# Patient Record
Sex: MALE | Race: OTHER | Employment: OTHER | URBAN - METROPOLITAN AREA
[De-identification: names, ages, dates, MRNs, and addresses within clinical notes are randomized per-mention and may not be internally consistent; named-entity substitution may affect disease eponyms.]

---

## 2017-04-13 ENCOUNTER — HOSPITAL ENCOUNTER (EMERGENCY)
Age: 52
Discharge: HOME | End: 2017-04-13
Attending: EMERGENCY MEDICINE
Payer: COMMERCIAL

## 2017-04-13 ENCOUNTER — HOSPITAL ENCOUNTER (OUTPATIENT)
Age: 52
Setting detail: OUTPATIENT SURGERY
Discharge: ANOTHER HOSPITAL | End: 2017-04-13
Attending: PODIATRIST | Admitting: PODIATRIST
Payer: COMMERCIAL

## 2017-04-13 VITALS
WEIGHT: 202.6 LBS | OXYGEN SATURATION: 100 % | DIASTOLIC BLOOD PRESSURE: 83 MMHG | BODY MASS INDEX: 31.8 KG/M2 | RESPIRATION RATE: 14 BRPM | TEMPERATURE: 97.3 F | HEIGHT: 67 IN | HEART RATE: 74 BPM | SYSTOLIC BLOOD PRESSURE: 206 MMHG

## 2017-04-13 VITALS
HEART RATE: 77 BPM | TEMPERATURE: 97.9 F | RESPIRATION RATE: 16 BRPM | SYSTOLIC BLOOD PRESSURE: 165 MMHG | DIASTOLIC BLOOD PRESSURE: 63 MMHG | OXYGEN SATURATION: 98 %

## 2017-04-13 DIAGNOSIS — I10 ESSENTIAL HYPERTENSION: Primary | ICD-10-CM

## 2017-04-13 LAB
ABSOLUTE EOS #: 0.3 K/UL (ref 0–0.4)
ABSOLUTE LYMPH #: 2.1 K/UL (ref 1–4.8)
ABSOLUTE MONO #: 0.6 K/UL (ref 0.2–0.8)
ANION GAP SERPL CALCULATED.3IONS-SCNC: 14 MMOL/L (ref 9–17)
BASOPHILS # BLD: 0 % (ref 0–2)
BASOPHILS ABSOLUTE: 0 K/UL (ref 0–0.2)
BUN BLDV-MCNC: 12 MG/DL (ref 6–20)
BUN/CREAT BLD: 18 (ref 9–20)
CALCIUM SERPL-MCNC: 8.9 MG/DL (ref 8.6–10.4)
CHLORIDE BLD-SCNC: 100 MMOL/L (ref 98–107)
CO2: 24 MMOL/L (ref 20–31)
CREAT SERPL-MCNC: 0.66 MG/DL (ref 0.7–1.2)
DIFFERENTIAL TYPE: ABNORMAL
EOSINOPHILS RELATIVE PERCENT: 3 % (ref 1–4)
GFR AFRICAN AMERICAN: >60 ML/MIN
GFR NON-AFRICAN AMERICAN: >60 ML/MIN
GFR SERPL CREATININE-BSD FRML MDRD: ABNORMAL ML/MIN/{1.73_M2}
GFR SERPL CREATININE-BSD FRML MDRD: ABNORMAL ML/MIN/{1.73_M2}
GLUCOSE BLD-MCNC: 246 MG/DL (ref 70–99)
HCT VFR BLD CALC: 37.4 % (ref 41–53)
HEMOGLOBIN: 12.1 G/DL (ref 13.5–17.5)
LYMPHOCYTES # BLD: 23 % (ref 24–44)
MCH RBC QN AUTO: 26 PG (ref 26–34)
MCHC RBC AUTO-ENTMCNC: 32.3 G/DL (ref 31–37)
MCV RBC AUTO: 80.6 FL (ref 80–100)
MONOCYTES # BLD: 7 % (ref 1–7)
MYOGLOBIN: 62 NG/ML (ref 28–72)
PDW BLD-RTO: 14.4 % (ref 11.5–14.5)
PLATELET # BLD: 385 K/UL (ref 130–400)
PLATELET ESTIMATE: ABNORMAL
PMV BLD AUTO: 8.8 FL (ref 6–12)
POTASSIUM SERPL-SCNC: 4.6 MMOL/L (ref 3.7–5.3)
RBC # BLD: 4.63 M/UL (ref 4.5–5.9)
RBC # BLD: ABNORMAL 10*6/UL
SEG NEUTROPHILS: 67 % (ref 36–66)
SEGMENTED NEUTROPHILS ABSOLUTE COUNT: 6.2 K/UL (ref 1.8–7.7)
SODIUM BLD-SCNC: 138 MMOL/L (ref 135–144)
TROPONIN INTERP: NORMAL
TROPONIN T: <0.03 NG/ML
WBC # BLD: 9.2 K/UL (ref 3.5–11)
WBC # BLD: ABNORMAL 10*3/UL

## 2017-04-13 PROCEDURE — 7100000010 HC PHASE II RECOVERY - FIRST 15 MIN: Performed by: PODIATRIST

## 2017-04-13 PROCEDURE — 96374 THER/PROPH/DIAG INJ IV PUSH: CPT

## 2017-04-13 PROCEDURE — 96376 TX/PRO/DX INJ SAME DRUG ADON: CPT

## 2017-04-13 PROCEDURE — 3600000012 HC SURGERY LEVEL 2 ADDTL 15MIN: Performed by: PODIATRIST

## 2017-04-13 PROCEDURE — 84484 ASSAY OF TROPONIN QUANT: CPT

## 2017-04-13 PROCEDURE — 99283 EMERGENCY DEPT VISIT LOW MDM: CPT

## 2017-04-13 PROCEDURE — 6370000000 HC RX 637 (ALT 250 FOR IP): Performed by: PODIATRIST

## 2017-04-13 PROCEDURE — 85025 COMPLETE CBC W/AUTO DIFF WBC: CPT

## 2017-04-13 PROCEDURE — 3600000002 HC SURGERY LEVEL 2 BASE: Performed by: PODIATRIST

## 2017-04-13 PROCEDURE — 83874 ASSAY OF MYOGLOBIN: CPT

## 2017-04-13 PROCEDURE — 80048 BASIC METABOLIC PNL TOTAL CA: CPT

## 2017-04-13 PROCEDURE — 6360000002 HC RX W HCPCS: Performed by: NURSE PRACTITIONER

## 2017-04-13 PROCEDURE — 93005 ELECTROCARDIOGRAM TRACING: CPT

## 2017-04-13 PROCEDURE — 7100000011 HC PHASE II RECOVERY - ADDTL 15 MIN: Performed by: PODIATRIST

## 2017-04-13 DEVICE — EPIFIX 2X2CM 4SQ CM: Type: IMPLANTABLE DEVICE | Site: FOOT | Status: FUNCTIONAL

## 2017-04-13 RX ORDER — SODIUM CHLORIDE 0.9 % (FLUSH) 0.9 %
10 SYRINGE (ML) INJECTION PRN
Status: DISCONTINUED | OUTPATIENT
Start: 2017-04-13 | End: 2017-04-13

## 2017-04-13 RX ORDER — SODIUM CHLORIDE, SODIUM LACTATE, POTASSIUM CHLORIDE, CALCIUM CHLORIDE 600; 310; 30; 20 MG/100ML; MG/100ML; MG/100ML; MG/100ML
INJECTION, SOLUTION INTRAVENOUS CONTINUOUS
Status: DISCONTINUED | OUTPATIENT
Start: 2017-04-13 | End: 2017-04-13

## 2017-04-13 RX ORDER — SODIUM CHLORIDE 9 MG/ML
INJECTION, SOLUTION INTRAVENOUS CONTINUOUS
Status: DISCONTINUED | OUTPATIENT
Start: 2017-04-13 | End: 2017-04-13

## 2017-04-13 RX ORDER — HYDRALAZINE HYDROCHLORIDE 20 MG/ML
10 INJECTION INTRAMUSCULAR; INTRAVENOUS ONCE
Status: COMPLETED | OUTPATIENT
Start: 2017-04-13 | End: 2017-04-13

## 2017-04-13 RX ORDER — AMLODIPINE BESYLATE 5 MG/1
5 TABLET ORAL ONCE
Status: COMPLETED | OUTPATIENT
Start: 2017-04-13 | End: 2017-04-13

## 2017-04-13 RX ORDER — LIDOCAINE HYDROCHLORIDE 10 MG/ML
1 INJECTION, SOLUTION EPIDURAL; INFILTRATION; INTRACAUDAL; PERINEURAL
Status: DISCONTINUED | OUTPATIENT
Start: 2017-04-13 | End: 2017-04-13

## 2017-04-13 RX ORDER — OXYCODONE HYDROCHLORIDE AND ACETAMINOPHEN 5; 325 MG/1; MG/1
1 TABLET ORAL EVERY 6 HOURS PRN
Qty: 15 TABLET | Refills: 0 | Status: SHIPPED | OUTPATIENT
Start: 2017-04-13 | End: 2017-04-20

## 2017-04-13 RX ORDER — SODIUM CHLORIDE 0.9 % (FLUSH) 0.9 %
10 SYRINGE (ML) INJECTION EVERY 12 HOURS SCHEDULED
Status: DISCONTINUED | OUTPATIENT
Start: 2017-04-13 | End: 2017-04-13

## 2017-04-13 RX ORDER — LABETALOL 100 MG/1
100 TABLET, FILM COATED ORAL ONCE
Status: COMPLETED | OUTPATIENT
Start: 2017-04-13 | End: 2017-04-13

## 2017-04-13 RX ADMIN — HYDRALAZINE HYDROCHLORIDE 10 MG: 20 INJECTION INTRAMUSCULAR; INTRAVENOUS at 11:46

## 2017-04-13 RX ADMIN — HYDRALAZINE HYDROCHLORIDE 10 MG: 20 INJECTION INTRAMUSCULAR; INTRAVENOUS at 12:43

## 2017-04-13 RX ADMIN — LABETALOL HYDROCHLORIDE 100 MG: 100 TABLET, FILM COATED ORAL at 09:20

## 2017-04-13 RX ADMIN — AMLODIPINE BESYLATE 5 MG: 5 TABLET ORAL at 10:12

## 2017-04-13 ASSESSMENT — PAIN SCALES - GENERAL
PAINLEVEL_OUTOF10: 0

## 2017-04-13 ASSESSMENT — ENCOUNTER SYMPTOMS
CHEST TIGHTNESS: 0
ABDOMINAL PAIN: 0
SHORTNESS OF BREATH: 0
COUGH: 0
PHOTOPHOBIA: 0
EYE PAIN: 0
BACK PAIN: 0

## 2017-04-18 LAB
EKG ATRIAL RATE: 73 BPM
EKG P AXIS: 36 DEGREES
EKG P-R INTERVAL: 146 MS
EKG Q-T INTERVAL: 400 MS
EKG QRS DURATION: 82 MS
EKG QTC CALCULATION (BAZETT): 440 MS
EKG R AXIS: 20 DEGREES
EKG T AXIS: -10 DEGREES
EKG VENTRICULAR RATE: 73 BPM

## 2017-04-26 ENCOUNTER — ANESTHESIA EVENT (OUTPATIENT)
Dept: OPERATING ROOM | Age: 52
DRG: 617 | End: 2017-04-26
Payer: COMMERCIAL

## 2017-04-27 ENCOUNTER — ANESTHESIA (OUTPATIENT)
Dept: OPERATING ROOM | Age: 52
DRG: 617 | End: 2017-04-27
Payer: COMMERCIAL

## 2017-04-27 ENCOUNTER — HOSPITAL ENCOUNTER (INPATIENT)
Age: 52
LOS: 3 days | Discharge: HOME HEALTH | DRG: 617 | End: 2017-04-30
Attending: PODIATRIST | Admitting: PODIATRIST
Payer: COMMERCIAL

## 2017-04-27 ENCOUNTER — APPOINTMENT (OUTPATIENT)
Dept: GENERAL RADIOLOGY | Age: 52
DRG: 617 | End: 2017-04-27
Attending: PODIATRIST
Payer: COMMERCIAL

## 2017-04-27 VITALS — OXYGEN SATURATION: 100 % | SYSTOLIC BLOOD PRESSURE: 175 MMHG | DIASTOLIC BLOOD PRESSURE: 71 MMHG

## 2017-04-27 LAB
ABSOLUTE EOS #: 0.3 K/UL (ref 0–0.4)
ABSOLUTE LYMPH #: 2.4 K/UL (ref 1–4.8)
ABSOLUTE MONO #: 0.9 K/UL (ref 0.1–1.2)
ANION GAP SERPL CALCULATED.3IONS-SCNC: 15 MMOL/L (ref 9–17)
BASOPHILS # BLD: 0 %
BASOPHILS ABSOLUTE: 0 K/UL (ref 0–0.2)
BUN BLDV-MCNC: 14 MG/DL (ref 6–20)
BUN/CREAT BLD: ABNORMAL (ref 9–20)
CALCIUM SERPL-MCNC: 9.2 MG/DL (ref 8.6–10.4)
CHLORIDE BLD-SCNC: 98 MMOL/L (ref 98–107)
CHOLESTEROL/HDL RATIO: 2.9
CHOLESTEROL: 98 MG/DL
CO2: 23 MMOL/L (ref 20–31)
CREAT SERPL-MCNC: 0.7 MG/DL (ref 0.7–1.2)
DIFFERENTIAL TYPE: ABNORMAL
EOSINOPHILS RELATIVE PERCENT: 3 %
ESTIMATED AVERAGE GLUCOSE: 197 MG/DL
GFR AFRICAN AMERICAN: >60 ML/MIN
GFR NON-AFRICAN AMERICAN: >60 ML/MIN
GFR SERPL CREATININE-BSD FRML MDRD: ABNORMAL ML/MIN/{1.73_M2}
GFR SERPL CREATININE-BSD FRML MDRD: ABNORMAL ML/MIN/{1.73_M2}
GLUCOSE BLD-MCNC: 114 MG/DL (ref 75–110)
GLUCOSE BLD-MCNC: 114 MG/DL (ref 75–110)
GLUCOSE BLD-MCNC: 137 MG/DL (ref 75–110)
GLUCOSE BLD-MCNC: 169 MG/DL (ref 75–110)
GLUCOSE BLD-MCNC: 176 MG/DL (ref 70–99)
GLUCOSE BLD-MCNC: 189 MG/DL (ref 75–110)
HBA1C MFR BLD: 8.5 % (ref 4–6)
HCT VFR BLD CALC: 36.1 % (ref 41–53)
HDLC SERPL-MCNC: 34 MG/DL
HEMOGLOBIN: 11.7 G/DL (ref 13.5–17.5)
LDL CHOLESTEROL: 45 MG/DL (ref 0–130)
LYMPHOCYTES # BLD: 24 %
MCH RBC QN AUTO: 25.7 PG (ref 26–34)
MCHC RBC AUTO-ENTMCNC: 32.3 G/DL (ref 31–37)
MCV RBC AUTO: 79.4 FL (ref 80–100)
MONOCYTES # BLD: 9 %
PDW BLD-RTO: 15.4 % (ref 12.5–15.4)
PLATELET # BLD: 267 K/UL (ref 140–450)
PLATELET ESTIMATE: ABNORMAL
PMV BLD AUTO: 9.3 FL (ref 6–12)
POTASSIUM SERPL-SCNC: 4 MMOL/L (ref 3.7–5.3)
RBC # BLD: 4.54 M/UL (ref 4.5–5.9)
RBC # BLD: ABNORMAL 10*6/UL
SEG NEUTROPHILS: 64 %
SEGMENTED NEUTROPHILS ABSOLUTE COUNT: 6.3 K/UL (ref 1.8–7.7)
SODIUM BLD-SCNC: 136 MMOL/L (ref 135–144)
TRIGL SERPL-MCNC: 96 MG/DL
VLDLC SERPL CALC-MCNC: ABNORMAL MG/DL (ref 1–30)
WBC # BLD: 9.9 K/UL (ref 3.5–11)
WBC # BLD: ABNORMAL 10*3/UL

## 2017-04-27 PROCEDURE — 87070 CULTURE OTHR SPECIMN AEROBIC: CPT

## 2017-04-27 PROCEDURE — 2500000003 HC RX 250 WO HCPCS: Performed by: NURSE PRACTITIONER

## 2017-04-27 PROCEDURE — 80048 BASIC METABOLIC PNL TOTAL CA: CPT

## 2017-04-27 PROCEDURE — 73630 X-RAY EXAM OF FOOT: CPT

## 2017-04-27 PROCEDURE — 6360000002 HC RX W HCPCS: Performed by: NURSE ANESTHETIST, CERTIFIED REGISTERED

## 2017-04-27 PROCEDURE — 3700000001 HC ADD 15 MINUTES (ANESTHESIA): Performed by: PODIATRIST

## 2017-04-27 PROCEDURE — 2580000003 HC RX 258: Performed by: INTERNAL MEDICINE

## 2017-04-27 PROCEDURE — 88311 DECALCIFY TISSUE: CPT

## 2017-04-27 PROCEDURE — 2500000003 HC RX 250 WO HCPCS: Performed by: NURSE ANESTHETIST, CERTIFIED REGISTERED

## 2017-04-27 PROCEDURE — 2500000003 HC RX 250 WO HCPCS: Performed by: PODIATRIST

## 2017-04-27 PROCEDURE — 88305 TISSUE EXAM BY PATHOLOGIST: CPT

## 2017-04-27 PROCEDURE — 6360000002 HC RX W HCPCS: Performed by: INTERNAL MEDICINE

## 2017-04-27 PROCEDURE — 2580000003 HC RX 258: Performed by: PODIATRIST

## 2017-04-27 PROCEDURE — 7100000010 HC PHASE II RECOVERY - FIRST 15 MIN: Performed by: PODIATRIST

## 2017-04-27 PROCEDURE — 83036 HEMOGLOBIN GLYCOSYLATED A1C: CPT

## 2017-04-27 PROCEDURE — 6360000002 HC RX W HCPCS: Performed by: ANESTHESIOLOGY

## 2017-04-27 PROCEDURE — 87176 TISSUE HOMOGENIZATION CULTR: CPT

## 2017-04-27 PROCEDURE — 0Y6S0Z0 DETACHMENT AT LEFT 2ND TOE, COMPLETE, OPEN APPROACH: ICD-10-PCS | Performed by: PODIATRIST

## 2017-04-27 PROCEDURE — 87205 SMEAR GRAM STAIN: CPT

## 2017-04-27 PROCEDURE — 6370000000 HC RX 637 (ALT 250 FOR IP): Performed by: PODIATRIST

## 2017-04-27 PROCEDURE — 87181 SC STD AGAR DILUTION PER AGT: CPT

## 2017-04-27 PROCEDURE — 6360000002 HC RX W HCPCS: Performed by: FAMILY MEDICINE

## 2017-04-27 PROCEDURE — 88307 TISSUE EXAM BY PATHOLOGIST: CPT

## 2017-04-27 PROCEDURE — 6360000002 HC RX W HCPCS: Performed by: PODIATRIST

## 2017-04-27 PROCEDURE — 80061 LIPID PANEL: CPT

## 2017-04-27 PROCEDURE — 1200000000 HC SEMI PRIVATE

## 2017-04-27 PROCEDURE — 3700000000 HC ANESTHESIA ATTENDED CARE: Performed by: PODIATRIST

## 2017-04-27 PROCEDURE — 82947 ASSAY GLUCOSE BLOOD QUANT: CPT

## 2017-04-27 PROCEDURE — 2720000010 HC SURG SUPPLY STERILE: Performed by: PODIATRIST

## 2017-04-27 PROCEDURE — 0Y9N0ZZ DRAINAGE OF LEFT FOOT, OPEN APPROACH: ICD-10-PCS | Performed by: PODIATRIST

## 2017-04-27 PROCEDURE — 7100000011 HC PHASE II RECOVERY - ADDTL 15 MIN: Performed by: PODIATRIST

## 2017-04-27 PROCEDURE — 3600000003 HC SURGERY LEVEL 3 BASE: Performed by: PODIATRIST

## 2017-04-27 PROCEDURE — 3600000013 HC SURGERY LEVEL 3 ADDTL 15MIN: Performed by: PODIATRIST

## 2017-04-27 PROCEDURE — 6370000000 HC RX 637 (ALT 250 FOR IP): Performed by: FAMILY MEDICINE

## 2017-04-27 PROCEDURE — 2580000003 HC RX 258: Performed by: ANESTHESIOLOGY

## 2017-04-27 PROCEDURE — 87075 CULTR BACTERIA EXCEPT BLOOD: CPT

## 2017-04-27 PROCEDURE — 85025 COMPLETE CBC W/AUTO DIFF WBC: CPT

## 2017-04-27 PROCEDURE — 99221 1ST HOSP IP/OBS SF/LOW 40: CPT | Performed by: FAMILY MEDICINE

## 2017-04-27 RX ORDER — INSULIN GLARGINE 100 [IU]/ML
90 INJECTION, SOLUTION SUBCUTANEOUS NIGHTLY
Status: DISCONTINUED | OUTPATIENT
Start: 2017-04-27 | End: 2017-04-30 | Stop reason: HOSPADM

## 2017-04-27 RX ORDER — HYDRALAZINE HYDROCHLORIDE 50 MG/1
50 TABLET, FILM COATED ORAL 3 TIMES DAILY
Status: DISCONTINUED | OUTPATIENT
Start: 2017-04-27 | End: 2017-04-28

## 2017-04-27 RX ORDER — OXYCODONE HYDROCHLORIDE AND ACETAMINOPHEN 5; 325 MG/1; MG/1
1 TABLET ORAL EVERY 4 HOURS PRN
Status: DISCONTINUED | OUTPATIENT
Start: 2017-04-27 | End: 2017-04-30 | Stop reason: HOSPADM

## 2017-04-27 RX ORDER — LIDOCAINE HYDROCHLORIDE 10 MG/ML
INJECTION, SOLUTION EPIDURAL; INFILTRATION; INTRACAUDAL; PERINEURAL PRN
Status: DISCONTINUED | OUTPATIENT
Start: 2017-04-27 | End: 2017-04-27 | Stop reason: SDUPTHER

## 2017-04-27 RX ORDER — NICOTINE POLACRILEX 4 MG
15 LOZENGE BUCCAL PRN
Status: DISCONTINUED | OUTPATIENT
Start: 2017-04-27 | End: 2017-04-30 | Stop reason: HOSPADM

## 2017-04-27 RX ORDER — HYDRALAZINE HYDROCHLORIDE 20 MG/ML
10 INJECTION INTRAMUSCULAR; INTRAVENOUS EVERY 6 HOURS PRN
Status: DISCONTINUED | OUTPATIENT
Start: 2017-04-27 | End: 2017-04-30 | Stop reason: HOSPADM

## 2017-04-27 RX ORDER — METOPROLOL TARTRATE 5 MG/5ML
5 INJECTION INTRAVENOUS EVERY 4 HOURS PRN
Status: DISCONTINUED | OUTPATIENT
Start: 2017-04-27 | End: 2017-04-30 | Stop reason: HOSPADM

## 2017-04-27 RX ORDER — BUPIVACAINE HYDROCHLORIDE 5 MG/ML
INJECTION, SOLUTION EPIDURAL; INTRACAUDAL PRN
Status: DISCONTINUED | OUTPATIENT
Start: 2017-04-27 | End: 2017-04-27

## 2017-04-27 RX ORDER — LISINOPRIL 20 MG/1
20 TABLET ORAL DAILY
Status: DISCONTINUED | OUTPATIENT
Start: 2017-04-27 | End: 2017-04-28

## 2017-04-27 RX ORDER — DEXTROSE MONOHYDRATE 25 G/50ML
12.5 INJECTION, SOLUTION INTRAVENOUS PRN
Status: DISCONTINUED | OUTPATIENT
Start: 2017-04-27 | End: 2017-04-30 | Stop reason: HOSPADM

## 2017-04-27 RX ORDER — GLIMEPIRIDE 2 MG/1
1 TABLET ORAL 2 TIMES DAILY
Status: DISCONTINUED | OUTPATIENT
Start: 2017-04-27 | End: 2017-04-30 | Stop reason: HOSPADM

## 2017-04-27 RX ORDER — PROPOFOL 10 MG/ML
INJECTION, EMULSION INTRAVENOUS PRN
Status: DISCONTINUED | OUTPATIENT
Start: 2017-04-27 | End: 2017-04-27 | Stop reason: SDUPTHER

## 2017-04-27 RX ORDER — HYDRALAZINE HYDROCHLORIDE 25 MG/1
25 TABLET, FILM COATED ORAL 3 TIMES DAILY
Status: DISCONTINUED | OUTPATIENT
Start: 2017-04-27 | End: 2017-04-27

## 2017-04-27 RX ORDER — SODIUM CHLORIDE 0.9 % (FLUSH) 0.9 %
10 SYRINGE (ML) INJECTION PRN
Status: DISCONTINUED | OUTPATIENT
Start: 2017-04-27 | End: 2017-04-30 | Stop reason: HOSPADM

## 2017-04-27 RX ORDER — CAPTOPRIL 12.5 MG/1
12.5 TABLET ORAL 3 TIMES DAILY
Status: DISCONTINUED | OUTPATIENT
Start: 2017-04-27 | End: 2017-04-27

## 2017-04-27 RX ORDER — TIMOLOL MALEATE 2.5 MG/ML
1 SOLUTION OPHTHALMIC DAILY
Status: ON HOLD | COMMUNITY
End: 2020-07-14

## 2017-04-27 RX ORDER — MORPHINE SULFATE 4 MG/ML
4 INJECTION, SOLUTION INTRAMUSCULAR; INTRAVENOUS
Status: DISCONTINUED | OUTPATIENT
Start: 2017-04-27 | End: 2017-04-30 | Stop reason: HOSPADM

## 2017-04-27 RX ORDER — OXYCODONE HYDROCHLORIDE AND ACETAMINOPHEN 5; 325 MG/1; MG/1
2 TABLET ORAL EVERY 4 HOURS PRN
Status: DISCONTINUED | OUTPATIENT
Start: 2017-04-27 | End: 2017-04-30 | Stop reason: HOSPADM

## 2017-04-27 RX ORDER — LATANOPROST 50 UG/ML
1 SOLUTION/ DROPS OPHTHALMIC NIGHTLY
Status: DISCONTINUED | OUTPATIENT
Start: 2017-04-27 | End: 2017-04-30 | Stop reason: HOSPADM

## 2017-04-27 RX ORDER — ONDANSETRON 2 MG/ML
4 INJECTION INTRAMUSCULAR; INTRAVENOUS EVERY 6 HOURS PRN
Status: DISCONTINUED | OUTPATIENT
Start: 2017-04-27 | End: 2017-04-30 | Stop reason: HOSPADM

## 2017-04-27 RX ORDER — PRAVASTATIN SODIUM 10 MG
10 TABLET ORAL DAILY
Status: ON HOLD | COMMUNITY
End: 2017-09-25 | Stop reason: HOSPADM

## 2017-04-27 RX ORDER — MAGNESIUM HYDROXIDE 1200 MG/15ML
LIQUID ORAL CONTINUOUS PRN
Status: DISCONTINUED | OUTPATIENT
Start: 2017-04-27 | End: 2017-04-27

## 2017-04-27 RX ORDER — HYDRALAZINE HYDROCHLORIDE 25 MG/1
25 TABLET, FILM COATED ORAL 3 TIMES DAILY
Status: ON HOLD | COMMUNITY
End: 2017-04-30 | Stop reason: HOSPADM

## 2017-04-27 RX ORDER — MIDAZOLAM HYDROCHLORIDE 1 MG/ML
2 INJECTION INTRAMUSCULAR; INTRAVENOUS ONCE
Status: COMPLETED | OUTPATIENT
Start: 2017-04-27 | End: 2017-04-27

## 2017-04-27 RX ORDER — INSULIN GLARGINE 100 [IU]/ML
INJECTION, SOLUTION SUBCUTANEOUS NIGHTLY
Status: ON HOLD | COMMUNITY
End: 2017-08-31 | Stop reason: CLARIF

## 2017-04-27 RX ORDER — CYCLOBENZAPRINE HCL 5 MG
5 TABLET ORAL 3 TIMES DAILY PRN
Status: ON HOLD | COMMUNITY
End: 2017-09-25 | Stop reason: HOSPADM

## 2017-04-27 RX ORDER — KETAMINE HYDROCHLORIDE 100 MG/ML
INJECTION, SOLUTION INTRAMUSCULAR; INTRAVENOUS PRN
Status: DISCONTINUED | OUTPATIENT
Start: 2017-04-27 | End: 2017-04-27 | Stop reason: SDUPTHER

## 2017-04-27 RX ORDER — HYDRALAZINE HYDROCHLORIDE 25 MG/1
25 TABLET, FILM COATED ORAL ONCE
Status: COMPLETED | OUTPATIENT
Start: 2017-04-27 | End: 2017-04-27

## 2017-04-27 RX ORDER — SODIUM CHLORIDE 0.9 % (FLUSH) 0.9 %
10 SYRINGE (ML) INJECTION EVERY 12 HOURS SCHEDULED
Status: DISCONTINUED | OUTPATIENT
Start: 2017-04-27 | End: 2017-04-30 | Stop reason: HOSPADM

## 2017-04-27 RX ORDER — ACETAMINOPHEN 325 MG/1
650 TABLET ORAL EVERY 4 HOURS PRN
Status: DISCONTINUED | OUTPATIENT
Start: 2017-04-27 | End: 2017-04-30 | Stop reason: HOSPADM

## 2017-04-27 RX ORDER — FENTANYL CITRATE 50 UG/ML
25 INJECTION, SOLUTION INTRAMUSCULAR; INTRAVENOUS EVERY 5 MIN PRN
Status: DISCONTINUED | OUTPATIENT
Start: 2017-04-27 | End: 2017-04-27

## 2017-04-27 RX ORDER — SODIUM CHLORIDE, SODIUM LACTATE, POTASSIUM CHLORIDE, CALCIUM CHLORIDE 600; 310; 30; 20 MG/100ML; MG/100ML; MG/100ML; MG/100ML
INJECTION, SOLUTION INTRAVENOUS CONTINUOUS
Status: DISCONTINUED | OUTPATIENT
Start: 2017-04-27 | End: 2017-04-27

## 2017-04-27 RX ORDER — MORPHINE SULFATE 2 MG/ML
2 INJECTION, SOLUTION INTRAMUSCULAR; INTRAVENOUS
Status: DISCONTINUED | OUTPATIENT
Start: 2017-04-27 | End: 2017-04-30 | Stop reason: HOSPADM

## 2017-04-27 RX ORDER — INSULIN GLARGINE 100 [IU]/ML
0.9 INJECTION, SOLUTION SUBCUTANEOUS NIGHTLY
Status: DISCONTINUED | OUTPATIENT
Start: 2017-04-27 | End: 2017-04-27

## 2017-04-27 RX ORDER — CEPHALEXIN 500 MG/1
500 CAPSULE ORAL
Status: ON HOLD | COMMUNITY
Start: 2016-11-07 | End: 2017-04-27

## 2017-04-27 RX ORDER — CAPTOPRIL 12.5 MG/1
12.5 TABLET ORAL 3 TIMES DAILY
Status: ON HOLD | COMMUNITY
End: 2017-04-30 | Stop reason: HOSPADM

## 2017-04-27 RX ORDER — DEXTROSE MONOHYDRATE 50 MG/ML
100 INJECTION, SOLUTION INTRAVENOUS PRN
Status: DISCONTINUED | OUTPATIENT
Start: 2017-04-27 | End: 2017-04-30 | Stop reason: HOSPADM

## 2017-04-27 RX ORDER — LIDOCAINE HYDROCHLORIDE 10 MG/ML
1 INJECTION, SOLUTION EPIDURAL; INFILTRATION; INTRACAUDAL; PERINEURAL
Status: DISCONTINUED | OUTPATIENT
Start: 2017-04-27 | End: 2017-04-27

## 2017-04-27 RX ORDER — CYCLOBENZAPRINE HCL 5 MG
5 TABLET ORAL 3 TIMES DAILY PRN
Status: DISCONTINUED | OUTPATIENT
Start: 2017-04-27 | End: 2017-04-30 | Stop reason: HOSPADM

## 2017-04-27 RX ORDER — PRAVASTATIN SODIUM 20 MG
10 TABLET ORAL DAILY
Status: DISCONTINUED | OUTPATIENT
Start: 2017-04-27 | End: 2017-04-30 | Stop reason: HOSPADM

## 2017-04-27 RX ADMIN — METFORMIN HYDROCHLORIDE 500 MG: 500 TABLET ORAL at 11:47

## 2017-04-27 RX ADMIN — HYDRALAZINE HYDROCHLORIDE 25 MG: 25 TABLET, FILM COATED ORAL at 11:46

## 2017-04-27 RX ADMIN — METFORMIN HYDROCHLORIDE 500 MG: 500 TABLET ORAL at 17:17

## 2017-04-27 RX ADMIN — HYDRALAZINE HYDROCHLORIDE 10 MG: 20 INJECTION INTRAMUSCULAR; INTRAVENOUS at 20:23

## 2017-04-27 RX ADMIN — MIDAZOLAM HYDROCHLORIDE 2 MG: 1 INJECTION, SOLUTION INTRAMUSCULAR; INTRAVENOUS at 08:58

## 2017-04-27 RX ADMIN — PROPOFOL 100 MG: 10 INJECTION, EMULSION INTRAVENOUS at 09:19

## 2017-04-27 RX ADMIN — INSULIN GLARGINE 90 UNITS: 100 INJECTION, SOLUTION SUBCUTANEOUS at 20:36

## 2017-04-27 RX ADMIN — CEFTRIAXONE SODIUM 1 G: 1 INJECTION, POWDER, FOR SOLUTION INTRAMUSCULAR; INTRAVENOUS at 15:13

## 2017-04-27 RX ADMIN — LIDOCAINE HYDROCHLORIDE 50 MG: 10 INJECTION, SOLUTION EPIDURAL; INFILTRATION; INTRACAUDAL; PERINEURAL at 09:19

## 2017-04-27 RX ADMIN — TIMOLOL MALEATE 1 DROP: 2.5 SOLUTION/ DROPS OPHTHALMIC at 20:22

## 2017-04-27 RX ADMIN — OXYCODONE HYDROCHLORIDE AND ACETAMINOPHEN 1 TABLET: 5; 325 TABLET ORAL at 20:28

## 2017-04-27 RX ADMIN — PRAVASTATIN SODIUM 10 MG: 20 TABLET ORAL at 11:47

## 2017-04-27 RX ADMIN — KETAMINE HYDROCHLORIDE 20 MG: 100 INJECTION, SOLUTION, CONCENTRATE INTRAMUSCULAR; INTRAVENOUS at 09:19

## 2017-04-27 RX ADMIN — HYDRALAZINE HYDROCHLORIDE 10 MG: 20 INJECTION INTRAMUSCULAR; INTRAVENOUS at 14:30

## 2017-04-27 RX ADMIN — HYDRALAZINE HYDROCHLORIDE 25 MG: 25 TABLET, FILM COATED ORAL at 17:17

## 2017-04-27 RX ADMIN — SODIUM CHLORIDE, POTASSIUM CHLORIDE, SODIUM LACTATE AND CALCIUM CHLORIDE: 600; 310; 30; 20 INJECTION, SOLUTION INTRAVENOUS at 08:00

## 2017-04-27 RX ADMIN — HYDRALAZINE HYDROCHLORIDE 50 MG: 50 TABLET, FILM COATED ORAL at 20:22

## 2017-04-27 RX ADMIN — VANCOMYCIN HYDROCHLORIDE 1500 MG: 1 INJECTION, POWDER, LYOPHILIZED, FOR SOLUTION INTRAVENOUS at 11:47

## 2017-04-27 RX ADMIN — METOPROLOL TARTRATE 5 MG: 5 INJECTION INTRAVENOUS at 21:36

## 2017-04-27 RX ADMIN — Medication 2 G: at 09:21

## 2017-04-27 RX ADMIN — LISINOPRIL 20 MG: 20 TABLET ORAL at 15:24

## 2017-04-27 RX ADMIN — LATANOPROST 1 DROP: 50 SOLUTION OPHTHALMIC at 20:22

## 2017-04-27 RX ADMIN — CAPTOPRIL 12.5 MG: 12.5 TABLET ORAL at 11:46

## 2017-04-27 ASSESSMENT — ENCOUNTER SYMPTOMS
SHORTNESS OF BREATH: 0
CONSTIPATION: 0
BLOOD IN STOOL: 0
DIARRHEA: 0
COLOR CHANGE: 1
SORE THROAT: 0
COUGH: 0
SINUS PRESSURE: 0
ABDOMINAL PAIN: 0
VOICE CHANGE: 0
NAUSEA: 0
WHEEZING: 0
VOMITING: 0

## 2017-04-27 ASSESSMENT — PAIN SCALES - GENERAL
PAINLEVEL_OUTOF10: 0
PAINLEVEL_OUTOF10: 4
PAINLEVEL_OUTOF10: 2
PAINLEVEL_OUTOF10: 0
PAINLEVEL_OUTOF10: 0

## 2017-04-27 ASSESSMENT — PAIN - FUNCTIONAL ASSESSMENT: PAIN_FUNCTIONAL_ASSESSMENT: 0-10

## 2017-04-28 LAB
GLUCOSE BLD-MCNC: 102 MG/DL (ref 75–110)
GLUCOSE BLD-MCNC: 103 MG/DL (ref 75–110)
GLUCOSE BLD-MCNC: 109 MG/DL (ref 75–110)
GLUCOSE BLD-MCNC: 110 MG/DL (ref 75–110)
GLUCOSE BLD-MCNC: 137 MG/DL (ref 75–110)
TSH SERPL DL<=0.05 MIU/L-ACNC: 2.92 MIU/L (ref 0.3–5)
VANCOMYCIN RANDOM DATE LAST DOSE: NORMAL
VANCOMYCIN RANDOM DOSE AMOUNT: NORMAL
VANCOMYCIN RANDOM TIME LAST DOSE: NORMAL
VANCOMYCIN RANDOM: 10.5 UG/ML

## 2017-04-28 PROCEDURE — 97165 OT EVAL LOW COMPLEX 30 MIN: CPT

## 2017-04-28 PROCEDURE — 82947 ASSAY GLUCOSE BLOOD QUANT: CPT

## 2017-04-28 PROCEDURE — 6370000000 HC RX 637 (ALT 250 FOR IP): Performed by: PODIATRIST

## 2017-04-28 PROCEDURE — 6370000000 HC RX 637 (ALT 250 FOR IP): Performed by: FAMILY MEDICINE

## 2017-04-28 PROCEDURE — 02HV33Z INSERTION OF INFUSION DEVICE INTO SUPERIOR VENA CAVA, PERCUTANEOUS APPROACH: ICD-10-PCS | Performed by: INTERNAL MEDICINE

## 2017-04-28 PROCEDURE — G8978 MOBILITY CURRENT STATUS: HCPCS

## 2017-04-28 PROCEDURE — 2580000003 HC RX 258: Performed by: INTERNAL MEDICINE

## 2017-04-28 PROCEDURE — 6360000002 HC RX W HCPCS: Performed by: INTERNAL MEDICINE

## 2017-04-28 PROCEDURE — 97535 SELF CARE MNGMENT TRAINING: CPT

## 2017-04-28 PROCEDURE — 36569 INSJ PICC 5 YR+ W/O IMAGING: CPT

## 2017-04-28 PROCEDURE — 80202 ASSAY OF VANCOMYCIN: CPT

## 2017-04-28 PROCEDURE — G8987 SELF CARE CURRENT STATUS: HCPCS

## 2017-04-28 PROCEDURE — 6360000002 HC RX W HCPCS: Performed by: FAMILY MEDICINE

## 2017-04-28 PROCEDURE — C1751 CATH, INF, PER/CENT/MIDLINE: HCPCS

## 2017-04-28 PROCEDURE — 2500000003 HC RX 250 WO HCPCS: Performed by: NURSE PRACTITIONER

## 2017-04-28 PROCEDURE — G8989 SELF CARE D/C STATUS: HCPCS

## 2017-04-28 PROCEDURE — 6360000002 HC RX W HCPCS: Performed by: PODIATRIST

## 2017-04-28 PROCEDURE — 97161 PT EVAL LOW COMPLEX 20 MIN: CPT

## 2017-04-28 PROCEDURE — G8980 MOBILITY D/C STATUS: HCPCS

## 2017-04-28 PROCEDURE — B548ZZA ULTRASONOGRAPHY OF SUPERIOR VENA CAVA, GUIDANCE: ICD-10-PCS | Performed by: INTERNAL MEDICINE

## 2017-04-28 PROCEDURE — 36415 COLL VENOUS BLD VENIPUNCTURE: CPT

## 2017-04-28 PROCEDURE — G8988 SELF CARE GOAL STATUS: HCPCS

## 2017-04-28 PROCEDURE — 76937 US GUIDE VASCULAR ACCESS: CPT

## 2017-04-28 PROCEDURE — 2580000003 HC RX 258: Performed by: PODIATRIST

## 2017-04-28 PROCEDURE — 1200000000 HC SEMI PRIVATE

## 2017-04-28 PROCEDURE — G8979 MOBILITY GOAL STATUS: HCPCS

## 2017-04-28 PROCEDURE — 99232 SBSQ HOSP IP/OBS MODERATE 35: CPT | Performed by: FAMILY MEDICINE

## 2017-04-28 PROCEDURE — 84443 ASSAY THYROID STIM HORMONE: CPT

## 2017-04-28 PROCEDURE — 97530 THERAPEUTIC ACTIVITIES: CPT

## 2017-04-28 RX ORDER — HYDRALAZINE HYDROCHLORIDE 50 MG/1
100 TABLET, FILM COATED ORAL 3 TIMES DAILY
Status: DISCONTINUED | OUTPATIENT
Start: 2017-04-28 | End: 2017-04-30 | Stop reason: HOSPADM

## 2017-04-28 RX ORDER — LISINOPRIL 20 MG/1
40 TABLET ORAL DAILY
Status: DISCONTINUED | OUTPATIENT
Start: 2017-04-29 | End: 2017-04-30 | Stop reason: HOSPADM

## 2017-04-28 RX ORDER — FAMOTIDINE 20 MG/1
20 TABLET, FILM COATED ORAL 2 TIMES DAILY
Status: DISCONTINUED | OUTPATIENT
Start: 2017-04-28 | End: 2017-04-30 | Stop reason: HOSPADM

## 2017-04-28 RX ADMIN — HYDRALAZINE HYDROCHLORIDE 100 MG: 50 TABLET, FILM COATED ORAL at 13:59

## 2017-04-28 RX ADMIN — TIMOLOL MALEATE 1 DROP: 2.5 SOLUTION/ DROPS OPHTHALMIC at 09:23

## 2017-04-28 RX ADMIN — INSULIN GLARGINE 90 UNITS: 100 INJECTION, SOLUTION SUBCUTANEOUS at 21:37

## 2017-04-28 RX ADMIN — HYDRALAZINE HYDROCHLORIDE 50 MG: 50 TABLET, FILM COATED ORAL at 09:22

## 2017-04-28 RX ADMIN — METFORMIN HYDROCHLORIDE 500 MG: 500 TABLET ORAL at 09:22

## 2017-04-28 RX ADMIN — VANCOMYCIN HYDROCHLORIDE 1250 MG: 10 INJECTION, POWDER, LYOPHILIZED, FOR SOLUTION INTRAVENOUS at 16:41

## 2017-04-28 RX ADMIN — VANCOMYCIN HYDROCHLORIDE 1250 MG: 10 INJECTION, POWDER, LYOPHILIZED, FOR SOLUTION INTRAVENOUS at 23:53

## 2017-04-28 RX ADMIN — HYDRALAZINE HYDROCHLORIDE 100 MG: 50 TABLET, FILM COATED ORAL at 20:30

## 2017-04-28 RX ADMIN — METOPROLOL TARTRATE 5 MG: 5 INJECTION INTRAVENOUS at 17:45

## 2017-04-28 RX ADMIN — OXYCODONE HYDROCHLORIDE AND ACETAMINOPHEN 1 TABLET: 5; 325 TABLET ORAL at 19:23

## 2017-04-28 RX ADMIN — METOPROLOL TARTRATE 5 MG: 5 INJECTION INTRAVENOUS at 21:24

## 2017-04-28 RX ADMIN — HYDRALAZINE HYDROCHLORIDE 10 MG: 20 INJECTION INTRAMUSCULAR; INTRAVENOUS at 23:50

## 2017-04-28 RX ADMIN — LATANOPROST 1 DROP: 50 SOLUTION OPHTHALMIC at 20:30

## 2017-04-28 RX ADMIN — CEFTRIAXONE SODIUM 2 G: 2 INJECTION, POWDER, FOR SOLUTION INTRAMUSCULAR; INTRAVENOUS at 18:16

## 2017-04-28 RX ADMIN — Medication 10 ML: at 21:24

## 2017-04-28 RX ADMIN — VANCOMYCIN HYDROCHLORIDE 1250 MG: 10 INJECTION, POWDER, LYOPHILIZED, FOR SOLUTION INTRAVENOUS at 00:38

## 2017-04-28 RX ADMIN — TIMOLOL MALEATE 1 DROP: 2.5 SOLUTION/ DROPS OPHTHALMIC at 20:30

## 2017-04-28 RX ADMIN — METFORMIN HYDROCHLORIDE 500 MG: 500 TABLET ORAL at 16:44

## 2017-04-28 RX ADMIN — LISINOPRIL 20 MG: 20 TABLET ORAL at 09:22

## 2017-04-28 RX ADMIN — FAMOTIDINE 20 MG: 20 TABLET, FILM COATED ORAL at 18:13

## 2017-04-28 RX ADMIN — ENOXAPARIN SODIUM 40 MG: 40 INJECTION SUBCUTANEOUS at 11:25

## 2017-04-28 RX ADMIN — PRAVASTATIN SODIUM 10 MG: 20 TABLET ORAL at 09:22

## 2017-04-28 ASSESSMENT — ENCOUNTER SYMPTOMS
SINUS PRESSURE: 0
SORE THROAT: 0
VOICE CHANGE: 0
BLOOD IN STOOL: 0
VOMITING: 0
COUGH: 0
SHORTNESS OF BREATH: 0
CONSTIPATION: 0
DIARRHEA: 0
NAUSEA: 0
WHEEZING: 0
ABDOMINAL PAIN: 0

## 2017-04-28 ASSESSMENT — PAIN SCALES - GENERAL
PAINLEVEL_OUTOF10: 4
PAINLEVEL_OUTOF10: 0

## 2017-04-29 LAB
GLUCOSE BLD-MCNC: 105 MG/DL (ref 75–110)
GLUCOSE BLD-MCNC: 134 MG/DL (ref 75–110)
GLUCOSE BLD-MCNC: 64 MG/DL (ref 75–110)
GLUCOSE BLD-MCNC: 72 MG/DL (ref 75–110)
GLUCOSE BLD-MCNC: 72 MG/DL (ref 75–110)
GLUCOSE BLD-MCNC: 80 MG/DL (ref 75–110)

## 2017-04-29 PROCEDURE — 6370000000 HC RX 637 (ALT 250 FOR IP): Performed by: NURSE PRACTITIONER

## 2017-04-29 PROCEDURE — 93975 VASCULAR STUDY: CPT

## 2017-04-29 PROCEDURE — 6370000000 HC RX 637 (ALT 250 FOR IP): Performed by: FAMILY MEDICINE

## 2017-04-29 PROCEDURE — 6360000002 HC RX W HCPCS: Performed by: FAMILY MEDICINE

## 2017-04-29 PROCEDURE — 6360000002 HC RX W HCPCS: Performed by: INTERNAL MEDICINE

## 2017-04-29 PROCEDURE — 1200000000 HC SEMI PRIVATE

## 2017-04-29 PROCEDURE — 2580000003 HC RX 258: Performed by: PODIATRIST

## 2017-04-29 PROCEDURE — 2500000003 HC RX 250 WO HCPCS: Performed by: NURSE PRACTITIONER

## 2017-04-29 PROCEDURE — 99232 SBSQ HOSP IP/OBS MODERATE 35: CPT | Performed by: FAMILY MEDICINE

## 2017-04-29 PROCEDURE — 82947 ASSAY GLUCOSE BLOOD QUANT: CPT

## 2017-04-29 PROCEDURE — 6360000002 HC RX W HCPCS: Performed by: PODIATRIST

## 2017-04-29 PROCEDURE — 2580000003 HC RX 258: Performed by: INTERNAL MEDICINE

## 2017-04-29 PROCEDURE — 6370000000 HC RX 637 (ALT 250 FOR IP): Performed by: PODIATRIST

## 2017-04-29 RX ORDER — AMLODIPINE BESYLATE 10 MG/1
10 TABLET ORAL DAILY
Status: DISCONTINUED | OUTPATIENT
Start: 2017-04-29 | End: 2017-04-30 | Stop reason: HOSPADM

## 2017-04-29 RX ORDER — CLONIDINE HYDROCHLORIDE 0.1 MG/1
0.1 TABLET ORAL EVERY 4 HOURS PRN
Status: DISCONTINUED | OUTPATIENT
Start: 2017-04-29 | End: 2017-04-29

## 2017-04-29 RX ORDER — CALCIUM CARBONATE 200(500)MG
500 TABLET,CHEWABLE ORAL 3 TIMES DAILY PRN
Status: DISCONTINUED | OUTPATIENT
Start: 2017-04-29 | End: 2017-04-30 | Stop reason: HOSPADM

## 2017-04-29 RX ADMIN — FAMOTIDINE 20 MG: 20 TABLET, FILM COATED ORAL at 07:33

## 2017-04-29 RX ADMIN — INSULIN GLARGINE 90 UNITS: 100 INJECTION, SOLUTION SUBCUTANEOUS at 21:58

## 2017-04-29 RX ADMIN — METOPROLOL TARTRATE 5 MG: 5 INJECTION INTRAVENOUS at 17:02

## 2017-04-29 RX ADMIN — DEXTROSE MONOHYDRATE 12.5 G: 500 INJECTION PARENTERAL at 11:57

## 2017-04-29 RX ADMIN — METOPROLOL TARTRATE 5 MG: 5 INJECTION INTRAVENOUS at 08:56

## 2017-04-29 RX ADMIN — ANTACID TABLETS 500 MG: 500 TABLET, CHEWABLE ORAL at 00:31

## 2017-04-29 RX ADMIN — FAMOTIDINE 20 MG: 20 TABLET, FILM COATED ORAL at 21:57

## 2017-04-29 RX ADMIN — METOPROLOL TARTRATE 5 MG: 5 INJECTION INTRAVENOUS at 04:00

## 2017-04-29 RX ADMIN — HYDRALAZINE HYDROCHLORIDE 100 MG: 50 TABLET, FILM COATED ORAL at 21:57

## 2017-04-29 RX ADMIN — Medication 10 ML: at 21:57

## 2017-04-29 RX ADMIN — CEFTRIAXONE SODIUM 2 G: 2 INJECTION, POWDER, FOR SOLUTION INTRAMUSCULAR; INTRAVENOUS at 17:42

## 2017-04-29 RX ADMIN — HYDRALAZINE HYDROCHLORIDE 100 MG: 50 TABLET, FILM COATED ORAL at 13:29

## 2017-04-29 RX ADMIN — PRAVASTATIN SODIUM 10 MG: 20 TABLET ORAL at 07:32

## 2017-04-29 RX ADMIN — LATANOPROST 1 DROP: 50 SOLUTION OPHTHALMIC at 22:08

## 2017-04-29 RX ADMIN — METOPROLOL TARTRATE 5 MG: 5 INJECTION INTRAVENOUS at 23:40

## 2017-04-29 RX ADMIN — TIMOLOL MALEATE 1 DROP: 2.5 SOLUTION/ DROPS OPHTHALMIC at 07:30

## 2017-04-29 RX ADMIN — VANCOMYCIN HYDROCHLORIDE 1250 MG: 10 INJECTION, POWDER, LYOPHILIZED, FOR SOLUTION INTRAVENOUS at 11:12

## 2017-04-29 RX ADMIN — CLONIDINE HYDROCHLORIDE 0.1 MG: 0.1 TABLET ORAL at 05:45

## 2017-04-29 RX ADMIN — AMLODIPINE BESYLATE 10 MG: 10 TABLET ORAL at 08:52

## 2017-04-29 RX ADMIN — ENOXAPARIN SODIUM 40 MG: 40 INJECTION SUBCUTANEOUS at 07:33

## 2017-04-29 RX ADMIN — METFORMIN HYDROCHLORIDE 500 MG: 500 TABLET ORAL at 07:32

## 2017-04-29 RX ADMIN — HYDRALAZINE HYDROCHLORIDE 10 MG: 20 INJECTION INTRAMUSCULAR; INTRAVENOUS at 11:35

## 2017-04-29 RX ADMIN — HYDRALAZINE HYDROCHLORIDE 100 MG: 50 TABLET, FILM COATED ORAL at 07:32

## 2017-04-29 RX ADMIN — TIMOLOL MALEATE 1 DROP: 2.5 SOLUTION/ DROPS OPHTHALMIC at 22:03

## 2017-04-29 RX ADMIN — LISINOPRIL 40 MG: 20 TABLET ORAL at 07:32

## 2017-04-29 ASSESSMENT — ENCOUNTER SYMPTOMS
SORE THROAT: 0
COUGH: 0
SINUS PRESSURE: 0
VOICE CHANGE: 0
ABDOMINAL PAIN: 0
CONSTIPATION: 0
NAUSEA: 0
WHEEZING: 0
BLOOD IN STOOL: 0
VOMITING: 0
DIARRHEA: 0
SHORTNESS OF BREATH: 0

## 2017-04-29 ASSESSMENT — PAIN DESCRIPTION - PROGRESSION
CLINICAL_PROGRESSION: GRADUALLY IMPROVING

## 2017-04-29 ASSESSMENT — PAIN DESCRIPTION - LOCATION: LOCATION: FOOT

## 2017-04-29 ASSESSMENT — PAIN DESCRIPTION - DESCRIPTORS: DESCRIPTORS: DISCOMFORT;SORE

## 2017-04-29 ASSESSMENT — PAIN DESCRIPTION - FREQUENCY: FREQUENCY: CONTINUOUS

## 2017-04-29 ASSESSMENT — PAIN SCALES - GENERAL
PAINLEVEL_OUTOF10: 4
PAINLEVEL_OUTOF10: 0

## 2017-04-29 ASSESSMENT — PAIN DESCRIPTION - ORIENTATION: ORIENTATION: LEFT

## 2017-04-29 ASSESSMENT — PAIN DESCRIPTION - PAIN TYPE: TYPE: ACUTE PAIN;SURGICAL PAIN

## 2017-04-29 ASSESSMENT — PAIN DESCRIPTION - ONSET: ONSET: ON-GOING

## 2017-04-30 VITALS
DIASTOLIC BLOOD PRESSURE: 82 MMHG | RESPIRATION RATE: 18 BRPM | WEIGHT: 204.15 LBS | HEART RATE: 72 BPM | OXYGEN SATURATION: 98 % | HEIGHT: 67 IN | TEMPERATURE: 98.1 F | SYSTOLIC BLOOD PRESSURE: 166 MMHG | BODY MASS INDEX: 32.04 KG/M2

## 2017-04-30 LAB
ANION GAP SERPL CALCULATED.3IONS-SCNC: 16 MMOL/L (ref 9–17)
BUN BLDV-MCNC: 10 MG/DL (ref 6–20)
BUN/CREAT BLD: ABNORMAL (ref 9–20)
CALCIUM SERPL-MCNC: 8.9 MG/DL (ref 8.6–10.4)
CHLORIDE BLD-SCNC: 102 MMOL/L (ref 98–107)
CO2: 19 MMOL/L (ref 20–31)
CREAT SERPL-MCNC: 0.63 MG/DL (ref 0.7–1.2)
CULTURE: ABNORMAL
DIRECT EXAM: ABNORMAL
DIRECT EXAM: ABNORMAL
GFR AFRICAN AMERICAN: >60 ML/MIN
GFR NON-AFRICAN AMERICAN: >60 ML/MIN
GFR SERPL CREATININE-BSD FRML MDRD: ABNORMAL ML/MIN/{1.73_M2}
GFR SERPL CREATININE-BSD FRML MDRD: ABNORMAL ML/MIN/{1.73_M2}
GLUCOSE BLD-MCNC: 108 MG/DL (ref 70–99)
GLUCOSE BLD-MCNC: 129 MG/DL (ref 75–110)
GLUCOSE BLD-MCNC: 45 MG/DL (ref 75–110)
GLUCOSE BLD-MCNC: 47 MG/DL (ref 75–110)
GLUCOSE BLD-MCNC: 60 MG/DL (ref 75–110)
GLUCOSE BLD-MCNC: 63 MG/DL (ref 75–110)
GLUCOSE BLD-MCNC: 99 MG/DL (ref 75–110)
Lab: ABNORMAL
ORGANISM: ABNORMAL
POTASSIUM SERPL-SCNC: 3.6 MMOL/L (ref 3.7–5.3)
SODIUM BLD-SCNC: 137 MMOL/L (ref 135–144)
SPECIMEN DESCRIPTION: ABNORMAL
STATUS: ABNORMAL

## 2017-04-30 PROCEDURE — 82947 ASSAY GLUCOSE BLOOD QUANT: CPT

## 2017-04-30 PROCEDURE — 6370000000 HC RX 637 (ALT 250 FOR IP): Performed by: PODIATRIST

## 2017-04-30 PROCEDURE — 80048 BASIC METABOLIC PNL TOTAL CA: CPT

## 2017-04-30 PROCEDURE — 6360000002 HC RX W HCPCS: Performed by: PODIATRIST

## 2017-04-30 PROCEDURE — 6370000000 HC RX 637 (ALT 250 FOR IP): Performed by: FAMILY MEDICINE

## 2017-04-30 PROCEDURE — 6370000000 HC RX 637 (ALT 250 FOR IP): Performed by: NURSE PRACTITIONER

## 2017-04-30 PROCEDURE — 99232 SBSQ HOSP IP/OBS MODERATE 35: CPT | Performed by: FAMILY MEDICINE

## 2017-04-30 PROCEDURE — 2580000003 HC RX 258: Performed by: INTERNAL MEDICINE

## 2017-04-30 PROCEDURE — 6360000002 HC RX W HCPCS: Performed by: INTERNAL MEDICINE

## 2017-04-30 PROCEDURE — 6360000002 HC RX W HCPCS: Performed by: FAMILY MEDICINE

## 2017-04-30 PROCEDURE — 2500000003 HC RX 250 WO HCPCS: Performed by: NURSE PRACTITIONER

## 2017-04-30 PROCEDURE — 36415 COLL VENOUS BLD VENIPUNCTURE: CPT

## 2017-04-30 RX ORDER — AMLODIPINE BESYLATE 10 MG/1
10 TABLET ORAL DAILY
Qty: 30 TABLET | Refills: 1 | Status: ON HOLD | OUTPATIENT
Start: 2017-04-30 | End: 2017-09-25 | Stop reason: HOSPADM

## 2017-04-30 RX ORDER — CLONIDINE HYDROCHLORIDE 0.1 MG/1
0.1 TABLET ORAL 3 TIMES DAILY
Status: DISCONTINUED | OUTPATIENT
Start: 2017-04-30 | End: 2017-04-30

## 2017-04-30 RX ORDER — CLONIDINE HYDROCHLORIDE 0.1 MG/1
0.1 TABLET ORAL 2 TIMES DAILY
Status: DISCONTINUED | OUTPATIENT
Start: 2017-04-30 | End: 2017-04-30

## 2017-04-30 RX ORDER — METOPROLOL TARTRATE 50 MG/1
50 TABLET, FILM COATED ORAL 2 TIMES DAILY
Status: DISCONTINUED | OUTPATIENT
Start: 2017-04-30 | End: 2017-04-30 | Stop reason: HOSPADM

## 2017-04-30 RX ORDER — HYDRALAZINE HYDROCHLORIDE 100 MG/1
100 TABLET, FILM COATED ORAL 3 TIMES DAILY
Qty: 90 TABLET | Refills: 1 | Status: ON HOLD | OUTPATIENT
Start: 2017-04-30 | End: 2017-09-25 | Stop reason: HOSPADM

## 2017-04-30 RX ORDER — METOPROLOL TARTRATE 50 MG/1
50 TABLET, FILM COATED ORAL 2 TIMES DAILY
Qty: 60 TABLET | Refills: 1 | Status: ON HOLD | OUTPATIENT
Start: 2017-04-30 | End: 2017-09-25 | Stop reason: HOSPADM

## 2017-04-30 RX ORDER — SPIRONOLACTONE 25 MG/1
50 TABLET ORAL DAILY
Status: DISCONTINUED | OUTPATIENT
Start: 2017-04-30 | End: 2017-04-30 | Stop reason: HOSPADM

## 2017-04-30 RX ORDER — CLONIDINE HYDROCHLORIDE 0.1 MG/1
0.1 TABLET ORAL EVERY 4 HOURS PRN
Status: DISCONTINUED | OUTPATIENT
Start: 2017-04-30 | End: 2017-04-30

## 2017-04-30 RX ORDER — HYDRALAZINE HYDROCHLORIDE 100 MG/1
100 TABLET, FILM COATED ORAL 3 TIMES DAILY
Qty: 90 TABLET | Refills: 3 | Status: SHIPPED | OUTPATIENT
Start: 2017-04-30 | End: 2017-04-30

## 2017-04-30 RX ORDER — LISINOPRIL 40 MG/1
40 TABLET ORAL DAILY
Qty: 30 TABLET | Refills: 1 | Status: ON HOLD | OUTPATIENT
Start: 2017-04-30 | End: 2017-09-25 | Stop reason: HOSPADM

## 2017-04-30 RX ORDER — METOPROLOL TARTRATE 50 MG/1
50 TABLET, FILM COATED ORAL 2 TIMES DAILY
Qty: 60 TABLET | Refills: 3 | Status: SHIPPED | OUTPATIENT
Start: 2017-04-30 | End: 2017-04-30

## 2017-04-30 RX ORDER — LISINOPRIL 40 MG/1
40 TABLET ORAL DAILY
Qty: 30 TABLET | Refills: 3 | Status: SHIPPED | OUTPATIENT
Start: 2017-04-30 | End: 2017-04-30

## 2017-04-30 RX ORDER — CALCIUM CARBONATE 200(500)MG
500 TABLET,CHEWABLE ORAL 3 TIMES DAILY PRN
Qty: 60 TABLET | COMMUNITY
Start: 2017-04-30 | End: 2017-05-30

## 2017-04-30 RX ORDER — AMLODIPINE BESYLATE 10 MG/1
10 TABLET ORAL DAILY
Qty: 30 TABLET | Refills: 3 | Status: SHIPPED | OUTPATIENT
Start: 2017-04-30 | End: 2017-04-30

## 2017-04-30 RX ORDER — OXYCODONE HYDROCHLORIDE AND ACETAMINOPHEN 5; 325 MG/1; MG/1
1 TABLET ORAL EVERY 4 HOURS PRN
Qty: 40 TABLET | Refills: 0 | Status: ON HOLD | OUTPATIENT
Start: 2017-04-30 | End: 2017-09-16 | Stop reason: HOSPADM

## 2017-04-30 RX ORDER — CEFTRIAXONE 2 G/1
2 INJECTION, POWDER, FOR SOLUTION INTRAMUSCULAR; INTRAVENOUS DAILY
Qty: 60 EACH | Refills: 0 | Status: SHIPPED | OUTPATIENT
Start: 2017-04-30 | End: 2017-06-07

## 2017-04-30 RX ADMIN — FAMOTIDINE 20 MG: 20 TABLET, FILM COATED ORAL at 08:47

## 2017-04-30 RX ADMIN — ENOXAPARIN SODIUM 40 MG: 40 INJECTION SUBCUTANEOUS at 08:27

## 2017-04-30 RX ADMIN — HYDRALAZINE HYDROCHLORIDE 10 MG: 20 INJECTION INTRAMUSCULAR; INTRAVENOUS at 12:12

## 2017-04-30 RX ADMIN — DEXTROSE 15 G: 15 GEL ORAL at 05:54

## 2017-04-30 RX ADMIN — LISINOPRIL 40 MG: 20 TABLET ORAL at 08:27

## 2017-04-30 RX ADMIN — METOPROLOL TARTRATE 5 MG: 5 INJECTION INTRAVENOUS at 10:37

## 2017-04-30 RX ADMIN — HYDRALAZINE HYDROCHLORIDE 10 MG: 20 INJECTION INTRAMUSCULAR; INTRAVENOUS at 05:54

## 2017-04-30 RX ADMIN — HYDRALAZINE HYDROCHLORIDE 100 MG: 50 TABLET, FILM COATED ORAL at 13:12

## 2017-04-30 RX ADMIN — DEXTROSE 15 G: 15 GEL ORAL at 06:24

## 2017-04-30 RX ADMIN — CEFTRIAXONE SODIUM 2 G: 2 INJECTION, POWDER, FOR SOLUTION INTRAMUSCULAR; INTRAVENOUS at 16:06

## 2017-04-30 RX ADMIN — TIMOLOL MALEATE 1 DROP: 2.5 SOLUTION/ DROPS OPHTHALMIC at 08:24

## 2017-04-30 RX ADMIN — PRAVASTATIN SODIUM 10 MG: 20 TABLET ORAL at 08:47

## 2017-04-30 RX ADMIN — AMLODIPINE BESYLATE 10 MG: 10 TABLET ORAL at 08:28

## 2017-04-30 RX ADMIN — DEXTROSE 15 G: 15 GEL ORAL at 05:28

## 2017-04-30 RX ADMIN — METOPROLOL TARTRATE 50 MG: 50 TABLET, FILM COATED ORAL at 08:27

## 2017-04-30 RX ADMIN — METOPROLOL TARTRATE 5 MG: 5 INJECTION INTRAVENOUS at 04:33

## 2017-04-30 RX ADMIN — SPIRONOLACTONE 50 MG: 25 TABLET ORAL at 14:05

## 2017-04-30 RX ADMIN — HYDRALAZINE HYDROCHLORIDE 100 MG: 50 TABLET, FILM COATED ORAL at 08:27

## 2017-04-30 ASSESSMENT — ENCOUNTER SYMPTOMS
BLOOD IN STOOL: 0
SORE THROAT: 0
DIARRHEA: 0
CONSTIPATION: 0
VOMITING: 0
WHEEZING: 0
VOICE CHANGE: 0
COUGH: 0
SINUS PRESSURE: 0
SHORTNESS OF BREATH: 0
ABDOMINAL PAIN: 0
NAUSEA: 0

## 2017-04-30 ASSESSMENT — PAIN DESCRIPTION - PROGRESSION
CLINICAL_PROGRESSION: GRADUALLY IMPROVING

## 2017-04-30 ASSESSMENT — PAIN SCALES - GENERAL: PAINLEVEL_OUTOF10: 0

## 2017-05-01 LAB — SURGICAL PATHOLOGY REPORT: NORMAL

## 2017-07-13 RX ORDER — METOPROLOL TARTRATE 50 MG/1
TABLET, FILM COATED ORAL
Qty: 60 TABLET | Refills: 1 | OUTPATIENT
Start: 2017-07-13

## 2017-07-13 RX ORDER — LISINOPRIL 40 MG/1
TABLET ORAL
Qty: 30 TABLET | Refills: 1 | OUTPATIENT
Start: 2017-07-13

## 2017-07-13 RX ORDER — AMLODIPINE BESYLATE 10 MG/1
TABLET ORAL
Qty: 30 TABLET | Refills: 1 | OUTPATIENT
Start: 2017-07-13

## 2017-07-13 RX ORDER — HYDRALAZINE HYDROCHLORIDE 100 MG/1
TABLET, FILM COATED ORAL
Qty: 90 TABLET | Refills: 1 | OUTPATIENT
Start: 2017-07-13

## 2017-08-31 ENCOUNTER — HOSPITAL ENCOUNTER (INPATIENT)
Age: 52
LOS: 16 days | Discharge: NURSING, REHAB OR LONG-TERM CARE | DRG: 239 | End: 2017-09-16
Attending: PODIATRIST | Admitting: PODIATRIST
Payer: COMMERCIAL

## 2017-08-31 ENCOUNTER — APPOINTMENT (OUTPATIENT)
Dept: GENERAL RADIOLOGY | Age: 52
DRG: 239 | End: 2017-08-31
Attending: PODIATRIST
Payer: COMMERCIAL

## 2017-08-31 PROBLEM — I96 GANGRENE OF FOOT (HCC): Status: ACTIVE | Noted: 2017-08-31

## 2017-08-31 PROBLEM — N17.9 ACUTE KIDNEY INJURY (HCC): Status: ACTIVE | Noted: 2017-08-31

## 2017-08-31 PROBLEM — E11.8 DIABETIC FOOT (HCC): Status: ACTIVE | Noted: 2017-08-31

## 2017-08-31 LAB
ABSOLUTE EOS #: 0 K/UL (ref 0–0.4)
ABSOLUTE LYMPH #: 0.73 K/UL (ref 1–4.8)
ABSOLUTE MONO #: 1.09 K/UL (ref 0.1–0.8)
ANION GAP SERPL CALCULATED.3IONS-SCNC: 20 MMOL/L (ref 9–17)
BASOPHILS # BLD: 0 %
BASOPHILS ABSOLUTE: 0 K/UL (ref 0–0.2)
BUN BLDV-MCNC: 59 MG/DL (ref 6–20)
BUN/CREAT BLD: ABNORMAL (ref 9–20)
C-REACTIVE PROTEIN: 214.9 MG/L (ref 0–5)
CALCIUM SERPL-MCNC: 8.2 MG/DL (ref 8.6–10.4)
CHLORIDE BLD-SCNC: 96 MMOL/L (ref 98–107)
CO2: 14 MMOL/L (ref 20–31)
CREAT SERPL-MCNC: 1.92 MG/DL (ref 0.7–1.2)
DIFFERENTIAL TYPE: ABNORMAL
EOSINOPHILS RELATIVE PERCENT: 0 %
GFR AFRICAN AMERICAN: 45 ML/MIN
GFR NON-AFRICAN AMERICAN: 37 ML/MIN
GFR SERPL CREATININE-BSD FRML MDRD: ABNORMAL ML/MIN/{1.73_M2}
GFR SERPL CREATININE-BSD FRML MDRD: ABNORMAL ML/MIN/{1.73_M2}
GLUCOSE BLD-MCNC: 226 MG/DL (ref 70–99)
GLUCOSE BLD-MCNC: 230 MG/DL (ref 75–110)
HCT VFR BLD CALC: 24.6 % (ref 41–53)
HEMOGLOBIN: 7.9 G/DL (ref 13.5–17.5)
LYMPHOCYTES # BLD: 2 %
MCH RBC QN AUTO: 25.2 PG (ref 26–34)
MCHC RBC AUTO-ENTMCNC: 32.2 G/DL (ref 31–37)
MCV RBC AUTO: 78.4 FL (ref 80–100)
MONOCYTES # BLD: 3 %
MORPHOLOGY: ABNORMAL
PDW BLD-RTO: 17.1 % (ref 12.5–15.4)
PLATELET # BLD: 369 K/UL (ref 140–450)
PLATELET ESTIMATE: ABNORMAL
PMV BLD AUTO: 10.2 FL (ref 6–12)
POTASSIUM SERPL-SCNC: 3.9 MMOL/L (ref 3.7–5.3)
RBC # BLD: 3.14 M/UL (ref 4.5–5.9)
RBC # BLD: ABNORMAL 10*6/UL
SEDIMENTATION RATE, ERYTHROCYTE: 75 MM (ref 0–10)
SEG NEUTROPHILS: 95 %
SEGMENTED NEUTROPHILS ABSOLUTE COUNT: 34.58 K/UL (ref 1.8–7.7)
SODIUM BLD-SCNC: 130 MMOL/L (ref 135–144)
WBC # BLD: 36.4 K/UL (ref 3.5–11)
WBC # BLD: ABNORMAL 10*3/UL

## 2017-08-31 PROCEDURE — 85045 AUTOMATED RETICULOCYTE COUNT: CPT

## 2017-08-31 PROCEDURE — 86140 C-REACTIVE PROTEIN: CPT

## 2017-08-31 PROCEDURE — 93923 UPR/LXTR ART STDY 3+ LVLS: CPT

## 2017-08-31 PROCEDURE — 83550 IRON BINDING TEST: CPT

## 2017-08-31 PROCEDURE — 2580000003 HC RX 258: Performed by: PODIATRIST

## 2017-08-31 PROCEDURE — 6360000002 HC RX W HCPCS: Performed by: PODIATRIST

## 2017-08-31 PROCEDURE — 83036 HEMOGLOBIN GLYCOSYLATED A1C: CPT

## 2017-08-31 PROCEDURE — 99222 1ST HOSP IP/OBS MODERATE 55: CPT | Performed by: SURGERY

## 2017-08-31 PROCEDURE — 6370000000 HC RX 637 (ALT 250 FOR IP): Performed by: HOSPITALIST

## 2017-08-31 PROCEDURE — 82728 ASSAY OF FERRITIN: CPT

## 2017-08-31 PROCEDURE — 73630 X-RAY EXAM OF FOOT: CPT

## 2017-08-31 PROCEDURE — 82947 ASSAY GLUCOSE BLOOD QUANT: CPT

## 2017-08-31 PROCEDURE — 2580000003 HC RX 258: Performed by: SURGERY

## 2017-08-31 PROCEDURE — 80048 BASIC METABOLIC PNL TOTAL CA: CPT

## 2017-08-31 PROCEDURE — 6360000002 HC RX W HCPCS: Performed by: STUDENT IN AN ORGANIZED HEALTH CARE EDUCATION/TRAINING PROGRAM

## 2017-08-31 PROCEDURE — 73610 X-RAY EXAM OF ANKLE: CPT

## 2017-08-31 PROCEDURE — 36415 COLL VENOUS BLD VENIPUNCTURE: CPT

## 2017-08-31 PROCEDURE — 73590 X-RAY EXAM OF LOWER LEG: CPT

## 2017-08-31 PROCEDURE — 83540 ASSAY OF IRON: CPT

## 2017-08-31 PROCEDURE — 1200000000 HC SEMI PRIVATE

## 2017-08-31 PROCEDURE — 85025 COMPLETE CBC W/AUTO DIFF WBC: CPT

## 2017-08-31 PROCEDURE — 6370000000 HC RX 637 (ALT 250 FOR IP): Performed by: PODIATRIST

## 2017-08-31 PROCEDURE — 85651 RBC SED RATE NONAUTOMATED: CPT

## 2017-08-31 RX ORDER — DEXTROSE MONOHYDRATE 50 MG/ML
100 INJECTION, SOLUTION INTRAVENOUS PRN
Status: DISCONTINUED | OUTPATIENT
Start: 2017-08-31 | End: 2017-09-16 | Stop reason: HOSPADM

## 2017-08-31 RX ORDER — HEPARIN SODIUM 5000 [USP'U]/ML
5000 INJECTION, SOLUTION INTRAVENOUS; SUBCUTANEOUS EVERY 8 HOURS SCHEDULED
Status: DISCONTINUED | OUTPATIENT
Start: 2017-08-31 | End: 2017-09-12

## 2017-08-31 RX ORDER — NICOTINE POLACRILEX 4 MG
15 LOZENGE BUCCAL PRN
Status: DISCONTINUED | OUTPATIENT
Start: 2017-08-31 | End: 2017-09-16 | Stop reason: HOSPADM

## 2017-08-31 RX ORDER — PANTOPRAZOLE SODIUM 20 MG/1
20 TABLET, DELAYED RELEASE ORAL 2 TIMES DAILY
Status: DISCONTINUED | OUTPATIENT
Start: 2017-08-31 | End: 2017-09-16 | Stop reason: HOSPADM

## 2017-08-31 RX ORDER — SODIUM CHLORIDE 0.9 % (FLUSH) 0.9 %
10 SYRINGE (ML) INJECTION EVERY 12 HOURS SCHEDULED
Status: DISCONTINUED | OUTPATIENT
Start: 2017-08-31 | End: 2017-09-16 | Stop reason: HOSPADM

## 2017-08-31 RX ORDER — CYCLOBENZAPRINE HCL 10 MG
5 TABLET ORAL 3 TIMES DAILY PRN
Status: DISCONTINUED | OUTPATIENT
Start: 2017-08-31 | End: 2017-09-16 | Stop reason: HOSPADM

## 2017-08-31 RX ORDER — INSULIN GLARGINE 100 [IU]/ML
20 INJECTION, SOLUTION SUBCUTANEOUS NIGHTLY
Status: DISCONTINUED | OUTPATIENT
Start: 2017-08-31 | End: 2017-09-01

## 2017-08-31 RX ORDER — OXYCODONE HYDROCHLORIDE AND ACETAMINOPHEN 5; 325 MG/1; MG/1
1 TABLET ORAL EVERY 4 HOURS PRN
Status: DISCONTINUED | OUTPATIENT
Start: 2017-08-31 | End: 2017-09-16 | Stop reason: HOSPADM

## 2017-08-31 RX ORDER — INSULIN GLARGINE 100 [IU]/ML
30 INJECTION, SOLUTION SUBCUTANEOUS NIGHTLY
Status: DISCONTINUED | OUTPATIENT
Start: 2017-08-31 | End: 2017-08-31

## 2017-08-31 RX ORDER — LISINOPRIL 20 MG/1
40 TABLET ORAL DAILY
Status: DISCONTINUED | OUTPATIENT
Start: 2017-08-31 | End: 2017-09-11

## 2017-08-31 RX ORDER — LATANOPROST 50 UG/ML
1 SOLUTION/ DROPS OPHTHALMIC NIGHTLY
Status: DISCONTINUED | OUTPATIENT
Start: 2017-08-31 | End: 2017-09-16 | Stop reason: HOSPADM

## 2017-08-31 RX ORDER — INSULIN GLARGINE 100 [IU]/ML
20 INJECTION, SOLUTION SUBCUTANEOUS EVERY MORNING
Status: ON HOLD | COMMUNITY
End: 2017-09-25 | Stop reason: HOSPADM

## 2017-08-31 RX ORDER — OMEPRAZOLE 20 MG/1
20 CAPSULE, DELAYED RELEASE ORAL 2 TIMES DAILY
COMMUNITY

## 2017-08-31 RX ORDER — AMLODIPINE BESYLATE 10 MG/1
10 TABLET ORAL DAILY
Status: DISCONTINUED | OUTPATIENT
Start: 2017-08-31 | End: 2017-09-11

## 2017-08-31 RX ORDER — ACETAMINOPHEN 325 MG/1
650 TABLET ORAL EVERY 4 HOURS PRN
Status: DISCONTINUED | OUTPATIENT
Start: 2017-08-31 | End: 2017-09-16 | Stop reason: HOSPADM

## 2017-08-31 RX ORDER — PREGABALIN 25 MG/1
25 CAPSULE ORAL 3 TIMES DAILY
Status: ON HOLD | COMMUNITY
End: 2017-09-25 | Stop reason: HOSPADM

## 2017-08-31 RX ORDER — MORPHINE SULFATE 2 MG/ML
2 INJECTION, SOLUTION INTRAMUSCULAR; INTRAVENOUS
Status: DISCONTINUED | OUTPATIENT
Start: 2017-08-31 | End: 2017-09-07

## 2017-08-31 RX ORDER — PREGABALIN 25 MG/1
25 CAPSULE ORAL 3 TIMES DAILY
Status: DISCONTINUED | OUTPATIENT
Start: 2017-08-31 | End: 2017-09-16 | Stop reason: HOSPADM

## 2017-08-31 RX ORDER — SODIUM CHLORIDE 0.9 % (FLUSH) 0.9 %
10 SYRINGE (ML) INJECTION PRN
Status: DISCONTINUED | OUTPATIENT
Start: 2017-08-31 | End: 2017-09-16 | Stop reason: HOSPADM

## 2017-08-31 RX ORDER — SODIUM CHLORIDE, SODIUM LACTATE, POTASSIUM CHLORIDE, CALCIUM CHLORIDE 600; 310; 30; 20 MG/100ML; MG/100ML; MG/100ML; MG/100ML
INJECTION, SOLUTION INTRAVENOUS CONTINUOUS
Status: DISCONTINUED | OUTPATIENT
Start: 2017-08-31 | End: 2017-09-03

## 2017-08-31 RX ORDER — METOPROLOL TARTRATE 50 MG/1
50 TABLET, FILM COATED ORAL 2 TIMES DAILY
Status: DISCONTINUED | OUTPATIENT
Start: 2017-08-31 | End: 2017-09-16 | Stop reason: HOSPADM

## 2017-08-31 RX ORDER — INSULIN GLARGINE 100 [IU]/ML
30 INJECTION, SOLUTION SUBCUTANEOUS NIGHTLY
Status: ON HOLD | COMMUNITY
End: 2017-09-25 | Stop reason: HOSPADM

## 2017-08-31 RX ORDER — ONDANSETRON 2 MG/ML
4 INJECTION INTRAMUSCULAR; INTRAVENOUS EVERY 6 HOURS PRN
Status: DISCONTINUED | OUTPATIENT
Start: 2017-08-31 | End: 2017-09-16 | Stop reason: HOSPADM

## 2017-08-31 RX ORDER — PRAVASTATIN SODIUM 20 MG
10 TABLET ORAL DAILY
Status: DISCONTINUED | OUTPATIENT
Start: 2017-08-31 | End: 2017-09-16 | Stop reason: HOSPADM

## 2017-08-31 RX ORDER — GLIMEPIRIDE 2 MG/1
1 TABLET ORAL 2 TIMES DAILY
Status: DISCONTINUED | OUTPATIENT
Start: 2017-08-31 | End: 2017-09-16 | Stop reason: HOSPADM

## 2017-08-31 RX ORDER — DEXTROSE MONOHYDRATE 25 G/50ML
12.5 INJECTION, SOLUTION INTRAVENOUS PRN
Status: DISCONTINUED | OUTPATIENT
Start: 2017-08-31 | End: 2017-09-16 | Stop reason: HOSPADM

## 2017-08-31 RX ORDER — OXYCODONE HYDROCHLORIDE AND ACETAMINOPHEN 5; 325 MG/1; MG/1
2 TABLET ORAL EVERY 4 HOURS PRN
Status: DISCONTINUED | OUTPATIENT
Start: 2017-08-31 | End: 2017-09-16 | Stop reason: HOSPADM

## 2017-08-31 RX ORDER — MORPHINE SULFATE 4 MG/ML
4 INJECTION, SOLUTION INTRAMUSCULAR; INTRAVENOUS
Status: DISCONTINUED | OUTPATIENT
Start: 2017-08-31 | End: 2017-09-07

## 2017-08-31 RX ORDER — HYDRALAZINE HYDROCHLORIDE 50 MG/1
100 TABLET, FILM COATED ORAL 3 TIMES DAILY
Status: DISCONTINUED | OUTPATIENT
Start: 2017-08-31 | End: 2017-09-16 | Stop reason: HOSPADM

## 2017-08-31 RX ORDER — INSULIN GLARGINE 100 [IU]/ML
20 INJECTION, SOLUTION SUBCUTANEOUS EVERY MORNING
Status: DISCONTINUED | OUTPATIENT
Start: 2017-09-01 | End: 2017-08-31

## 2017-08-31 RX ADMIN — HYDRALAZINE HYDROCHLORIDE 100 MG: 50 TABLET, FILM COATED ORAL at 22:07

## 2017-08-31 RX ADMIN — METFORMIN HYDROCHLORIDE 500 MG: 500 TABLET ORAL at 17:45

## 2017-08-31 RX ADMIN — TIMOLOL MALEATE 1 DROP: 2.5 SOLUTION/ DROPS OPHTHALMIC at 22:07

## 2017-08-31 RX ADMIN — MORPHINE SULFATE 4 MG: 4 INJECTION, SOLUTION INTRAMUSCULAR; INTRAVENOUS at 19:37

## 2017-08-31 RX ADMIN — HYDRALAZINE HYDROCHLORIDE 100 MG: 50 TABLET, FILM COATED ORAL at 17:45

## 2017-08-31 RX ADMIN — PIPERACILLIN AND TAZOBACTAM 3.38 G: 3; .375 INJECTION, POWDER, LYOPHILIZED, FOR SOLUTION INTRAVENOUS; PARENTERAL at 19:34

## 2017-08-31 RX ADMIN — LATANOPROST 1 DROP: 50 SOLUTION OPHTHALMIC at 22:07

## 2017-08-31 RX ADMIN — SODIUM CHLORIDE, POTASSIUM CHLORIDE, SODIUM LACTATE AND CALCIUM CHLORIDE: 600; 310; 30; 20 INJECTION, SOLUTION INTRAVENOUS at 19:33

## 2017-08-31 RX ADMIN — METOPROLOL TARTRATE 50 MG: 50 TABLET, FILM COATED ORAL at 22:07

## 2017-08-31 RX ADMIN — PREGABALIN 25 MG: 25 CAPSULE ORAL at 22:07

## 2017-08-31 RX ADMIN — Medication 10 ML: at 23:07

## 2017-08-31 RX ADMIN — OXYCODONE HYDROCHLORIDE AND ACETAMINOPHEN 2 TABLET: 5; 325 TABLET ORAL at 17:27

## 2017-08-31 RX ADMIN — VANCOMYCIN HYDROCHLORIDE 1750 MG: 10 INJECTION, POWDER, LYOPHILIZED, FOR SOLUTION INTRAVENOUS at 23:01

## 2017-08-31 RX ADMIN — INSULIN GLARGINE 20 UNITS: 100 INJECTION, SOLUTION SUBCUTANEOUS at 23:01

## 2017-08-31 RX ADMIN — HEPARIN SODIUM 5000 UNITS: 5000 INJECTION, SOLUTION INTRAVENOUS; SUBCUTANEOUS at 23:01

## 2017-08-31 RX ADMIN — PANTOPRAZOLE SODIUM 20 MG: 20 TABLET, DELAYED RELEASE ORAL at 22:07

## 2017-08-31 ASSESSMENT — PAIN SCALES - GENERAL
PAINLEVEL_OUTOF10: 5
PAINLEVEL_OUTOF10: 7
PAINLEVEL_OUTOF10: 4

## 2017-08-31 ASSESSMENT — PAIN DESCRIPTION - FREQUENCY: FREQUENCY: CONTINUOUS

## 2017-08-31 ASSESSMENT — PAIN DESCRIPTION - PAIN TYPE: TYPE: ACUTE PAIN

## 2017-08-31 ASSESSMENT — PAIN DESCRIPTION - ORIENTATION: ORIENTATION: LEFT

## 2017-08-31 ASSESSMENT — PAIN DESCRIPTION - DESCRIPTORS: DESCRIPTORS: ACHING;DISCOMFORT

## 2017-08-31 ASSESSMENT — PAIN DESCRIPTION - LOCATION: LOCATION: LEG

## 2017-09-01 ENCOUNTER — APPOINTMENT (OUTPATIENT)
Dept: CARDIAC CATH/INVASIVE PROCEDURES | Age: 52
DRG: 239 | End: 2017-09-01
Attending: PODIATRIST
Payer: COMMERCIAL

## 2017-09-01 ENCOUNTER — APPOINTMENT (OUTPATIENT)
Dept: MRI IMAGING | Age: 52
DRG: 239 | End: 2017-09-01
Attending: PODIATRIST
Payer: COMMERCIAL

## 2017-09-01 PROBLEM — I99.8 ISCHEMIC FOOT: Status: ACTIVE | Noted: 2017-09-01

## 2017-09-01 LAB
ABSOLUTE EOS #: 0 K/UL (ref 0–0.4)
ABSOLUTE LYMPH #: 1.1 K/UL (ref 1–4.8)
ABSOLUTE MONO #: 1.65 K/UL (ref 0.1–1.2)
ABSOLUTE RETIC #: 0.08 M/UL (ref 0.02–0.1)
ANION GAP SERPL CALCULATED.3IONS-SCNC: 12 MMOL/L (ref 9–17)
BASOPHILS # BLD: 0 %
BASOPHILS ABSOLUTE: 0 K/UL (ref 0–0.2)
BUN BLDV-MCNC: 61 MG/DL (ref 6–20)
BUN/CREAT BLD: ABNORMAL (ref 9–20)
CALCIUM SERPL-MCNC: 7.5 MG/DL (ref 8.6–10.4)
CHLORIDE BLD-SCNC: 98 MMOL/L (ref 98–107)
CO2: 16 MMOL/L (ref 20–31)
CORTISOL COLLECTION INFO: NORMAL
CORTISOL COLLECTION INFO: NORMAL
CORTISOL: 14.4 UG/DL
CORTISOL: 19.7 UG/DL
CREAT SERPL-MCNC: 1.67 MG/DL (ref 0.7–1.2)
CREATININE URINE: 66.1 MG/DL (ref 39–259)
DIFFERENTIAL TYPE: ABNORMAL
EOSINOPHILS RELATIVE PERCENT: 0 %
ESTIMATED AVERAGE GLUCOSE: 212 MG/DL
FERRITIN: 634 UG/L (ref 30–400)
FOLATE: 4.4 NG/ML
GFR AFRICAN AMERICAN: 52 ML/MIN
GFR NON-AFRICAN AMERICAN: 43 ML/MIN
GFR SERPL CREATININE-BSD FRML MDRD: ABNORMAL ML/MIN/{1.73_M2}
GFR SERPL CREATININE-BSD FRML MDRD: ABNORMAL ML/MIN/{1.73_M2}
GLUCOSE BLD-MCNC: 156 MG/DL (ref 75–110)
GLUCOSE BLD-MCNC: 195 MG/DL (ref 75–110)
GLUCOSE BLD-MCNC: 199 MG/DL (ref 75–110)
GLUCOSE BLD-MCNC: 215 MG/DL (ref 75–110)
GLUCOSE BLD-MCNC: 247 MG/DL (ref 70–99)
HBA1C MFR BLD: 9 % (ref 4–6)
HCT VFR BLD CALC: 24 % (ref 41–53)
HEMOGLOBIN: 7.8 G/DL (ref 13.5–17.5)
IRON SATURATION: 10 % (ref 20–55)
IRON: 12 UG/DL (ref 59–158)
LYMPHOCYTES # BLD: 4 %
MCH RBC QN AUTO: 25.2 PG (ref 26–34)
MCHC RBC AUTO-ENTMCNC: 32.3 G/DL (ref 31–37)
MCV RBC AUTO: 78 FL (ref 80–100)
MONOCYTES # BLD: 6 %
MORPHOLOGY: ABNORMAL
OSMOLALITY URINE: 337 MOSM/KG (ref 80–1300)
PDW BLD-RTO: 16.8 % (ref 12.5–15.4)
PLATELET # BLD: 320 K/UL (ref 140–450)
PLATELET ESTIMATE: ABNORMAL
PMV BLD AUTO: 11.1 FL (ref 6–12)
POTASSIUM SERPL-SCNC: 3.4 MMOL/L (ref 3.7–5.3)
RBC # BLD: 3.08 M/UL (ref 4.5–5.9)
RBC # BLD: ABNORMAL 10*6/UL
RETIC %: 2.7 % (ref 0.5–2)
SEG NEUTROPHILS: 90 %
SEGMENTED NEUTROPHILS ABSOLUTE COUNT: 24.75 K/UL (ref 1.8–7.7)
SERUM OSMOLALITY: 305 MOSM/KG (ref 275–295)
SERUM OSMOLALITY: 307 MOSM/KG (ref 275–295)
SODIUM BLD-SCNC: 126 MMOL/L (ref 135–144)
SODIUM BLD-SCNC: 129 MMOL/L (ref 135–144)
SODIUM BLD-SCNC: 130 MMOL/L (ref 135–144)
SODIUM BLD-SCNC: 135 MMOL/L (ref 135–144)
SODIUM,UR: 26 MMOL/L
SURGICAL PATHOLOGY REPORT: NORMAL
TOTAL IRON BINDING CAPACITY: 124 UG/DL (ref 250–450)
TSH SERPL DL<=0.05 MIU/L-ACNC: 2.92 MIU/L (ref 0.3–5)
TSH SERPL DL<=0.05 MIU/L-ACNC: 3.65 MIU/L (ref 0.3–5)
UNSATURATED IRON BINDING CAPACITY: 112 UG/DL (ref 112–347)
VITAMIN B-12: >2000 PG/ML (ref 211–946)
WBC # BLD: 27.5 K/UL (ref 3.5–11)
WBC # BLD: ABNORMAL 10*3/UL

## 2017-09-01 PROCEDURE — 047U3ZZ DILATION OF LEFT PERONEAL ARTERY, PERCUTANEOUS APPROACH: ICD-10-PCS | Performed by: SURGERY

## 2017-09-01 PROCEDURE — 82533 TOTAL CORTISOL: CPT

## 2017-09-01 PROCEDURE — 84443 ASSAY THYROID STIM HORMONE: CPT

## 2017-09-01 PROCEDURE — 75710 ARTERY X-RAYS ARM/LEG: CPT

## 2017-09-01 PROCEDURE — 36415 COLL VENOUS BLD VENIPUNCTURE: CPT

## 2017-09-01 PROCEDURE — B41G1ZZ FLUOROSCOPY OF LEFT LOWER EXTREMITY ARTERIES USING LOW OSMOLAR CONTRAST: ICD-10-PCS | Performed by: SURGERY

## 2017-09-01 PROCEDURE — C1725 CATH, TRANSLUMIN NON-LASER: HCPCS

## 2017-09-01 PROCEDURE — C1769 GUIDE WIRE: HCPCS

## 2017-09-01 PROCEDURE — 6360000002 HC RX W HCPCS: Performed by: STUDENT IN AN ORGANIZED HEALTH CARE EDUCATION/TRAINING PROGRAM

## 2017-09-01 PROCEDURE — 76937 US GUIDE VASCULAR ACCESS: CPT | Performed by: SURGERY

## 2017-09-01 PROCEDURE — 047L3ZZ DILATION OF LEFT FEMORAL ARTERY, PERCUTANEOUS APPROACH: ICD-10-PCS | Performed by: SURGERY

## 2017-09-01 PROCEDURE — 6370000000 HC RX 637 (ALT 250 FOR IP): Performed by: PODIATRIST

## 2017-09-01 PROCEDURE — 1200000000 HC SEMI PRIVATE

## 2017-09-01 PROCEDURE — 6360000002 HC RX W HCPCS: Performed by: PODIATRIST

## 2017-09-01 PROCEDURE — 82570 ASSAY OF URINE CREATININE: CPT

## 2017-09-01 PROCEDURE — 37224 HC FEM POP TERRITORY PLASTY: CPT

## 2017-09-01 PROCEDURE — 83036 HEMOGLOBIN GLYCOSYLATED A1C: CPT

## 2017-09-01 PROCEDURE — C2623 CATH, TRANSLUMIN, DRUG-COAT: HCPCS

## 2017-09-01 PROCEDURE — 51798 US URINE CAPACITY MEASURE: CPT

## 2017-09-01 PROCEDURE — 6370000000 HC RX 637 (ALT 250 FOR IP): Performed by: HOSPITALIST

## 2017-09-01 PROCEDURE — 37228 PR REVSC OPN/PRQ TIB/PERO W/ANGIOPLASTY UNI: CPT | Performed by: SURGERY

## 2017-09-01 PROCEDURE — 84295 ASSAY OF SERUM SODIUM: CPT

## 2017-09-01 PROCEDURE — C1781 MESH (IMPLANTABLE): HCPCS

## 2017-09-01 PROCEDURE — 37232 HC TIB PER TERR ADDL PLASTY: CPT

## 2017-09-01 PROCEDURE — C1760 CLOSURE DEV, VASC: HCPCS

## 2017-09-01 PROCEDURE — 80048 BASIC METABOLIC PNL TOTAL CA: CPT

## 2017-09-01 PROCEDURE — C1887 CATHETER, GUIDING: HCPCS

## 2017-09-01 PROCEDURE — 99223 1ST HOSP IP/OBS HIGH 75: CPT | Performed by: INTERNAL MEDICINE

## 2017-09-01 PROCEDURE — 6360000002 HC RX W HCPCS

## 2017-09-01 PROCEDURE — 047S3ZZ DILATION OF LEFT POSTERIOR TIBIAL ARTERY, PERCUTANEOUS APPROACH: ICD-10-PCS | Performed by: SURGERY

## 2017-09-01 PROCEDURE — 83930 ASSAY OF BLOOD OSMOLALITY: CPT

## 2017-09-01 PROCEDURE — 37232 PR REVSC OPN/PRQ TIB/PERO W/ANGIOPLASTY UNI EA VSL: CPT | Performed by: SURGERY

## 2017-09-01 PROCEDURE — 83935 ASSAY OF URINE OSMOLALITY: CPT

## 2017-09-01 PROCEDURE — 73718 MRI LOWER EXTREMITY W/O DYE: CPT

## 2017-09-01 PROCEDURE — 2580000003 HC RX 258: Performed by: PODIATRIST

## 2017-09-01 PROCEDURE — C1894 INTRO/SHEATH, NON-LASER: HCPCS

## 2017-09-01 PROCEDURE — 37224 PR REVSC OPN/PRG FEM/POP W/ANGIOPLASTY UNI: CPT | Performed by: SURGERY

## 2017-09-01 PROCEDURE — 85025 COMPLETE CBC W/AUTO DIFF WBC: CPT

## 2017-09-01 PROCEDURE — 84300 ASSAY OF URINE SODIUM: CPT

## 2017-09-01 PROCEDURE — 82607 VITAMIN B-12: CPT

## 2017-09-01 PROCEDURE — 82947 ASSAY GLUCOSE BLOOD QUANT: CPT

## 2017-09-01 PROCEDURE — 37228 HC TIB PER TERRITORY PLASTY: CPT

## 2017-09-01 PROCEDURE — 75774 ARTERY X-RAY EACH VESSEL: CPT

## 2017-09-01 PROCEDURE — 82746 ASSAY OF FOLIC ACID SERUM: CPT

## 2017-09-01 RX ORDER — POTASSIUM CHLORIDE 20 MEQ/1
20 TABLET, EXTENDED RELEASE ORAL ONCE
Status: COMPLETED | OUTPATIENT
Start: 2017-09-01 | End: 2017-09-01

## 2017-09-01 RX ORDER — INSULIN GLARGINE 100 [IU]/ML
30 INJECTION, SOLUTION SUBCUTANEOUS NIGHTLY
Status: DISCONTINUED | OUTPATIENT
Start: 2017-09-01 | End: 2017-09-05

## 2017-09-01 RX ADMIN — OXYCODONE HYDROCHLORIDE AND ACETAMINOPHEN 2 TABLET: 5; 325 TABLET ORAL at 22:12

## 2017-09-01 RX ADMIN — INSULIN LISPRO 1 UNITS: 100 INJECTION, SOLUTION INTRAVENOUS; SUBCUTANEOUS at 16:17

## 2017-09-01 RX ADMIN — POTASSIUM CHLORIDE 20 MEQ: 20 TABLET, EXTENDED RELEASE ORAL at 15:57

## 2017-09-01 RX ADMIN — PIPERACILLIN AND TAZOBACTAM 3.38 G: 3; .375 INJECTION, POWDER, LYOPHILIZED, FOR SOLUTION INTRAVENOUS; PARENTERAL at 03:48

## 2017-09-01 RX ADMIN — PIPERACILLIN AND TAZOBACTAM 3.38 G: 3; .375 INJECTION, POWDER, LYOPHILIZED, FOR SOLUTION INTRAVENOUS; PARENTERAL at 13:48

## 2017-09-01 RX ADMIN — INSULIN GLARGINE 30 UNITS: 100 INJECTION, SOLUTION SUBCUTANEOUS at 22:09

## 2017-09-01 RX ADMIN — LISINOPRIL 40 MG: 20 TABLET ORAL at 15:58

## 2017-09-01 RX ADMIN — MORPHINE SULFATE 4 MG: 4 INJECTION, SOLUTION INTRAMUSCULAR; INTRAVENOUS at 11:03

## 2017-09-01 RX ADMIN — HEPARIN SODIUM 5000 UNITS: 5000 INJECTION, SOLUTION INTRAVENOUS; SUBCUTANEOUS at 22:08

## 2017-09-01 RX ADMIN — PREGABALIN 25 MG: 25 CAPSULE ORAL at 22:08

## 2017-09-01 RX ADMIN — HEPARIN SODIUM 5000 UNITS: 5000 INJECTION, SOLUTION INTRAVENOUS; SUBCUTANEOUS at 15:57

## 2017-09-01 RX ADMIN — TIMOLOL MALEATE 1 DROP: 2.5 SOLUTION/ DROPS OPHTHALMIC at 22:19

## 2017-09-01 RX ADMIN — DAKIN'S SOLUTION 0.125% (QUARTER STRENGTH): 0.12 SOLUTION at 16:01

## 2017-09-01 RX ADMIN — PRAVASTATIN SODIUM 10 MG: 20 TABLET ORAL at 15:58

## 2017-09-01 RX ADMIN — INSULIN LISPRO 1 UNITS: 100 INJECTION, SOLUTION INTRAVENOUS; SUBCUTANEOUS at 22:08

## 2017-09-01 RX ADMIN — PANTOPRAZOLE SODIUM 20 MG: 20 TABLET, DELAYED RELEASE ORAL at 22:08

## 2017-09-01 RX ADMIN — PREGABALIN 25 MG: 25 CAPSULE ORAL at 15:57

## 2017-09-01 RX ADMIN — HYDRALAZINE HYDROCHLORIDE 100 MG: 50 TABLET, FILM COATED ORAL at 16:00

## 2017-09-01 RX ADMIN — LATANOPROST 1 DROP: 50 SOLUTION OPHTHALMIC at 22:08

## 2017-09-01 ASSESSMENT — PAIN DESCRIPTION - LOCATION: LOCATION: FOOT

## 2017-09-01 ASSESSMENT — PAIN SCALES - GENERAL
PAINLEVEL_OUTOF10: 9
PAINLEVEL_OUTOF10: 7
PAINLEVEL_OUTOF10: 3

## 2017-09-01 ASSESSMENT — PAIN DESCRIPTION - DESCRIPTORS: DESCRIPTORS: CONSTANT;SORE;DISCOMFORT

## 2017-09-01 ASSESSMENT — PAIN DESCRIPTION - ORIENTATION: ORIENTATION: LEFT

## 2017-09-01 ASSESSMENT — PAIN DESCRIPTION - ONSET: ONSET: ON-GOING

## 2017-09-01 ASSESSMENT — PAIN DESCRIPTION - PAIN TYPE: TYPE: ACUTE PAIN;SURGICAL PAIN

## 2017-09-01 ASSESSMENT — PAIN DESCRIPTION - FREQUENCY: FREQUENCY: CONTINUOUS

## 2017-09-01 ASSESSMENT — PAIN DESCRIPTION - PROGRESSION: CLINICAL_PROGRESSION: NOT CHANGED

## 2017-09-01 NOTE — PROGRESS NOTES
cyclobenzaprine, glucose, dextrose, glucagon (rDNA), dextrose    Objective     Vitals:  No data found. Average, Min, and Max for last 24 hours Vitals:  TEMPERATURE:  Temp  Av.4 °F (36.3 °C)  Min: 97.3 °F (36.3 °C)  Max: 97.4 °F (36.3 °C)    RESPIRATIONS RANGE: Resp  Av.5  Min: 16  Max: 19    PULSE RANGE: Pulse  Av.5  Min: 59  Max: 64    BLOOD PRESSURE RANGE:  Systolic (06WXK), PNB:580 , Min:119 , YOLANDA:591   ; Diastolic (07ICU), LP, Min:45, Max:45      PULSE OXIMETRY RANGE: SpO2  Av.5 %  Min: 94 %  Max: 97 %    I/O last 3 completed shifts:  In: -   Out: 548 [Urine:548]    CBC:  Recent Labs      17   1730  17   0519   WBC  36.4*  27.5*   HGB  7.9*  7.8*   HCT  24.6*  24.0*   PLT  369  320   CRP  214.9*   --         BMP:  Recent Labs      17   1730  17   0530  17   1341  17   1557   NA  130*  126*  130*  129*   K  3.9  3.4*   --    --    CL  96*  98   --    --    CO2  14*  16*   --    --    BUN  59*  61*   --    --    CREATININE  1.92*  1.67*   --    --    GLUCOSE  226*  247*   --    --    CALCIUM  8.2*  7.5*   --    --         Coags:  No results for input(s): APTT, PROT, INR in the last 72 hours. Physical Exam:   General: Awake, alert, and oriented to person, place, and time. No acute distress. Heart: Regular rate and rhythm. S1 and S2 normal     Lungs: Clear to auscultation bilateral, no wheezes, rales, rhonchi, or crackles    Abdomen: Soft, non-tender to palpation. Not distended. +Bowel sounds in all 4 quadrants. Lower Extremity Physical Exam:  Vascular: PT pulses are non-palpable bilaterally and DP non-palpable, Right. Palpable DP pulse, left. CFT<5 seconds to digits bilaterally. Neuro: Sensation intact to light touch to level of digits bilaterally. Musculoskeletal: Lower extremity muscle strength deferred due to pain. EHL/EHB/EDL/EDB intact, left. Dermatologic: Lower extremity skin is warm, left.  Large ulceration present on the lateral aspect of the left foot measures approximately 9.0 x 3.0 x 0.3cm. Necrotic wound base. Ulceration on the dorsum of the foot extending to the plantar aspect of the foot. Dusky necrotic changes to the left 5th digit. Malodor present. Serous drainage on dressing and from lateral wound. No purulence. Radiology:  MRI Left Foot  Impression:        1. Ulceration at the medial forefoot with subjacent osteomyelitis of the  residual 1st metatarsal diaphysis. 2. Abscess formation surrounding the distal 5th metatarsal with probable  acute on chronic osteomyelitis of the entirety of the 5th metatarsal and  likely the proximal aspect of the proximal phalanx of the 5th digit. 3. Broad ulceration at the lateral aspect of the foot with subjacent abscess  formation inferolateral to the cuboid and probable septic calcaneocuboid  joint.  Region of suspected abscess formation measures 1.4 x 1.7 x 1.9 cm. Suspected osseous destruction and osteomyelitis of the 4th and 5th  tarsometatarsal joints. 4. Abscess formation or infectious tenosynovitis in association with the  distal aspect of the residual flexor tendon of the 2nd digit measuring 1.8 x  1.7 x 1.2 cm.  5. Signal changes and slight fragmentation of the distal 3rd metatarsal head  can be seen with sequela of Freiberg's infraction or osteomyelitis.  Probable  reactive osteitis versus early osteomyelitis of the remainder of the 3rd and  4th metatarsals and proximal aspect of the proximal phalanx of the 3rd digit. 6. Diffuse infectious myositis.  Multifocal susceptibility artifact  throughout the foot but primarily in the midfoot is concerning for multifocal  soft tissue gas or possibly necrotizing infection. 7. Mild diffuse cellulitis of the visualized foot. 8. Prior amputation of the 1st and 2nd digits at the level of the metatarsal  diaphysis.   9. Suspected osteomyelitis of the anterolateral margin of the calcaneus and  inferior aspect of the medial

## 2017-09-01 NOTE — PROGRESS NOTES
Patient admitted, consent signed, all questions answered. Pt ready for procedure. Call light to reach with side rails up 2 of 2. Bilateral groin hair clipped. No family at bedside with patient.-wife remains in patient's room. Left foot with large dressing dry and intact.

## 2017-09-01 NOTE — PLAN OF CARE
Problem: Pain:  Goal: Pain level will decrease  Pain level will decrease   Outcome: Ongoing  Goal: Control of acute pain  Control of acute pain   Outcome: Ongoing  Goal: Control of chronic pain  Control of chronic pain   Outcome: Ongoing    Problem: Falls - Risk of  Goal: Absence of falls  Outcome: Ongoing    Problem: Skin Integrity:  Goal: Will show no infection signs and symptoms  Will show no infection signs and symptoms   Outcome: Ongoing  Goal: Absence of new skin breakdown  Absence of new skin breakdown   Outcome: Ongoing

## 2017-09-01 NOTE — CONSULTS
intraocular lens 2015    bilateral     Type II or unspecified type diabetes mellitus without mention of complication, not stated as uncontrolled        Past Surgical  History:     Past Surgical History:   Procedure Laterality Date    EYE SURGERY Bilateral     lazer both eyes    FOOT SURGERY Left 02/24/2017    surgical prep of wound bed    FOOT SURGERY Left 04/13/2017    1) Left foot surgical preparation of wound-bed, 2) Left foot application of graft     HC  PICC [address] DOUBLE  4/28/2017         OTHER SURGICAL HISTORY Left 01/23/2017    I and D bone, bone biopsy with flap closure and pulse lavage    OTHER SURGICAL HISTORY Left 04/27/2017    I & D foot    HI DEEP DISSEC FOOT INFEC,1 BURSA Left 4/13/2017    FOOT DEBRIDEMENT WITH EPIFIX  performed by Wesley Hernandez DPM at [facility] [address] Left 2014    hallux    TOE AMPUTATION Left 12/09/2016    hallux    TOE AMPUTATION Left 4/27/2017    I AND D FOOT, BONE BIOPSY, POSSIBLE FILET 2ND TOE performed by Wesley Hernandez DPM at [facility] OR       Medications:      sodium hypochlorite   Irrigation Daily    insulin glargine  30 Units Subcutaneous Nightly    insulin lispro  0-6 Units Subcutaneous TID WC    insulin lispro  0-3 Units Subcutaneous Nightly    potassium chloride  20 mEq Oral Once    sodium chloride flush  10 mL Intravenous 2 times per day    amLODIPine  10 mg Oral Daily    hydrALAZINE  100 mg Oral TID    lisinopril  40 mg Oral Daily    metoprolol tartrate  50 mg Oral BID    pravastatin  10 mg Oral Daily    timolol  1 drop Both Eyes BID    latanoprost  1 drop Both Eyes Nightly    pregabalin  25 mg Oral TID    pantoprazole  20 mg Oral BID    piperacillin-tazobactam  3.375 g Intravenous Q8H    vancomycin (VANCOCIN) intermittent dosing (placeholder)   Other RX Placeholder    vancomycin  1,750 mg Intravenous Q24H    heparin (porcine)  5,000 Units Subcutaneous 3 times per day       Social History:     Social History     Social

## 2017-09-01 NOTE — PROGRESS NOTES
[address]  Occupational Therapy Not Seen Note    Patient not available for Occupational Therapy due to:    [] Testing:    [] Hemodialysis    [] Blood Transfusion in Progress    [] Refusal by Patient:    [x] Surgery/Procedure: Surgery today for L foot wound - Cath lab in AM    [] Strict Bedrest    [] Sedation    [] Spine Precautions     [] Pt being transferred to palliative care at this time. Spoke with pt/family and OT services to be defered. [] Pt independent with functional mobility and functional tasks.  Pt with no OT acute care needs at this time, will defer OT eval.    [] Other    Next Scheduled: Check back 9/4    Signature: RASTA Cameron, OTR/L

## 2017-09-01 NOTE — PROGRESS NOTES
Patient return to room from angiogram procedure. Patient is resting, right groin site soft, no hematoma or swelling noted. Pedal Pulse palpable. Remind patient to lay flat for two hours. Family members at bedside. Will continue to monitor.

## 2017-09-01 NOTE — CONSULTS
Division of Vascular Surgery          Vascular Consult      Name: Car Montoya  MRN: [id number]       Physician Requesting Consult:  Dr. Caro Dominguez    Reason for Consult:   Left foot wound    Chief Complaint:      My foot has a bad wound on    History of Present Illness:      Car Montoya is a 46 y.o.  male who presents with a long complex podiatric history. Patient is a hypertensive hypercholesterolemic diabetic patient with a previous history of right foot digit amputation and left foot digital amputations. Patient has been doing moderately well this last Saturday he reports twisting his ankle. After twisting his ankle he developed blisters. Despite being urged by his wife to go to the hospital patient did not. Finally the odor and foot looked so poor he presented to the hospital today. Patient denies any pain fevers or chills with me but says that there were odors of the foot. Apparently some the blisters have been removed and has the appearance below. Patient denies any shortness of breath, chest pain, difficulty breathing. He is comfortable lying in bed. He denies a history of claudication. He has multiple wounds on his legs that of all healed and denies a long history of nonhealing wounds.   He denies having a vascular surgeon he denies having vascular surgery in the past.  He is a former smoker and he's quit for almost a year now                 Past Medical History:     Past Medical History:   Diagnosis Date    Acid reflux     Cerebrovascular disease 2006    TIA    Glaucoma     Hyperlipidemia     Hypertension     IDDM (insulin dependent diabetes mellitus) ([address]     MDRO (multiple drug resistant organisms) resistance     Neuropathy ([address]     Osteomyelitis ([address]     Left Hallux    S/P cataract extraction and insertion of intraocular lens 2015    bilateral     Type II or unspecified type diabetes mellitus without mention of complication, not stated as uncontrolled         Past Surgical MG tablet Take 1 tablet by mouth 3 times daily 4/30/17   Dary Trejo MD   cyclobenzaprine (FLEXERIL) 5 MG tablet Take 5 mg by mouth 3 times daily as needed for Muscle spasms    Historical Provider, MD   metFORMIN (GLUCOPHAGE) 500 MG tablet Take 500 mg by mouth 2 times daily (with meals)    Historical Provider, MD   pravastatin (PRAVACHOL) 10 MG tablet Take 10 mg by mouth daily    Historical Provider, MD   timolol (TIMOPTIC-XE) 0.25 % ophthalmic gel-forming 1 drop daily    Historical Provider, MD   travoprost, benzalkonium, (TRAVATAN) 0.004 % ophthalmic solution 1 drop nightly    Historical Provider, MD        Allergies:       Review of patient's allergies indicates no known allergies. Social History:     Tobacco:    reports that he has quit smoking. His smoking use included Cigarettes. He smoked 0.00 packs per day. He does not have any smokeless tobacco history on file. Alcohol:      reports that he does not drink alcohol. Drug Use:  reports that he does not use illicit drugs.     Family History:     Family History   Problem Relation Age of Onset    Diabetes Mother     Cancer Father      colon    Diabetes Father     Diabetes Sister     Diabetes Sister        Review of Systems:     Positive and Negative as described in HPI      Constitutional:  negative for  fevers, chills, sweats, fatigue, and weight loss  HEENT:  negative for vision or hearing changes,   Respiratory:  negative for shortness of breath, cough, or congestion  Cardiovascular:  negative for  chest pain, palpitations  Gastrointestinal:  negative for nausea, vomiting, diarrhea, constipation, abdominal pain  Genitourinary:  negative for frequency, dysuria  Integument/Breast:  negative for rash, skin lesions  Musculoskeletal:  negative for muscle aches or joint pain  Neurological:  negative for headaches, dizziness, lightheadedness, numbness, pain and tingling extremities  Behavior/Psych:  negative for depression and anxiety      Physical Exam: Vitals:  BP (!) 119/45  Pulse 59  Temp 97.4 °F (36.3 °C) (Oral)   Resp 16  Ht 5' 6.93\" (1.7 m)  Wt 204 lb 2.3 oz (92.6 kg)  SpO2 94%  BMI 32.04 kg/m2      General appearance - alert, well appearing and in no acute distress  Mental status - oriented to person, place and time with normal affect  Head - normocephalic and atraumatic  Eyes - pupils equal and reactive, extraocular eye movements intact, conjunctiva clear  Ears - hearing appears to be intact  Nose - no drainage noted  Mouth - mucous membranes moist  Neck - supple, no carotid bruits, thyroid not palpable, no JVD  Chest - clear to auscultation, normal effort  Heart - normal rate, regular rhythm, no murmurs  Abdomen - soft, non-tender, non-distended, bowel sounds present all four quadrants, no masses,   Neurological - normal speech, no focal findings or movement disorder noted, cranial nerves II through XII grossly intact  Extremities - images above demonstrate the foot with full thickness wounds on the plantar lateral and dorsal aspect of the foot however depth cannot be identified. Left fifth digit appears to have full thickness gangrene. There is an odor. There appears to be fat exposed on the lateral aspect of the left foot. There is no cellulitis at the ankle. The exes on the images demonstrate where the signal can be identified. No pulses can be palpated in the foot  Skin -multiple skin breakdown of the foot as demonstrated above          R brachial 2+ L brachial 2+   R radial 2+ L radial 2+   R femoral 2+ L femoral 2+   R popliteal 0+ L popliteal 0+   R posterior tibial 0+ L posterior tibial 0+   R dorsalis pedis 0+ L dorsalis pedis 0+          LALI:   Noninvasive studies were performed by the vascular lab. I reviewed the waveforms and ankle brachial indices which were approximately 0.7-0.8 on the right and 1.3 on the left.   The waveforms appear normal however I do not feel a pulse and thus concluded that this test is invalidated due to hyper calcific disease and should not be relied on:      Imaging:   Multiple x-rays have been performed final results are pending      Assessment:     Ike Fajardo is a 46 y.o.  male who    1. Critical limb ischemia of the left lower extremity  2. Although ankle-brachial indices would suggest normal flow there is no palpable pulse which is an ominous sign in the setting of this much tissue loss in a diabetic hypercholesterolemic former smoker      Plan:     1. This is an issue of limb salvage. Although he has a moderately good signal the dorsalis pedis on the left there is almost no signal the posterior tibial and I can't identify peroneal.  Given the fact that the next level of amputation if this is a below the knee amputation I feel no aggressive strategy is warranted    [facility] the patient in the multiple family members present. We will bring him for a left lower extremity diagnostic angiogram possible intervention. He may need a bypass if he wants to see this foot. There is no guarantees that we'll be able to salvage this. Overall I feel he must of twisted his ankle developed a blister which became infected the infection spread and cause significant tissue loss. I cannot necessarily prove that this is the point but based on the history sounds like this is what occurred. Family seems to understand everything that is going on. Both nursing and podiatry team have been made aware. Patient was seen and examined with the surgical residents and we discussed the case. Requested that he be started on IV infusion of lactated Ringer's at 1 25 mL an hour as creatinine is elevated. Also recommend that he be started on broad-spectrum antibiotics as his white count is elevated.     No guarantees can be made that we'll be able to save this foot below the knee amputation may be the end result      ----------------------------------------    Al Eng M.D., FACS, RVT, [address]

## 2017-09-01 NOTE — PROGRESS NOTES
Patient straight cathed at 0430. Patient tolerated well. Due to void by 1230. Writer will include in report to day shift nurse.

## 2017-09-01 NOTE — PROGRESS NOTES
DATE: 2017    NAME: Fox Hunter  MRN: [id number]   : 1965    Patient not seen this date for Physical Therapy due to:  [] Blood transfusion in progress  [] Hemodialysis  []  Patient Declined  [] Spine Precautions   [] Strict Bedrest  [x] Surgery/ Procedure: Surgery today for L foot wound. [] Testing      [] Other        [] PT being discontinued at this time. Patient independent. No further needs. [] PT being discontinued at this time as the patient has been transferred to palliative care. No further needs. RAMOS Garg  Evaluation/treatment performed by Student PT under the supervision of co-signing PT who agrees with all evaluation/treatment and documentation.

## 2017-09-01 NOTE — CONSULTS
[address] [place] [address]     Department of Internal Medicine - Staff Internal Medicine Service          CONSULTATION NOTE      RESAON FOR CONSULTATION:      HISTORY OBTAIN FROM:  Patient     HISTORY OF PRESENT ILLNESS:      Thank you for allowing us to see Ollie Mcneil in consultation for medical management/pre-operative evaluation for proposed. Podiatry is the primary. Ollie Mcneil is a pleasant 46 y.o. male with significant past medical history IDDM, MDRO , diabetic neuropathy, HTN, wounds of  foot who presented with swollen left foot. Patient had a fall last Saturday, he lost his balance, walks with a walker. His little toe became black. Hx of amputation of left great toe in past due to gangrene.     PAST MEDICAL HISTORY:        Diagnosis Date    Acid reflux     Cerebrovascular disease 2006    TIA    Glaucoma     Hyperlipidemia     Hypertension     IDDM (insulin dependent diabetes mellitus) ([address]     MDRO (multiple drug resistant organisms) resistance     Neuropathy (HCC)     Osteomyelitis (HCC)     Left Hallux    S/P cataract extraction and insertion of intraocular lens 2015    bilateral     Type II or unspecified type diabetes mellitus without mention of complication, not stated as uncontrolled        PAST SURGICAL HISTORY:        Procedure Laterality Date    EYE SURGERY Bilateral     lazer both eyes    FOOT SURGERY Left 02/24/2017    surgical prep of wound bed    FOOT SURGERY Left 04/13/2017    1) Left foot surgical preparation of wound-bed, 2) Left foot application of graft     HC  PICC [facility] DOUBLE  4/28/2017         OTHER SURGICAL HISTORY Left 01/23/2017    I and D bone, bone biopsy with flap closure and pulse lavage    OTHER SURGICAL HISTORY Left 04/27/2017    I & D foot    [place] DEEP DISSEC FOOT INFEC,1 BURSA Left 4/13/2017    FOOT DEBRIDEMENT WITH EPIFIX  performed by Vinod Quintero DPM at [address] [place] [address] TOE AMPUTATION Left 2014    hallux    TOE AMPUTATION Left 12/09/2016    hallux    TOE AMPUTATION Left 4/27/2017    I AND D FOOT, BONE BIOPSY, POSSIBLE FILET 2ND TOE performed by Suzy Judd DPM at [facility]:  Prescriptions Prior to Admission: insulin glargine (LANTUS) 100 UNIT/ML injection vial, Inject 20 Units into the skin every morning  insulin glargine (LANTUS) 100 UNIT/ML injection vial, Inject 30 Units into the skin nightly  omeprazole (PRILOSEC) 20 MG delayed release capsule, Take 20 mg by mouth 2 times daily  pregabalin (LYRICA) 25 MG capsule, Take 25 mg by mouth 3 times daily  oxyCODONE-acetaminophen (PERCOCET) 5-325 MG per tablet, Take 1 tablet by mouth every 4 hours as needed for Pain . lisinopril (PRINIVIL;ZESTRIL) 40 MG tablet, Take 1 tablet by mouth daily  metoprolol tartrate (LOPRESSOR) 50 MG tablet, Take 1 tablet by mouth 2 times daily  amLODIPine (NORVASC) 10 MG tablet, Take 1 tablet by mouth daily  hydrALAZINE (APRESOLINE) 100 MG tablet, Take 1 tablet by mouth 3 times daily  cyclobenzaprine (FLEXERIL) 5 MG tablet, Take 5 mg by mouth 3 times daily as needed for Muscle spasms  metFORMIN (GLUCOPHAGE) 500 MG tablet, Take 500 mg by mouth 2 times daily (with meals)  pravastatin (PRAVACHOL) 10 MG tablet, Take 10 mg by mouth daily  timolol (TIMOPTIC-XE) 0.25 % ophthalmic gel-forming, 1 drop daily  travoprost, benzalkonium, (TRAVATAN) 0.004 % ophthalmic solution, 1 drop nightly    Allergies:  Review of patient's allergies indicates no known allergies. SOCIAL HISTORY:   TOBACCO:   reports that he has quit smoking. His smoking use included Cigarettes. He smoked 0.00 packs per day. He does not have any smokeless tobacco history on file. ETOH:   reports that he does not drink alcohol. DRUGS:   reports that he does not use illicit drugs.     FAMILY HISTORY:       Problem Relation Age of Onset    Diabetes Mother     Cancer Father      colon    Diabetes Father     Diabetes Sister     Diabetes Sister        REVIEW OF SYSTEMS:  CONSTITUTIONAL:  negative  EYES:  negative  HEENT:  negative  RESPIRATORY:  negative  CARDIOVASCULAR:  negative  GASTROINTESTINAL:  negative  GENITOURINARY:  negative  HEMATOLOGIC/LYMPHATIC:  negative  ENDOCRINE:  negative  MUSCULOSKELETAL:  Pain in left foot  NEUROLOGICAL:  negative  BEHAVIOR/PSYCH:  negative    PHYSICAL EXAM:  BP (!) 119/45  Pulse 59  Temp 97.4 °F (36.3 °C) (Oral)   Resp 16  Ht 5' 6.93\" (1.7 m)  Wt 204 lb 2.3 oz (92.6 kg)  SpO2 94%  BMI 32.04 kg/m2  CONSTITUTIONAL:  awake, alert, cooperative, no apparent distress, and appears stated age  EYES: Pupils equal, round and reactive to light, extra ocular muscles intact  ENT:  Normocephalic, without obvious abnormality, atraumatic. BACK:  Symmetric, no curvature,   LUNGS:  Clear to auscultation bilaterally, no crackles or wheezing  CARDIOVASCULAR:  Normal S1 and S2, no S3 or S4, and no murmur noted  ABDOMEN:  Normal bowel sounds, soft, non-distended, non-tender, no masses palpated  MUSCULOSKELETAL:  Motor strength is 5 out of 5 all extremities bilaterally. Tone is normal.  NEUROLOGIC:  Awake, alert, oriented to name, place and time.    EXT : peripheral pulses weak more feeble in left foot  SKIN:  no bruising or bleeding      CBC with Differential:    Lab Results   Component Value Date    WBC 36.4 08/31/2017    RBC 3.14 08/31/2017    HGB 7.9 08/31/2017    HCT 24.6 08/31/2017     08/31/2017    MCV 78.4 08/31/2017    MCH 25.2 08/31/2017    MCHC 32.2 08/31/2017    RDW 17.1 08/31/2017    LYMPHOPCT 2 08/31/2017    MONOPCT 3 08/31/2017    BASOPCT 0 08/31/2017    MONOSABS 1.09 08/31/2017    LYMPHSABS 0.73 08/31/2017    EOSABS 0.00 08/31/2017    BASOSABS 0.00 08/31/2017    DIFFTYPE NOT REPORTED 08/31/2017     BMP:    Lab Results   Component Value Date     09/01/2017    K 3.4 09/01/2017    CL 98 09/01/2017    CO2 16 09/01/2017    BUN 61 09/01/2017    LABALBU 4.0 07/13/2012    CREATININE 1.67 09/01/2017    CALCIUM 7.5 09/01/2017 GFRAA 52 09/01/2017    LABGLOM 43 09/01/2017    GLUCOSE 247 09/01/2017     PT/INR:  No results found for: PROTIME, INR  Troponin:  No results found for: TROPONINI  Radiology Review:      Principal Problem:    Diabetic foot left  Active Problems:    IDDM (insulin dependent diabetes mellitus) ([facility] [address]    Acute kidney injury ([facility]    Ischemic foot left      ASSESSMENT/PLAN:    · Lantus 20 U HS , glucose checks every 6 hrs  · Leukocytosis Wbc 34, on vancomycin  · NPO from midnight, resume sliding scale once the patient takes diet. · BP well controlled hydralazine, amlodipine, metoprolol  · Angiogram followed by possible ( amputation) in am, vascular on board  · Pain control per primary   · COLT patient on ringers lactate 125 ml/hr   · F/u BMP in am  · Anemia Hb 7.9, serum iron , TIBC and ferritin, PS and retic count, stool for occult blood.   · Heparin for DVT prophylaxis      Michelle Box  PGY-2, Internal Medicine Resident  9/1/2017, 6:53 AM

## 2017-09-01 NOTE — PROGRESS NOTES
Internal Medicine Team - 3            Daily Progress Note    Patient:  Ike Fajardo  YOB: 1965  MRN: Opal Robledo: [de-identified]     Admit date: 8/31/2017    Subjective:   Pt seen and examined at bedside. There were no acute events overnight. Denies any chest pain/discomfort or difficulty breathing. NPO from midnight    Review of systems:    Eyes: No visual changes or diplopia. No scleral icterus. Cardiovascular: No chest pain, No dyspnea, No orthopnea or palpitations   Respiratory: No cough or wheezing, no sputum production. No pleuritic chest pain. Gastrointestinal: No abdominal pain, No nausea, vomiting or diarrhea  Genitourinary: No dysuria, trouble voiding, or hematuria. Objective:   BP (!) 119/45  Pulse 59  Temp 97.4 °F (36.3 °C) (Oral)   Resp 16  Ht 5' 6.93\" (1.7 m)  Wt 204 lb 2.3 oz (92.6 kg)  SpO2 94%  BMI 32.04 kg/m2      CONSTITUTIONAL: Alert, awake, oriented to time, place and person, in no apparent distress, sating at     [facility]:  CTA bilaterally, no wheeze, rales or rhonchi  CVS:  RRR, no murmurs, gallops, or rubs.   ABDOMEN: soft, non distended, non tender, bowel sounds present, no masses, no organomegaly   NEUROLOGY:   EXTREMITIES: No pedal edema, no calf swelling or tenderness,  Distal pulses intact      Intake/Output Summary (Last 24 hours) at 09/01/17 0605  Last data filed at 09/01/17 0424   Gross per 24 hour   Intake                0 ml   Output               28 ml   Net              -28 ml         Medications:      sodium hypochlorite   Irrigation Daily    sodium chloride flush  10 mL Intravenous 2 times per day    amLODIPine  10 mg Oral Daily    hydrALAZINE  100 mg Oral TID    lisinopril  40 mg Oral Daily    metoprolol tartrate  50 mg Oral BID    pravastatin  10 mg Oral Daily    timolol  1 drop Both Eyes BID    latanoprost  1 drop Both Eyes Nightly    pregabalin  25 mg Oral TID    pantoprazole  20 mg Oral BID    piperacillin-tazobactam

## 2017-09-01 NOTE — PROGRESS NOTES
Patient has not voided this shift. Writer asked patient to try in a urinal with no success. Writer bladder scanned patient for 739 mL. Danii Mora to notify. Writer will continue to monitor.

## 2017-09-02 PROBLEM — R33.9 URINARY RETENTION: Status: ACTIVE | Noted: 2017-09-02

## 2017-09-02 LAB
BUN BLDV-MCNC: 60 MG/DL (ref 6–20)
CREAT SERPL-MCNC: 1.57 MG/DL (ref 0.7–1.2)
GFR AFRICAN AMERICAN: 56 ML/MIN
GFR NON-AFRICAN AMERICAN: 46 ML/MIN
GFR SERPL CREATININE-BSD FRML MDRD: ABNORMAL ML/MIN/{1.73_M2}
GFR SERPL CREATININE-BSD FRML MDRD: ABNORMAL ML/MIN/{1.73_M2}
GLUCOSE BLD-MCNC: 153 MG/DL (ref 75–110)
GLUCOSE BLD-MCNC: 164 MG/DL (ref 75–110)
GLUCOSE BLD-MCNC: 178 MG/DL (ref 75–110)
GLUCOSE BLD-MCNC: 183 MG/DL (ref 75–110)
HCT VFR BLD CALC: 24.7 % (ref 41–53)
HEMOGLOBIN: 8.1 G/DL (ref 13.5–17.5)
MCH RBC QN AUTO: 25.7 PG (ref 26–34)
MCHC RBC AUTO-ENTMCNC: 32.6 G/DL (ref 31–37)
MCV RBC AUTO: 78.9 FL (ref 80–100)
PDW BLD-RTO: 17.8 % (ref 12.5–15.4)
PLATELET # BLD: 284 K/UL (ref 140–450)
PMV BLD AUTO: 11.1 FL (ref 6–12)
RBC # BLD: 3.14 M/UL (ref 4.5–5.9)
SODIUM BLD-SCNC: 127 MMOL/L (ref 135–144)
SODIUM BLD-SCNC: 129 MMOL/L (ref 135–144)
SODIUM BLD-SCNC: 129 MMOL/L (ref 135–144)
SODIUM BLD-SCNC: 130 MMOL/L (ref 135–144)
SODIUM BLD-SCNC: 130 MMOL/L (ref 135–144)
SODIUM BLD-SCNC: 133 MMOL/L (ref 135–144)
WBC # BLD: 23.7 K/UL (ref 3.5–11)

## 2017-09-02 PROCEDURE — 6370000000 HC RX 637 (ALT 250 FOR IP): Performed by: PODIATRIST

## 2017-09-02 PROCEDURE — 6360000002 HC RX W HCPCS: Performed by: STUDENT IN AN ORGANIZED HEALTH CARE EDUCATION/TRAINING PROGRAM

## 2017-09-02 PROCEDURE — 6370000000 HC RX 637 (ALT 250 FOR IP): Performed by: HOSPITALIST

## 2017-09-02 PROCEDURE — 85027 COMPLETE CBC AUTOMATED: CPT

## 2017-09-02 PROCEDURE — 84295 ASSAY OF SERUM SODIUM: CPT

## 2017-09-02 PROCEDURE — G8979 MOBILITY GOAL STATUS: HCPCS

## 2017-09-02 PROCEDURE — 6360000002 HC RX W HCPCS: Performed by: PODIATRIST

## 2017-09-02 PROCEDURE — 97162 PT EVAL MOD COMPLEX 30 MIN: CPT

## 2017-09-02 PROCEDURE — 82947 ASSAY GLUCOSE BLOOD QUANT: CPT

## 2017-09-02 PROCEDURE — 36415 COLL VENOUS BLD VENIPUNCTURE: CPT

## 2017-09-02 PROCEDURE — 2580000003 HC RX 258: Performed by: PODIATRIST

## 2017-09-02 PROCEDURE — 99232 SBSQ HOSP IP/OBS MODERATE 35: CPT | Performed by: INTERNAL MEDICINE

## 2017-09-02 PROCEDURE — 84520 ASSAY OF UREA NITROGEN: CPT

## 2017-09-02 PROCEDURE — 51701 INSERT BLADDER CATHETER: CPT

## 2017-09-02 PROCEDURE — 1200000000 HC SEMI PRIVATE

## 2017-09-02 PROCEDURE — 99233 SBSQ HOSP IP/OBS HIGH 50: CPT | Performed by: PODIATRIST

## 2017-09-02 PROCEDURE — 82565 ASSAY OF CREATININE: CPT

## 2017-09-02 PROCEDURE — 97530 THERAPEUTIC ACTIVITIES: CPT

## 2017-09-02 PROCEDURE — G8978 MOBILITY CURRENT STATUS: HCPCS

## 2017-09-02 PROCEDURE — 87086 URINE CULTURE/COLONY COUNT: CPT

## 2017-09-02 RX ORDER — TAMSULOSIN HYDROCHLORIDE 0.4 MG/1
0.4 CAPSULE ORAL DAILY
Status: DISCONTINUED | OUTPATIENT
Start: 2017-09-02 | End: 2017-09-16 | Stop reason: HOSPADM

## 2017-09-02 RX ADMIN — PIPERACILLIN AND TAZOBACTAM 3.38 G: 3; .375 INJECTION, POWDER, LYOPHILIZED, FOR SOLUTION INTRAVENOUS; PARENTERAL at 00:33

## 2017-09-02 RX ADMIN — PREGABALIN 25 MG: 25 CAPSULE ORAL at 08:38

## 2017-09-02 RX ADMIN — LISINOPRIL 40 MG: 20 TABLET ORAL at 08:38

## 2017-09-02 RX ADMIN — PANTOPRAZOLE SODIUM 20 MG: 20 TABLET, DELAYED RELEASE ORAL at 08:38

## 2017-09-02 RX ADMIN — INSULIN GLARGINE 30 UNITS: 100 INJECTION, SOLUTION SUBCUTANEOUS at 21:38

## 2017-09-02 RX ADMIN — LATANOPROST 1 DROP: 50 SOLUTION OPHTHALMIC at 21:32

## 2017-09-02 RX ADMIN — INSULIN LISPRO 1 UNITS: 100 INJECTION, SOLUTION INTRAVENOUS; SUBCUTANEOUS at 11:04

## 2017-09-02 RX ADMIN — HYDRALAZINE HYDROCHLORIDE 100 MG: 50 TABLET, FILM COATED ORAL at 13:21

## 2017-09-02 RX ADMIN — METOPROLOL TARTRATE 50 MG: 50 TABLET, FILM COATED ORAL at 08:38

## 2017-09-02 RX ADMIN — METOPROLOL TARTRATE 50 MG: 50 TABLET, FILM COATED ORAL at 21:31

## 2017-09-02 RX ADMIN — PREGABALIN 25 MG: 25 CAPSULE ORAL at 21:31

## 2017-09-02 RX ADMIN — PREGABALIN 25 MG: 25 CAPSULE ORAL at 13:21

## 2017-09-02 RX ADMIN — PRAVASTATIN SODIUM 10 MG: 20 TABLET ORAL at 08:38

## 2017-09-02 RX ADMIN — HYDRALAZINE HYDROCHLORIDE 100 MG: 50 TABLET, FILM COATED ORAL at 21:31

## 2017-09-02 RX ADMIN — HEPARIN SODIUM 5000 UNITS: 5000 INJECTION, SOLUTION INTRAVENOUS; SUBCUTANEOUS at 05:57

## 2017-09-02 RX ADMIN — HEPARIN SODIUM 5000 UNITS: 5000 INJECTION, SOLUTION INTRAVENOUS; SUBCUTANEOUS at 13:21

## 2017-09-02 RX ADMIN — HEPARIN SODIUM 5000 UNITS: 5000 INJECTION, SOLUTION INTRAVENOUS; SUBCUTANEOUS at 23:26

## 2017-09-02 RX ADMIN — TIMOLOL MALEATE 1 DROP: 2.5 SOLUTION/ DROPS OPHTHALMIC at 08:38

## 2017-09-02 RX ADMIN — VANCOMYCIN HYDROCHLORIDE 1750 MG: 10 INJECTION, POWDER, LYOPHILIZED, FOR SOLUTION INTRAVENOUS at 01:00

## 2017-09-02 RX ADMIN — PIPERACILLIN AND TAZOBACTAM 3.38 G: 3; .375 INJECTION, POWDER, LYOPHILIZED, FOR SOLUTION INTRAVENOUS; PARENTERAL at 16:42

## 2017-09-02 RX ADMIN — OXYCODONE HYDROCHLORIDE AND ACETAMINOPHEN 2 TABLET: 5; 325 TABLET ORAL at 03:09

## 2017-09-02 RX ADMIN — TIMOLOL MALEATE 1 DROP: 2.5 SOLUTION/ DROPS OPHTHALMIC at 21:31

## 2017-09-02 RX ADMIN — HYDRALAZINE HYDROCHLORIDE 100 MG: 50 TABLET, FILM COATED ORAL at 08:38

## 2017-09-02 RX ADMIN — INSULIN LISPRO 1 UNITS: 100 INJECTION, SOLUTION INTRAVENOUS; SUBCUTANEOUS at 16:42

## 2017-09-02 RX ADMIN — PIPERACILLIN AND TAZOBACTAM 3.38 G: 3; .375 INJECTION, POWDER, LYOPHILIZED, FOR SOLUTION INTRAVENOUS; PARENTERAL at 08:38

## 2017-09-02 RX ADMIN — AMLODIPINE BESYLATE 10 MG: 10 TABLET ORAL at 08:38

## 2017-09-02 RX ADMIN — INSULIN LISPRO 1 UNITS: 100 INJECTION, SOLUTION INTRAVENOUS; SUBCUTANEOUS at 08:39

## 2017-09-02 RX ADMIN — INSULIN LISPRO 1 UNITS: 100 INJECTION, SOLUTION INTRAVENOUS; SUBCUTANEOUS at 23:22

## 2017-09-02 ASSESSMENT — PAIN DESCRIPTION - PROGRESSION

## 2017-09-02 ASSESSMENT — PAIN SCALES - GENERAL
PAINLEVEL_OUTOF10: 3
PAINLEVEL_OUTOF10: 7

## 2017-09-02 ASSESSMENT — PAIN DESCRIPTION - PAIN TYPE
TYPE: ACUTE PAIN
TYPE: ACUTE PAIN

## 2017-09-02 ASSESSMENT — PAIN DESCRIPTION - DESCRIPTORS: DESCRIPTORS: CONSTANT

## 2017-09-02 ASSESSMENT — PAIN DESCRIPTION - LOCATION
LOCATION: FOOT
LOCATION: FOOT

## 2017-09-02 ASSESSMENT — PAIN DESCRIPTION - ORIENTATION
ORIENTATION: LEFT
ORIENTATION: LEFT

## 2017-09-02 ASSESSMENT — PAIN DESCRIPTION - FREQUENCY: FREQUENCY: CONTINUOUS

## 2017-09-02 ASSESSMENT — PAIN DESCRIPTION - ONSET: ONSET: ON-GOING

## 2017-09-02 NOTE — PROGRESS NOTES
Physical Therapy    Facility/Department: [address] ORTHO/MED SURG  Initial Assessment    NAME: Jose Barger  : 1965  MRN: Jace Anaya a 46 y. o. male who presents to the hospital from Dr. Vonnie Yusuf office. Patient states that he has had wounds on the left foot for sometime now however they were almost healed. Patient states that on Saturday he fell and since then his wounds have worsened. S/P LLE angiogram with angioplasty of L AT, L peroneal, L SFA yesterday. Good left DP pulse obtained post procedure and on today's exam. Podiatry to manage Left foot with debridement planned this week.   Date of Service: 2017    Patient Diagnosis(es):   Patient Active Problem List    Diagnosis Date Noted    Ischemic foot left 2017    Gangrene of foot ([address] 2017    Diabetic foot left 2017    Acute kidney injury ([address] 2017    Hyperlipidemia     Dysphagia 2013    Family history of colon cancer 2013    Bowel habit changes 2013    Uncontrolled hypertension     IDDM (insulin dependent diabetes mellitus) ([address]     Neuropathy ([address]     Uncontrolled type 2 diabetes with cellulitis of foot ([address]     Cerebrovascular disease     Cataract        Past Medical History:   Diagnosis Date    Acid reflux     Cerebrovascular disease 2006    TIA    Glaucoma     Hyperlipidemia     Hypertension     IDDM (insulin dependent diabetes mellitus) (HCC)     MDRO (multiple drug resistant organisms) resistance     Neuropathy (HCC)     Osteomyelitis (HCC)     Left Hallux    S/P cataract extraction and insertion of intraocular lens     bilateral     Type II or unspecified type diabetes mellitus without mention of complication, not stated as uncontrolled      Past Surgical History:   Procedure Laterality Date    EYE SURGERY Bilateral     lazer both eyes    FOOT SURGERY Left 2017    surgical prep of wound bed    FOOT SURGERY Left 2017    1) Left foot surgical Assistance: Independent  Homemaking Responsibilities: Yes  Ambulation Assistance: Independent (with [facility] most recently)  Transfer Assistance: [address]: enjoys sleeping and relaxing  Additional Comments: Pt's wife reports sons plan to install a ramp prior to pt's discharge. Objective          Joint Mobility  Spine: WFL  ROM RLE: WFL  ROM LLE: WFL  ROM RUE: WFL  ROM LUE: WFL  Strength RLE  Strength RLE: WFL  Strength LLE  Strength LLE: WFL  Comment: ankle not tested d/t foot pain, at least 3/5 based on functional mobility  Strength RUE  Strength RUE: WFL  Strength LUE  Strength LUE: WFL  Comment: pt complains of L shoulder arthritis and stiffness  Tone RLE  RLE Tone: Normotonic  Tone LLE  LLE Tone: Normotonic  Motor Control  Gross Motor?: WNL  Sensation  Overall Sensation Status: Impaired (B foot neuropathy at baseline)  Bed mobility  Scooting: Contact guard assistance  Transfers  Sit to Stand: Contact guard assistance  Stand to sit: Contact guard assistance  Stand Pivot Transfers: Contact guard assistance  Comment: transfers performed with RW  Ambulation  Ambulation?: Yes  WB Status: NWB LLE  Ambulation 1  Surface: level tile  Device: Rolling Walker  Assistance: Contact guard assistance  Quality of Gait: NWB LLE, mildly unsteady, no LOB  Distance: 3ft  Comments: pt able to maintain NWB LLE  Stairs/Curb  Stairs?: No     Balance  Posture: Good  Sitting - Static: Good  Sitting - Dynamic: Good  Standing - Static: Fair;+  Standing - Dynamic: Fair  Comments: standing balance assessed with RW        Assessment   Body structures, Functions, Activity limitations: Decreased functional mobility ; Decreased ADL status; Decreased strength;Decreased endurance;Decreased balance  Assessment: Pt ambulated 3ft with RW and CGA and is able to maintain NWB LLE. Pt should be safe to discharge home with prn assist from family.   Prognosis: Good  Decision Making: Medium Complexity  Patient Education: POC, importance of

## 2017-09-02 NOTE — PROGRESS NOTES
[facility] notified and stated to let pod know vascular resident and attending were here less than 1 hr ago changed drsg wound on foot draining bright red blood thru drsg on to floor now Dr Isabel Ji notified and stated to let pod resident know and if they needed vascular to see let her know. Pod attending and resident here to see pt and change drsg.

## 2017-09-02 NOTE — PLAN OF CARE
Problem: Pain:  Goal: Pain level will decrease  Pain level will decrease to a 3 on a scale of 0-10 per pt. goal   Outcome: Met This Shift  Goal: Control of acute pain  Control of acute pain   Outcome: Met This Shift  Goal: Control of chronic pain  Control of chronic pain   Outcome: Met This Shift    Problem: Falls - Risk of  Goal: Absence of falls  Outcome: Met This Shift    Problem: Skin Integrity:  Goal: Will show no infection signs and symptoms  Will show no infection signs and symptoms   Outcome: Ongoing  Goal: Absence of new skin breakdown  Absence of new skin breakdown   Outcome: Met This Shift

## 2017-09-02 NOTE — PROGRESS NOTES
alert, in no apparent distress. Pleasant. Mental status: good mentation  Head:  Normocephalic, no trauma  ENT: sclera anicteric. Neck: Supple, without lymphadenopathy. Pulmonary: Clear to auscultation  Cardiovascular: Regular rate and rhythm without murmurs. Abdomen: Soft, non tender, non distended. Extremities: Warm, well perfused. LE edema. Neurologic: No gross sensory or motor deficits. Skin: left foot examined, gangrenous changes most of the foot  And less foul    Medical Decision Making:   Labs    CBC with Differential:   Recent Labs      08/31/17 1730 09/01/17   0519  09/02/17   0532   WBC  36.4*  27.5*  23.7*   HGB  7.9*  7.8*  8.1*   HCT  24.6*  24.0*  24.7*   PLT  369  320  284   LYMPHOPCT  2  4   --    MONOPCT  3  6   --        BMP:   Recent Labs      08/31/17 1730 09/01/17 0530   09/02/17   0532  09/02/17   0937   NA  130*  126*   < >  133*  130*   K  3.9  3.4*   --    --    --    CL  96*  98   --    --    --    CO2  14*  16*   --    --    --    BUN  59*  61*   --   60*   --    CREATININE  1.92*  1.67*   --   1.57*   --     < > = values in this interval not displayed. Hepatic Function Panel: No results for input(s): PROT, LABALBU, BILIDIR, IBILI, BILITOT, ALKPHOS, ALT, AST in the last 72 hours. No results for input(s): RPR in the last 72 hours. No results for input(s): HIV in the last 72 hours. No results for input(s): BC in the last 72 hours. Lab Results   Component Value Date    MUCUS 2+ 03/30/2015    RBC 3.14 09/02/2017    TRICHOMONAS NOT REPORTED 03/30/2015    WBC 23.7 09/02/2017    YEAST NOT REPORTED 03/30/2015    TURBIDITY TURBID 03/30/2015       Lab Results   Component Value Date    CREATININE 1.57 09/02/2017    GLUCOSE 247 09/01/2017       . .................................................................................................................................. Delilah Pretty   MRI left foot  9/1/17  Ulceration at the medial forefoot with subjacent osteomyelitis of the   residual 1st metatarsal diaphysis. 2. Abscess formation surrounding the distal 5th metatarsal with probable   acute on chronic osteomyelitis of the entirety of the 5th metatarsal and   likely the proximal aspect of the proximal phalanx of the 5th digit. 3. Broad ulceration at the lateral aspect of the foot with subjacent abscess   formation inferolateral to the cuboid and probable septic calcaneocuboid   joint.  Region of suspected abscess formation measures 1.4 x 1.7 x 1.9 cm. Suspected osseous destruction and osteomyelitis of the 4th and 5th   tarsometatarsal joints. 4. Abscess formation or infectious tenosynovitis in association with the   distal aspect of the residual flexor tendon of the 2nd digit measuring 1.8 x   1.7 x 1.2 cm.   5. Signal changes and slight fragmentation of the distal 3rd metatarsal head   can be seen with sequela of Freiberg's infraction or osteomyelitis.  Probable   reactive osteitis versus early osteomyelitis of the remainder of the 3rd and   4th metatarsals and proximal aspect of the proximal phalanx of the 3rd digit. 6. Diffuse infectious myositis.  Multifocal susceptibility artifact   throughout the foot but primarily in the midfoot is concerning for multifocal   soft tissue gas or possibly necrotizing infection. 7. Mild diffuse cellulitis of the visualized foot. 8. Prior amputation of the 1st and 2nd digits at the level of the metatarsal   diaphysis. 9. Suspected osteomyelitis of the anterolateral margin of the calcaneus and   inferior aspect of the medial cuneiform. Cora Obando MD , pgy-1      Department of Internal Medicine  [facility]         9/2/2017, 11:04 AM      I have examined the patient, reviewed the patient's medical history in detail and updated the computerized patient record. Above exam and data confirmed.   Colleen Chavira, Infectious Diseases  \

## 2017-09-02 NOTE — PROGRESS NOTES
[facility]  Occupational Therapy    Date: 2017  Patient Name: Trey Barnes        : 1965       [x] Pt Refusal     Pt declines this am stating \"I just got out of bed. \"        [] Pt Unavailable due to:           Elder Mcdonough,  OTR/L Date: 2017

## 2017-09-02 NOTE — OP NOTE
[address] [facility]                               OPERATIVE REPORT    PATIENT NAME: Ngoc Barber                                 :       1965  MED REC NO:   [id number]                                        ROOM:      0234  ACCOUNT NO:   [de-identified]                                      ADMISSION  DATE:  2017  PROVIDER:     Antonio Holcomb MD    DATE OF PROCEDURE:  2017    SURGEON:  Dr. Antonio Holcomb. ASSISTANT:  Dr. Moise Pleitez. PREOPERATIVE DIAGNOSIS:  Significant tissue loss of the left lower  extremity ischemia. POSTOPERATIVE DIAGNOSIS:  Significant tissue loss of the left lower  extremity ischemia. PROCEDURE:  1. Ultrasound-guided access of the right common femoral artery. 2.  Left lower extremity angiogram with third order catheterization of  the anterior tibial artery. 3.  Additional third order catheterization with catheter into the  peroneal artery. 4.  Additional third order catheterization with catheter into the  posterior tibial artery. 5.  Percutaneous angioplasty of the anterior tibial artery with a 3 mm  balloon all the way down into the dorsalis pedis. 6.  Percutaneous angioplasty of the peroneal vessel, 3 mm. 7.  Percutaneous angioplasty of the posterior tibial artery with 3 mm. 8.  Percutaneous angioplasty of the superficial femoral artery with a  Lutonix balloon, 5 mm. 9.  Completion angiogram.  10.  Closure with 6-Frisian Angio-seal.    CPT CODES:  1.  96367.  2.  30554.  3.  68690.  4.  68553.  5.  28108.  6.  80272.  7.  27142.  8.  12923.  9.  84704. IMPLANTS:  None. COMPLICATIONS:  None. CONTRAST:  15 mL. BLOOD LOSS:  10 mL. FLOW TIME:  Per nursing chart.     PREOPERATIVE FINDINGS:  Stenotic SFA lesion, stenosis greater than 75%  of the takeoff of the peroneal artery, occlusion of the distal  posterior tibial artery, and significant occlusive a  fluffy-angled Glidewire was then placed up in the aorta under vision. We then hooked it and went up and over the aortic bifurcation and  placed the catheter. Diagnostic angiography was performed using 2 mL of contrast down the  left lower extremity down the level of the knee. We identified normal  common femoral artery, profunda femoris artery and proximal  superficial femoral artery with a stenosis greater than 50% at the  location of the superficial femoral artery midline. The wire and  catheter were then placed all the way down into the popliteal artery  where another 2 mL of contrast was used and diagnostic angiography now  demonstrated patent popliteal artery, stenotic takeoff of the  posterior tibial artery with an occlusion, however, some of the  imaging was difficult to identify. Peroneal artery appeared to be  patent, but diseased and anterior tibial artery was patent, but  diseased. In an attempt to take contrast and any decision to  intervene based on the diagnostic angiography and more  catheter-directed angiography for diagnostic and treatment purposes  will be used later where we can use selective catheterization and  imaging. The patient was given 9000 units of heparin and stiff-angled Glidewire  was placed and the sheath was exchanged after the catheter was  removed. A [place] 45 cm sheath was placed up and over  the aortic bifurcation under direct vision into left common femoral  artery. At this point, a [address] catheter was then placed  down into the tibioperoneal trunk. We then used the angled Glidewire  to access the anterior tibial artery based on old images access the AT and  once the anterior tibial artery was accessed, the diagnostic  angiography was performed. Anterior tibial artery had diseased  proximal segment occluded at the level of the ankle and no dorsalis  pedis was identified.   As a result, an KloudCatch wire was  placed on this catheter platform and from the level of the  distal popliteal artery I performed the completion angiogram using 2  mL of the contrast and I was able to identify now a significantly  improved anterior tibial artery takeoff with flow all the way into the  foot filling small branches and vessels which was not present  preoperatively. Peroneal artery was patent and proximal stenosis was  gone and he had larger branches at the level of the ankle. The  posterior tibial artery was larger and larger branches could be  identified. We then removed the catheter over the wire and then took  the Lutonix balloon, a 5 mm x 80 and performed a percutaneous  angioplasty of the previous superficial femoral artery stenosis. Wires, catheters, and sheaths were all backed off. Completion  angiography was performed with ACOSTA imaging demonstrated good puncture  site. A 6-Polish Angio-Seal was deployed without difficulty. At the end of the case, hemostasis was achieved. I was able to  palpate dorsalis pedis at the level of the foot, which was marked with  an X. The foot was much warmer than when I saw him last night. I went and spoke to his wife and I had spoken with the Podiatry  attending. We have explained the plan. I think that rapid  debridement of all ischemic tissue is warranted. We should then allow  that to heal back and then consider any foot saving amputations at  that point. I am moderately optimistic, however, I have been  realistic with the patient for the expectations from the salvage. ROBERT I. Dulcie Mcburney, MD    D: 09/01/2017 11:56:25       T: 09/01/2017 14:40:06     [id number]  Job#: [id number]     Doc#: [id number]

## 2017-09-02 NOTE — PROGRESS NOTES
Temp src Pulse Resp SpO2   17 0309 (!) 113/55 98.3 °F (36.8 °C) Oral 56 16 95 %     Average, Min, and Max for last 24 hours Vitals:  TEMPERATURE:  Temp  Av.7 °F (36.5 °C)  Min: 97.3 °F (36.3 °C)  Max: 98.3 °F (36.8 °C)    RESPIRATIONS RANGE: Resp  Av.3  Min: 16  Max: 19    PULSE RANGE: Pulse  Av.7  Min: 56  Max: 64    BLOOD PRESSURE RANGE:  Systolic (88TZY), JEV:199 , Min:113 , KATINA:950   ; Diastolic (88RRG), OTM:94, Min:52, Max:57      PULSE OXIMETRY RANGE: SpO2  Av %  Min: 95 %  Max: 97 %    I/O last 3 completed shifts:  In: -   Out: 1000 [Urine:1000]    CBC:  Recent Labs      17   0519  17   0532   WBC  36.4*  27.5*  23.7*   HGB  7.9*  7.8*  8.1*   HCT  24.6*  24.0*  24.7*   PLT  369  320  284   CRP  214.9*   --    --         BMP:  Recent Labs      17   0530   17   0028  17   0532   NA  130*  126*   < >  135  129*  133*   K  3.9  3.4*   --    --    --    --    CL  96*  98   --    --    --    --    CO2  14*  16*   --    --    --    --    BUN  59*  61*   --    --    --   60*   CREATININE  1.92*  1.67*   --    --    --   1.57*   GLUCOSE  226*  247*   --    --    --    --    CALCIUM  8.2*  7.5*   --    --    --    --     < > = values in this interval not displayed. Coags:  No results for input(s): APTT, PROT, INR in the last 72 hours. Physical Exam:   General: Awake, alert, and oriented to person, place, and time. No acute distress. Heart: RRR    Lungs: CTAB, no increased effort, equal air entry     Abdomen: Soft, non-tender to palpation. Not distended. +Bowel sounds in all 4 quadrants. Lower Extremity Physical Exam:  Vascular: PT pulses are non-palpable bilaterally and DP non-palpable, Right. Palpable DP pulse, left. CFT<5 seconds to digits bilaterally. Neuro: Sensation intact to light touch to level of digits bilaterally.     Musculoskeletal: Lower extremity muscle strength metatarsal  diaphysis. 9. Suspected osteomyelitis of the anterolateral margin of the calcaneus and  inferior aspect of the medial cuneiform. 10. Developing Charcot arthropathy/neuropathic foot is difficult to exclude. Assessment    Patient is a 45 yo male with      1. Chronic diabetic neuropathic ulceration, left foot    2. Dry gangrene left 5th digit.    3. Severe PAD    Principal Problem:    Diabetic foot left  Active Problems:    IDDM (insulin dependent diabetes mellitus) ([facility]    Acute kidney injury ([facility]    Ischemic foot left       Plan      - Patient examined and evaluated at bedside with Dr. Sierra Hernandez  - Treatment options discussed in detail with the patient      - Dakins WTD dressing changed to left lower extremity   - Continue IV abx per ID   - Medical management per IM  - NWB to left foot   - Diet: carb control  - DVT ppx: lovenox  - Plan for extensive debridement of left foot vs amputation       Ashish Torre DPM PGY-2  Podiatric Medicine & Surgery   Pager: [phone]

## 2017-09-02 NOTE — PROGRESS NOTES
insulin lispro  0-3 Units Subcutaneous Nightly    sodium chloride flush  10 mL Intravenous 2 times per day    amLODIPine  10 mg Oral Daily    hydrALAZINE  100 mg Oral TID    lisinopril  40 mg Oral Daily    metoprolol tartrate  50 mg Oral BID    pravastatin  10 mg Oral Daily    timolol  1 drop Both Eyes BID    latanoprost  1 drop Both Eyes Nightly    pregabalin  25 mg Oral TID    pantoprazole  20 mg Oral BID    piperacillin-tazobactam  3.375 g Intravenous Q8H    vancomycin (VANCOCIN) intermittent dosing (placeholder)   Other RX Placeholder    vancomycin  1,750 mg Intravenous Q24H    heparin (porcine)  5,000 Units Subcutaneous 3 times per day       PRN Medications  sodium chloride flush 10 mL PRN   acetaminophen 650 mg Q4H PRN   magnesium hydroxide 30 mL Daily PRN   ondansetron 4 mg Q6H PRN   oxyCODONE-acetaminophen 1 tablet Q4H PRN   Or     oxyCODONE-acetaminophen 2 tablet Q4H PRN   morphine 2 mg Q2H PRN   Or     morphine 4 mg Q2H PRN   cyclobenzaprine 5 mg TID PRN   glucose 15 g PRN   dextrose 12.5 g PRN   glucagon (rDNA) 1 mg PRN   dextrose 100 mL/hr PRN       Diagnostic Labs and Imaging:  CBC:  Recent Labs      08/31/17   1730  09/01/17   0519  09/02/17   0532   WBC  36.4*  27.5*  23.7*   HGB  7.9*  7.8*  8.1*   PLT  369  320  284     BMP: Recent Labs      08/31/17   1730  09/01/17   0530   09/01/17 2012 09/02/17   0028  09/02/17   0532   NA  130*  126*   < >  135  129*  133*   K  3.9  3.4*   --    --    --    --    CL  96*  98   --    --    --    --    CO2  14*  16*   --    --    --    --    BUN  59*  61*   --    --    --   60*   CREATININE  1.92*  1.67*   --    --    --   1.57*   GLUCOSE  226*  247*   --    --    --    --     < > = values in this interval not displayed. Hepatic: No results for input(s): AST, ALT, ALB, BILITOT, ALKPHOS in the last 72 hours. ASSESSMENT:     1. Diabetic left foot  2. Osteomyelitis   3. Insulin dependent DM   4. Hyponatremia   5. Acute kidney injury   6. Essential HTN     PLAN:    - S/p angiogram/angioplasty on 9/1/17   - Debridement planned this week per podiatry    - Blood glucose control Lantus 20U, and low dose ISS   - IV vanc/zosyn per primary team    - DVT prophylaxis: Pepe Conrad DO  Internal Medicine Resident   [facility]

## 2017-09-03 LAB
-: ABNORMAL
AMORPHOUS: ABNORMAL
BACTERIA: ABNORMAL
BILIRUBIN URINE: NEGATIVE
BUN BLDV-MCNC: 44 MG/DL (ref 6–20)
CASTS UA: ABNORMAL /LPF (ref 0–8)
COLOR: YELLOW
CREAT SERPL-MCNC: 1.29 MG/DL (ref 0.7–1.2)
CRYSTALS, UA: ABNORMAL /HPF
CULTURE: NO GROWTH
CULTURE: NORMAL
EPITHELIAL CELLS UA: ABNORMAL /HPF (ref 0–5)
GFR AFRICAN AMERICAN: >60 ML/MIN
GFR NON-AFRICAN AMERICAN: 58 ML/MIN
GFR SERPL CREATININE-BSD FRML MDRD: ABNORMAL ML/MIN/{1.73_M2}
GFR SERPL CREATININE-BSD FRML MDRD: ABNORMAL ML/MIN/{1.73_M2}
GLUCOSE BLD-MCNC: 107 MG/DL (ref 75–110)
GLUCOSE BLD-MCNC: 133 MG/DL (ref 75–110)
GLUCOSE BLD-MCNC: 151 MG/DL (ref 75–110)
GLUCOSE BLD-MCNC: 205 MG/DL (ref 75–110)
GLUCOSE URINE: NEGATIVE
HCT VFR BLD CALC: 24 % (ref 41–53)
HEMOGLOBIN: 7.8 G/DL (ref 13.5–17.5)
KETONES, URINE: NEGATIVE
LEUKOCYTE ESTERASE, URINE: ABNORMAL
Lab: NORMAL
MCH RBC QN AUTO: 25.2 PG (ref 26–34)
MCHC RBC AUTO-ENTMCNC: 32.5 G/DL (ref 31–37)
MCV RBC AUTO: 77.5 FL (ref 80–100)
MUCUS: ABNORMAL
NITRITE, URINE: NEGATIVE
OTHER OBSERVATIONS UA: ABNORMAL
PATHOLOGIST REVIEW: NORMAL
PDW BLD-RTO: 17.4 % (ref 12.5–15.4)
PH UA: 5.5 (ref 5–8)
PLATELET # BLD: 305 K/UL (ref 140–450)
PMV BLD AUTO: 10.6 FL (ref 6–12)
PROTEIN UA: ABNORMAL
RBC # BLD: 3.1 M/UL (ref 4.5–5.9)
RBC UA: ABNORMAL /HPF (ref 0–4)
RENAL EPITHELIAL, UA: ABNORMAL /HPF
SODIUM BLD-SCNC: 129 MMOL/L (ref 135–144)
SODIUM BLD-SCNC: 129 MMOL/L (ref 135–144)
SODIUM BLD-SCNC: 130 MMOL/L (ref 135–144)
SPECIFIC GRAVITY UA: 1.02 (ref 1–1.03)
SPECIMEN DESCRIPTION: NORMAL
STATUS: NORMAL
TRICHOMONAS: ABNORMAL
TURBIDITY: ABNORMAL
URINE HGB: NEGATIVE
UROBILINOGEN, URINE: NORMAL
VANCOMYCIN TROUGH DATE LAST DOSE: NORMAL
VANCOMYCIN TROUGH DOSE AMOUNT: NORMAL
VANCOMYCIN TROUGH TIME LAST DOSE: NORMAL
VANCOMYCIN TROUGH: 15.2 UG/ML (ref 10–20)
WBC # BLD: 27 K/UL (ref 3.5–11)
WBC UA: ABNORMAL /HPF (ref 0–5)
YEAST: ABNORMAL

## 2017-09-03 PROCEDURE — 6360000002 HC RX W HCPCS: Performed by: PODIATRIST

## 2017-09-03 PROCEDURE — 1200000000 HC SEMI PRIVATE

## 2017-09-03 PROCEDURE — 82947 ASSAY GLUCOSE BLOOD QUANT: CPT

## 2017-09-03 PROCEDURE — 36415 COLL VENOUS BLD VENIPUNCTURE: CPT

## 2017-09-03 PROCEDURE — 2580000003 HC RX 258: Performed by: SURGERY

## 2017-09-03 PROCEDURE — 84295 ASSAY OF SERUM SODIUM: CPT

## 2017-09-03 PROCEDURE — 85027 COMPLETE CBC AUTOMATED: CPT

## 2017-09-03 PROCEDURE — 86403 PARTICLE AGGLUT ANTBDY SCRN: CPT

## 2017-09-03 PROCEDURE — 87186 SC STD MICRODIL/AGAR DIL: CPT

## 2017-09-03 PROCEDURE — 97116 GAIT TRAINING THERAPY: CPT

## 2017-09-03 PROCEDURE — 99233 SBSQ HOSP IP/OBS HIGH 50: CPT | Performed by: INTERNAL MEDICINE

## 2017-09-03 PROCEDURE — 84520 ASSAY OF UREA NITROGEN: CPT

## 2017-09-03 PROCEDURE — 6360000002 HC RX W HCPCS: Performed by: STUDENT IN AN ORGANIZED HEALTH CARE EDUCATION/TRAINING PROGRAM

## 2017-09-03 PROCEDURE — 81001 URINALYSIS AUTO W/SCOPE: CPT

## 2017-09-03 PROCEDURE — 6370000000 HC RX 637 (ALT 250 FOR IP): Performed by: STUDENT IN AN ORGANIZED HEALTH CARE EDUCATION/TRAINING PROGRAM

## 2017-09-03 PROCEDURE — 97530 THERAPEUTIC ACTIVITIES: CPT

## 2017-09-03 PROCEDURE — 82565 ASSAY OF CREATININE: CPT

## 2017-09-03 PROCEDURE — 87205 SMEAR GRAM STAIN: CPT

## 2017-09-03 PROCEDURE — 6370000000 HC RX 637 (ALT 250 FOR IP): Performed by: HOSPITALIST

## 2017-09-03 PROCEDURE — 87070 CULTURE OTHR SPECIMN AEROBIC: CPT

## 2017-09-03 PROCEDURE — 80202 ASSAY OF VANCOMYCIN: CPT

## 2017-09-03 PROCEDURE — 97110 THERAPEUTIC EXERCISES: CPT

## 2017-09-03 PROCEDURE — 6370000000 HC RX 637 (ALT 250 FOR IP): Performed by: PODIATRIST

## 2017-09-03 PROCEDURE — 2580000003 HC RX 258: Performed by: PODIATRIST

## 2017-09-03 PROCEDURE — 82747 ASSAY OF FOLIC ACID RBC: CPT

## 2017-09-03 RX ADMIN — METOPROLOL TARTRATE 50 MG: 50 TABLET, FILM COATED ORAL at 08:46

## 2017-09-03 RX ADMIN — PRAVASTATIN SODIUM 10 MG: 20 TABLET ORAL at 08:46

## 2017-09-03 RX ADMIN — TIMOLOL MALEATE 1 DROP: 2.5 SOLUTION/ DROPS OPHTHALMIC at 21:42

## 2017-09-03 RX ADMIN — METOPROLOL TARTRATE 50 MG: 50 TABLET, FILM COATED ORAL at 21:42

## 2017-09-03 RX ADMIN — PREGABALIN 25 MG: 25 CAPSULE ORAL at 14:06

## 2017-09-03 RX ADMIN — HYDRALAZINE HYDROCHLORIDE 100 MG: 50 TABLET, FILM COATED ORAL at 08:46

## 2017-09-03 RX ADMIN — INSULIN GLARGINE 30 UNITS: 100 INJECTION, SOLUTION SUBCUTANEOUS at 21:42

## 2017-09-03 RX ADMIN — INSULIN LISPRO 1 UNITS: 100 INJECTION, SOLUTION INTRAVENOUS; SUBCUTANEOUS at 09:40

## 2017-09-03 RX ADMIN — AMLODIPINE BESYLATE 10 MG: 10 TABLET ORAL at 08:46

## 2017-09-03 RX ADMIN — PIPERACILLIN AND TAZOBACTAM 3.38 G: 3; .375 INJECTION, POWDER, LYOPHILIZED, FOR SOLUTION INTRAVENOUS; PARENTERAL at 16:24

## 2017-09-03 RX ADMIN — HEPARIN SODIUM 5000 UNITS: 5000 INJECTION, SOLUTION INTRAVENOUS; SUBCUTANEOUS at 07:41

## 2017-09-03 RX ADMIN — PREGABALIN 25 MG: 25 CAPSULE ORAL at 21:42

## 2017-09-03 RX ADMIN — INSULIN LISPRO 2 UNITS: 100 INJECTION, SOLUTION INTRAVENOUS; SUBCUTANEOUS at 12:48

## 2017-09-03 RX ADMIN — LISINOPRIL 40 MG: 20 TABLET ORAL at 08:46

## 2017-09-03 RX ADMIN — HYDRALAZINE HYDROCHLORIDE 100 MG: 50 TABLET, FILM COATED ORAL at 14:06

## 2017-09-03 RX ADMIN — ONDANSETRON 4 MG: 2 INJECTION INTRAMUSCULAR; INTRAVENOUS at 13:38

## 2017-09-03 RX ADMIN — PIPERACILLIN AND TAZOBACTAM 3.38 G: 3; .375 INJECTION, POWDER, LYOPHILIZED, FOR SOLUTION INTRAVENOUS; PARENTERAL at 00:32

## 2017-09-03 RX ADMIN — HEPARIN SODIUM 5000 UNITS: 5000 INJECTION, SOLUTION INTRAVENOUS; SUBCUTANEOUS at 14:06

## 2017-09-03 RX ADMIN — TAMSULOSIN HYDROCHLORIDE 0.4 MG: 0.4 CAPSULE ORAL at 08:48

## 2017-09-03 RX ADMIN — TIMOLOL MALEATE 1 DROP: 2.5 SOLUTION/ DROPS OPHTHALMIC at 08:49

## 2017-09-03 RX ADMIN — HYDRALAZINE HYDROCHLORIDE 100 MG: 50 TABLET, FILM COATED ORAL at 21:42

## 2017-09-03 RX ADMIN — PANTOPRAZOLE SODIUM 20 MG: 20 TABLET, DELAYED RELEASE ORAL at 21:42

## 2017-09-03 RX ADMIN — VANCOMYCIN HYDROCHLORIDE 1750 MG: 10 INJECTION, POWDER, LYOPHILIZED, FOR SOLUTION INTRAVENOUS at 03:09

## 2017-09-03 RX ADMIN — HEPARIN SODIUM 5000 UNITS: 5000 INJECTION, SOLUTION INTRAVENOUS; SUBCUTANEOUS at 21:42

## 2017-09-03 RX ADMIN — PREGABALIN 25 MG: 25 CAPSULE ORAL at 08:45

## 2017-09-03 RX ADMIN — PANTOPRAZOLE SODIUM 20 MG: 20 TABLET, DELAYED RELEASE ORAL at 08:45

## 2017-09-03 RX ADMIN — SODIUM CHLORIDE, POTASSIUM CHLORIDE, SODIUM LACTATE AND CALCIUM CHLORIDE: 600; 310; 30; 20 INJECTION, SOLUTION INTRAVENOUS at 03:11

## 2017-09-03 RX ADMIN — LATANOPROST 1 DROP: 50 SOLUTION OPHTHALMIC at 21:42

## 2017-09-03 RX ADMIN — PIPERACILLIN AND TAZOBACTAM 3.38 G: 3; .375 INJECTION, POWDER, LYOPHILIZED, FOR SOLUTION INTRAVENOUS; PARENTERAL at 08:46

## 2017-09-03 ASSESSMENT — PAIN DESCRIPTION - PROGRESSION
CLINICAL_PROGRESSION: NOT CHANGED

## 2017-09-03 ASSESSMENT — PAIN SCALES - GENERAL: PAINLEVEL_OUTOF10: 0

## 2017-09-03 NOTE — PROGRESS NOTES
Physical Therapy  Facility/Department: [address] ORTHO/MED SURG  Daily Treatment Note  NAME: Trey Barnes  : 1965  MRN: [id number]    Date of Service: 9/3/2017    Patient Diagnosis(es):   Patient Active Problem List    Diagnosis Date Noted    Urinary retention 2017    Ischemic foot left 2017    Gangrene of foot ([address] 2017    Diabetic foot left 2017    Acute kidney injury ([address] 2017    Hyperlipidemia     Dysphagia 2013    Family history of colon cancer 2013    Bowel habit changes 2013    Uncontrolled hypertension     IDDM (insulin dependent diabetes mellitus) ([address]     Neuropathy ([address]     Uncontrolled type 2 diabetes with cellulitis of foot ([address]     Cerebrovascular disease     Cataract        Past Medical History:   Diagnosis Date    Acid reflux     Cerebrovascular disease     TIA    Glaucoma     Hyperlipidemia     Hypertension     IDDM (insulin dependent diabetes mellitus) ([address]     MDRO (multiple drug resistant organisms) resistance     Neuropathy (HCC)     Osteomyelitis ([address]     Left Hallux    S/P cataract extraction and insertion of intraocular lens     bilateral     Type II or unspecified type diabetes mellitus without mention of complication, not stated as uncontrolled      Past Surgical History:   Procedure Laterality Date    EYE SURGERY Bilateral     lazer both eyes    FOOT SURGERY Left 2017    surgical prep of wound bed    FOOT SURGERY Left 2017    1) Left foot surgical preparation of wound-bed, 2) Left foot application of graft     HC  PICC [facility] DOUBLE  2017         OTHER SURGICAL HISTORY Left 2017    I and D bone, bone biopsy with flap closure and pulse lavage    OTHER SURGICAL HISTORY Left 2017    I & D foot    CO DEEP DISSEC FOOT INFEC,1 BURSA Left 2017    FOOT DEBRIDEMENT WITH EPIFIX  performed by Rosalee Gonzalez DPM at [facility] Left 2014    hallux  TOE AMPUTATION Left 12/09/2016    hallux    TOE AMPUTATION Left 4/27/2017    I AND D FOOT, BONE BIOPSY, POSSIBLE FILET 2ND TOE performed by Rosalee Gonzalez DPM at [place] [address]       Restrictions/Precautions  Restrictions/Precautions: Weight Bearing, Fall Risk  Required Braces or Orthoses?: No  Lower Extremity Weight Bearing Restrictions  Left Lower Extremity Weight Bearing: Non Weight Bearing  Position Activity Restriction  Other position/activity restrictions: up with assist    Subjective   Subjective: Patient supine in bed upon arrival, Podiatric MD present, performing wound check and dressing change. Pt and RN agreeable to PT this date. Patient Currently in Pain: Denies  Pain Assessment: 0-10  Pain Level: 0  Vital Signs  Patient Currently in Pain: Denies    Orientation  Overall Orientation Status: Within Normal Limits    Objective   Bed mobility  Rolling to Left: Contact guard assistance  Supine to Sit: Minimal assistance  Scooting: Contact guard assistance    Transfers  Sit to Stand: Minimal Assistance  Stand to sit: Minimal Assistance  Bed to Chair: Contact guard assistance;Minimal assistance  Stand Pivot Transfers: Contact guard assistance    Ambulation  WB Status: NWB LLE  Surface: level tile  Device: Rolling Walker  Assistance: Contact guard assistance;Minimal assistance  Quality of Gait: NWB LLE, mildly unsteady, no LOB  Distance: 8 ft x 2 to chair and then back to bed  Comments: pt able to maintain NWB LLE    Stairs/Curb  Stairs?: No    Balance  Posture: Good  Sitting - Static: Good  Sitting - Dynamic: Good  Standing - Static: Fair;+  Standing - Dynamic: Fair  Comments: standing balance assessed with RW    Seated LE exercise program: Long Arc Quads, hip abduction/adduction, heel/toe raises, and marches. Reps: 15 reps bilaterally    Assessment   Body structures, Functions, Activity limitations: Decreased functional mobility ; Decreased ADL status; Decreased strength;Decreased endurance;Decreased

## 2017-09-03 NOTE — PROGRESS NOTES
Internal Medicine Residency Program  Inpatient Daily Progress Note  ______________________________________________________________________________    Patient: Toney Brown  YOB: 1965   MRN: Su Bill: [de-identified]     Admit date: 8/31/2017  Today's date: 09/03/17  Number of days in the hospital: 3  Expected Discharge Date: 09/04/17    Admitting Diagnosis: Diabetic foot ([address]    Subjective:   Patient seen and examined at bedside. Patient c/o urinary retention yesterday afternoon, and was straight catheterized twice (520 and 480 cc). Urology was consulted and marcus placed, started on flomax. Afebrile, vital signs stable. Patient states pain is controlled. States podiatry is planning for debridement surgery this week.       REVIEW OF SYSTEMS:  Constitutional: negative fever, negative chills  HEENT: negative visual disturbances, negative headaches  Respiratory: negative dyspnea, negative cough  Cardiovascular: negative chest pain,  negative palpitations  Gastrointestinal: negative abdominal pain, negative RUQ pain, negative N/V, negative diarrhea, negative constipation  Musculoskeletal: negative back pain, negative myalgias, negative arthralgias  Neurological:  negative dizziness, negative weakness    Objective:   Vital Sign:  BP (!) 137/59  Pulse 66  Temp 98.6 °F (37 °C) (Oral)   Resp 16  Ht 5' 6.93\" (1.7 m)  Wt 204 lb 2.3 oz (92.6 kg)  SpO2 98%  BMI 32.04 kg/m2      Physical Exam:  General appearance:   alert, well appearing, and in no distress  Mental Status: alert, oriented to person, place, and time  Neurologic:  alert, oriented, normal speech, no focal findings or movement disorder noted  Lungs:  clear to auscultation, no wheezes, rales or rhonchi, symmetric air entry  Heart[de-identified] normal rate, regular rhythm, normal S1, S2, no murmurs, rubs, clicks or gallops  Abdomen:  soft, nontender, nondistended, no masses or organomegaly      Medications:  Scheduled Medications   tamsulosin 0.4 mg Oral Daily    sodium hypochlorite   Irrigation Daily    insulin glargine  30 Units Subcutaneous Nightly    insulin lispro  0-6 Units Subcutaneous TID WC    insulin lispro  0-3 Units Subcutaneous Nightly    sodium chloride flush  10 mL Intravenous 2 times per day    amLODIPine  10 mg Oral Daily    hydrALAZINE  100 mg Oral TID    lisinopril  40 mg Oral Daily    metoprolol tartrate  50 mg Oral BID    pravastatin  10 mg Oral Daily    timolol  1 drop Both Eyes BID    latanoprost  1 drop Both Eyes Nightly    pregabalin  25 mg Oral TID    pantoprazole  20 mg Oral BID    piperacillin-tazobactam  3.375 g Intravenous Q8H    vancomycin (VANCOCIN) intermittent dosing (placeholder)   Other RX Placeholder    vancomycin  1,750 mg Intravenous Q24H    heparin (porcine)  5,000 Units Subcutaneous 3 times per day       PRN Medications    sodium chloride flush 10 mL PRN   acetaminophen 650 mg Q4H PRN   magnesium hydroxide 30 mL Daily PRN   ondansetron 4 mg Q6H PRN   oxyCODONE-acetaminophen 1 tablet Q4H PRN   Or     oxyCODONE-acetaminophen 2 tablet Q4H PRN   morphine 2 mg Q2H PRN   Or     morphine 4 mg Q2H PRN   cyclobenzaprine 5 mg TID PRN   glucose 15 g PRN   dextrose 12.5 g PRN   glucagon (rDNA) 1 mg PRN   dextrose 100 mL/hr PRN       Diagnostic Labs and Imaging:  CBC:  Recent Labs      08/31/17   1730  09/01/17   0519  09/02/17   0532   WBC  36.4*  27.5*  23.7*   HGB  7.9*  7.8*  8.1*   PLT  369  320  284     BMP:   Recent Labs      08/31/17   1730  09/01/17   0530   09/02/17   0532   09/02/17   1656  09/02/17   2030  09/03/17   0049   NA  130*  126*   < >  133*   < >  129*  127*  129*   K  3.9  3.4*   --    --    --    --    --    --    CL  96*  98   --    --    --    --    --    --    CO2  14*  16*   --    --    --    --    --    --    BUN  59*  61*   --   60*   --    --    --    --    CREATININE  1.92*  1.67*   --   1.57*   --    --    --    --    GLUCOSE  226*  247*   --    --    --    --    --    -- < > = values in this interval not displayed. Hepatic: No results for input(s): AST, ALT, ALB, BILITOT, ALKPHOS in the last 72 hours. ASSESSMENT:     1. Diabetic left foot  2. Osteomyelitis   3. Insulin dependent DM   4. Acute kidney injury   5. Hyponatremia  6.  Essential HTN     PLAN:    - S/p LLE angiogram/angioplasty on 9/1/17   - Debridement planned for Tuesday (9/5/17) per podiatry    - IV vanc/zosyn per primary team    - Blood glucose control Lantus 30U, and low dose ISS   - Urology consulted for urinary retention: recommended marcus catheter 48-72 hrs or until Cr nadirs, UA and Ucx pending, started Flomax    - Monitor serum sodium q6hrs   - Blood pressures controlled with home medications     - DVT prophylaxis: heparin    - Ulcer prophylaxis: protonix       Carlos Enrique Salinas DO  Internal Medicine Resident   [facility], [facility]

## 2017-09-03 NOTE — PROGRESS NOTES
Progress Note  Podiatric Medicine and Surgery     Subjective     Patient seen and examined at bedside. No acute events overnight. Afebrile, vital signs stable   Was having issues with urinary retention; now has marcus in place and urology has been consulted. Urine cx suggestive of UTI. Denies pain in the foot. HPI 8/31/17:  Devi Stanford is a 46 y.o. male who presents to the hospital from Dr. Clifford Stone office. Patient states that he has had wounds on the left foot for sometime now however they were almost healed. Patient states that on Saturday he fell and since then his wounds have worsened. Patient states that it is painful to move. Patient states that the little toe has become black. Patient denies any N/V/F/C. Pt denies any shortness of breath. ROS: Negative except for HPI. Denies N/V/F/C/SOB/CP.     Medications:  Scheduled Meds:   tamsulosin  0.4 mg Oral Daily    sodium hypochlorite   Irrigation Daily    insulin glargine  30 Units Subcutaneous Nightly    insulin lispro  0-6 Units Subcutaneous TID WC    insulin lispro  0-3 Units Subcutaneous Nightly    sodium chloride flush  10 mL Intravenous 2 times per day    amLODIPine  10 mg Oral Daily    hydrALAZINE  100 mg Oral TID    lisinopril  40 mg Oral Daily    metoprolol tartrate  50 mg Oral BID    pravastatin  10 mg Oral Daily    timolol  1 drop Both Eyes BID    latanoprost  1 drop Both Eyes Nightly    pregabalin  25 mg Oral TID    pantoprazole  20 mg Oral BID    piperacillin-tazobactam  3.375 g Intravenous Q8H    vancomycin  1,750 mg Intravenous Q24H    heparin (porcine)  5,000 Units Subcutaneous 3 times per day       Continuous Infusions:   dextrose         PRN Meds:sodium chloride flush, acetaminophen, magnesium hydroxide, ondansetron, oxyCODONE-acetaminophen **OR** oxyCODONE-acetaminophen, morphine **OR** morphine, cyclobenzaprine, glucose, dextrose, glucagon (rDNA), dextrose    Objective     Vitals:  Patient Vitals for the past 8 artifact  throughout the foot but primarily in the midfoot is concerning for multifocal  soft tissue gas or possibly necrotizing infection. 7. Mild diffuse cellulitis of the visualized foot. 8. Prior amputation of the 1st and 2nd digits at the level of the metatarsal  diaphysis. 9. Suspected osteomyelitis of the anterolateral margin of the calcaneus and  inferior aspect of the medial cuneiform. 10. Developing Charcot arthropathy/neuropathic foot is difficult to exclude. Assessment    Patient is a 47 yo male with      1. Chronic diabetic neuropathic ulceration, left foot   2. Osteomyelitis of the left foot    3. Dry gangrene left 5th digit.    4. Severe PAD    Principal Problem:    Diabetic foot left  Active Problems:    IDDM (insulin dependent diabetes mellitus) ([facility]    Acute kidney injury ([facility]    Ischemic foot left    Urinary retention       Plan      - Patient examined and evaluated at bedside with Dr. Terryann Aschoff  - Treatment options discussed in detail with the patient      - Dakins WTD dressing changed to left lower extremity   - Continue IV abx per ID: currently vanc/zosyn   - Aerobic/anaerobic cultures of wound obtained and sent today   - Medical management per IM  - NWB to left foot   - Diet: carb control  - DVT ppx: lovenox  - Plan for extensive debridement of left foot vs amputation       Adrianne Beaver DPM PGY-2  Podiatric Medicine & Surgery   Pager: [phone]

## 2017-09-03 NOTE — CONSULTS
[facility]  Urology Consultation    Patient:  Zo Roca  MRN: [id number]  YOB: 1965    CHIEF COMPLAINT:  Urinary retention    HISTORY OF PRESENT ILLNESS:   The patient is a 46 y.o. male with PMH diabetes who presents with infected left foot wounds. Patient has had difficulty voiding over the past 24 hours requiring straight catheterization for volumes of 520 and 480 cc. A marcus catheter was inserted by the primary service today for difficulty voiding. The patient denies any past urologic history including retention, BPH, cancer, or nephrolithasis. Has never seen a urologist in the past. Reports that his diabetes is normally well controlled at home, however his HbA1c is 9.0 during this admission. Does have diabetic neuropathy but denies retinopathy or gastroparesis. Having regular bowel movements, last BM today, denies issues with constipation. His mobility is somewhat limited by his foot wound but he reports he is able to get out of bed. Not currently requiring any pain medications, denies any pain. Denies fevers, chills, chest pain, shortness of breath, nausea, vomiting. Onset of retention was 1 day ago and was gradual in onset. Patient currently does have a urinary catheter in place. 500 ml of urine were drained when catheter was placed. Prior to this event voiding symptoms consisted of nocturia x1. Denies weak stream, hesitancy, urgency. States he does have urge to void intact. Prior treatments include none. Recent medications that may have affected his voiding include none. Patient's old records, notes and chart reviewed and summarized above.     Past Medical History:    Past Medical History:   Diagnosis Date    Acid reflux     Cerebrovascular disease 2006    TIA    Glaucoma     Hyperlipidemia     Hypertension     ID[facility] mellitus) [facility]     MDRO (multiple drug resistant organisms) resistance     Neuropathy ([facility]     Osteomyelitis ([address]     Left Hallux    S/P cataract extraction and insertion of intraocular lens 2015    bilateral     Type II or unspecified type diabetes mellitus without mention of complication, not stated as uncontrolled        Past Surgical History:    Past Surgical History:   Procedure Laterality Date    EYE SURGERY Bilateral     lazer both eyes    FOOT SURGERY Left 02/24/2017    surgical prep of wound bed    FOOT SURGERY Left 04/13/2017    1) Left foot surgical preparation of wound-bed, 2) Left foot application of graft     HC  PICC [facility] DOUBLE  4/28/2017         OTHER SURGICAL HISTORY Left 01/23/2017    I and D bone, bone biopsy with flap closure and pulse lavage    OTHER SURGICAL HISTORY Left 04/27/2017    I & D foot    MT DEEP DISSEC FOOT INFEC,1 BURSA Left 4/13/2017    FOOT DEBRIDEMENT WITH EPIFIX  performed by Eleonora Haro DPM at [address] [place] [address] TOE AMPUTATION Left 2014    hallux    TOE AMPUTATION Left 12/09/2016    hallux    TOE AMPUTATION Left 4/27/2017    I AND D FOOT, BONE BIOPSY, POSSIBLE FILET 2ND TOE performed by Eleonora Haro DPM at [place] [address]     Previous  surgery: none     Medications:    Scheduled Meds:   sodium hypochlorite   Irrigation Daily    insulin glargine  30 Units Subcutaneous Nightly    insulin lispro  0-6 Units Subcutaneous TID WC    insulin lispro  0-3 Units Subcutaneous Nightly    sodium chloride flush  10 mL Intravenous 2 times per day    amLODIPine  10 mg Oral Daily    hydrALAZINE  100 mg Oral TID    lisinopril  40 mg Oral Daily    metoprolol tartrate  50 mg Oral BID    pravastatin  10 mg Oral Daily    timolol  1 drop Both Eyes BID    latanoprost  1 drop Both Eyes Nightly    pregabalin  25 mg Oral TID    pantoprazole  20 mg Oral BID    piperacillin-tazobactam  3.375 g Intravenous Q8H    vancomycin (VANCOCIN) intermittent dosing (placeholder)   Other RX Placeholder    vancomycin  1,750 mg Intravenous Q24H    heparin (porcine)  5,000 Units colon cancer    Bowel habit changes    Hyperlipidemia    Gangrene of foot ([address]    Diabetic foot left    Acute kidney [facility]    Ischemic foot left   46 y.o. male with urinary retention. Immobility and diabetic cytopathy possible contributing factors. Plan: COLT with Cr 1.92 on admission from baseline 0.7. Cr 1.57 today, continue serial chemistries. BUN: Cr ratio of 30 on admission suggestive of prerenal etiology. Maintain marcus catheter for 48-72 hours or until Cr nadirs. Check UA and urine culture. Start Flomax. Encourage ambulation/out of bed. Avoid constipation. Will need outpatient followup, may benefit from urodynamics.       Lauren Davalos  8:07 PM 9/2/2017

## 2017-09-03 NOTE — PLAN OF CARE
Problem: Pain:  Goal: Pain level will decrease  Pain level will decrease to a 3 on a scale of 0-10 per pt. goal   Outcome: Ongoing  Goal: Control of acute pain  Control of acute pain   Outcome: Ongoing  Goal: Control of chronic pain  Control of chronic pain   Outcome: Ongoing    Problem: Falls - Risk of  Goal: Absence of falls  Outcome: Met This Shift    Problem: Skin Integrity:  Goal: Will show no infection signs and symptoms  Will show no infection signs and symptoms   Outcome: Ongoing  Goal: Absence of new skin breakdown  Absence of new skin breakdown   Outcome: Ongoing

## 2017-09-03 NOTE — PROGRESS NOTES
[facility] - Initial Consult Note  Today's Date and Time: 9/3/2017, 8:32 AM    Impression :   1. Left diabetic foot gangrene , possible gaz formation  2. Left foot abscess  3. osteo 3rd and 4th, 5th MTS heads with abscesses  4. Calcaneal osteo  5. leukocytosis  6. Diabetes mellitus with neuropathy : uncontrolled    Recommendations   1. vanco zosyn iv  2. Watch creat and vanco trough under vanco , aim trough   3. Strict glycemic control  4. Pend OR cx tuesday  Chief complaint/reason for consultation:    Gangrene left foot. History of Present Illness:   Misa Holland is a 46 y.o.-male who was initially admitted on 2017. This is a 45 yo male who presents to the hospital from Dr Gayle Vera office where he was found to have blackening of his left little toe with wounds over his foot . The patient had wounds on his left foot before which were healing and then he fell on Saturday which made the wounds worse. He has had amputation of his left toe before. No fever, chills, nausea or vomiting. MRI of left foot shows osteomyelitis of the 1st and 3rd metatarsal, calcaneus and also of the 5th metatarsals , diffuse infectious myositis is seen. Concern for soft tissue gas and necrotizing infection. abscess formation measures 1.4 x 1.7 x 1.9 cm.lateral aspect of the foot. Interval History :17   No nausea, vomiting, diarrhea   No fever  Foot debridement possibly this week as per podiatry  Afebrile    Urine cx 17 neg  Creat better 1.5  WBC better 36-23      Physical Examination :     Patient Vitals for the past 8 hrs:   BP Temp Temp src Pulse Resp SpO2   17 0800 (!) 136/54 99.6 °F (37.6 °C) Oral 66 15 91 %     Temp (24hrs), Av.1 °F (37.3 °C), Min:98.6 °F (37 °C), Max:99.6 °F (37.6 °C)      General Appearance: Awake, alert, in no apparent distress. Pleasant. Mental status: good mentation  Head:  Normocephalic, no trauma  ENT: sclera anicteric.    Neck: Supple, without lymphadenopathy. Pulmonary: Clear to auscultation  Cardiovascular: Regular rate and rhythm without murmurs. Abdomen: Soft, non tender, non distended. Extremities: Warm, well perfused. LE edema. Neurologic: No gross sensory or motor deficits. Skin: left foot examined, gangrenous changes most of the foot  And less foul    Medical Decision Making:   Labs    CBC with Differential:   Recent Labs      08/31/17   1730  09/01/17   0519  09/02/17   0532  09/03/17   0606   WBC  36.4*  27.5*  23.7*  27.0*   HGB  7.9*  7.8*  8.1*  7.8*   HCT  24.6*  24.0*  24.7*  24.0*   PLT  369  320  284  305   LYMPHOPCT  2  4   --    --    MONOPCT  3  6   --    --        BMP:   Recent Labs      08/31/17   1730 09/01/17   0530   09/02/17   0532   09/03/17   0049  09/03/17   0606   NA  130*  126*   < >  133*   < >  129*  129*   K  3.9  3.4*   --    --    --    --    --    CL  96*  98   --    --    --    --    --    CO2  14*  16*   --    --    --    --    --    BUN  59*  61*   --   60*   --    --   44*   CREATININE  1.92*  1.67*   --   1.57*   --    --   1.29*    < > = values in this interval not displayed. Hepatic Function Panel: No results for input(s): PROT, LABALBU, BILIDIR, IBILI, BILITOT, ALKPHOS, ALT, AST in the last 72 hours. No results for input(s): RPR in the last 72 hours. No results for input(s): HIV in the last 72 hours. No results for input(s): BC in the last 72 hours. Lab Results   Component Value Date    MUCUS NOT REPORTED 09/03/2017    RBC 3.10 09/03/2017    TRICHOMONAS NOT REPORTED 09/03/2017    WBC 27.0 09/03/2017    YEAST NOT REPORTED 09/03/2017    TURBIDITY CLOUDY 09/03/2017       Lab Results   Component Value Date    CREATININE 1.29 09/03/2017    GLUCOSE 247 09/01/2017       . .................................................................................................................................. Adebayo Katel   MRI left foot  9/1/17  Ulceration at the medial forefoot with subjacent osteomyelitis of the   residual 1st metatarsal diaphysis. 2. Abscess formation surrounding the distal 5th metatarsal with probable   acute on chronic osteomyelitis of the entirety of the 5th metatarsal and   likely the proximal aspect of the proximal phalanx of the 5th digit. 3. Broad ulceration at the lateral aspect of the foot with subjacent abscess   formation inferolateral to the cuboid and probable septic calcaneocuboid   joint.  Region of suspected abscess formation measures 1.4 x 1.7 x 1.9 cm. Suspected osseous destruction and osteomyelitis of the 4th and 5th   tarsometatarsal joints. 4. Abscess formation or infectious tenosynovitis in association with the   distal aspect of the residual flexor tendon of the 2nd digit measuring 1.8 x   1.7 x 1.2 cm.   5. Signal changes and slight fragmentation of the distal 3rd metatarsal head   can be seen with sequela of Freiberg's infraction or osteomyelitis.  Probable   reactive osteitis versus early osteomyelitis of the remainder of the 3rd and   4th metatarsals and proximal aspect of the proximal phalanx of the 3rd digit. 6. Diffuse infectious myositis.  Multifocal susceptibility artifact   throughout the foot but primarily in the midfoot is concerning for multifocal   soft tissue gas or possibly necrotizing infection. 7. Mild diffuse cellulitis of the visualized foot. 8. Prior amputation of the 1st and 2nd digits at the level of the metatarsal   diaphysis. 9. Suspected osteomyelitis of the anterolateral margin of the calcaneus and   inferior aspect of the medial cuneiform. I have examined the patient, reviewed the patient's medical history in detail and updated the computerized patient record. Above exam and data confirmed.   Colleen Chavira, Infectious Diseases

## 2017-09-04 LAB
BUN BLDV-MCNC: 43 MG/DL (ref 6–20)
CREAT SERPL-MCNC: 1.27 MG/DL (ref 0.7–1.2)
CREATININE URINE: 84.5 MG/DL (ref 39–259)
CULTURE: NO GROWTH
CULTURE: NORMAL
GFR AFRICAN AMERICAN: >60 ML/MIN
GFR NON-AFRICAN AMERICAN: 59 ML/MIN
GFR SERPL CREATININE-BSD FRML MDRD: ABNORMAL ML/MIN/{1.73_M2}
GFR SERPL CREATININE-BSD FRML MDRD: ABNORMAL ML/MIN/{1.73_M2}
GLUCOSE BLD-MCNC: 122 MG/DL (ref 75–110)
GLUCOSE BLD-MCNC: 226 MG/DL (ref 75–110)
GLUCOSE BLD-MCNC: 230 MG/DL (ref 75–110)
GLUCOSE BLD-MCNC: 230 MG/DL (ref 75–110)
HCT VFR BLD CALC: 20 % (ref 41–53)
HCT VFR BLD CALC: 21.4 % (ref 41–53)
HCT VFR BLD CALC: 24.3 % (ref 41–53)
HEMOGLOBIN: 6.5 G/DL (ref 13.5–17.5)
HEMOGLOBIN: 7.1 G/DL (ref 13.5–17.5)
Lab: NORMAL
MCH RBC QN AUTO: 25.1 PG (ref 26–34)
MCHC RBC AUTO-ENTMCNC: 32.7 G/DL (ref 31–37)
MCV RBC AUTO: 76.8 FL (ref 80–100)
OSMOLALITY URINE: 371 MOSM/KG (ref 80–1300)
PDW BLD-RTO: 17.7 % (ref 12.5–15.4)
PLATELET # BLD: 304 K/UL (ref 140–450)
PMV BLD AUTO: 10.2 FL (ref 6–12)
RBC # BLD: 2.61 M/UL (ref 4.5–5.9)
RBC FOLATE: 821 NG/ML (ref 208–903)
SERUM OSMOLALITY: 294 MOSM/KG (ref 275–295)
SODIUM BLD-SCNC: 127 MMOL/L (ref 135–144)
SODIUM BLD-SCNC: 128 MMOL/L (ref 135–144)
SODIUM,UR: <20 MMOL/L
SPECIMEN DESCRIPTION: NORMAL
STATUS: NORMAL
TOTAL PROTEIN, URINE: 65 MG/DL
TRIGL SERPL-MCNC: 165 MG/DL
WBC # BLD: 22.3 K/UL (ref 3.5–11)

## 2017-09-04 PROCEDURE — 86920 COMPATIBILITY TEST SPIN: CPT

## 2017-09-04 PROCEDURE — 85027 COMPLETE CBC AUTOMATED: CPT

## 2017-09-04 PROCEDURE — 85014 HEMATOCRIT: CPT

## 2017-09-04 PROCEDURE — 84300 ASSAY OF URINE SODIUM: CPT

## 2017-09-04 PROCEDURE — 82570 ASSAY OF URINE CREATININE: CPT

## 2017-09-04 PROCEDURE — 97116 GAIT TRAINING THERAPY: CPT

## 2017-09-04 PROCEDURE — 83935 ASSAY OF URINE OSMOLALITY: CPT

## 2017-09-04 PROCEDURE — 2580000003 HC RX 258: Performed by: PODIATRIST

## 2017-09-04 PROCEDURE — 84478 ASSAY OF TRIGLYCERIDES: CPT

## 2017-09-04 PROCEDURE — 6360000002 HC RX W HCPCS: Performed by: PODIATRIST

## 2017-09-04 PROCEDURE — 6360000002 HC RX W HCPCS: Performed by: STUDENT IN AN ORGANIZED HEALTH CARE EDUCATION/TRAINING PROGRAM

## 2017-09-04 PROCEDURE — 36415 COLL VENOUS BLD VENIPUNCTURE: CPT

## 2017-09-04 PROCEDURE — 84295 ASSAY OF SERUM SODIUM: CPT

## 2017-09-04 PROCEDURE — 86850 RBC ANTIBODY SCREEN: CPT

## 2017-09-04 PROCEDURE — 84520 ASSAY OF UREA NITROGEN: CPT

## 2017-09-04 PROCEDURE — G8988 SELF CARE GOAL STATUS: HCPCS

## 2017-09-04 PROCEDURE — 83930 ASSAY OF BLOOD OSMOLALITY: CPT

## 2017-09-04 PROCEDURE — 82947 ASSAY GLUCOSE BLOOD QUANT: CPT

## 2017-09-04 PROCEDURE — 99232 SBSQ HOSP IP/OBS MODERATE 35: CPT | Performed by: INTERNAL MEDICINE

## 2017-09-04 PROCEDURE — 84156 ASSAY OF PROTEIN URINE: CPT

## 2017-09-04 PROCEDURE — 97530 THERAPEUTIC ACTIVITIES: CPT

## 2017-09-04 PROCEDURE — 82565 ASSAY OF CREATININE: CPT

## 2017-09-04 PROCEDURE — 99233 SBSQ HOSP IP/OBS HIGH 50: CPT | Performed by: PODIATRIST

## 2017-09-04 PROCEDURE — 6370000000 HC RX 637 (ALT 250 FOR IP): Performed by: HOSPITALIST

## 2017-09-04 PROCEDURE — 6370000000 HC RX 637 (ALT 250 FOR IP): Performed by: STUDENT IN AN ORGANIZED HEALTH CARE EDUCATION/TRAINING PROGRAM

## 2017-09-04 PROCEDURE — 85018 HEMOGLOBIN: CPT

## 2017-09-04 PROCEDURE — 97110 THERAPEUTIC EXERCISES: CPT

## 2017-09-04 PROCEDURE — 86901 BLOOD TYPING SEROLOGIC RH(D): CPT

## 2017-09-04 PROCEDURE — G8987 SELF CARE CURRENT STATUS: HCPCS

## 2017-09-04 PROCEDURE — 97166 OT EVAL MOD COMPLEX 45 MIN: CPT

## 2017-09-04 PROCEDURE — 1200000000 HC SEMI PRIVATE

## 2017-09-04 PROCEDURE — 86900 BLOOD TYPING SEROLOGIC ABO: CPT

## 2017-09-04 PROCEDURE — 6370000000 HC RX 637 (ALT 250 FOR IP): Performed by: PODIATRIST

## 2017-09-04 RX ORDER — FOLIC ACID 1 MG/1
1 TABLET ORAL DAILY
Status: DISCONTINUED | OUTPATIENT
Start: 2017-09-04 | End: 2017-09-16 | Stop reason: HOSPADM

## 2017-09-04 RX ADMIN — PREGABALIN 25 MG: 25 CAPSULE ORAL at 09:11

## 2017-09-04 RX ADMIN — PIPERACILLIN AND TAZOBACTAM 3.38 G: 3; .375 INJECTION, POWDER, LYOPHILIZED, FOR SOLUTION INTRAVENOUS; PARENTERAL at 16:50

## 2017-09-04 RX ADMIN — HYDRALAZINE HYDROCHLORIDE 100 MG: 50 TABLET, FILM COATED ORAL at 14:25

## 2017-09-04 RX ADMIN — TAMSULOSIN HYDROCHLORIDE 0.4 MG: 0.4 CAPSULE ORAL at 09:11

## 2017-09-04 RX ADMIN — AMLODIPINE BESYLATE 10 MG: 10 TABLET ORAL at 09:11

## 2017-09-04 RX ADMIN — PIPERACILLIN AND TAZOBACTAM 3.38 G: 3; .375 INJECTION, POWDER, LYOPHILIZED, FOR SOLUTION INTRAVENOUS; PARENTERAL at 08:45

## 2017-09-04 RX ADMIN — HEPARIN SODIUM 5000 UNITS: 5000 INJECTION, SOLUTION INTRAVENOUS; SUBCUTANEOUS at 14:25

## 2017-09-04 RX ADMIN — PANTOPRAZOLE SODIUM 20 MG: 20 TABLET, DELAYED RELEASE ORAL at 09:11

## 2017-09-04 RX ADMIN — METOPROLOL TARTRATE 50 MG: 50 TABLET, FILM COATED ORAL at 09:11

## 2017-09-04 RX ADMIN — PREGABALIN 25 MG: 25 CAPSULE ORAL at 14:25

## 2017-09-04 RX ADMIN — TIMOLOL MALEATE 1 DROP: 2.5 SOLUTION/ DROPS OPHTHALMIC at 20:41

## 2017-09-04 RX ADMIN — PANTOPRAZOLE SODIUM 20 MG: 20 TABLET, DELAYED RELEASE ORAL at 20:39

## 2017-09-04 RX ADMIN — PRAVASTATIN SODIUM 10 MG: 20 TABLET ORAL at 09:11

## 2017-09-04 RX ADMIN — INSULIN LISPRO 1 UNITS: 100 INJECTION, SOLUTION INTRAVENOUS; SUBCUTANEOUS at 20:42

## 2017-09-04 RX ADMIN — INSULIN GLARGINE 30 UNITS: 100 INJECTION, SOLUTION SUBCUTANEOUS at 20:44

## 2017-09-04 RX ADMIN — METOPROLOL TARTRATE 50 MG: 50 TABLET, FILM COATED ORAL at 20:39

## 2017-09-04 RX ADMIN — PREGABALIN 25 MG: 25 CAPSULE ORAL at 20:41

## 2017-09-04 RX ADMIN — FOLIC ACID 1 MG: 1 TABLET ORAL at 09:31

## 2017-09-04 RX ADMIN — VANCOMYCIN HYDROCHLORIDE 1750 MG: 10 INJECTION, POWDER, LYOPHILIZED, FOR SOLUTION INTRAVENOUS at 01:30

## 2017-09-04 RX ADMIN — INSULIN LISPRO 2 UNITS: 100 INJECTION, SOLUTION INTRAVENOUS; SUBCUTANEOUS at 12:40

## 2017-09-04 RX ADMIN — HYDRALAZINE HYDROCHLORIDE 100 MG: 50 TABLET, FILM COATED ORAL at 20:39

## 2017-09-04 RX ADMIN — HYDRALAZINE HYDROCHLORIDE 100 MG: 50 TABLET, FILM COATED ORAL at 09:11

## 2017-09-04 RX ADMIN — TIMOLOL MALEATE 1 DROP: 2.5 SOLUTION/ DROPS OPHTHALMIC at 09:11

## 2017-09-04 RX ADMIN — HEPARIN SODIUM 5000 UNITS: 5000 INJECTION, SOLUTION INTRAVENOUS; SUBCUTANEOUS at 06:46

## 2017-09-04 RX ADMIN — Medication 10 ML: at 09:32

## 2017-09-04 RX ADMIN — LATANOPROST 1 DROP: 50 SOLUTION OPHTHALMIC at 20:40

## 2017-09-04 RX ADMIN — INSULIN LISPRO 3 UNITS: 100 INJECTION, SOLUTION INTRAVENOUS; SUBCUTANEOUS at 17:44

## 2017-09-04 RX ADMIN — LISINOPRIL 40 MG: 20 TABLET ORAL at 09:11

## 2017-09-04 RX ADMIN — PIPERACILLIN AND TAZOBACTAM 3.38 G: 3; .375 INJECTION, POWDER, LYOPHILIZED, FOR SOLUTION INTRAVENOUS; PARENTERAL at 01:00

## 2017-09-04 ASSESSMENT — PAIN SCALES - GENERAL: PAINLEVEL_OUTOF10: 6

## 2017-09-04 ASSESSMENT — PAIN DESCRIPTION - DESCRIPTORS: DESCRIPTORS: ACHING

## 2017-09-04 ASSESSMENT — PAIN DESCRIPTION - PAIN TYPE: TYPE: ACUTE PAIN

## 2017-09-04 ASSESSMENT — PAIN DESCRIPTION - ORIENTATION: ORIENTATION: LEFT

## 2017-09-04 ASSESSMENT — PAIN DESCRIPTION - LOCATION: LOCATION: SHOULDER

## 2017-09-04 ASSESSMENT — PAIN DESCRIPTION - FREQUENCY: FREQUENCY: INTERMITTENT

## 2017-09-04 NOTE — PROGRESS NOTES
Occupational Therapy   Occupational Therapy Initial Assessment  Date: 2017   Patient Name: Surinder Vásquez  MRN: [id number]     : 1965    No chief complaint on file.       Past Medical History:   Diagnosis Date    Acid reflux     Cerebrovascular disease 2006    TIA    Glaucoma     Hyperlipidemia     Hypertension     IDDM (insulin dependent diabetes mellitus) ([facility] [address]     MDRO (multiple drug resistant organisms) resistance     Neuropathy (HCC)     Osteomyelitis (HCC)     Left Hallux    S/P cataract extraction and insertion of intraocular lens     bilateral     Type II or unspecified type diabetes mellitus without mention of complication, not stated as uncontrolled      Past Surgical History:   Procedure Laterality Date    EYE SURGERY Bilateral     lazer both eyes    FOOT SURGERY Left 2017    surgical prep of wound bed    FOOT SURGERY Left 2017    1) Left foot surgical preparation of wound-bed, 2) Left foot application of graft     HC  PICC [facility] DOUBLE  2017         OTHER SURGICAL HISTORY Left 2017    I and D bone, bone biopsy with flap closure and pulse lavage    OTHER SURGICAL HISTORY Left 2017    I & D foot    MD DEEP DISSEC FOOT INFEC,1 BURSA Left 2017    FOOT DEBRIDEMENT WITH EPIFIX  performed by Zurdo Montoya DPM at [address] [facility]  Left     hallux    TOE AMPUTATION Left 2016    hallux    TOE AMPUTATION Left 2017    I AND D FOOT, BONE BIOPSY, POSSIBLE FILET 2ND TOE performed by Zurdo Montoya DPM at [address] [facility]  Restrictions/Precautions  Restrictions/Precautions: Weight Bearing, Fall Risk, Contact Precautions  Required Braces or Orthoses?: No  Lower Extremity Weight Bearing Restrictions  Left Lower Extremity Weight Bearing: Non Weight Bearing  Position Activity Restriction  Other position/activity restrictions: up with assist    Subjective   General  Patient assessed for rehabilitation services?: Yes  Family / Caregiver Present: No  Diagnosis: Diabetic L foot ulcer  Pain Assessment  Patient Currently in Pain: Yes  Pain Assessment: 0-10  Pain Level: 6  Pain Type: Acute pain  Pain Location: Shoulder  Pain Orientation: Left  Pain Descriptors: Aching  Pain Frequency: Intermittent  Pain Intervention(s): Distraction, Therapeutic presence, Repositioned, Emotional support, Ambulation/Increased activity  Response to Pain Intervention: Patient Satisfied     Social/Functional History  Social/Functional History  Lives With: Spouse  Type of Home: House  Home Layout: One level  Home Access: Stairs to enter with rails  Entrance Stairs - Number of Steps: 4  Entrance Stairs - Rails: Right  Bathroom Shower/Tub: Walk-in shower  Bathroom Toilet: Standard  Bathroom Accessibility: Accessible  Home Equipment: Rolling walker, Cane, [employer] cane, [employer]  Receives Help From: Family  ADL Assistance: Independent  Homemaking Assistance: Independent  Homemaking Responsibilities: Yes  Ambulation Assistance: Independent (cane)  Transfer Assistance: Independent  Active : Yes  Occupation: Retired  Leisure & Hobbies: enjoys sleeping and relaxing  Additional Comments: Pt's wife reports sons plan to install a ramp prior to pt's discharge.      Objective   Vision: Within Functional Limits  Hearing: Within functional limits    Orientation  Overall Orientation Status: Within Functional Limits     Balance  Sitting Balance: Independent  Standing Balance: Stand by assistance  Standing Balance  Time: 3 min  Activity: Pt stood bedside and short func mob   Sit to stand: Contact guard assistance  Stand to sit: Contact guard assistance (Pt demo'd proper technique with vc's)  Functional Mobility  Functional - Mobility Device: Rolling Walker  Activity: Other  Assist Level: Contact guard assistance  Functional Mobility Comments: Unsteady at times d/t NWB restriction using RW  ADL  Feeding: Setup  Grooming: Setup  UE Bathing: Training, Endurance Training, Functional Mobility Training, Pain Management, Safety Education & Training, Home Management Training, Self-Care / ADL, Patient/Caregiver Education & Training, Equipment Evaluation, Education, & procurement    G-Code  OT G-codes  Functional Assessment Tool Used: Barthel index  Score: 17/20  Functional Limitation: Self care  Self Care Current Status (): At least 1 percent but less than 20 percent impaired, limited or restricted  Self Care Goal Status (): 0 percent impaired, limited or restricted    Goals  Short term goals  Time Frame for Short term goals: Pt will by discharge   Short term goal 1: demo supine<>sit transfer with mod I  Short term goal 2: demo sit<>stand transfer with least restrictive AE and SBA  Short term goal 3: demo standing during ADL completion for 6 min with least restrictive AE and supervision  Short term goal 4: demo good safety awareness during func mob around floor with RW, SBA, and maintaining WB restricitons  Short term goal 5: demo ADL UB/LB dressing/bathing activity seated with setup only     Therapy Time   Individual Concurrent Group Co-treatment   Time In 0920         Time Out 0936         Minutes 16            Pt supine in bed with call light within reach upon arrival. Pt transferred to EOB, performed ADL LB dressing task, and transferred to standing with RW and assist. Pt demo'd short func mob around floor while maintaining WB restrictions. Pt returned to room and sat in recliner, pt demo'd ADL feeding task seated with chair alarm on and call light within reach upon exit. Discharge recommendations discussed with patient during initial evaluation.     Niko Darden OTR/L

## 2017-09-04 NOTE — PROGRESS NOTES
timolol  1 drop Both Eyes BID    latanoprost  1 drop Both Eyes Nightly    pregabalin  25 mg Oral TID    pantoprazole  20 mg Oral BID    piperacillin-tazobactam  3.375 g Intravenous Q8H    vancomycin  1,750 mg Intravenous Q24H    heparin (porcine)  5,000 Units Subcutaneous 3 times per day         Diagnostic Labs and Imaging      CBC:   Recent Labs      09/02/17   0532  09/03/17   0606  09/03/17   0856  09/04/17   0536   WBC  23.7*  27.0*   --   22.3*   RBC  3.14*  3.10*   --   2.61*   HGB  8.1*  7.8*   --   6.5*   HCT  24.7*  24.0*  24.3*  20.0*   MCV  78.9*  77.5*   --   76.8*   RDW  17.8*  17.4*   --   17.7*   PLT  284  305   --   304     BMP:   Recent Labs      09/02/17   0532   09/03/17   0606  09/03/17 2026  09/04/17   0536   NA  133*   < >  129*  130*  127*   BUN  60*   --   44*   --   43*   CREATININE  1.57*   --   1.29*   --   1.27*    < > = values in this interval not displayed.      FASTING LIPID PANEL:  Lab Results   Component Value Date    CHOL 98 04/27/2017    HDL 34 (L) 04/27/2017    TRIG 96 04/27/2017       Assessment and Plan:     Principal Problem:    Diabetic foot left  Active Problems:    IDDM (insulin dependent diabetes mellitus) ([facility] [address]    Acute kidney injury ([facility] [address]    Ischemic foot left    Urinary retention      PLAN:  · S/p LLE Angiogram with angioplasty of Left anterior tibial. Left Peroneal, L SFA  · Possible debridement vs left foot amputation per podiatry, as planned  · Monitor sodium every 6 hrs  · Blood glucose control 30 Lantus and MISS  · IV vancomycin and zosyn  · Gomez's and Flomax of urinary retention        Aide Grande  Resident PGY-2, Department of Internal medicine   [place], [place]

## 2017-09-04 NOTE — PROGRESS NOTES
Urology Progress Note    Subjective: Surinder Vásquez is a 46 y.o. male. His/Her current Diet is: DIET CARB CONTROL;. The patient is * No surgery found * from   No acute urologic events overnight. Gomez draining without issue. No chest pain, shortness of breath, nausea, vomiting, fevers, chills  is not ambulating  is passing flatus  is not having bowel movements    Patient Vitals for the past 24 hrs:   BP Temp Temp src Pulse Resp SpO2   09/04/17 0726 (!) 141/53 98.3 °F (36.8 °C) Oral 67 16 94 %   09/03/17 2004 (!) 137/57 98.3 °F (36.8 °C) Oral 71 17 94 %       Intake/Output Summary (Last 24 hours) at 09/04/17 0921  Last data filed at 09/04/17 0439   Gross per 24 hour   Intake             1003 ml   Output             1200 ml   Net             -197 ml       Recent Labs      09/02/17   0532  09/03/17   0606  09/03/17   0856  09/04/17   0536  09/04/17   0750   WBC  23.7*  27.0*   --   22.3*   --    HGB  8.1*  7.8*   --   6.5*  7.1*   HCT  24.7*  24.0*  24.3*  20.0*  21.4*   MCV  78.9*  77.5*   --   76.8*   --    PLT  284  305   --   304   --      Recent Labs      09/02/17   0532   09/03/17   0606  09/03/17 2026  09/04/17   0536   NA  133*   < >  129*  130*  127*   BUN  60*   --   44*   --   43*   CREATININE  1.57*   --   1.29*   --   1.27*    < > = values in this interval not displayed.        Recent Labs      09/03/17   0739   COLORU  YELLOW   PHUR  5.5   WBCUA  20 TO 50   RBCUA  2 TO 5   MUCUS  NOT REPORTED   TRICHOMONAS  NOT REPORTED   YEAST  NOT REPORTED   BACTERIA  NOT REPORTED   SPECGRAV  1.016   LEUKOCYTESUR  MODERATE*   UROBILINOGEN  Normal   BILIRUBINUR  NEGATIVE       Additional Lab/culture results:    Physical Exam:   NAD  AOx3  Peripheral pulses palpable  Regular rhythm  Respirations nonlabored, symmetric chest rise bilaterally  Soft, NT, ND  Gomez in place draining clear  LLE wound with dressing in place      Interval Imaging Findings:    Impression:    Patient Active Problem List   Diagnosis   

## 2017-09-04 NOTE — PROGRESS NOTES
Perfect served Dr. Jenna Ovalles with internal medicine @ 9283 that pt.'s hgb is 6.5 this am and inquired about a blood transfusion. Awaiting response.

## 2017-09-04 NOTE — PROGRESS NOTES
with increased ambulation this date although still difficult and fatiguing. Decreased complaints of UE pain. Requested multiple times to return to bed at end of treatment, with encouragement and education, patient agreeable to stay up in chair (at least until after lunch but prefer to stay up for dinner).  Patient would benefit from contienud PT services and home PT at the minimum when d/c from hospital.   Prognosis: Good  Decision Making: Medium Complexity  Patient Education: Importance of mobility, NWB LLE  Barriers to Learning: none  REQUIRES PT FOLLOW UP: Yes  Activity Tolerance  Activity Tolerance: Patient Tolerated treatment well;Patient limited by endurance  PT Equipment Recommendations  Equipment Needed: No     Discharge Recommendations:  Home with assist PRN, Home with Home health PT    Goals  Short term goals  Time Frame for Short term goals: 14 visits  Short term goal 1: Ambulate 100ft with RW and NWB LLE independently  Short term goal 2: Ascend/descend 4 steps with NWB LLE and CGA  Short term goal 3: Mod I for bed mobility and functional transfers  Short term goal 4: Demo Good dynamic standing balance with RW to decrease risk of falls  Patient Goals   Patient goals : return home    Plan    Times per week: 6-7x  Times per day: Daily  Current Treatment Recommendations: Strengthening, Balance Training, Functional Mobility Training, Transfer Training, Endurance Training, Gait Training, Stair training, Home Exercise Program, Safety Education & Training, Patient/Caregiver Education & Training  Safety Devices  Type of devices: Nurse notified, Left in chair, Chair alarm in place, Call light within reach, Gait belt, All fall precuations   Restraints  Initially in place: No     Therapy Time   Individual Concurrent Group Co-treatment   Time In  1137         Time Out  1208         Minutes  [address] [place], [place]

## 2017-09-04 NOTE — PROGRESS NOTES
[facility] - Initial Consult Note  Today's Date and Time: 2017, 1:20 PM    Impression :   1. Left diabetic foot gangrene , possible gas formation  2. Left foot abscess  3. Osteo 3rd and 4th, 5th MTS heads with abscesses  4. Calcaneal osteo  5. leukocytosis  6. Diabetes mellitus with neuropathy : uncontrolled    Recommendations   1. vanco, zosyn iv  2. Watch creat and vanco trough under vanco , aim trough levels 14-17 mcg/ml  3. Strict glycemic control  4. Pend OR cx tuesday  Chief complaint/reason for consultation:    Gangrene left foot. History of Present Illness:   INITIAL HISTORY:    Surinder Vásquez is a 46 y.o.-male who was initially admitted on 2017. He presented to the hospital from Dr Natty Pacheco office where he was found to have blackening of his left little toe with wounds over his foot . The patient had wounds on his left foot before which were healing and then he fell on Saturday which made the wounds worse. He has had amputation of his left toe before. No fever, chills, nausea or vomiting. MRI of left foot shows osteomyelitis of the 1st and 3rd metatarsal, calcaneus and also of the 5th metatarsals , diffuse infectious myositis is seen. Concern for soft tissue gas and necrotizing infection. abscess formation measures 1.4 x 1.7 x 1.9 cm.lateral aspect of the foot. CURRENT EVALUATION  :17   Afebrile  VS stable    No nausea, vomiting, diarrhea     Foot debridement possibly this week as per Podiatry      Urine cx 17 neg  Creat better 1.5-->1.27  WBC better 36-->22.3  9-3-17 Foot culture: S aureus. Awaiting sensitivity    Physical Examination :     Patient Vitals for the past 8 hrs:   BP Temp Temp src Pulse Resp SpO2   17 0726 (!) 141/53 98.3 °F (36.8 °C) Oral 67 16 94 %     Temp (24hrs), Av.3 °F (36.8 °C), Min:98.3 °F (36.8 °C), Max:98.3 °F (36.8 °C)      General Appearance: Awake, alert, in no apparent distress. Pleasant.    Mental status: good mentation  Head:  Normocephalic, no trauma  ENT: sclera anicteric. Neck: Supple, without lymphadenopathy. Pulmonary: Clear to auscultation  Cardiovascular: Regular rate and rhythm without murmurs. Abdomen: Soft, non tender, non distended. Extremities: Warm, well perfused. LE edema. Neurologic: No gross sensory or motor deficits. Skin: left foot examined, gangrenous changes present          Medical Decision Making:   Labs    CBC with Differential:   Recent Labs      09/03/17   0606 09/04/17   0536  09/04/17   0750   WBC  27.0*   --   22.3*   --    HGB  7.8*   --   6.5*  7.1*   HCT  24.0*   < >  20.0*  21.4*   PLT  305   --   304   --     < > = values in this interval not displayed. BMP:   Recent Labs      09/03/17   0606 09/03/17 2026 09/04/17   0536   NA  129*  130*  127*   BUN  44*   --   43*   CREATININE  1.29*   --   1.27*       Hepatic Function Panel: No results for input(s): PROT, LABALBU, BILIDIR, IBILI, BILITOT, ALKPHOS, ALT, AST in the last 72 hours. No results for input(s): RPR in the last 72 hours. No results for input(s): HIV in the last 72 hours. No results for input(s): BC in the last 72 hours. Lab Results   Component Value Date    MUCUS NOT REPORTED 09/03/2017    RBC 2.61 09/04/2017    TRICHOMONAS NOT REPORTED 09/03/2017    WBC 22.3 09/04/2017    YEAST NOT REPORTED 09/03/2017    TURBIDITY CLOUDY 09/03/2017       Lab Results   Component Value Date    CREATININE 1.27 09/04/2017    GLUCOSE 247 09/01/2017       . .................................................................................................................................. Bob Mei MRI left foot  9/1/17  Ulceration at the medial forefoot with subjacent osteomyelitis of the   residual 1st metatarsal diaphysis.    2. Abscess formation surrounding the distal 5th metatarsal with probable   acute on chronic osteomyelitis of the entirety of the 5th metatarsal and   likely the proximal aspect of the proximal phalanx of the 5th digit. 3. Broad ulceration at the lateral aspect of the foot with subjacent abscess   formation inferolateral to the cuboid and probable septic calcaneocuboid   joint.  Region of suspected abscess formation measures 1.4 x 1.7 x 1.9 cm. Suspected osseous destruction and osteomyelitis of the 4th and 5th   tarsometatarsal joints. 4. Abscess formation or infectious tenosynovitis in association with the   distal aspect of the residual flexor tendon of the 2nd digit measuring 1.8 x   1.7 x 1.2 cm.   5. Signal changes and slight fragmentation of the distal 3rd metatarsal head   can be seen with sequela of Freiberg's infraction or osteomyelitis.  Probable   reactive osteitis versus early osteomyelitis of the remainder of the 3rd and   4th metatarsals and proximal aspect of the proximal phalanx of the 3rd digit. 6. Diffuse infectious myositis.  Multifocal susceptibility artifact   throughout the foot but primarily in the midfoot is concerning for multifocal   soft tissue gas or possibly necrotizing infection. 7. Mild diffuse cellulitis of the visualized foot. 8. Prior amputation of the 1st and 2nd digits at the level of the metatarsal   diaphysis. 9. Suspected osteomyelitis of the anterolateral margin of the calcaneus and   inferior aspect of the medial cuneiform. I have examined the patient, reviewed the patient's medical history in detail and updated the computerized patient record. Above exam and data confirmed.     Ian Thapa MD

## 2017-09-04 NOTE — PROGRESS NOTES
Progress Note  Podiatric Medicine and Surgery     Subjective     Patient seen and examined at bedside. No acute events overnight. Afebrile, vital signs stable   Denies pain in the foot. HPI 8/31/17:  Devi Stanford is a 46 y.o. male who presents to the hospital from Dr. Clifford Stone office. Patient states that he has had wounds on the left foot for sometime now however they were almost healed. Patient states that on Saturday he fell and since then his wounds have worsened. Patient states that it is painful to move. Patient states that the little toe has become black. Patient denies any N/V/F/C. Pt denies any shortness of breath. ROS: Negative except for HPI. Denies N/V/F/C/SOB/CP.     Medications:  Scheduled Meds:   folic acid  1 mg Oral Daily    tamsulosin  0.4 mg Oral Daily    sodium hypochlorite   Irrigation Daily    insulin glargine  30 Units Subcutaneous Nightly    insulin lispro  0-6 Units Subcutaneous TID WC    insulin lispro  0-3 Units Subcutaneous Nightly    sodium chloride flush  10 mL Intravenous 2 times per day    amLODIPine  10 mg Oral Daily    hydrALAZINE  100 mg Oral TID    lisinopril  40 mg Oral Daily    metoprolol tartrate  50 mg Oral BID    pravastatin  10 mg Oral Daily    timolol  1 drop Both Eyes BID    latanoprost  1 drop Both Eyes Nightly    pregabalin  25 mg Oral TID    pantoprazole  20 mg Oral BID    piperacillin-tazobactam  3.375 g Intravenous Q8H    vancomycin  1,750 mg Intravenous Q24H    heparin (porcine)  5,000 Units Subcutaneous 3 times per day       Continuous Infusions:   dextrose         PRN Meds:sodium chloride flush, acetaminophen, magnesium hydroxide, ondansetron, oxyCODONE-acetaminophen **OR** oxyCODONE-acetaminophen, morphine **OR** morphine, cyclobenzaprine, glucose, dextrose, glucagon (rDNA), dextrose    Objective     Vitals:  Patient Vitals for the past 8 hrs:   BP Temp Temp src Pulse Resp SpO2   09/04/17 0726 (!) 141/53 98.3 °F (36.8 °C) Oral 67 16 94 %     Average, Min, and Max for last 24 hours Vitals:  TEMPERATURE:  Temp  Av.3 °F (36.8 °C)  Min: 98.3 °F (36.8 °C)  Max: 98.3 °F (36.8 °C)    RESPIRATIONS RANGE: Resp  Av.5  Min: 16  Max: 17    PULSE RANGE: Pulse  Av  Min: 67  Max: 71    BLOOD PRESSURE RANGE:  Systolic (60XUK), CIE:782 , Min:137 , TLJ:960   ; Diastolic (27BEV), WVE:95, Min:53, Max:57      PULSE OXIMETRY RANGE: SpO2  Av %  Min: 94 %  Max: 94 %    I/O last 3 completed shifts: In: 1003 [P.O.:650; I.V.:353]  Out: 1200 [Urine:1200]    CBC:  Recent Labs      17   0532  17   0606  17   0856  17   0536  17   0750   WBC  23.7*  27.0*   --   22.3*   --    HGB  8.1*  7.8*   --   6.5*  7.1*   HCT  24.7*  24.0*  24.3*  20.0*  21.4*   PLT  284  305   --   304   --         BMP:  Recent Labs      17   0532   17   0606  17   2026  17   0536   NA  133*   < >  129*  130*  127*   BUN  60*   --   44*   --   43*   CREATININE  1.57*   --   1.29*   --   1.27*    < > = values in this interval not displayed. Coags:  No results for input(s): APTT, PROT, INR in the last 72 hours. Physical Exam:   General: Awake, alert, and oriented to person, place, and time. No acute distress. Heart: RRR    Lungs: CTAB, no increased effort, equal air entry     Abdomen: Soft, non-tender to palpation. Not distended. +Bowel sounds in all 4 quadrants. Gomez in place      Lower Extremity Physical Exam:  Vascular: PT pulses are non-palpable bilaterally and DP non-palpable, Right. Palpable DP pulse, left. CFT<5 seconds to digits bilaterally. Neuro: Sensation absent to light touch to level of digits bilaterally. Musculoskeletal: Lower extremity muscle strength deferred due to pain. EHL/EHB/EDL/EDB intact, left. Dermatologic: Lower extremity skin is warm, left. Large ulceration present on the lateral aspect of the left foot measures approximately 9.0 x 3.0 x 0.3cm. Necrotic wound base.

## 2017-09-05 ENCOUNTER — APPOINTMENT (OUTPATIENT)
Dept: ULTRASOUND IMAGING | Age: 52
DRG: 239 | End: 2017-09-05
Attending: PODIATRIST
Payer: COMMERCIAL

## 2017-09-05 LAB
BUN BLDV-MCNC: 35 MG/DL (ref 6–20)
CREAT SERPL-MCNC: 1.11 MG/DL (ref 0.7–1.2)
GFR AFRICAN AMERICAN: >60 ML/MIN
GFR NON-AFRICAN AMERICAN: >60 ML/MIN
GFR SERPL CREATININE-BSD FRML MDRD: ABNORMAL ML/MIN/{1.73_M2}
GFR SERPL CREATININE-BSD FRML MDRD: ABNORMAL ML/MIN/{1.73_M2}
GLUCOSE BLD-MCNC: 189 MG/DL (ref 75–110)
GLUCOSE BLD-MCNC: 236 MG/DL (ref 75–110)
GLUCOSE BLD-MCNC: 287 MG/DL (ref 75–110)
GLUCOSE BLD-MCNC: 342 MG/DL (ref 75–110)
HCT VFR BLD CALC: 21.9 % (ref 41–53)
HEMOGLOBIN: 7.1 G/DL (ref 13.5–17.5)
MCH RBC QN AUTO: 25.2 PG (ref 26–34)
MCHC RBC AUTO-ENTMCNC: 32.3 G/DL (ref 31–37)
MCV RBC AUTO: 77.9 FL (ref 80–100)
PDW BLD-RTO: 17.2 % (ref 12.5–15.4)
PLATELET # BLD: 333 K/UL (ref 140–450)
PMV BLD AUTO: 9.9 FL (ref 6–12)
RBC # BLD: 2.82 M/UL (ref 4.5–5.9)
SODIUM BLD-SCNC: 128 MMOL/L (ref 135–144)
SODIUM BLD-SCNC: 131 MMOL/L (ref 135–144)
WBC # BLD: 20 K/UL (ref 3.5–11)

## 2017-09-05 PROCEDURE — 1200000000 HC SEMI PRIVATE

## 2017-09-05 PROCEDURE — 6360000002 HC RX W HCPCS: Performed by: STUDENT IN AN ORGANIZED HEALTH CARE EDUCATION/TRAINING PROGRAM

## 2017-09-05 PROCEDURE — 6360000002 HC RX W HCPCS: Performed by: PODIATRIST

## 2017-09-05 PROCEDURE — 82565 ASSAY OF CREATININE: CPT

## 2017-09-05 PROCEDURE — 2580000003 HC RX 258: Performed by: PODIATRIST

## 2017-09-05 PROCEDURE — 85027 COMPLETE CBC AUTOMATED: CPT

## 2017-09-05 PROCEDURE — 6370000000 HC RX 637 (ALT 250 FOR IP): Performed by: HOSPITALIST

## 2017-09-05 PROCEDURE — 84520 ASSAY OF UREA NITROGEN: CPT

## 2017-09-05 PROCEDURE — 82947 ASSAY GLUCOSE BLOOD QUANT: CPT

## 2017-09-05 PROCEDURE — 76770 US EXAM ABDO BACK WALL COMP: CPT

## 2017-09-05 PROCEDURE — 6370000000 HC RX 637 (ALT 250 FOR IP): Performed by: STUDENT IN AN ORGANIZED HEALTH CARE EDUCATION/TRAINING PROGRAM

## 2017-09-05 PROCEDURE — 97116 GAIT TRAINING THERAPY: CPT

## 2017-09-05 PROCEDURE — 51798 US URINE CAPACITY MEASURE: CPT

## 2017-09-05 PROCEDURE — 99233 SBSQ HOSP IP/OBS HIGH 50: CPT | Performed by: INTERNAL MEDICINE

## 2017-09-05 PROCEDURE — 36415 COLL VENOUS BLD VENIPUNCTURE: CPT

## 2017-09-05 PROCEDURE — 84295 ASSAY OF SERUM SODIUM: CPT

## 2017-09-05 PROCEDURE — 6370000000 HC RX 637 (ALT 250 FOR IP): Performed by: PODIATRIST

## 2017-09-05 PROCEDURE — 97530 THERAPEUTIC ACTIVITIES: CPT

## 2017-09-05 RX ORDER — INSULIN GLARGINE 100 [IU]/ML
35 INJECTION, SOLUTION SUBCUTANEOUS NIGHTLY
Status: DISCONTINUED | OUTPATIENT
Start: 2017-09-05 | End: 2017-09-07

## 2017-09-05 RX ADMIN — METOPROLOL TARTRATE 50 MG: 50 TABLET, FILM COATED ORAL at 21:17

## 2017-09-05 RX ADMIN — TIMOLOL MALEATE 1 DROP: 2.5 SOLUTION/ DROPS OPHTHALMIC at 09:20

## 2017-09-05 RX ADMIN — PIPERACILLIN AND TAZOBACTAM 3.38 G: 3; .375 INJECTION, POWDER, LYOPHILIZED, FOR SOLUTION INTRAVENOUS; PARENTERAL at 12:53

## 2017-09-05 RX ADMIN — INSULIN LISPRO 4 UNITS: 100 INJECTION, SOLUTION INTRAVENOUS; SUBCUTANEOUS at 12:49

## 2017-09-05 RX ADMIN — DAKIN'S SOLUTION 0.125% (QUARTER STRENGTH) 473 ML: 0.12 SOLUTION at 09:00

## 2017-09-05 RX ADMIN — VANCOMYCIN HYDROCHLORIDE 1750 MG: 10 INJECTION, POWDER, LYOPHILIZED, FOR SOLUTION INTRAVENOUS at 00:10

## 2017-09-05 RX ADMIN — PANTOPRAZOLE SODIUM 20 MG: 20 TABLET, DELAYED RELEASE ORAL at 21:18

## 2017-09-05 RX ADMIN — Medication 10 ML: at 21:17

## 2017-09-05 RX ADMIN — PRAVASTATIN SODIUM 10 MG: 20 TABLET ORAL at 09:17

## 2017-09-05 RX ADMIN — LISINOPRIL 40 MG: 20 TABLET ORAL at 09:16

## 2017-09-05 RX ADMIN — PIPERACILLIN AND TAZOBACTAM 3.38 G: 3; .375 INJECTION, POWDER, LYOPHILIZED, FOR SOLUTION INTRAVENOUS; PARENTERAL at 03:30

## 2017-09-05 RX ADMIN — INSULIN LISPRO 2 UNITS: 100 INJECTION, SOLUTION INTRAVENOUS; SUBCUTANEOUS at 09:21

## 2017-09-05 RX ADMIN — HEPARIN SODIUM 5000 UNITS: 5000 INJECTION, SOLUTION INTRAVENOUS; SUBCUTANEOUS at 00:16

## 2017-09-05 RX ADMIN — HYDRALAZINE HYDROCHLORIDE 100 MG: 50 TABLET, FILM COATED ORAL at 21:18

## 2017-09-05 RX ADMIN — TAMSULOSIN HYDROCHLORIDE 0.4 MG: 0.4 CAPSULE ORAL at 09:19

## 2017-09-05 RX ADMIN — PREGABALIN 25 MG: 25 CAPSULE ORAL at 21:18

## 2017-09-05 RX ADMIN — INSULIN LISPRO 3 UNITS: 100 INJECTION, SOLUTION INTRAVENOUS; SUBCUTANEOUS at 16:36

## 2017-09-05 RX ADMIN — AMLODIPINE BESYLATE 10 MG: 10 TABLET ORAL at 09:15

## 2017-09-05 RX ADMIN — PREGABALIN 25 MG: 25 CAPSULE ORAL at 14:02

## 2017-09-05 RX ADMIN — PANTOPRAZOLE SODIUM 20 MG: 20 TABLET, DELAYED RELEASE ORAL at 09:17

## 2017-09-05 RX ADMIN — LATANOPROST 1 DROP: 50 SOLUTION OPHTHALMIC at 21:17

## 2017-09-05 RX ADMIN — INSULIN LISPRO 1 UNITS: 100 INJECTION, SOLUTION INTRAVENOUS; SUBCUTANEOUS at 21:18

## 2017-09-05 RX ADMIN — PREGABALIN 25 MG: 25 CAPSULE ORAL at 09:18

## 2017-09-05 RX ADMIN — TIMOLOL MALEATE 1 DROP: 2.5 SOLUTION/ DROPS OPHTHALMIC at 21:17

## 2017-09-05 RX ADMIN — HEPARIN SODIUM 5000 UNITS: 5000 INJECTION, SOLUTION INTRAVENOUS; SUBCUTANEOUS at 14:05

## 2017-09-05 RX ADMIN — METOPROLOL TARTRATE 50 MG: 50 TABLET, FILM COATED ORAL at 09:16

## 2017-09-05 RX ADMIN — HEPARIN SODIUM 5000 UNITS: 5000 INJECTION, SOLUTION INTRAVENOUS; SUBCUTANEOUS at 06:23

## 2017-09-05 RX ADMIN — PIPERACILLIN AND TAZOBACTAM 3.38 G: 3; .375 INJECTION, POWDER, LYOPHILIZED, FOR SOLUTION INTRAVENOUS; PARENTERAL at 21:11

## 2017-09-05 RX ADMIN — FOLIC ACID 1 MG: 1 TABLET ORAL at 09:15

## 2017-09-05 RX ADMIN — HYDRALAZINE HYDROCHLORIDE 100 MG: 50 TABLET, FILM COATED ORAL at 09:15

## 2017-09-05 RX ADMIN — HEPARIN SODIUM 5000 UNITS: 5000 INJECTION, SOLUTION INTRAVENOUS; SUBCUTANEOUS at 21:18

## 2017-09-05 RX ADMIN — INSULIN GLARGINE 35 UNITS: 100 INJECTION, SOLUTION SUBCUTANEOUS at 21:19

## 2017-09-05 ASSESSMENT — PAIN SCALES - GENERAL: PAINLEVEL_OUTOF10: 0

## 2017-09-05 NOTE — PROGRESS NOTES
°C)      General Appearance: Awake, alert, in no apparent distress. Pleasant. Mental status: good mentation  Head:  Normocephalic, no trauma  ENT: sclera anicteric. Neck: Supple, without lymphadenopathy. Pulmonary: Clear to auscultation  Cardiovascular: Regular rate and rhythm without murmurs. Abdomen: Soft, non tender, non distended. Extremities: Warm, well perfused. LE edema. Neurologic: No gross sensory or motor deficits. Skin: left foot examined, gangrenous changes present          Medical Decision Making:   Labs    CBC with Differential:   Recent Labs      09/04/17   0536  09/04/17   0750  09/05/17   0808   WBC  22.3*   --   20.0*   HGB  6.5*  7.1*  7.1*   HCT  20.0*  21.4*  21.9*   PLT  304   --   333       BMP:   Recent Labs      09/04/17   0536  09/04/17 2018 09/05/17   0808   NA  127*  128*  131*   BUN  43*   --   35*   CREATININE  1.27*   --   1.11       Hepatic Function Panel: No results for input(s): PROT, LABALBU, BILIDIR, IBILI, BILITOT, ALKPHOS, ALT, AST in the last 72 hours. No results for input(s): RPR in the last 72 hours. No results for input(s): HIV in the last 72 hours. No results for input(s): BC in the last 72 hours. Lab Results   Component Value Date    MUCUS NOT REPORTED 09/03/2017    RBC 2.82 09/05/2017    TRICHOMONAS NOT REPORTED 09/03/2017    WBC 20.0 09/05/2017    YEAST NOT REPORTED 09/03/2017    TURBIDITY CLOUDY 09/03/2017       Lab Results   Component Value Date    CREATININE 1.11 09/05/2017    GLUCOSE 247 09/01/2017       . .................................................................................................................................. Nicole Valenzuela MRI left foot  9/1/17  Ulceration at the medial forefoot with subjacent osteomyelitis of the   residual 1st metatarsal diaphysis.    2. Abscess formation surrounding the distal 5th metatarsal with probable   acute on chronic osteomyelitis of the entirety of the 5th metatarsal and   likely the proximal aspect of the proximal phalanx of the 5th digit. 3. Broad ulceration at the lateral aspect of the foot with subjacent abscess   formation inferolateral to the cuboid and probable septic calcaneocuboid   joint.  Region of suspected abscess formation measures 1.4 x 1.7 x 1.9 cm. Suspected osseous destruction and osteomyelitis of the 4th and 5th   tarsometatarsal joints. 4. Abscess formation or infectious tenosynovitis in association with the   distal aspect of the residual flexor tendon of the 2nd digit measuring 1.8 x   1.7 x 1.2 cm.   5. Signal changes and slight fragmentation of the distal 3rd metatarsal head   can be seen with sequela of Freiberg's infraction or osteomyelitis.  Probable   reactive osteitis versus early osteomyelitis of the remainder of the 3rd and   4th metatarsals and proximal aspect of the proximal phalanx of the 3rd digit. 6. Diffuse infectious myositis.  Multifocal susceptibility artifact   throughout the foot but primarily in the midfoot is concerning for multifocal   soft tissue gas or possibly necrotizing infection. 7. Mild diffuse cellulitis of the visualized foot. 8. Prior amputation of the 1st and 2nd digits at the level of the metatarsal   diaphysis. 9. Suspected osteomyelitis of the anterolateral margin of the calcaneus and   inferior aspect of the medial cuneiform. I have examined the patient, reviewed the patient's medical history in detail and updated the computerized patient record. Above exam and data confirmed.   Colleen Chavira, Infectious Diseases

## 2017-09-05 NOTE — PLAN OF CARE
Problem: Skin Integrity:  Goal: Will show no infection signs and symptoms  Will show no infection signs and symptoms   Outcome: Ongoing  Goal: Absence of new skin breakdown  Absence of new skin breakdown   Outcome: Ongoing

## 2017-09-05 NOTE — PROGRESS NOTES
Physical Therapy  Facility/Department: [address] ORTHO/MED SURG  Daily Treatment Note  NAME: Zafar Hall  : 1965  MRN: [id number]    Date of Service: 2017    Patient Diagnosis(es):   Patient Active Problem List    Diagnosis Date Noted    Urinary retention 2017    Ischemic foot left 2017    Gangrene of foot ([address] 2017    Diabetic foot left 2017    Acute kidney injury ([address] 2017    Hyperlipidemia     Dysphagia 2013    Family history of colon cancer 2013    Bowel habit changes 2013    Uncontrolled hypertension     IDDM (insulin dependent diabetes mellitus) ([address]     Neuropathy ([address]     Uncontrolled type 2 diabetes with cellulitis of foot ([address]     Cerebrovascular disease     Cataract        Past Medical History:   Diagnosis Date    Acid reflux     Cerebrovascular disease 2006    TIA    Glaucoma     Hyperlipidemia     Hypertension     IDDM (insulin dependent diabetes mellitus) ([address]     MDRO (multiple drug resistant organisms) resistance     Neuropathy (HCC)     Osteomyelitis ([address]     Left Hallux    S/P cataract extraction and insertion of intraocular lens     bilateral     Type II or unspecified type diabetes mellitus without mention of complication, not stated as uncontrolled      Past Surgical History:   Procedure Laterality Date    EYE SURGERY Bilateral     lazer both eyes    FOOT SURGERY Left 2017    surgical prep of wound bed    FOOT SURGERY Left 2017    1) Left foot surgical preparation of wound-bed, 2) Left foot application of graft     HC  PICC [facility] DOUBLE  2017         OTHER SURGICAL HISTORY Left 2017    I and D bone, bone biopsy with flap closure and pulse lavage    OTHER SURGICAL HISTORY Left 2017    I & D foot    OR DEEP DISSEC FOOT INFEC,1 BURSA Left 2017    FOOT DEBRIDEMENT WITH EPIFIX  performed by Aly Laguerre DPM at [address] Left     hallux  TOE AMPUTATION Left 12/09/2016    hallux    TOE AMPUTATION Left 4/27/2017    I AND D FOOT, BONE BIOPSY, POSSIBLE FILET 2ND TOE performed by Mary Montero DPM at [place] [address]       Restrictions/Precautions  Restrictions/Precautions: Weight Bearing, Fall Risk  Required Braces or Orthoses?: No  Lower Extremity Weight Bearing Restrictions  Left Lower Extremity Weight Bearing: Non Weight Bearing  Position Activity Restriction  Other position/activity restrictions: up with assist    Subjective   Per RN, patient is okay for PT. Patient with multiple family members present- requested all visitors (except spouse) to step out for treatment. RN requested to assist patient to attempt to urinate standing up. Patient agreeable to treatment. Did not rate pain when asked but did admit to a significant amount. Orientation  Overall Orientation Status: Within Normal Limits    Objective   Transfers  Sit to Stand: Minimal Assistance  Stand to sit: Minimal Assistance  Comments: Cued for eccentric descent. Improved steadiness. Ambulation  WB Status: NWB LLE  Surface: level tile  Device: Rolling Walker  Assistance: Contact guard assistance;Minimal assistance  Quality of Gait: NWB LLE, mildly unsteady, no LOB  Distance: 20 ft  Comments: Max cues (and encouragement) near end of gait training (fatigue/pain). Patient verbalized understanding of importance of maintaining NWB status and risks. Stairs/Curb  Stairs?: No    Balance  Posture: Good  Sitting - Static: Good  Sitting - Dynamic: Good  Standing - Static: Fair;+  Standing - Dynamic: Fair  Comments: standing balance assessed with RW. Patient CGA while attempting to urinate. Extended standing throughout treatment. Exercise  Seated LE exercise program: Long Arc Quads, hip abduction/adduction, heel/toe raises, and marches. Reps: 15  Assessment   Body structures, Functions, Activity limitations: Decreased functional mobility ; Decreased ADL status; Decreased strength;Decreased

## 2017-09-05 NOTE — PROGRESS NOTES
Progress Note  Podiatric Medicine and Surgery     Subjective     Patient seen and examined at bedside. No acute events overnight. Afebrile, hypertensive and vital signs stable   Denies pain in the foot. HPI 8/31/17:  Ike Fajardo is a 46 y.o. male who presents to the hospital from Dr. Dereck Peterson office. Patient states that he has had wounds on the left foot for sometime now however they were almost healed. Patient states that on Saturday he fell and since then his wounds have worsened. Patient states that it is painful to move. Patient states that the little toe has become black. Patient denies any N/V/F/C. Pt denies any shortness of breath. ROS: Negative except for HPI. Denies N/V/F/C/SOB/CP.     Medications:  Scheduled Meds:   piperacillin-tazobactam  3.375 g Intravenous Q8H    insulin lispro  0-6 Units Subcutaneous TID WC    insulin lispro  0-3 Units Subcutaneous Nightly    insulin glargine  35 Units Subcutaneous Nightly    folic acid  1 mg Oral Daily    tamsulosin  0.4 mg Oral Daily    sodium hypochlorite   Irrigation Daily    sodium chloride flush  10 mL Intravenous 2 times per day    amLODIPine  10 mg Oral Daily    hydrALAZINE  100 mg Oral TID    lisinopril  40 mg Oral Daily    metoprolol tartrate  50 mg Oral BID    pravastatin  10 mg Oral Daily    timolol  1 drop Both Eyes BID    latanoprost  1 drop Both Eyes Nightly    pregabalin  25 mg Oral TID    pantoprazole  20 mg Oral BID    vancomycin  1,750 mg Intravenous Q24H    heparin (porcine)  5,000 Units Subcutaneous 3 times per day       Continuous Infusions:   dextrose         PRN Meds:sodium chloride flush, acetaminophen, magnesium hydroxide, ondansetron, oxyCODONE-acetaminophen **OR** oxyCODONE-acetaminophen, morphine **OR** morphine, cyclobenzaprine, glucose, dextrose, glucagon (rDNA), dextrose    Objective     Vitals:  Patient Vitals for the past 8 hrs:   BP Temp Pulse Resp SpO2   09/05/17 0700 (!) 147/59 98.1 °F (36.7 °C) 70 16 foot is difficult to exclude. Cultures:  AEROBIC CULTURE [[id number]] (Abnormal) Collected: 09/03/17 1145      Order Status: Completed Specimen: Foot Updated: 09/05/17 8242      Specimen Description . FOOT      Special Requests NECROTIC SWAB      Direct Exam FEW NEUTROPHILS (A)      Direct Exam FEW GRAM POSITIVE COCCI IN CLUSTERS (A)      Direct Exam FEW GRAM POSITIVE RODS (A)      Culture STAPHYLOCOCCUS AUREUS MODERATE GROWTH (A)      Culture NORMAL SKIN MARIELA (A)      Culture [address] [place] [id number] [facility], [address] [place] [address] [phone]      Status Pending       Assessment    Patient is a 45 yo male with      1. Chronic diabetic neuropathic ulceration, left foot   2. Osteomyelitis of the left foot    3. Dry gangrene left 5th digit.    4. Severe PAD    Principal Problem:    Diabetic foot left  Active Problems:    IDDM (insulin dependent diabetes mellitus) ([facility]    Acute kidney injury ([facility]    Ischemic foot left    Urinary retention       Plan      - Patient examined and evaluated at bedside  - Treatment options discussed in detail with the patient      - Dakins WTD dressing changed to left lower extremity   - Continue IV abx per ID: currently vanc/zosyn   - Aerobic/anaerobic cultures of wound - Staph Aureus: Pending ID  - Medical management per IM  - Urology on board for urinary retention - renal US 9/4, void trial soon  - NWB to left foot   - Diet: carb control  - DVT ppx: lovenox  - Plan for extensive debridement of left foot vs amputation     - Discussed with Dr. Bah Prior     Electronically signed by Greg Pritchard DPM on 9/5/2017 at 12:06 PM

## 2017-09-05 NOTE — FLOWSHEET NOTE
DATE: 2017    NAME: Stacey Manriquez  MRN: [id number]   : 1965    Patient not seen this date for Physical Therapy due to:  [] Blood transfusion in progress  [] Hemodialysis  []  Patient Declined  [] Spine Precautions   [] Strict Bedrest  [] Surgery/ Procedure  [x] Testing      [x] Other; checked on pt 2x in am, pt eating breakfast then gone for testing        [] PT being discontinued at this time. Patient independent. No further needs. [] PT being discontinued at this time as the patient has been transferred to palliative care. No further needs.     Gabriel Holland, PTA

## 2017-09-05 NOTE — PLAN OF CARE
Problem: Pain:  Goal: Pain level will decrease  Pain level will decrease to a 3 on a scale of 0-10 per pt. goal   Outcome: Ongoing  Goal: Control of acute pain  Control of acute pain   Outcome: Ongoing  Goal: Control of chronic pain  Control of chronic pain   Outcome: Ongoing

## 2017-09-06 PROBLEM — M86.9 OSTEOMYELITIS OF LEFT FOOT (HCC): Status: ACTIVE | Noted: 2017-09-06

## 2017-09-06 LAB
BUN BLDV-MCNC: 28 MG/DL (ref 6–20)
CREAT SERPL-MCNC: 1.13 MG/DL (ref 0.7–1.2)
CULTURE: ABNORMAL
DIRECT EXAM: ABNORMAL
GFR AFRICAN AMERICAN: >60 ML/MIN
GFR NON-AFRICAN AMERICAN: >60 ML/MIN
GFR SERPL CREATININE-BSD FRML MDRD: ABNORMAL ML/MIN/{1.73_M2}
GFR SERPL CREATININE-BSD FRML MDRD: ABNORMAL ML/MIN/{1.73_M2}
GLUCOSE BLD-MCNC: 140 MG/DL (ref 75–110)
GLUCOSE BLD-MCNC: 78 MG/DL (ref 75–110)
GLUCOSE BLD-MCNC: 90 MG/DL (ref 75–110)
HCT VFR BLD CALC: 20.9 % (ref 41–53)
HEMOGLOBIN: 6.7 G/DL (ref 13.5–17.5)
Lab: ABNORMAL
MCH RBC QN AUTO: 25.4 PG (ref 26–34)
MCHC RBC AUTO-ENTMCNC: 32.1 G/DL (ref 31–37)
MCV RBC AUTO: 79.2 FL (ref 80–100)
ORGANISM: ABNORMAL
PDW BLD-RTO: 17.4 % (ref 12.5–15.4)
PLATELET # BLD: 352 K/UL (ref 140–450)
PMV BLD AUTO: 9.8 FL (ref 6–12)
RBC # BLD: 2.64 M/UL (ref 4.5–5.9)
SODIUM BLD-SCNC: 129 MMOL/L (ref 135–144)
SODIUM BLD-SCNC: 135 MMOL/L (ref 135–144)
SPECIMEN DESCRIPTION: ABNORMAL
STATUS: ABNORMAL
WBC # BLD: 17 K/UL (ref 3.5–11)

## 2017-09-06 PROCEDURE — 6360000002 HC RX W HCPCS: Performed by: PODIATRIST

## 2017-09-06 PROCEDURE — 84295 ASSAY OF SERUM SODIUM: CPT

## 2017-09-06 PROCEDURE — 2580000003 HC RX 258: Performed by: PODIATRIST

## 2017-09-06 PROCEDURE — 99232 SBSQ HOSP IP/OBS MODERATE 35: CPT | Performed by: INTERNAL MEDICINE

## 2017-09-06 PROCEDURE — 6360000002 HC RX W HCPCS: Performed by: STUDENT IN AN ORGANIZED HEALTH CARE EDUCATION/TRAINING PROGRAM

## 2017-09-06 PROCEDURE — 6370000000 HC RX 637 (ALT 250 FOR IP): Performed by: PODIATRIST

## 2017-09-06 PROCEDURE — 84520 ASSAY OF UREA NITROGEN: CPT

## 2017-09-06 PROCEDURE — 82565 ASSAY OF CREATININE: CPT

## 2017-09-06 PROCEDURE — P9016 RBC LEUKOCYTES REDUCED: HCPCS

## 2017-09-06 PROCEDURE — 2580000003 HC RX 258: Performed by: HOSPITALIST

## 2017-09-06 PROCEDURE — 1200000000 HC SEMI PRIVATE

## 2017-09-06 PROCEDURE — 36415 COLL VENOUS BLD VENIPUNCTURE: CPT

## 2017-09-06 PROCEDURE — 6370000000 HC RX 637 (ALT 250 FOR IP): Performed by: STUDENT IN AN ORGANIZED HEALTH CARE EDUCATION/TRAINING PROGRAM

## 2017-09-06 PROCEDURE — 6370000000 HC RX 637 (ALT 250 FOR IP): Performed by: HOSPITALIST

## 2017-09-06 PROCEDURE — 86900 BLOOD TYPING SEROLOGIC ABO: CPT

## 2017-09-06 PROCEDURE — 82947 ASSAY GLUCOSE BLOOD QUANT: CPT

## 2017-09-06 PROCEDURE — 85027 COMPLETE CBC AUTOMATED: CPT

## 2017-09-06 PROCEDURE — 2580000003 HC RX 258: Performed by: STUDENT IN AN ORGANIZED HEALTH CARE EDUCATION/TRAINING PROGRAM

## 2017-09-06 PROCEDURE — 36430 TRANSFUSION BLD/BLD COMPNT: CPT

## 2017-09-06 RX ORDER — 0.9 % SODIUM CHLORIDE 0.9 %
250 INTRAVENOUS SOLUTION INTRAVENOUS ONCE
Status: COMPLETED | OUTPATIENT
Start: 2017-09-06 | End: 2017-09-06

## 2017-09-06 RX ADMIN — HYDRALAZINE HYDROCHLORIDE 100 MG: 50 TABLET, FILM COATED ORAL at 09:06

## 2017-09-06 RX ADMIN — INSULIN LISPRO 2 UNITS: 100 INJECTION, SOLUTION INTRAVENOUS; SUBCUTANEOUS at 09:09

## 2017-09-06 RX ADMIN — HYDRALAZINE HYDROCHLORIDE 100 MG: 50 TABLET, FILM COATED ORAL at 15:22

## 2017-09-06 RX ADMIN — TAMSULOSIN HYDROCHLORIDE 0.4 MG: 0.4 CAPSULE ORAL at 09:09

## 2017-09-06 RX ADMIN — VANCOMYCIN HYDROCHLORIDE 1750 MG: 10 INJECTION, POWDER, LYOPHILIZED, FOR SOLUTION INTRAVENOUS at 01:18

## 2017-09-06 RX ADMIN — LISINOPRIL 40 MG: 20 TABLET ORAL at 09:07

## 2017-09-06 RX ADMIN — Medication 10 ML: at 22:22

## 2017-09-06 RX ADMIN — PANTOPRAZOLE SODIUM 20 MG: 20 TABLET, DELAYED RELEASE ORAL at 22:09

## 2017-09-06 RX ADMIN — HEPARIN SODIUM 5000 UNITS: 5000 INJECTION, SOLUTION INTRAVENOUS; SUBCUTANEOUS at 06:40

## 2017-09-06 RX ADMIN — INSULIN GLARGINE 35 UNITS: 100 INJECTION, SOLUTION SUBCUTANEOUS at 22:12

## 2017-09-06 RX ADMIN — HEPARIN SODIUM 5000 UNITS: 5000 INJECTION, SOLUTION INTRAVENOUS; SUBCUTANEOUS at 22:10

## 2017-09-06 RX ADMIN — HEPARIN SODIUM 5000 UNITS: 5000 INJECTION, SOLUTION INTRAVENOUS; SUBCUTANEOUS at 15:23

## 2017-09-06 RX ADMIN — METOPROLOL TARTRATE 50 MG: 50 TABLET, FILM COATED ORAL at 09:07

## 2017-09-06 RX ADMIN — HYDRALAZINE HYDROCHLORIDE 100 MG: 50 TABLET, FILM COATED ORAL at 22:10

## 2017-09-06 RX ADMIN — FOLIC ACID 1 MG: 1 TABLET ORAL at 09:06

## 2017-09-06 RX ADMIN — DAKIN'S SOLUTION 0.125% (QUARTER STRENGTH) 473 ML: 0.12 SOLUTION at 09:08

## 2017-09-06 RX ADMIN — LATANOPROST 1 DROP: 50 SOLUTION OPHTHALMIC at 22:10

## 2017-09-06 RX ADMIN — TIMOLOL MALEATE 1 DROP: 2.5 SOLUTION/ DROPS OPHTHALMIC at 22:10

## 2017-09-06 RX ADMIN — TIMOLOL MALEATE 1 DROP: 2.5 SOLUTION/ DROPS OPHTHALMIC at 09:09

## 2017-09-06 RX ADMIN — AMLODIPINE BESYLATE 10 MG: 10 TABLET ORAL at 09:05

## 2017-09-06 RX ADMIN — IRON SUCROSE 100 MG: 20 INJECTION, SOLUTION INTRAVENOUS at 18:01

## 2017-09-06 RX ADMIN — PANTOPRAZOLE SODIUM 20 MG: 20 TABLET, DELAYED RELEASE ORAL at 09:07

## 2017-09-06 RX ADMIN — SODIUM CHLORIDE 250 ML: 9 INJECTION, SOLUTION INTRAVENOUS at 10:00

## 2017-09-06 RX ADMIN — METOPROLOL TARTRATE 50 MG: 50 TABLET, FILM COATED ORAL at 22:09

## 2017-09-06 RX ADMIN — PIPERACILLIN AND TAZOBACTAM 3.38 G: 3; .375 INJECTION, POWDER, LYOPHILIZED, FOR SOLUTION INTRAVENOUS; PARENTERAL at 12:48

## 2017-09-06 RX ADMIN — PREGABALIN 25 MG: 25 CAPSULE ORAL at 22:10

## 2017-09-06 RX ADMIN — PIPERACILLIN AND TAZOBACTAM 3.38 G: 3; .375 INJECTION, POWDER, LYOPHILIZED, FOR SOLUTION INTRAVENOUS; PARENTERAL at 05:31

## 2017-09-06 RX ADMIN — PREGABALIN 25 MG: 25 CAPSULE ORAL at 15:23

## 2017-09-06 RX ADMIN — PRAVASTATIN SODIUM 10 MG: 20 TABLET ORAL at 09:08

## 2017-09-06 RX ADMIN — PREGABALIN 25 MG: 25 CAPSULE ORAL at 09:08

## 2017-09-06 RX ADMIN — PIPERACILLIN AND TAZOBACTAM 3.38 G: 3; .375 INJECTION, POWDER, LYOPHILIZED, FOR SOLUTION INTRAVENOUS; PARENTERAL at 22:22

## 2017-09-06 ASSESSMENT — PAIN SCALES - GENERAL
PAINLEVEL_OUTOF10: 0
PAINLEVEL_OUTOF10: 0

## 2017-09-06 NOTE — PROGRESS NOTES
REPORTED Final     trimethoprim-sulfamethoxazole Sensitive <=10 SUSCEPTIBLE Final     vancomycin Sensitive 1 SUSCEPTIBLE Final            Paige Cazares  Podiatric Medicine DPM PGY1  [address]    I have examined the patient, reviewed the patient's medical history in detail and updated the computerized patient record. Above exam and data confirmed.   Colleen Chavira, Infectious Diseases

## 2017-09-06 NOTE — PROGRESS NOTES
[facility]  Urology Progress Note    Subjective: No acute events. Catheter removed yesterday. Voiding well throughout the day. Voids of 500-600 with pvr ranging . Most recent pvr 35 per nurse. Feels he is voiding well and emptying. Patient Vitals for the past 24 hrs:   BP Temp Pulse Resp SpO2   09/05/17 2013 (!) 142/50 98.9 °F (37.2 °C) 64 20 96 %   09/05/17 1400 (!) 120/57 - - - -   09/05/17 0700 (!) 147/59 98.1 °F (36.7 °C) 70 16 96 %       Intake/Output Summary (Last 24 hours) at 09/06/17 0548  Last data filed at 09/05/17 2245   Gross per 24 hour   Intake                0 ml   Output             1775 ml   Net            -1775 ml       Recent Labs      09/04/17   0536  09/04/17   0750  09/05/17   0808  09/06/17   0505   WBC  22.3*   --   20.0*  17.0*   HGB  6.5*  7.1*  7.1*  6.7*   HCT  20.0*  21.4*  21.9*  20.9*   MCV  76.8*   --   77.9*  79.2*   PLT  304   --   333  352     Recent Labs      09/04/17   0536   09/05/17   0808  09/05/17   1947  09/06/17   0505   NA  127*   < >  131*  128*  129*   BUN  43*   --   35*   --   28*   CREATININE  1.27*   --   1.11   --   1.13    < > = values in this interval not displayed. Recent Labs      09/03/17   0739   COLORU  YELLOW   PHUR  5.5   WBCUA  20 TO 50   RBCUA  2 TO 5   MUCUS  NOT REPORTED   TRICHOMONAS  NOT REPORTED   YEAST  NOT REPORTED   BACTERIA  NOT REPORTED   SPECGRAV  1.016   LEUKOCYTESUR  MODERATE*   UROBILINOGEN  Normal   BILIRUBINUR  NEGATIVE       Additional Lab/culture results:    Physical Exam:   NAD, A/Ox3  NC/AT  RRR, palpable pulses  Equal chest rise, normal inspiratory effort  Soft, NT/ND.  No peritoneal signs  Warm extremities    Interval Imaging Findings:    Impression:  Diabetic foot [facility])  Active Hospital Problems    Diagnosis Date Noted    Urinary retention [R33.9] 09/02/2017    Ischemic foot left [I99.8] 09/01/2017    Diabetic foot left [E11.8] 08/31/2017    Acute kidney injury ([facility] [N17.9] 08/31/2017  IDDM (insulin dependent diabetes mellitus) [facility] [E11.9, Z79.4]        Plan:   Continue flomax  Supportive care  Continue timed double voids, pvrs, CIC for pvr>400 or patient discomfort  Follow up in Urology Office in 1-2 months for evaluation of voiding  Please call with any further questions/concerns.  Thank you for the opportunity to participate in this patient's care    Karon Pitts  5:48 AM 9/6/2017

## 2017-09-06 NOTE — PROGRESS NOTES
Physical Therapy  DATE: 2017    NAME: Stacey Manriquez  MRN: [id number]   : 1965    Patient not seen this date for Physical Therapy due to:  [] Blood transfusion in progress  [] Hemodialysis  []  Patient Declined  [] Spine Precautions   [] Strict Bedrest  [] Surgery/ Procedure  [] Testing      [x] Other: PT checked on twice this am, first time pt needed breakfast and second check pt needed blood per RN. Will check this pm.        [] PT being discontinued at this time. Patient independent. No further needs. [] PT being discontinued at this time as the patient has been transferred to palliative care. No further needs.     Adrianne Baker, PTA

## 2017-09-06 NOTE — PROGRESS NOTES
04/13/17 202 lb 9.6 oz (91.9 kg)       Intake and Output summary:   Intake/Output Summary (Last 24 hours) at 09/06/17 0933  Last data filed at 09/06/17 0630   Gross per 24 hour   Intake                0 ml   Output             1425 ml   Net            -1425 ml         PHYSICAL EXAMINATION :    General appearance - alert, well appearing, and in no distress and oriented to person, place, and time  Mental status - alert, oriented to person, place, and time, normal mood, behavior, speech, dress, motor activity, and thought processes  Eyes - pupils equal and reactive, extraocular eye movements intact, sclera anicteric  Ears - hearing grossly normal bilaterally  Nose - normal and patent, no discharge and normal nontender sinuses  Mouth - mucous membranes moist, pharynx normal without lesions  Neck - supple, no significant adenopathy, carotids upstroke normal bilaterally, no bruits  Chest - clear to auscultation, no wheezes, rales or rhonchi, symmetric air entry, no tachypnea, retractions or cyanosis  Heart - normal rate, regular rhythm, normal S1, S2, no murmurs, rubs, clicks or gallops, no JVD  Abdomen - soft, nontender, nondistended, no masses or organomegaly, bowel sounds normal, no abdominal bruits, no pulsatile masses  Extremities - no pedal edema, no clubbing or cyanosis, dressing on the left foot      Medications:    Scheduled Meds:   insulin lispro  0-12 Units Subcutaneous TID WC    insulin lispro  0-6 Units Subcutaneous Nightly    sodium chloride  250 mL Intravenous Once    piperacillin-tazobactam  3.375 g Intravenous Q8H    insulin glargine  35 Units Subcutaneous Nightly    folic acid  1 mg Oral Daily    tamsulosin  0.4 mg Oral Daily    sodium hypochlorite   Irrigation Daily    sodium chloride flush  10 mL Intravenous 2 times per day    amLODIPine  10 mg Oral Daily    hydrALAZINE  100 mg Oral TID    lisinopril  40 mg Oral Daily    metoprolol tartrate  50 mg Oral BID    pravastatin  10 mg Oral Daily    timolol  1 drop Both Eyes BID    latanoprost  1 drop Both Eyes Nightly    pregabalin  25 mg Oral TID    pantoprazole  20 mg Oral BID    vancomycin  1,750 mg Intravenous Q24H    heparin (porcine)  5,000 Units Subcutaneous 3 times per day     Continuous Infusions:   dextrose       PRN Medications: sodium chloride flush, acetaminophen, magnesium hydroxide, ondansetron, oxyCODONE-acetaminophen **OR** oxyCODONE-acetaminophen, morphine **OR** morphine, cyclobenzaprine, glucose, dextrose, glucagon (rDNA), dextrose    Diet: DIET CARB CONTROL;    Laboratory findings:    CBC:   Recent Labs      09/04/17   0536  09/04/17   0750  09/05/17   0808  09/06/17   0505   WBC  22.3*   --   20.0*  17.0*   HGB  6.5*  7.1*  7.1*  6.7*   HCT  20.0*  21.4*  21.9*  20.9*   PLT  304   --   333  352       BMP: Recent Labs      09/04/17   0536   09/05/17   0808  09/05/17   1947  09/06/17   0505   NA  127*   < >  131*  128*  129*   BUN  43*   --   35*   --   28*   CREATININE  1.27*   --   1.11   --   1.13    < > = values in this interval not displayed. Hepatic functions: No results for input(s): AST, ALT, ALB, BILITOT, ALKPHOS in the last 72 hours. INR: No results for input(s): INR in the last 72 hours. S. Lactic Acid: No results for input(s): LACTA in the last 72 hours. Cardiac enzymes:No results for input(s): CKTOTAL, CKMB, CKMBINDEX, TROPONINI in the last 72 hours.     Urinalysis:     Microbiology:    Cultures during this admission:     Blood cultures:      [] None drawn      [] Negative             [x]  Positive (Details:  )  Urine Culture:        [] None drawn      [x] Negative             []  Positive (Details:  )  Sputum Culture:   [x] None drawn       [] Negative             []  Positive (Details:  )     ASSESSMENT:      Patient Active Problem List:     Uncontrolled hypertension     ID[facility] (insulin dependent diabetes mellitus) [facility]     Neuropathy ([facility]     Uncontrolled type 2 diabetes with cellulitis of foot ([facility]

## 2017-09-06 NOTE — PROGRESS NOTES
--   1.11   --     < > = values in this interval not displayed. Calcium:No results for input(s): CALCIUM in the last 72 hours. Ionized Calcium:No results for input(s): IONCA in the last 72 hours. Magnesium:No results for input(s): MG in the last 72 hours. Phosphorus:No results for input(s): PHOS in the last 72 hours. BNP:No results for input(s): BNP in the last 72 hours. Glucose:  Recent Labs      09/05/17   0713  09/05/17   1245  09/05/17   1633   POCGLU  236*  342*  287*     HgbA1C: No results for input(s): LABA1C in the last 72 hours. INR: No results for input(s): INR in the last 72 hours. Hepatic: No results for input(s): ALKPHOS, ALT, AST, PROT, BILITOT, BILIDIR, LABALBU in the last 72 hours. Amylase and Lipase:No results for input(s): LACTA, AMYLASE in the last 72 hours. Lactic Acid: No results for input(s): LACTA in the last 72 hours. CARDIAC ENZYMES:No results for input(s): CKTOTAL, CKMB, CKMBINDEX, TROPONINI in the last 72 hours. BNP: No results for input(s): BNP in the last 72 hours.   Lipids: Recent Labs      09/04/17   1208   TRIG  165*     ABGs: No results found for: PH, PCO2, PO2, HCO3, O2SAT  Thyroid:   Lab Results   Component Value Date    TSH 3.65 09/01/2017      Urinalysis: Recent Labs      09/03/17   0739   BACTERIA  NOT REPORTED   COLORU  YELLOW   PHUR  5.5   PROTEINU  1+*   RBCUA  2 TO 5   SPECGRAV  1.016   BILIRUBINUR  NEGATIVE   NITRU  NEGATIVE   WBCUA  20 TO 48   LEUKOCYTESUR  MODERATE*   GLUCOSEU  NEGATIVE               Assessment:    Patient Active Problem List   Diagnosis    Uncontrolled hypertension    IDDM (insulin dependent diabetes mellitus) (HCC)    Neuropathy (HCC)    Uncontrolled type 2 diabetes with [facility]ulitis of foot ([facility]    Cerebrovascular disease    Cataract    Dysphagia    Family history of colon cancer    Bowel habit changes    Hyperlipidemia    Gangrene of foot (HCC)    Diabetic foot left    Acute kidney injury ([facility]    Ischemic foot left    Urinary retention   Type 2 diabetes mellitus. Hyponatremia. Essential hypertension. Ischemic left foot with ulceration    Plan:  Meds reviewed- changes made as appropriate. Lantus insulin to be increased. On subcu heparin. Continue to monitor serum sodium. Laboratory data do not suggest SIADH  Increase activity as permitted and tolerated. Questions patient/family had were answered to their satisfaction. Diet reviewed. Patient apparently does not follow dietary restrictions which could contribute to his poor sugar control  Thank you for having us involved in the care of your patient. Please call us if you have any questions or concerns.     Yassine Phillips MD      9/5/2017, 8:58 PM    Pulmonary & Critical Care

## 2017-09-06 NOTE — PROGRESS NOTES
Vascular Surgery Progress Note         PATIENT NAME: Abhijit Pritchard     TODAY'S DATE: 9/6/2017, 1:22 PM    CC: LLE foot osteomyelitis and dry gangrene      SUBJECTIVE:    Pt seen and examined. States he is doing well with no complaints at this time. No LLE foot pain and denies any issues besides he is noticing his foot is beginning to smell. Tolerating diet and denies any n/v, f/c, sob or chest pain. Tmax 37.2, WBC 17.0    Detail of events in plan section. Will assess with Dr. Bryn Sharp at bedside and determine surgical timing for L BKA vs any alternative intervention. OBJECTIVE:   Vitals:  /71  Pulse 60  Temp 97.4 °F (36.3 °C)  Resp 16  Ht 5' 6.93\" (1.7 m)  Wt 204 lb 2.3 oz (92.6 kg)  SpO2 98%  BMI 32.04 kg/m2     INTAKE/OUTPUT:      Intake/Output Summary (Last 24 hours) at 09/06/17 1322  Last data filed at 09/06/17 0630   Gross per 24 hour   Intake                0 ml   Output             1425 ml   Net            -1425 ml                 GENERAL: AOx3, NAD  HEENT: EOMI bilaterally  CARDIAC: Regular rate and rhythm. ABDOMEN: Soft, NT, ND, Active Bowel Sounds  EXTREMITY: moves all extremities, no edema. Good doppler signal of Left DP/PT/AT   NEURO:Gross motor intact.   INCISION: Dressing moist fluffs and new Kerlix applied with loose ace bandage around      Data:  CBC with Differential:    Lab Results   Component Value Date    WBC 17.0 09/06/2017    RBC 2.64 09/06/2017    HGB 6.7 09/06/2017    HCT 20.9 09/06/2017     09/06/2017    MCV 79.2 09/06/2017    MCH 25.4 09/06/2017    MCHC 32.1 09/06/2017    RDW 17.4 09/06/2017    LYMPHOPCT 4 09/01/2017    MONOPCT 6 09/01/2017    BASOPCT 0 09/01/2017    MONOSABS 1.65 09/01/2017    LYMPHSABS 1.10 09/01/2017    EOSABS 0.00 09/01/2017    BASOSABS 0.00 09/01/2017    DIFFTYPE NOT REPORTED 09/01/2017     CMP:    Lab Results   Component Value Date     09/06/2017    K 3.4 09/01/2017    CL 98 09/01/2017    CO2 16 09/01/2017    BUN 28 09/06/2017 CREATININE 1.13 09/06/2017    GFRAA >60 09/06/2017    LABGLOM >60 09/06/2017    GLUCOSE 247 09/01/2017    LABALBU 4.0 07/13/2012    CALCIUM 7.5 09/01/2017    BILITOT 0.20 07/13/2012    ALKPHOS 76 07/13/2012    AST 18 07/13/2012    ALT 22 07/13/2012     HgBA1c:  No components found for: HGBA1C      ASSESSMENT   Patient Active Problem List    Diagnosis Date Noted    Osteomyelitis of left foot ([facility] 09/06/2017    Urinary retention 09/02/2017    Ischemic foot left 09/01/2017    Gangrene of foot ([facility] 08/31/2017    Diabetic foot left 08/31/2017    Acute kidney injury ([facility] [facility] 08/31/2017    Hyperlipidemia     Dysphagia 11/12/2013    Family history of colon cancer 11/12/2013    Bowel habit changes 11/12/2013    Uncontrolled hypertension     IDDM (insulin dependent diabetes mellitus) ([facility] [facility]     Neuropathy (HCC)     Uncontrolled type 2 diabetes with cellulitis of foot ([facility] [facility]     Cerebrovascular disease     Cataract        1. Osteomyelitis of L foot (3,4,5 MTS heads w/ abscesses), calcaneal osteo, and dry gangrene of L 5th digit extending to left lateral aspect of foot now   2. Severe PAD  3. DM with neuropathy  4. Hyponatremia  5. Leukocytosis   6. Anemia Hgb 6.7 last CBC      PLAN  1. Vascular surgery signed off 9/2 under the impression podiatry would do aggressive debridement of LLE s/p LLE angiogram with angioplasty on 9/1 with post procedure palpable L DP obtained. Per podiatry daily progress notes and original discussion with podiatry team plan was for extensive debridement by podiatry. Per chart review, notes and interviewing the pt the pt has received no bedside debridements or OR debridements since admission on 8/31. LLE skin necrosis and gangrene has since worsened and further critical limb ischemia is now evident    2. Will discuss L BKA with Dr. Martha Roach today   -Pt was informed of potential plan of L BKA and agreeable to proceed with surgery in order to assure over all well being and safety    3.  Risk stratification   -Medical optimization   4. Continue Abx therapy per ID, Zosyn and vanco IV  5. Strict glycemic control per IM  6. Continue management and recommendations per primary   -Anemic Hgb 6.7, hyponatremia  7.  Urology following for urinary retention- dosing flomax and timed double voids       Electronically signed by Brandon Harrington DO  on 9/6/2017 at 1:22 PM

## 2017-09-06 NOTE — PROGRESS NOTES
Internal Medicine - Daily Progress Note      Date and time: 9/6/2017 10:00 AM  Patient's name:  Trey Barnes  Medical Record Number: [id number]  Patient's account/billing number: [de-identified]  Patient's YOB: 1965  Age: 46 y.o. Date of Admission: 8/31/2017  4:35 PM      Primary Care Physician: Atul Rodriguez MD  Attending Physician:    Code Status: Full Code    Chief complaint:  Osteomyelitis and gangrene of diabetic left foot    Problem List:   Patient Active Problem List   Diagnosis    Uncontrolled hypertension    IDDM (insulin dependent diabetes mellitus) ([address]    Neuropathy ([address]    Uncontrolled type 2 diabetes with cellulitis of foot ([address]    Cerebrovascular disease    Cataract    Dysphagia    Family history of colon cancer    Bowel habit changes    Hyperlipidemia    Gangrene of foot ([address]    Diabetic foot left    Acute kidney injury ([address]    Ischemic foot left    Urinary retention    Osteomyelitis of left foot (HCC)       Allergies:    No Known Allergies       SUBJECTIVE:     Interval History : History was obtained from patient Mr./Ms. Trey Barnes is who mis admitted for osteomyelitis of left foot . Medical history significant for Insulin dependent Diabetes mellitus, currently on zosyn and vancomycin. Blood sugars are well controlled. Vitally stable, afebrile.    ml pt on condom catheter  Had a bowel movement    Review of Systems -   General ROS: negative  Psychological ROS: negative  Ophthalmic ROS: negative  ENT ROS: negative  Allergy and Immunology ROS: negative  Hematological and Lymphatic ROS: negative  Endocrine ROS: negative  Respiratory ROS: no cough, shortness of breath, or wheezing  Cardiovascular ROS: no chest pain or dyspnea on exertion  Gastrointestinal ROS:negative  Genito-Urinary ROS: negative  Musculoskeletal ROS: pain in left foot  Neurological ROS: negative  Dermatological ROS: negative        OBJECTIVE:    Vitals: /70  Pulse 68  Temp 97.7 °F (36.5 °C)  Resp 14  Ht 5' 6.93\" (1.7 m)  Wt 204 lb 2.3 oz (92.6 kg)  SpO2 95%  BMI 32.04 kg/m2    Last Body weight:   Wt Readings from Last 3 Encounters:   08/31/17 204 lb 2.3 oz (92.6 kg)   04/27/17 204 lb 2.3 oz (92.6 kg)   04/13/17 202 lb 9.6 oz (91.9 kg)       Intake and Output summary:     Intake/Output Summary (Last 24 hours) at 09/06/17 1000  Last data filed at 09/06/17 0630   Gross per 24 hour   Intake                0 ml   Output             1425 ml   Net            -1425 ml         PHYSICAL EXAMINATION :    General appearance - alert, well appearing, and in no distress and oriented to person, place, and time  Mental status - alert, oriented to person, place, and time, normal mood, behavior, speech, dress, motor activity, and thought processes  Eyes - pupils equal and reactive ,   Ears - hearing grossly normal bilaterally  Nose - normal and patent  Mouth - mucous membranes moist  Neck - supple, no significant adenopathy  Chest - clear to auscultation, no wheezes  Heart - normal rate, regular rhythm, normal S1, S2, no murmurs  Abdomen - soft, nontender, nondistended, no masses or organomegaly, bowel sounds normal, no abdominal bruits, no pulsatile masses  Neurological - alert, oriented, normal speech, gait not assessed  Extremities - peripheral pulses normal, no pedal edema, no clubbing or cyanosis, feet normal, good pulses  Skin - small hematoma on his belly at the site of heparin pokes, dressing of left foot intact.        Medications:    Scheduled Meds:   insulin lispro  0-12 Units Subcutaneous TID     insulin lispro  0-6 Units Subcutaneous Nightly    sodium chloride  250 mL Intravenous Once    piperacillin-tazobactam  3.375 g Intravenous Q8H    insulin glargine  35 Units Subcutaneous Nightly    folic acid  1 mg Oral Daily    tamsulosin  0.4 mg Oral Daily    sodium hypochlorite   Irrigation Daily    sodium chloride flush  10 mL Intravenous 2 times per day    amLODIPine  10 mg Oral Daily  hydrALAZINE  100 mg Oral TID    lisinopril  40 mg Oral Daily    metoprolol tartrate  50 mg Oral BID    pravastatin  10 mg Oral Daily    timolol  1 drop Both Eyes BID    latanoprost  1 drop Both Eyes Nightly    pregabalin  25 mg Oral TID    pantoprazole  20 mg Oral BID    vancomycin  1,750 mg Intravenous Q24H    heparin (porcine)  5,000 Units Subcutaneous 3 times per day     Continuous Infusions:   dextrose       PRN Medications: sodium chloride flush, acetaminophen, magnesium hydroxide, ondansetron, oxyCODONE-acetaminophen **OR** oxyCODONE-acetaminophen, morphine **OR** morphine, cyclobenzaprine, glucose, dextrose, glucagon (rDNA), dextrose    Diet: DIET CARB CONTROL;    Laboratory findings:    CBC:   Recent Labs      09/04/17   0536  09/04/17   0750  09/05/17   0808  09/06/17   0505   WBC  22.3*   --   20.0*  17.0*   HGB  6.5*  7.1*  7.1*  6.7*   HCT  20.0*  21.4*  21.9*  20.9*   PLT  304   --   333  352       BMP:   Recent Labs      09/04/17   0536   09/05/17   0808  09/05/17   1947  09/06/17   0505   NA  127*   < >  131*  128*  129*   BUN  43*   --   35*   --   28*   CREATININE  1.27*   --   1.11   --   1.13    < > = values in this interval not displayed.            Microbiology:    Cultures during this admission:     Blood cultures:      [] None drawn      [] Negative             []  Positive (Details:  )  Urine Culture:        [] None drawn      [] Negative             []  Positive (Details:  )  Sputum Culture:   [] None drawn       [] Negative             []  Positive (Details:  )     ASSESSMENT:      Patient Active Problem List:     Uncontrolled hypertension     IDDM (insulin dependent diabetes mellitus) ([facility] [facility]     Neuropathy ([facility] [address]     Uncontrolled type 2 diabetes with cellulitis of foot ([facility] [address]     Cerebrovascular disease     Cataract     Dysphagia     Family history of colon cancer     Bowel habit changes     Hyperlipidemia     Gangrene of foot ([facility] [address]     Diabetic foot left     Acute kidney injury

## 2017-09-06 NOTE — PROGRESS NOTES
Podiatry Inpatient Progress Note  CC: LLE gangrene, osteomyelitis, PAD    Subjective     Patient seen and examined at bedside at bedside. No acute events overnight. Afebrile, continued HTN otherwise VSS  Denies pain in the foot. C/o foul odor to foot. HPI 8/31/17:  Fatou Carvalho is a 46 y.o. male who presents to the hospital from Dr. Mery Alcantar office. Patient states that he has had wounds on the left foot for sometime now however they were almost healed. Patient states that on Saturday he fell and since then his wounds have worsened. Patient states that it is painful to move. Patient states that the little toe has become black. Patient denies any N/V/F/C. Pt denies any shortness of breath. ROS: Negative except for HPI. Denies N/V/F/C/SOB/CP.     Medications:  Scheduled Meds:   insulin lispro  0-12 Units Subcutaneous TID WC    insulin lispro  0-6 Units Subcutaneous Nightly    sodium chloride  250 mL Intravenous Once    piperacillin-tazobactam  3.375 g Intravenous Q8H    insulin glargine  35 Units Subcutaneous Nightly    folic acid  1 mg Oral Daily    tamsulosin  0.4 mg Oral Daily    sodium hypochlorite   Irrigation Daily    sodium chloride flush  10 mL Intravenous 2 times per day    amLODIPine  10 mg Oral Daily    hydrALAZINE  100 mg Oral TID    lisinopril  40 mg Oral Daily    metoprolol tartrate  50 mg Oral BID    pravastatin  10 mg Oral Daily    timolol  1 drop Both Eyes BID    latanoprost  1 drop Both Eyes Nightly    pregabalin  25 mg Oral TID    pantoprazole  20 mg Oral BID    vancomycin  1,750 mg Intravenous Q24H    heparin (porcine)  5,000 Units Subcutaneous 3 times per day       Continuous Infusions:   dextrose         PRN Meds:sodium chloride flush, acetaminophen, magnesium hydroxide, ondansetron, oxyCODONE-acetaminophen **OR** oxyCODONE-acetaminophen, morphine **OR** morphine, cyclobenzaprine, glucose, dextrose, glucagon (rDNA), dextrose    Objective     Vitals:  Patient Vitals the midfoot is concerning for multifocal  soft tissue gas or possibly necrotizing infection. 7. Mild diffuse cellulitis of the visualized foot. 8. Prior amputation of the 1st and 2nd digits at the level of the metatarsal  diaphysis. 9. Suspected osteomyelitis of the anterolateral margin of the calcaneus and  inferior aspect of the medial cuneiform. 10. Developing Charcot arthropathy/neuropathic foot is difficult to exclude. Cultures:  AEROBIC CULTURE [[id number]] (Abnormal)  Collected: 09/03/17 1145      Order Status: Completed Specimen: Foot Updated: 09/06/17 0048      Specimen Description . FOOT      Special Requests NECROTIC SWAB      Direct Exam FEW NEUTROPHILS (A)      Direct Exam FEW GRAM POSITIVE COCCI IN CLUSTERS (A)      Direct Exam FEW GRAM POSITIVE RODS (A)      Culture METHICILLIN RESISTANT STAPHYLOCOCCUS AUREUS MODERATE GROWTH (A)      Culture NORMAL SKIN MARIELA (A)      Culture [facility] [address] [facility], [address] [place] [address] [phone]      Status FINAL 09/06/2017      Organism MRSA     PVR PPG 8/31/17:  LALI Right: 0.82 Left 1.12    Assessment    Patient is a 45 yo male with      1. Chronic diabetic neuropathic ulceration, left foot   2. Osteomyelitis of the left foot    3. Dry gangrene left 5th digit. 4. Severe PAD  5. Leukocytosis: 20 --> 17    Principal Problem:    Osteomyelitis of left foot (HCC)  Active Problems:    IDDM (insulin dependent diabetes mellitus) (HCC)    Diabetic foot left    Acute kidney injury ([address]    Ischemic foot left    Urinary retention       Plan      - Patient examined and evaluated at bedside with Dr. Alex Baptiste.   - Discussed with patient, family and RN need for BKA LLE. Patient agreeable to amputation. Dr. Alex Baptiste requested Dr. Lora Ramos however Dr. Lora Ramos [place] so Dr. Bryn Sharp is consulted.       - Dakins WTD dressing changed to left lower extremity   - Continue IV abx per ID: currently vanc/zosyn   - Medical management per IM  - Urology on board for urinary retention rec: Continue flomax, Supportive care, Continue timed double voids, pvrs, CIC for pvr>400 or patient discomfort.  - NWB to left foot   - Diet: carb control  - DVT ppx: lovenox  - Discussed with Dr. Misa Alrdidge    Electronically signed by Justus Troy DPM on 9/6/2017 at 11:28 AM

## 2017-09-06 NOTE — PLAN OF CARE
Problem: Skin Integrity:  Goal: Absence of new skin breakdown  Absence of new skin breakdown   Outcome: Met This Shift

## 2017-09-06 NOTE — PROGRESS NOTES
Progress Note  Podiatric Medicine and Surgery     Subjective     Patient seen and examined at bedside. No acute events overnight. Afebrile, hypertensive and vital signs stable   Denies pain in the foot. HPI 17:  Gabriela Vasquez is a 46 y.o. male who presents to the hospital from Dr. Chance Trejo office. Patient states that he has had wounds on the left foot for sometime now however they were almost healed. Patient states that on Saturday he fell and since then his wounds have worsened. Patient states that it is painful to move. Patient states that the little toe has become black. Patient denies any N/V/F/C. Pt denies any shortness of breath. S/p     ROS: Negative except for HPI. Denies N/V/F/C/SOB/CP. Medications:  Scheduled Meds:   insulin lispro  0-12 Units Subcutaneous TID WC    insulin lispro  0-6 Units Subcutaneous Nightly    piperacillin-tazobactam  3.375 g Intravenous Q8H    insulin glargine  35 Units Subcutaneous Nightly    folic acid  1 mg Oral Daily    tamsulosin  0.4 mg Oral Daily    sodium hypochlorite   Irrigation Daily    sodium chloride flush  10 mL Intravenous 2 times per day    amLODIPine  10 mg Oral Daily    hydrALAZINE  100 mg Oral TID    lisinopril  40 mg Oral Daily    metoprolol tartrate  50 mg Oral BID    pravastatin  10 mg Oral Daily    timolol  1 drop Both Eyes BID    latanoprost  1 drop Both Eyes Nightly    pregabalin  25 mg Oral TID    pantoprazole  20 mg Oral BID    vancomycin  1,750 mg Intravenous Q24H    heparin (porcine)  5,000 Units Subcutaneous 3 times per day       Continuous Infusions:   dextrose         PRN Meds:sodium chloride flush, acetaminophen, magnesium hydroxide, ondansetron, oxyCODONE-acetaminophen **OR** oxyCODONE-acetaminophen, morphine **OR** morphine, cyclobenzaprine, glucose, dextrose, glucagon (rDNA), dextrose    Objective     Vitals:  No data found.     Average, Min, and Max for last 24 hours Vitals:  TEMPERATURE:  Temp  Av.5 °F (36.9

## 2017-09-07 ENCOUNTER — ANESTHESIA (OUTPATIENT)
Dept: OPERATING ROOM | Age: 52
DRG: 239 | End: 2017-09-07
Payer: COMMERCIAL

## 2017-09-07 ENCOUNTER — ANESTHESIA EVENT (OUTPATIENT)
Dept: OPERATING ROOM | Age: 52
DRG: 239 | End: 2017-09-07
Payer: COMMERCIAL

## 2017-09-07 VITALS — SYSTOLIC BLOOD PRESSURE: 110 MMHG | TEMPERATURE: 96 F | DIASTOLIC BLOOD PRESSURE: 57 MMHG | OXYGEN SATURATION: 100 %

## 2017-09-07 LAB
ABO/RH: NORMAL
ANION GAP SERPL CALCULATED.3IONS-SCNC: 12 MMOL/L (ref 9–17)
ANTIBODY SCREEN: NEGATIVE
ARM BAND NUMBER: NORMAL
BLD PROD TYP BPU: NORMAL
BUN BLDV-MCNC: 20 MG/DL (ref 6–20)
BUN/CREAT BLD: ABNORMAL (ref 9–20)
CALCIUM SERPL-MCNC: 7.9 MG/DL (ref 8.6–10.4)
CHLORIDE BLD-SCNC: 100 MMOL/L (ref 98–107)
CO2: 19 MMOL/L (ref 20–31)
CREAT SERPL-MCNC: 0.93 MG/DL (ref 0.7–1.2)
CROSSMATCH RESULT: NORMAL
DISPENSE STATUS BLOOD BANK: NORMAL
EXPIRATION DATE: NORMAL
GFR AFRICAN AMERICAN: >60 ML/MIN
GFR NON-AFRICAN AMERICAN: >60 ML/MIN
GFR SERPL CREATININE-BSD FRML MDRD: ABNORMAL ML/MIN/{1.73_M2}
GFR SERPL CREATININE-BSD FRML MDRD: ABNORMAL ML/MIN/{1.73_M2}
GLUCOSE BLD-MCNC: 107 MG/DL (ref 75–110)
GLUCOSE BLD-MCNC: 113 MG/DL (ref 70–99)
GLUCOSE BLD-MCNC: 119 MG/DL (ref 75–110)
GLUCOSE BLD-MCNC: 134 MG/DL (ref 75–110)
GLUCOSE BLD-MCNC: 82 MG/DL (ref 75–110)
HCT VFR BLD CALC: 24.3 % (ref 41–53)
HEMOGLOBIN: 8 G/DL (ref 13.5–17.5)
MCH RBC QN AUTO: 26.1 PG (ref 26–34)
MCHC RBC AUTO-ENTMCNC: 32.8 G/DL (ref 31–37)
MCV RBC AUTO: 79.5 FL (ref 80–100)
PDW BLD-RTO: 18.2 % (ref 12.5–15.4)
PLATELET # BLD: 394 K/UL (ref 140–450)
PMV BLD AUTO: 9.6 FL (ref 6–12)
POTASSIUM SERPL-SCNC: 4.1 MMOL/L (ref 3.7–5.3)
RBC # BLD: 3.06 M/UL (ref 4.5–5.9)
SODIUM BLD-SCNC: 131 MMOL/L (ref 135–144)
TRANSFUSION STATUS: NORMAL
UNIT DIVISION: 0
UNIT NUMBER: NORMAL
WBC # BLD: 17.9 K/UL (ref 3.5–11)

## 2017-09-07 PROCEDURE — 36415 COLL VENOUS BLD VENIPUNCTURE: CPT

## 2017-09-07 PROCEDURE — 3600000015 HC SURGERY LEVEL 5 ADDTL 15MIN: Performed by: SURGERY

## 2017-09-07 PROCEDURE — 84295 ASSAY OF SERUM SODIUM: CPT

## 2017-09-07 PROCEDURE — 27882 AMPUTATION OF LOWER LEG: CPT | Performed by: SURGERY

## 2017-09-07 PROCEDURE — 2000000000 HC ICU R&B

## 2017-09-07 PROCEDURE — 2580000003 HC RX 258: Performed by: PODIATRIST

## 2017-09-07 PROCEDURE — 6360000002 HC RX W HCPCS: Performed by: ANESTHESIOLOGY

## 2017-09-07 PROCEDURE — 6360000002 HC RX W HCPCS: Performed by: SURGERY

## 2017-09-07 PROCEDURE — 76937 US GUIDE VASCULAR ACCESS: CPT

## 2017-09-07 PROCEDURE — 2580000003 HC RX 258: Performed by: HOSPITALIST

## 2017-09-07 PROCEDURE — 3700000001 HC ADD 15 MINUTES (ANESTHESIA): Performed by: SURGERY

## 2017-09-07 PROCEDURE — 6370000000 HC RX 637 (ALT 250 FOR IP): Performed by: PODIATRIST

## 2017-09-07 PROCEDURE — 6360000002 HC RX W HCPCS: Performed by: STUDENT IN AN ORGANIZED HEALTH CARE EDUCATION/TRAINING PROGRAM

## 2017-09-07 PROCEDURE — 85027 COMPLETE CBC AUTOMATED: CPT

## 2017-09-07 PROCEDURE — 99221 1ST HOSP IP/OBS SF/LOW 40: CPT | Performed by: PHYSICAL MEDICINE & REHABILITATION

## 2017-09-07 PROCEDURE — 2580000003 HC RX 258: Performed by: STUDENT IN AN ORGANIZED HEALTH CARE EDUCATION/TRAINING PROGRAM

## 2017-09-07 PROCEDURE — 6360000002 HC RX W HCPCS: Performed by: PODIATRIST

## 2017-09-07 PROCEDURE — 82947 ASSAY GLUCOSE BLOOD QUANT: CPT

## 2017-09-07 PROCEDURE — 88307 TISSUE EXAM BY PATHOLOGIST: CPT

## 2017-09-07 PROCEDURE — 2580000003 HC RX 258: Performed by: SURGERY

## 2017-09-07 PROCEDURE — 0Y6J0Z3 DETACHMENT AT LEFT LOWER LEG, LOW, OPEN APPROACH: ICD-10-PCS | Performed by: SURGERY

## 2017-09-07 PROCEDURE — 3600000005 HC SURGERY LEVEL 5 BASE: Performed by: SURGERY

## 2017-09-07 PROCEDURE — 6360000002 HC RX W HCPCS

## 2017-09-07 PROCEDURE — 88311 DECALCIFY TISSUE: CPT

## 2017-09-07 PROCEDURE — 2500000003 HC RX 250 WO HCPCS: Performed by: NURSE ANESTHETIST, CERTIFIED REGISTERED

## 2017-09-07 PROCEDURE — 6360000002 HC RX W HCPCS: Performed by: HOSPITALIST

## 2017-09-07 PROCEDURE — 2580000003 HC RX 258: Performed by: NURSE ANESTHETIST, CERTIFIED REGISTERED

## 2017-09-07 PROCEDURE — 3700000000 HC ANESTHESIA ATTENDED CARE: Performed by: SURGERY

## 2017-09-07 PROCEDURE — 80048 BASIC METABOLIC PNL TOTAL CA: CPT

## 2017-09-07 PROCEDURE — 99232 SBSQ HOSP IP/OBS MODERATE 35: CPT | Performed by: INTERNAL MEDICINE

## 2017-09-07 PROCEDURE — 6360000002 HC RX W HCPCS: Performed by: NURSE ANESTHETIST, CERTIFIED REGISTERED

## 2017-09-07 PROCEDURE — 94770 HC ETCO2 MONITOR DAILY: CPT

## 2017-09-07 RX ORDER — FENTANYL CITRATE 50 UG/ML
INJECTION, SOLUTION INTRAMUSCULAR; INTRAVENOUS PRN
Status: DISCONTINUED | OUTPATIENT
Start: 2017-09-07 | End: 2017-09-07 | Stop reason: SDUPTHER

## 2017-09-07 RX ORDER — FENTANYL CITRATE 50 UG/ML
25 INJECTION, SOLUTION INTRAMUSCULAR; INTRAVENOUS EVERY 5 MIN PRN
Status: DISCONTINUED | OUTPATIENT
Start: 2017-09-07 | End: 2017-09-07 | Stop reason: HOSPADM

## 2017-09-07 RX ORDER — NALOXONE HYDROCHLORIDE 0.4 MG/ML
0.4 INJECTION, SOLUTION INTRAMUSCULAR; INTRAVENOUS; SUBCUTANEOUS PRN
Status: DISCONTINUED | OUTPATIENT
Start: 2017-09-07 | End: 2017-09-08

## 2017-09-07 RX ORDER — PROPOFOL 10 MG/ML
INJECTION, EMULSION INTRAVENOUS PRN
Status: DISCONTINUED | OUTPATIENT
Start: 2017-09-07 | End: 2017-09-07 | Stop reason: SDUPTHER

## 2017-09-07 RX ORDER — SODIUM CHLORIDE 9 MG/ML
INJECTION, SOLUTION INTRAVENOUS CONTINUOUS PRN
Status: DISCONTINUED | OUTPATIENT
Start: 2017-09-07 | End: 2017-09-07 | Stop reason: SDUPTHER

## 2017-09-07 RX ORDER — INSULIN GLARGINE 100 [IU]/ML
20 INJECTION, SOLUTION SUBCUTANEOUS NIGHTLY
Status: DISCONTINUED | OUTPATIENT
Start: 2017-09-07 | End: 2017-09-09

## 2017-09-07 RX ORDER — MAGNESIUM HYDROXIDE 1200 MG/15ML
LIQUID ORAL CONTINUOUS PRN
Status: DISCONTINUED | OUTPATIENT
Start: 2017-09-07 | End: 2017-09-07 | Stop reason: HOSPADM

## 2017-09-07 RX ORDER — FENTANYL CITRATE 50 UG/ML
INJECTION, SOLUTION INTRAMUSCULAR; INTRAVENOUS
Status: COMPLETED
Start: 2017-09-07 | End: 2017-09-07

## 2017-09-07 RX ORDER — ONDANSETRON 2 MG/ML
4 INJECTION INTRAMUSCULAR; INTRAVENOUS
Status: DISCONTINUED | OUTPATIENT
Start: 2017-09-07 | End: 2017-09-07 | Stop reason: HOSPADM

## 2017-09-07 RX ORDER — MIDAZOLAM HYDROCHLORIDE 1 MG/ML
INJECTION INTRAMUSCULAR; INTRAVENOUS PRN
Status: DISCONTINUED | OUTPATIENT
Start: 2017-09-07 | End: 2017-09-07 | Stop reason: SDUPTHER

## 2017-09-07 RX ORDER — LIDOCAINE HYDROCHLORIDE 10 MG/ML
INJECTION, SOLUTION EPIDURAL; INFILTRATION; INTRACAUDAL; PERINEURAL PRN
Status: DISCONTINUED | OUTPATIENT
Start: 2017-09-07 | End: 2017-09-07 | Stop reason: SDUPTHER

## 2017-09-07 RX ORDER — MEPERIDINE HYDROCHLORIDE 50 MG/ML
12.5 INJECTION INTRAMUSCULAR; INTRAVENOUS; SUBCUTANEOUS EVERY 5 MIN PRN
Status: DISCONTINUED | OUTPATIENT
Start: 2017-09-07 | End: 2017-09-07 | Stop reason: HOSPADM

## 2017-09-07 RX ORDER — FENTANYL CITRATE 50 UG/ML
50 INJECTION, SOLUTION INTRAMUSCULAR; INTRAVENOUS EVERY 5 MIN PRN
Status: DISCONTINUED | OUTPATIENT
Start: 2017-09-07 | End: 2017-09-07 | Stop reason: HOSPADM

## 2017-09-07 RX ADMIN — FENTANYL CITRATE 25 MCG: 50 INJECTION, SOLUTION INTRAMUSCULAR; INTRAVENOUS at 10:00

## 2017-09-07 RX ADMIN — PREGABALIN 25 MG: 25 CAPSULE ORAL at 16:29

## 2017-09-07 RX ADMIN — IRON SUCROSE 100 MG: 20 INJECTION, SOLUTION INTRAVENOUS at 18:44

## 2017-09-07 RX ADMIN — HYDRALAZINE HYDROCHLORIDE 100 MG: 50 TABLET, FILM COATED ORAL at 14:39

## 2017-09-07 RX ADMIN — AMLODIPINE BESYLATE 10 MG: 10 TABLET ORAL at 14:39

## 2017-09-07 RX ADMIN — Medication 1 MG: at 10:59

## 2017-09-07 RX ADMIN — HYDRALAZINE HYDROCHLORIDE 100 MG: 50 TABLET, FILM COATED ORAL at 21:12

## 2017-09-07 RX ADMIN — HEPARIN SODIUM 5000 UNITS: 5000 INJECTION, SOLUTION INTRAVENOUS; SUBCUTANEOUS at 22:58

## 2017-09-07 RX ADMIN — PROPOFOL 200 MG: 10 INJECTION, EMULSION INTRAVENOUS at 09:03

## 2017-09-07 RX ADMIN — SODIUM CHLORIDE: 900 INJECTION, SOLUTION INTRAVENOUS at 08:51

## 2017-09-07 RX ADMIN — FENTANYL CITRATE 50 MCG: 50 INJECTION INTRAMUSCULAR; INTRAVENOUS at 09:13

## 2017-09-07 RX ADMIN — PANTOPRAZOLE SODIUM 20 MG: 20 TABLET, DELAYED RELEASE ORAL at 21:13

## 2017-09-07 RX ADMIN — PIPERACILLIN AND TAZOBACTAM 3.38 G: 3; .375 INJECTION, POWDER, LYOPHILIZED, FOR SOLUTION INTRAVENOUS; PARENTERAL at 13:36

## 2017-09-07 RX ADMIN — TIMOLOL MALEATE 1 DROP: 2.5 SOLUTION/ DROPS OPHTHALMIC at 21:11

## 2017-09-07 RX ADMIN — OXYCODONE HYDROCHLORIDE AND ACETAMINOPHEN 2 TABLET: 5; 325 TABLET ORAL at 22:14

## 2017-09-07 RX ADMIN — PIPERACILLIN AND TAZOBACTAM 3.38 G: 3; .375 INJECTION, POWDER, LYOPHILIZED, FOR SOLUTION INTRAVENOUS; PARENTERAL at 20:36

## 2017-09-07 RX ADMIN — VANCOMYCIN HYDROCHLORIDE 1750 MG: 10 INJECTION, POWDER, LYOPHILIZED, FOR SOLUTION INTRAVENOUS at 00:54

## 2017-09-07 RX ADMIN — MIDAZOLAM HYDROCHLORIDE 1 MG: 1 INJECTION, SOLUTION INTRAMUSCULAR; INTRAVENOUS at 09:00

## 2017-09-07 RX ADMIN — FENTANYL CITRATE 50 MCG: 50 INJECTION INTRAMUSCULAR; INTRAVENOUS at 10:12

## 2017-09-07 RX ADMIN — FENTANYL CITRATE 25 MCG: 50 INJECTION INTRAMUSCULAR; INTRAVENOUS at 10:00

## 2017-09-07 RX ADMIN — CALCIUM GLUCONATE 2 G: 94 INJECTION, SOLUTION INTRAVENOUS at 17:35

## 2017-09-07 RX ADMIN — LATANOPROST 1 DROP: 50 SOLUTION OPHTHALMIC at 21:11

## 2017-09-07 RX ADMIN — LIDOCAINE HYDROCHLORIDE 50 MG: 10 INJECTION, SOLUTION EPIDURAL; INFILTRATION; INTRACAUDAL; PERINEURAL at 09:03

## 2017-09-07 RX ADMIN — PIPERACILLIN AND TAZOBACTAM 3.38 G: 3; .375 INJECTION, POWDER, LYOPHILIZED, FOR SOLUTION INTRAVENOUS; PARENTERAL at 05:06

## 2017-09-07 RX ADMIN — METOPROLOL TARTRATE 50 MG: 50 TABLET, FILM COATED ORAL at 21:19

## 2017-09-07 RX ADMIN — PREGABALIN 25 MG: 25 CAPSULE ORAL at 22:14

## 2017-09-07 RX ADMIN — LISINOPRIL 40 MG: 20 TABLET ORAL at 16:29

## 2017-09-07 ASSESSMENT — PAIN DESCRIPTION - ORIENTATION
ORIENTATION: LEFT
ORIENTATION: LEFT

## 2017-09-07 ASSESSMENT — PAIN DESCRIPTION - DESCRIPTORS
DESCRIPTORS: SHARP
DESCRIPTORS: SHARP

## 2017-09-07 ASSESSMENT — PAIN DESCRIPTION - PAIN TYPE
TYPE: SURGICAL PAIN
TYPE: SURGICAL PAIN

## 2017-09-07 ASSESSMENT — PAIN DESCRIPTION - ONSET
ONSET: ON-GOING
ONSET: ON-GOING

## 2017-09-07 ASSESSMENT — PAIN SCALES - GENERAL
PAINLEVEL_OUTOF10: 4
PAINLEVEL_OUTOF10: 5
PAINLEVEL_OUTOF10: 7

## 2017-09-07 ASSESSMENT — PAIN - FUNCTIONAL ASSESSMENT: PAIN_FUNCTIONAL_ASSESSMENT: 0-10

## 2017-09-07 ASSESSMENT — PAIN DESCRIPTION - PROGRESSION
CLINICAL_PROGRESSION: GRADUALLY IMPROVING
CLINICAL_PROGRESSION: NOT CHANGED

## 2017-09-07 ASSESSMENT — PAIN DESCRIPTION - LOCATION
LOCATION: LEG
LOCATION: LEG

## 2017-09-07 ASSESSMENT — PAIN DESCRIPTION - FREQUENCY
FREQUENCY: INTERMITTENT
FREQUENCY: INTERMITTENT

## 2017-09-07 NOTE — CONSULTS
appetite, depression and fatigue     Premorbid function:  Independent with all PTA    Current function:    PT: TBA post BKA    OT: TBA post BKA      Past Medical History:        Diagnosis Date    Acid reflux     Cerebrovascular disease 2006    TIA    Glaucoma     Hyperlipidemia     Hypertension     [facility] mellitus) [facility]     MDRO (multiple drug resistant organisms) resistance     MRSA (methicillin resistant staph aureus) culture positive 09/03/2017    foot    Neuropathy ([facility]     Osteomyelitis ([facility]     Left Hallux    S/P cataract extraction and insertion of intraocular lens 2015    bilateral     Type II or unspecified type diabetes mellitus without mention of complication, not stated as uncontrolled        Past Surgical History:        Procedure Laterality Date    EYE SURGERY Bilateral     lazer both eyes    FOOT SURGERY Left 02/24/2017    surgical prep of wound bed    FOOT SURGERY Left 04/13/2017    1) Left foot surgical preparation of wound-bed, 2) Left foot application of graft     HC  PICC [facility] DOUBLE  4/28/2017         OTHER SURGICAL HISTORY Left 01/23/2017    I and D bone, bone biopsy with flap closure and pulse lavage    OTHER SURGICAL HISTORY Left 04/27/2017    I & D foot    OK DEEP DISSEC FOOT INFEC,1 BURSA Left 4/13/2017    FOOT DEBRIDEMENT WITH EPIFIX  performed by Priscila Wilkinson DPM at [address] Left 2014    hallux    TOE AMPUTATION Left 12/09/2016    hallux    TOE AMPUTATION Left 4/27/2017    I AND D FOOT, BONE BIOPSY, POSSIBLE FILET 2ND TOE performed by Priscila Wilkinson DPM at [address] [facility] [address]:    No Known Allergies     Current Medications:   Current Facility-Administered Medications: naloxone (NARCAN) injection 0.4 mg, 0.4 mg, Intravenous, PRN  fentaNYL 20 mcg/mL PCA, , Intravenous, Continuous  insulin glargine (LANTUS) injection vial 20 Units, 20 Units, Subcutaneous, Nightly  insulin lispro (HUMALOG) injection vial 0-12 Units, 0-12 Units, Subcutaneous, TID WC  insulin lispro (HUMALOG) injection vial 0-6 Units, 0-6 Units, Subcutaneous, Nightly  iron sucrose (VENOFER) 100 mg in sodium chloride 0.9 % 100 mL IVPB, 100 mg, Intravenous, Q24H  piperacillin-tazobactam (ZOSYN) 3.375 g in dextrose 5 % 50 mL IVPB extended infusion (mini-bag), 3.375 g, Intravenous, F4G  folic acid (FOLVITE) tablet 1 mg, 1 mg, Oral, Daily  tamsulosin (FLOMAX) capsule 0.4 mg, 0.4 mg, Oral, Daily  sodium hypochlorite (DAKINS) external solution, , Irrigation, Daily  sodium chloride flush 0.9 % injection 10 mL, 10 mL, Intravenous, 2 times per day  sodium chloride flush 0.9 % injection 10 mL, 10 mL, Intravenous, PRN  acetaminophen (TYLENOL) tablet 650 mg, 650 mg, Oral, Q4H PRN  magnesium hydroxide (MILK OF MAGNESIA) 400 MG/5ML suspension 30 mL, 30 mL, Oral, Daily PRN  ondansetron (ZOFRAN) injection 4 mg, 4 mg, Intravenous, Q6H PRN  oxyCODONE-acetaminophen (PERCOCET) 5-325 MG per tablet 1 tablet, 1 tablet, Oral, Q4H PRN **OR** oxyCODONE-acetaminophen (PERCOCET) 5-325 MG per tablet 2 tablet, 2 tablet, Oral, Q4H PRN  amLODIPine (NORVASC) tablet 10 mg, 10 mg, Oral, Daily  cyclobenzaprine (FLEXERIL) tablet 5 mg, 5 mg, Oral, TID PRN  hydrALAZINE (APRESOLINE) tablet 100 mg, 100 mg, Oral, TID  lisinopril (PRINIVIL;ZESTRIL) tablet 40 mg, 40 mg, Oral, Daily  metoprolol tartrate (LOPRESSOR) tablet 50 mg, 50 mg, Oral, BID  pravastatin (PRAVACHOL) tablet 10 mg, 10 mg, Oral, Daily  timolol (TIMOPTIC) 0.25 % ophthalmic solution 1 drop, 1 drop, Both Eyes, BID  latanoprost (XALATAN) 0.005 % ophthalmic solution 1 drop, 1 drop, Both Eyes, Nightly  glucose (GLUTOSE) 40 % oral gel 15 g, 15 g, Oral, PRN  dextrose 50 % solution 12.5 g, 12.5 g, Intravenous, PRN  glucagon (rDNA) injection 1 mg, 1 mg, Intramuscular, PRN  dextrose 5 % solution, 100 mL/hr, Intravenous, PRN  pregabalin (LYRICA) capsule 25 mg, 25 mg, Oral, TID  pantoprazole (PROTONIX) tablet 20 mg, 20 mg, Oral, Follows all commands  Good speech and language function. Lungs clear, no wheezing. Heart regular. Abdomen non-distended, non-tender. No calf tenderness or edema. Left leg wrapped with bandage. Sensory: Intact in BUE and BLE to soft sensation. Motor: Muscle tone and bulk are normal bilaterally. MS- 5/5 BUE and BLE. Impression:  Patient is a 45 yo male s/p left BKA secondary to chronic diabetic wound    Recommendations:  1. The patient needs PT and OT re- evaluations post surgery today. He will most likely be appropriate for acute inpatient rehab for pre-prosthetic training. He will have to have the need for 2 of 3 disciplines. The rehab team will continue to follow the patient. 2.  Podiatry- left guillotine amputation of foot. NWB LLE. 3.  dVT px- on lovenox  4. ID- on IV abx per ID service. It was my pleasure to evaluate Maegan Burgess today. Please call with questions. Nelson Headley MD        This note was completed by using EMR and the assistance of a speech-recognition program. However, inadvertent computerized transcription errors may be present.  Although every effort was made to ensure accuracy, no guarantees can be provided that every mistake has been identified and corrected by editing

## 2017-09-07 NOTE — PROGRESS NOTES
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 Being discussed with Dr. Sol Agustin  · Watch creat and vanco trough under vanco , aim trough levels 14-17 mcg/ml  · Strict glycemic control  · MRSA - susceptible to vanc, clinda, tetracycline, tmp-smx, gentamycin  Chief complaint/reason for consultation:    Gangrene left foot. History of Present Illness:   INITIAL HISTORY:    Ike Fajardo is a 46 y.o.-male who was initially admitted on 8/31/2017. He presented to the hospital from Dr Dereck Peterson office where he was found to have blackening of his left little toe with wounds over his foot . The patient had wounds on his left foot before which were healing and then he fell on Saturday which made the wounds worse. He has had amputation of his left toe before. No fever, chills, nausea or vomiting. MRI of left foot shows osteomyelitis of the 1st and 3rd metatarsal, calcaneus and also of the 5th metatarsals , diffuse infectious myositis is seen. Concern for soft tissue gas and necrotizing infection. abscess formation measures 1.4 x 1.7 x 1.9 cm.lateral aspect of the foot.     CURRENT EVALUATION  :09/07/17   Afebrile   He denies n/v/f/c/d, sob, no sputum    vanco trough 15.2 on 9/3/17  Creat better 1.5-->1.27 --> 1.1 --> 1.13 --> 0.93 (9/6/17)  WBC better 36-->22.3 --> 20 --> 17 - 17.9 (9/7/17)  9-3-17 Foot culture: MRSA see below sensitivities     Urine cx 9/2/17 neg    US retroperitoneal (9/5/17): unremarkable, incidental cholelithiasis      Physical Examination :     Patient Vitals for the past 8 hrs:   BP Temp Temp src REPORTED Final     trimethoprim-sulfamethoxazole Sensitive <=10 SUSCEPTIBLE Final     vancomycin Sensitive 1 SUSCEPTIBLE Final            Damián Aaron  Podiatric Medicine DPM PGY1  [address]        I have examined the patient, reviewed the patient's medical history in detail and updated the computerized patient record. Above exam and data confirmed.   Colleen Chavira, Infectious Diseases

## 2017-09-07 NOTE — PROGRESS NOTES
Med resident return page. Updated on patient vitals. Ordered give scheduled meds early, and see how patient responds. No PRN at this time.

## 2017-09-07 NOTE — PROGRESS NOTES
ROS: negative        OBJECTIVE:    Vitals: BP (!) 163/57  Pulse 78  Temp 98.7 °F (37.1 °C) (Oral)   Resp 18  Ht 5' 6.93\" (1.7 m)  Wt 204 lb 2.3 oz (92.6 kg)  SpO2 92%  BMI 32.04 kg/m2    Last Body weight:   Wt Readings from Last 3 Encounters:   08/31/17 204 lb 2.3 oz (92.6 kg)   04/27/17 204 lb 2.3 oz (92.6 kg)   04/13/17 202 lb 9.6 oz (91.9 kg)       Intake and Output summary:     Intake/Output Summary (Last 24 hours) at 09/07/17 9506  Last data filed at 09/07/17 0423   Gross per 24 hour   Intake             1100 ml   Output             1000 ml   Net              100 ml         PHYSICAL EXAMINATION :    General appearance - alert, well appearing, and in no distress and oriented to person, place, and time  Mental status - alert, oriented to person, place, and time, normal mood, behavior, speech, dress, motor activity, and thought processes  Eyes - pupils equal and reactive ,   Ears - hearing grossly normal bilaterally  Nose - normal and patent  Mouth - mucous membranes moist  Neck - supple, no significant adenopathy  Chest - clear to auscultation, no wheezes  Heart - normal rate, regular rhythm, normal S1, S2, no murmurs  Abdomen - soft, nontender, nondistended, no masses or organomegaly, bowel sounds normal, no abdominal bruits, no pulsatile masses  Neurological - alert, oriented, normal speech, gait not assessed  Extremities - peripheral pulses normal, no pedal edema, no clubbing or cyanosis, feet normal, good pulses  Skin - small hematoma on his belly at the site of heparin pokes, dressing of left foot intact.        Medications:    Scheduled Meds:   insulin lispro  0-12 Units Subcutaneous TID WC    insulin lispro  0-6 Units Subcutaneous Nightly    iron sucrose  100 mg Intravenous Q24H    piperacillin-tazobactam  3.375 g Intravenous Q8H    insulin glargine  35 Units Subcutaneous Nightly    folic acid  1 mg Oral Daily    tamsulosin  0.4 mg Oral Daily    sodium hypochlorite   Irrigation Daily   

## 2017-09-07 NOTE — PLAN OF CARE
Problem: Pain:  Goal: Pain level will decrease  Pain level will decrease to a 3 on a scale of 0-10 per pt. goal   Outcome: Met This Shift  Goal: Control of acute pain  Control of acute pain   Outcome: Met This Shift  Goal: Control of chronic pain  Control of chronic pain   Outcome: Met This Shift    Problem: Falls - Risk of  Goal: Absence of falls  Outcome: Met This Shift    Problem: Skin Integrity:  Goal: Will show no infection signs and symptoms  Will show no infection signs and symptoms   Outcome: Ongoing  Goal: Absence of new skin breakdown  Absence of new skin breakdown   Outcome: Ongoing

## 2017-09-07 NOTE — PROGRESS NOTES
Direct Exam FEW GRAM POSITIVE COCCI IN CLUSTERS (A)      Direct Exam FEW GRAM POSITIVE RODS (A)      Culture METHICILLIN RESISTANT STAPHYLOCOCCUS AUREUS MODERATE GROWTH (A)      Culture NORMAL SKIN MARIELA (A)      Culture [facility] [address] [facility], [address] [place] [address] [phone]      Status FINAL 09/06/2017      Organism MRSA     PVR PPG 8/31/17:  LALI Right: 0.82 Left 1.12    Assessment    Patient is a 47 yo male with      1. Chronic diabetic neuropathic ulceration, left foot   2. Osteomyelitis of the left foot    3. Dry gangrene left 5th digit. 4. Severe PAD  5. Leukocytosis: 20 --> 17  6. 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  - POD#0 guillotine amputation left foot. - Dressings per vascular team.   - Continue IV abx per ID: currently vanc/zosyn    WBC 17.0-->17.9 today. - Medical management per IM  - Urology on board for urinary retention rec: Continue flomax, Supportive care, Continue timed double voids, pvrs, CIC for pvr>400 or patient discomfort.  - NWB to left LE   - Diet: carb control  - DVT ppx: lovenox  - Will discuss with Dr. Carleen Garcia.      Electronically signed by Lucia Campbell DPM on 9/7/2017 at 10:31 AM

## 2017-09-07 NOTE — PLAN OF CARE
Problem: Pain:  Goal: Pain level will decrease  Pain level will decrease to a 3 on a scale of 0-10 per pt. goal   Outcome: Ongoing  Goal: Control of acute pain  Control of acute pain   Outcome: Ongoing  Goal: Control of chronic pain  Control of chronic pain   Outcome: Ongoing    Problem: Falls - Risk of  Goal: Absence of falls  Outcome: Ongoing  Patient remains free of falls this shift. Bed remains locked, in lowest position. Patient instructed on call light, which remains within reach.          Problem: Skin Integrity:  Goal: Will show no infection signs and symptoms  Will show no infection signs and symptoms   Outcome: Ongoing  Goal: Absence of new skin breakdown  Absence of new skin breakdown   Outcome: Ongoing

## 2017-09-07 NOTE — PROGRESS NOTES
[facility]  Occupational Therapy Not Seen Note    Patient not available for Occupational Therapy due to:    [] Testing:    [] Hemodialysis    [] Blood Transfusion in Progress    []Refusal by Patient:    [] Surgery/Procedure:    [] Strict Bedrest    [] Sedation    [] Spine Precautions     [] Pt being transferred to palliative care at this time. Spoke with pt/family and OT services to be defered. [] Pt independent with functional mobility and functional tasks. Pt with no OT acute care needs at this time, will defer OT eval.    [x] Other : attempt this am but pt was in sx per RN report.  Procedure: Left leg Guillotine amputation    Next Scheduled Treatment: 9/8/17    Signature: Tonia MONIQUE

## 2017-09-07 NOTE — BRIEF OP NOTE
Brief Postoperative Note  ______________________________________________________________    Patient: Stacey Manriquez  YOB: 1965  MRN: [id number]  Date of Procedure: 9/7/2017    Pre-Op Diagnosis: GANGRENE LEFT FIFTH DIGIT    Post-Op Diagnosis: Same       Procedure(s):  LEFT LEG GUILLOTINE AMPUTATION    Anesthesia: General    Surgeon(s):   Otf Kaplan MD    Residents  [address] [place] [address] PGY1    Student  Yuliya Foster MS3    Staff:  Roseline Almazan [employer]: Eladio Pastrana     Estimated Blood Loss: less than 19GL  Complications: None    Specimens:     ID Type Source Tests Collected by Time Destination   A : LEFT LEG AMPUTATION Tissue Leg SURGICAL PATHOLOGY Otf Kaplan MD 9/7/2017 1280        Implants:  * No implants in log *      Drains:       [REMOVED] Negative Pressure Wound Therapy Foot Left (Removed)   Removed 09/06/17 1454       [REMOVED] Urethral Catheter Straight-tip (Removed)   Removed 09/05/17 0755   $ Urethral catheter insertion Inserted for procedure 9/2/2017  3:54 PM   Catheter Indications Urology/Urologist seeing this patient or inserted indwelling catheter;Acute urinary retention/obstruction 9/5/2017  7:00 AM   Site Assessment No urethral drainage 9/5/2017  7:00 AM   Urine Color Yellow 9/5/2017  7:00 AM   Urine Appearance Clear 9/5/2017  7:00 AM   Output (mL) 550 mL 9/4/2017  4:00 PM       Findings: left distal leg guillotine amputation with gigli saw, suture ligation of vessels     Sung Rendon DO  Date: 9/7/2017  Time: 9:46 AM

## 2017-09-08 LAB
BUN BLDV-MCNC: 12 MG/DL (ref 6–20)
CREAT SERPL-MCNC: 0.83 MG/DL (ref 0.7–1.2)
GFR AFRICAN AMERICAN: >60 ML/MIN
GFR NON-AFRICAN AMERICAN: >60 ML/MIN
GFR SERPL CREATININE-BSD FRML MDRD: NORMAL ML/MIN/{1.73_M2}
GFR SERPL CREATININE-BSD FRML MDRD: NORMAL ML/MIN/{1.73_M2}
GLUCOSE BLD-MCNC: 110 MG/DL (ref 75–110)
GLUCOSE BLD-MCNC: 113 MG/DL (ref 75–110)
GLUCOSE BLD-MCNC: 120 MG/DL (ref 75–110)
GLUCOSE BLD-MCNC: 159 MG/DL (ref 75–110)
HCT VFR BLD CALC: 24.7 % (ref 41–53)
HEMOGLOBIN: 7.9 G/DL (ref 13.5–17.5)
MCH RBC QN AUTO: 25.8 PG (ref 26–34)
MCHC RBC AUTO-ENTMCNC: 32.2 G/DL (ref 31–37)
MCV RBC AUTO: 80.1 FL (ref 80–100)
PDW BLD-RTO: 17.8 % (ref 12.5–15.4)
PLATELET # BLD: 430 K/UL (ref 140–450)
PMV BLD AUTO: 9.7 FL (ref 6–12)
RBC # BLD: 3.08 M/UL (ref 4.5–5.9)
SODIUM BLD-SCNC: 132 MMOL/L (ref 135–144)
SODIUM BLD-SCNC: 133 MMOL/L (ref 135–144)
SODIUM BLD-SCNC: 135 MMOL/L (ref 135–144)
SURGICAL PATHOLOGY REPORT: NORMAL
WBC # BLD: 15.7 K/UL (ref 3.5–11)

## 2017-09-08 PROCEDURE — 2580000003 HC RX 258: Performed by: PODIATRIST

## 2017-09-08 PROCEDURE — 82565 ASSAY OF CREATININE: CPT

## 2017-09-08 PROCEDURE — 99232 SBSQ HOSP IP/OBS MODERATE 35: CPT | Performed by: INTERNAL MEDICINE

## 2017-09-08 PROCEDURE — 6370000000 HC RX 637 (ALT 250 FOR IP): Performed by: PODIATRIST

## 2017-09-08 PROCEDURE — 6360000002 HC RX W HCPCS: Performed by: SURGERY

## 2017-09-08 PROCEDURE — 97535 SELF CARE MNGMENT TRAINING: CPT

## 2017-09-08 PROCEDURE — 2060000000 HC ICU INTERMEDIATE R&B

## 2017-09-08 PROCEDURE — 6360000002 HC RX W HCPCS: Performed by: STUDENT IN AN ORGANIZED HEALTH CARE EDUCATION/TRAINING PROGRAM

## 2017-09-08 PROCEDURE — 6370000000 HC RX 637 (ALT 250 FOR IP): Performed by: STUDENT IN AN ORGANIZED HEALTH CARE EDUCATION/TRAINING PROGRAM

## 2017-09-08 PROCEDURE — 84520 ASSAY OF UREA NITROGEN: CPT

## 2017-09-08 PROCEDURE — 6370000000 HC RX 637 (ALT 250 FOR IP): Performed by: HOSPITALIST

## 2017-09-08 PROCEDURE — 85027 COMPLETE CBC AUTOMATED: CPT

## 2017-09-08 PROCEDURE — 6360000002 HC RX W HCPCS: Performed by: PODIATRIST

## 2017-09-08 PROCEDURE — 94770 HC ETCO2 MONITOR DAILY: CPT

## 2017-09-08 PROCEDURE — 36415 COLL VENOUS BLD VENIPUNCTURE: CPT

## 2017-09-08 PROCEDURE — 84295 ASSAY OF SERUM SODIUM: CPT

## 2017-09-08 PROCEDURE — 82947 ASSAY GLUCOSE BLOOD QUANT: CPT

## 2017-09-08 PROCEDURE — 2580000003 HC RX 258: Performed by: STUDENT IN AN ORGANIZED HEALTH CARE EDUCATION/TRAINING PROGRAM

## 2017-09-08 RX ORDER — FENTANYL CITRATE 50 UG/ML
25 INJECTION, SOLUTION INTRAMUSCULAR; INTRAVENOUS
Status: DISCONTINUED | OUTPATIENT
Start: 2017-09-08 | End: 2017-09-16

## 2017-09-08 RX ADMIN — LISINOPRIL 40 MG: 20 TABLET ORAL at 08:20

## 2017-09-08 RX ADMIN — OXYCODONE HYDROCHLORIDE AND ACETAMINOPHEN 1 TABLET: 5; 325 TABLET ORAL at 17:49

## 2017-09-08 RX ADMIN — HEPARIN SODIUM 5000 UNITS: 5000 INJECTION, SOLUTION INTRAVENOUS; SUBCUTANEOUS at 13:19

## 2017-09-08 RX ADMIN — LATANOPROST 1 DROP: 50 SOLUTION OPHTHALMIC at 20:50

## 2017-09-08 RX ADMIN — Medication 10 ML: at 19:45

## 2017-09-08 RX ADMIN — FOLIC ACID 1 MG: 1 TABLET ORAL at 08:19

## 2017-09-08 RX ADMIN — TIMOLOL MALEATE 1 DROP: 2.5 SOLUTION/ DROPS OPHTHALMIC at 13:19

## 2017-09-08 RX ADMIN — OXYCODONE HYDROCHLORIDE AND ACETAMINOPHEN 2 TABLET: 5; 325 TABLET ORAL at 05:51

## 2017-09-08 RX ADMIN — VANCOMYCIN HYDROCHLORIDE 1750 MG: 10 INJECTION, POWDER, LYOPHILIZED, FOR SOLUTION INTRAVENOUS at 01:21

## 2017-09-08 RX ADMIN — PIPERACILLIN AND TAZOBACTAM 3.38 G: 3; .375 INJECTION, POWDER, LYOPHILIZED, FOR SOLUTION INTRAVENOUS; PARENTERAL at 13:18

## 2017-09-08 RX ADMIN — METOPROLOL TARTRATE 50 MG: 50 TABLET, FILM COATED ORAL at 08:19

## 2017-09-08 RX ADMIN — PANTOPRAZOLE SODIUM 20 MG: 20 TABLET, DELAYED RELEASE ORAL at 08:19

## 2017-09-08 RX ADMIN — PRAVASTATIN SODIUM 10 MG: 20 TABLET ORAL at 08:24

## 2017-09-08 RX ADMIN — TAMSULOSIN HYDROCHLORIDE 0.4 MG: 0.4 CAPSULE ORAL at 08:20

## 2017-09-08 RX ADMIN — HEPARIN SODIUM 5000 UNITS: 5000 INJECTION, SOLUTION INTRAVENOUS; SUBCUTANEOUS at 22:24

## 2017-09-08 RX ADMIN — TIMOLOL MALEATE 1 DROP: 2.5 SOLUTION/ DROPS OPHTHALMIC at 20:50

## 2017-09-08 RX ADMIN — AMLODIPINE BESYLATE 10 MG: 10 TABLET ORAL at 08:19

## 2017-09-08 RX ADMIN — INSULIN LISPRO 2 UNITS: 100 INJECTION, SOLUTION INTRAVENOUS; SUBCUTANEOUS at 08:53

## 2017-09-08 RX ADMIN — PREGABALIN 25 MG: 25 CAPSULE ORAL at 08:19

## 2017-09-08 RX ADMIN — DAKIN'S SOLUTION 0.125% (QUARTER STRENGTH): 0.12 SOLUTION at 08:23

## 2017-09-08 RX ADMIN — IRON SUCROSE 100 MG: 20 INJECTION, SOLUTION INTRAVENOUS at 17:40

## 2017-09-08 RX ADMIN — PREGABALIN 25 MG: 25 CAPSULE ORAL at 20:50

## 2017-09-08 RX ADMIN — PANTOPRAZOLE SODIUM 20 MG: 20 TABLET, DELAYED RELEASE ORAL at 22:30

## 2017-09-08 RX ADMIN — HYDRALAZINE HYDROCHLORIDE 100 MG: 50 TABLET, FILM COATED ORAL at 20:49

## 2017-09-08 RX ADMIN — HYDRALAZINE HYDROCHLORIDE 100 MG: 50 TABLET, FILM COATED ORAL at 08:18

## 2017-09-08 RX ADMIN — HEPARIN SODIUM 5000 UNITS: 5000 INJECTION, SOLUTION INTRAVENOUS; SUBCUTANEOUS at 06:43

## 2017-09-08 RX ADMIN — Medication 1 MG: at 05:45

## 2017-09-08 RX ADMIN — METOPROLOL TARTRATE 50 MG: 50 TABLET, FILM COATED ORAL at 20:49

## 2017-09-08 RX ADMIN — ONDANSETRON 4 MG: 2 INJECTION INTRAMUSCULAR; INTRAVENOUS at 10:16

## 2017-09-08 RX ADMIN — PIPERACILLIN AND TAZOBACTAM 3.38 G: 3; .375 INJECTION, POWDER, LYOPHILIZED, FOR SOLUTION INTRAVENOUS; PARENTERAL at 04:16

## 2017-09-08 ASSESSMENT — PAIN SCALES - GENERAL
PAINLEVEL_OUTOF10: 7
PAINLEVEL_OUTOF10: 7
PAINLEVEL_OUTOF10: 5
PAINLEVEL_OUTOF10: 7
PAINLEVEL_OUTOF10: 0

## 2017-09-08 ASSESSMENT — PAIN DESCRIPTION - FREQUENCY: FREQUENCY: INTERMITTENT

## 2017-09-08 ASSESSMENT — PAIN DESCRIPTION - LOCATION
LOCATION: LEG
LOCATION: LEG
LOCATION: FOOT

## 2017-09-08 ASSESSMENT — PAIN DESCRIPTION - PAIN TYPE
TYPE: SURGICAL PAIN

## 2017-09-08 ASSESSMENT — PAIN DESCRIPTION - ORIENTATION
ORIENTATION: LEFT

## 2017-09-08 ASSESSMENT — PAIN DESCRIPTION - ONSET: ONSET: ON-GOING

## 2017-09-08 ASSESSMENT — PAIN DESCRIPTION - DESCRIPTORS: DESCRIPTORS: DISCOMFORT

## 2017-09-08 ASSESSMENT — PAIN DESCRIPTION - PROGRESSION
CLINICAL_PROGRESSION: NOT CHANGED
CLINICAL_PROGRESSION: NOT CHANGED

## 2017-09-08 NOTE — PROGRESS NOTES
Occupational Therapy  Facility/Department: [facility]  Daily Treatment Note  NAME: Josephine Leal  : 1965  MRN: [id number]    Date of Service: 2017    Patient Diagnosis(es):   Patient Active Problem List    Diagnosis Date Noted    Osteomyelitis of left foot ([address] 2017    Urinary retention 2017    Ischemic foot left 2017    Gangrene of foot ([address] 2017    Diabetic foot left 2017    Acute kidney injury ([address] 2017    Hyperlipidemia     Dysphagia 2013    Family history of colon cancer 2013    Bowel habit changes 2013    Uncontrolled hypertension     IDDM (insulin dependent diabetes mellitus) ([address]     Neuropathy (HCC)     Uncontrolled type 2 diabetes with cellulitis of foot ([address]     Cerebrovascular disease     Cataract      Past Medical History:   Diagnosis Date    Acid reflux     Cerebrovascular disease 2006    TIA    Glaucoma     Hyperlipidemia     Hypertension     IDDM (insulin dependent diabetes mellitus) (HCC)     MDRO (multiple drug resistant organisms) resistance     MRSA (methicillin resistant staph aureus) culture positive 2017    foot    Neuropathy ([address]     Osteomyelitis ([address]     Left Hallux    S/P cataract extraction and insertion of intraocular lens     bilateral     Type II or unspecified type diabetes mellitus without mention of complication, not stated as uncontrolled      Past Surgical History:   Procedure Laterality Date    EYE SURGERY Bilateral     lazer both eyes    FOOT SURGERY Left 2017    surgical prep of wound bed    FOOT SURGERY Left 2017    1) Left foot surgical preparation of wound-bed, 2) Left foot application of graft     HC  PICC [facility]  2017         OTHER SURGICAL HISTORY Left 2017    I and D bone, bone biopsy with flap closure and pulse lavage    OTHER SURGICAL HISTORY Left 2017    I & D foot    CO DEEP DISSEC FOOT INFEC,1 BURSA Left 2017 FOOT DEBRIDEMENT WITH EPIFIX  performed by Vinod Quintero DPM at [address] Left 2014    hallux    TOE AMPUTATION Left 12/09/2016    hallux    TOE AMPUTATION Left 4/27/2017    I AND D FOOT, BONE BIOPSY, POSSIBLE FILET 2ND TOE performed by Vinod Quintero DPM at [address] [facility]  Restrictions/Precautions  Restrictions/Precautions: Weight Bearing, Surgical Protocols, Fall Risk, Contact Precautions  Required Braces or Orthoses?: No  Lower Extremity Weight Bearing Restrictions  Left Lower Extremity Weight Bearing: Non Weight Bearing  Position Activity Restriction  Other position/activity restrictions: Up with assist, IV  Subjective   General  Patient assessed for rehabilitation services?: Yes  Family / Caregiver Present: No  Diagnosis: Diabetic L foot ulcer  Pain Assessment  Patient Currently in Pain: Yes  Pain Assessment: 0-10  Pain Level: 5  Pain Type: Surgical pain  Pain Location: Leg  Pain Orientation: Left  Clinical Progression: Not changed  Pain Intervention(s): RN notified  Response to Pain Intervention: Patient Satisfied   Objective    ADL  Feeding: Setup  Grooming: Stand by assistance;Setup (Seated EOB)  UE Bathing: Minimal assistance;Setup (Seated EOB, assist w/back)  LE Bathing: Stand by assistance;Setup (Laying in bed )  UE Dressing: Minimal assistance;Setup (w/gown)  LE Dressing: Stand by assistance;Minimal assistance;Setup (SBA w/footie, Min assist w/PJ bottoms)  Toileting: Setup;Supervision (Using urinal)     Balance  Sitting Balance: Stand by assistance  Standing Balance: Unable to assess (New BKA 9/7/17 pt declined to stand at this time)  Bed mobility  Bridging: Supervision  Rolling to Left: Supervision  Rolling to Right: Supervision  Supine to Sit: Stand by assistance  Sit to Supine: Stand by assistance  Scooting: Modified independent       Assessment   Performance deficits / Impairments: Decreased functional mobility ; Decreased balance;Decreased ADL status; Decreased high-level IADLs;Decreased endurance  Treatment Diagnosis: L BKA 9/7/17  Prognosis: Good  Patient Education: OT POC, Limb Desensitization  Discharge Recommendations: IP Rehab  REQUIRES OT FOLLOW UP: Yes  Activity Tolerance  Activity Tolerance: Patient Tolerated treatment well;Patient limited by pain  Safety Devices  Safety Devices in place: Yes  Type of devices: All fall risk precautions in place;Call light within reach; Left in bed;Nurse notified       Discharge Recommendations:  IP Rehab  In my professional opinion, this patient could tolerate a total of 3 hours of combined therapies at an acute rehab facility. Plan   Plan  Times per week: 4x  Current Treatment Recommendations: Balance Training, Endurance Training, Functional Mobility Training, Pain Management, Safety Education & Training, Home Management Training, Self-Care / ADL, Patient/Caregiver Education & Training, Equipment Evaluation, Education, & procurement    Goals  Short term goals  Time Frame for Short term goals: Pt will by discharge   Short term goal 1: demo supine<>sit transfer with mod I  Short term goal 2: demo sit<>stand transfer with least restrictive AE and SBA  Short term goal 3: demo standing during ADL completion for 6 min with least restrictive AE and supervision  Short term goal 4: demo good safety awareness during func mob around floor with RW, SBA, and maintaining WB restricitons  Short term goal 5: demo ADL UB/LB dressing/bathing activity seated with setup only     Therapy Time   Individual Concurrent Group Co-treatment   Time In  0855         Time Out 0955         Minutes              Pt in bed upon arrival. [facility] per RN for OT tx. Sup<>sit to eob SBA to complete bathing (using Hibiclens wash) and dressing (see above for LOF). Pt layed back in bed to remove PJ bottoms, footie and complete LB bathing/dressing. Pt declined to stand at this time d/t fatigue and wanting to wait for PT to go to chair.  Pt maintained sitting balance for approx 45 min without LOB. Pt retired back to supine in bed d/t fatigue and wanting to rest prior to PT arrival. Educ given on limb desensitization tech, call light and phone in reach. RN notified.     [address]

## 2017-09-08 NOTE — PLAN OF CARE
Problem: Nutrition  Goal: Optimal nutrition therapy  Outcome: Ongoing  Nutrition Problem: Increased nutrient needs  Intervention: Food and/or Nutrient Delivery: Start ONS, Modify current diet  Nutritional Goals: Intake of Valente twice daily

## 2017-09-08 NOTE — PROGRESS NOTES
[facility] -Progress Note  Today's Date and Time: 9/8/2017, 10:32 AM    Impression :   · Left diabetic foot gangrene , possible gas formation  · Osteo 3rd and 4th, 5th MTS heads with abscesses  · Calcaneal osteo  · Left foot abscess - MRSA - susceptible to vanc, clinda, tetracycline, tmp-smx, gentamycin  · S/p left leg guillotine amputation 9/7/2017  · Leukocytosis - improving  · Diabetes mellitus with neuropathy : uncontrolled  ·     Recommendations   · vanco, zosyn IV -  · Stop zosyn   · Watch creat and vanco trough under vanco , aim trough levels 14-17 mcg/ml  · Strict glycemic control  · Leukocytosis improving  · Might stop vanco over the weekend once the leukocytosis resolves, possible switch to doxy till closure  Chief complaint/reason for consultation:    Gangrene left foot. History of Present Illness:   INITIAL HISTORY:    Car Montoya is a 46 y.o.-male who was initially admitted on 8/31/2017. He presented to the hospital from Dr Leo Stevens office where he was found to have blackening of his left little toe with wounds over his foot . The patient had wounds on his left foot before which were healing and then he fell on Saturday which made the wounds worse. He has had amputation of his left toe before. No fever, chills, nausea or vomiting. MRI of left foot shows osteomyelitis of the 1st and 3rd metatarsal, calcaneus and also of the 5th metatarsals , diffuse infectious myositis is seen. Concern for soft tissue gas and necrotizing infection. abscess formation measures 1.4 x 1.7 x 1.9 cm.lateral aspect of the foot.     CURRENT EVALUATION  :09/08/17   Afebrile, HTN, VSS  He admits to an episode of nauseas w emesis x2 that occurred this morning; feels fine now  Dressing change per Vasc this AM, picture provided - they noted purulent drainage medial wound  No cultures were taken intra-op, just surgical path    vanco trough 15.2 on 9/3/17  Creat better 1.5-->1.27 --> TRICHOMONAS NOT REPORTED 09/03/2017    WBC 15.7 09/08/2017    YEAST NOT REPORTED 09/03/2017    TURBIDITY CLOUDY 09/03/2017       Lab Results   Component Value Date    CREATININE 0.83 09/08/2017    GLUCOSE 113 09/07/2017       . .................................................................................................................................. Murali Gonsalez MRI left foot  9/1/17  Ulceration at the medial forefoot with subjacent osteomyelitis of the   residual 1st metatarsal diaphysis. 2. Abscess formation surrounding the distal 5th metatarsal with probable   acute on chronic osteomyelitis of the entirety of the 5th metatarsal and   likely the proximal aspect of the proximal phalanx of the 5th digit. 3. Broad ulceration at the lateral aspect of the foot with subjacent abscess   formation inferolateral to the cuboid and probable septic calcaneocuboid   joint.  Region of suspected abscess formation measures 1.4 x 1.7 x 1.9 cm. Suspected osseous destruction and osteomyelitis of the 4th and 5th   tarsometatarsal joints. 4. Abscess formation or infectious tenosynovitis in association with the   distal aspect of the residual flexor tendon of the 2nd digit measuring 1.8 x   1.7 x 1.2 cm.   5. Signal changes and slight fragmentation of the distal 3rd metatarsal head   can be seen with sequela of Freiberg's infraction or osteomyelitis.  Probable   reactive osteitis versus early osteomyelitis of the remainder of the 3rd and   4th metatarsals and proximal aspect of the proximal phalanx of the 3rd digit. 6. Diffuse infectious myositis.  Multifocal susceptibility artifact   throughout the foot but primarily in the midfoot is concerning for multifocal   soft tissue gas or possibly necrotizing infection. 7. Mild diffuse cellulitis of the visualized foot. 8. Prior amputation of the 1st and 2nd digits at the level of the metatarsal   diaphysis.    9. Suspected osteomyelitis of the anterolateral margin of the calcaneus nitrofurantoin  NOT REPORTED Final     oxacillin Resistant >=4 RESISTANT Final     penicillin Resistant >=0.5 RESISTANT Final     rifampin  NOT REPORTED Final     tetracycline Sensitive 2 SUSCEPTIBLE Final     tigecycline  NOT REPORTED Final     trimethoprim-sulfamethoxazole Sensitive <=10 SUSCEPTIBLE Final     vancomycin Sensitive 1 SUSCEPTIBLE Final            [facility]  Podiatric Medicine DPM PGY1  [address]        I have examined the patient, reviewed the patient's medical history in detail and updated the computerized patient record. Above exam and data confirmed.   Colleen Chavira, Infectious Diseases

## 2017-09-08 NOTE — PROGRESS NOTES
Resp 16  Ht 5' 6.93\" (1.7 m)  Wt 200 lb 9.9 oz (91 kg)  SpO2 97%  BMI 31.49 kg/m2    Last Body weight:   Wt Readings from Last 3 Encounters:   09/08/17 200 lb 9.9 oz (91 kg)   04/27/17 204 lb 2.3 oz (92.6 kg)   04/13/17 202 lb 9.6 oz (91.9 kg)       Intake and Output summary:     Intake/Output Summary (Last 24 hours) at 09/08/17 0830  Last data filed at 09/08/17 0600   Gross per 24 hour   Intake          1401.04 ml   Output             1875 ml   Net          -473.96 ml         PHYSICAL EXAMINATION :    General appearance - alert, well appearing, and in no distress and oriented to person, place, and time  Mental status - alert, oriented to person, place, and time, normal mood, behavior, speech, dress, motor activity, and thought processes  Eyes - pupils equal and reactive   Ears - hearing grossly normal bilaterally  Nose - normal and patent  Mouth - mucous membranes moist  Neck - supple, no significant adenopathy  Chest - clear to auscultation, no wheezes  Heart - normal rate, regular rhythm, normal S1, S2, no murmurs  Abdomen - soft, nontender, nondistended, no masses or organomegaly, bowel sounds normal, no abdominal bruits, no pulsatile masses  Neurological - alert, oriented, normal speech, gait not assessed  Extremities - left BKA, dressing in place  Skin - small hematoma on his belly at the site of heparin pokes, dressing of left foot intact.        Medications:    Scheduled Meds:   insulin glargine  20 Units Subcutaneous Nightly    iron sucrose  100 mg Intravenous Q24H    insulin lispro  0-12 Units Subcutaneous TID WC    insulin lispro  0-6 Units Subcutaneous Nightly    piperacillin-tazobactam  3.375 g Intravenous P4C    folic acid  1 mg Oral Daily    tamsulosin  0.4 mg Oral Daily    sodium hypochlorite   Irrigation Daily    sodium chloride flush  10 mL Intravenous 2 times per day    amLODIPine  10 mg Oral Daily    hydrALAZINE  100 mg Oral TID    lisinopril  40 mg Oral Daily    metoprolol diabetes mellitus) ([facility] [address]     Neuropathy ([facility] [address]     Uncontrolled type 2 diabetes with cellulitis of foot ([facility] [address]     Cerebrovascular disease     Cataract     Dysphagia     Family history of colon cancer     Bowel habit changes     Hyperlipidemia     Gangrene of foot ([facility] [address]     Diabetic foot left     Acute kidney injury ([facility] [address]     Ischemic foot left     Urinary retention      PLAN:           - POD #1 s/p guillotine amputation left foot, podiatry and vascular following    - Blood glucose control w/ Lantus 20U, continue to monitor   - Home blood pressure medications resumed, continue to monitor BPs    - Monitor serum sodium q12hrs (Na 129>135>132), laboratory data not suggestive of SIADH   - Monitor BUN:Cr, creatinine improving (Cr 1.92>>>1.13>0.83)   - ID consulted: continue IV antibiotics, currently vanc/zosyn    - Urology consulted for urinary retention: continue flomax, supportive care, continue timed double voids, PVRS, CIC for PVR>400 or patient discomfort, f/u outpatient urology 1-2 months    - Diet: carb control    - DVT prophylaxis: heparin    - Ulcer prophylaxis: protonix       [facility] ([facility])             9/8/2017, 8:30 AM

## 2017-09-08 NOTE — PROGRESS NOTES
Vascular Surgery Progress Note         PATIENT NAME: Demarcus Noel     TODAY'S DATE: 9/8/2017, 6:34 AM    CC:    SUBJECTIVE:    Pt seen and examined. No complaints over night. Pain well controlled with PCA pump. Denies any f/c, n/v, sob or chest pain. OBJECTIVE:   Vitals:  BP (!) 155/63  Pulse 65  Temp 97.7 °F (36.5 °C) (Oral)   Resp 16  Ht 5' 6.93\" (1.7 m)  Wt 200 lb 9.9 oz (91 kg)  SpO2 96%  BMI 31.49 kg/m2     INTAKE/OUTPUT:      Intake/Output Summary (Last 24 hours) at 09/08/17 0634  Last data filed at 09/08/17 0600   Gross per 24 hour   Intake          1401.04 ml   Output             1875 ml   Net          -473.96 ml                 GENERAL: AOx3, NAD  HEENT: EOMI bilaterally  CARDIAC: Regular rate and rhythm. ABDOMEN: Soft, NT, ND, Active Bowel Sounds  EXTREMITY: moves all extremities, no edema. LLE dressing replaced   NEURO: Gross motor intact. INCISION: Dressing clean, dry, intact.  Replaced dressing, wet to dry, wrapped with ace and coban, will do dressing changes tomorrow, purulent drainage still noted from medial portion of wound               Data:  CBC with Differential:    Lab Results   Component Value Date    WBC 15.7 09/08/2017    RBC 3.08 09/08/2017    HGB 7.9 09/08/2017    HCT 24.7 09/08/2017     09/08/2017    MCV 80.1 09/08/2017    MCH 25.8 09/08/2017    MCHC 32.2 09/08/2017    RDW 17.8 09/08/2017    LYMPHOPCT 4 09/01/2017    MONOPCT 6 09/01/2017    BASOPCT 0 09/01/2017    MONOSABS 1.65 09/01/2017    LYMPHSABS 1.10 09/01/2017    EOSABS 0.00 09/01/2017    BASOSABS 0.00 09/01/2017    DIFFTYPE NOT REPORTED 09/01/2017     CMP:    Lab Results   Component Value Date     09/07/2017    K 4.1 09/07/2017     09/07/2017    CO2 19 09/07/2017    BUN 12 09/08/2017    CREATININE 0.83 09/08/2017    GFRAA >60 09/08/2017    LABGLOM >60 09/08/2017    GLUCOSE 113 09/07/2017    LABALBU 4.0 07/13/2012    CALCIUM 7.9 09/07/2017    BILITOT 0.20 07/13/2012    ALKPHOS 76 07/13/2012    AST 18 07/13/2012    ALT 22 07/13/2012     PT/INR:  No results found for: PROTIME, INR  HgBA1c:  No components found for: HGBA1C      ASSESSMENT   1. POD#1 left BKA guillotine amputation 2/2 gangrenous left foot    PLAN  1. Dressing changed this am, Vascular team will do dressing changes in am  2. Tolerating diet  3. Planned for staged revision on 9/12  4. Continue Abx as scheduled   5. ID- recommendations   6. IM- medical management and recommendations   7.  PCA pump d/c and transition to PO pain medicine with minimal IV use     Electronically signed by Sung Rendon DO  on 9/8/2017 at 6:34 AM

## 2017-09-08 NOTE — OP NOTE
patient and informed consent has been obtained. All questions were answered appropriately. Description of procedure:  Patient was wheeled to the OR suite and was positioned on the OR table in supine position. General anesthesia was started per anesthesia. Patient was already on IV Zosyn and Vanco.  Time out was called. Left lower extremity was prepped and draped in sterile fashion. Incision was made over the skin circumferentially with #10 blade just superior to lateral and medial malleolus. It was carried down to subcutaneous tissue and muscle down to tibia and fibula. Gigli saw used to divide tibia and fibula. Specimen was sent to pathology. AT, and peroneal arteries were suture ligated using 2-0 silk in figure of eight fashion. All other bleeding vessels were suture ligated in same fashion and using electro cautery. About 10 cm superior tunneling was noted posterior to tibia that contained pus. The tunneling was irrigated using NS. Penrose drain was placed within this tunneling. With excellent hemostasis confirmed, wet to dry dressing using 4x4 fluffs and NS was placed over the guillotine BKA stump site followed by 4x4 dry fluffs, and abd pad. Kerlix wrap was then used followed by ace band and co-band. All instrument, needle, and sponge counts were correct. Patient tolerated the procedure well without any complications. Patient was extubated and transferred to ICU unit in stable condition.       Dr. Shaista Ramos was present for the entirety of case.       Homero Holliday DO  Date: 9/8/2017  Time: 9:03 AM

## 2017-09-08 NOTE — PLAN OF CARE
Problem: Pain:  Goal: Pain level will decrease  Pain level will decrease to a 3 on a scale of 0-10 per pt. goal   Outcome: Ongoing  Goal: Control of acute pain  Control of acute pain   Outcome: Ongoing  Goal: Control of chronic pain  Control of chronic pain   Outcome: Ongoing    Problem: Falls - Risk of  Goal: Absence of falls  Outcome: Ongoing    Problem: Skin Integrity:  Goal: Will show no infection signs and symptoms  Will show no infection signs and symptoms   Outcome: Ongoing  Goal: Absence of new skin breakdown  Absence of new skin breakdown   Outcome: Ongoing

## 2017-09-08 NOTE — PROGRESS NOTES
Dr. Phu Saini and Camino Picking with Vascular at the bedside and change LLE surgical site dressing, wet to dry dressing change wrapped with dry dressing,  patient premedicated prior to dressing change and tolerated well, new orders received to stop fentanyl PCA

## 2017-09-09 PROBLEM — M86.272 SUBACUTE OSTEOMYELITIS OF LEFT FOOT (HCC): Status: ACTIVE | Noted: 2017-09-06

## 2017-09-09 LAB
BUN BLDV-MCNC: 11 MG/DL (ref 6–20)
CREAT SERPL-MCNC: 0.74 MG/DL (ref 0.7–1.2)
GFR AFRICAN AMERICAN: >60 ML/MIN
GFR NON-AFRICAN AMERICAN: >60 ML/MIN
GFR SERPL CREATININE-BSD FRML MDRD: NORMAL ML/MIN/{1.73_M2}
GFR SERPL CREATININE-BSD FRML MDRD: NORMAL ML/MIN/{1.73_M2}
GLUCOSE BLD-MCNC: 145 MG/DL (ref 75–110)
GLUCOSE BLD-MCNC: 149 MG/DL (ref 75–110)
GLUCOSE BLD-MCNC: 154 MG/DL (ref 75–110)
GLUCOSE BLD-MCNC: 159 MG/DL (ref 75–110)
GLUCOSE BLD-MCNC: 185 MG/DL (ref 75–110)
HCT VFR BLD CALC: 24.9 % (ref 41–53)
HEMOGLOBIN: 8.1 G/DL (ref 13.5–17.5)
MCH RBC QN AUTO: 26.2 PG (ref 26–34)
MCHC RBC AUTO-ENTMCNC: 32.5 G/DL (ref 31–37)
MCV RBC AUTO: 80.6 FL (ref 80–100)
PDW BLD-RTO: 18.2 % (ref 12.5–15.4)
PLATELET # BLD: 433 K/UL (ref 140–450)
PMV BLD AUTO: 9.3 FL (ref 6–12)
RBC # BLD: 3.09 M/UL (ref 4.5–5.9)
SODIUM BLD-SCNC: 136 MMOL/L (ref 135–144)
SODIUM BLD-SCNC: 137 MMOL/L (ref 135–144)
WBC # BLD: 12.6 K/UL (ref 3.5–11)

## 2017-09-09 PROCEDURE — 6370000000 HC RX 637 (ALT 250 FOR IP): Performed by: HOSPITALIST

## 2017-09-09 PROCEDURE — 6370000000 HC RX 637 (ALT 250 FOR IP): Performed by: STUDENT IN AN ORGANIZED HEALTH CARE EDUCATION/TRAINING PROGRAM

## 2017-09-09 PROCEDURE — 36415 COLL VENOUS BLD VENIPUNCTURE: CPT

## 2017-09-09 PROCEDURE — 84295 ASSAY OF SERUM SODIUM: CPT

## 2017-09-09 PROCEDURE — 2060000000 HC ICU INTERMEDIATE R&B

## 2017-09-09 PROCEDURE — 6360000002 HC RX W HCPCS: Performed by: STUDENT IN AN ORGANIZED HEALTH CARE EDUCATION/TRAINING PROGRAM

## 2017-09-09 PROCEDURE — 2580000003 HC RX 258: Performed by: PODIATRIST

## 2017-09-09 PROCEDURE — 84520 ASSAY OF UREA NITROGEN: CPT

## 2017-09-09 PROCEDURE — 85027 COMPLETE CBC AUTOMATED: CPT

## 2017-09-09 PROCEDURE — 82565 ASSAY OF CREATININE: CPT

## 2017-09-09 PROCEDURE — 99232 SBSQ HOSP IP/OBS MODERATE 35: CPT | Performed by: INTERNAL MEDICINE

## 2017-09-09 PROCEDURE — 6370000000 HC RX 637 (ALT 250 FOR IP): Performed by: PODIATRIST

## 2017-09-09 PROCEDURE — 6360000002 HC RX W HCPCS: Performed by: PODIATRIST

## 2017-09-09 PROCEDURE — 2500000003 HC RX 250 WO HCPCS: Performed by: HOSPITALIST

## 2017-09-09 PROCEDURE — 82947 ASSAY GLUCOSE BLOOD QUANT: CPT

## 2017-09-09 RX ORDER — LABETALOL HYDROCHLORIDE 5 MG/ML
10 INJECTION, SOLUTION INTRAVENOUS ONCE
Status: COMPLETED | OUTPATIENT
Start: 2017-09-09 | End: 2017-09-09

## 2017-09-09 RX ORDER — SODIUM HYPOCHLORITE 1.25 MG/ML
SOLUTION TOPICAL DAILY
Status: DISCONTINUED | OUTPATIENT
Start: 2017-09-09 | End: 2017-09-16 | Stop reason: HOSPADM

## 2017-09-09 RX ORDER — INSULIN GLARGINE 100 [IU]/ML
5 INJECTION, SOLUTION SUBCUTANEOUS ONCE
Status: COMPLETED | OUTPATIENT
Start: 2017-09-09 | End: 2017-09-09

## 2017-09-09 RX ADMIN — TAMSULOSIN HYDROCHLORIDE 0.4 MG: 0.4 CAPSULE ORAL at 07:59

## 2017-09-09 RX ADMIN — LISINOPRIL 40 MG: 20 TABLET ORAL at 07:59

## 2017-09-09 RX ADMIN — TIMOLOL MALEATE 1 DROP: 2.5 SOLUTION/ DROPS OPHTHALMIC at 20:33

## 2017-09-09 RX ADMIN — HYDRALAZINE HYDROCHLORIDE 100 MG: 50 TABLET, FILM COATED ORAL at 14:05

## 2017-09-09 RX ADMIN — AMLODIPINE BESYLATE 10 MG: 10 TABLET ORAL at 07:59

## 2017-09-09 RX ADMIN — PRAVASTATIN SODIUM 10 MG: 20 TABLET ORAL at 07:59

## 2017-09-09 RX ADMIN — PANTOPRAZOLE SODIUM 20 MG: 20 TABLET, DELAYED RELEASE ORAL at 07:58

## 2017-09-09 RX ADMIN — PREGABALIN 25 MG: 25 CAPSULE ORAL at 20:30

## 2017-09-09 RX ADMIN — PANTOPRAZOLE SODIUM 20 MG: 20 TABLET, DELAYED RELEASE ORAL at 20:31

## 2017-09-09 RX ADMIN — OXYCODONE HYDROCHLORIDE AND ACETAMINOPHEN 2 TABLET: 5; 325 TABLET ORAL at 14:05

## 2017-09-09 RX ADMIN — METOPROLOL TARTRATE 50 MG: 50 TABLET, FILM COATED ORAL at 20:30

## 2017-09-09 RX ADMIN — HEPARIN SODIUM 5000 UNITS: 5000 INJECTION, SOLUTION INTRAVENOUS; SUBCUTANEOUS at 06:19

## 2017-09-09 RX ADMIN — VANCOMYCIN HYDROCHLORIDE 1750 MG: 10 INJECTION, POWDER, LYOPHILIZED, FOR SOLUTION INTRAVENOUS at 00:30

## 2017-09-09 RX ADMIN — Medication 10 ML: at 08:01

## 2017-09-09 RX ADMIN — LABETALOL HYDROCHLORIDE 10 MG: 5 INJECTION, SOLUTION INTRAVENOUS at 08:53

## 2017-09-09 RX ADMIN — INSULIN LISPRO 1 UNITS: 100 INJECTION, SOLUTION INTRAVENOUS; SUBCUTANEOUS at 18:18

## 2017-09-09 RX ADMIN — INSULIN LISPRO 1 UNITS: 100 INJECTION, SOLUTION INTRAVENOUS; SUBCUTANEOUS at 08:18

## 2017-09-09 RX ADMIN — HEPARIN SODIUM 5000 UNITS: 5000 INJECTION, SOLUTION INTRAVENOUS; SUBCUTANEOUS at 14:05

## 2017-09-09 RX ADMIN — INSULIN GLARGINE 5 UNITS: 100 INJECTION, SOLUTION SUBCUTANEOUS at 08:18

## 2017-09-09 RX ADMIN — LATANOPROST 1 DROP: 50 SOLUTION OPHTHALMIC at 20:30

## 2017-09-09 RX ADMIN — INSULIN LISPRO 1 UNITS: 100 INJECTION, SOLUTION INTRAVENOUS; SUBCUTANEOUS at 20:40

## 2017-09-09 RX ADMIN — HYDRALAZINE HYDROCHLORIDE 100 MG: 50 TABLET, FILM COATED ORAL at 07:59

## 2017-09-09 RX ADMIN — OXYCODONE HYDROCHLORIDE AND ACETAMINOPHEN 2 TABLET: 5; 325 TABLET ORAL at 18:52

## 2017-09-09 RX ADMIN — FENTANYL CITRATE 25 MCG: 50 INJECTION INTRAMUSCULAR; INTRAVENOUS at 06:10

## 2017-09-09 RX ADMIN — HYDRALAZINE HYDROCHLORIDE 100 MG: 50 TABLET, FILM COATED ORAL at 20:31

## 2017-09-09 RX ADMIN — HEPARIN SODIUM 5000 UNITS: 5000 INJECTION, SOLUTION INTRAVENOUS; SUBCUTANEOUS at 20:41

## 2017-09-09 RX ADMIN — PREGABALIN 25 MG: 25 CAPSULE ORAL at 14:05

## 2017-09-09 RX ADMIN — FOLIC ACID 1 MG: 1 TABLET ORAL at 07:59

## 2017-09-09 RX ADMIN — INSULIN LISPRO 1 UNITS: 100 INJECTION, SOLUTION INTRAVENOUS; SUBCUTANEOUS at 12:25

## 2017-09-09 RX ADMIN — METOPROLOL TARTRATE 50 MG: 50 TABLET, FILM COATED ORAL at 07:59

## 2017-09-09 RX ADMIN — PREGABALIN 25 MG: 25 CAPSULE ORAL at 07:59

## 2017-09-09 RX ADMIN — OXYCODONE HYDROCHLORIDE AND ACETAMINOPHEN 2 TABLET: 5; 325 TABLET ORAL at 09:15

## 2017-09-09 RX ADMIN — Medication 10 ML: at 20:32

## 2017-09-09 RX ADMIN — TIMOLOL MALEATE 1 DROP: 2.5 SOLUTION/ DROPS OPHTHALMIC at 08:00

## 2017-09-09 ASSESSMENT — PAIN SCALES - GENERAL
PAINLEVEL_OUTOF10: 7
PAINLEVEL_OUTOF10: 0
PAINLEVEL_OUTOF10: 0
PAINLEVEL_OUTOF10: 7
PAINLEVEL_OUTOF10: 0

## 2017-09-09 NOTE — PROGRESS NOTES
146/58 98 °F (36.7 °C) Oral 62 17 94 %   17 1000 (!) 158/59 - - 65 21 95 %   17 0945 (!) 160/62 - - 68 26 -   17 0905 - - - - - 93 %   17 0900 - - - - - 92 %   17 0850 (!) 182/49 - - 76 22 -   17 0800 - - - - - 95 %   17 0730 (!) 175/63 97.9 °F (36.6 °C) Oral 77 16 95 %     Average, Min, and Max for last 24 hours Vitals:  TEMPERATURE:  Temp  Av °F (36.7 °C)  Min: 97.9 °F (36.6 °C)  Max: 98.4 °F (36.9 °C)    RESPIRATIONS RANGE: Resp  Av.2  Min: 9  Max: 26    PULSE RANGE: Pulse  Av.4  Min: 62  Max: 77    BLOOD PRESSURE RANGE:  Systolic (79DGO), IYC:704 , Min:146 , CDC:399   ; Diastolic (00SLP), VBX:33, Min:44, Max:67      PULSE OXIMETRY RANGE: SpO2  Av %  Min: 92 %  Max: 98 %    I/O last 3 completed shifts: In: 0952 [P.O.:250; I.V.:771]  Out: 1025 [Urine:1025]    CBC:  Recent Labs      17   0523  17   0503  17   0529   WBC  17.9*  15.7*  12.6*   HGB  8.0*  7.9*  8.1*   HCT  24.3*  24.7*  24.9*   PLT  394  430  433        BMP:  Recent Labs      17   0523   17   0503  17   0529  17   0840   NA  131*   < >  133*  135   --   137   K  4.1   --    --    --    --    --    CL  100   --    --    --    --    --    CO2  19*   --    --    --    --    --    BUN  20   --   12   --   11   --    CREATININE  0.93   --   0.83   --   0.74   --    GLUCOSE  113*   --    --    --    --    --    CALCIUM  7.9*   --    --    --    --    --     < > = values in this interval not displayed. Coags:  No results for input(s): APTT, PROT, INR in the last 72 hours. Physical Exam:   General: Awake, alert, and oriented to person, place, and time. No acute distress. Heart: RRR, normal S1/S2  Lungs: CTAB, no increased effort, equal air entry   Abdomen: Soft, non-tender to palpation. Not distended. +Bowel sounds in all 4 quadrants. Lower Extremity Physical Exam:  Dressing left intact to left leg.  No strike through

## 2017-09-09 NOTE — PLAN OF CARE
Problem: Falls - Risk of  Goal: Absence of falls  Outcome: Ongoing  Bed lock and in lowest position, side rails up x 2, room free from clutter, call light within reach

## 2017-09-09 NOTE — PROGRESS NOTES
BP Temp Temp src Pulse Resp SpO2   17 1000 (!) 158/59 - - 65 21 95 %   17 0945 (!) 160/62 - - 68 26 -   17 0905 - - - - - 93 %   17 0900 - - - - - 92 %   17 0850 (!) 182/49 - - 76 22 -   17 0800 - - - - - 95 %   17 0730 (!) 175/63 97.9 °F (36.6 °C) Oral 77 16 95 %   17 0429 (!) 168/61 98.4 °F (36.9 °C) Axillary 70 13 95 %     Temp (24hrs), Av °F (36.7 °C), Min:97.9 °F (36.6 °C), Max:98.4 °F (36.9 °C)    General Appearance: Awake, alert, and in no apparent distress  Eyes: Sclera anicteric; conjunctivae pink. ENT:Oropharynx clear, without erythema, exudate, or thrush. No nasal drainage. Neck: Supple  Pulmonary/Chest: Clear to auscultation, without wheezes, rales, or rhonchi. No areas of consolidation. Good air movement bilaterally. No egophony. Cardiovascular:Regular rate and rhythm without murmurs, rubs, or gallops. S1 and S2 normal.  Abdomen: Soft, non tender. Bowel sounds normal.   Extremities: left LE wound bandage is clean, dry and intact. Neurologic: No gross sensory or motor deficits. Skin: No rash or lesions. IV site inspected: no inflammation. Medical Decision Making:   I have independently reviewed/ordered the following labs:    CBC with Differential: Recent Labs      17   0503  17   0529   WBC  15.7*  12.6*   HGB  7.9*  8.1*   HCT  24.7*  24.9*   PLT  430  433     BMP: Recent Labs      17   0523   17   0503  17   0529  17   0840   NA  131*   < >  133*  135   --   137   K  4.1   --    --    --    --    --    CL  100   --    --    --    --    --    CO2  19*   --    --    --    --    --    BUN  20   --   12   --   11   --    CREATININE  0.93   --   0.83   --   0.74   --     < > = values in this interval not displayed.        Lab Results   Component Value Date    [facility] 15.2 2017        Medications:      insulin lispro  0-6 Units Subcutaneous TID     insulin lispro  0-3 Units

## 2017-09-09 NOTE — PROGRESS NOTES
AST 18 07/13/2012    ALT 22 07/13/2012     PT/INR:  No results found for: PROTIME, INR  HgBA1c:  No components found for: HGBA1C      ASSESSMENT   1. 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 Tolerating diet. Encourage eating. 2. Pain control with Norco and Fentanyl PRN. 3. IV Zosyn d/cd per ID. On IV Vanco.  F/u ID recs. 4. Continue medical management per primary. 5. Planned for L BKA revision on 9/12 at 10 AM.  This procedure, including risks, benefits, alternatives and complications have been fully reviewed with patient and informed consent has been obtained. All questions were answered appropriately.     Electronically signed by Luis Fernando Harmon DO  on 9/9/2017 at 6:37 AM

## 2017-09-09 NOTE — PROGRESS NOTES
SpO2 95%  BMI 31.49 kg/m2    Last Body weight:   Wt Readings from Last 3 Encounters:   09/08/17 200 lb 9.9 oz (91 kg)   04/27/17 204 lb 2.3 oz (92.6 kg)   04/13/17 202 lb 9.6 oz (91.9 kg)       Intake and Output summary:     Intake/Output Summary (Last 24 hours) at 09/09/17 0741  Last data filed at 09/09/17 0300   Gross per 24 hour   Intake             1021 ml   Output             1025 ml   Net               -4 ml         PHYSICAL EXAMINATION :    General appearance - alert, well appearing, and in no distress and oriented to person, place, and time  Mental status - alert, oriented to person, place, and time, normal mood, behavior, speech, dress, motor activity, and thought processes  Eyes - pupils equal and reactive   Ears - hearing grossly normal bilaterally  Nose - normal and patent  Mouth - mucous membranes moist  Neck - supple, no significant adenopathy  Chest - clear to auscultation, no wheezes  Heart - normal rate, regular rhythm, normal S1, S2, no murmurs  Abdomen - soft, nontender, nondistended, no masses or organomegaly, bowel sounds normal, no abdominal bruits, no pulsatile masses  Neurological - alert, oriented, normal speech, gait not assessed  Extremities - left BKA, dressing in place  Skin - small hematoma on his belly at the site of heparin pokes, dressing of left foot intact.        Medications:    Scheduled Meds:   insulin glargine  5 Units Subcutaneous Once    insulin lispro  0-6 Units Subcutaneous TID WC    insulin lispro  0-3 Units Subcutaneous Nightly    sodium hypochlorite   Irrigation Daily    labetalol  10 mg Intravenous Once    folic acid  1 mg Oral Daily    tamsulosin  0.4 mg Oral Daily    sodium chloride flush  10 mL Intravenous 2 times per day    amLODIPine  10 mg Oral Daily    hydrALAZINE  100 mg Oral TID    lisinopril  40 mg Oral Daily    metoprolol tartrate  50 mg Oral BID    pravastatin  10 mg Oral Daily    timolol  1 drop Both Eyes BID    latanoprost  1 drop Both Eyes diabetes with cellulitis of foot ([facility] [address]     Cerebrovascular disease     Cataract     Dysphagia     Family history of colon cancer     Bowel habit changes     Hyperlipidemia     Gangrene of foot ([facility] [address]     Diabetic foot left     Acute kidney injury ([facility] [address]     Ischemic foot left     Urinary retention      PLAN:           - POD #2 s/p guillotine amputation left foot, podiatry and vascular following    - Vascular consulted: plans for L BKA revision on 9/12/17 @ 10AM   - Blood glucose control w/ low dose ISS, continue to monitor   - Home blood pressure medications resumed, continue to monitor BPs    - Monitor BUN:Cr, creatinine improving (Cr 1.92>>>1.13>0.74)   - Hgb improving (7.7>7.8>8.1)   - ID consulted: continue IV antibiotics, previously IV vanc/zosyn, D/Cd zosyn; possibly stop vanc and switch to doxy as leukocytosis improves     - Urology consulted for urinary retention: continue flomax, supportive care, continue timed double voids, PVRS, CIC for PVR>400 or patient discomfort, f/u outpatient urology 1-2 months    - Diet: carb control    - DVT prophylaxis: heparin    - Ulcer prophylaxis: protonix    - Discharge planning, inpatient rehab       Jesse Coughlin D.O.   [facility] ([facility])             9/9/2017, 7:41 AM

## 2017-09-10 LAB
BUN BLDV-MCNC: 14 MG/DL (ref 6–20)
CREAT SERPL-MCNC: 0.87 MG/DL (ref 0.7–1.2)
GFR AFRICAN AMERICAN: >60 ML/MIN
GFR NON-AFRICAN AMERICAN: >60 ML/MIN
GFR SERPL CREATININE-BSD FRML MDRD: NORMAL ML/MIN/{1.73_M2}
GFR SERPL CREATININE-BSD FRML MDRD: NORMAL ML/MIN/{1.73_M2}
GLUCOSE BLD-MCNC: 159 MG/DL (ref 75–110)
GLUCOSE BLD-MCNC: 189 MG/DL (ref 75–110)
GLUCOSE BLD-MCNC: 292 MG/DL (ref 75–110)
HCT VFR BLD CALC: 24.1 % (ref 41–53)
HEMOGLOBIN: 7.7 G/DL (ref 13.5–17.5)
MCH RBC QN AUTO: 25.9 PG (ref 26–34)
MCHC RBC AUTO-ENTMCNC: 32 G/DL (ref 31–37)
MCV RBC AUTO: 80.7 FL (ref 80–100)
PDW BLD-RTO: 18.9 % (ref 12.5–15.4)
PLATELET # BLD: 429 K/UL (ref 140–450)
PMV BLD AUTO: 9.5 FL (ref 6–12)
RBC # BLD: 2.98 M/UL (ref 4.5–5.9)
SODIUM BLD-SCNC: 136 MMOL/L (ref 135–144)
WBC # BLD: 11.4 K/UL (ref 3.5–11)

## 2017-09-10 PROCEDURE — 6370000000 HC RX 637 (ALT 250 FOR IP): Performed by: PODIATRIST

## 2017-09-10 PROCEDURE — 36415 COLL VENOUS BLD VENIPUNCTURE: CPT

## 2017-09-10 PROCEDURE — 6360000002 HC RX W HCPCS: Performed by: STUDENT IN AN ORGANIZED HEALTH CARE EDUCATION/TRAINING PROGRAM

## 2017-09-10 PROCEDURE — 6370000000 HC RX 637 (ALT 250 FOR IP): Performed by: SURGERY

## 2017-09-10 PROCEDURE — 6370000000 HC RX 637 (ALT 250 FOR IP): Performed by: STUDENT IN AN ORGANIZED HEALTH CARE EDUCATION/TRAINING PROGRAM

## 2017-09-10 PROCEDURE — 2060000000 HC ICU INTERMEDIATE R&B

## 2017-09-10 PROCEDURE — 2580000003 HC RX 258: Performed by: PODIATRIST

## 2017-09-10 PROCEDURE — 6360000002 HC RX W HCPCS: Performed by: SURGERY

## 2017-09-10 PROCEDURE — 99232 SBSQ HOSP IP/OBS MODERATE 35: CPT | Performed by: INTERNAL MEDICINE

## 2017-09-10 PROCEDURE — 82947 ASSAY GLUCOSE BLOOD QUANT: CPT

## 2017-09-10 PROCEDURE — 6370000000 HC RX 637 (ALT 250 FOR IP): Performed by: HOSPITALIST

## 2017-09-10 PROCEDURE — 94640 AIRWAY INHALATION TREATMENT: CPT

## 2017-09-10 PROCEDURE — 6360000002 HC RX W HCPCS: Performed by: PODIATRIST

## 2017-09-10 PROCEDURE — 84295 ASSAY OF SERUM SODIUM: CPT

## 2017-09-10 PROCEDURE — 84520 ASSAY OF UREA NITROGEN: CPT

## 2017-09-10 PROCEDURE — 94664 DEMO&/EVAL PT USE INHALER: CPT

## 2017-09-10 PROCEDURE — 85027 COMPLETE CBC AUTOMATED: CPT

## 2017-09-10 PROCEDURE — 82565 ASSAY OF CREATININE: CPT

## 2017-09-10 RX ORDER — INSULIN GLARGINE 100 [IU]/ML
5 INJECTION, SOLUTION SUBCUTANEOUS NIGHTLY
Status: DISCONTINUED | OUTPATIENT
Start: 2017-09-10 | End: 2017-09-15

## 2017-09-10 RX ORDER — FENTANYL CITRATE 50 UG/ML
50 INJECTION, SOLUTION INTRAMUSCULAR; INTRAVENOUS ONCE
Status: COMPLETED | OUTPATIENT
Start: 2017-09-10 | End: 2017-09-10

## 2017-09-10 RX ORDER — ALBUTEROL SULFATE 2.5 MG/3ML
2.5 SOLUTION RESPIRATORY (INHALATION) EVERY 6 HOURS PRN
Status: DISCONTINUED | OUTPATIENT
Start: 2017-09-10 | End: 2017-09-16 | Stop reason: HOSPADM

## 2017-09-10 RX ORDER — HYDRALAZINE HYDROCHLORIDE 20 MG/ML
10 INJECTION INTRAMUSCULAR; INTRAVENOUS ONCE
Status: COMPLETED | OUTPATIENT
Start: 2017-09-10 | End: 2017-09-10

## 2017-09-10 RX ADMIN — HYDRALAZINE HYDROCHLORIDE 10 MG: 20 INJECTION INTRAMUSCULAR; INTRAVENOUS at 03:48

## 2017-09-10 RX ADMIN — LISINOPRIL 40 MG: 20 TABLET ORAL at 08:18

## 2017-09-10 RX ADMIN — HYDRALAZINE HYDROCHLORIDE 100 MG: 50 TABLET, FILM COATED ORAL at 14:38

## 2017-09-10 RX ADMIN — PREGABALIN 25 MG: 25 CAPSULE ORAL at 08:19

## 2017-09-10 RX ADMIN — INSULIN LISPRO 2 UNITS: 100 INJECTION, SOLUTION INTRAVENOUS; SUBCUTANEOUS at 20:24

## 2017-09-10 RX ADMIN — INSULIN LISPRO 6 UNITS: 100 INJECTION, SOLUTION INTRAVENOUS; SUBCUTANEOUS at 08:23

## 2017-09-10 RX ADMIN — ALBUTEROL SULFATE 2.5 MG: 2.5 SOLUTION RESPIRATORY (INHALATION) at 03:31

## 2017-09-10 RX ADMIN — HEPARIN SODIUM 5000 UNITS: 5000 INJECTION, SOLUTION INTRAVENOUS; SUBCUTANEOUS at 14:38

## 2017-09-10 RX ADMIN — METOPROLOL TARTRATE 50 MG: 50 TABLET, FILM COATED ORAL at 08:19

## 2017-09-10 RX ADMIN — OXYCODONE HYDROCHLORIDE AND ACETAMINOPHEN 2 TABLET: 5; 325 TABLET ORAL at 03:55

## 2017-09-10 RX ADMIN — Medication 10 ML: at 08:28

## 2017-09-10 RX ADMIN — PANTOPRAZOLE SODIUM 20 MG: 20 TABLET, DELAYED RELEASE ORAL at 08:19

## 2017-09-10 RX ADMIN — INSULIN LISPRO 2 UNITS: 100 INJECTION, SOLUTION INTRAVENOUS; SUBCUTANEOUS at 17:04

## 2017-09-10 RX ADMIN — HYDRALAZINE HYDROCHLORIDE 100 MG: 50 TABLET, FILM COATED ORAL at 20:23

## 2017-09-10 RX ADMIN — METOPROLOL TARTRATE 50 MG: 50 TABLET, FILM COATED ORAL at 20:23

## 2017-09-10 RX ADMIN — HEPARIN SODIUM 5000 UNITS: 5000 INJECTION, SOLUTION INTRAVENOUS; SUBCUTANEOUS at 06:28

## 2017-09-10 RX ADMIN — INSULIN GLARGINE 5 UNITS: 100 INJECTION, SOLUTION SUBCUTANEOUS at 20:25

## 2017-09-10 RX ADMIN — AMLODIPINE BESYLATE 10 MG: 10 TABLET ORAL at 08:19

## 2017-09-10 RX ADMIN — PREGABALIN 25 MG: 25 CAPSULE ORAL at 14:38

## 2017-09-10 RX ADMIN — TIMOLOL MALEATE 1 DROP: 2.5 SOLUTION/ DROPS OPHTHALMIC at 20:22

## 2017-09-10 RX ADMIN — DAKIN'S SOLUTION 0.125% (QUARTER STRENGTH) 473 ML: 0.12 SOLUTION at 06:22

## 2017-09-10 RX ADMIN — PANTOPRAZOLE SODIUM 20 MG: 20 TABLET, DELAYED RELEASE ORAL at 20:23

## 2017-09-10 RX ADMIN — LATANOPROST 1 DROP: 50 SOLUTION OPHTHALMIC at 20:22

## 2017-09-10 RX ADMIN — VANCOMYCIN HYDROCHLORIDE 1750 MG: 10 INJECTION, POWDER, LYOPHILIZED, FOR SOLUTION INTRAVENOUS at 02:19

## 2017-09-10 RX ADMIN — ONDANSETRON 4 MG: 2 INJECTION INTRAMUSCULAR; INTRAVENOUS at 10:17

## 2017-09-10 RX ADMIN — INSULIN LISPRO 2 UNITS: 100 INJECTION, SOLUTION INTRAVENOUS; SUBCUTANEOUS at 12:51

## 2017-09-10 RX ADMIN — HYDRALAZINE HYDROCHLORIDE 100 MG: 50 TABLET, FILM COATED ORAL at 08:19

## 2017-09-10 RX ADMIN — FENTANYL CITRATE 50 MCG: 50 INJECTION INTRAMUSCULAR; INTRAVENOUS at 05:59

## 2017-09-10 RX ADMIN — TAMSULOSIN HYDROCHLORIDE 0.4 MG: 0.4 CAPSULE ORAL at 08:19

## 2017-09-10 RX ADMIN — PREGABALIN 25 MG: 25 CAPSULE ORAL at 20:23

## 2017-09-10 RX ADMIN — TIMOLOL MALEATE 1 DROP: 2.5 SOLUTION/ DROPS OPHTHALMIC at 08:19

## 2017-09-10 RX ADMIN — PRAVASTATIN SODIUM 10 MG: 20 TABLET ORAL at 08:19

## 2017-09-10 RX ADMIN — FOLIC ACID 1 MG: 1 TABLET ORAL at 08:19

## 2017-09-10 RX ADMIN — Medication 10 ML: at 20:23

## 2017-09-10 ASSESSMENT — PAIN DESCRIPTION - FREQUENCY: FREQUENCY: INTERMITTENT

## 2017-09-10 ASSESSMENT — PAIN SCALES - GENERAL
PAINLEVEL_OUTOF10: 0
PAINLEVEL_OUTOF10: 4

## 2017-09-10 ASSESSMENT — PAIN DESCRIPTION - PROGRESSION: CLINICAL_PROGRESSION: GRADUALLY IMPROVING

## 2017-09-10 ASSESSMENT — PAIN DESCRIPTION - LOCATION: LOCATION: FOOT

## 2017-09-10 ASSESSMENT — PAIN DESCRIPTION - ORIENTATION: ORIENTATION: LEFT

## 2017-09-10 ASSESSMENT — PAIN DESCRIPTION - DESCRIPTORS: DESCRIPTORS: DISCOMFORT;ACHING

## 2017-09-10 ASSESSMENT — PAIN DESCRIPTION - PAIN TYPE: TYPE: SURGICAL PAIN

## 2017-09-10 NOTE — PROGRESS NOTES
solution 1 drop nightly    Historical Provider, MD   .  Recent Surgical History: None = 0     Assessment   Received pt on 3LNC, awake, c/o slight shortness of breath after waking up.  Pt states he is a former smoker, quit in oct 2016, denies COPD, asthma, DENYS, home oxygen, or home inhalers    RR 17  Breath Sounds: Clear t/o, diminished in bases      · Bronchodilator assessment at level  1    · [x]    Bronchodilator Assessment  BRONCHODILATOR ASSESSMENT SCORE  Score 0 1 2 3 4 5   Breath Sounds   []  Patient Baseline [x]  No Wheeze good aeration []  Faint, scattered wheezing, good aeration []  Expiratory Wheezing and or moderately diminished []  Insp/Exp wheeze and/or very diminished []  Insp/Exp and/ or marked distress   Respiratory Rate   []  Patient Baseline [x]  Less than 20 []  Less than 20 []  20-25 []  Greater than 25 []  Greater than 25   Peak flow % of Pred or PB [x]  NA   []  Greater than 90%  []  81-90% []  71-80% []  Less than or equal to 70%  or unable to perform []  Unable due to Respiratory Distress   Dyspnea re []  Patient Baseline []  No SOB []  No SOB [x]  SOB on exertion []  SOB min activity []  At rest/acute   e FEV% Predicted       [x]  NA []  Above 69%  []  Unable []  Above 60-69%  []  Unable []  Above 50-59%  []  Unable []  Above 35-49%  []  Unable []  Less than 35%  []  Unable

## 2017-09-10 NOTE — PLAN OF CARE
Problem: RESPIRATORY  Intervention: Respiratory assessment  PRN FOR BRONCHOSPASM/BRONCHOCONSTRICTION                [X]         IMPROVE AERATION/BREATH SOUNDS  [X]        ADMINISTER BRONCHODILATOR THERAPY AS APPROPRIATE  [X]        ASSESS BREATH SOUNDS  [ ]         IMPLEMENT AEROSOL/MDI PROTOCOL  [X]        PATIENT EDUCATION AS NEEDED

## 2017-09-10 NOTE — PROGRESS NOTES
[address]  Occupational Therapy Not Seen Note    Patient not available for Occupational Therapy due to:    [] Testing:    [] Hemodialysis    [] Blood Transfusion in Progress    [x] Refusal by Patient: Pt states \"my stomach hurts today\". Agreeable to see OT tomorrow. @1130am    [] Surgery/Procedure:    [] Strict Bedrest    [] Sedation    [] Spine Precautions     [] Pt being transferred to palliative care at this time. Spoke with pt/family and OT services to be defered. [] Pt independent with functional mobility and functional tasks.  Pt with no OT acute care needs at this time, will defer OT eval.    [] Other    Next Scheduled Treatment: 9-11-17    Signature: CALVIN Douglas/AKUA

## 2017-09-10 NOTE — PROGRESS NOTES
Internal Medicine - Daily Progress Note      Date and time: 9/10/2017 8:50 AM  Patient's name:  Adri Erazo  Medical Record Number: [id number]  Patient's account/billing number: [de-identified]  Patient's YOB: 1965  Age: 46 y.o.   Date of Admission: 8/31/2017  4:35 PM      Primary Care Physician: Nicci Polk MD  Attending Physician:    Code Status: Full Code    Chief complaint: ischemic left foot     Problem List:   Patient Active Problem List   Diagnosis    Uncontrolled hypertension    IDDM (insulin dependent diabetes mellitus) ([address]    Neuropathy ([address]    Uncontrolled type 2 diabetes with cellulitis of foot ([address]    Cerebrovascular disease    Cataract    Dysphagia    Family history of colon cancer    Bowel habit changes    Hyperlipidemia    Gangrene of foot ([address]    Diabetic foot left    Acute kidney injury ([address]    Ischemic foot left    Urinary retention    Subacute osteomyelitis of left foot (HCC)       Allergies:    No Known Allergies       SUBJECTIVE:    no acute issues over night   Pain well controlled   BP slightly elevated , received one dsoe of hydralazine last night    , this morning , 170-180 during day yesterday            Review of Systems -   General ROS: negative  Psychological ROS: negative  Ophthalmic ROS: negative  ENT ROS: negative  Allergy and Immunology ROS: negative  Hematological and Lymphatic ROS: negative  Endocrine ROS: negative  Breast ROS: negative  Respiratory ROS: no cough, shortness of breath, or wheezing  Cardiovascular ROS: no chest pain or dyspnea on exertion  Gastrointestinal ROS:negative  Genito-Urinary ROS: negative  Musculoskeletal ROS: negative  Neurological ROS: negative  Dermatological ROS: negative        OBJECTIVE:    Vitals: BP (!) 162/63  Pulse 78  Temp 97.9 °F (36.6 °C) (Oral)   Resp 18  Ht 5' 6.93\" (1.7 m)  Wt 200 lb 9.9 oz (91 kg)  SpO2 98%  BMI 31.49 kg/m2    Last Body weight:   Wt Readings from Last 3 Encounters:   09/08/17 Hyperlipidemia     Gangrene of foot ([address]     Diabetic foot left     Acute kidney injury ([address]     Ischemic foot left     Urinary retention     Subacute osteomyelitis of left foot ([address]          PLAN:    1. Dressing changed this morning , small drainage from the amputation site   2. Add lantus 5 U nightly , increase ISS to medium dose   3. Plan for BKA revision on 9/12 at 10 am   4. Diet: carb control   5. DVT prophylaxis: heparin   6. Ulcer prophylaxis: protonix   7. Discharge planning, inpatient rehab         Please note that this chart was generated using voice recognition dictation software. Although every effort was made to ensure the accuracy of this automated transcription, some errors in transcription may have occurred. Keyanna Miller M.D.   [facility] ([facility])             9/10/2017, 8:50 AM

## 2017-09-10 NOTE — PROGRESS NOTES
[facility] - Daily Progress Note  Today's Date and Time: 9/10/2017, 10:25 AM    Impression :   · Left diabetic foot gangrene with possible gas formation  · Osteo 3rd and 4th, 5th MTS heads with abscesses  · Calcaneal osteo  · Left foot abscess - MRSA - susceptible to vanc, clinda, tetracycline, tmp-smx, gentamycin  · S/p left leg guillotine amputation 9/7/2017  · Leukocytosis - improving  · Diabetes mellitus with neuropathy : uncontrolled    Recommendations:   · Continue IV Vancomycin 1750mg Q24 . Plan to d/C  24 hr post second stage BKA  · Monitor creatinine: 0.87  · Monitor vancomycin trough, goal trough levels 14-17 mcg/ml: 15.3 on 9-3-17    Interval History:   INITIAL HISTORY:     Keegan Altman is a 46 y.o.-male who was initially admitted on 8/31/2017.      He presented to the hospital from Dr Saadia Drew office where he was found to have blackening of his left little toe with wounds over his foot . The patient had wounds on his left foot before which were healing and then he fell on Saturday which made the wounds worse. He has had amputation of his left toe before. No fever, chills, nausea or vomiting.      MRI of left foot shows osteomyelitis of the 1st and 3rd metatarsal, calcaneus and also of the 5th metatarsals , diffuse infectious myositis is seen. Concern for soft tissue gas and necrotizing infection. abscess formation measures 1.4 x 1.7 x 1.9 cm.lateral aspect of the foot.     CURRENT EVALUATION  :9/10/2017  Afebrile  VS stable    POD#3 from left BKA guillotine amputation   Second stage BKA revision planned for 9/12 at 10AM    Patient states pain is well controlled  Wound was re-bandaged by vascular this AM. Bandage is clean, dry and intact  Patient denies N/V/D and abdominal pain     WBC: 11.4 9/10/2017    Cr: 0.74     Discussed with patient, family and RN.      Physical Examination :     Patient Vitals for the past 8 hrs:   BP Temp Temp src Pulse Resp SpO2   09/10/17 7278 (!) 162/63 97.9 °F (36.6 °C) Oral 78 18 98 %   09/10/17 0431 (!) 163/66 97.6 °F (36.4 °C) Oral 77 16 96 %   09/10/17 0348 (!) 172/57 - - - - -   09/10/17 0332 - - - - - 92 %     Temp (24hrs), Av.1 °F (36.7 °C), Min:97.6 °F (36.4 °C), Max:98.6 °F (37 °C)    General Appearance: Awake, alert, and in no apparent distress  Eyes: Sclera anicteric; conjunctivae pink. ENT:Oropharynx clear, without erythema, exudate, or thrush. No nasal drainage. Neck: Supple  Pulmonary/Chest: Clear to auscultation, without wheezes, rales, or rhonchi. No areas of consolidation. Good air movement bilaterally. No egophony. Cardiovascular:Regular rate and rhythm without murmurs, rubs, or gallops. S1 and S2 normal.  Abdomen: Soft, non tender. Bowel sounds normal.   Extremities: left LE wound bandage is clean, dry and intact. Neurologic: No gross sensory or motor deficits. Skin: No rash or lesions. IV site inspected: no inflammation. Medical Decision Making:   I have independently reviewed/ordered the following labs:    CBC with Differential:   Recent Labs      09/09/17   0529  09/10/17   0350   WBC  12.6*  11.4*   HGB  8.1*  7.7*   HCT  24.9*  24.1*   PLT  433  429     BMP:   Recent Labs      17   0529   09/09/17   2002  09/10/17   0350  09/10/17   0757   NA   --    < >  136   --   136   BUN  11   --    --   14   --    CREATININE  0.74   --    --   0.87   --     < > = values in this interval not displayed.        Lab Results   Component Value Date    [facility] 15.2 2017        Medications:      insulin lispro  0-12 Units Subcutaneous TID WC    insulin lispro  0-6 Units Subcutaneous Nightly    insulin glargine  5 Units Subcutaneous Nightly    sodium hypochlorite   Irrigation Daily    folic acid  1 mg Oral Daily    tamsulosin  0.4 mg Oral Daily    sodium chloride flush  10 mL Intravenous 2 times per day    amLODIPine  10 mg Oral Daily    hydrALAZINE  100 mg Oral TID    lisinopril  40 mg Oral Daily   

## 2017-09-10 NOTE — PROGRESS NOTES
Vascular Surgery Progress Note         PATIENT NAME: Toney Brown     TODAY'S DATE: 9/10/2017, 6:19 AM      SUBJECTIVE:    Pt seen and examined. No complaints over night. Pain well controlled. Denies any F/C. No other complaints noted. OBJECTIVE:   Vitals:  BP (!) 172/57  Pulse 74  Temp 97.9 °F (36.6 °C) (Oral)   Resp 22  Ht 5' 6.93\" (1.7 m)  Wt 200 lb 9.9 oz (91 kg)  SpO2 92%  BMI 31.49 kg/m2     INTAKE/OUTPUT:      Intake/Output Summary (Last 24 hours) at 09/10/17 5592  Last data filed at 09/09/17 1700   Gross per 24 hour   Intake                0 ml   Output              800 ml   Net             -800 ml                 GENERAL: AOx3, NAD  HEENT: EOMI bilaterally  CARDIAC: Regular rate and rhythm. ABDOMEN: Soft, NT, ND, Active Bowel Sounds  EXTREMITY: moves all extremities, no edema. LLE dressing replaced   NEURO: Gross motor intact. INCISION: L BKA guillotine amp site dressing taken down.  seropus drainage noted through penrose. Continues to have pus drainage medial to it. Other muscle and subcutaneous tissue looking healthy otherwise.           Data:  CBC with Differential:    Lab Results   Component Value Date    WBC 11.4 09/10/2017    RBC 2.98 09/10/2017    HGB 7.7 09/10/2017    HCT 24.1 09/10/2017     09/10/2017    MCV 80.7 09/10/2017    MCH 25.9 09/10/2017    MCHC 32.0 09/10/2017    RDW 18.9 09/10/2017    LYMPHOPCT 4 09/01/2017    MONOPCT 6 09/01/2017    BASOPCT 0 09/01/2017    MONOSABS 1.65 09/01/2017    LYMPHSABS 1.10 09/01/2017    EOSABS 0.00 09/01/2017    BASOSABS 0.00 09/01/2017    DIFFTYPE NOT REPORTED 09/01/2017     CMP:    Lab Results   Component Value Date     09/09/2017    K 4.1 09/07/2017     09/07/2017    CO2 19 09/07/2017    BUN 14 09/10/2017    CREATININE 0.87 09/10/2017    GFRAA >60 09/10/2017    LABGLOM >60 09/10/2017    GLUCOSE 113 09/07/2017    LABALBU 4.0 07/13/2012    CALCIUM 7.9 09/07/2017    BILITOT 0.20 07/13/2012    ALKPHOS 76 07/13/2012    AST 18 07/13/2012    ALT 22 07/13/2012     PT/INR:  No results found for: PROTIME, INR  HgBA1c:  No components found for: HGBA1C      ASSESSMENT   1. 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 Hx of DM    PLAN  1. Tolerating diet. Encourage eating. 2. Pain control with Norco and Fentanyl PRN. 3. On IV Vanco.  F/u ID recs. 4. 1/4 strength Dakins used for wet to dry dressing due to continuation of pus like drainage on medial portion of amputation site. Area was irrigated with Dakins soln as well. 5. Continue medical management per primary. 6. Planned for L BKA revision on 9/12 at 10 AM.  This procedure, including risks, benefits, alternatives and complications have been fully reviewed with patient and informed consent has been obtained. All questions were answered appropriately.     Electronically signed by Cassy Hall DO  on 9/10/2017 at 6:19 AM

## 2017-09-10 NOTE — PLAN OF CARE
Problem: Pain:  Goal: Pain level will decrease  Pain level will decrease to a 3 on a scale of 0-10 per pt. goal   Outcome: Ongoing    Problem: Falls - Risk of  Goal: Absence of falls  Outcome: Ongoing    Problem: Skin Integrity:  Goal: Will show no infection signs and symptoms  Will show no infection signs and symptoms   Outcome: Ongoing  Goal: Absence of new skin breakdown  Absence of new skin breakdown   Outcome: Ongoing

## 2017-09-10 NOTE — PROGRESS NOTES
Podiatry Inpatient Progress Note  CC: LLE gangrene, osteomyelitis, PAD    Subjective   Patient seen and examined at bedside at bedside. POD#3 guillotine amputation left foot (DOS: 9/7/17). Had asthma exacerbation last night, relieved with breathing treatment. Afebrile, continued HTN otherwise VSS. Pt seems to be in good spirits despite amputation. HPI 8/31/17:  Sim Hernandez is a 46 y.o. male who presents to the hospital from Dr. Lakeisha Castrejon office. Patient states that he has had wounds on the left foot for sometime now however they were almost healed. Patient states that on Saturday he fell and since then his wounds have worsened. Patient states that it is painful to move. Patient states that the little toe has become black. Patient denies any N/V/F/C. Pt denies any shortness of breath. ROS: Negative except for HPI. Denies N/V/F/C/SOB/CP.     Medications:  Scheduled Meds:   insulin lispro  0-12 Units Subcutaneous TID WC    insulin lispro  0-6 Units Subcutaneous Nightly    insulin glargine  5 Units Subcutaneous Nightly    sodium hypochlorite   Irrigation Daily    folic acid  1 mg Oral Daily    tamsulosin  0.4 mg Oral Daily    sodium chloride flush  10 mL Intravenous 2 times per day    amLODIPine  10 mg Oral Daily    hydrALAZINE  100 mg Oral TID    lisinopril  40 mg Oral Daily    metoprolol tartrate  50 mg Oral BID    pravastatin  10 mg Oral Daily    timolol  1 drop Both Eyes BID    latanoprost  1 drop Both Eyes Nightly    pregabalin  25 mg Oral TID    pantoprazole  20 mg Oral BID    vancomycin  1,750 mg Intravenous Q24H    heparin (porcine)  5,000 Units Subcutaneous 3 times per day       Continuous Infusions:   dextrose         PRN Meds:albuterol, fentanNYL, sodium chloride flush, acetaminophen, magnesium hydroxide, ondansetron, oxyCODONE-acetaminophen **OR** oxyCODONE-acetaminophen, cyclobenzaprine, glucose, dextrose, glucagon (rDNA), dextrose    Objective     Vitals:  Patient Vitals for the past 8 hrs:   BP Temp Temp src Pulse Resp SpO2   09/10/17 0712 (!) 162/63 97.9 °F (36.6 °C) Oral 78 18 98 %   09/10/17 0431 (!) 163/66 97.6 °F (36.4 °C) Oral 77 16 96 %   09/10/17 0348 (!) 172/57 - - - - -   09/10/17 0332 - - - - - 92 %     Average, Min, and Max for last 24 hours Vitals:  TEMPERATURE:  Temp  Av.1 °F (36.7 °C)  Min: 97.6 °F (36.4 °C)  Max: 98.6 °F (37 °C)    RESPIRATIONS RANGE: Resp  Av.4  Min: 16  Max: 22    PULSE RANGE: Pulse  Av.3  Min: 62  Max: 78    BLOOD PRESSURE RANGE:  Systolic (41DJN), RQ , Min:141 , FUD:268   ; Diastolic (61QKA), AMH:94, Min:57, Max:74      PULSE OXIMETRY RANGE: SpO2  Av.9 %  Min: 92 %  Max: 98 %    I/O last 3 completed shifts: In: 760 [P.O.:250; I.V.:510]  Out: 1575 [Urine:1575]    CBC:  Recent Labs      17   0503  17   0529  09/10/17   0350   WBC  15.7*  12.6*  11.4*   HGB  7.9*  8.1*  7.7*   HCT  24.7*  24.9*  24.1*   PLT  430  433  429        BMP:  Recent Labs      17   0503   17   0529  17   0840  09/09/17   2002  09/10/17   0350  09/10/17   0757   NA  133*   < >   --   137  136   --   136   BUN  12   --   11   --    --   14   --    CREATININE  0.83   --   0.74   --    --   0.87   --     < > = values in this interval not displayed. Coags:  No results for input(s): APTT, PROT, INR in the last 72 hours. Physical Exam:   General: Awake, alert, and oriented to person, place, and time. No acute distress. Heart: RRR, normal S1/S2  Lungs: CTAB, no increased effort, equal air entry   Abdomen: Soft, non-tender to palpation. Not distended. +Bowel sounds in all 4 quadrants. Lower Extremity Physical Exam:  Dressing left intact to left leg. No strike through noted. PVR PPG 17:  LALI Right: 0.82 Left 1.12    Assessment    Patient is a 45 yo male with      1. Chronic diabetic neuropathic ulceration, left foot   2. Osteomyelitis of the left foot    3. Dry gangrene left 5th digit. 4. Severe PAD  5. Leukocytosis: 20 --> 17  6. 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  - POD#3 guillotine amputation left foot. - Dressings per vascular team. Vascular plans to perform BKA revision on Tuesday 9/12.   - Continue IV abx per ID: currently vanc   WBC 12.6-->11.4 today. - Medical management per IM  - Urology on board for urinary retention rec.  - NWB to left LE   - Diet: carb control  - DVT ppx: lovenox  - Inpt rehab at [facility] on d/c.   - Will follow with you.   - Discussed with Dr. Bertha Adams.      Electronically signed by Sloan Castaneda DPM on 9/10/2017 at 10:57 AM

## 2017-09-11 ENCOUNTER — ANESTHESIA EVENT (OUTPATIENT)
Dept: OPERATING ROOM | Age: 52
DRG: 239 | End: 2017-09-11
Payer: COMMERCIAL

## 2017-09-11 LAB
BUN BLDV-MCNC: 13 MG/DL (ref 6–20)
CREAT SERPL-MCNC: 0.78 MG/DL (ref 0.7–1.2)
GFR AFRICAN AMERICAN: >60 ML/MIN
GFR NON-AFRICAN AMERICAN: >60 ML/MIN
GFR SERPL CREATININE-BSD FRML MDRD: NORMAL ML/MIN/{1.73_M2}
GFR SERPL CREATININE-BSD FRML MDRD: NORMAL ML/MIN/{1.73_M2}
GLUCOSE BLD-MCNC: 134 MG/DL (ref 75–110)
GLUCOSE BLD-MCNC: 165 MG/DL (ref 75–110)
GLUCOSE BLD-MCNC: 189 MG/DL (ref 75–110)
GLUCOSE BLD-MCNC: 211 MG/DL (ref 75–110)
GLUCOSE BLD-MCNC: 302 MG/DL (ref 75–110)
HCT VFR BLD CALC: 26 % (ref 41–53)
HEMOGLOBIN: 8.3 G/DL (ref 13.5–17.5)
MCH RBC QN AUTO: 26.3 PG (ref 26–34)
MCHC RBC AUTO-ENTMCNC: 31.7 G/DL (ref 31–37)
MCV RBC AUTO: 83.1 FL (ref 80–100)
PDW BLD-RTO: 19.5 % (ref 12.5–15.4)
PLATELET # BLD: 527 K/UL (ref 140–450)
PMV BLD AUTO: 8.9 FL (ref 6–12)
RBC # BLD: 3.13 M/UL (ref 4.5–5.9)
SODIUM BLD-SCNC: 137 MMOL/L (ref 135–144)
SODIUM BLD-SCNC: 138 MMOL/L (ref 135–144)
WBC # BLD: 13.2 K/UL (ref 3.5–11)

## 2017-09-11 PROCEDURE — 36415 COLL VENOUS BLD VENIPUNCTURE: CPT

## 2017-09-11 PROCEDURE — 6370000000 HC RX 637 (ALT 250 FOR IP): Performed by: PODIATRIST

## 2017-09-11 PROCEDURE — 84520 ASSAY OF UREA NITROGEN: CPT

## 2017-09-11 PROCEDURE — 99232 SBSQ HOSP IP/OBS MODERATE 35: CPT | Performed by: PHYSICAL MEDICINE & REHABILITATION

## 2017-09-11 PROCEDURE — G8979 MOBILITY GOAL STATUS: HCPCS

## 2017-09-11 PROCEDURE — 97530 THERAPEUTIC ACTIVITIES: CPT

## 2017-09-11 PROCEDURE — 85027 COMPLETE CBC AUTOMATED: CPT

## 2017-09-11 PROCEDURE — 6360000002 HC RX W HCPCS: Performed by: STUDENT IN AN ORGANIZED HEALTH CARE EDUCATION/TRAINING PROGRAM

## 2017-09-11 PROCEDURE — 82947 ASSAY GLUCOSE BLOOD QUANT: CPT

## 2017-09-11 PROCEDURE — 97535 SELF CARE MNGMENT TRAINING: CPT

## 2017-09-11 PROCEDURE — 2580000003 HC RX 258: Performed by: PODIATRIST

## 2017-09-11 PROCEDURE — 2580000003 HC RX 258: Performed by: INTERNAL MEDICINE

## 2017-09-11 PROCEDURE — 2060000000 HC ICU INTERMEDIATE R&B

## 2017-09-11 PROCEDURE — 6370000000 HC RX 637 (ALT 250 FOR IP): Performed by: HOSPITALIST

## 2017-09-11 PROCEDURE — 99232 SBSQ HOSP IP/OBS MODERATE 35: CPT | Performed by: INTERNAL MEDICINE

## 2017-09-11 PROCEDURE — 6370000000 HC RX 637 (ALT 250 FOR IP): Performed by: STUDENT IN AN ORGANIZED HEALTH CARE EDUCATION/TRAINING PROGRAM

## 2017-09-11 PROCEDURE — 6360000002 HC RX W HCPCS: Performed by: INTERNAL MEDICINE

## 2017-09-11 PROCEDURE — 97164 PT RE-EVAL EST PLAN CARE: CPT

## 2017-09-11 PROCEDURE — G8978 MOBILITY CURRENT STATUS: HCPCS

## 2017-09-11 PROCEDURE — 82565 ASSAY OF CREATININE: CPT

## 2017-09-11 PROCEDURE — 84295 ASSAY OF SERUM SODIUM: CPT

## 2017-09-11 PROCEDURE — 99024 POSTOP FOLLOW-UP VISIT: CPT | Performed by: SURGERY

## 2017-09-11 RX ORDER — LISINOPRIL 20 MG/1
40 TABLET ORAL ONCE
Status: COMPLETED | OUTPATIENT
Start: 2017-09-11 | End: 2017-09-11

## 2017-09-11 RX ORDER — AMLODIPINE BESYLATE 10 MG/1
10 TABLET ORAL DAILY
Status: DISCONTINUED | OUTPATIENT
Start: 2017-09-12 | End: 2017-09-16 | Stop reason: HOSPADM

## 2017-09-11 RX ORDER — AMLODIPINE BESYLATE 10 MG/1
10 TABLET ORAL ONCE
Status: COMPLETED | OUTPATIENT
Start: 2017-09-11 | End: 2017-09-11

## 2017-09-11 RX ORDER — HYDRALAZINE HYDROCHLORIDE 20 MG/ML
10 INJECTION INTRAMUSCULAR; INTRAVENOUS ONCE
Status: COMPLETED | OUTPATIENT
Start: 2017-09-11 | End: 2017-09-11

## 2017-09-11 RX ORDER — LISINOPRIL 20 MG/1
40 TABLET ORAL DAILY
Status: DISCONTINUED | OUTPATIENT
Start: 2017-09-12 | End: 2017-09-16 | Stop reason: HOSPADM

## 2017-09-11 RX ADMIN — TAMSULOSIN HYDROCHLORIDE 0.4 MG: 0.4 CAPSULE ORAL at 08:18

## 2017-09-11 RX ADMIN — HYDRALAZINE HYDROCHLORIDE 100 MG: 50 TABLET, FILM COATED ORAL at 08:18

## 2017-09-11 RX ADMIN — INSULIN LISPRO 2 UNITS: 100 INJECTION, SOLUTION INTRAVENOUS; SUBCUTANEOUS at 13:17

## 2017-09-11 RX ADMIN — TIMOLOL MALEATE 1 DROP: 2.5 SOLUTION/ DROPS OPHTHALMIC at 08:19

## 2017-09-11 RX ADMIN — VANCOMYCIN HYDROCHLORIDE 1750 MG: 10 INJECTION, POWDER, LYOPHILIZED, FOR SOLUTION INTRAVENOUS at 02:57

## 2017-09-11 RX ADMIN — INSULIN LISPRO 8 UNITS: 100 INJECTION, SOLUTION INTRAVENOUS; SUBCUTANEOUS at 08:28

## 2017-09-11 RX ADMIN — PREGABALIN 25 MG: 25 CAPSULE ORAL at 20:38

## 2017-09-11 RX ADMIN — HEPARIN SODIUM 5000 UNITS: 5000 INJECTION, SOLUTION INTRAVENOUS; SUBCUTANEOUS at 07:11

## 2017-09-11 RX ADMIN — INSULIN GLARGINE 5 UNITS: 100 INJECTION, SOLUTION SUBCUTANEOUS at 20:38

## 2017-09-11 RX ADMIN — INSULIN LISPRO 2 UNITS: 100 INJECTION, SOLUTION INTRAVENOUS; SUBCUTANEOUS at 16:47

## 2017-09-11 RX ADMIN — HYDRALAZINE HYDROCHLORIDE 100 MG: 50 TABLET, FILM COATED ORAL at 13:17

## 2017-09-11 RX ADMIN — LISINOPRIL 40 MG: 20 TABLET ORAL at 08:18

## 2017-09-11 RX ADMIN — LATANOPROST 1 DROP: 50 SOLUTION OPHTHALMIC at 20:39

## 2017-09-11 RX ADMIN — METOPROLOL TARTRATE 50 MG: 50 TABLET, FILM COATED ORAL at 20:38

## 2017-09-11 RX ADMIN — HYDRALAZINE HYDROCHLORIDE 100 MG: 50 TABLET, FILM COATED ORAL at 20:39

## 2017-09-11 RX ADMIN — PANTOPRAZOLE SODIUM 20 MG: 20 TABLET, DELAYED RELEASE ORAL at 20:38

## 2017-09-11 RX ADMIN — DAKIN'S SOLUTION 0.125% (QUARTER STRENGTH): 0.12 SOLUTION at 08:30

## 2017-09-11 RX ADMIN — METOPROLOL TARTRATE 50 MG: 50 TABLET, FILM COATED ORAL at 08:18

## 2017-09-11 RX ADMIN — Medication 10 ML: at 20:44

## 2017-09-11 RX ADMIN — FOLIC ACID 1 MG: 1 TABLET ORAL at 08:19

## 2017-09-11 RX ADMIN — HEPARIN SODIUM 5000 UNITS: 5000 INJECTION, SOLUTION INTRAVENOUS; SUBCUTANEOUS at 20:38

## 2017-09-11 RX ADMIN — FENTANYL CITRATE 25 MCG: 50 INJECTION INTRAMUSCULAR; INTRAVENOUS at 06:26

## 2017-09-11 RX ADMIN — AMLODIPINE BESYLATE 10 MG: 10 TABLET ORAL at 16:47

## 2017-09-11 RX ADMIN — PRAVASTATIN SODIUM 10 MG: 20 TABLET ORAL at 08:18

## 2017-09-11 RX ADMIN — PANTOPRAZOLE SODIUM 20 MG: 20 TABLET, DELAYED RELEASE ORAL at 08:18

## 2017-09-11 RX ADMIN — AMLODIPINE BESYLATE 10 MG: 10 TABLET ORAL at 08:18

## 2017-09-11 RX ADMIN — OXYCODONE HYDROCHLORIDE AND ACETAMINOPHEN 2 TABLET: 5; 325 TABLET ORAL at 04:48

## 2017-09-11 RX ADMIN — HYDRALAZINE HYDROCHLORIDE 10 MG: 20 INJECTION INTRAMUSCULAR; INTRAVENOUS at 05:15

## 2017-09-11 RX ADMIN — PREGABALIN 25 MG: 25 CAPSULE ORAL at 13:17

## 2017-09-11 RX ADMIN — HEPARIN SODIUM 5000 UNITS: 5000 INJECTION, SOLUTION INTRAVENOUS; SUBCUTANEOUS at 01:03

## 2017-09-11 RX ADMIN — HEPARIN SODIUM 5000 UNITS: 5000 INJECTION, SOLUTION INTRAVENOUS; SUBCUTANEOUS at 13:17

## 2017-09-11 RX ADMIN — TIMOLOL MALEATE 1 DROP: 2.5 SOLUTION/ DROPS OPHTHALMIC at 20:39

## 2017-09-11 RX ADMIN — PREGABALIN 25 MG: 25 CAPSULE ORAL at 08:18

## 2017-09-11 RX ADMIN — Medication 10 ML: at 08:19

## 2017-09-11 RX ADMIN — LISINOPRIL 40 MG: 20 TABLET ORAL at 16:47

## 2017-09-11 ASSESSMENT — PAIN SCALES - GENERAL
PAINLEVEL_OUTOF10: 0
PAINLEVEL_OUTOF10: 0
PAINLEVEL_OUTOF10: 5
PAINLEVEL_OUTOF10: 0

## 2017-09-11 ASSESSMENT — PAIN DESCRIPTION - LOCATION: LOCATION: LEG

## 2017-09-11 ASSESSMENT — PAIN DESCRIPTION - ORIENTATION: ORIENTATION: LEFT

## 2017-09-11 ASSESSMENT — PAIN DESCRIPTION - PAIN TYPE: TYPE: SURGICAL PAIN

## 2017-09-11 NOTE — PLAN OF CARE
Problem: Pain:  Goal: Pain level will decrease  Pain level will decrease to a 3 on a scale of 0-10 per pt. goal   Outcome: Ongoing    Problem: Falls - Risk of  Goal: Absence of falls  Outcome: Ongoing    Problem: Skin Integrity:  Goal: Will show no infection signs and symptoms  Will show no infection signs and symptoms   Outcome: Ongoing

## 2017-09-11 NOTE — PROGRESS NOTES
Physical Medicine & Rehabilitation  Progress Note    9/11/2017 11:38 AM     CC: Ambulatory and ADL dysfunction due to L BKA    Subjective:   Good spirits. Pain controlled. Vasc- revision 9/12  IM- insulin adjusted    ROS:  Denies fevers, chills, sweats. No chest pain, palpitations, lightheadedness. Denies coughing, wheezing or shortness of breath. Denies abdominal pain, nausea, diarrhea or constipation. No new areas of joint pain. Denies new areas of numbness or weakness. Denies new anxiety or depression issues. No new skin problems. Rehabilitation:   PT:  Restrictions/Precautions: Weight Bearing, Surgical Protocols, Fall Risk, Contact Precautions  Other position/activity restrictions:  Up with assist, New L BKA  Left Lower Extremity Weight Bearing: Non Weight Bearing   Transfers  Sit to Stand: Contact guard assistance  Stand to sit: Contact guard assistance  Bed to Chair: Contact guard assistance, Minimal assistance  Stand Pivot Transfers: Contact guard assistance  Comment: vc's for UE placement during transfers with fair carryover  WB Status: NWB LLE  Ambulation 1  Surface: level tile  Device: Rolling Walker  Assistance: Contact guard assistance  Quality of Gait: NWB LLE, mildly unsteady with turning, no LOB, decreased savanah  Distance: 20ft  Comments: Pt SOB following ambulation, SPO2 97%    Transfers  Sit to Stand: Contact guard assistance  Stand to sit: Contact guard assistance  Bed to Chair: Contact guard assistance, Minimal assistance  Stand Pivot Transfers: Contact guard assistance  Comment: vc's for UE placement during transfers with fair carryover  Ambulation  Ambulation?: Yes  WB Status: NWB LLE  Ambulation 1  Surface: level tile  Device: Rolling Walker  Assistance: Contact guard assistance  Quality of Gait: NWB LLE, mildly unsteady with turning, no LOB, decreased savanah  Distance: 20ft  Comments: Pt SOB following ambulation, SPO2 97%    Surface: level tile  Ambulation 1  Surface: level 5,000 Units Subcutaneous 3 times per day     Continuous Infusions:   dextrose       PRN Meds:.albuterol, fentanNYL, sodium chloride flush, acetaminophen, magnesium hydroxide, ondansetron, oxyCODONE-acetaminophen **OR** oxyCODONE-acetaminophen, cyclobenzaprine, glucose, dextrose, glucagon (rDNA), dextrose     Diagnostics:     CBC: Recent Labs      09/09/17   0529  09/10/17   0350  09/11/17   0509   WBC  12.6*  11.4*  13.2*   RBC  3.09*  2.98*  3.13*   HGB  8.1*  7.7*  8.3*   HCT  24.9*  24.1*  26.0*   MCV  80.6  80.7  83.1   RDW  18.2*  18.9*  19.5*   PLT  433  429  527*     BMP: Recent Labs      09/09/17   0529   09/09/17   2002  09/10/17   0350  09/10/17   0757  09/11/17   0509  09/11/17   0849   NA   --    < >  136   --   136   --   137   BUN  11   --    --   14   --   13   --    CREATININE  0.74   --    --   0.87   --   0.78   --     < > = values in this interval not displayed. BNP: No results for input(s): BNP in the last 72 hours. PT/INR: No results for input(s): PROTIME, INR in the last 72 hours. APTT: No results for input(s): APTT in the last 72 hours. CARDIAC ENZYMES: No results for input(s): CKMB, CKMBINDEX, TROPONINT in the last 72 hours. Invalid input(s): CKTOTAL;3  FASTING LIPID PANEL:  Lab Results   Component Value Date    CHOL 98 04/27/2017    HDL 34 (L) 04/27/2017    TRIG 165 (H) 09/04/2017     LIVER PROFILE: No results for input(s): AST, ALT, ALB, BILIDIR, BILITOT, ALKPHOS in the last 72 hours. I/O (24Hr):     Intake/Output Summary (Last 24 hours) at 09/11/17 1138  Last data filed at 09/11/17 1040   Gross per 24 hour   Intake              960 ml   Output              900 ml   Net               60 ml       Glu last 24 hour  Recent Labs      09/10/17   1124  09/10/17   1637  09/10/17   2022  09/11/17   0827   POCGLU  159*  189*  211*  302*       No results for input(s): CLARITYU, COLORU, PHUR, SPECGRAV, [address], RBCUA, BLOODU, BACTERIA, NITRU, [address], LEUKOCYTESUR, YEAST, GLUCOSEU, BILIRUBINUR in the last 72 hours. Impression    47 yo male s/p left BKA secondary to chronic diabetic wound    1.  L BKA - plan for revision 9/12  2. DM, neuropathy  3. HTN/HL  4. GERD  5. DVT proph - Heparin  6. Anemia  7. Leukocytosis  8. Uro - eval for retention-continue flomax, supportive care, continue timed double voids, PVRS, CIC for PVR>400 or patient discomfort, f/u outpatient urology 1-2 months       Rec  1. Dx - L BKA  2. Med nec - as above, uncontrolled DM, anemia, etc  3. Support - wife 24/7  4. Therapy - has PT/OT needs  5.. Rehab - would benefit from acute inpt rehab when med ready- will reeval after revision 9/12, work on preprosthetic train, collier, amb, adl, steps, family training, edema control, pain control, etc  6. Discussed with pt and nsg  7. Transition to oral pain meds after surgery        Josafat Mello MD

## 2017-09-11 NOTE — PROGRESS NOTES
with flap closure and pulse lavage    OTHER SURGICAL HISTORY Left 04/27/2017    I & D foot    ME DEEP DISSEC FOOT INFEC,1 BURSA Left 4/13/2017    FOOT DEBRIDEMENT WITH EPIFIX  performed by Jayde Szymanski DPM at [facility] Left 2014    hallux    TOE AMPUTATION Left 12/09/2016    hallux    TOE AMPUTATION Left 4/27/2017    I AND D FOOT, BONE BIOPSY, POSSIBLE FILET 2ND TOE performed by Jayde Szymanski DPM at [id number] [facility]  Restrictions/Precautions  Restrictions/Precautions: Weight Bearing, Surgical Protocols, Fall Risk, Contact Precautions  Required Braces or Orthoses?: No  Lower Extremity Weight Bearing Restrictions  Left Lower Extremity Weight Bearing: Non Weight Bearing  Position Activity Restriction  Other position/activity restrictions: Up with assist, New L BKA  Subjective   General  Patient assessed for rehabilitation services?: Yes  Family / Caregiver Present: No  Diagnosis: Diabetic L foot ulcer, S/P L BKA  Pain Assessment  Patient Currently in Pain: Denies  Response to Pain Intervention: Patient Satisfied   Objective    ADL  Feeding: Independent  Grooming: Stand by assistance;Setup (Seated in chair at tray table)  UE Bathing: Minimal assistance;Setup (w/back)  LE Bathing: Stand by assistance;Minimal assistance;Setup (Min assist w/bottom only, SBA for BLE and foot)  UE Dressing: Minimal assistance;Setup (w/gown)  LE Dressing: Stand by assistance;Setup (w/footie)  Toileting: Setup;Supervision (Using urinal)     Balance  Sitting Balance: Stand by assistance  Standing Balance: Contact guard assistance (w/SW)  Standing Balance  Time: Pt stood for approx 3-4 min w/SW for jean care and to change linen in chair  Sit to stand: Contact guard assistance  Stand to sit: Contact guard assistance  Transfers  Sit to stand: Contact guard assistance  Stand to sit: Contact guard assistance       Assessment   Performance deficits / Impairments: Decreased functional mobility ; Decreased above).      [id number] RAPHAEL, SIMPSON/L

## 2017-09-11 NOTE — PROGRESS NOTES
(Oral)   Resp 18  Ht 5' 6.93\" (1.7 m)  Wt 200 lb 9.9 oz (91 kg)  SpO2 94%  BMI 31.49 kg/m2    Last Body weight:   Wt Readings from Last 3 Encounters:   09/08/17 200 lb 9.9 oz (91 kg)   04/27/17 204 lb 2.3 oz (92.6 kg)   04/13/17 202 lb 9.6 oz (91.9 kg)       Intake and Output summary:     Intake/Output Summary (Last 24 hours) at 09/11/17 0755  Last data filed at 09/11/17 0500   Gross per 24 hour   Intake              960 ml   Output             1250 ml   Net             -290 ml         PHYSICAL EXAMINATION :    General appearance - alert, well appearing, and in no distress and oriented to person, place, and time  Mental status - alert, oriented to person, place, and time, normal mood, behavior, speech, dress, motor activity, and thought processes  Eyes - pupils equal and reactive   Ears - hearing grossly normal bilaterally  Nose - normal and patent  Mouth - mucous membranes moist  Neck - supple, no significant adenopathy  Chest - clear to auscultation, no wheezes  Heart - normal rate, regular rhythm, normal S1, S2, no murmurs  Abdomen - soft, nontender, nondistended, no masses or organomegaly, bowel sounds normal, no abdominal bruits, no pulsatile masses  Neurological - alert, oriented, normal speech, gait not assessed  Extremities - left BKA, dressing in place  Skin - small hematoma on his belly at the site of heparin pokes, dressing of left foot intact.        Medications:    Scheduled Meds:   insulin lispro  0-12 Units Subcutaneous TID     insulin lispro  0-6 Units Subcutaneous Nightly    insulin glargine  5 Units Subcutaneous Nightly    sodium hypochlorite   Irrigation Daily    folic acid  1 mg Oral Daily    tamsulosin  0.4 mg Oral Daily    sodium chloride flush  10 mL Intravenous 2 times per day    amLODIPine  10 mg Oral Daily    hydrALAZINE  100 mg Oral TID    lisinopril  40 mg Oral Daily    metoprolol tartrate  50 mg Oral BID    pravastatin  10 mg Oral Daily    timolol  1 drop Both Eyes BID

## 2017-09-11 NOTE — PROGRESS NOTES
Podiatry Inpatient Progress Note  CC: LLE gangrene, osteomyelitis, PAD    Subjective   Patient seen and examined at bedside at bedside. POD#4 guillotine amputation left foot (DOS: 9/7/17). Had asthma exacerbation last night, relieved with breathing treatment. Afebrile, continued HTN otherwise VSS. Pt seems to be in good spirits and ready for BKA revision tomorrow. HPI 8/31/17:  Alondra Galicia is a 46 y.o. male who presents to the hospital from Dr. Tali Benites office. Patient states that he has had wounds on the left foot for sometime now however they were almost healed. Patient states that on Saturday he fell and since then his wounds have worsened. Patient states that it is painful to move. Patient states that the little toe has become black. Patient denies any N/V/F/C. Pt denies any shortness of breath. ROS: Negative except for HPI. Denies N/V/F/C/SOB/CP.     Medications:  Scheduled Meds:   insulin lispro  0-12 Units Subcutaneous TID WC    insulin lispro  0-6 Units Subcutaneous Nightly    insulin glargine  5 Units Subcutaneous Nightly    sodium hypochlorite   Irrigation Daily    folic acid  1 mg Oral Daily    tamsulosin  0.4 mg Oral Daily    sodium chloride flush  10 mL Intravenous 2 times per day    amLODIPine  10 mg Oral Daily    hydrALAZINE  100 mg Oral TID    lisinopril  40 mg Oral Daily    metoprolol tartrate  50 mg Oral BID    pravastatin  10 mg Oral Daily    timolol  1 drop Both Eyes BID    latanoprost  1 drop Both Eyes Nightly    pregabalin  25 mg Oral TID    pantoprazole  20 mg Oral BID    vancomycin  1,750 mg Intravenous Q24H    heparin (porcine)  5,000 Units Subcutaneous 3 times per day       Continuous Infusions:   dextrose         PRN Meds:albuterol, fentanNYL, sodium chloride flush, acetaminophen, magnesium hydroxide, ondansetron, oxyCODONE-acetaminophen **OR** oxyCODONE-acetaminophen, cyclobenzaprine, glucose, dextrose, glucagon (rDNA), dextrose    Objective Vitals:  Patient Vitals for the past 8 hrs:   BP Temp Temp src Pulse Resp SpO2   17 0338 (!) 189/63 98.2 °F (36.8 °C) Oral 75 18 94 %   09/10/17 2350 (!) 154/52 98.1 °F (36.7 °C) Oral 73 23 93 %     Average, Min, and Max for last 24 hours Vitals:  TEMPERATURE:  Temp  Av °F (36.7 °C)  Min: 97.7 °F (36.5 °C)  Max: 98.2 °F (36.8 °C)    RESPIRATIONS RANGE: Resp  Av.8  Min: 18  Max: 23    PULSE RANGE: Pulse  Av.5  Min: 73  Max: 78    BLOOD PRESSURE RANGE:  Systolic (85NSJ), PE , Min:149 , QKQ:271   ; Diastolic (79LYX), URL:87, Min:52, Max:63      PULSE OXIMETRY RANGE: SpO2  Av %  Min: 93 %  Max: 98 %    I/O last 3 completed shifts: In: 4926 [P.O.:1210; I.V.:510]  Out: 1725 [Urine:1725]    CBC:  Recent Labs      17   0529  09/10/17   0350  17   0509   WBC  12.6*  11.4*  13.2*   HGB  8.1*  7.7*  8.3*   HCT  24.9*  24.1*  26.0*   PLT  433  429  527*        BMP:  Recent Labs      17   0529  17   0840  09/09/17   2002  09/10/17   0350  09/10/17   0757  17   0509   NA   --   137  136   --   136   --    BUN  11   --    --   14   --   13   CREATININE  0.74   --    --   0.87   --   0.78        Coags:  No results for input(s): APTT, PROT, INR in the last 72 hours. Physical Exam:   General: Awake, alert, and oriented to person, place, and time. No acute distress. Heart: RRR, normal S1/S2  Lungs: CTAB, no increased effort, equal air entry   Abdomen: Soft, non-tender to palpation. Not distended. +Bowel sounds in all 4 quadrants. Lower Extremity Physical Exam:  Dressing left intact to left leg. No strike through noted. PVR PPG 17:  LALI Right: 0.82 Left 1.12    Assessment    Patient is a 45 yo male with      1. Chronic diabetic neuropathic ulceration, left foot   2. Osteomyelitis of the left foot    3. Dry gangrene left 5th digit. 4. Severe PAD  5. Leukocytosis: 20 --> 17  6.  S/p guillotine amputation left foot on 17    Principal Problem:

## 2017-09-11 NOTE — PROGRESS NOTES
[facility] - Daily Progress Note  Today's Date and Time: 9/11/2017, 10:17 AM    Impression :   · Left diabetic foot gangrene with possible gas formation  · Osteo 3rd and 4th, 5th MTS heads with abscesses  · Calcaneal osteo  · Left foot abscess - MRSA - susceptible to vanc, clinda, tetracycline, tmp-smx, gentamycin  · S/p left leg guillotine amputation 9/7/2017   · Leukocytosis - improving  · Diabetes mellitus with neuropathy : uncontrolled    Recommendations:   · Going for a revision of the stump due to persistent purulence from he stump  · Continue IV Vancomycin 1750mg Q24 . Plan to d/C  24 hr post second stage BKA  · Will ask cx intra op today  · Monitor creatinine: 0.78 (9/11)  · Monitor vancomycin trough, goal trough levels 14-17 mcg/ml: 15.2 on 9-3-17    Interval History:   INITIAL HISTORY:     Alondra Galicia is a 46 y.o.-male who was initially admitted on 8/31/2017.      He presented to the hospital from Dr Tali Benites office where he was found to have blackening of his left little toe with wounds over his foot . The patient had wounds on his left foot before which were healing and then he fell on Saturday which made the wounds worse. He has had amputation of his left toe before. No fever, chills, nausea or vomiting.      MRI of left foot shows osteomyelitis of the 1st and 3rd metatarsal, calcaneus and also of the 5th metatarsals , diffuse infectious myositis is seen. Concern for soft tissue gas and necrotizing infection. abscess formation measures 1.4 x 1.7 x 1.9 cm.lateral aspect of the foot.     CURRENT EVALUATION  :9/11/2017  Afebrile  VS stable, except HTN  Denies n/v/c/d  Purulence noted medial wound per Vascular note  Second stage BKA revision planned for 9/12 at 10AM    WBC: 15.7 - 12.6 - 11.4 - 13.2 9/11/2017    Cr: 0.74  - 0.87 - 0.78 (9/11)    Discussed with patient, family and RN.      Physical Examination :     Patient Vitals for the past 8 hrs:   BP Temp Temp src Pulse Resp SpO2   17 0818 (!) 173/81 97.4 °F (36.3 °C) Axillary 77 21 96 %   17 0338 (!) 189/63 98.2 °F (36.8 °C) Oral 75 18 94 %     Temp (24hrs), Av.9 °F (36.6 °C), Min:97.4 °F (36.3 °C), Max:98.2 °F (36.8 °C)    General Appearance: Awake, alert, and in no apparent distress  Eyes: Sclera anicteric; conjunctivae pink. ENT:Oropharynx clear, without erythema, exudate, or thrush. No nasal drainage. Neck: Supple  Pulmonary/Chest: Clear to auscultation, without wheezes, rales, or rhonchi. No areas of consolidation. Good air movement bilaterally. No egophony. Cardiovascular:Regular rate and rhythm without murmurs, rubs, or gallops. S1 and S2 normal.  Abdomen: Soft, non tender. Bowel sounds normal.   Extremities: left LE wound bandage is clean, dry and intact. Neurologic: No gross sensory or motor deficits. Skin: No rash or lesions. IV site inspected: no inflammation.     Medical Decision Making:   I have independently reviewed/ordered the following labs:    CBC with Differential:   Recent Labs      09/10/17   0350  17   0509   WBC  11.4*  13.2*   HGB  7.7*  8.3*   HCT  24.1*  26.0*   PLT  429  527*     BMP:   Recent Labs      09/10/17   0350  09/10/17   0757  17   0509  17   0849   NA   --   136   --   137   BUN  14   --   13   --    CREATININE  0.87   --   0.78   --        Lab Results   Component Value Date    [facility] 15.2 2017        Medications:      insulin lispro  0-12 Units Subcutaneous TID     insulin lispro  0-6 Units Subcutaneous Nightly    insulin glargine  5 Units Subcutaneous Nightly    sodium hypochlorite   Irrigation Daily    folic acid  1 mg Oral Daily    tamsulosin  0.4 mg Oral Daily    sodium chloride flush  10 mL Intravenous 2 times per day    amLODIPine  10 mg Oral Daily    hydrALAZINE  100 mg Oral TID    lisinopril  40 mg Oral Daily    metoprolol tartrate  50 mg Oral BID    pravastatin  10 mg Oral Daily    timolol  1 drop Both Eyes BID  latanoprost  1 drop Both Eyes Nightly    pregabalin  25 mg Oral TID    pantoprazole  20 mg Oral BID    vancomycin  1,750 mg Intravenous Q24H    heparin (porcine)  5,000 Units Subcutaneous 3 times per day       Thank you for allowing us to participate in the care of this patient. Please call with questions. Mary White  Podiatric Medicine PGY1    I have examined the patient, reviewed the patient's medical history in detail and updated the computerized patient record. Above exam and data confirmed.   Colleen Chavira, Infectious Diseases

## 2017-09-11 NOTE — PROGRESS NOTES
Vascular Surgery Progress Note         PATIENT NAME: Jose Barger     TODAY'S DATE: 9/11/2017, 6:27 AM      SUBJECTIVE:    Pt seen and examined. No complaints over night. Tolerating diet. Pain well controlled. Denies any F/C. No other complaints noted. OBJECTIVE:   Vitals:  BP (!) 189/63  Pulse 75  Temp 98.2 °F (36.8 °C) (Oral)   Resp 18  Ht 5' 6.93\" (1.7 m)  Wt 200 lb 9.9 oz (91 kg)  SpO2 94%  BMI 31.49 kg/m2     INTAKE/OUTPUT:      Intake/Output Summary (Last 24 hours) at 09/11/17 1480  Last data filed at 09/11/17 0500   Gross per 24 hour   Intake              960 ml   Output             1250 ml   Net             -290 ml                 GENERAL: AOx3, NAD  HEENT: EOMI bilaterally  CARDIAC: Regular rate and rhythm. ABDOMEN: Soft, NT, ND, Active Bowel Sounds  NEURO: Gross motor intact. INCISION: L BKA guillotine amp site dressing taken down.  seropus drainage noted through penrose, more clear fluid than yesterday. Minimal pus like drainage medial to it. Other muscle and subcutaneous tissue looking healthy otherwise.             Data:  CBC with Differential:    Lab Results   Component Value Date    WBC 13.2 09/11/2017    RBC 3.13 09/11/2017    HGB 8.3 09/11/2017    HCT 26.0 09/11/2017     09/11/2017    MCV 83.1 09/11/2017    MCH 26.3 09/11/2017    MCHC 31.7 09/11/2017    RDW 19.5 09/11/2017    LYMPHOPCT 4 09/01/2017    MONOPCT 6 09/01/2017    BASOPCT 0 09/01/2017    MONOSABS 1.65 09/01/2017    LYMPHSABS 1.10 09/01/2017    EOSABS 0.00 09/01/2017    BASOSABS 0.00 09/01/2017    DIFFTYPE NOT REPORTED 09/01/2017     CMP:    Lab Results   Component Value Date     09/10/2017    K 4.1 09/07/2017     09/07/2017    CO2 19 09/07/2017    BUN 14 09/10/2017    CREATININE 0.87 09/10/2017    GFRAA >60 09/10/2017    LABGLOM >60 09/10/2017    GLUCOSE 113 09/07/2017    LABALBU 4.0 07/13/2012    CALCIUM 7.9 09/07/2017    BILITOT 0.20 07/13/2012    ALKPHOS 76 07/13/2012    AST 18 07/13/2012    ALT 22 07/13/2012     PT/INR:  No results found for: PROTIME, INR  HgBA1c:  No components found for: HGBA1C      ASSESSMENT   1. 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 Hx of DM    PLAN  1. Tolerating diet. Encourage eating. NPO at MN. 2. Pain control with Norco and Fentanyl PRN. 3. On IV Vanco.  F/u ID recs. 4. 1/4 strength Dakins used for wet to dry dressing due to continuation of pus like drainage on medial portion of amputation site. 5. Continue medical management per primary. 6. Hold DVT prophylaxis on 9/12 starting at 4 AM.  Epic order in place. 7. Planned for L BKA revision on 9/12 at 10 AM.  This procedure, including risks, benefits, alternatives and complications have been fully reviewed with patient and informed consent has been obtained. All questions were answered appropriately.     Electronically signed by Rios Jade DO  on 9/11/2017 at 6:27 AM

## 2017-09-11 NOTE — PROGRESS NOTES
Physical Therapy    Facility/Department: [facility] CAR 1  Re-Assessment    NAME: Ollie Mcneil  : 1965  MRN: [id number]  Re-evaluation completed following LEFT BELOW [address]  Date of Service: 2017    Patient Diagnosis(es):   Patient Active Problem List    Diagnosis Date Noted    Subacute osteomyelitis of left foot ([address] 2017    Urinary retention 2017    Ischemic foot left 2017    Gangrene of foot ([address] 2017    Diabetic foot left 2017    Acute kidney injury ([address] 2017    Hyperlipidemia     Dysphagia 2013    Family history of colon cancer 2013    Bowel habit changes 2013    Uncontrolled hypertension     IDDM (insulin dependent diabetes mellitus) ([address]     Neuropathy ([address]     Uncontrolled type 2 diabetes with cellulitis of foot ([address]     Cerebrovascular disease     Cataract        Past Medical History:   Diagnosis Date    Acid reflux     Cerebrovascular disease 2006    TIA    Glaucoma     Hyperlipidemia     Hypertension     [facility] mellitus) [facility]     MDRO (multiple drug resistant organisms) resistance     MRSA (methicillin resistant staph aureus) culture positive 2017    foot    Neuropathy ([address]     Osteomyelitis ([address]     Left Hallux    S/P cataract extraction and insertion of intraocular lens     bilateral     Type II or unspecified type diabetes mellitus without mention of complication, not stated as uncontrolled      Past Surgical History:   Procedure Laterality Date    EYE SURGERY Bilateral     lazer both eyes    FOOT SURGERY Left 2017    surgical prep of wound bed    FOOT SURGERY Left 2017    1) Left foot surgical preparation of wound-bed, 2) Left foot application of graft     [facility] DOUBLE  2017         LEG AMPUTATION BELOW KNEE Left 2017    LEFT BELOW KNEE GUILLOTINE AMPUTATION performed by Grey Mcnamara MD at [facility],

## 2017-09-12 ENCOUNTER — ANESTHESIA (OUTPATIENT)
Dept: OPERATING ROOM | Age: 52
DRG: 239 | End: 2017-09-12
Payer: COMMERCIAL

## 2017-09-12 VITALS — SYSTOLIC BLOOD PRESSURE: 130 MMHG | OXYGEN SATURATION: 91 % | TEMPERATURE: 98.4 F | DIASTOLIC BLOOD PRESSURE: 63 MMHG

## 2017-09-12 LAB
BLOOD BANK SPECIMEN: NORMAL
BUN BLDV-MCNC: 12 MG/DL (ref 6–20)
CREAT SERPL-MCNC: 0.75 MG/DL (ref 0.7–1.2)
GFR AFRICAN AMERICAN: >60 ML/MIN
GFR NON-AFRICAN AMERICAN: >60 ML/MIN
GFR SERPL CREATININE-BSD FRML MDRD: NORMAL ML/MIN/{1.73_M2}
GFR SERPL CREATININE-BSD FRML MDRD: NORMAL ML/MIN/{1.73_M2}
GLUCOSE BLD-MCNC: 162 MG/DL (ref 75–110)
GLUCOSE BLD-MCNC: 207 MG/DL (ref 75–110)
GLUCOSE BLD-MCNC: 223 MG/DL (ref 75–110)
GLUCOSE BLD-MCNC: 227 MG/DL (ref 75–110)
HCT VFR BLD CALC: 21.6 % (ref 41–53)
HCT VFR BLD CALC: 24.3 % (ref 41–53)
HEMOGLOBIN: 6.9 G/DL (ref 13.5–17.5)
HEMOGLOBIN: 7.7 G/DL (ref 13.5–17.5)
MCH RBC QN AUTO: 26.4 PG (ref 26–34)
MCHC RBC AUTO-ENTMCNC: 31.8 G/DL (ref 31–37)
MCV RBC AUTO: 83.1 FL (ref 80–100)
PDW BLD-RTO: 19.7 % (ref 12.5–15.4)
PLATELET # BLD: 512 K/UL (ref 140–450)
PMV BLD AUTO: 8.8 FL (ref 6–12)
RBC # BLD: 2.92 M/UL (ref 4.5–5.9)
SODIUM BLD-SCNC: 138 MMOL/L (ref 135–144)
SODIUM BLD-SCNC: 142 MMOL/L (ref 135–144)
WBC # BLD: 12 K/UL (ref 3.5–11)

## 2017-09-12 PROCEDURE — 88307 TISSUE EXAM BY PATHOLOGIST: CPT

## 2017-09-12 PROCEDURE — 0Y6J0Z1 DETACHMENT AT LEFT LOWER LEG, HIGH, OPEN APPROACH: ICD-10-PCS | Performed by: SURGERY

## 2017-09-12 PROCEDURE — 82565 ASSAY OF CREATININE: CPT

## 2017-09-12 PROCEDURE — 2580000003 HC RX 258: Performed by: NURSE ANESTHETIST, CERTIFIED REGISTERED

## 2017-09-12 PROCEDURE — 36430 TRANSFUSION BLD/BLD COMPNT: CPT

## 2017-09-12 PROCEDURE — 2580000003 HC RX 258: Performed by: INTERNAL MEDICINE

## 2017-09-12 PROCEDURE — 6370000000 HC RX 637 (ALT 250 FOR IP): Performed by: STUDENT IN AN ORGANIZED HEALTH CARE EDUCATION/TRAINING PROGRAM

## 2017-09-12 PROCEDURE — 6360000002 HC RX W HCPCS: Performed by: INTERNAL MEDICINE

## 2017-09-12 PROCEDURE — 36415 COLL VENOUS BLD VENIPUNCTURE: CPT

## 2017-09-12 PROCEDURE — 84520 ASSAY OF UREA NITROGEN: CPT

## 2017-09-12 PROCEDURE — 3600000003 HC SURGERY LEVEL 3 BASE: Performed by: SURGERY

## 2017-09-12 PROCEDURE — 97110 THERAPEUTIC EXERCISES: CPT

## 2017-09-12 PROCEDURE — 2580000003 HC RX 258: Performed by: PODIATRIST

## 2017-09-12 PROCEDURE — 3700000000 HC ANESTHESIA ATTENDED CARE: Performed by: SURGERY

## 2017-09-12 PROCEDURE — 97116 GAIT TRAINING THERAPY: CPT

## 2017-09-12 PROCEDURE — 6360000002 HC RX W HCPCS: Performed by: SURGERY

## 2017-09-12 PROCEDURE — 94770 HC ETCO2 MONITOR DAILY: CPT

## 2017-09-12 PROCEDURE — 86901 BLOOD TYPING SEROLOGIC RH(D): CPT

## 2017-09-12 PROCEDURE — 6360000002 HC RX W HCPCS: Performed by: PODIATRIST

## 2017-09-12 PROCEDURE — 85018 HEMOGLOBIN: CPT

## 2017-09-12 PROCEDURE — P9016 RBC LEUKOCYTES REDUCED: HCPCS

## 2017-09-12 PROCEDURE — 2500000003 HC RX 250 WO HCPCS: Performed by: NURSE ANESTHETIST, CERTIFIED REGISTERED

## 2017-09-12 PROCEDURE — A6550 NEG PRES WOUND THER DRSG SET: HCPCS | Performed by: SURGERY

## 2017-09-12 PROCEDURE — 86900 BLOOD TYPING SEROLOGIC ABO: CPT

## 2017-09-12 PROCEDURE — 97530 THERAPEUTIC ACTIVITIES: CPT

## 2017-09-12 PROCEDURE — 99232 SBSQ HOSP IP/OBS MODERATE 35: CPT | Performed by: INTERNAL MEDICINE

## 2017-09-12 PROCEDURE — 82947 ASSAY GLUCOSE BLOOD QUANT: CPT

## 2017-09-12 PROCEDURE — 3700000001 HC ADD 15 MINUTES (ANESTHESIA): Performed by: SURGERY

## 2017-09-12 PROCEDURE — 6370000000 HC RX 637 (ALT 250 FOR IP): Performed by: PODIATRIST

## 2017-09-12 PROCEDURE — 85027 COMPLETE CBC AUTOMATED: CPT

## 2017-09-12 PROCEDURE — 85014 HEMATOCRIT: CPT

## 2017-09-12 PROCEDURE — 2060000000 HC ICU INTERMEDIATE R&B

## 2017-09-12 PROCEDURE — 86850 RBC ANTIBODY SCREEN: CPT

## 2017-09-12 PROCEDURE — 3600000013 HC SURGERY LEVEL 3 ADDTL 15MIN: Performed by: SURGERY

## 2017-09-12 PROCEDURE — 27886 AMPUTATION FOLLOW-UP SURGERY: CPT | Performed by: SURGERY

## 2017-09-12 PROCEDURE — 86920 COMPATIBILITY TEST SPIN: CPT

## 2017-09-12 PROCEDURE — 6360000002 HC RX W HCPCS: Performed by: NURSE ANESTHETIST, CERTIFIED REGISTERED

## 2017-09-12 PROCEDURE — 84295 ASSAY OF SERUM SODIUM: CPT

## 2017-09-12 PROCEDURE — 6360000002 HC RX W HCPCS: Performed by: STUDENT IN AN ORGANIZED HEALTH CARE EDUCATION/TRAINING PROGRAM

## 2017-09-12 RX ORDER — ROCURONIUM BROMIDE 10 MG/ML
INJECTION, SOLUTION INTRAVENOUS PRN
Status: DISCONTINUED | OUTPATIENT
Start: 2017-09-12 | End: 2017-09-12 | Stop reason: SDUPTHER

## 2017-09-12 RX ORDER — ONDANSETRON 2 MG/ML
INJECTION INTRAMUSCULAR; INTRAVENOUS PRN
Status: DISCONTINUED | OUTPATIENT
Start: 2017-09-12 | End: 2017-09-12 | Stop reason: SDUPTHER

## 2017-09-12 RX ORDER — GLYCOPYRROLATE 0.2 MG/ML
INJECTION INTRAMUSCULAR; INTRAVENOUS PRN
Status: DISCONTINUED | OUTPATIENT
Start: 2017-09-12 | End: 2017-09-12 | Stop reason: SDUPTHER

## 2017-09-12 RX ORDER — PROPOFOL 10 MG/ML
INJECTION, EMULSION INTRAVENOUS PRN
Status: DISCONTINUED | OUTPATIENT
Start: 2017-09-12 | End: 2017-09-12 | Stop reason: SDUPTHER

## 2017-09-12 RX ORDER — SODIUM CHLORIDE 9 MG/ML
INJECTION, SOLUTION INTRAVENOUS CONTINUOUS PRN
Status: DISCONTINUED | OUTPATIENT
Start: 2017-09-12 | End: 2017-09-12 | Stop reason: SDUPTHER

## 2017-09-12 RX ORDER — FENTANYL CITRATE 50 UG/ML
INJECTION, SOLUTION INTRAMUSCULAR; INTRAVENOUS PRN
Status: DISCONTINUED | OUTPATIENT
Start: 2017-09-12 | End: 2017-09-12 | Stop reason: SDUPTHER

## 2017-09-12 RX ORDER — NALOXONE HYDROCHLORIDE 0.4 MG/ML
0.4 INJECTION, SOLUTION INTRAMUSCULAR; INTRAVENOUS; SUBCUTANEOUS PRN
Status: DISCONTINUED | OUTPATIENT
Start: 2017-09-12 | End: 2017-09-13

## 2017-09-12 RX ORDER — HEPARIN SODIUM 5000 [USP'U]/ML
5000 INJECTION, SOLUTION INTRAVENOUS; SUBCUTANEOUS EVERY 8 HOURS SCHEDULED
Status: DISCONTINUED | OUTPATIENT
Start: 2017-09-13 | End: 2017-09-16 | Stop reason: HOSPADM

## 2017-09-12 RX ORDER — METOCLOPRAMIDE HYDROCHLORIDE 5 MG/ML
10 INJECTION INTRAMUSCULAR; INTRAVENOUS EVERY 6 HOURS PRN
Status: DISCONTINUED | OUTPATIENT
Start: 2017-09-12 | End: 2017-09-16 | Stop reason: HOSPADM

## 2017-09-12 RX ORDER — MIDAZOLAM HYDROCHLORIDE 1 MG/ML
INJECTION INTRAMUSCULAR; INTRAVENOUS PRN
Status: DISCONTINUED | OUTPATIENT
Start: 2017-09-12 | End: 2017-09-12 | Stop reason: SDUPTHER

## 2017-09-12 RX ORDER — LIDOCAINE HYDROCHLORIDE 10 MG/ML
INJECTION, SOLUTION EPIDURAL; INFILTRATION; INTRACAUDAL; PERINEURAL PRN
Status: DISCONTINUED | OUTPATIENT
Start: 2017-09-12 | End: 2017-09-12 | Stop reason: SDUPTHER

## 2017-09-12 RX ORDER — 0.9 % SODIUM CHLORIDE 0.9 %
250 INTRAVENOUS SOLUTION INTRAVENOUS ONCE
Status: DISCONTINUED | OUTPATIENT
Start: 2017-09-12 | End: 2017-09-16 | Stop reason: HOSPADM

## 2017-09-12 RX ADMIN — VANCOMYCIN HYDROCHLORIDE 1750 MG: 10 INJECTION, POWDER, LYOPHILIZED, FOR SOLUTION INTRAVENOUS at 00:39

## 2017-09-12 RX ADMIN — METOPROLOL TARTRATE 50 MG: 50 TABLET, FILM COATED ORAL at 08:23

## 2017-09-12 RX ADMIN — INSULIN LISPRO 2 UNITS: 100 INJECTION, SOLUTION INTRAVENOUS; SUBCUTANEOUS at 21:27

## 2017-09-12 RX ADMIN — HYDRALAZINE HYDROCHLORIDE 100 MG: 50 TABLET, FILM COATED ORAL at 23:02

## 2017-09-12 RX ADMIN — PREGABALIN 25 MG: 25 CAPSULE ORAL at 23:02

## 2017-09-12 RX ADMIN — OXYCODONE HYDROCHLORIDE AND ACETAMINOPHEN 1 TABLET: 5; 325 TABLET ORAL at 23:03

## 2017-09-12 RX ADMIN — OXYCODONE HYDROCHLORIDE AND ACETAMINOPHEN 2 TABLET: 5; 325 TABLET ORAL at 19:20

## 2017-09-12 RX ADMIN — FENTANYL CITRATE 50 MCG: 50 INJECTION INTRAMUSCULAR; INTRAVENOUS at 14:32

## 2017-09-12 RX ADMIN — ONDANSETRON 4 MG: 2 INJECTION INTRAMUSCULAR; INTRAVENOUS at 16:50

## 2017-09-12 RX ADMIN — FENTANYL CITRATE 50 MCG: 50 INJECTION INTRAMUSCULAR; INTRAVENOUS at 15:41

## 2017-09-12 RX ADMIN — METOCLOPRAMIDE 10 MG: 5 INJECTION, SOLUTION INTRAMUSCULAR; INTRAVENOUS at 21:27

## 2017-09-12 RX ADMIN — TIMOLOL MALEATE 1 DROP: 2.5 SOLUTION/ DROPS OPHTHALMIC at 21:29

## 2017-09-12 RX ADMIN — AMLODIPINE BESYLATE 10 MG: 10 TABLET ORAL at 08:23

## 2017-09-12 RX ADMIN — PREGABALIN 25 MG: 25 CAPSULE ORAL at 16:55

## 2017-09-12 RX ADMIN — Medication 1 MG: at 16:02

## 2017-09-12 RX ADMIN — FENTANYL CITRATE 25 MCG: 50 INJECTION INTRAMUSCULAR; INTRAVENOUS at 16:01

## 2017-09-12 RX ADMIN — TAMSULOSIN HYDROCHLORIDE 0.4 MG: 0.4 CAPSULE ORAL at 16:55

## 2017-09-12 RX ADMIN — INSULIN LISPRO 4 UNITS: 100 INJECTION, SOLUTION INTRAVENOUS; SUBCUTANEOUS at 18:13

## 2017-09-12 RX ADMIN — TIMOLOL MALEATE 1 DROP: 2.5 SOLUTION/ DROPS OPHTHALMIC at 08:28

## 2017-09-12 RX ADMIN — LATANOPROST 1 DROP: 50 SOLUTION OPHTHALMIC at 21:29

## 2017-09-12 RX ADMIN — FENTANYL CITRATE 25 MCG: 50 INJECTION INTRAMUSCULAR; INTRAVENOUS at 11:33

## 2017-09-12 RX ADMIN — FENTANYL CITRATE 50 MCG: 50 INJECTION INTRAMUSCULAR; INTRAVENOUS at 14:33

## 2017-09-12 RX ADMIN — PANTOPRAZOLE SODIUM 20 MG: 20 TABLET, DELAYED RELEASE ORAL at 23:02

## 2017-09-12 RX ADMIN — HYDRALAZINE HYDROCHLORIDE 100 MG: 50 TABLET, FILM COATED ORAL at 08:24

## 2017-09-12 RX ADMIN — Medication 10 ML: at 08:27

## 2017-09-12 RX ADMIN — INSULIN GLARGINE 5 UNITS: 100 INJECTION, SOLUTION SUBCUTANEOUS at 21:26

## 2017-09-12 RX ADMIN — FENTANYL CITRATE 50 MCG: 50 INJECTION INTRAMUSCULAR; INTRAVENOUS at 15:40

## 2017-09-12 RX ADMIN — METOPROLOL TARTRATE 50 MG: 50 TABLET, FILM COATED ORAL at 23:02

## 2017-09-12 RX ADMIN — LISINOPRIL 40 MG: 20 TABLET ORAL at 08:24

## 2017-09-12 RX ADMIN — HYDRALAZINE HYDROCHLORIDE 100 MG: 50 TABLET, FILM COATED ORAL at 16:55

## 2017-09-12 ASSESSMENT — PAIN DESCRIPTION - PAIN TYPE
TYPE: SURGICAL PAIN
TYPE: SURGICAL PAIN

## 2017-09-12 ASSESSMENT — PAIN SCALES - GENERAL
PAINLEVEL_OUTOF10: 10
PAINLEVEL_OUTOF10: 0
PAINLEVEL_OUTOF10: 10
PAINLEVEL_OUTOF10: 8
PAINLEVEL_OUTOF10: 9

## 2017-09-12 NOTE — PROGRESS NOTES
Physical Therapy  Facility/Department: [facility] CAR 1  Daily Treatment Note  NAME: Adri Erazo  : 1965  MRN: [id number]    Date of Service: 2017    Patient Diagnosis(es):   Patient Active Problem List    Diagnosis Date Noted    Subacute osteomyelitis of left foot ([address] 2017    Urinary retention 2017    Ischemic foot left 2017    Gangrene of foot ([address] 2017    Diabetic foot left 2017    Acute kidney injury ([address] 2017    Hyperlipidemia     Dysphagia 2013    Family history of colon cancer 2013    Bowel habit changes 2013    Uncontrolled hypertension     IDDM (insulin dependent diabetes mellitus) ([address]     Neuropathy (HCC)     Uncontrolled type 2 diabetes with cellulitis of foot ([address]     Cerebrovascular disease     Cataract        Past Medical History:   Diagnosis Date    Acid reflux     Cerebrovascular disease 2006    TIA    Glaucoma     Hyperlipidemia     Hypertension     IDDM (insulin dependent diabetes mellitus) (HCC)     MDRO (multiple drug resistant organisms) resistance     MRSA (methicillin resistant staph aureus) culture positive 2017    foot    Neuropathy ([address]     Osteomyelitis ([address]     Left Hallux    S/P cataract extraction and insertion of intraocular lens     bilateral     Type II or unspecified type diabetes mellitus without mention of complication, not stated as uncontrolled      Past Surgical History:   Procedure Laterality Date    EYE SURGERY Bilateral     lazer both eyes    FOOT SURGERY Left 2017    surgical prep of wound bed    FOOT SURGERY Left 2017    1) Left foot surgical preparation of wound-bed, 2) Left foot application of graft     [facility] DOUBLE  2017         LEG AMPUTATION BELOW KNEE Left 2017    LEFT BELOW KNEE GUILLOTINE AMPUTATION performed by Sonam Molina MD at [address] [place] [address] Left 2017    I and D bone, bone biopsy with flap closure and pulse lavage    OTHER SURGICAL HISTORY Left 04/27/2017    I & D foot    CA DEEP DISSEC FOOT INFEC,1 BURSA Left 4/13/2017    FOOT DEBRIDEMENT WITH EPIFIX  performed by Mary Montero DPM at [facility] Left 2014    hallux    TOE AMPUTATION Left 12/09/2016    hallux    TOE AMPUTATION Left 4/27/2017    I AND D FOOT, BONE BIOPSY, POSSIBLE FILET 2ND TOE performed by Mary Montero DPM at [address] [place] [address]  Restrictions/Precautions  Restrictions/Precautions: Weight Bearing, Surgical Protocols, Fall Risk, Contact Precautions  Required Braces or Orthoses?: No  Lower Extremity Weight Bearing Restrictions  Left Lower Extremity Weight Bearing: Non Weight Bearing  Position Activity Restriction  Other position/activity restrictions: Up with assist, New L BKA  Subjective    Pt sitting up on the EOB upon entry. Pt's wife is present. Pt has no c/o pain  Orientation    Overall Orientation Status: Within Normal Limits  Objective     Bed mobility  Scooting: Modified independent  Transfers  Sit to Stand: Contact guard assistance  Stand to sit: Contact guard assistance  Stand Pivot Transfers: Contact guard assistance  Comment: vc's for UE placement during transfers with fair carryover  Ambulation  Ambulation?: Yes  WB Status: NWB LLE  Ambulation 1  Surface: level tile  Device: Rolling Walker  Assistance: Contact guard assistance  Quality of Gait: NWB LLE,no LOB, decreased savanah  Distance: 35ft with one standing rest break. Stairs/Curb  Stairs?: No     Balance  Posture: Good  Sitting - Static: Good  Sitting - Dynamic: Good  Standing - Static: Fair;+  Standing - Dynamic: Fair  Comments: standing balance assessed with RW  Exercises    Upper extremity exercises: Bicep curl, shoulder flexion/extension, punches. Reps: 20 x each with 2 lb wt on a SPC. 4LBS on SPC with Bicep ex's. Seated LE exercise program: Long Arc Quads,heel/toe raises, and marches.  Reps: 20 x each with 4 lb wt on R

## 2017-09-12 NOTE — PROGRESS NOTES
1. 46 y.o. Male POD#0 s/p L BKA revision without closure of wound, wound vac placement   2. POD#5 L BKA guillotine amputation 2/2 gangrenous L foot  3. Hx of DM      Plan  1. Ok for diet once patient more awake. 2. Recommend continuation of IV abx. F/u ID recs. 3. Medical management per primary service. 4. Pain control with Fentanyl PCA. Norco PRN once tolerating diet.   5. Will plan to take patient back to OR for L BKA revision on 9/14 at 9 AM.    Electronically signed by Chase May DO  on 9/12/2017 at 7:10 PM

## 2017-09-12 NOTE — PROGRESS NOTES
[facility] [address]  Occupational Therapy Not Seen Note    Patient not available for Occupational Therapy due to:    [] Testing:    [] Hemodialysis    [] Blood Transfusion in Progress    []Refusal by Patient:    [x] Surgery/Procedure: Closure of LLE amp site. @1015am    [] Strict Bedrest    [] Sedation    [] Spine Precautions     [] Pt being transferred to palliative care at this time. Spoke with pt/family and OT services to be defered. [] Pt independent with functional mobility and functional tasks.  Pt with no OT acute care needs at this time, will defer OT eval.    [] Other    Next Scheduled Treatment: 9-13-17    Signature: CALVIN Siddiqi/AKUA Tan

## 2017-09-12 NOTE — PROGRESS NOTES
Vascular resident perfect served regarding ID request to have stump cultured intraop. Resident acknowledged page, stated \"ok\".

## 2017-09-12 NOTE — PROGRESS NOTES
Nutrition Assessment    Type and Reason for Visit: Reassess    Nutrition Recommendations: Recommend Continue 5 carbs per meal for this pt. Recommend continue Valente twice daily. Malnutrition Assessment:  · Malnutrition Status: No malnutrition    Nutrition Diagnosis:   · Problem: Increased nutrient needs  · Etiology: related to Increased demand for energy/nutrients due to     Signs and symptoms:  as evidenced by Presence of wounds    Nutrition Assessment:  · Subjective Assessment: Spoke to pt who states he likes Valente supplements. Documentation suggests intake is adequate (greater than 75%). · Wound Type: Surgical Wound  · Current Nutrition Therapies:  · Oral Diet Orders: NPO   · Oral Diet intake: 51-75%  · Anthropometric Measures:  · Ht: 5' 6.93\" (170 cm)   · Current Body Wt: 200 lb (90.7 kg)  · Ideal Body Wt: 142 lb (64.4 kg), % Ideal Body 140%  · Adjusted Body Wt: 164 lb (74.4 kg), body weight adjusted for Obesity  · BMI Classification: BMI 30.0 - 34.9 Obese Class I  · Comparative Standards (Estimated Nutrition Needs):  · Estimated Daily Total Kcal: 4493-1574 kcal  · Estimated Daily Protein (g):  g    Estimated Intake vs Estimated Needs: Intake Meets Needs    Nutrition Risk Level: Moderate    Nutrition Interventions:   Start oral diet, Continue current ONS       Nutrition Evaluation:   · Evaluation: Goal achieved   · Goals: Intake of Valente twice daily    · Monitoring: Meal Intake, Supplement Intake, Wound Healing    See Adult Nutrition Doc Flowsheet for more detail.      Electronically signed by Nan Martinez RD, LD on 9/12/17 at 3:20 PM    Contact Number: [phone]

## 2017-09-12 NOTE — PROGRESS NOTES
09/07/2017    LABALBU 4.0 07/13/2012    CALCIUM 7.9 09/07/2017    BILITOT 0.20 07/13/2012    ALKPHOS 76 07/13/2012    AST 18 07/13/2012    ALT 22 07/13/2012         ASSESSMENT   1. POD #5 L BKA guillotine amp 2/2 gangrenous L foot  2. Hx DM    PLAN  1. OR to day for revision of L BKA  2. NPO since midnight   3. Currently on Vanco per ID, continue as scheduled  4. AC held for this am  5. Medical management and recommendations per primary  6.  Pain control PO Norco and Fentanyl PRN   7. Leukocytosis trending down, afebrile       Electronically signed by Nakia Duran DO  on 9/12/2017 at 6:40 AM

## 2017-09-12 NOTE — PROGRESS NOTES
Internal Medicine - Daily Progress Note      Date and time: 9/12/2017 11:37 AM  Patient's name:  Ike Fajardo  Medical Record Number: [id number]  Patient's account/billing number: [de-identified]  Patient's YOB: 1965  Age: 46 y.o. Date of Admission: 8/31/2017  4:35 PM      Primary Care Physician: Yani Novak MD  Attending Physician:    Code Status: Full Code    Chief complaint: Osteomyelitis left foot    Problem List:   Patient Active Problem List   Diagnosis    Uncontrolled hypertension    [facility]    Neuropathy [facility]    Uncontrolled type 2 diabetes with cellulitis of foot ([facility]    Cerebrovascular disease    Cataract    Dysphagia    Family history of colon cancer    Bowel habit changes    Hyperlipidemia    [facility]rene [facility] foot [facility]    Diabetic foot left    Acute kidney injury ([facility]    Ischemic foot left    Urinary retention    Subacute osteomyelitis of left foot (HCC)       Allergies:    No Known Allergies       SUBJECTIVE:     Interval History : History was obtained from the patient. /Ms. Ike Fajardo is admitted for osteomyelitis of left foot sec to diabetes. Had Guillotine amputation left BKA is due for revision BKA today.     Denies any fresh complaints  BP elevated in am, is in 140's after  Anti hypertension medications with sips  Blood glucose 223 on 5 U Lantus nightly        Review of Systems -   General ROS: negative  Psychological ROS: negative  Ophthalmic ROS: negative  ENT ROS: negative  Allergy and Immunology ROS: negative  Respiratory ROS: no cough, shortness of breath, or wheezing  Cardiovascular ROS: no chest pain or dyspnea on exertion  Gastrointestinal ROS:negative  Genito-Urinary ROS: negative  Musculoskeletal ROS: left BKA with dressing intact  Neurological ROS: negative  Dermatological ROS: negative        OBJECTIVE:    Vitals: BP (!) 143/46  Pulse 80  Temp 99 °F (37.2 °C) (Oral)   Resp 19  Ht 5' 6.93\" (1.7 m)  Wt 200 Hb at 20.00 , transfuse prbc if less than 7  3. ID following on Vancomycin, f/u creatinine  4. NPO from midnight  5. Latus 5 U , sliding scale  6. Continue amlodipine 10 mg , metoprolol 50 mg BID , hydralazine 100 mg TID, Lisinopril 40 mg QD. 7. Continue Pravachol, Pregabalin , Flomax, Folic acid  8. Possible discharge to IP rehab  9. DVT prophylaxis heparin 5000 U TID  10. Resume diet after surgery         Darling Lovett M.D.   [facility] ([facility])             9/12/2017, 11:37 AM

## 2017-09-12 NOTE — BRIEF OP NOTE
Brief Postoperative Note  ______________________________________________________________    Patient: Cira Toney  YOB: 1965  MRN: [id number]  Date of Procedure: 9/12/2017    Pre-Op Diagnosis: GUILLOTINE AMPUTATION LEFT LEG    Post-Op Diagnosis: Same       Procedure(s):  L BKA revision without closure of wound. Wound vac placement    Anesthesia: General    Surgeon(s): Padmini Coleman MD     Assistants:  Essence Shah, PGY-4  Patricia Mckeon, PGY-1    Staff:  Scrub Person First: Yumi Kelly RN     Estimated Blood Loss: 200 (units unknown)    Complications: None    Specimens:     ID Type Source Tests Collected by Time Destination   A :  Tissue Leg SURGICAL PATHOLOGY Padmini Coleman MD 9/12/2017 1513        Implants:  * No implants in log *      Drains:       [REMOVED] Negative Pressure Wound Therapy Foot Left (Removed)   Removed 09/06/17 1454       [REMOVED] Negative Pressure Wound Therapy Leg Distal (Removed)   Removed 09/07/17 0946       [REMOVED] Urethral Catheter Straight-tip (Removed)   Removed 09/05/17 0755   $ Urethral catheter insertion Inserted for procedure 9/2/2017  3:54 PM   Catheter Indications Urology/Urologist seeing this patient or inserted indwelling catheter;Acute urinary retention/obstruction 9/5/2017  7:00 AM   Site Assessment No urethral drainage 9/5/2017  7:00 AM   Urine Color Yellow 9/5/2017  7:00 AM   Urine Appearance Clear 9/5/2017  7:00 AM   Output (mL) 550 mL 9/4/2017  4:00 PM       Findings: pocket of pus noted adjacent to tibia where division was done. Copious irrigation was done and no further pocket of pus noted. Wound left open with wound vac placement. Plan:  1. Plan for wound vac takedown and closure of left BKA wound on 9/14.  2.  Was not able to obtain aerobic and anaerobic cultures. Will recommend continuation of IV abx per ID.  3.  Pain control with PCA fentanyl.     Essence Shah, DO  Date: 9/12/2017  Time: 3:40 PM

## 2017-09-12 NOTE — PLAN OF CARE
Problem: Pain:  Goal: Control of acute pain  Control of acute pain   Outcome: Ongoing  Medicated for surgical pain, with PCA fentanyl. Problem: Falls - Risk of  Goal: Absence of falls  Outcome: Ongoing  No falls this shift. Problem: Skin Integrity:  Goal: Will show no infection signs and symptoms  Will show no infection signs and symptoms   Outcome: Ongoing  Left wrist reddened with scab present.

## 2017-09-13 ENCOUNTER — ANESTHESIA EVENT (OUTPATIENT)
Dept: OPERATING ROOM | Age: 52
DRG: 239 | End: 2017-09-13
Payer: COMMERCIAL

## 2017-09-13 LAB
ABO/RH: NORMAL
ANTIBODY SCREEN: NEGATIVE
ARM BAND NUMBER: NORMAL
BLD PROD TYP BPU: NORMAL
BUN BLDV-MCNC: 13 MG/DL (ref 6–20)
CREAT SERPL-MCNC: 0.85 MG/DL (ref 0.7–1.2)
CROSSMATCH RESULT: NORMAL
DISPENSE STATUS BLOOD BANK: NORMAL
EXPIRATION DATE: NORMAL
GFR AFRICAN AMERICAN: >60 ML/MIN
GFR NON-AFRICAN AMERICAN: >60 ML/MIN
GFR SERPL CREATININE-BSD FRML MDRD: NORMAL ML/MIN/{1.73_M2}
GFR SERPL CREATININE-BSD FRML MDRD: NORMAL ML/MIN/{1.73_M2}
GLUCOSE BLD-MCNC: 105 MG/DL (ref 75–110)
GLUCOSE BLD-MCNC: 157 MG/DL (ref 75–110)
GLUCOSE BLD-MCNC: 198 MG/DL (ref 75–110)
GLUCOSE BLD-MCNC: 206 MG/DL (ref 75–110)
HCT VFR BLD CALC: 24.6 % (ref 41–53)
HEMOGLOBIN: 7.8 G/DL (ref 13.5–17.5)
MCH RBC QN AUTO: 26.7 PG (ref 26–34)
MCHC RBC AUTO-ENTMCNC: 31.7 G/DL (ref 31–37)
MCV RBC AUTO: 84.3 FL (ref 80–100)
PDW BLD-RTO: 20.1 % (ref 12.5–15.4)
PLATELET # BLD: 482 K/UL (ref 140–450)
PMV BLD AUTO: 9.1 FL (ref 6–12)
RBC # BLD: 2.91 M/UL (ref 4.5–5.9)
SODIUM BLD-SCNC: 139 MMOL/L (ref 135–144)
SODIUM BLD-SCNC: 142 MMOL/L (ref 135–144)
TRANSFUSION STATUS: NORMAL
UNIT DIVISION: 0
UNIT NUMBER: NORMAL
VANCOMYCIN TROUGH DATE LAST DOSE: ABNORMAL
VANCOMYCIN TROUGH DOSE AMOUNT: ABNORMAL
VANCOMYCIN TROUGH TIME LAST DOSE: ABNORMAL
VANCOMYCIN TROUGH: 8.3 UG/ML (ref 10–20)
WBC # BLD: 13.1 K/UL (ref 3.5–11)

## 2017-09-13 PROCEDURE — 97116 GAIT TRAINING THERAPY: CPT

## 2017-09-13 PROCEDURE — 99024 POSTOP FOLLOW-UP VISIT: CPT | Performed by: SURGERY

## 2017-09-13 PROCEDURE — 6370000000 HC RX 637 (ALT 250 FOR IP): Performed by: HOSPITALIST

## 2017-09-13 PROCEDURE — 6370000000 HC RX 637 (ALT 250 FOR IP): Performed by: STUDENT IN AN ORGANIZED HEALTH CARE EDUCATION/TRAINING PROGRAM

## 2017-09-13 PROCEDURE — 6370000000 HC RX 637 (ALT 250 FOR IP): Performed by: PODIATRIST

## 2017-09-13 PROCEDURE — 2580000003 HC RX 258: Performed by: PODIATRIST

## 2017-09-13 PROCEDURE — 82565 ASSAY OF CREATININE: CPT

## 2017-09-13 PROCEDURE — 97535 SELF CARE MNGMENT TRAINING: CPT

## 2017-09-13 PROCEDURE — 82947 ASSAY GLUCOSE BLOOD QUANT: CPT

## 2017-09-13 PROCEDURE — 6360000002 HC RX W HCPCS: Performed by: SURGERY

## 2017-09-13 PROCEDURE — 36415 COLL VENOUS BLD VENIPUNCTURE: CPT

## 2017-09-13 PROCEDURE — 80202 ASSAY OF VANCOMYCIN: CPT

## 2017-09-13 PROCEDURE — 99232 SBSQ HOSP IP/OBS MODERATE 35: CPT | Performed by: PHYSICAL MEDICINE & REHABILITATION

## 2017-09-13 PROCEDURE — 85027 COMPLETE CBC AUTOMATED: CPT

## 2017-09-13 PROCEDURE — 2580000003 HC RX 258: Performed by: INTERNAL MEDICINE

## 2017-09-13 PROCEDURE — 51701 INSERT BLADDER CATHETER: CPT

## 2017-09-13 PROCEDURE — 6360000002 HC RX W HCPCS: Performed by: STUDENT IN AN ORGANIZED HEALTH CARE EDUCATION/TRAINING PROGRAM

## 2017-09-13 PROCEDURE — 97110 THERAPEUTIC EXERCISES: CPT

## 2017-09-13 PROCEDURE — 2060000000 HC ICU INTERMEDIATE R&B

## 2017-09-13 PROCEDURE — 2500000003 HC RX 250 WO HCPCS: Performed by: HOSPITALIST

## 2017-09-13 PROCEDURE — 99232 SBSQ HOSP IP/OBS MODERATE 35: CPT | Performed by: INTERNAL MEDICINE

## 2017-09-13 PROCEDURE — 6360000002 HC RX W HCPCS: Performed by: INTERNAL MEDICINE

## 2017-09-13 PROCEDURE — 84295 ASSAY OF SERUM SODIUM: CPT

## 2017-09-13 PROCEDURE — 84520 ASSAY OF UREA NITROGEN: CPT

## 2017-09-13 RX ORDER — LABETALOL HYDROCHLORIDE 5 MG/ML
10 INJECTION, SOLUTION INTRAVENOUS ONCE
Status: COMPLETED | OUTPATIENT
Start: 2017-09-13 | End: 2017-09-13

## 2017-09-13 RX ADMIN — PREGABALIN 25 MG: 25 CAPSULE ORAL at 08:16

## 2017-09-13 RX ADMIN — OXYCODONE HYDROCHLORIDE AND ACETAMINOPHEN 2 TABLET: 5; 325 TABLET ORAL at 16:36

## 2017-09-13 RX ADMIN — OXYCODONE HYDROCHLORIDE AND ACETAMINOPHEN 2 TABLET: 5; 325 TABLET ORAL at 20:35

## 2017-09-13 RX ADMIN — Medication 10 ML: at 10:16

## 2017-09-13 RX ADMIN — FOLIC ACID 1 MG: 1 TABLET ORAL at 08:17

## 2017-09-13 RX ADMIN — LATANOPROST 1 DROP: 50 SOLUTION OPHTHALMIC at 20:36

## 2017-09-13 RX ADMIN — TIMOLOL MALEATE 1 DROP: 2.5 SOLUTION/ DROPS OPHTHALMIC at 20:34

## 2017-09-13 RX ADMIN — METOPROLOL TARTRATE 50 MG: 50 TABLET, FILM COATED ORAL at 08:17

## 2017-09-13 RX ADMIN — INSULIN LISPRO 2 UNITS: 100 INJECTION, SOLUTION INTRAVENOUS; SUBCUTANEOUS at 08:29

## 2017-09-13 RX ADMIN — PREGABALIN 25 MG: 25 CAPSULE ORAL at 16:33

## 2017-09-13 RX ADMIN — VANCOMYCIN HYDROCHLORIDE 1750 MG: 10 INJECTION, POWDER, LYOPHILIZED, FOR SOLUTION INTRAVENOUS at 10:16

## 2017-09-13 RX ADMIN — LISINOPRIL 40 MG: 20 TABLET ORAL at 08:17

## 2017-09-13 RX ADMIN — OXYCODONE HYDROCHLORIDE AND ACETAMINOPHEN 2 TABLET: 5; 325 TABLET ORAL at 12:21

## 2017-09-13 RX ADMIN — FENTANYL CITRATE: 50 INJECTION INTRAMUSCULAR; INTRAVENOUS at 10:16

## 2017-09-13 RX ADMIN — OXYCODONE HYDROCHLORIDE AND ACETAMINOPHEN 2 TABLET: 5; 325 TABLET ORAL at 08:18

## 2017-09-13 RX ADMIN — INSULIN GLARGINE 5 UNITS: 100 INJECTION, SOLUTION SUBCUTANEOUS at 22:05

## 2017-09-13 RX ADMIN — Medication 1 MG: at 00:56

## 2017-09-13 RX ADMIN — PREGABALIN 25 MG: 25 CAPSULE ORAL at 20:34

## 2017-09-13 RX ADMIN — HEPARIN SODIUM 5000 UNITS: 5000 INJECTION, SOLUTION INTRAVENOUS; SUBCUTANEOUS at 10:24

## 2017-09-13 RX ADMIN — PANTOPRAZOLE SODIUM 20 MG: 20 TABLET, DELAYED RELEASE ORAL at 08:17

## 2017-09-13 RX ADMIN — TIMOLOL MALEATE 1 DROP: 2.5 SOLUTION/ DROPS OPHTHALMIC at 08:20

## 2017-09-13 RX ADMIN — PRAVASTATIN SODIUM 10 MG: 20 TABLET ORAL at 08:19

## 2017-09-13 RX ADMIN — TAMSULOSIN HYDROCHLORIDE 0.4 MG: 0.4 CAPSULE ORAL at 08:17

## 2017-09-13 RX ADMIN — Medication 10 ML: at 22:12

## 2017-09-13 RX ADMIN — INSULIN LISPRO 4 UNITS: 100 INJECTION, SOLUTION INTRAVENOUS; SUBCUTANEOUS at 12:17

## 2017-09-13 RX ADMIN — HYDRALAZINE HYDROCHLORIDE 100 MG: 50 TABLET, FILM COATED ORAL at 08:16

## 2017-09-13 RX ADMIN — PANTOPRAZOLE SODIUM 20 MG: 20 TABLET, DELAYED RELEASE ORAL at 20:34

## 2017-09-13 RX ADMIN — AMLODIPINE BESYLATE 10 MG: 10 TABLET ORAL at 08:16

## 2017-09-13 RX ADMIN — HYDRALAZINE HYDROCHLORIDE 100 MG: 50 TABLET, FILM COATED ORAL at 16:33

## 2017-09-13 RX ADMIN — HEPARIN SODIUM 5000 UNITS: 5000 INJECTION, SOLUTION INTRAVENOUS; SUBCUTANEOUS at 22:06

## 2017-09-13 RX ADMIN — HYDRALAZINE HYDROCHLORIDE 100 MG: 50 TABLET, FILM COATED ORAL at 20:34

## 2017-09-13 RX ADMIN — INSULIN LISPRO 1 UNITS: 100 INJECTION, SOLUTION INTRAVENOUS; SUBCUTANEOUS at 22:05

## 2017-09-13 RX ADMIN — LABETALOL HYDROCHLORIDE 10 MG: 5 INJECTION, SOLUTION INTRAVENOUS at 02:55

## 2017-09-13 RX ADMIN — METOPROLOL TARTRATE 50 MG: 50 TABLET, FILM COATED ORAL at 20:34

## 2017-09-13 ASSESSMENT — PAIN SCALES - GENERAL
PAINLEVEL_OUTOF10: 8
PAINLEVEL_OUTOF10: 7
PAINLEVEL_OUTOF10: 8
PAINLEVEL_OUTOF10: 9
PAINLEVEL_OUTOF10: 8
PAINLEVEL_OUTOF10: 9
PAINLEVEL_OUTOF10: 8
PAINLEVEL_OUTOF10: 7

## 2017-09-13 ASSESSMENT — PAIN DESCRIPTION - ORIENTATION: ORIENTATION: LEFT

## 2017-09-13 ASSESSMENT — PAIN DESCRIPTION - LOCATION: LOCATION: LEG

## 2017-09-13 ASSESSMENT — PAIN DESCRIPTION - PAIN TYPE: TYPE: SURGICAL PAIN

## 2017-09-13 NOTE — PROGRESS NOTES
[facility] - Daily Progress Note  Today's Date and Time: 9/13/2017, 8:48 AM    Impression :   · Left diabetic foot gangrene with possible gas formation  · Osteo 3rd and 4th, 5th MTS heads with abscesses  · Calcaneal osteo  · Left foot abscess - MRSA - susceptible to vanc, clinda, tetracycline, tmp-smx, gentamycin  · S/p left leg guillotine amputation 9/7/2017   · Leukocytosis - improving  · Diabetes mellitus with neuropathy : uncontrolled    Recommendations:     · Continue IV Vancomycin 1750mg Q24 to cover MRSA of the stump, pend repeat OR cx  · Repeat vanco trough  · Follow OR cx    Interval History:   INITIAL HISTORY:     Geneva Moncada is a 46 y.o.-male who was initially admitted on 8/31/2017.      He presented to the hospital from Dr Moreno Serum office where he was found to have blackening of his left little toe with wounds over his foot . The patient had wounds on his left foot before which were healing and then he fell on Saturday which made the wounds worse. He has had amputation of his left toe before. No fever, chills, nausea or vomiting.      MRI of left foot shows osteomyelitis of the 1st and 3rd metatarsal, calcaneus and also of the 5th metatarsals , diffuse infectious myositis is seen. Concern for soft tissue gas and necrotizing infection. abscess formation measures 1.4 x 1.7 x 1.9 cm.lateral aspect of the foot.     CURRENT EVALUATION  :9/13/2017  No fever  No nausea, vomiting, diarrhea  Stump covered w VAC  BKA debridement 9/12: pocket of pus noted adjacent to tibia where division was done. Copious irrigation was done and no further pocket of pus noted. Wound left open with wound vac placement. WBC: 15.7 - 12.6 - 11.4 - 13.2 9/13/2017    Cr: 0.74  - 0.87 - 0.78 (9/11)    Discussed with patient, family and RN.      Physical Examination :     Patient Vitals for the past 8 hrs:   BP Temp Temp src Pulse Resp SpO2 Weight   09/13/17 0800 (!) 166/78 - - 70 14 99 % -

## 2017-09-13 NOTE — PROGRESS NOTES
Vascular Surgery Progress Note         PATIENT NAME: Stacey Manriquez     TODAY'S DATE: 9/13/2017, 6:13 AM    SUBJECTIVE:    Pt seen and examined. Pain in posterior aspect of L BKA, tolerable with pain medicine. Pt received 1uPRBC for Hgb 6.9  and had HTN over night, medicine is planning on managing his current anti-hypertensive regime. No other complaints at this time. Pt admits to having appetite. Denies any f/c,n/v, sob or chest pain. Plan for revision/ formalization of L FLAKITOA on 9/14 at 9am.    Hgb 7.8 this am, afebrile     OBJECTIVE:   Vitals:  BP (!) 123/38  Pulse 70  Temp 97.5 °F (36.4 °C) (Oral)   Resp 13  Ht 5' 6.93\" (1.7 m)  Wt 194 lb 0.1 oz (88 kg)  SpO2 98%  BMI 30.45 kg/m2     INTAKE/OUTPUT:      Intake/Output Summary (Last 24 hours) at 09/13/17 9051  Last data filed at 09/13/17 0556   Gross per 24 hour   Intake          1288.36 ml   Output              875 ml   Net           413.36 ml                 GENERAL: AOx3, NAD  HEENT: EOMI bilaterally  CARDIAC: Regular rate and rhythm. ABDOMEN: Soft, NT, ND, Active Bowel Sounds  EXTREMITY: moves all extremities, no edema. NEURO: Gross motor intact.   INCISION: Dressing clean, dry, intact, wound vac in place, good seal    Data:  CBC with Differential:    Lab Results   Component Value Date    WBC 13.1 09/13/2017    RBC 2.91 09/13/2017    HGB 7.8 09/13/2017    HCT 24.6 09/13/2017     09/13/2017    MCV 84.3 09/13/2017    MCH 26.7 09/13/2017    MCHC 31.7 09/13/2017    RDW 20.1 09/13/2017    LYMPHOPCT 4 09/01/2017    MONOPCT 6 09/01/2017    BASOPCT 0 09/01/2017    MONOSABS 1.65 09/01/2017    LYMPHSABS 1.10 09/01/2017    EOSABS 0.00 09/01/2017    BASOSABS 0.00 09/01/2017    DIFFTYPE NOT REPORTED 09/01/2017     CMP:    Lab Results   Component Value Date     09/13/2017    K 4.1 09/07/2017     09/07/2017    CO2 19 09/07/2017    BUN 13 09/13/2017    CREATININE 0.85 09/13/2017    GFRAA >60 09/13/2017    LABGLOM >60 09/13/2017    GLUCOSE 113

## 2017-09-13 NOTE — PROGRESS NOTES
Dr. Evelyn Sequeira at bedside to evaluate. Writer expressed concern regarding pt. No urinating, yet feeling the urge. Unable to bladder scan patient. Order to straight cath once and give morning Flomax early.

## 2017-09-13 NOTE — PROGRESS NOTES
no cough, shortness of breath, or wheezing  Cardiovascular ROS: no chest pain or dyspnea on exertion  Gastrointestinal ROS:negative  Genito-Urinary ROS: negative  Musculoskeletal ROS: left BKA with dressing intact  Neurological ROS: negative  Dermatological ROS: negative        OBJECTIVE:    Vitals: BP (!) 123/38  Pulse 70  Temp 97.5 °F (36.4 °C) (Oral)   Resp 13  Ht 5' 6.93\" (1.7 m)  Wt 194 lb 0.1 oz (88 kg)  SpO2 98%  BMI 30.45 kg/m2    Last Body weight:   Wt Readings from Last 3 Encounters:   09/13/17 194 lb 0.1 oz (88 kg)   04/27/17 204 lb 2.3 oz (92.6 kg)   04/13/17 202 lb 9.6 oz (91.9 kg)       Intake and Output summary:     Intake/Output Summary (Last 24 hours) at 09/13/17 3332  Last data filed at 09/13/17 0556   Gross per 24 hour   Intake          1288.36 ml   Output              875 ml   Net           413.36 ml       PHYSICAL EXAMINATION :    General appearance - alert, well appearing, and in no distress   Mental status - alert, oriented to person, place, and time  Eyes - pupils equal and reactive  Nose - normal and patent  Mouth - mucous membranes moist  Neck - supple, no significant adenopathy  Chest - clear to auscultation, no wheezes  Heart - normal S1, S2, no murmurs  Abdomen - soft, nontender, nondistended, no masses or organomegaly  Neurological - alert, oriented, normal speech, gait not assessed, but ambulating well per patient   Extremities - left BKA, dressing in place; peripheral pulses normal, no pedal edema  Skin - normal coloration and turgor, no rashes noted       Medications:    Scheduled Meds:   heparin (porcine)  5,000 Units Subcutaneous 3 times per day    sodium chloride  250 mL Intravenous Once    amLODIPine  10 mg Oral Daily    lisinopril  40 mg Oral Daily    insulin lispro  0-12 Units Subcutaneous TID     insulin lispro  0-6 Units Subcutaneous Nightly    insulin glargine  5 Units Subcutaneous Nightly    sodium hypochlorite   Irrigation Daily    folic acid  1 mg Oral Daily    tamsulosin  0.4 mg Oral Daily    sodium chloride flush  10 mL Intravenous 2 times per day    hydrALAZINE  100 mg Oral TID    metoprolol tartrate  50 mg Oral BID    pravastatin  10 mg Oral Daily    timolol  1 drop Both Eyes BID    latanoprost  1 drop Both Eyes Nightly    pregabalin  25 mg Oral TID    pantoprazole  20 mg Oral BID     Continuous Infusions:   fentaNYL      dextrose       PRN Medications: naloxone, metoclopramide, albuterol, fentanNYL, sodium chloride flush, acetaminophen, magnesium hydroxide, ondansetron, oxyCODONE-acetaminophen **OR** oxyCODONE-acetaminophen, cyclobenzaprine, glucose, dextrose, glucagon (rDNA), dextrose    Diet: Dietary Nutrition Supplements: Wound Healing Oral Supplement  DIET GENERAL;    Laboratory findings:    CBC:   Recent Labs      09/11/17   0509 09/12/17 0520 09/12/17 2238 09/13/17 0457   WBC  13.2*  12.0*   --   13.1*   HGB  8.3*  7.7*  6.9*  7.8*   HCT  26.0*  24.3*  21.6*  24.6*   PLT  527*  512*   --   482*       BMP:   Recent Labs      09/11/17   0509   09/12/17   0520 09/12/17 2238 09/13/17 0457   NA   --    < >  138  142  142   BUN  13   --   12   --   13   CREATININE  0.78   --   0.75   --   0.85    < > = values in this interval not displayed.            Microbiology:    Cultures during this admission:     Blood cultures:      [] None drawn      [] Negative             []  Positive (Details:  )  Urine Culture:        [] None drawn      [] Negative             []  Positive (Details:  )  Sputum Culture:   [] None drawn       [] Negative             []  Positive (Details:  )     ASSESSMENT:      Patient Active Problem List:     Uncontrolled hypertension     IDDM (insulin dependent diabetes mellitus) ([facility] [address]     Neuropathy ([facility] [address]     Uncontrolled type 2 diabetes with cellulitis of foot ([facility] [address]     Cerebrovascular disease     Cataract     Dysphagia     Family history of colon cancer     Bowel habit changes     Hyperlipidemia     Gangrene of foot

## 2017-09-13 NOTE — PROGRESS NOTES
dressing  Good strength and ROM of UE      Medications   Scheduled Meds:   vancomycin  1,750 mg Intravenous Q24H    heparin (porcine)  5,000 Units Subcutaneous 3 times per day    sodium chloride  250 mL Intravenous Once    amLODIPine  10 mg Oral Daily    lisinopril  40 mg Oral Daily    insulin lispro  0-12 Units Subcutaneous TID WC    insulin lispro  0-6 Units Subcutaneous Nightly    insulin glargine  5 Units Subcutaneous Nightly    sodium hypochlorite   Irrigation Daily    folic acid  1 mg Oral Daily    tamsulosin  0.4 mg Oral Daily    sodium chloride flush  10 mL Intravenous 2 times per day    hydrALAZINE  100 mg Oral TID    metoprolol tartrate  50 mg Oral BID    pravastatin  10 mg Oral Daily    timolol  1 drop Both Eyes BID    latanoprost  1 drop Both Eyes Nightly    pregabalin  25 mg Oral TID    pantoprazole  20 mg Oral BID     Continuous Infusions:   dextrose       PRN Meds:.metoclopramide, albuterol, fentanNYL, sodium chloride flush, acetaminophen, magnesium hydroxide, ondansetron, oxyCODONE-acetaminophen **OR** oxyCODONE-acetaminophen, cyclobenzaprine, glucose, dextrose, glucagon (rDNA), dextrose     Diagnostics:     CBC:   Recent Labs      09/11/17   0509  09/12/17   0520  09/12/17 2238 09/13/17   0457   WBC  13.2*  12.0*   --   13.1*   RBC  3.13*  2.92*   --   2.91*   HGB  8.3*  7.7*  6.9*  7.8*   HCT  26.0*  24.3*  21.6*  24.6*   MCV  83.1  83.1   --   84.3   RDW  19.5*  19.7*   --   20.1*   PLT  527*  512*   --   482*     BMP:   Recent Labs      09/11/17   0509   09/12/17   0520  09/12/17 2238 09/13/17   0457   NA   --    < >  138  142  142   BUN  13   --   12   --   13   CREATININE  0.78   --   0.75   --   0.85    < > = values in this interval not displayed. BNP: No results for input(s): BNP in the last 72 hours. PT/INR: No results for input(s): PROTIME, INR in the last 72 hours. APTT: No results for input(s): APTT in the last 72 hours.   CARDIAC ENZYMES: No results for input(s): CKMB, CKMBINDEX, TROPONINT in the last 72 hours. Invalid input(s): CKTOTAL;3  FASTING LIPID PANEL:  Lab Results   Component Value Date    CHOL 98 04/27/2017    HDL 34 (L) 04/27/2017    TRIG 165 (H) 09/04/2017     LIVER PROFILE: No results for input(s): AST, ALT, ALB, BILIDIR, BILITOT, ALKPHOS in the last 72 hours. I/O (24Hr): Intake/Output Summary (Last 24 hours) at 09/13/17 1103  Last data filed at 09/13/17 0556   Gross per 24 hour   Intake          1288.36 ml   Output              875 ml   Net           413.36 ml       Glu last 24 hour  Recent Labs      09/12/17   1110  09/12/17   1650  09/12/17   2055  09/13/17   0800   POCGLU  227*  207*  162*  198*       No results for input(s): CLARITYU, COLORU, PHUR, SPECGRAV, PROTEINU, RBCUA, BLOODU, BACTERIA, NITRU, WBCUA, LEUKOCYTESUR, YEAST, GLUCOSEU, BILIRUBINUR in the last 72 hours. Impression    47 yo male s/p left BKA secondary to chronic diabetic wound    1.  L BKA - plan for revision 9/14, MRSA - on IV vanco - ? length  2.  DM, neuropathy- uncontrolled  3. HTN/HL- norvasc, hydralazine, labetolol, lisinopril, lopressor- uncontrolled  4. GERD- protonix  5. DVT proph - Heparin  6. Anemia- s/p trans  7. Leukocytosis  8. Uro - eval for retention-continue flomax, supportive care, continue timed double voids, PVRS, CIC for PVR>400 or patient discomfort, f/u outpatient urology 1-2 months   9. Pain - lyrica, percocet  10 Glaucoma - eye drops    Rec  1. Dx - L BKA, complete revision on 9/14  2. Med nec - as above, uncontrolled DM, anemia, HTN,etc  3. Support - wife 24/7  4. Therapy - has PT/OT needs  5.. Rehab - would benefit from acute inpt rehab when med ready- will reeval after revision 9/14, work on preprosthetic train, collier, amb, adl, steps, family training, edema control, pain control, etc, Fall prec reviewed with pt, phantom pain /sensation reviewed  6. Discussed with pt and nsg  7.  Transition to oral pain meds after surgery        Alesha Vivas

## 2017-09-13 NOTE — PROGRESS NOTES
The Respiratory Therapy Department completed the Respiratory Care evaluation. No therapy is indicated at this time. The reason therapy is not indicated is due to the following:     [] Patient does not meet indications for therapy. [] Patient has returned to their home regimen. [x] Patient frequency has changed to prn, nursing to assess and call if needed  . Respiratory Care will not see this patient further unless otherwise requested. Please call the respiratory care charge therapist with questions or concerns at extension [phone].  Thank you for using the Respiratory Therapy Protocol Program.    Juan Lunch   8:23 AM

## 2017-09-13 NOTE — PROGRESS NOTES
Physical Therapy  Facility/Department: [facility]  Daily Treatment Note  NAME: Fox Hunter  : 1965  MRN: [id number]    Date of Service: 2017    Patient Diagnosis(es):   Patient Active Problem List    Diagnosis Date Noted    Subacute osteomyelitis of left foot ([address] 2017    Urinary retention 2017    Ischemic foot left 2017    Gangrene of foot ([address] 2017    Diabetic foot left 2017    Acute kidney injury ([address] 2017    Hyperlipidemia     Dysphagia 2013    Family history of colon cancer 2013    Bowel habit changes 2013    Uncontrolled hypertension     IDDM (insulin dependent diabetes mellitus) ([address]     Neuropathy (HCC)     Uncontrolled type 2 diabetes with cellulitis of foot ([address]     Cerebrovascular disease     Cataract        Past Medical History:   Diagnosis Date    Acid reflux     Cerebrovascular disease 2006    TIA    Glaucoma     Hyperlipidemia     Hypertension     IDDM (insulin dependent diabetes mellitus) (HCC)     MDRO (multiple drug resistant organisms) resistance     MRSA (methicillin resistant staph aureus) culture positive 2017    foot    Neuropathy ([address]     Osteomyelitis ([address]     Left Hallux    S/P cataract extraction and insertion of intraocular lens     bilateral     Type II or unspecified type diabetes mellitus without mention of complication, not stated as uncontrolled      Past Surgical History:   Procedure Laterality Date    EYE SURGERY Bilateral     lazer both eyes    FOOT SURGERY Left 2017    surgical prep of wound bed    FOOT SURGERY Left 2017    1) Left foot surgical preparation of wound-bed, 2) Left foot application of graft     [facility] DOUBLE  2017         LEG AMPUTATION BELOW KNEE Left 2017    LEFT BELOW KNEE GUILLOTINE AMPUTATION performed by Manny Lagos MD at [facility] Left 2017    I and D bone, bone biopsy with exercise program: Long Arc Quads,heel/toe raises, and marches. Reps: 20 x each with 2 lb wt on R LE. AROM LLE. Comments: Pt demonstrated good understanding of all ex's. Assessment     Body structures, Functions, Activity limitations: Decreased functional mobility ; Decreased strength;Decreased endurance;Decreased balance;Decreased ADL status  Assessment: Pt ambulated 75ft with RW and CGA and performed functional transfers with CGA. Pt required one standing and one seated rest break d/t SOB and mild fatigue. Recommend IP rehab upon discharge to maximize safety and independence.   Prognosis: Good  Decision Making: Medium Complexity  Patient Education: importance of maintaining L knee extension, safety and sequencing for transfers  REQUIRES PT FOLLOW UP: Yes  Activity Tolerance  Activity Tolerance: Patient limited by endurance,      Discharge Recommendations:  IP Rehab    G-Code     OutComes Score   Goals  Short term goals  Time Frame for Short term goals: 14 visits  Short term goal 1: Mod I for ambulating 100ft with RW and NWB LLE  Short term goal 2: Ascend/descend 4 steps with NWB LLE and CGA  Short term goal 3: Mod I for bed mobility and functional transfers  Short term goal 4: Demo good dynamic standing balance with RW to decrease risk of falls  Short term goal 5: Participate in 45 minutes of therapy to demonstrate increased endurance  Patient Goals   Patient goals : return home    Plan    Plan  Times per week: 6-7x  Times per day: Daily  Current Treatment Recommendations: Strengthening, Balance Training, Functional Mobility Training, Transfer Training, Endurance Training, Gait Training, Stair training, Home Exercise Program, Safety Education & Training, Patient/Caregiver Education & Training  Safety Devices  Type of devices: Nurse notified, Left in chair, Call light within reach  Restraints  Initially in place: No     Therapy Time   Individual Concurrent Group Co-treatment   Time In  1130         Time Out  1200

## 2017-09-13 NOTE — PROGRESS NOTES
Dr. Angeline Chicas w/ vascular surgery paged regarding pt w/ no bowel sounds, nause, vomiting. Awaiting response.

## 2017-09-13 NOTE — PROGRESS NOTES
fatigue and pain in LLE. RN notified of pain and meds are not due until 4:30pm. Call light and phone in reach. BLE elevated, LLE up onto pillow. Wife present throughout tx. Pt scheduled for revision of L TRUPTI 9/14/17.     [address]/L

## 2017-09-13 NOTE — PROGRESS NOTES
Judson Salter and Ajay Minus at bedside to evaluate, chart reviewed. DC fentanyl PCA. No other orders received.

## 2017-09-14 ENCOUNTER — ANESTHESIA (OUTPATIENT)
Dept: OPERATING ROOM | Age: 52
DRG: 239 | End: 2017-09-14
Payer: COMMERCIAL

## 2017-09-14 VITALS — OXYGEN SATURATION: 94 % | TEMPERATURE: 95.7 F | SYSTOLIC BLOOD PRESSURE: 158 MMHG | DIASTOLIC BLOOD PRESSURE: 74 MMHG

## 2017-09-14 LAB
BUN BLDV-MCNC: 16 MG/DL (ref 6–20)
CREAT SERPL-MCNC: 0.9 MG/DL (ref 0.7–1.2)
GFR AFRICAN AMERICAN: >60 ML/MIN
GFR NON-AFRICAN AMERICAN: >60 ML/MIN
GFR SERPL CREATININE-BSD FRML MDRD: NORMAL ML/MIN/{1.73_M2}
GFR SERPL CREATININE-BSD FRML MDRD: NORMAL ML/MIN/{1.73_M2}
GLUCOSE BLD-MCNC: 114 MG/DL (ref 75–110)
GLUCOSE BLD-MCNC: 123 MG/DL (ref 75–110)
GLUCOSE BLD-MCNC: 140 MG/DL (ref 75–110)
GLUCOSE BLD-MCNC: 182 MG/DL (ref 75–110)
HCT VFR BLD CALC: 22.8 % (ref 41–53)
HEMOGLOBIN: 7.3 G/DL (ref 13.5–17.5)
MCH RBC QN AUTO: 27 PG (ref 26–34)
MCHC RBC AUTO-ENTMCNC: 32.1 G/DL (ref 31–37)
MCV RBC AUTO: 84 FL (ref 80–100)
PDW BLD-RTO: 20 % (ref 12.5–15.4)
PLATELET # BLD: 434 K/UL (ref 140–450)
PMV BLD AUTO: 8.8 FL (ref 6–12)
RBC # BLD: 2.71 M/UL (ref 4.5–5.9)
SODIUM BLD-SCNC: 137 MMOL/L (ref 135–144)
SODIUM BLD-SCNC: 139 MMOL/L (ref 135–144)
SURGICAL PATHOLOGY REPORT: NORMAL
WBC # BLD: 11.2 K/UL (ref 3.5–11)

## 2017-09-14 PROCEDURE — 36415 COLL VENOUS BLD VENIPUNCTURE: CPT

## 2017-09-14 PROCEDURE — 85027 COMPLETE CBC AUTOMATED: CPT

## 2017-09-14 PROCEDURE — 6370000000 HC RX 637 (ALT 250 FOR IP): Performed by: HOSPITALIST

## 2017-09-14 PROCEDURE — 7100000010 HC PHASE II RECOVERY - FIRST 15 MIN

## 2017-09-14 PROCEDURE — 2580000003 HC RX 258: Performed by: SURGERY

## 2017-09-14 PROCEDURE — 3600000012 HC SURGERY LEVEL 2 ADDTL 15MIN: Performed by: SURGERY

## 2017-09-14 PROCEDURE — 82947 ASSAY GLUCOSE BLOOD QUANT: CPT

## 2017-09-14 PROCEDURE — 87205 SMEAR GRAM STAIN: CPT

## 2017-09-14 PROCEDURE — 3700000000 HC ANESTHESIA ATTENDED CARE: Performed by: SURGERY

## 2017-09-14 PROCEDURE — 2500000003 HC RX 250 WO HCPCS: Performed by: NURSE ANESTHETIST, CERTIFIED REGISTERED

## 2017-09-14 PROCEDURE — 6370000000 HC RX 637 (ALT 250 FOR IP): Performed by: STUDENT IN AN ORGANIZED HEALTH CARE EDUCATION/TRAINING PROGRAM

## 2017-09-14 PROCEDURE — 0QBH0ZZ EXCISION OF LEFT TIBIA, OPEN APPROACH: ICD-10-PCS | Performed by: SURGERY

## 2017-09-14 PROCEDURE — 99232 SBSQ HOSP IP/OBS MODERATE 35: CPT | Performed by: INTERNAL MEDICINE

## 2017-09-14 PROCEDURE — 6370000000 HC RX 637 (ALT 250 FOR IP): Performed by: PODIATRIST

## 2017-09-14 PROCEDURE — 87075 CULTR BACTERIA EXCEPT BLOOD: CPT

## 2017-09-14 PROCEDURE — 2580000003 HC RX 258: Performed by: STUDENT IN AN ORGANIZED HEALTH CARE EDUCATION/TRAINING PROGRAM

## 2017-09-14 PROCEDURE — 84295 ASSAY OF SERUM SODIUM: CPT

## 2017-09-14 PROCEDURE — 6360000002 HC RX W HCPCS: Performed by: ANESTHESIOLOGY

## 2017-09-14 PROCEDURE — 6360000002 HC RX W HCPCS: Performed by: STUDENT IN AN ORGANIZED HEALTH CARE EDUCATION/TRAINING PROGRAM

## 2017-09-14 PROCEDURE — 3700000001 HC ADD 15 MINUTES (ANESTHESIA): Performed by: SURGERY

## 2017-09-14 PROCEDURE — 3600000002 HC SURGERY LEVEL 2 BASE: Performed by: SURGERY

## 2017-09-14 PROCEDURE — 84520 ASSAY OF UREA NITROGEN: CPT

## 2017-09-14 PROCEDURE — 82565 ASSAY OF CREATININE: CPT

## 2017-09-14 PROCEDURE — 2580000003 HC RX 258: Performed by: PODIATRIST

## 2017-09-14 PROCEDURE — 2060000000 HC ICU INTERMEDIATE R&B

## 2017-09-14 PROCEDURE — 2580000003 HC RX 258: Performed by: NURSE ANESTHETIST, CERTIFIED REGISTERED

## 2017-09-14 PROCEDURE — 87070 CULTURE OTHR SPECIMN AEROBIC: CPT

## 2017-09-14 PROCEDURE — 6370000000 HC RX 637 (ALT 250 FOR IP): Performed by: INTERNAL MEDICINE

## 2017-09-14 PROCEDURE — 27884 AMPUTATION FOLLOW-UP SURGERY: CPT | Performed by: SURGERY

## 2017-09-14 PROCEDURE — 2500000003 HC RX 250 WO HCPCS: Performed by: SURGERY

## 2017-09-14 PROCEDURE — 6360000002 HC RX W HCPCS

## 2017-09-14 PROCEDURE — 7100000011 HC PHASE II RECOVERY - ADDTL 15 MIN

## 2017-09-14 PROCEDURE — 6360000002 HC RX W HCPCS: Performed by: NURSE ANESTHETIST, CERTIFIED REGISTERED

## 2017-09-14 PROCEDURE — 2500000003 HC RX 250 WO HCPCS: Performed by: STUDENT IN AN ORGANIZED HEALTH CARE EDUCATION/TRAINING PROGRAM

## 2017-09-14 RX ORDER — DOXYCYCLINE HYCLATE 100 MG
100 TABLET ORAL EVERY 12 HOURS SCHEDULED
Qty: 28 TABLET | Refills: 0 | Status: ON HOLD | OUTPATIENT
Start: 2017-09-14 | End: 2017-09-25 | Stop reason: HOSPADM

## 2017-09-14 RX ORDER — PROPOFOL 10 MG/ML
INJECTION, EMULSION INTRAVENOUS PRN
Status: DISCONTINUED | OUTPATIENT
Start: 2017-09-14 | End: 2017-09-14 | Stop reason: SDUPTHER

## 2017-09-14 RX ORDER — DOXYCYCLINE HYCLATE 100 MG
100 TABLET ORAL EVERY 12 HOURS SCHEDULED
Status: DISCONTINUED | OUTPATIENT
Start: 2017-09-14 | End: 2017-09-16 | Stop reason: HOSPADM

## 2017-09-14 RX ORDER — SODIUM CHLORIDE 9 MG/ML
INJECTION, SOLUTION INTRAVENOUS CONTINUOUS PRN
Status: DISCONTINUED | OUTPATIENT
Start: 2017-09-14 | End: 2017-09-14 | Stop reason: SDUPTHER

## 2017-09-14 RX ORDER — ACETAMINOPHEN 325 MG/1
650 TABLET ORAL EVERY 4 HOURS PRN
Status: DISCONTINUED | OUTPATIENT
Start: 2017-09-14 | End: 2017-09-16 | Stop reason: HOSPADM

## 2017-09-14 RX ORDER — SODIUM CHLORIDE 0.9 % (FLUSH) 0.9 %
10 SYRINGE (ML) INJECTION EVERY 12 HOURS SCHEDULED
Status: DISCONTINUED | OUTPATIENT
Start: 2017-09-14 | End: 2017-09-16 | Stop reason: HOSPADM

## 2017-09-14 RX ORDER — DIPHENHYDRAMINE HCL 25 MG
25 TABLET ORAL EVERY 6 HOURS PRN
Status: DISCONTINUED | OUTPATIENT
Start: 2017-09-14 | End: 2017-09-16 | Stop reason: HOSPADM

## 2017-09-14 RX ORDER — FENTANYL CITRATE 50 UG/ML
INJECTION, SOLUTION INTRAMUSCULAR; INTRAVENOUS PRN
Status: DISCONTINUED | OUTPATIENT
Start: 2017-09-14 | End: 2017-09-14 | Stop reason: SDUPTHER

## 2017-09-14 RX ORDER — BACITRACIN 50000 [USP'U]/1
INJECTION, POWDER, LYOPHILIZED, FOR SOLUTION INTRAMUSCULAR PRN
Status: DISCONTINUED | OUTPATIENT
Start: 2017-09-14 | End: 2017-09-14 | Stop reason: HOSPADM

## 2017-09-14 RX ORDER — LABETALOL HYDROCHLORIDE 5 MG/ML
10 INJECTION, SOLUTION INTRAVENOUS EVERY 6 HOURS PRN
Status: DISCONTINUED | OUTPATIENT
Start: 2017-09-14 | End: 2017-09-16 | Stop reason: HOSPADM

## 2017-09-14 RX ORDER — SODIUM CHLORIDE 0.9 % (FLUSH) 0.9 %
10 SYRINGE (ML) INJECTION PRN
Status: DISCONTINUED | OUTPATIENT
Start: 2017-09-14 | End: 2017-09-16 | Stop reason: HOSPADM

## 2017-09-14 RX ORDER — LIDOCAINE HYDROCHLORIDE 10 MG/ML
INJECTION, SOLUTION EPIDURAL; INFILTRATION; INTRACAUDAL; PERINEURAL PRN
Status: DISCONTINUED | OUTPATIENT
Start: 2017-09-14 | End: 2017-09-14 | Stop reason: SDUPTHER

## 2017-09-14 RX ORDER — MIDAZOLAM HYDROCHLORIDE 1 MG/ML
INJECTION INTRAMUSCULAR; INTRAVENOUS PRN
Status: DISCONTINUED | OUTPATIENT
Start: 2017-09-14 | End: 2017-09-14 | Stop reason: SDUPTHER

## 2017-09-14 RX ORDER — MAGNESIUM HYDROXIDE 1200 MG/15ML
LIQUID ORAL CONTINUOUS PRN
Status: DISCONTINUED | OUTPATIENT
Start: 2017-09-14 | End: 2017-09-14 | Stop reason: HOSPADM

## 2017-09-14 RX ADMIN — TAMSULOSIN HYDROCHLORIDE 0.4 MG: 0.4 CAPSULE ORAL at 12:13

## 2017-09-14 RX ADMIN — HYDROMORPHONE HYDROCHLORIDE 0.25 MG: 1 INJECTION, SOLUTION INTRAMUSCULAR; INTRAVENOUS; SUBCUTANEOUS at 10:32

## 2017-09-14 RX ADMIN — OXYCODONE HYDROCHLORIDE AND ACETAMINOPHEN 2 TABLET: 5; 325 TABLET ORAL at 15:57

## 2017-09-14 RX ADMIN — OXYCODONE HYDROCHLORIDE AND ACETAMINOPHEN 2 TABLET: 5; 325 TABLET ORAL at 11:23

## 2017-09-14 RX ADMIN — PANTOPRAZOLE SODIUM 20 MG: 20 TABLET, DELAYED RELEASE ORAL at 21:39

## 2017-09-14 RX ADMIN — HYDRALAZINE HYDROCHLORIDE 100 MG: 50 TABLET, FILM COATED ORAL at 07:54

## 2017-09-14 RX ADMIN — HYDRALAZINE HYDROCHLORIDE 100 MG: 50 TABLET, FILM COATED ORAL at 13:39

## 2017-09-14 RX ADMIN — LISINOPRIL 40 MG: 20 TABLET ORAL at 07:54

## 2017-09-14 RX ADMIN — METOPROLOL TARTRATE 50 MG: 50 TABLET, FILM COATED ORAL at 07:54

## 2017-09-14 RX ADMIN — MIDAZOLAM HYDROCHLORIDE 1 MG: 1 INJECTION, SOLUTION INTRAMUSCULAR; INTRAVENOUS at 09:06

## 2017-09-14 RX ADMIN — HYDROMORPHONE HYDROCHLORIDE 0.25 MG: 1 INJECTION, SOLUTION INTRAMUSCULAR; INTRAVENOUS; SUBCUTANEOUS at 10:47

## 2017-09-14 RX ADMIN — INSULIN LISPRO 2 UNITS: 100 INJECTION, SOLUTION INTRAVENOUS; SUBCUTANEOUS at 07:56

## 2017-09-14 RX ADMIN — FENTANYL CITRATE 25 MCG: 50 INJECTION INTRAMUSCULAR; INTRAVENOUS at 13:39

## 2017-09-14 RX ADMIN — FENTANYL CITRATE 25 MCG: 50 INJECTION INTRAMUSCULAR; INTRAVENOUS at 21:50

## 2017-09-14 RX ADMIN — Medication 10 ML: at 11:01

## 2017-09-14 RX ADMIN — HYDRALAZINE HYDROCHLORIDE 100 MG: 50 TABLET, FILM COATED ORAL at 21:39

## 2017-09-14 RX ADMIN — FOLIC ACID 1 MG: 1 TABLET ORAL at 12:13

## 2017-09-14 RX ADMIN — LIDOCAINE HYDROCHLORIDE 50 MG: 10 INJECTION, SOLUTION EPIDURAL; INFILTRATION; INTRACAUDAL; PERINEURAL at 09:07

## 2017-09-14 RX ADMIN — INSULIN LISPRO 1 UNITS: 100 INJECTION, SOLUTION INTRAVENOUS; SUBCUTANEOUS at 21:40

## 2017-09-14 RX ADMIN — PROPOFOL 200 MG: 10 INJECTION, EMULSION INTRAVENOUS at 09:07

## 2017-09-14 RX ADMIN — FENTANYL CITRATE 25 MCG: 50 INJECTION INTRAMUSCULAR; INTRAVENOUS at 15:57

## 2017-09-14 RX ADMIN — TIMOLOL MALEATE 1 DROP: 2.5 SOLUTION/ DROPS OPHTHALMIC at 12:13

## 2017-09-14 RX ADMIN — FENTANYL CITRATE 25 MCG: 50 INJECTION INTRAMUSCULAR; INTRAVENOUS at 09:42

## 2017-09-14 RX ADMIN — PREGABALIN 25 MG: 25 CAPSULE ORAL at 21:39

## 2017-09-14 RX ADMIN — HYDROMORPHONE HYDROCHLORIDE 0.25 MG: 1 INJECTION, SOLUTION INTRAMUSCULAR; INTRAVENOUS; SUBCUTANEOUS at 10:55

## 2017-09-14 RX ADMIN — LABETALOL HYDROCHLORIDE 10 MG: 5 INJECTION, SOLUTION INTRAVENOUS at 15:57

## 2017-09-14 RX ADMIN — TIMOLOL MALEATE 1 DROP: 2.5 SOLUTION/ DROPS OPHTHALMIC at 21:39

## 2017-09-14 RX ADMIN — OXYCODONE HYDROCHLORIDE AND ACETAMINOPHEN 2 TABLET: 5; 325 TABLET ORAL at 00:55

## 2017-09-14 RX ADMIN — AMLODIPINE BESYLATE 10 MG: 10 TABLET ORAL at 07:54

## 2017-09-14 RX ADMIN — PRAVASTATIN SODIUM 10 MG: 20 TABLET ORAL at 12:13

## 2017-09-14 RX ADMIN — FENTANYL CITRATE 50 MCG: 50 INJECTION INTRAMUSCULAR; INTRAVENOUS at 09:06

## 2017-09-14 RX ADMIN — INSULIN GLARGINE 5 UNITS: 100 INJECTION, SOLUTION SUBCUTANEOUS at 21:39

## 2017-09-14 RX ADMIN — LATANOPROST 1 DROP: 50 SOLUTION OPHTHALMIC at 21:39

## 2017-09-14 RX ADMIN — PREGABALIN 25 MG: 25 CAPSULE ORAL at 12:13

## 2017-09-14 RX ADMIN — METOPROLOL TARTRATE 50 MG: 50 TABLET, FILM COATED ORAL at 21:39

## 2017-09-14 RX ADMIN — PANTOPRAZOLE SODIUM 20 MG: 20 TABLET, DELAYED RELEASE ORAL at 12:13

## 2017-09-14 RX ADMIN — SODIUM CHLORIDE: 900 INJECTION, SOLUTION INTRAVENOUS at 08:56

## 2017-09-14 RX ADMIN — DOXYCYCLINE HYCLATE 100 MG: 100 TABLET, COATED ORAL at 21:38

## 2017-09-14 RX ADMIN — OXYCODONE HYDROCHLORIDE AND ACETAMINOPHEN 2 TABLET: 5; 325 TABLET ORAL at 20:20

## 2017-09-14 RX ADMIN — DIPHENHYDRAMINE HCL 25 MG: 25 TABLET ORAL at 12:13

## 2017-09-14 RX ADMIN — FENTANYL CITRATE 25 MCG: 50 INJECTION INTRAMUSCULAR; INTRAVENOUS at 10:17

## 2017-09-14 RX ADMIN — HEPARIN SODIUM 5000 UNITS: 5000 INJECTION, SOLUTION INTRAVENOUS; SUBCUTANEOUS at 21:39

## 2017-09-14 ASSESSMENT — PAIN SCALES - GENERAL
PAINLEVEL_OUTOF10: 8
PAINLEVEL_OUTOF10: 9
PAINLEVEL_OUTOF10: 9
PAINLEVEL_OUTOF10: 10
PAINLEVEL_OUTOF10: 9
PAINLEVEL_OUTOF10: 10
PAINLEVEL_OUTOF10: 9
PAINLEVEL_OUTOF10: 10
PAINLEVEL_OUTOF10: 9
PAINLEVEL_OUTOF10: 7
PAINLEVEL_OUTOF10: 10

## 2017-09-14 NOTE — FLOWSHEET NOTE
Pre-Surgical Spiritual Care Note  Stopped to see pt per Surgery Schedule for 9/14.  entered room after all of pt's family had left. His wife & a large # of extended family, including little children had stopped by to see him only a short time before  was able to enter room. Pt said that he has a positive outlook. His series of surgical interventions on his leg have gone well. He spoke of hope of perhaps being able to walk again. He mentioned that his son also has DM, & he expressed concerns that his son take care of himself & especially his feet. Pt is a man of nakul & looks to God to be his support in his life & through this process. Pt readily accepted 's offer of prayer. Chaplains will remain available to offer spiritual and emotional support as needed. Rev. Brendon Cole, [address] [place] [address]     09/13/17 2845   Encounter Summary   Services provided to: Family; Patient   Referral/Consult From: Multi-disciplinary team  (Surgery Schedule)   Support System Spouse; Children;Family members   Continue Visiting (9/13)   Complexity of Encounter Low   Length of Encounter 15 minutes   Routine   Type Pre-procedure   Assessment Calm; Approachable;Coping; Hopeful; Anxious   Intervention Sustaining presence/ Ministry of presence; Active listening;Explored feelings, thoughts, concerns;Explored coping resources; Discussed illness/injury and it's impact; Discussed relationship with God;Nurtured hope;Prayer   Outcome Receptive; Acceptance;Encouraged;Comfort;Coping; Hopeful;Engaged in conversation;Expressed gratitude   Spiritual/Restorationist   Type Spiritual support

## 2017-09-14 NOTE — PROGRESS NOTES
 sodium chloride  250 mL Intravenous Once    amLODIPine  10 mg Oral Daily    lisinopril  40 mg Oral Daily    insulin lispro  0-12 Units Subcutaneous TID WC    insulin lispro  0-6 Units Subcutaneous Nightly    insulin glargine  5 Units Subcutaneous Nightly    sodium hypochlorite   Irrigation Daily    folic acid  1 mg Oral Daily    tamsulosin  0.4 mg Oral Daily    sodium chloride flush  10 mL Intravenous 2 times per day    hydrALAZINE  100 mg Oral TID    metoprolol tartrate  50 mg Oral BID    pravastatin  10 mg Oral Daily    timolol  1 drop Both Eyes BID    latanoprost  1 drop Both Eyes Nightly    pregabalin  25 mg Oral TID    pantoprazole  20 mg Oral BID       Thank you for allowing us to participate in the care of this patient. Please call with questions.       Lazaro Busch

## 2017-09-14 NOTE — PROGRESS NOTES
processes  Eyes - pupils equal and reactive, extraocular eye movements intact, sclera anicteric  Ears - hearing grossly normal bilaterally  Nose - normal and patent, no discharge and normal nontender sinuses  Mouth - mucous membranes moist, pharynx normal without lesions  Neck - supple, no significant adenopathy, carotids upstroke normal bilaterally, no bruits  Chest - clear to auscultation, no wheezes, rales or rhonchi, symmetric air entry, no tachypnea, retractions or cyanosis  Heart - normal rate, regular rhythm, normal S1, S2, no murmurs, rubs, clicks or gallops, no JVD  Abdomen - soft, nontender, nondistended, no masses or organomegaly, bowel sounds normal, no abdominal bruits, no pulsatile masses  Neurological - alert, oriented, normal speech, no focal findings or movement disorder noted, cranial nerves II through XII intact, DTR's normal and symmetric, Babinski sign negative  Extremities - peripheral pulses normal, no pedal edema, no clubbing or cyanosis, feet normal, good pulses, normal color, temperature and sensation. No redness or tenderness in the calves or thighs, Cesia's sign negative bilaterally.  No ulcers, gangrene or atrophic changes  Skin - normal coloration and turgor, no rashes, no suspicious skin lesions noted       Medications:    Scheduled Meds:   sodium chloride flush  10 mL Intravenous 2 times per day    vancomycin  1,750 mg Intravenous Q24H    heparin (porcine)  5,000 Units Subcutaneous 3 times per day    sodium chloride  250 mL Intravenous Once    amLODIPine  10 mg Oral Daily    lisinopril  40 mg Oral Daily    insulin lispro  0-12 Units Subcutaneous TID WC    insulin lispro  0-6 Units Subcutaneous Nightly    insulin glargine  5 Units Subcutaneous Nightly    sodium hypochlorite   Irrigation Daily    folic acid  1 mg Oral Daily    tamsulosin  0.4 mg Oral Daily    sodium chloride flush  10 mL Intravenous 2 times per day    hydrALAZINE  100 mg Oral TID    metoprolol tartrate  50 foot left    Acute kidney injury ([facility] [address]    Ischemic foot left    Urinary retention      PLAN:    1. POD # 0 BKA closed by vascular , pt is is stable , following commands   2. BP persistently elevated , pain likely aggravating factor , continue pain control with fentanyl and percocet , prn labetalol 10 mg for SBP > 170 , continue po antihypertensives   3. Continue lantus 5 U with ISS   4. Hb stable 7.3 this morning , transfuse for Hb< 7   5. Will continue to follow   6. Vancomycin per ID   7. Dc planning per vascular , podiatry signed off , vascular surgery is primary         Please note that this chart was generated using voice recognition dictation software. Although every effort was made to ensure the accuracy of this automated transcription, some errors in transcription may have occurred. Gabriella Haro M.D.   \[facility] [place] [facility]\"")             9/14/2017, 1:15 PM

## 2017-09-14 NOTE — OP NOTE
by going about 12 cm below the tibial tuberosity and  then marking up the posterior flap. This was then incised using a  scalpel and taking it down to the fascia. With electrocautery,  we went thru the anterior and lateral compartments. The neurovascular  bundles were suture ligated. The fibula was then identified, and using the periosteal  elevator, we dissected proximally. This was then transected several cm proximal to our intended tibia division, using a bone cutter. Circumferential dissection around the tibia was performed with the aid of the periosteal elevator, followed by transection with the gigli saw. We did note an abscess pocket just inferior to our transection. Upon  removal of the specimen, we gained control of the posterior tibial  neurovascular bundle and suture ligated it. The nerve was identified  and ligated and transected. We irrigated the entire posterior  flap. We did not see any other cavities or any ascending tracking of  the abscess. The posterior flap looked quite healthy with good  bleeding muscle and no evidence of obvious infection. Since we did  discover an abscess cavity, we elected not to completely close the  amputation. A couple of 2-0 Vicryl sutures were used to cover up the  tibia bone by bringing the fascia from the posterior flap onto the  tibia covering the bone. We then placed a wound VAC on the  superficial aspect to allow drainage of any infected fluid that may develop. Once the wound  VAC was applied, suction was held in place. PLAN:  We will bring the patient back in a couple of days to the  operating room and complete formalization if the wound appears to be  clean.         Flor Murcia MD    D: 09/12/2017 17:09:08       T: 09/13/2017 19:23:58     [email or web address]  Job#: [id number]     Doc#: [id number]

## 2017-09-14 NOTE — PROGRESS NOTES
09/07/2017    BILITOT 0.20 07/13/2012    ALKPHOS 76 07/13/2012    AST 18 07/13/2012    ALT 22 07/13/2012     BUN/Creatinine:    Lab Results   Component Value Date    BUN 16 09/14/2017    CREATININE 0.90 09/14/2017         ASSESSMENT   1. POD # 2 L BKA revision without closure of wound, wound vac placed  2. POD # 7 L BKA guillotine amp 2/2 gangrenous L foot  3. Hx of DM and HTN    PLAN  1. Plan for OR this morning 0900 with Dr. Erum Campos for L BKA washout and formalization of L BKA  2. NPO since midnight   3. Culture of wound during surgery this am  4. Continue Abx post prodecure oer ID  5. HTN and DM management per IM  6. Continued follow up and recommendations per IM    Electronically signed by Baldev Cueva  on 9/14/2017 at 5:57 AM     Pt seen and examined. Above note reviewed and edited as needed. No issues overnight and pt agreeable and ready for formalization of L BKA this am. Afebrile overnight, Tmax 36.1  and WBC 11.2. Plan for OR at 9am today. Pt has been NPO since midnight.  Will get cultures in OR of L BKA    Electronically signed by Jeancarlos Tran DO on 9/14/17 at 6:37 AM

## 2017-09-15 LAB
BUN BLDV-MCNC: 16 MG/DL (ref 6–20)
CREAT SERPL-MCNC: 0.97 MG/DL (ref 0.7–1.2)
CULTURE: NORMAL
CULTURE: NORMAL
DIRECT EXAM: NORMAL
GFR AFRICAN AMERICAN: >60 ML/MIN
GFR NON-AFRICAN AMERICAN: >60 ML/MIN
GFR SERPL CREATININE-BSD FRML MDRD: NORMAL ML/MIN/{1.73_M2}
GFR SERPL CREATININE-BSD FRML MDRD: NORMAL ML/MIN/{1.73_M2}
GLUCOSE BLD-MCNC: 177 MG/DL (ref 75–110)
GLUCOSE BLD-MCNC: 319 MG/DL (ref 75–110)
GLUCOSE BLD-MCNC: 88 MG/DL (ref 75–110)
HCT VFR BLD CALC: 22.2 % (ref 41–53)
HCT VFR BLD CALC: 22.2 % (ref 41–53)
HEMOGLOBIN: 7.1 G/DL (ref 13.5–17.5)
HEMOGLOBIN: 7.1 G/DL (ref 13.5–17.5)
Lab: NORMAL
MCH RBC QN AUTO: 26.8 PG (ref 26–34)
MCHC RBC AUTO-ENTMCNC: 31.8 G/DL (ref 31–37)
MCV RBC AUTO: 84.4 FL (ref 80–100)
PDW BLD-RTO: 19.9 % (ref 12.5–15.4)
PLATELET # BLD: 414 K/UL (ref 140–450)
PMV BLD AUTO: 9.3 FL (ref 6–12)
RBC # BLD: 2.63 M/UL (ref 4.5–5.9)
SODIUM BLD-SCNC: 139 MMOL/L (ref 135–144)
SODIUM BLD-SCNC: 143 MMOL/L (ref 135–144)
SPECIMEN DESCRIPTION: NORMAL
STATUS: NORMAL
WBC # BLD: 13.2 K/UL (ref 3.5–11)

## 2017-09-15 PROCEDURE — 82565 ASSAY OF CREATININE: CPT

## 2017-09-15 PROCEDURE — 2060000000 HC ICU INTERMEDIATE R&B

## 2017-09-15 PROCEDURE — 6370000000 HC RX 637 (ALT 250 FOR IP): Performed by: INTERNAL MEDICINE

## 2017-09-15 PROCEDURE — 6370000000 HC RX 637 (ALT 250 FOR IP): Performed by: PODIATRIST

## 2017-09-15 PROCEDURE — 6370000000 HC RX 637 (ALT 250 FOR IP): Performed by: STUDENT IN AN ORGANIZED HEALTH CARE EDUCATION/TRAINING PROGRAM

## 2017-09-15 PROCEDURE — 99232 SBSQ HOSP IP/OBS MODERATE 35: CPT | Performed by: PHYSICAL MEDICINE & REHABILITATION

## 2017-09-15 PROCEDURE — 6360000002 HC RX W HCPCS: Performed by: STUDENT IN AN ORGANIZED HEALTH CARE EDUCATION/TRAINING PROGRAM

## 2017-09-15 PROCEDURE — 84520 ASSAY OF UREA NITROGEN: CPT

## 2017-09-15 PROCEDURE — 97530 THERAPEUTIC ACTIVITIES: CPT

## 2017-09-15 PROCEDURE — 36415 COLL VENOUS BLD VENIPUNCTURE: CPT

## 2017-09-15 PROCEDURE — 97110 THERAPEUTIC EXERCISES: CPT

## 2017-09-15 PROCEDURE — 82947 ASSAY GLUCOSE BLOOD QUANT: CPT

## 2017-09-15 PROCEDURE — 99232 SBSQ HOSP IP/OBS MODERATE 35: CPT | Performed by: INTERNAL MEDICINE

## 2017-09-15 PROCEDURE — 85018 HEMOGLOBIN: CPT

## 2017-09-15 PROCEDURE — 6370000000 HC RX 637 (ALT 250 FOR IP): Performed by: HOSPITALIST

## 2017-09-15 PROCEDURE — 97116 GAIT TRAINING THERAPY: CPT

## 2017-09-15 PROCEDURE — 85014 HEMATOCRIT: CPT

## 2017-09-15 PROCEDURE — 84295 ASSAY OF SERUM SODIUM: CPT

## 2017-09-15 PROCEDURE — 85027 COMPLETE CBC AUTOMATED: CPT

## 2017-09-15 RX ORDER — INSULIN GLARGINE 100 [IU]/ML
10 INJECTION, SOLUTION SUBCUTANEOUS NIGHTLY
Status: DISCONTINUED | OUTPATIENT
Start: 2017-09-15 | End: 2017-09-16 | Stop reason: HOSPADM

## 2017-09-15 RX ADMIN — TIMOLOL MALEATE 1 DROP: 2.5 SOLUTION/ DROPS OPHTHALMIC at 21:47

## 2017-09-15 RX ADMIN — INSULIN LISPRO 2 UNITS: 100 INJECTION, SOLUTION INTRAVENOUS; SUBCUTANEOUS at 17:36

## 2017-09-15 RX ADMIN — PREGABALIN 25 MG: 25 CAPSULE ORAL at 17:35

## 2017-09-15 RX ADMIN — HEPARIN SODIUM 5000 UNITS: 5000 INJECTION, SOLUTION INTRAVENOUS; SUBCUTANEOUS at 17:36

## 2017-09-15 RX ADMIN — FENTANYL CITRATE 25 MCG: 50 INJECTION INTRAMUSCULAR; INTRAVENOUS at 09:43

## 2017-09-15 RX ADMIN — FOLIC ACID 1 MG: 1 TABLET ORAL at 08:00

## 2017-09-15 RX ADMIN — PANTOPRAZOLE SODIUM 20 MG: 20 TABLET, DELAYED RELEASE ORAL at 08:00

## 2017-09-15 RX ADMIN — INSULIN GLARGINE 10 UNITS: 100 INJECTION, SOLUTION SUBCUTANEOUS at 21:56

## 2017-09-15 RX ADMIN — DOXYCYCLINE HYCLATE 100 MG: 100 TABLET, COATED ORAL at 21:47

## 2017-09-15 RX ADMIN — METOPROLOL TARTRATE 50 MG: 50 TABLET, FILM COATED ORAL at 08:00

## 2017-09-15 RX ADMIN — INSULIN LISPRO 8 UNITS: 100 INJECTION, SOLUTION INTRAVENOUS; SUBCUTANEOUS at 08:06

## 2017-09-15 RX ADMIN — HYDRALAZINE HYDROCHLORIDE 100 MG: 50 TABLET, FILM COATED ORAL at 07:59

## 2017-09-15 RX ADMIN — OXYCODONE HYDROCHLORIDE AND ACETAMINOPHEN 2 TABLET: 5; 325 TABLET ORAL at 01:23

## 2017-09-15 RX ADMIN — INSULIN LISPRO 1 UNITS: 100 INJECTION, SOLUTION INTRAVENOUS; SUBCUTANEOUS at 21:56

## 2017-09-15 RX ADMIN — HYDRALAZINE HYDROCHLORIDE 100 MG: 50 TABLET, FILM COATED ORAL at 21:57

## 2017-09-15 RX ADMIN — LATANOPROST 1 DROP: 50 SOLUTION OPHTHALMIC at 21:47

## 2017-09-15 RX ADMIN — TAMSULOSIN HYDROCHLORIDE 0.4 MG: 0.4 CAPSULE ORAL at 08:00

## 2017-09-15 RX ADMIN — AMLODIPINE BESYLATE 10 MG: 10 TABLET ORAL at 08:00

## 2017-09-15 RX ADMIN — HYDRALAZINE HYDROCHLORIDE 100 MG: 50 TABLET, FILM COATED ORAL at 17:34

## 2017-09-15 RX ADMIN — OXYCODONE HYDROCHLORIDE AND ACETAMINOPHEN 2 TABLET: 5; 325 TABLET ORAL at 13:03

## 2017-09-15 RX ADMIN — METOPROLOL TARTRATE 50 MG: 50 TABLET, FILM COATED ORAL at 21:47

## 2017-09-15 RX ADMIN — PREGABALIN 25 MG: 25 CAPSULE ORAL at 21:47

## 2017-09-15 RX ADMIN — DIPHENHYDRAMINE HCL 25 MG: 25 TABLET ORAL at 21:47

## 2017-09-15 RX ADMIN — OXYCODONE HYDROCHLORIDE AND ACETAMINOPHEN 2 TABLET: 5; 325 TABLET ORAL at 08:01

## 2017-09-15 RX ADMIN — PANTOPRAZOLE SODIUM 20 MG: 20 TABLET, DELAYED RELEASE ORAL at 21:47

## 2017-09-15 RX ADMIN — DOXYCYCLINE HYCLATE 100 MG: 100 TABLET, COATED ORAL at 08:00

## 2017-09-15 RX ADMIN — PREGABALIN 25 MG: 25 CAPSULE ORAL at 08:00

## 2017-09-15 RX ADMIN — PRAVASTATIN SODIUM 10 MG: 20 TABLET ORAL at 13:04

## 2017-09-15 RX ADMIN — OXYCODONE HYDROCHLORIDE AND ACETAMINOPHEN 2 TABLET: 5; 325 TABLET ORAL at 21:48

## 2017-09-15 RX ADMIN — TIMOLOL MALEATE 1 DROP: 2.5 SOLUTION/ DROPS OPHTHALMIC at 08:01

## 2017-09-15 RX ADMIN — HEPARIN SODIUM 5000 UNITS: 5000 INJECTION, SOLUTION INTRAVENOUS; SUBCUTANEOUS at 08:03

## 2017-09-15 RX ADMIN — OXYCODONE HYDROCHLORIDE AND ACETAMINOPHEN 2 TABLET: 5; 325 TABLET ORAL at 17:34

## 2017-09-15 RX ADMIN — LISINOPRIL 40 MG: 20 TABLET ORAL at 08:00

## 2017-09-15 RX ADMIN — HEPARIN SODIUM 5000 UNITS: 5000 INJECTION, SOLUTION INTRAVENOUS; SUBCUTANEOUS at 21:56

## 2017-09-15 ASSESSMENT — PAIN SCALES - GENERAL
PAINLEVEL_OUTOF10: 7
PAINLEVEL_OUTOF10: 10
PAINLEVEL_OUTOF10: 7
PAINLEVEL_OUTOF10: 8
PAINLEVEL_OUTOF10: 7

## 2017-09-15 ASSESSMENT — PAIN DESCRIPTION - PAIN TYPE: TYPE: ACUTE PAIN

## 2017-09-15 ASSESSMENT — PAIN DESCRIPTION - LOCATION: LOCATION: LEG

## 2017-09-15 ASSESSMENT — PAIN DESCRIPTION - ORIENTATION: ORIENTATION: LEFT;LOWER

## 2017-09-15 NOTE — PROGRESS NOTES
placement. WBC: 15.7 - 12.6 - 11.4 - 13.2 - 11.2 - 13.2 9/15/2017  Cr: 0.74  - 0.87 - 0.78 - 0.97 (9/15)    cx stump MRSA 9/3/17 sensitive to doxy      Physical Examination :     Patient Vitals for the past 8 hrs:   BP Temp Temp src Pulse Resp SpO2   09/15/17 0701 (!) 173/62 97.7 °F (36.5 °C) Oral 75 17 93 %   09/15/17 0400 (!) 167/58 97.7 °F (36.5 °C) Oral 74 16 90 %     Temp (24hrs), Av.6 °F (36.4 °C), Min:97.1 °F (36.2 °C), Max:98.1 °F (36.7 °C)    General Appearance: Awake, alert, and in no apparent distress  Eyes: Sclera anicteric; conjunctivae pink. ENT:Oropharynx clear, without erythema, exudate, or thrush. No nasal drainage. Neck: Supple  Pulmonary/Chest: Clear to auscultation, without wheezes, rales, or rhonchi. No areas of consolidation. Good air movement bilaterally. No egophony. Cardiovascular:Regular rate and rhythm without murmurs, rubs, or gallops. S1 and S2 normal.  Abdomen: Soft, non tender. Bowel sounds normal.   Extremities: left LE dressing in place   Neurologic: No gross sensory or motor deficits. Skin: No rash or lesions.  Inflammation around old IV site on L wrist     Medical Decision Making:   I have independently reviewed/ordered the following labs:    CBC with Differential:   Recent Labs      17   0452  09/15/17   0509   WBC  11.2*  13.2*   HGB  7.3*  7.1*   HCT  22.8*  22.2*   PLT  434  414     BMP:   Recent Labs      17   0452  17   2031  09/15/17   0509  09/15/17   1013   NA  137  139   --   143   BUN  16   --   16   --    CREATININE  0.90   --   0.97   --        Lab Results   Component Value Date    [facility] 8.3 2017        Medications:      insulin glargine  10 Units Subcutaneous Nightly    sodium chloride flush  10 mL Intravenous 2 times per day    doxycycline hyclate  100 mg Oral 2 times per day    heparin (porcine)  5,000 Units Subcutaneous 3 times per day    sodium chloride  250 mL Intravenous Once    amLODIPine  10 mg Oral Daily   

## 2017-09-15 NOTE — PROGRESS NOTES
Physical Therapy  Facility/Department: [facility] CAR 1  Daily Treatment Note  NAME: Adri Erazo  : 1965  MRN: [id number]    Date of Service: 9/15/2017    Patient Diagnosis(es):   Patient Active Problem List    Diagnosis Date Noted    Subacute osteomyelitis of left foot ([address] 2017    Urinary retention 2017    Ischemic foot left 2017    Gangrene of foot ([address] 2017    Diabetic foot left 2017    Acute kidney injury ([address] 2017    Hyperlipidemia     Dysphagia 2013    Family history of colon cancer 2013    Bowel habit changes 2013    Uncontrolled hypertension     IDDM (insulin dependent diabetes mellitus) ([address]     Neuropathy (HCC)     Uncontrolled type 2 diabetes with cellulitis of foot ([address]     Cerebrovascular disease     Cataract        Past Medical History:   Diagnosis Date    Acid reflux     Cerebrovascular disease 2006    TIA    Glaucoma     Hyperlipidemia     Hypertension     IDDM (insulin dependent diabetes mellitus) (HCC)     MDRO (multiple drug resistant organisms) resistance     MRSA (methicillin resistant staph aureus) culture positive 2017    foot    Neuropathy ([address]     Osteomyelitis ([address]     Left Hallux    S/P cataract extraction and insertion of intraocular lens     bilateral     Type II or unspecified type diabetes mellitus without mention of complication, not stated as uncontrolled      Past Surgical History:   Procedure Laterality Date    EYE SURGERY Bilateral     lazer both eyes    FOOT SURGERY Left 2017    surgical prep of wound bed    FOOT SURGERY Left 2017    1) Left foot surgical preparation of wound-bed, 2) Left foot application of graft     [facility] DOUBLE  2017         LEG AMPUTATION BELOW KNEE Left 2017    LEFT BELOW KNEE GUILLOTINE AMPUTATION performed by Sonam Molina MD at [address] Left 2017    REVISION LEFT BELOW KNEE AMPUTATION, CLOSED  performed by Alan Carpenter MD at [address] Left 01/23/2017    I and D bone, bone biopsy with flap closure and pulse lavage    OTHER SURGICAL HISTORY Left 04/27/2017    I & D foot    NH DEEP DISSEC FOOT INFEC,1 BURSA Left 4/13/2017    FOOT DEBRIDEMENT WITH EPIFIX  performed by Armand Pavon DPM at [address] [facility] INCIS/DRAIN THIGH/KNEE ABSCESS,DEEP Left 9/12/2017     LEFT BELOW KNEE AMPUTATION OPEN, WOUND VAC PLACEMENT performed by Alan Carpenter MD at [address] [place] Left 2014    hallux    TOE AMPUTATION Left 12/09/2016    hallux    TOE AMPUTATION Left 4/27/2017    I AND D FOOT, BONE BIOPSY, POSSIBLE FILET 2ND TOE performed by Armand Pavon DPM at [address] [facility]  Restrictions/Precautions  Restrictions/Precautions: Weight Bearing, Surgical Protocols, Fall Risk, Contact Precautions  Required Braces or Orthoses?: No  Lower Extremity Weight Bearing Restrictions  Left Lower Extremity Weight Bearing: Non Weight Bearing  Position Activity Restriction  Other position/activity restrictions: Up with assist, New L BKA  Subjective   RN and pt agreeable to PT. Significant other in room upon arrival.    Pt c/o 9/10 pain, RN informed. Orientation    Overall Orientation Status: Within Normal Limits  Objective    Bed Mobility  Supine to sit: Min. A x 1  Scooting: Yadiel x 1  Transfers  Sit to Stand: initially Contact guard assistance to Min A d/t pt leaning forward over RW too far. Stand to sit: Contact guard assistance  Comment: vc's for UE placement during transfers with good carryover  Ambulation  Ambulation?: Yes  WB Status: NWB LLE  Ambulation 1  Surface: level tile  Device: Rolling Walker, Chair to follow  Assistance: Contact guard assistance to Min A  Quality of Gait: NWB LLE, decreased savanah,Pt has an immobilzer on his L LE to maintain Knee ext.   Distance: 48' x1 and 40'x1 with one standing rest break during each amb and one seated rest break between amb d/t SOB and mild fatigue. Comments: Pt demonstrated increase hop lengths which leads to R LE going outside his SUHAS. Stairs/Curb  Stairs?: No     Balance  Posture: Good  Sitting - Static: Good  Sitting - Dynamic: Good  Standing - Static: Fair;+  Standing - Dynamic: Fair  Comments: standing balance assessed with RW  Exercises    Upper extremity exercises: Bicep curl, shoulder flexion/extension, punches. Reps: 20 x 1 with shoulder flex/ext and 10 x2 with punches and bicep curls d/t fatigue, 4 lb wt on a SPC. Seated LE exercise program: Long Arc Quads,heel/toe raises, and marches. Reps: 20 x each with 2 lb wt on R LE. AROM LLE. Comments: Pt only able to complete 10 x1 with RLE marches d/t \"pain\" in LLE while performing ex. Assessment     Body structures, Functions, Activity limitations: Decreased functional mobility ; Decreased strength;Decreased endurance;Decreased balance;Decreased ADL status  Assessment: Pt amb 50' x1 and 40'x1 with one standing rest break during each amb and one seated rest break between amb d/t SOB and mild fatigue. Recommend IP rehab upon discharge to maximize safety and independence.      Prognosis: Good  Decision Making: Medium Complexity  Patient Education: importance of maintaining L knee extension, safety and sequencing for transfers  REQUIRES PT FOLLOW UP: Yes  Activity Tolerance  Activity Tolerance: Patient limited by endurance,      Discharge Recommendations:  IP Rehab    G-Code     OutComes Score   Goals  Short term goals  Time Frame for Short term goals: 14 visits  Short term goal 1: Mod I for ambulating 100ft with RW and NWB LLE  Short term goal 2: Ascend/descend 4 steps with NWB LLE and CGA  Short term goal 3: Mod I for bed mobility and functional transfers  Short term goal 4: Demo good dynamic standing balance with RW to decrease risk of falls  Short term goal 5: Participate in 45 minutes of therapy to demonstrate increased endurance  Patient Goals

## 2017-09-15 NOTE — PROGRESS NOTES
Physical Medicine & Rehabilitation  Progress Note    9/15/2017 10:07 AM     CC: Ambulatory and ADL dysfunction due to L BKA    Subjective:   Good spirits. Pain controlled. Using IV pain meds occasionally. Wife present    Vasc- s/p Revision of  BKA 9/14   IM- insulin adjusted, BP meds adjusted, s/p transfusion  ID - cont on vanco IV for MRSA pending repeat OR cx- plan to change to doxycyline for 2 weeks on on dc    ROS:  Denies fevers, chills, sweats. No chest pain, palpitations, lightheadedness. Denies coughing, wheezing or shortness of breath. Denies abdominal pain, nausea, diarrhea or constipation. No new areas of joint pain. Denies new areas of numbness or weakness. Denies new anxiety or depression issues. No new skin problems. Rehabilitation:   PT: 9/15  Transfers  Sit to Stand: initially Contact guard assistance to Min A d/t pt leaning forward over RW too far. Stand to sit: Contact guard assistance  Comment: vc's for UE placement during transfers with good carryover  Ambulation  Ambulation?: Yes  WB Status: NWB LLE  Ambulation 1  Surface: level tile  Device: Rolling Walker, Chair to follow  Assistance: Contact guard assistance to Min A  Quality of Gait: NWB LLE, decreased savanah,Pt has an immobilzer on his L LE to maintain Knee ext. Distance: 48' x1 and 40'x1 with one standing rest break during each amb and one seated rest break between amb d/t SOB and mild fatigue. Comments: Pt demonstrated increase hop lengths which leads to R LE going outside his SUHAS.    Stairs/Curb  Stairs?: No     Balance  Posture: Good  Sitting - Static: Good  Sitting - Dynamic: Good  Standing - Static: Fair;+  Standing - Dynamic: Fair  Comments: standing balance assessed with RW      OT: 9/13  Feeding: Independent  Grooming: Stand by assistance;Setup (Seated in chair at tray table)  UE Bathing: Minimal assistance;Setup (w/back)  LE Bathing: Stand by assistance;Setup (SBA for BLE and foot)  UE Dressing: Minimal assistance;Setup (w/gown)  LE Dressing: Stand by assistance;Setup (w/footie)  Toileting: Setup;Supervision (Using urinal)     Balance  Sitting Balance: Stand by assistance  Standing Balance: Contact guard assistance (w/SW)  Standing Balance  Time: Pt stood for approx 3-4 min w/SW for jean care and to change linen in chair  Sit to stand: Contact guard assistance  Stand to sit: Contact guard assistance    Objective:  BP (!) 173/62  Pulse 75  Temp 97.7 °F (36.5 °C) (Oral)   Resp 17  Ht 5' 6.93\" (1.7 m)  Wt 194 lb 0.1 oz (88 kg)  SpO2 93%  BMI 30.45 kg/m2 I Body mass index is 30.45 kg/(m^2). I   Wt Readings from Last 1 Encounters:   17 194 lb 0.1 oz (88 kg)      Temp (24hrs), Av.6 °F (36.4 °C), Min:97.1 °F (36.2 °C), Max:98.1 °F (36.7 °C)      Alert, no distress. Good speech and language function. Lungs clear. Heart regular. Abdomen non-distended, non-tender. No calf tenderness or edema.   Dec sensation d>P RLE  LLE - with dressing and ext brace  Good strength and ROM of UE      Medications   Scheduled Meds:   insulin glargine  10 Units Subcutaneous Nightly    sodium chloride flush  10 mL Intravenous 2 times per day    doxycycline hyclate  100 mg Oral 2 times per day    heparin (porcine)  5,000 Units Subcutaneous 3 times per day    sodium chloride  250 mL Intravenous Once    amLODIPine  10 mg Oral Daily    lisinopril  40 mg Oral Daily    insulin lispro  0-12 Units Subcutaneous TID WC    insulin lispro  0-6 Units Subcutaneous Nightly    sodium hypochlorite   Irrigation Daily    folic acid  1 mg Oral Daily    tamsulosin  0.4 mg Oral Daily    sodium chloride flush  10 mL Intravenous 2 times per day    hydrALAZINE  100 mg Oral TID    metoprolol tartrate  50 mg Oral BID    pravastatin  10 mg Oral Daily    timolol  1 drop Both Eyes BID    latanoprost  1 drop Both Eyes Nightly    pregabalin  25 mg Oral TID    pantoprazole  20 mg Oral BID     Continuous Infusions:   dextrose       PRN wound    1.  L BKA - s/p revision 9/14, MRSA -  on doxy for 2 weeks post dc- f/u 2 weeks  2.  DM, neuropathy- uncontrolled 114- 319  3. HTN/HL- norvasc, hydralazine, labetolol, lisinopril, lopressor- uncontrolled  4. GERD- protonix  5. DVT proph - Heparin  6. Anemia- s/p trans, hgb trend down - ? Post op, need to monitor  7. Leukocytosis  8. Uro - eval for retention-continue flomax, supportive care, continue timed double voids, PVRS, CIC for PVR>400 or patient discomfort, f/u outpatient urology 1-2 months   9. Pain - lyrica, percocet  10 Glaucoma - eye drops    Rec  1. Dx - L BKA, completed revision on 9/14  2. Med nec - as above, uncontrolled DM, anemia, HTN,etc  3. Support - wife 24/7  4. Therapy - has PT/OT needs,   5.. Rehab - would benefit from acute inpt rehab when med ready-  work on preprosthetic train, collier, amb, adl, steps, family training, edema control, pain control, etc, Fall prec reviewed with pt, phantom pain /sensation again reviewed  6. Discussed with pt and nsg  7. Transition to oral pain meds after surgery        Josafat Sharpe MD

## 2017-09-15 NOTE — PROGRESS NOTES
Family  ADL Assistance: Independent  Homemaking Assistance: Independent  Ambulation Assistance: Independent (cane)  Transfer Assistance: Independent  Additional Comments: Pt's wife reports sons plan to install a ramp prior to pt's discharge. History of current illness:    Stacey Manriquez is a 46 y.o. male admitted to [place] [address] on 8/31/2017. The patient came in from the doctors office with wounds on the left foot that had not healed. He experienced increased pain in the foot and the 5 th toe turned black. MRI of left foot shows osteomyelitis of the 1st and 3rd metatarsal, calcaneus and also of the 5th metatarsals , diffuse infectious myositis is seen. Concern for soft tissue gas and necrotizing infection. abscess formation measures 1.4 x 1.7 x 1.9 cm.lateral aspect of the foot. Patient underwent a left BKA on 9/7/17. He is being followed by ID secondary to osteo. Underwent a left BKA revision on 9/14/17.     Current functional status for upper extremity ADLs:  UE Bathing: Minimal assistance, Setup (w/back)  UE Dressing: Minimal assistance, Setup (w/gown)    Current functional status for lower extremity ADLs:  LE Bathing: Stand by assistance, Minimal assistance, Setup (Min assist w/bottom only, SBA for BLE and foot)  LE Dressing: Stand by assistance, Setup (w/footie)    Current functional status for bed, chair, wheelchair transfers:  Transfers  Sit to Stand: Contact guard assistance  Stand to sit: Contact guard assistance  Bed to Chair: Contact guard assistance, Minimal assistance  Stand Pivot Transfers: Contact guard assistance  Comment: vc's for UE placement during transfers with fair carryover    Current functional status for toilet transfers:       Current functional status for locomotion:  Ambulation  Ambulation?: Yes  WB Status: NWB LLE  Ambulation 1  Surface: level tile  Device: Rolling Walker  Assistance: Contact guard assistance  Quality of Gait: NWB LLE, mildly unsteady with

## 2017-09-15 NOTE — PROGRESS NOTES
Patient approved by insurance. Pre-cert good through 7/68/44. Patient must admit to ARU by 14:00 on 9/17/17 or a Pre-Screen must be redone and resigned by PMR.

## 2017-09-15 NOTE — CARE COORDINATION
Received message from Blanche eli/Gopi regarding pt DC plan. Attempted to return call/left message.   Phone [phone]
Social Work-Met with pt and wife. His plan remains [employer] for rehab.  [facility] is following.  Will need DC/Readmit completed at [facility] after [facility]. Mehnaz Silverman
by Jose Schumacher RN on 8/31/17 at 6:02 PM

## 2017-09-15 NOTE — PROGRESS NOTES
BUN/Creatinine:    Lab Results   Component Value Date    BUN 16 09/15/2017    CREATININE 0.97 09/15/2017     Hepatic Function Panel:    Lab Results   Component Value Date    ALKPHOS 76 07/13/2012    ALT 22 07/13/2012    AST 18 07/13/2012    BILITOT 0.20 07/13/2012    LABALBU 4.0 07/13/2012     Albumin:    Lab Results   Component Value Date    LABALBU 4.0 07/13/2012         ASSESSMENT   1. POD # 1 L BKA formalization with placement- knee splint placed to immobilze   2. POD # 3 L BKA revision without closure  3. POD # 8 L BKA guillotine amputation 2/2 gangrenous L foot    PLAN  1. Neuro- pain controlled, transition to PO roxicodone only   2. CV- BP/HR stable  3. GI- general diet, tolerating  4. Encouraged pt to be active in therapy today and conitinue use of IC  5. ID- recommendations for Abx treatment as an out pt- conitune IV vancomycin while in-pt and will transition to PO doxy for 2 weeks total  6. L BKA dressing to examen with Dr. May Jackson this am  7. Follow up with PM&R for patient rehab  2. Transfer patient to rehab facility when appropriate, possible this weekend or early next week  3. HTN and DM management per IM  [facility]    Electronically signed by Westley Lieva  on 9/15/2017 at 6:03 AM     Pt seen and examined. Note edited above as needed. Pt doing well, pain controlled, ready for therapy today. Mirza any f/c, n/v, sob, chest pain. Tolerating diet. ID recs as above for out pt abx therapy.  Will take down dressing and exam L BKA with Dr. May Jackson this am.    Electronically signed by Delilah Townsend DO on 9/15/2017 at 6:53 AM

## 2017-09-15 NOTE — PROGRESS NOTES
Wound left open with wound vac placement. WBC: 15.7 - 12.6 - 11.4 - 13.2 - 11.2 - 13.2 9/15/2017  Cr: 0.74  - 0.87 - 0.78 - 0.97 (9/15)    cx stump MRSA 9/3/17 sensitive to doxy      Physical Examination :     Patient Vitals for the past 8 hrs:   BP Temp Temp src Pulse Resp SpO2   09/15/17 0701 (!) 173/62 97.7 °F (36.5 °C) Oral 75 17 93 %   09/15/17 0400 (!) 167/58 97.7 °F (36.5 °C) Oral 74 16 90 %     Temp (24hrs), Av.3 °F (35.7 °C), Min:94.1 °F (34.5 °C), Max:98.1 °F (36.7 °C)    General Appearance: Awake, alert, and in no apparent distress  Eyes: Sclera anicteric; conjunctivae pink. ENT:Oropharynx clear, without erythema, exudate, or thrush. No nasal drainage. Neck: Supple  Pulmonary/Chest: Clear to auscultation, without wheezes, rales, or rhonchi. No areas of consolidation. Good air movement bilaterally. No egophony. Cardiovascular:Regular rate and rhythm without murmurs, rubs, or gallops. S1 and S2 normal.  Abdomen: Soft, non tender. Bowel sounds normal.   Extremities: left LE dressing in place   Neurologic: No gross sensory or motor deficits. Skin: No rash or lesions.  Inflammation around old IV site on L wrist     Medical Decision Making:   I have independently reviewed/ordered the following labs:    CBC with Differential:   Recent Labs      17   0452  09/15/17   0509   WBC  11.2*  13.2*   HGB  7.3*  7.1*   HCT  22.8*  22.2*   PLT  434  414     BMP:   Recent Labs      17   0452  09/14/17   2031  09/15/17   0509   NA  137  139   --    BUN  16   --   16   CREATININE  0.90   --   0.97       Lab Results   Component Value Date    [facility] 8.3 2017        Medications:      insulin glargine  10 Units Subcutaneous Nightly    sodium chloride flush  10 mL Intravenous 2 times per day    doxycycline hyclate  100 mg Oral 2 times per day    heparin (porcine)  5,000 Units Subcutaneous 3 times per day    sodium chloride  250 mL Intravenous Once    amLODIPine  10 mg Oral Daily   

## 2017-09-15 NOTE — PROGRESS NOTES
Nutrition Assessment    Type and Reason for Visit: Reassess    Nutrition Recommendations: Recommend continue General diet and Valente supplements. Malnutrition Assessment:  · Malnutrition Status: No malnutrition    Nutrition Diagnosis:   · Problem: Increased nutrient needs  · Etiology: related to Increased demand for energy/nutrients due to     Signs and symptoms:  as evidenced by Presence of wounds    Nutrition Assessment:  · Subjective Assessment: Pt continues to consume all food provided. Planning for discharge noted. Agree with liberalized diet. · Wound Type: Surgical Wound  · Current Nutrition Therapies:  · Oral Diet Orders: General   · Oral Diet intake: %  · Anthropometric Measures:  · Ht: 5' 6.93\" (170 cm)   · Current Body Wt: 194 lb (88 kg)  ·   · Ideal Body Wt: 142 lb (64.4 kg), % Ideal Body 140%  · Adjusted Body Wt: 164 lb (74.4 kg), body weight adjusted for Obesity  · BMI Classification: BMI 30.0 - 34.9 Obese Class I  · Comparative Standards (Estimated Nutrition Needs):  · Estimated Daily Total Kcal: 7783-2616 kcal  · Estimated Daily Protein (g):  g    Estimated Intake vs Estimated Needs: Intake Meets Needs    Nutrition Risk Level: Moderate    Nutrition Interventions:   Continue current diet, Continue current ONS       Nutrition Evaluation:   · Evaluation: Goal achieved   · Goals: Intake of Valente twice daily    · Monitoring: Meal Intake, Supplement Intake, Wound Healing    See Adult Nutrition Doc Flowsheet for more detail.      Electronically signed by Yusef Calle RD, LD on 9/15/17 at 2:49 PM    Contact Number: [phone]

## 2017-09-15 NOTE — PROGRESS NOTES
Internal Medicine - Daily Progress Note      Date and time: 9/15/2017 12:08 PM  Patient's name:  Zo Roca  Medical Record Number: [id number]  Patient's account/billing number: [de-identified]  Patient's YOB: 1965  Age: 46 y.o. Date of Admission: 8/31/2017  4:35 PM      Primary Care Physician: Deep Salazar MD  Attending Physician:    Code Status: Full Code    Chief complaint: Osteomylitis of left foot      Problem List:   Patient Active Problem List   Diagnosis    Uncontrolled hypertension    IDDM (insulin dependent diabetes mellitus) ([address]    [facility]    Uncontrolled type 2 diabetes with cellulitis of foot [facility]    Cerebrovascular disease    Cataract    Dysphagia    Family history of colon cancer    Bowel habit changes    Hyperlipidemia    Gangrene of foot ([address]    Diabetic foot left    Acute kidney injury ([facility]    Ischemic foot left    Urinary retention    Subacute osteomyelitis of left foot (HCC)       Allergies:    No Known Allergies       SUBJECTIVE:     Interval History : History was obtained from the patient. /Ms. Zo Roca is admitted for left foot osteomyelitis    Left foot pain   bl sugars in 300  BP sbp in 160 - 170 elevated due to pain    Review of Systems -   General ROS: negative  Psychological ROS: negative  Respiratory ROS: no cough, shortness of breath, or wheezing  Cardiovascular ROS: no chest pain or dyspnea on exertion  Gastrointestinal ROS:negative  Genito-Urinary ROS: negative  Musculoskeletal ROS: pain at amputation site left leg  Neurological ROS: negative  Dermatological ROS: negative        OBJECTIVE:    Vitals: BP (!) 173/62  Pulse 75  Temp 97.7 °F (36.5 °C) (Oral)   Resp 17  Ht 5' 6.93\" (1.7 m)  Wt 194 lb 0.1 oz (88 kg)  SpO2 93%  BMI 30.45 kg/m2    Last Body weight:   Wt Readings from Last 3 Encounters:   09/13/17 194 lb 0.1 oz (88 kg)   04/27/17 204 lb 2.3 oz (92.6 kg)   04/13/17 202 lb 9.6 oz (91.9 kg)       Intake and Output summary:   Intake/Output Summary (Last 24 hours) at 09/15/17 1208  Last data filed at 09/15/17 7407   Gross per 24 hour   Intake              300 ml   Output             1800 ml   Net            -1500 ml         PHYSICAL EXAMINATION :    General appearance - alert, well appearing, and in no distress,   Mental status - alert, oriented to person, place, and time, normal mood, behavior, speech, dress, motor activity, and thought processes  Eyes - pupils equal and reactive  Ears - hearing grossly normal bilaterally  Nose - normal and patent  Mouth - mucous membranes moist  Neck - supple, no significant adenopathy  Chest - clear to auscultation, no wheezes  Heart - normal S1, S2, no murmurs  Abdomen - soft, nontender, nondistended, no masses or organomegaly  Neurological - alert, oriented, normal speech,  Gait not assessed  Extremities - peripheral pulses normal, no pedal edema, left BKA   Skin - normal coloration and turgor, no rashes noted       Medications:    Scheduled Meds:   insulin glargine  10 Units Subcutaneous Nightly    sodium chloride flush  10 mL Intravenous 2 times per day    doxycycline hyclate  100 mg Oral 2 times per day    heparin (porcine)  5,000 Units Subcutaneous 3 times per day    sodium chloride  250 mL Intravenous Once    amLODIPine  10 mg Oral Daily    lisinopril  40 mg Oral Daily    insulin lispro  0-12 Units Subcutaneous TID WC    insulin lispro  0-6 Units Subcutaneous Nightly    sodium hypochlorite   Irrigation Daily    folic acid  1 mg Oral Daily    tamsulosin  0.4 mg Oral Daily    sodium chloride flush  10 mL Intravenous 2 times per day    hydrALAZINE  100 mg Oral TID    metoprolol tartrate  50 mg Oral BID    pravastatin  10 mg Oral Daily    timolol  1 drop Both Eyes BID    latanoprost  1 drop Both Eyes Nightly    pregabalin  25 mg Oral TID    pantoprazole  20 mg Oral BID     Continuous Infusions:   dextrose       PRN Medications: sodium chloride flush, acetaminophen, Although every effort was made to ensure the accuracy of this automated transcription, some errors in transcription may have occurred. Lily Sousa M.D.   [facility] ([facility])             9/15/2017, 12:08 PM

## 2017-09-16 ENCOUNTER — HOSPITAL ENCOUNTER (INPATIENT)
Age: 52
LOS: 10 days | Discharge: HOME | DRG: 560 | End: 2017-09-26
Attending: PHYSICAL MEDICINE & REHABILITATION | Admitting: PHYSICAL MEDICINE & REHABILITATION
Payer: COMMERCIAL

## 2017-09-16 VITALS
TEMPERATURE: 97.1 F | WEIGHT: 194 LBS | RESPIRATION RATE: 20 BRPM | SYSTOLIC BLOOD PRESSURE: 168 MMHG | HEART RATE: 74 BPM | BODY MASS INDEX: 30.45 KG/M2 | OXYGEN SATURATION: 92 % | DIASTOLIC BLOOD PRESSURE: 61 MMHG | HEIGHT: 67 IN

## 2017-09-16 LAB
BUN BLDV-MCNC: 14 MG/DL (ref 6–20)
CREAT SERPL-MCNC: 0.85 MG/DL (ref 0.7–1.2)
GFR AFRICAN AMERICAN: >60 ML/MIN
GFR NON-AFRICAN AMERICAN: >60 ML/MIN
GFR SERPL CREATININE-BSD FRML MDRD: NORMAL ML/MIN/{1.73_M2}
GFR SERPL CREATININE-BSD FRML MDRD: NORMAL ML/MIN/{1.73_M2}
GLUCOSE BLD-MCNC: 151 MG/DL (ref 75–110)
GLUCOSE BLD-MCNC: 156 MG/DL (ref 75–110)
GLUCOSE BLD-MCNC: 157 MG/DL (ref 75–110)
GLUCOSE BLD-MCNC: 190 MG/DL (ref 75–110)
HCT VFR BLD CALC: 22.7 % (ref 41–53)
HEMOGLOBIN: 7.3 G/DL (ref 13.5–17.5)
MCH RBC QN AUTO: 27.2 PG (ref 26–34)
MCHC RBC AUTO-ENTMCNC: 32.1 G/DL (ref 31–37)
MCV RBC AUTO: 84.7 FL (ref 80–100)
PDW BLD-RTO: 21.2 % (ref 12.5–15.4)
PLATELET # BLD: 451 K/UL (ref 140–450)
PMV BLD AUTO: 9.2 FL (ref 6–12)
RBC # BLD: 2.68 M/UL (ref 4.5–5.9)
SODIUM BLD-SCNC: 140 MMOL/L (ref 135–144)
WBC # BLD: 9.9 K/UL (ref 3.5–11)

## 2017-09-16 PROCEDURE — 82947 ASSAY GLUCOSE BLOOD QUANT: CPT

## 2017-09-16 PROCEDURE — 85027 COMPLETE CBC AUTOMATED: CPT

## 2017-09-16 PROCEDURE — 94664 DEMO&/EVAL PT USE INHALER: CPT

## 2017-09-16 PROCEDURE — 6360000002 HC RX W HCPCS: Performed by: STUDENT IN AN ORGANIZED HEALTH CARE EDUCATION/TRAINING PROGRAM

## 2017-09-16 PROCEDURE — 84295 ASSAY OF SERUM SODIUM: CPT

## 2017-09-16 PROCEDURE — 82565 ASSAY OF CREATININE: CPT

## 2017-09-16 PROCEDURE — 6370000000 HC RX 637 (ALT 250 FOR IP): Performed by: STUDENT IN AN ORGANIZED HEALTH CARE EDUCATION/TRAINING PROGRAM

## 2017-09-16 PROCEDURE — 2580000003 HC RX 258: Performed by: STUDENT IN AN ORGANIZED HEALTH CARE EDUCATION/TRAINING PROGRAM

## 2017-09-16 PROCEDURE — 6370000000 HC RX 637 (ALT 250 FOR IP): Performed by: PODIATRIST

## 2017-09-16 PROCEDURE — 6370000000 HC RX 637 (ALT 250 FOR IP): Performed by: PHYSICAL MEDICINE & REHABILITATION

## 2017-09-16 PROCEDURE — 99232 SBSQ HOSP IP/OBS MODERATE 35: CPT | Performed by: INTERNAL MEDICINE

## 2017-09-16 PROCEDURE — 84520 ASSAY OF UREA NITROGEN: CPT

## 2017-09-16 PROCEDURE — 36415 COLL VENOUS BLD VENIPUNCTURE: CPT

## 2017-09-16 PROCEDURE — 2580000003 HC RX 258: Performed by: PODIATRIST

## 2017-09-16 PROCEDURE — 1180000000 HC REHAB R&B

## 2017-09-16 PROCEDURE — 6370000000 HC RX 637 (ALT 250 FOR IP): Performed by: INTERNAL MEDICINE

## 2017-09-16 PROCEDURE — 6370000000 HC RX 637 (ALT 250 FOR IP): Performed by: HOSPITALIST

## 2017-09-16 PROCEDURE — 94760 N-INVAS EAR/PLS OXIMETRY 1: CPT

## 2017-09-16 RX ORDER — METOPROLOL TARTRATE 50 MG/1
50 TABLET, FILM COATED ORAL 2 TIMES DAILY
Status: CANCELLED | OUTPATIENT
Start: 2017-09-16

## 2017-09-16 RX ORDER — DOCUSATE SODIUM 100 MG/1
100 CAPSULE, LIQUID FILLED ORAL 2 TIMES DAILY PRN
Qty: 40 CAPSULE | Refills: 0 | Status: ON HOLD | OUTPATIENT
Start: 2017-09-16 | End: 2019-07-25 | Stop reason: SDUPTHER

## 2017-09-16 RX ORDER — CHLORTHALIDONE 25 MG/1
25 TABLET ORAL DAILY
Status: DISCONTINUED | OUTPATIENT
Start: 2017-09-17 | End: 2017-09-26 | Stop reason: HOSPADM

## 2017-09-16 RX ORDER — TAMSULOSIN HYDROCHLORIDE 0.4 MG/1
0.4 CAPSULE ORAL DAILY
Status: CANCELLED | OUTPATIENT
Start: 2017-09-17

## 2017-09-16 RX ORDER — SENNA AND DOCUSATE SODIUM 50; 8.6 MG/1; MG/1
2 TABLET, FILM COATED ORAL 2 TIMES DAILY PRN
Status: DISCONTINUED | OUTPATIENT
Start: 2017-09-16 | End: 2017-09-26 | Stop reason: HOSPADM

## 2017-09-16 RX ORDER — DOXYCYCLINE HYCLATE 100 MG
100 TABLET ORAL EVERY 12 HOURS SCHEDULED
Status: DISCONTINUED | OUTPATIENT
Start: 2017-09-16 | End: 2017-09-16

## 2017-09-16 RX ORDER — PRAVASTATIN SODIUM 20 MG
10 TABLET ORAL DAILY
Status: CANCELLED | OUTPATIENT
Start: 2017-09-17

## 2017-09-16 RX ORDER — OXYCODONE HYDROCHLORIDE AND ACETAMINOPHEN 5; 325 MG/1; MG/1
1 TABLET ORAL EVERY 6 HOURS PRN
Qty: 28 TABLET | Refills: 0 | Status: ON HOLD | OUTPATIENT
Start: 2017-09-16 | End: 2017-09-25 | Stop reason: HOSPADM

## 2017-09-16 RX ORDER — CYCLOBENZAPRINE HCL 10 MG
5 TABLET ORAL 3 TIMES DAILY PRN
Status: DISCONTINUED | OUTPATIENT
Start: 2017-09-16 | End: 2017-09-26 | Stop reason: HOSPADM

## 2017-09-16 RX ORDER — GLIMEPIRIDE 2 MG/1
1 TABLET ORAL 2 TIMES DAILY
Status: DISCONTINUED | OUTPATIENT
Start: 2017-09-17 | End: 2017-09-26 | Stop reason: HOSPADM

## 2017-09-16 RX ORDER — CHLORTHALIDONE 25 MG/1
25 TABLET ORAL DAILY
Status: DISCONTINUED | OUTPATIENT
Start: 2017-09-16 | End: 2017-09-16 | Stop reason: HOSPADM

## 2017-09-16 RX ORDER — PREGABALIN 25 MG/1
25 CAPSULE ORAL 3 TIMES DAILY
Status: DISCONTINUED | OUTPATIENT
Start: 2017-09-16 | End: 2017-09-26 | Stop reason: HOSPADM

## 2017-09-16 RX ORDER — ALBUTEROL SULFATE 2.5 MG/3ML
2.5 SOLUTION RESPIRATORY (INHALATION) EVERY 6 HOURS PRN
Status: DISCONTINUED | OUTPATIENT
Start: 2017-09-16 | End: 2017-09-26 | Stop reason: HOSPADM

## 2017-09-16 RX ORDER — ONDANSETRON 2 MG/ML
4 INJECTION INTRAMUSCULAR; INTRAVENOUS EVERY 6 HOURS PRN
Status: DISCONTINUED | OUTPATIENT
Start: 2017-09-16 | End: 2017-09-20

## 2017-09-16 RX ORDER — LABETALOL HYDROCHLORIDE 5 MG/ML
10 INJECTION, SOLUTION INTRAVENOUS EVERY 6 HOURS PRN
Status: DISCONTINUED | OUTPATIENT
Start: 2017-09-16 | End: 2017-09-21

## 2017-09-16 RX ORDER — ACETAMINOPHEN 325 MG/1
650 TABLET ORAL EVERY 4 HOURS PRN
Status: CANCELLED | OUTPATIENT
Start: 2017-09-16

## 2017-09-16 RX ORDER — OXYCODONE HYDROCHLORIDE AND ACETAMINOPHEN 5; 325 MG/1; MG/1
2 TABLET ORAL EVERY 4 HOURS PRN
Status: CANCELLED | OUTPATIENT
Start: 2017-09-16

## 2017-09-16 RX ORDER — INSULIN GLARGINE 100 [IU]/ML
10 INJECTION, SOLUTION SUBCUTANEOUS NIGHTLY
Status: CANCELLED | OUTPATIENT
Start: 2017-09-16

## 2017-09-16 RX ORDER — PRAVASTATIN SODIUM 10 MG
10 TABLET ORAL DAILY
Status: DISCONTINUED | OUTPATIENT
Start: 2017-09-17 | End: 2017-09-26 | Stop reason: HOSPADM

## 2017-09-16 RX ORDER — PANTOPRAZOLE SODIUM 20 MG/1
20 TABLET, DELAYED RELEASE ORAL 2 TIMES DAILY
Status: CANCELLED | OUTPATIENT
Start: 2017-09-16

## 2017-09-16 RX ORDER — ONDANSETRON 2 MG/ML
4 INJECTION INTRAMUSCULAR; INTRAVENOUS EVERY 6 HOURS PRN
Status: CANCELLED | OUTPATIENT
Start: 2017-09-16

## 2017-09-16 RX ORDER — NICOTINE POLACRILEX 4 MG
15 LOZENGE BUCCAL PRN
Status: CANCELLED | OUTPATIENT
Start: 2017-09-16

## 2017-09-16 RX ORDER — LATANOPROST 50 UG/ML
1 SOLUTION/ DROPS OPHTHALMIC NIGHTLY
Status: DISCONTINUED | OUTPATIENT
Start: 2017-09-16 | End: 2017-09-26 | Stop reason: HOSPADM

## 2017-09-16 RX ORDER — SODIUM CHLORIDE 0.9 % (FLUSH) 0.9 %
10 SYRINGE (ML) INJECTION EVERY 12 HOURS SCHEDULED
Status: CANCELLED | OUTPATIENT
Start: 2017-09-16

## 2017-09-16 RX ORDER — PREGABALIN 25 MG/1
25 CAPSULE ORAL 3 TIMES DAILY
Status: CANCELLED | OUTPATIENT
Start: 2017-09-16

## 2017-09-16 RX ORDER — DIPHENHYDRAMINE HCL 25 MG
25 TABLET ORAL EVERY 6 HOURS PRN
Status: DISCONTINUED | OUTPATIENT
Start: 2017-09-16 | End: 2017-09-26 | Stop reason: HOSPADM

## 2017-09-16 RX ORDER — OXYCODONE HYDROCHLORIDE AND ACETAMINOPHEN 5; 325 MG/1; MG/1
1 TABLET ORAL EVERY 4 HOURS PRN
Status: DISCONTINUED | OUTPATIENT
Start: 2017-09-16 | End: 2017-09-26 | Stop reason: HOSPADM

## 2017-09-16 RX ORDER — METOPROLOL TARTRATE 50 MG/1
50 TABLET, FILM COATED ORAL 2 TIMES DAILY
Status: DISCONTINUED | OUTPATIENT
Start: 2017-09-16 | End: 2017-09-26 | Stop reason: HOSPADM

## 2017-09-16 RX ORDER — AMLODIPINE BESYLATE 10 MG/1
10 TABLET ORAL DAILY
Status: CANCELLED | OUTPATIENT
Start: 2017-09-17

## 2017-09-16 RX ORDER — CYCLOBENZAPRINE HCL 5 MG
5 TABLET ORAL 3 TIMES DAILY PRN
Status: CANCELLED | OUTPATIENT
Start: 2017-09-16

## 2017-09-16 RX ORDER — INSULIN GLARGINE 100 [IU]/ML
10 INJECTION, SOLUTION SUBCUTANEOUS NIGHTLY
Status: DISCONTINUED | OUTPATIENT
Start: 2017-09-16 | End: 2017-09-18

## 2017-09-16 RX ORDER — METOCLOPRAMIDE HYDROCHLORIDE 5 MG/ML
10 INJECTION INTRAMUSCULAR; INTRAVENOUS EVERY 6 HOURS PRN
Status: CANCELLED | OUTPATIENT
Start: 2017-09-16

## 2017-09-16 RX ORDER — ACETAMINOPHEN 325 MG/1
650 TABLET ORAL EVERY 4 HOURS PRN
Status: DISCONTINUED | OUTPATIENT
Start: 2017-09-16 | End: 2017-09-26 | Stop reason: HOSPADM

## 2017-09-16 RX ORDER — SENNA AND DOCUSATE SODIUM 50; 8.6 MG/1; MG/1
2 TABLET, FILM COATED ORAL 2 TIMES DAILY PRN
Status: DISCONTINUED | OUTPATIENT
Start: 2017-09-16 | End: 2017-09-16 | Stop reason: HOSPADM

## 2017-09-16 RX ORDER — LISINOPRIL 20 MG/1
40 TABLET ORAL DAILY
Status: CANCELLED | OUTPATIENT
Start: 2017-09-17

## 2017-09-16 RX ORDER — SENNA AND DOCUSATE SODIUM 50; 8.6 MG/1; MG/1
2 TABLET, FILM COATED ORAL 2 TIMES DAILY PRN
Status: CANCELLED | OUTPATIENT
Start: 2017-09-16

## 2017-09-16 RX ORDER — DOXYCYCLINE 100 MG/1
100 CAPSULE ORAL EVERY 12 HOURS SCHEDULED
Status: DISCONTINUED | OUTPATIENT
Start: 2017-09-16 | End: 2017-09-26 | Stop reason: HOSPADM

## 2017-09-16 RX ORDER — PANTOPRAZOLE SODIUM 20 MG/1
20 TABLET, DELAYED RELEASE ORAL 2 TIMES DAILY
Status: DISCONTINUED | OUTPATIENT
Start: 2017-09-16 | End: 2017-09-26 | Stop reason: HOSPADM

## 2017-09-16 RX ORDER — DIPHENHYDRAMINE HCL 25 MG
25 TABLET ORAL EVERY 6 HOURS PRN
Status: CANCELLED | OUTPATIENT
Start: 2017-09-16

## 2017-09-16 RX ORDER — METOCLOPRAMIDE HYDROCHLORIDE 5 MG/ML
10 INJECTION INTRAMUSCULAR; INTRAVENOUS EVERY 6 HOURS PRN
Status: DISCONTINUED | OUTPATIENT
Start: 2017-09-16 | End: 2017-09-20

## 2017-09-16 RX ORDER — POLYETHYLENE GLYCOL 3350 17 G/17G
17 POWDER, FOR SOLUTION ORAL DAILY
Status: DISCONTINUED | OUTPATIENT
Start: 2017-09-17 | End: 2017-09-26 | Stop reason: HOSPADM

## 2017-09-16 RX ORDER — BISACODYL 10 MG
10 SUPPOSITORY, RECTAL RECTAL DAILY PRN
Status: DISCONTINUED | OUTPATIENT
Start: 2017-09-16 | End: 2017-09-26 | Stop reason: HOSPADM

## 2017-09-16 RX ORDER — LATANOPROST 50 UG/ML
1 SOLUTION/ DROPS OPHTHALMIC NIGHTLY
Status: CANCELLED | OUTPATIENT
Start: 2017-09-16

## 2017-09-16 RX ORDER — FOLIC ACID 1 MG/1
1 TABLET ORAL DAILY
Status: DISCONTINUED | OUTPATIENT
Start: 2017-09-17 | End: 2017-09-26 | Stop reason: HOSPADM

## 2017-09-16 RX ORDER — OXYCODONE HYDROCHLORIDE AND ACETAMINOPHEN 5; 325 MG/1; MG/1
2 TABLET ORAL EVERY 4 HOURS PRN
Status: DISCONTINUED | OUTPATIENT
Start: 2017-09-16 | End: 2017-09-26 | Stop reason: HOSPADM

## 2017-09-16 RX ORDER — OXYCODONE HYDROCHLORIDE AND ACETAMINOPHEN 5; 325 MG/1; MG/1
1 TABLET ORAL EVERY 4 HOURS PRN
Status: CANCELLED | OUTPATIENT
Start: 2017-09-16

## 2017-09-16 RX ORDER — LABETALOL HYDROCHLORIDE 5 MG/ML
10 INJECTION, SOLUTION INTRAVENOUS EVERY 6 HOURS PRN
Status: CANCELLED | OUTPATIENT
Start: 2017-09-16

## 2017-09-16 RX ORDER — TAMSULOSIN HYDROCHLORIDE 0.4 MG/1
0.4 CAPSULE ORAL DAILY
Status: DISCONTINUED | OUTPATIENT
Start: 2017-09-17 | End: 2017-09-26 | Stop reason: HOSPADM

## 2017-09-16 RX ORDER — DOXYCYCLINE HYCLATE 100 MG
100 TABLET ORAL EVERY 12 HOURS SCHEDULED
Status: CANCELLED | OUTPATIENT
Start: 2017-09-16

## 2017-09-16 RX ORDER — NICOTINE POLACRILEX 4 MG
15 LOZENGE BUCCAL PRN
Status: DISCONTINUED | OUTPATIENT
Start: 2017-09-16 | End: 2017-09-26 | Stop reason: HOSPADM

## 2017-09-16 RX ORDER — FOLIC ACID 1 MG/1
1 TABLET ORAL DAILY
Status: CANCELLED | OUTPATIENT
Start: 2017-09-17

## 2017-09-16 RX ORDER — CHLORTHALIDONE 25 MG/1
25 TABLET ORAL DAILY
Status: CANCELLED | OUTPATIENT
Start: 2017-09-17

## 2017-09-16 RX ORDER — ALBUTEROL SULFATE 2.5 MG/3ML
2.5 SOLUTION RESPIRATORY (INHALATION) EVERY 6 HOURS PRN
Status: CANCELLED | OUTPATIENT
Start: 2017-09-16

## 2017-09-16 RX ORDER — AMLODIPINE BESYLATE 10 MG/1
10 TABLET ORAL DAILY
Status: DISCONTINUED | OUTPATIENT
Start: 2017-09-17 | End: 2017-09-26 | Stop reason: HOSPADM

## 2017-09-16 RX ORDER — LISINOPRIL 20 MG/1
40 TABLET ORAL DAILY
Status: DISCONTINUED | OUTPATIENT
Start: 2017-09-17 | End: 2017-09-26 | Stop reason: HOSPADM

## 2017-09-16 RX ADMIN — DOXYCYCLINE HYCLATE 100 MG: 100 TABLET, COATED ORAL at 09:03

## 2017-09-16 RX ADMIN — PREGABALIN 25 MG: 25 CAPSULE ORAL at 20:37

## 2017-09-16 RX ADMIN — METOPROLOL TARTRATE 50 MG: 50 TABLET ORAL at 20:36

## 2017-09-16 RX ADMIN — CHLORTHALIDONE 25 MG: 25 TABLET ORAL at 14:09

## 2017-09-16 RX ADMIN — LATANOPROST 1 DROP: 50 SOLUTION OPHTHALMIC at 20:37

## 2017-09-16 RX ADMIN — LISINOPRIL 40 MG: 20 TABLET ORAL at 09:03

## 2017-09-16 RX ADMIN — INSULIN LISPRO 2 UNITS: 100 INJECTION, SOLUTION INTRAVENOUS; SUBCUTANEOUS at 12:48

## 2017-09-16 RX ADMIN — FOLIC ACID 1 MG: 1 TABLET ORAL at 09:03

## 2017-09-16 RX ADMIN — LABETALOL HYDROCHLORIDE 10 MG: 5 INJECTION, SOLUTION INTRAVENOUS at 00:59

## 2017-09-16 RX ADMIN — TAMSULOSIN HYDROCHLORIDE 0.4 MG: 0.4 CAPSULE ORAL at 09:02

## 2017-09-16 RX ADMIN — PANTOPRAZOLE SODIUM 20 MG: 20 TABLET, DELAYED RELEASE ORAL at 09:03

## 2017-09-16 RX ADMIN — HYDRALAZINE HYDROCHLORIDE 100 MG: 50 TABLET, FILM COATED ORAL at 14:09

## 2017-09-16 RX ADMIN — PREGABALIN 25 MG: 25 CAPSULE ORAL at 14:00

## 2017-09-16 RX ADMIN — TIMOLOL MALEATE 1 DROP: 2.5 SOLUTION/ DROPS OPHTHALMIC at 09:04

## 2017-09-16 RX ADMIN — PANTOPRAZOLE SODIUM 20 MG: 20 TABLET, DELAYED RELEASE ORAL at 20:36

## 2017-09-16 RX ADMIN — METOPROLOL TARTRATE 50 MG: 50 TABLET, FILM COATED ORAL at 09:03

## 2017-09-16 RX ADMIN — HYDRALAZINE HYDROCHLORIDE 100 MG: 50 TABLET, FILM COATED ORAL at 09:03

## 2017-09-16 RX ADMIN — Medication 10 ML: at 02:39

## 2017-09-16 RX ADMIN — Medication 10 ML: at 09:04

## 2017-09-16 RX ADMIN — OXYCODONE HYDROCHLORIDE AND ACETAMINOPHEN 2 TABLET: 5; 325 TABLET ORAL at 20:36

## 2017-09-16 RX ADMIN — OXYCODONE HYDROCHLORIDE AND ACETAMINOPHEN 2 TABLET: 5; 325 TABLET ORAL at 09:28

## 2017-09-16 RX ADMIN — DOXYCYCLINE 100 MG: 100 CAPSULE ORAL at 20:36

## 2017-09-16 RX ADMIN — OXYCODONE HYDROCHLORIDE AND ACETAMINOPHEN 2 TABLET: 5; 325 TABLET ORAL at 14:09

## 2017-09-16 RX ADMIN — OXYCODONE HYDROCHLORIDE AND ACETAMINOPHEN 2 TABLET: 5; 325 TABLET ORAL at 03:47

## 2017-09-16 RX ADMIN — INSULIN LISPRO 2 UNITS: 100 INJECTION, SOLUTION INTRAVENOUS; SUBCUTANEOUS at 09:18

## 2017-09-16 RX ADMIN — FENTANYL CITRATE 25 MCG: 50 INJECTION INTRAMUSCULAR; INTRAVENOUS at 00:59

## 2017-09-16 RX ADMIN — INSULIN LISPRO 1 UNITS: 100 INJECTION, SOLUTION INTRAVENOUS; SUBCUTANEOUS at 20:39

## 2017-09-16 RX ADMIN — PREGABALIN 25 MG: 25 CAPSULE ORAL at 09:03

## 2017-09-16 RX ADMIN — INSULIN GLARGINE 10 UNITS: 100 INJECTION, SOLUTION SUBCUTANEOUS at 20:39

## 2017-09-16 RX ADMIN — AMLODIPINE BESYLATE 10 MG: 10 TABLET ORAL at 09:03

## 2017-09-16 RX ADMIN — PRAVASTATIN SODIUM 10 MG: 20 TABLET ORAL at 09:02

## 2017-09-16 RX ADMIN — HEPARIN SODIUM 5000 UNITS: 5000 INJECTION, SOLUTION INTRAVENOUS; SUBCUTANEOUS at 09:19

## 2017-09-16 RX ADMIN — HEPARIN SODIUM 5000 UNITS: 5000 INJECTION, SOLUTION INTRAVENOUS; SUBCUTANEOUS at 14:10

## 2017-09-16 ASSESSMENT — PAIN DESCRIPTION - DESCRIPTORS
DESCRIPTORS: SHOOTING
DESCRIPTORS: ACHING

## 2017-09-16 ASSESSMENT — PAIN SCALES - GENERAL
PAINLEVEL_OUTOF10: 4
PAINLEVEL_OUTOF10: 9
PAINLEVEL_OUTOF10: 7
PAINLEVEL_OUTOF10: 7
PAINLEVEL_OUTOF10: 8
PAINLEVEL_OUTOF10: 7
PAINLEVEL_OUTOF10: 8

## 2017-09-16 ASSESSMENT — PAIN DESCRIPTION - PAIN TYPE
TYPE: ACUTE PAIN
TYPE: ACUTE PAIN

## 2017-09-16 ASSESSMENT — PAIN DESCRIPTION - ORIENTATION
ORIENTATION: LEFT
ORIENTATION: LEFT;LOWER

## 2017-09-16 ASSESSMENT — PAIN DESCRIPTION - FREQUENCY
FREQUENCY: CONTINUOUS
FREQUENCY: INTERMITTENT

## 2017-09-16 ASSESSMENT — PAIN DESCRIPTION - ONSET
ONSET: ON-GOING
ONSET: ON-GOING

## 2017-09-16 ASSESSMENT — PAIN DESCRIPTION - LOCATION
LOCATION: LEG
LOCATION: LEG

## 2017-09-16 ASSESSMENT — PAIN DESCRIPTION - PROGRESSION: CLINICAL_PROGRESSION: GRADUALLY IMPROVING

## 2017-09-16 NOTE — IP AVS SNAPSHOT
After Visit Summary  (Discharge Instructions)    Medication List for Home    Based on the information you provided to us as well as any changes during this visit, the following is your updated medication list.  Compare this with your prescription bottles at home. If you have any questions or concerns, contact your primary care physician's office. Daily Medication List (This medication list can be shared with any healthcare provider who is helping you manage your medications)      There are NEW medications for you. START taking them after you leave the hospital        Last Dose    Next Dose Due AM NOON PM NIGHT    chlorthalidone 25 MG tablet   Commonly known as:  HYGROTON   Take 1 tablet by mouth daily                25 mg on 9/26/2017  7:22 AM                            doxycycline monohydrate 100 MG capsule   Commonly known as:  MONODOX   Take 1 capsule by mouth every 12 hours for 3 days                100 mg on 9/26/2017  7:22 AM                            folic acid 1 MG tablet   Commonly known as:  FOLVITE   Take 1 tablet by mouth daily                1 mg on 9/26/2017  7:22 AM                            spironolactone 100 MG tablet   Commonly known as:  ALDACTONE   Take 1 tablet by mouth daily                100 mg on 9/26/2017  7:22 AM                            tamsulosin 0.4 MG capsule   Commonly known as:  FLOMAX   Take 1 capsule by mouth daily                0.4 mg on 9/26/2017  7:21 AM                              You told us you were taking these medications at home, but the amount or how often you take this medication has CHANGED        Last Dose    Next Dose Due AM NOON PM NIGHT    insulin glargine 100 UNIT/ML injection vial   Commonly known as:  LANTUS   Inject 20 Units into the skin nightly   What changed:    - when to take this  - Another medication with the same name was removed. Continue taking this medication, and follow the directions you see here. travoprost (benzalkonium) 0.004 % ophthalmic solution   Commonly known as:  TRAVATAN   1 drop nightly                                           These are the medications you have told us you were taking at home, STOP taking them after you leave the hospital     doxycycline hyclate 100 MG tablet   Commonly known as:  VIBRA-TABS       hydrALAZINE 100 MG tablet   Commonly known as:  APRESOLINE       metFORMIN 500 MG tablet   Commonly known as:  GLUCOPHAGE            Where to Get Your Medications      You can get these medications from any pharmacy     Bring a paper prescription for each of these medications     amLODIPine 10 MG tablet    chlorthalidone 25 MG tablet    cyclobenzaprine 5 MG tablet    doxycycline monohydrate 100 MG capsule    folic acid 1 MG tablet    insulin glargine 100 UNIT/ML injection vial    lisinopril 40 MG tablet    metoprolol tartrate 50 MG tablet    oxyCODONE-acetaminophen 5-325 MG per tablet    pravastatin 10 MG tablet    pregabalin 25 MG capsule    spironolactone 100 MG tablet    tamsulosin 0.4 MG capsule               Allergies as of 9/26/2017     No Known Allergies      Immunizations as of 9/26/2017  Reviewed on 9/19/2017    Name Date Dose VIS Date Route    Pneumococcal Polysaccharide ([id number]) 5/10/2017 0.5 mL -- --      Last Vitals          Most Recent Value    Temp  98.1 °F (36.7 °C)    Pulse  76    Resp  16    BP  (!)  142/58         After Visit Summary    This summary was created for you. Thank you for entrusting your care to us.   The following information includes details about your hospital/visit stay along with steps you should take to help with your recovery once you leave the hospital.  In this packet, you will find information about the topics listed below:    · Instructions about your medications including a list of your home medications  · A summary of your hospital visit  · Follow-up appointments once you have left the hospital  · Your care plan at home 3. Enter your [facility] Access Code exactly as it appears below. You will not need to use this code after youve completed the sign-up process. If you do not sign up before the expiration date, you must request a new code. [facility] Access Code: [email or web address]  Expires: 11/24/2017  2:55 PM    4. Enter your Social Security Number ([id number]) and Date of Birth (mm/dd/yyyy) as indicated and click Submit. You will be taken to the next sign-up page. 5. Create a [facility] ID. This will be your [facility] login ID and cannot be changed, so think of one that is secure and easy to remember. 6. Create a [facility] password. You can change your password at any time. 7. Enter your Password Reset Question and Answer. This can be used at a later time if you forget your password. 8. Enter your e-mail address. You will receive e-mail notification when new information is available in [address] 9. Click Sign Up. You can now view your medical record. Additional Information  If you have questions, please contact the physician practice where you receive care. Remember, [facility] is NOT to be used for urgent needs. For medical emergencies, dial 911. For questions regarding your [facility] account call [phone]. If you have a clinical question, please call your doctor's office. View your information online  ? Review your current list of  medications, immunization, and allergies. ? Review your future test results online . ? Review your discharge instructions provided by your caregivers at discharge    Certain functionality such as prescription refills, scheduling appointments or sending messages to your provider are not activated if your provider does not use [employer] in his/her office    For questions regarding your [facility] account call [phone]. If you have a clinical question, please call your doctor's office.          The information on all pages of the After Visit Summary has been reviewed

## 2017-09-16 NOTE — DISCHARGE SUMMARY
ambulating with assist of walker     Electronically signed by Olivia Larios DO on 9/16/2017 at 10:25 AM

## 2017-09-16 NOTE — IP AVS SNAPSHOT
Patient Information     Patient Name NELDA Perea 1965         This is your updated medication list to keep with you all times      TAKE these medications     amLODIPine 10 MG tablet   Commonly known as:  NORVASC   Take 1 tablet by mouth daily       chlorthalidone 25 MG tablet   Commonly known as:  HYGROTON   Take 1 tablet by mouth daily       cyclobenzaprine 5 MG tablet   Commonly known as:  FLEXERIL   Take 1 tablet by mouth 3 times daily as needed for Muscle spasms       docusate sodium 100 MG capsule   Commonly known as:  COLACE   Take 1 capsule by mouth 2 times daily as needed for Constipation       doxycycline monohydrate 100 MG capsule   Commonly known as:  MONODOX   Take 1 capsule by mouth every 12 hours for 3 days       folic acid 1 MG tablet   Commonly known as:  FOLVITE   Take 1 tablet by mouth daily       insulin glargine 100 UNIT/ML injection vial   Commonly known as:  LANTUS   Inject 20 Units into the skin nightly       lisinopril 40 MG tablet   Commonly known as:  PRINIVIL;ZESTRIL   Take 1 tablet by mouth daily       metoprolol tartrate 50 MG tablet   Commonly known as:  LOPRESSOR   Take 1 tablet by mouth 2 times daily       omeprazole 20 MG delayed release capsule   Commonly known as:  PRILOSEC       oxyCODONE-acetaminophen 5-325 MG per tablet   Commonly known as:  PERCOCET   Take 1 tablet by mouth every 8 hours as needed for Pain .       pravastatin 10 MG tablet   Commonly known as:  PRAVACHOL   Take 1 tablet by mouth daily       pregabalin 25 MG capsule   Commonly known as:  LYRICA   Take 1 capsule by mouth 3 times daily       spironolactone 100 MG tablet   Commonly known as:  ALDACTONE   Take 1 tablet by mouth daily       tamsulosin 0.4 MG capsule   Commonly known as:  FLOMAX   Take 1 capsule by mouth daily       timolol 0.25 % ophthalmic gel-forming   Commonly known as:  TIMOPTIC-XE       travoprost (benzalkonium) 0.004 % ophthalmic solution   Commonly known as:  TRAVATAN

## 2017-09-16 NOTE — PROGRESS NOTES
Vascular Surgery Progress Note         PATIENT NAME: Ollie Mcneil     TODAY'S DATE: 9/16/2017, 5:23 AM    SUBJECTIVE:    Pt seen and examined. Pt slept well in the chair this morning and no acute events overnight. Pt is tolerating PO medications and has decreased pain this morning. No fevers, chills, n/v. Pt is able to void urine and stool. Diet is tolerated well. Pt states he is ready for discharge to rehab and is eager to leave the hospital. Blood pressure remains high and at present was 168/61. Pt pressure has been elevated throughout visit however with max pressure 173/62 on 9/15. Dressing is c/d/i. Dressing take down this am pictures below. Tolerated PT work yesterday. Ready for rehab. OBJECTIVE:   Vitals:  BP (!) 166/63  Pulse 68  Temp 96.7 °F (35.9 °C) (Oral)   Resp 17  Ht 5' 6.93\" (1.7 m)  Wt 194 lb 0.1 oz (88 kg)  SpO2 92%  BMI 30.45 kg/m2     INTAKE/OUTPUT:      Intake/Output Summary (Last 24 hours) at 09/16/17 0523  Last data filed at 09/15/17 1701   Gross per 24 hour   Intake              900 ml   Output             1650 ml   Net             -750 ml                 GENERAL: AOx3, NAD  HEENT: EOMI bilaterally  CARDIAC: Regular rate and rhythm. Respiratory: bronchovesicular breath sounds in all lung fields  ABDOMEN: Soft, NT, ND, Active Bowel Sounds  EXTREMITY: moves all extremities, no edema. Pulse 2+ R DP, PT  NEURO:  Gross motor intact. INCISION: Dressing clean, dry, intact. Data:  CBC:   Lab Results   Component Value Date    WBC 13.2 09/15/2017    RBC 2.63 09/15/2017    HGB 7.1 09/15/2017    HCT 22.2 09/15/2017    MCV 84.4 09/15/2017    MCH 26.8 09/15/2017    MCHC 31.8 09/15/2017    RDW 19.9 09/15/2017     09/15/2017    MPV 9.3 09/15/2017     Sodium:    Lab Results   Component Value Date     09/15/2017     BUN/Creatinine:    Lab Results   Component Value Date    BUN 16 09/15/2017    CREATININE 0.97 09/15/2017         ASSESSMENT   1.  POD # 2 for L BKA formalization with knee splint  Immobilization  2. POD # 4 L BKA revision without closure  3. POD # 9 L BKA guillotine amputation L foot- gangrene    PLAN  1. Neuro- pain controlled  Will wean from IV narcoitcs, PO only  2. CV: BP elevated medicine managing, HR  3. GI: diet general as tolerated 60s-70s  4. Ambulate hallways and upper body strength conditioning  5. L BKA dressing assessment this AM, site is C/D/I, healing well  6. PM&R for rehab follow up  7. Social work/ facility placement pending. F/U with bed availability    8. Doni Still for D/C to rehab from Vascular standpoint     Electronically signed by Aron Vasquez  on 9/16/2017 at 5:23 AM     Pt seen and examined. Changes made to above note. No issues overnight. Dressing take down, incision site C/D/I. Tolerating PT. Dispo planning bed availability  at rehab facility.      Electronically signed by Kylah Judd DO on 9/16/17 at 7:53 AM

## 2017-09-16 NOTE — PROGRESS NOTES
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 · The patient will continue on the oral doxycycline to complete a 14 day course. · He will need to follow-up with us in the office in 2-4 weeks to assess his progress. · The patient and his wife questions were answered to their satisfaction. · Again the plan is for discharge today. Summary of Stay:   Cira Toney is a 46 y.o.-male who was initially admitted on 8/31/2017.       He presented to the hospital from Dr Reyes Chaves office where he was found to have blackening of his left little toe with wounds over his foot . The patient had wounds on his left foot before which were healing and then he fell on Saturday which made the wounds worse. He has had amputation of his left toe before. No fever, chills, nausea or vomiting.       MRI of left foot shows osteomyelitis of the 1st and 3rd metatarsal, calcaneus and also of the 5th metatarsals , diffuse infectious myositis is seen. Concern for soft tissue gas and necrotizing infection. abscess formation measures 1.4 x 1.7 x 1.9 cm.lateral aspect of the foot. BKA debridement 9/12: pocket of pus noted adjacent to tibia where division was done.  Copious irrigation was done and no further pocket of pus noted.  Wound left open with wound vac placement.     Post stump closure  9/14/17: \"wound class IV, cultures taken, formalization of L BKA with excision of small portion of distal tibia, knee immobilizer  placed in OR\"    Current evaluation:2017  The patient is seen and evaluated at bedside he is awake and alert in no acute distress. He is sitting at bedside with his wife and arrangements have been made for discharge to rehabilitation later today. He does not report any fevers or chills. He still does have some pain but this is well-controlled with his pain medication. Physical Examination :   BP (!) 168/61  Pulse 74  Temp 97.1 °F (36.2 °C) (Oral)   Resp 20  Ht 5' 6.93\" (1.7 m)  Wt 194 lb 0.1 oz (88 kg)  SpO2 92%  BMI 30.45 kg/m2    Temperature Range: Temp: 97.1 °F (36.2 °C) Temp  Av.1 °F (36.2 °C)  Min: 96.7 °F (35.9 °C)  Max: 97.6 °F (36.4 °C)    Physical Exam    Laboratory data:   I have independently reviewed the following labs:  CBC with Differential:   Recent Labs      09/15/17   0509  09/15/17   2034  09/16/17   0820   WBC  13.2*   --   9.9   HGB  7.1*  7.1*  7.3*   HCT  22.2*  22.2*  22.7*   PLT  414   --   451*     BMP:   Recent Labs      09/15/17   0509   09/15/17   2034  09/16/17   0820   NA   --    < >  139  140   BUN  16   --    --   14   CREATININE  0.97   --    --   0.85    < > = values in this interval not displayed. Hepatic Function Panel: No results for input(s): PROT, LABALBU, BILIDIR, IBILI, BILITOT, ALKPHOS, ALT, AST in the last 72 hours. No results for input(s): VANCOTROUGH in the last 72 hours. Lab Results   Component Value Date    .9 (H) 2017     Lab Results   Component Value Date    SEDRATE 75 (H) 2017       Imaging Studies:   No new imaging    Cultures:      Anaerobic and Aerobic Culture [[id number]] Collected: 17 09     Order Status: Completed Updated: 09/15/17 1109      Specimen Description . LEG      Special Requests RT LEG      Direct Exam NO NEUTROPHILS SEEN      Direct Exam NO BACTERIA SEEN      Culture NO GROWTH      Culture [facility] [address] [facility], [address] [place] [address] [phone]      Status Pending 09/03/17 1528      Specimen Description . CATHETERIZED URINE VALDIVIA SPECIMEN      Special Requests NOT REPORTED      Culture NO GROWTH      Culture [facility] [address] [facility], [address] [place] [address] [phone]      Status FINAL 09/03/2017           Medications:      chlorthalidone  25 mg Oral Daily    insulin glargine  10 Units Subcutaneous Nightly    sodium chloride flush  10 mL Intravenous 2 times per day    doxycycline hyclate  100 mg Oral 2 times per day    heparin (porcine)  5,000 Units Subcutaneous 3 times per day    sodium chloride  250 mL Intravenous Once    amLODIPine  10 mg Oral Daily    lisinopril  40 mg Oral Daily    insulin lispro  0-12 Units Subcutaneous TID WC    insulin lispro  0-6 Units Subcutaneous Nightly    sodium hypochlorite   Irrigation Daily    folic acid  1 mg Oral Daily    tamsulosin  0.4 mg Oral Daily    sodium chloride flush  10 mL Intravenous 2 times per day    hydrALAZINE  100 mg Oral TID    metoprolol tartrate  50 mg Oral BID    pravastatin  10 mg Oral Daily    timolol  1 drop Both Eyes BID    latanoprost  1 drop Both Eyes Nightly    pregabalin  25 mg Oral TID    pantoprazole  20 mg Oral BID     Thank you for allowing us to participate in the care of this patient. Please call with questions.     Tan Mendes MD  [employer] messaging: [phone]

## 2017-09-16 NOTE — PROGRESS NOTES
Physical Therapy  DATE: 2017    NAME: Stacey Manriquez  MRN: [id number]   : 1965    Patient not seen this date for Physical Therapy due to:  [] Blood transfusion in progress  [] Hemodialysis  []  Patient Declined  [] Spine Precautions   [] Strict Bedrest  [] Surgery/ Procedure  [] Testing      [x] Other: pt being d/c this pm.        [] PT being discontinued at this time. Patient independent. No further needs. [] PT being discontinued at this time as the patient has been transferred to palliative care. No further needs.     Adrianne Baker, PTA

## 2017-09-16 NOTE — PROGRESS NOTES
Internal Medicine - Daily Progress Note      Date and time: 9/16/2017 8:45 AM  Patient's name:  Shani Vivas  Medical Record Number: [id number]  Patient's account/billing number: [de-identified]  Patient's YOB: 1965  Age: 46 y.o. Date of Admission: 8/31/2017  4:35 PM      Primary Care Physician: Sung Dougherty MD  Attending Physician:    Code Status: Full Code    Chief complaint: Osteomylitis of left foot      Problem List:   Patient Active Problem List   Diagnosis    Uncontrolled hypertension    IDDM (insulin dependent diabetes mellitus) ([address]    Neuropathy ([address]    Uncontrolled type 2 diabetes with cellulitis of foot ([address]    Cerebrovascular disease    Cataract    Dysphagia    Family history of colon cancer    Bowel habit changes    Hyperlipidemia    Gangrene of foot ([address]    Diabetic foot left    Acute kidney injury ([address]    Ischemic foot left    Urinary retention    Subacute osteomyelitis of left foot (HCC)       Allergies:    No Known Allergies       SUBJECTIVE:     Interval History : History was obtained from the patient. Mr. Shani Vivas was admitted for left foot osteomyelitis. Underwent L BKA on 9/7/17, completed revision and closure of wound on 9/14/17. Patient seen and examined this morning. No acute events overnight. Afebrile, hypertensive, otherwise vital signs stable. Denies any pain. States he has been tolerating PO intake, denies nausea or vomiting. Ambulating well with assistance. Voiding without difficulty, +flatus, +BM. Denies fevers, chills, chest pain, SOB, or abdominal pain.      Review of Systems -   General ROS: negative  Psychological ROS: negative  Respiratory ROS: no cough, shortness of breath, or wheezing  Cardiovascular ROS: no chest pain or dyspnea on exertion  Gastrointestinal ROS:negative  Genito-Urinary ROS: negative  Musculoskeletal ROS: pain at amputation site left leg  Neurological ROS: negative  Dermatological ROS: negative        OBJECTIVE:    Vitals: BP (!) 168/61  Pulse 74  Temp 97.1 °F (36.2 °C) (Oral)   Resp 20  Ht 5' 6.93\" (1.7 m)  Wt 194 lb 0.1 oz (88 kg)  SpO2 92%  BMI 30.45 kg/m2    Last Body weight:   Wt Readings from Last 3 Encounters:   09/13/17 194 lb 0.1 oz (88 kg)   04/27/17 204 lb 2.3 oz (92.6 kg)   04/13/17 202 lb 9.6 oz (91.9 kg)       Intake and Output summary:     Intake/Output Summary (Last 24 hours) at 09/16/17 0834  Last data filed at 09/16/17 0536   Gross per 24 hour   Intake              960 ml   Output             1400 ml   Net             -440 ml         PHYSICAL EXAMINATION :    General appearance - alert, well appearing, and in no distress,   Mental status - alert, oriented to person, place, and time, normal mood, behavior, speech, dress, motor activity, and thought processes  Eyes - pupils equal and reactive  Ears - hearing grossly normal bilaterally  Nose - normal and patent  Mouth - mucous membranes moist  Neck - supple, no significant adenopathy  Chest - clear to auscultation, no wheezes  Heart - normal S1, S2, no murmurs  Abdomen - soft, nontender, nondistended, no masses or organomegaly  Neurological - alert, oriented, normal speech, gait not assessed  Extremities - left BKA, dressing in place with no drainage  Skin - normal coloration and turgor, no rashes noted       Medications:    Scheduled Meds:   insulin glargine  10 Units Subcutaneous Nightly    sodium chloride flush  10 mL Intravenous 2 times per day    doxycycline hyclate  100 mg Oral 2 times per day    heparin (porcine)  5,000 Units Subcutaneous 3 times per day    sodium chloride  250 mL Intravenous Once    amLODIPine  10 mg Oral Daily    lisinopril  40 mg Oral Daily    insulin lispro  0-12 Units Subcutaneous TID WC    insulin lispro  0-6 Units Subcutaneous Nightly    sodium hypochlorite   Irrigation Daily    folic acid  1 mg Oral Daily    tamsulosin  0.4 mg Oral Daily    sodium chloride flush  10 mL Intravenous

## 2017-09-16 NOTE — PLAN OF CARE
Problem: Pain:  Goal: Pain level will decrease  Pain level will decrease to a 3 on a scale of 0-10 per pt. goal   Outcome: Ongoing  Goal: Control of acute pain  Control of acute pain   Outcome: Ongoing  Patient educated on available pain control measures including non-pharmacologic and medications. Whiteboard updated for available time of next administration PRN. Will continue to monitor effectiveness of interventions & assess pain. Goal: Control of chronic pain  Control of chronic pain   Outcome: Ongoing    Problem: Falls - Risk of  Intervention: Toileting assistance  Urinal at bedside, Pt calls out freida. Hourly rounding     Goal: Absence of falls  Outcome: Ongoing  Room free of clutter, slip resistant socks on, call light within reach, chair alarm on. . Pt agreed to use call light and wait for assistance before trying to getting up. Pt calls out appropriately. Problem: Skin Integrity:  Goal: Will show no infection signs and symptoms  Will show no infection signs and symptoms   Outcome: Ongoing  Pt remaind free of signs and symptoms of infection, will continue to monitor. Goal: Absence of new skin breakdown  Absence of new skin breakdown   Outcome: Ongoing  Skin assessment complete. No new olamide of skin breakdown noted. Pt turned and repositioned. . Linens remain as clean and dry as possible.  Will continue to assess skin and intervene as necessary    Problem: Nutrition  Goal: Optimal nutrition therapy  Outcome: Ongoing

## 2017-09-16 NOTE — CONSULTS
Inpatient consult to PM&R - Physiatry  Consult performed by: Presley Abraham  Consult ordered by: Enoc Lewis      Please see consult note and follow-up from Dr. Leny Mcclellan.   Dr. Angie Brantley  PM&R

## 2017-09-16 NOTE — OP NOTE
room.        Dora Cao MD    D: 09/14/2017 10:57:02       T: 09/14/2017 12:16:38     [email or web address]  Job#: [id number]     Doc#: [id number]

## 2017-09-17 LAB
ANION GAP SERPL CALCULATED.3IONS-SCNC: 13 MMOL/L (ref 9–17)
BUN BLDV-MCNC: 13 MG/DL (ref 6–20)
BUN/CREAT BLD: ABNORMAL (ref 9–20)
CALCIUM SERPL-MCNC: 8.6 MG/DL (ref 8.6–10.4)
CHLORIDE BLD-SCNC: 105 MMOL/L (ref 98–107)
CO2: 24 MMOL/L (ref 20–31)
CREAT SERPL-MCNC: 0.88 MG/DL (ref 0.7–1.2)
GFR AFRICAN AMERICAN: >60 ML/MIN
GFR NON-AFRICAN AMERICAN: >60 ML/MIN
GFR SERPL CREATININE-BSD FRML MDRD: ABNORMAL ML/MIN/{1.73_M2}
GFR SERPL CREATININE-BSD FRML MDRD: ABNORMAL ML/MIN/{1.73_M2}
GLUCOSE BLD-MCNC: 145 MG/DL (ref 75–110)
GLUCOSE BLD-MCNC: 169 MG/DL (ref 75–110)
GLUCOSE BLD-MCNC: 256 MG/DL (ref 75–110)
GLUCOSE BLD-MCNC: 88 MG/DL (ref 75–110)
GLUCOSE BLD-MCNC: 91 MG/DL (ref 70–99)
HCT VFR BLD CALC: 25.3 % (ref 41–53)
HEMOGLOBIN: 8.2 G/DL (ref 13.5–17.5)
MCH RBC QN AUTO: 27.7 PG (ref 26–34)
MCHC RBC AUTO-ENTMCNC: 32.5 G/DL (ref 31–37)
MCV RBC AUTO: 85.3 FL (ref 80–100)
PDW BLD-RTO: 20 % (ref 11.5–14.9)
PLATELET # BLD: 508 K/UL (ref 150–450)
PMV BLD AUTO: 9.5 FL (ref 6–12)
POTASSIUM SERPL-SCNC: 3.4 MMOL/L (ref 3.7–5.3)
RBC # BLD: 2.97 M/UL (ref 4.5–5.9)
SODIUM BLD-SCNC: 142 MMOL/L (ref 135–144)
WBC # BLD: 10.4 K/UL (ref 3.5–11)

## 2017-09-17 PROCEDURE — 6370000000 HC RX 637 (ALT 250 FOR IP): Performed by: PHYSICAL MEDICINE & REHABILITATION

## 2017-09-17 PROCEDURE — 97162 PT EVAL MOD COMPLEX 30 MIN: CPT

## 2017-09-17 PROCEDURE — 97530 THERAPEUTIC ACTIVITIES: CPT

## 2017-09-17 PROCEDURE — 97110 THERAPEUTIC EXERCISES: CPT

## 2017-09-17 PROCEDURE — 6360000002 HC RX W HCPCS: Performed by: PHYSICAL MEDICINE & REHABILITATION

## 2017-09-17 PROCEDURE — 97165 OT EVAL LOW COMPLEX 30 MIN: CPT

## 2017-09-17 PROCEDURE — 97542 WHEELCHAIR MNGMENT TRAINING: CPT

## 2017-09-17 PROCEDURE — 94664 DEMO&/EVAL PT USE INHALER: CPT

## 2017-09-17 PROCEDURE — 82947 ASSAY GLUCOSE BLOOD QUANT: CPT

## 2017-09-17 PROCEDURE — 6370000000 HC RX 637 (ALT 250 FOR IP): Performed by: STUDENT IN AN ORGANIZED HEALTH CARE EDUCATION/TRAINING PROGRAM

## 2017-09-17 PROCEDURE — 1180000000 HC REHAB R&B

## 2017-09-17 PROCEDURE — 85027 COMPLETE CBC AUTOMATED: CPT

## 2017-09-17 PROCEDURE — 99233 SBSQ HOSP IP/OBS HIGH 50: CPT | Performed by: PHYSICAL MEDICINE & REHABILITATION

## 2017-09-17 PROCEDURE — 6370000000 HC RX 637 (ALT 250 FOR IP): Performed by: INTERNAL MEDICINE

## 2017-09-17 PROCEDURE — 80048 BASIC METABOLIC PNL TOTAL CA: CPT

## 2017-09-17 PROCEDURE — 94760 N-INVAS EAR/PLS OXIMETRY 1: CPT

## 2017-09-17 PROCEDURE — 6360000002 HC RX W HCPCS: Performed by: STUDENT IN AN ORGANIZED HEALTH CARE EDUCATION/TRAINING PROGRAM

## 2017-09-17 PROCEDURE — 97116 GAIT TRAINING THERAPY: CPT

## 2017-09-17 PROCEDURE — 94640 AIRWAY INHALATION TREATMENT: CPT

## 2017-09-17 PROCEDURE — 36415 COLL VENOUS BLD VENIPUNCTURE: CPT

## 2017-09-17 PROCEDURE — 97535 SELF CARE MNGMENT TRAINING: CPT

## 2017-09-17 RX ORDER — HEPARIN SODIUM 5000 [USP'U]/ML
5000 INJECTION, SOLUTION INTRAVENOUS; SUBCUTANEOUS 3 TIMES DAILY
Status: DISCONTINUED | OUTPATIENT
Start: 2017-09-17 | End: 2017-09-20

## 2017-09-17 RX ADMIN — FOLIC ACID 1 MG: 1 TABLET ORAL at 08:00

## 2017-09-17 RX ADMIN — DOXYCYCLINE 100 MG: 100 CAPSULE ORAL at 20:30

## 2017-09-17 RX ADMIN — TIMOLOL MALEATE 1 DROP: 2.5 SOLUTION OPHTHALMIC at 08:00

## 2017-09-17 RX ADMIN — INSULIN LISPRO 3 UNITS: 100 INJECTION, SOLUTION INTRAVENOUS; SUBCUTANEOUS at 20:30

## 2017-09-17 RX ADMIN — ALBUTEROL SULFATE 2.5 MG: 2.5 SOLUTION RESPIRATORY (INHALATION) at 09:21

## 2017-09-17 RX ADMIN — DOXYCYCLINE 100 MG: 100 CAPSULE ORAL at 08:00

## 2017-09-17 RX ADMIN — CHLORTHALIDONE 25 MG: 25 TABLET ORAL at 08:00

## 2017-09-17 RX ADMIN — HEPARIN SODIUM 5000 UNITS: 5000 INJECTION, SOLUTION INTRAVENOUS; SUBCUTANEOUS at 11:31

## 2017-09-17 RX ADMIN — OXYCODONE HYDROCHLORIDE AND ACETAMINOPHEN 2 TABLET: 5; 325 TABLET ORAL at 14:01

## 2017-09-17 RX ADMIN — METOPROLOL TARTRATE 50 MG: 50 TABLET ORAL at 20:28

## 2017-09-17 RX ADMIN — HEPARIN SODIUM 5000 UNITS: 5000 INJECTION, SOLUTION INTRAVENOUS; SUBCUTANEOUS at 20:30

## 2017-09-17 RX ADMIN — PREGABALIN 25 MG: 25 CAPSULE ORAL at 20:30

## 2017-09-17 RX ADMIN — TAMSULOSIN HYDROCHLORIDE 0.4 MG: 0.4 CAPSULE ORAL at 08:00

## 2017-09-17 RX ADMIN — AMLODIPINE BESYLATE 10 MG: 10 TABLET ORAL at 08:00

## 2017-09-17 RX ADMIN — PREGABALIN 25 MG: 25 CAPSULE ORAL at 16:36

## 2017-09-17 RX ADMIN — PANTOPRAZOLE SODIUM 20 MG: 20 TABLET, DELAYED RELEASE ORAL at 20:29

## 2017-09-17 RX ADMIN — PREGABALIN 25 MG: 25 CAPSULE ORAL at 08:00

## 2017-09-17 RX ADMIN — OXYCODONE HYDROCHLORIDE AND ACETAMINOPHEN 2 TABLET: 5; 325 TABLET ORAL at 07:59

## 2017-09-17 RX ADMIN — INSULIN LISPRO 2 UNITS: 100 INJECTION, SOLUTION INTRAVENOUS; SUBCUTANEOUS at 16:37

## 2017-09-17 RX ADMIN — PANTOPRAZOLE SODIUM 20 MG: 20 TABLET, DELAYED RELEASE ORAL at 08:00

## 2017-09-17 RX ADMIN — INSULIN LISPRO 2 UNITS: 100 INJECTION, SOLUTION INTRAVENOUS; SUBCUTANEOUS at 11:26

## 2017-09-17 RX ADMIN — LATANOPROST 1 DROP: 50 SOLUTION OPHTHALMIC at 20:29

## 2017-09-17 RX ADMIN — METOPROLOL TARTRATE 50 MG: 50 TABLET ORAL at 08:00

## 2017-09-17 RX ADMIN — TIMOLOL MALEATE 1 DROP: 2.5 SOLUTION OPHTHALMIC at 20:29

## 2017-09-17 RX ADMIN — INSULIN GLARGINE 10 UNITS: 100 INJECTION, SOLUTION SUBCUTANEOUS at 20:31

## 2017-09-17 RX ADMIN — LISINOPRIL 40 MG: 20 TABLET ORAL at 08:00

## 2017-09-17 RX ADMIN — PRAVASTATIN SODIUM 10 MG: 10 TABLET ORAL at 08:00

## 2017-09-17 ASSESSMENT — PAIN DESCRIPTION - PAIN TYPE
TYPE: SURGICAL PAIN
TYPE: ACUTE PAIN;SURGICAL PAIN

## 2017-09-17 ASSESSMENT — PAIN DESCRIPTION - ONSET: ONSET: ON-GOING

## 2017-09-17 ASSESSMENT — PAIN SCALES - GENERAL
PAINLEVEL_OUTOF10: 7
PAINLEVEL_OUTOF10: 0
PAINLEVEL_OUTOF10: 5
PAINLEVEL_OUTOF10: 7
PAINLEVEL_OUTOF10: 0
PAINLEVEL_OUTOF10: 7
PAINLEVEL_OUTOF10: 8
PAINLEVEL_OUTOF10: 0

## 2017-09-17 ASSESSMENT — PAIN DESCRIPTION - PROGRESSION: CLINICAL_PROGRESSION: GRADUALLY IMPROVING

## 2017-09-17 ASSESSMENT — PAIN DESCRIPTION - FREQUENCY: FREQUENCY: INTERMITTENT

## 2017-09-17 ASSESSMENT — PAIN DESCRIPTION - DESCRIPTORS: DESCRIPTORS: SHARP

## 2017-09-17 ASSESSMENT — PAIN DESCRIPTION - ORIENTATION: ORIENTATION: LEFT

## 2017-09-17 ASSESSMENT — PAIN DESCRIPTION - LOCATION: LOCATION: INCISION;LEG

## 2017-09-17 NOTE — PROGRESS NOTES
Short term goal 1: Pt. will demo. Mod.I with toilet transfer and SBA with toileting tasks with good . Short term goal 2: Pt. will demo. Mod.I with BADL's. Short term goal 3: Pt. will demo. Edna Jacinto EC/WS tech. , good knowledge of AE/DME and modified tech. to ease indep. with ADL's. Short term goal 4: Pt. will tolerate 8-10 min. functional standing activities with one UE support to increase indep. with ADL's and mobility. Short term goal 5: Pt. will tolerate 35+ min. with functional ADL transfers/therapeutic ex to promote increased indep. with ADL's. Short term goal 6: Pt. will demo. min. A with light homemgmt tasks at W/C level. Long term goals  Time Frame for Long term goals : By discharge,   Long term goal 1: Pt. will demo. Mod.I with functional mobility. Long term goal 2: Pt. will demo. Mod.I with tub transfer using tub bench. Long term goal 3: Pt. will demo. Mod.I with light homemgmt tasks at W/C level or using RW.     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  Prognosis: Good  Decision Making: Low Complexity  History: Medical Chart Review  Patient Education: OT POC, safe transfers, ADL's, EC/WS  REQUIRES OT FOLLOW UP: Yes  Discharge Recommendations: Home with assist PRN  Plan  Times per week: 5-7x/week  Times per day: Twice a day  Current Treatment Recommendations: Balance Training, Endurance Training, Functional Mobility Training, Safety Education & Training, Home Management Training, Self-Care / ADL, Patient/Caregiver Education & Training, Equipment Evaluation, Education, & procurement          09/17/17 1106 09/17/17 1545   OT Individual Minutes   Time In [id number]   Time Out 4413 [id number]   Minutes 63 30     Electronically signed by SPEEDY Mercedes on 9/17/17 at 3:46 PM

## 2017-09-17 NOTE — PROGRESS NOTES
and pre-prosthetic training  Treatment Diagnosis: Difficulty walking  Prognosis: Good  Patient Education: Safety/ Wrapping L Limb; Importance of L Knee & hip extension  REQUIRES PT FOLLOW UP: Yes  Activity Tolerance  Activity Tolerance: Patient limited by endurance; Patient limited by fatigue  PT Equipment Recommendations  Equipment Needed: Yes  Wheelchair: Wheelchair Cushion Standard  Other: Possible rolling walker       Discharge Recommendations:  Home with assist PRN, Patient would benefit from continued therapy after discharge    Goals  Short term goals  Time Frame for Short term goals: 3 days  Short term goal 1: Mod-I transfers  Short term goal 2: Ascend/descend 4 steps with NWB LLE with 2 rails, Min A  Short term goal 3: Pt able to ambulate with RW dist of 50-70 ft SBA  Short term goal 4: Pt able to manuever W/C mod-I on level surfaces  Long term goals  Time Frame for Long term goals : 10 days  Long term goal 1:  Pt able to ambulate 50 ft Mod-I with RW, level surfaeces  Long term goal 2: Pt able to ambulate 10-15 ft with RW, at SBA on outside terraine  Long term goal 3: Pt able to go up and down 4 steps with 1 HR and 1 crutch or able to scoot up sitting on steps with 1 person assist .  Long term goal 4: I/S pt/family in residual limb ace wrapping. and HEP. Patient Goals   Patient goals : return home    Plan    Plan  Times per week: 5-7 x/wk, 1.5 hr/day.   Current Treatment Recommendations: Strengthening, Balance Training, Functional Mobility Training, Transfer Training, Endurance Training, Gait Training, Stair training, Home Exercise Program, Safety Education & Training, Patient/Caregiver Education & Training, ROM, Wheelchair Mobility Training  Safety Devices  Type of devices: Call light within reach, Left in chair, Gait belt     Therapy Time   Individual Concurrent Group Co-treatment   Time In 1430         Time Out 1459         Minutes [address] [place], [place]

## 2017-09-17 NOTE — H&P
mention of complication, not stated as uncontrolled        Past Surgical History:      Procedure Laterality Date    EYE SURGERY Bilateral     lazer both eyes    FOOT SURGERY Left 02/24/2017    surgical prep of wound bed    FOOT SURGERY Left 04/13/2017    1) Left foot surgical preparation of wound-bed, 2) Left foot application of graft     HC  PICC [address] DOUBLE  4/28/2017         LEG AMPUTATION BELOW KNEE Left 9/7/2017    LEFT BELOW KNEE GUILLOTINE AMPUTATION performed by Kelly Pino MD at [address] Left 9/14/2017    REVISION LEFT BELOW KNEE AMPUTATION, CLOSED  performed by Kelly Pino MD at [address] [facility] Left 01/23/2017    I and D bone, bone biopsy with flap closure and pulse lavage    OTHER SURGICAL HISTORY Left 04/27/2017    I & D foot    [place] DEEP DISSEC FOOT INFEC,1 BURSA Left 4/13/2017    FOOT DEBRIDEMENT WITH EPIFIX  performed by Jorge Villarreal DPM at [address] INCIS/DRAIN THIGH/KNEE ABSCESS,DEEP Left 9/12/2017     LEFT BELOW KNEE AMPUTATION OPEN, WOUND VAC PLACEMENT performed by Kelly Pino MD at [address] [facility] [address] Left 2014    hallux    TOE AMPUTATION Left 12/09/2016    hallux    TOE AMPUTATION Left 4/27/2017    I AND D FOOT, BONE BIOPSY, POSSIBLE FILET 2ND TOE performed by Jorge Villarreal DPM at [address] [facility] [address]:    Review of patient's allergies indicates no known allergies.     Medications   Scheduled Meds:   amLODIPine  10 mg Oral Daily    chlorthalidone  25 mg Oral Daily    folic acid  1 mg Oral Daily    insulin glargine  10 Units Subcutaneous Nightly    insulin lispro  0-12 Units Subcutaneous TID     insulin lispro  0-6 Units Subcutaneous Nightly    latanoprost  1 drop Both Eyes Nightly    lisinopril  40 mg Oral Daily    metoprolol tartrate  50 mg Oral BID    pantoprazole  20 mg Oral BID    pravastatin  10 mg Oral Daily    pregabalin  25 mg Oral TID    tamsulosin  0.4 mg Oral Daily been identified and corrected by editing

## 2017-09-18 PROBLEM — Z89.519 HX OF BKA (HCC): Status: ACTIVE | Noted: 2017-09-18

## 2017-09-18 LAB
GLUCOSE BLD-MCNC: 182 MG/DL (ref 75–110)
GLUCOSE BLD-MCNC: 198 MG/DL (ref 75–110)
GLUCOSE BLD-MCNC: 231 MG/DL (ref 75–110)
GLUCOSE BLD-MCNC: 292 MG/DL (ref 75–110)

## 2017-09-18 PROCEDURE — 82947 ASSAY GLUCOSE BLOOD QUANT: CPT

## 2017-09-18 PROCEDURE — 51798 US URINE CAPACITY MEASURE: CPT

## 2017-09-18 PROCEDURE — 6370000000 HC RX 637 (ALT 250 FOR IP): Performed by: PHYSICAL MEDICINE & REHABILITATION

## 2017-09-18 PROCEDURE — 97530 THERAPEUTIC ACTIVITIES: CPT

## 2017-09-18 PROCEDURE — 97535 SELF CARE MNGMENT TRAINING: CPT

## 2017-09-18 PROCEDURE — 97110 THERAPEUTIC EXERCISES: CPT

## 2017-09-18 PROCEDURE — 94760 N-INVAS EAR/PLS OXIMETRY 1: CPT

## 2017-09-18 PROCEDURE — 97116 GAIT TRAINING THERAPY: CPT

## 2017-09-18 PROCEDURE — 99223 1ST HOSP IP/OBS HIGH 75: CPT | Performed by: INTERNAL MEDICINE

## 2017-09-18 PROCEDURE — 6370000000 HC RX 637 (ALT 250 FOR IP): Performed by: INTERNAL MEDICINE

## 2017-09-18 PROCEDURE — 1180000000 HC REHAB R&B

## 2017-09-18 PROCEDURE — 99232 SBSQ HOSP IP/OBS MODERATE 35: CPT | Performed by: PHYSICAL MEDICINE & REHABILITATION

## 2017-09-18 PROCEDURE — 6360000002 HC RX W HCPCS: Performed by: PHYSICAL MEDICINE & REHABILITATION

## 2017-09-18 RX ORDER — SPIRONOLACTONE 25 MG/1
50 TABLET ORAL DAILY
Status: DISCONTINUED | OUTPATIENT
Start: 2017-09-18 | End: 2017-09-21

## 2017-09-18 RX ORDER — INSULIN GLARGINE 100 [IU]/ML
15 INJECTION, SOLUTION SUBCUTANEOUS NIGHTLY
Status: DISCONTINUED | OUTPATIENT
Start: 2017-09-18 | End: 2017-09-21

## 2017-09-18 RX ORDER — POTASSIUM CHLORIDE 20 MEQ/1
20 TABLET, EXTENDED RELEASE ORAL ONCE
Status: COMPLETED | OUTPATIENT
Start: 2017-09-18 | End: 2017-09-18

## 2017-09-18 RX ADMIN — Medication 15 UNITS: at 20:28

## 2017-09-18 RX ADMIN — CHLORTHALIDONE 25 MG: 25 TABLET ORAL at 07:56

## 2017-09-18 RX ADMIN — AMLODIPINE BESYLATE 10 MG: 10 TABLET ORAL at 07:46

## 2017-09-18 RX ADMIN — INSULIN LISPRO 2 UNITS: 100 INJECTION, SOLUTION INTRAVENOUS; SUBCUTANEOUS at 20:27

## 2017-09-18 RX ADMIN — TIMOLOL MALEATE 1 DROP: 2.5 SOLUTION OPHTHALMIC at 07:54

## 2017-09-18 RX ADMIN — DOXYCYCLINE 100 MG: 100 CAPSULE ORAL at 20:30

## 2017-09-18 RX ADMIN — SPIRONOLACTONE 50 MG: 25 TABLET, FILM COATED ORAL at 14:40

## 2017-09-18 RX ADMIN — LISINOPRIL 40 MG: 20 TABLET ORAL at 07:47

## 2017-09-18 RX ADMIN — INSULIN LISPRO 6 UNITS: 100 INJECTION, SOLUTION INTRAVENOUS; SUBCUTANEOUS at 17:08

## 2017-09-18 RX ADMIN — TAMSULOSIN HYDROCHLORIDE 0.4 MG: 0.4 CAPSULE ORAL at 07:47

## 2017-09-18 RX ADMIN — TIMOLOL MALEATE 1 DROP: 2.5 SOLUTION OPHTHALMIC at 20:29

## 2017-09-18 RX ADMIN — PREGABALIN 25 MG: 25 CAPSULE ORAL at 20:30

## 2017-09-18 RX ADMIN — INSULIN LISPRO 2 UNITS: 100 INJECTION, SOLUTION INTRAVENOUS; SUBCUTANEOUS at 07:48

## 2017-09-18 RX ADMIN — PRAVASTATIN SODIUM 10 MG: 10 TABLET ORAL at 07:54

## 2017-09-18 RX ADMIN — LATANOPROST 1 DROP: 50 SOLUTION OPHTHALMIC at 20:29

## 2017-09-18 RX ADMIN — OXYCODONE HYDROCHLORIDE AND ACETAMINOPHEN 2 TABLET: 5; 325 TABLET ORAL at 20:28

## 2017-09-18 RX ADMIN — METOPROLOL TARTRATE 50 MG: 50 TABLET ORAL at 20:28

## 2017-09-18 RX ADMIN — PANTOPRAZOLE SODIUM 20 MG: 20 TABLET, DELAYED RELEASE ORAL at 07:47

## 2017-09-18 RX ADMIN — OXYCODONE HYDROCHLORIDE AND ACETAMINOPHEN 2 TABLET: 5; 325 TABLET ORAL at 06:20

## 2017-09-18 RX ADMIN — HEPARIN SODIUM 5000 UNITS: 5000 INJECTION, SOLUTION INTRAVENOUS; SUBCUTANEOUS at 20:27

## 2017-09-18 RX ADMIN — DOXYCYCLINE 100 MG: 100 CAPSULE ORAL at 07:55

## 2017-09-18 RX ADMIN — PREGABALIN 25 MG: 25 CAPSULE ORAL at 07:55

## 2017-09-18 RX ADMIN — FOLIC ACID 1 MG: 1 TABLET ORAL at 07:56

## 2017-09-18 RX ADMIN — PANTOPRAZOLE SODIUM 20 MG: 20 TABLET, DELAYED RELEASE ORAL at 20:28

## 2017-09-18 RX ADMIN — HEPARIN SODIUM 5000 UNITS: 5000 INJECTION, SOLUTION INTRAVENOUS; SUBCUTANEOUS at 07:48

## 2017-09-18 RX ADMIN — INSULIN LISPRO 2 UNITS: 100 INJECTION, SOLUTION INTRAVENOUS; SUBCUTANEOUS at 11:11

## 2017-09-18 RX ADMIN — PREGABALIN 25 MG: 25 CAPSULE ORAL at 14:41

## 2017-09-18 RX ADMIN — POTASSIUM CHLORIDE 20 MEQ: 20 TABLET, EXTENDED RELEASE ORAL at 14:49

## 2017-09-18 RX ADMIN — HEPARIN SODIUM 5000 UNITS: 5000 INJECTION, SOLUTION INTRAVENOUS; SUBCUTANEOUS at 14:37

## 2017-09-18 RX ADMIN — METOPROLOL TARTRATE 50 MG: 50 TABLET ORAL at 07:47

## 2017-09-18 ASSESSMENT — PAIN DESCRIPTION - LOCATION
LOCATION: INCISION;LEG
LOCATION: INCISION;LEG

## 2017-09-18 ASSESSMENT — PAIN DESCRIPTION - ONSET: ONSET: ON-GOING

## 2017-09-18 ASSESSMENT — PAIN DESCRIPTION - ORIENTATION
ORIENTATION: LEFT
ORIENTATION: LEFT

## 2017-09-18 ASSESSMENT — PAIN SCALES - GENERAL
PAINLEVEL_OUTOF10: 8
PAINLEVEL_OUTOF10: 5
PAINLEVEL_OUTOF10: 8
PAINLEVEL_OUTOF10: 8
PAINLEVEL_OUTOF10: 4

## 2017-09-18 ASSESSMENT — PAIN DESCRIPTION - FREQUENCY: FREQUENCY: CONTINUOUS

## 2017-09-18 ASSESSMENT — PAIN DESCRIPTION - PAIN TYPE
TYPE: SURGICAL PAIN
TYPE: SURGICAL PAIN;ACUTE PAIN

## 2017-09-18 ASSESSMENT — PAIN DESCRIPTION - DESCRIPTORS
DESCRIPTORS: SHARP
DESCRIPTORS: ACHING;DISCOMFORT

## 2017-09-18 ASSESSMENT — PAIN DESCRIPTION - PROGRESSION: CLINICAL_PROGRESSION: NOT CHANGED

## 2017-09-18 NOTE — PROGRESS NOTES
1.7 x 1.9 cm.lateral aspect of the foot.  Patient underwent a left BKA on 9/7/17. He underwent a left BKA ReVision without closure of wound wound VAC placement on 9/12/2017. On 9/14/2017 he underwent closure of left below-knee amputation by Dr. Sarah Little. Overall Orientation Status: Within Normal Limits  Restrictions/Precautions  Restrictions/Precautions: Weight Bearing;Surgical Protocols; Fall Risk;Contact Precautions  Lower Extremity Weight Bearing Restrictions  Left Lower Extremity Weight Bearing: Non Weight Bearing  Position Activity Restriction  Other position/activity restrictions: Left Knee immoblizer to re-inforce terminal extension Left knee. Subjective: Pt reports he is getting stronger; pt states he has no pain at this time  Comments: Cooperative with PT; excellent effort    Vital Signs  Patient Currently in Pain: Denies                   Bed Mobility:   Bed Mobility  Bridging: Independent  Rolling: Rolling Left;Rolling Right; Independent  Supine to Sit: Independent (flat mat in gym no handrails)  Sit to Supine: Independent (flat mat in gym no handrails)  Scooting: Independent (to Head of mat and to Edge of Mat)  Comment: Flat mat no handrails  Bed mobility  Bridging: Independent  Scooting: Independent (to Head of mat and to Edge of Mat)    Transfers:  Sit to Stand: Contact guard assistance;Stand by assistance  Stand to sit: Stand by assistance  Bed to Chair: Contact guard assistance  Stand Pivot Transfers: Contact guard assistance  Squat Pivot Transfers: Stand by assistance (from mat to w/c SBA)     WB Status: NWB L LE (L BKA)  Ambulation 1  Surface: level tile  Device: Rolling Walker  Other Apparatus: Wheelchair follow  Assistance: Contact guard assistance  Quality of Gait: Fatigues quickly, SOB after 50ft, standing rest break  Distance: 60ft  Comments: vc's to not place foot advance of the front of the RW for safety        Stairs/Curb  Stairs?: No              Wheelchair Activities  Wheelchair Type: Standard  Wheelchair Cushion: Standard  Pressure Relief Type: Push up;Self Adjusts  Level of Assistance for pressure relief activities: Independent  Wheelchair Parts Management: Yes  Left Brakes Level of Assistance: Independent  Right Brakes Level of Assistance: Independent  Propulsion: Yes  Propulsion 1  Propulsion: Manual  Level: Level Tile  Method: RUE;LUE  Level of Assistance: Stand by assistance  Description/ Details: Good maneuverability in tight areas, forward, retro, and turns  Distance: 100ft                 Left Brakes Level of Assistance: Independent  Right Brakes Level of Assistance: Independent        Propulsion 2  Propulsion: Manual  Level: Ramp (top end of ramp)  Method: RUE;LUE  Level of Assistance: Minimal assistance (ramp with threshold)  Description/ Details: great with small top end of ramp, difficulty propelling over threshold on ramp.   Distance: 60ft       FIMS:      Transfers  Bed, Chair, Wheel Chair: 4 - Requires steadying assistance only <25% assist  and/or requires assist with one leg only   Locomotion  Primary Mode: Walk  Distance Walked: 60ft  Distance Traveled in Wheel Chair: 60ft  Walk: 1- Total Assistance (Subject less than 25%) (CGA + w/c follow)  Wheel Chair: 2- Maximal Assistance (Subject equal 25% or more)  Stairs: 1 - Patient goes up/down 1 to 3 stairs or requires 2 staff members (//bars 4\" step x5)       BALANCE Posture: Good  Sitting - Static: Good  Sitting - Dynamic: Good  Standing - Static: Fair;+  Standing - Dynamic: Fair  Comments: Standing balance with rolling walker/UE support    EXERCISES    Other exercises 1: Supine bilat LE therex, 2lb ankle weight Right LE x10   Other exercises 2: Seated EOM UBE 5 min forward/ 5 min backward  Other exercises 3: Prone laying 2 min, rolling right & left without assistance  Other exercises 4: Standing //bars knee bends, heel/toe x10  Other exercises 5: Step education in //bars and training with 2\" step x2 forward/retro, 4\" step x2 for Long term goals : 10 days  Long term goal 1:  Pt able to ambulate 50 ft Mod-I with RW, level surfaeces  Long term goal 2: Pt able to ambulate 10-15 ft with RW, at SBA on outside terraine  Long term goal 3: Pt able to go up and down 4 steps with 1 HR and 1 crutch or able to scoot up sitting on steps with 1 person assist .  Long term goal 4: I/S pt/family in residual limb ace wrapping.  and HEP.       09/18/17 0920 09/18/17 1215   PT Individual Minutes   Time In 0920 1215   Time Out 1030 1250   Minutes 70 35       Electronically signed by Nicole Dutta PTA on 9/18/17 at 1:04 PM

## 2017-09-18 NOTE — PROGRESS NOTES
stay focus on task. OT FIM:   Eatin - Patient feeds self  Groomin - Requires setup/cues to do all tasks (completed trimming of the bear and shaving, brushed teeth seated sinkside)  Bathin - Able to bathe all 10 areas with setup/sup/cues  Dressing-Upper: 5 - Requires setup/supervision/cues and/or requires assist with presthesis/brace only  Dressing-Lower: 5 - Requires setup/supervision/cues and/or staff applies TEDS/prosthesis/brace only (CGA with balance to pull up pants with one UE support on the grab bar)  Toiletin - Did not occur  Toilet Transfer: 0 - Did not occur  Primary Mode: Shower  Tub Transfer: 0 - Activity does not occur  Shower Transfer: 4 - Minimal contact assistance, pt. expends 75% or more effort    Social Interaction: 7 - Patient has appropriate behavior/relations 100% of the time  Problem Solvin - Patient able to solve simple/routine tasks  Memory: 7 - Patient independent with meds/people/schedule    Assessment  Performance deficits / Impairments: Decreased functional mobility ; Decreased balance;Decreased ADL status; Decreased high-level IADLs;Decreased endurance  Prognosis: Good  Discharge Recommendations: Home with assist PRN  Activity Tolerance: Patient Tolerated treatment well  Activity Tolerance: SOB with moderate exertion, vc's with self pacing  Safety Devices in place: Yes  Type of devices: Nurse notified;Call light within reach (sitting at EOB)  Restraints  Initially in place: No          Patient Education:  Patient Goals   Patient goals : to get stronger and independernt then go home  Patient Education: , safe transfers, ADL's, EC/WS    Plan  Plan  Times per week: 5-7x/week  Times per day: Twice a day  Current Treatment Recommendations: Balance Training, Endurance Training, Functional Mobility Training, Safety Education & Training, Home Management Training, Self-Care / ADL, Patient/Caregiver Education & Training, Equipment Evaluation, Education, & procurement  Patient

## 2017-09-18 NOTE — PATIENT CARE CONFERENCE
walker, Quad cane  Receives Help From: Family  ADL Assistance: Independent  Homemaking Assistance: Independent  Ambulation Assistance: Independent (Cane)  Transfer Assistance: Independent  Active : Yes  Mode of Transportation: SUV, Truck  Occupation: Retired  Type of occupation: Construction  Leisure & Hobbies: enjoys sleeping and relaxing  Additional Comments: Pt's wife reports sons plan to install a ramp prior to pt's discharge. Family Education: Need to make contact with family to initiate education    Risk for Readmission: High 14>   Readmission Risk              Readmission Risk:        22.5       Age 72 or Greater:  0    Admitted from SNF or Requires Paid or Family Care:  0    Currently has CHF,COPD,ARF,CRI,or is on dialysis:  4    Takes more than 5 Prescription Medications:  4    Takes Digoxin,Insulin,Anticoagulants,Narcotics or ASA/Plavix:  [facility] in Past 12 Months:  10    On Disability:  0    Patient Considers own Health:  2.5        Critical Items:     Problem / Barrier Intervention / Plan  Results   Altered ADL status related to new amputation  Training in use of devices and modified care techniques to support slef care independence     Impaired functional mobility Functional mobility training with assistive device    Steps at entrance I/S in going up/down steps- pt/family education. Family also considering building ramp.                           Functional FIM Gain  Admission Score: 81  Progress: TBD   Goal:  104   `  Discharge Plan   Estimated Discharge Date: 9/26/17  Overnight or Day Pass: No  Factors facilitating achievement of predicted outcomes: Family support, Motivated, Cooperative and Good insight into deficits  Barriers to the achievement of predicted outcomes: Pain, Anxiety, Decreased endurance, Lower extremity weakness, Stairs at home, Skin Care and Medication managment    Functional Goals at discharge:  Home with family mod I trans and short dist amb, Mod I

## 2017-09-18 NOTE — CONSULTS
Admission medications    Medication Sig Start Date End Date Taking? Authorizing Provider   oxyCODONE-acetaminophen (PERCOCET) 5-325 MG per tablet Take 1 tablet by mouth every 6 hours as needed for Pain .  9/16/17 9/23/17  Yani Carolina DO   docusate sodium (COLACE) 100 MG capsule Take 1 capsule by mouth 2 times daily as needed for Constipation 9/16/17   Yani Carolina DO   doxycycline hyclate (VIBRA-TABS) 100 MG tablet Take 1 tablet by mouth every 12 hours for 14 days 9/14/17 9/28/17  Matilda Pulido MD   insulin glargine (LANTUS) 100 UNIT/ML injection vial Inject 20 Units into the skin every morning    Historical Provider, MD   insulin glargine (LANTUS) 100 UNIT/ML injection vial Inject 30 Units into the skin nightly    Historical Provider, MD   omeprazole (PRILOSEC) 20 MG delayed release capsule Take 20 mg by mouth 2 times daily    Historical Provider, MD   pregabalin (LYRICA) 25 MG capsule Take 25 mg by mouth 3 times daily    Historical Provider, MD   lisinopril (PRINIVIL;ZESTRIL) 40 MG tablet Take 1 tablet by mouth daily 4/30/17   Tamika Carrera MD   metoprolol tartrate (LOPRESSOR) 50 MG tablet Take 1 tablet by mouth 2 times daily 4/30/17   Tamika Carrera MD   amLODIPine (NORVASC) 10 MG tablet Take 1 tablet by mouth daily 4/30/17   Tamika Carrera MD   hydrALAZINE (APRESOLINE) 100 MG tablet Take 1 tablet by mouth 3 times daily 4/30/17   Tamika Carrera MD   cyclobenzaprine (FLEXERIL) 5 MG tablet Take 5 mg by mouth 3 times daily as needed for Muscle spasms    Historical Provider, MD   metFORMIN (GLUCOPHAGE) 500 MG tablet Take 500 mg by mouth 2 times daily (with meals)    Historical Provider, MD   pravastatin (PRAVACHOL) 10 MG tablet Take 10 mg by mouth daily    Historical Provider, MD   timolol (TIMOPTIC-XE) 0.25 % ophthalmic gel-forming 1 drop daily    Historical Provider, MD   travoprost, benzalkonium, (TRAVATAN) 0.004 % ophthalmic solution 1 drop nightly    Historical Provider, MD       Allergies:  Review of found for: PH, PCO2, PO2, HCO3, O2SAT  Thyroid functions:   Lab Results   Component Value Date    TSH 3.65 09/01/2017        Imaging/Diagonstics:      CXR: No acute cardiopulmonary findings. ASSESSMENT:    Patient Active Problem List   Diagnosis    Uncontrolled hypertension    [facility] mellitus) [facility]    Neuropathy [facility]    Uncontrolled type 2 diabetes with cellulitis of foot ([facility]    Cerebrovascular disease    Cataract    Dysphagia    Family history of colon cancer    Bowel habit changes    Hyperlipidemia    Gangrene of foot ([address]    Diabetic foot left    Acute kidney injury ([address]    Ischemic foot left    Urinary retention    Subacute osteomyelitis of left foot ([address]    Hx of BKA ([facility]       PLAN  Uncontrolled htn  With low k  likey primary dino  Will do aldactone  Bmp in am  unconrolled dm   Will adjsut meds  Left BKA post osteomytoli with uncontrolled dm  :        MD SHONDA Gaytan[address].    Phone [phone]   Fax: [phone]  Answering Service: [phone]

## 2017-09-18 NOTE — PROGRESS NOTES
tile  Ambulation 1  Surface: level tile  Device: Rolling Walker  Other Apparatus: Wheelchair follow  Assistance: Contact guard assistance  Quality of Gait: Fatigues quickly with UE/Moderate SOB after walking 30'. Increased difficulty with pivots but no loss of balance. Distance: 30'      OT:   FIM  Self-Care  Eatin - Patient feeds self  GOAL: Eatin  Groomin - Requires setup/cues to do all tasks  GOAL: Groomin  Bathin - Able to bathe all 10 areas with setup/sup/cues  GOAL: Bathin  Dressing-Upper: 5 - Requires setup/supervision/cues and/or requires assist with presthesis/brace only  GOAL: Dressing-Upper: 6  Dressing-Lower: 4 - Requires assist with buttons/zippers/shoelaces and/or assist with shoes only (assist with pulling up pants over the left hip and assist with maintaining balance)  GOAL: Dressing-Lower: 6  Toiletin - Did not occur  GOAL: Toiletin  Toilet Transfer: 0 - Did not occur  GOAL: Toilet: 6  Primary Mode: Shower  GOAL: Tub, Shower: 6  Shower Transfer: 4 - Minimal contact assistance, pt. expends 75% or more effort    Objective:  BP (!) 184/69  Pulse 80  Temp 97.9 °F (36.6 °C) (Oral)   Resp 17  Ht 5' 7\" (1.702 m)  Wt 204 lb (92.5 kg)  SpO2 94%  BMI 31.95 kg/m2 I Body mass index is 31.95 kg/(m^2). I Wt Readings from Last 1 Encounters:   17 204 lb (92.5 kg)      Temp (24hrs), Av.9 °F (36.6 °C), Min:97.9 °F (36.6 °C), Max:97.9 °F (36.6 °C)      Alert, no distress. Good speech and language function. Lungs clear. Heart regular. Abdomen non-distended, non-tender.   No calf tenderness or edema.- RLE  Dressing over stump Left      Medications   Scheduled Meds:   heparin (porcine)  5,000 Units Subcutaneous TID    amLODIPine  10 mg Oral Daily    chlorthalidone  25 mg Oral Daily    folic acid  1 mg Oral Daily    insulin glargine  10 Units Subcutaneous Nightly    insulin lispro  0-12 Units Subcutaneous TID WC    insulin lispro  0-6 Units Subcutaneous Nightly  latanoprost  1 drop Both Eyes Nightly    lisinopril  40 mg Oral Daily    metoprolol tartrate  50 mg Oral BID    pantoprazole  20 mg Oral BID    pravastatin  10 mg Oral Daily    pregabalin  25 mg Oral TID    tamsulosin  0.4 mg Oral Daily    polyethylene glycol  17 g Oral Daily    doxycycline monohydrate  100 mg Oral 2 times per day    timolol  1 drop Both Eyes BID     Continuous Infusions:   PRN Meds:.acetaminophen, magnesium hydroxide, ondansetron, oxyCODONE-acetaminophen **OR** oxyCODONE-acetaminophen, albuterol, cyclobenzaprine, diphenhydrAMINE, glucagon (rDNA), glucose, labetalol, metoclopramide, sennosides-docusate sodium, bisacodyl     Diagnostics:     CBC: Recent Labs      09/15/17   2034  09/16/17   0820  09/17/17   0602   WBC   --   9.9  10.4   RBC   --   2.68*  2.97*   HGB  7.1*  7.3*  8.2*   HCT  22.2*  22.7*  25.3*   MCV   --   84.7  85.3   RDW   --   21.2*  20.0*   PLT   --   451*  508*     BMP: Recent Labs      09/15/17   2034  09/16/17   0820  09/17/17   0602   NA  139  140  142   K   --    --   3.4*   CL   --    --   105   CO2   --    --   24   BUN   --   14  13   CREATININE   --   0.85  0.88     BNP: No results for input(s): BNP in the last 72 hours. PT/INR: No results for input(s): PROTIME, INR in the last 72 hours. APTT: No results for input(s): APTT in the last 72 hours. CARDIAC ENZYMES: No results for input(s): CKMB, CKMBINDEX, TROPONINT in the last 72 hours. Invalid input(s): CKTOTAL;3  FASTING LIPID PANEL:  Lab Results   Component Value Date    CHOL 98 04/27/2017    HDL 34 (L) 04/27/2017    TRIG 165 (H) 09/04/2017     LIVER PROFILE: No results for input(s): AST, ALT, ALB, BILIDIR, BILITOT, ALKPHOS in the last 72 hours. I/O (24Hr):     Intake/Output Summary (Last 24 hours) at 09/18/17 1032  Last data filed at 09/18/17 0623   Gross per 24 hour   Intake                0 ml   Output             1200 ml   Net            -1200 ml       Glu last 24 hour  Recent Labs 09/17/17   1125  09/17/17   1626  09/17/17 2006 09/18/17   0651   POCGLU  145*  169*  256*  182*       No results for input(s): CLARITYU, COLORU, PHUR, SPECGRAV, PROTEINU, RBCUA, BLOODU, BACTERIA, NITRU, WBCUA, LEUKOCYTESUR, YEAST, GLUCOSEU, BILIRUBINUR in the last 72 hours. Impression/Plan:    1. ADL and ambulation dysfunction secondary to Left BKA. Patient tolerating rehab. Pain controlled. Will discuss tomorrow at team conference. , progressing well  2. Left BKA- Vascular surgery weaned off IV narcotics, pain managed with PO percocet  And lyrica  Flexeril for muscle spasms  3. Left foot abcess/ s/p BKIA- continue Monodox, ID requests f/u in office in 2-4 weeks, noted cont antibx for 2 weeks from dc from [facility]  4. Post-surgical blood loss anemia- Hb 8.2 yesterday- monitor, improving  5. HTN/HLD- Lisinoril, lopressor, chlorthalidone, , pravastatin, IM recs f/u- uncontrolled- notify IM - discussed with NSG, supplement K , moay need daily as on chlorthalidione  6. Cataract/gluaucoma - on timolol and xalantan  7. Diabetes Melitis- Lantus, humalog- uncontrolled 145-256  8. Wheezing- impvoved- albuterol prn  9. GERD-protonix  10. DVT- heparin, montior CBC  11 Urinary ret at [facility] - on flomax, monitor void, bladder protocol  12. Internal medicine to medically manage- await f/u - discussed with nsg to notify       Lashon Marcos MS-IV    Patient seen and examined with student, note reviewed and adjusted. Agree with above.       Eric Chan M.D.

## 2017-09-19 LAB
CULTURE: NORMAL
CULTURE: NORMAL
DIRECT EXAM: NORMAL
DIRECT EXAM: NORMAL
GLUCOSE BLD-MCNC: 167 MG/DL (ref 75–110)
GLUCOSE BLD-MCNC: 225 MG/DL (ref 75–110)
GLUCOSE BLD-MCNC: 286 MG/DL (ref 75–110)
GLUCOSE BLD-MCNC: 295 MG/DL (ref 75–110)
Lab: NORMAL
SPECIMEN DESCRIPTION: NORMAL
STATUS: NORMAL

## 2017-09-19 PROCEDURE — 94640 AIRWAY INHALATION TREATMENT: CPT

## 2017-09-19 PROCEDURE — 6370000000 HC RX 637 (ALT 250 FOR IP): Performed by: PHYSICAL MEDICINE & REHABILITATION

## 2017-09-19 PROCEDURE — 94760 N-INVAS EAR/PLS OXIMETRY 1: CPT

## 2017-09-19 PROCEDURE — 97535 SELF CARE MNGMENT TRAINING: CPT

## 2017-09-19 PROCEDURE — 82947 ASSAY GLUCOSE BLOOD QUANT: CPT

## 2017-09-19 PROCEDURE — 6370000000 HC RX 637 (ALT 250 FOR IP): Performed by: INTERNAL MEDICINE

## 2017-09-19 PROCEDURE — 97110 THERAPEUTIC EXERCISES: CPT

## 2017-09-19 PROCEDURE — 97530 THERAPEUTIC ACTIVITIES: CPT

## 2017-09-19 PROCEDURE — 6360000002 HC RX W HCPCS: Performed by: PHYSICAL MEDICINE & REHABILITATION

## 2017-09-19 PROCEDURE — 99232 SBSQ HOSP IP/OBS MODERATE 35: CPT | Performed by: PHYSICAL MEDICINE & REHABILITATION

## 2017-09-19 PROCEDURE — 1180000000 HC REHAB R&B

## 2017-09-19 RX ADMIN — SPIRONOLACTONE 50 MG: 25 TABLET, FILM COATED ORAL at 07:13

## 2017-09-19 RX ADMIN — Medication 15 UNITS: at 21:48

## 2017-09-19 RX ADMIN — TIMOLOL MALEATE 1 DROP: 2.5 SOLUTION OPHTHALMIC at 21:49

## 2017-09-19 RX ADMIN — PANTOPRAZOLE SODIUM 20 MG: 20 TABLET, DELAYED RELEASE ORAL at 21:46

## 2017-09-19 RX ADMIN — FOLIC ACID 1 MG: 1 TABLET ORAL at 07:14

## 2017-09-19 RX ADMIN — ALBUTEROL SULFATE 2.5 MG: 2.5 SOLUTION RESPIRATORY (INHALATION) at 09:56

## 2017-09-19 RX ADMIN — INSULIN LISPRO 2 UNITS: 100 INJECTION, SOLUTION INTRAVENOUS; SUBCUTANEOUS at 07:10

## 2017-09-19 RX ADMIN — HEPARIN SODIUM 5000 UNITS: 5000 INJECTION, SOLUTION INTRAVENOUS; SUBCUTANEOUS at 21:49

## 2017-09-19 RX ADMIN — DOXYCYCLINE 100 MG: 100 CAPSULE ORAL at 07:15

## 2017-09-19 RX ADMIN — HEPARIN SODIUM 5000 UNITS: 5000 INJECTION, SOLUTION INTRAVENOUS; SUBCUTANEOUS at 07:10

## 2017-09-19 RX ADMIN — LATANOPROST 1 DROP: 50 SOLUTION OPHTHALMIC at 21:57

## 2017-09-19 RX ADMIN — PANTOPRAZOLE SODIUM 20 MG: 20 TABLET, DELAYED RELEASE ORAL at 07:13

## 2017-09-19 RX ADMIN — CHLORTHALIDONE 25 MG: 25 TABLET ORAL at 07:14

## 2017-09-19 RX ADMIN — OXYCODONE HYDROCHLORIDE AND ACETAMINOPHEN 1 TABLET: 5; 325 TABLET ORAL at 17:03

## 2017-09-19 RX ADMIN — TAMSULOSIN HYDROCHLORIDE 0.4 MG: 0.4 CAPSULE ORAL at 07:13

## 2017-09-19 RX ADMIN — INSULIN LISPRO 4 UNITS: 100 INJECTION, SOLUTION INTRAVENOUS; SUBCUTANEOUS at 11:05

## 2017-09-19 RX ADMIN — INSULIN LISPRO 3 UNITS: 100 INJECTION, SOLUTION INTRAVENOUS; SUBCUTANEOUS at 21:48

## 2017-09-19 RX ADMIN — PREGABALIN 25 MG: 25 CAPSULE ORAL at 21:46

## 2017-09-19 RX ADMIN — AMLODIPINE BESYLATE 10 MG: 10 TABLET ORAL at 07:13

## 2017-09-19 RX ADMIN — TIMOLOL MALEATE 1 DROP: 2.5 SOLUTION OPHTHALMIC at 07:15

## 2017-09-19 RX ADMIN — PREGABALIN 25 MG: 25 CAPSULE ORAL at 07:15

## 2017-09-19 RX ADMIN — PRAVASTATIN SODIUM 10 MG: 10 TABLET ORAL at 07:14

## 2017-09-19 RX ADMIN — METOPROLOL TARTRATE 50 MG: 50 TABLET ORAL at 07:13

## 2017-09-19 RX ADMIN — DOXYCYCLINE 100 MG: 100 CAPSULE ORAL at 21:46

## 2017-09-19 RX ADMIN — INSULIN LISPRO 6 UNITS: 100 INJECTION, SOLUTION INTRAVENOUS; SUBCUTANEOUS at 17:00

## 2017-09-19 RX ADMIN — LISINOPRIL 40 MG: 20 TABLET ORAL at 07:13

## 2017-09-19 RX ADMIN — OXYCODONE HYDROCHLORIDE AND ACETAMINOPHEN 2 TABLET: 5; 325 TABLET ORAL at 21:46

## 2017-09-19 RX ADMIN — HEPARIN SODIUM 5000 UNITS: 5000 INJECTION, SOLUTION INTRAVENOUS; SUBCUTANEOUS at 13:35

## 2017-09-19 RX ADMIN — METOPROLOL TARTRATE 50 MG: 50 TABLET ORAL at 21:46

## 2017-09-19 ASSESSMENT — PAIN SCALES - GENERAL
PAINLEVEL_OUTOF10: 5
PAINLEVEL_OUTOF10: 6
PAINLEVEL_OUTOF10: 5
PAINLEVEL_OUTOF10: 7
PAINLEVEL_OUTOF10: 0

## 2017-09-19 ASSESSMENT — PAIN DESCRIPTION - PROGRESSION: CLINICAL_PROGRESSION: NOT CHANGED

## 2017-09-19 NOTE — PROGRESS NOTES
[address]   ACUTE REHABILITATION OCCUPATIONAL THERAPY  DAILY NOTE    Date: 17  Patient Name: Dalia Elias      Room: 0495/5118-27    MRN: [id number]   : 1965  (46 y.o.)  Gender: male   Referring Practitioner: Dr. Rafita Reyes  Diagnosis: Osteomylitis of left foot, S/P Left BKA 17  Additional Pertinent Hx: completed revision and closure of wound on 17    Restrictions  Restrictions/Precautions: Weight Bearing, Surgical Protocols, Fall Risk, Contact Precautions  Other position/activity restrictions: Left Knee immoblizer to re-inforce terminal extension Left knee. Left Lower Extremity Weight Bearing: Non Weight Bearing  Position Activity Restriction  Other position/activity restrictions: Left Knee immoblizer to re-inforce terminal extension Left knee. Subjective  Comments: Pt. cooperative, pleasant and motivated. Patient Currently in Pain: Denies  Pain Level: 0  Restrictions/Precautions: Weight Bearing;Surgical Protocols; Fall Risk;Contact Precautions        Objective  Cognition  Overall Cognitive Status: WFL  Perception  Overall Perceptual Status: WFL  Balance  Sitting Balance: Modified independent   Standing Balance: Supervision  Transfers  Stand Pivot Transfers: Stand by assistance  Sit to stand: Stand by assistance  Stand to sit: Stand by assistance  Standing Balance  Time: AM: <30 secs x 2, ~1 min PM: 4 min, 5:30 min, ~1 min x 2   Activity: AM: functional stand pivots, LB dressing PM: dynamic standing bean bag toss and then retrieval using RW and reacher to address standing tolerance and balance for functional tasks, mobility for functional transfer training. Sit to stand: Stand by assistance  Stand to sit: Stand by assistance  Functional Mobility  Functional - Mobility Device: Rolling Walker (and wheelchair)  Activity: To/from bathroom;Transport items; Retrieve items; Other  Assist Level: Stand by assistance (mod I in w/c. )  Functional Mobility Comments: occ unsteady with RW, no noted LOB. pt demo good strength and endurnace for functional use of wheelchair. Pt used w/c in room for morning ADL tasks and completed faciliated kitchen mobility in OT gym for PM session. Pt used RW in OT bathroom for functionalt transfer training. Tub Transfers  Tub - Transfer From: Rolling walker  Tub - Transfer Type: To and From  Tub - Transfer To: Transfer tub bench  Tub - Technique: Ambulating  Tub Transfers: Stand by assistance  Tub Transfers Comments: pt demo good understanding and safety with facilitated tub bench transfer. Type of ROM/Therapeutic Exercise  Type of ROM/Therapeutic Exercise: Free weights (3#, 2x20 reps)  Comment: Pt complete UB ex's to increase overall strength and endurance for functional tasks and mobility. rest breaks PRN, no SOB noted. Exercises  Scapular Protraction: x  Scapular Retraction: x  Shoulder Flexion: x  Shoulder Extension: x  Shoulder ABduction: x  Shoulder ADduction: x  Elbow Flexion: x  Elbow Extension: x  Wrist Flexion: x  Wrist Extension: x  Other: Pt completed wheelchair self propulsion in hallway with some elevation from OT gym to pt room to address and increase pt UB strength and endurance. OT FIM:   Eatin - Patient feeds self  Groomin - Independent with all tasks using assistive device (pt completes set up & tasks at w/c level )  Bathin - Able to bathe all 10 areas with setup/sup/cues (occ set up A/A c clean up.  pt A at w/c level )  Dressing-Upper: 6 - Independent with device/prosthesis (pt retrieved clothing & completes task at w/c level )  Dressing-Lower: 5 - Requires setup/supervision/cues and/or staff applies TEDS/prosthesis/brace only (pt retrieves clothing at w/c level, A c TEDs/L brace. )  Toiletin - Requires setup/supervision/cues (set up/clean up using hand held urinal )  Toilet Transfer: 0 - Did not occur (pt used hand held urinal )  Primary Mode: Shower  Tub Transfer: 5 - Supervision, set-up, cues (SBA for RW<>TTB; facilitated transfer)  Shower Transfer: 5 - Supervision, set-up, cues (set up for stand pivot w/c<>shower bench )    Social Interaction: 7 - Patient has appropriate behavior/relations 100% of the time  Problem Solvin - Independent with device (e.g. notes, schedules)  Memory: 6 - Patient requires device to recall (e.g. memory book)    Assessment     Activity Tolerance: Patient Tolerated treatment well  Safety Devices in place: Yes  Type of devices: Left in chair;Call light within reach; All fall risk precautions in place  Equipment Recommendations  Equipment Needed: Yes  Transfer Tub Bench: x     Patient Education: DME, functional transfer training. Patient Goals   Patient goals : to get stronger and independernt then go home     Learner:patient  Method: demonstration and explanation       Outcome: acknowledged understanding of  and demonstrated understanding     Plan  Plan  Times per week: 5-7x/week  Times per day: Twice a day  Current Treatment Recommendations: Balance Training, Endurance Training, Functional Mobility Training, Safety Education & Training, Home Management Training, Self-Care / ADL, Patient/Caregiver Education & Training, Equipment Evaluation, Education, & procurement  Patient Goals   Patient goals : to get stronger and independernt then go home  Short term goals  Time Frame for Short term goals: One Week,   Short term goal 1: Pt. will demo. Mod.I with toilet transfer and SBA with toileting tasks with good . Short term goal 2: Pt. will demo. Mod.I with BADL's. Short term goal 3: Pt. will demo. Joey Garciaing EC/WS tech. , good knowledge of AE/DME and modified tech. to ease indep. with ADL's. Short term goal 4: Pt. will tolerate 8-10 min. functional standing activities with one UE support to increase indep. with ADL's and mobility. Short term goal 5: Pt. will tolerate 35+ min. with functional ADL transfers/therapeutic ex to promote increased indep. with ADL's.   Short term goal 6: Pt. will demo. min. A with light homemgmt tasks at W/C level. Long term goals  Time Frame for Long term goals : By discharge,   Long term goal 1: Pt. will demo. Mod.I with functional mobility. Long term goal 2: Pt. will demo. Mod.I with tub transfer using tub bench. Long term goal 3: Pt. will demo.  Mod.I with light homemgmt tasks at W/C level or using RW.        09/19/17 0945 09/19/17 1354   OT Individual Minutes   Time In [id number]   Time Out 0935 1341   Minutes 57 37     Electronically signed by ENEIDA Turner on 9/19/17 at 2:06 PM

## 2017-09-19 NOTE — PROGRESS NOTES
g Oral Daily    doxycycline monohydrate  100 mg Oral 2 times per day    timolol  1 drop Both Eyes BID     Continuous Infusions:   PRN Meds:.acetaminophen, magnesium hydroxide, ondansetron, oxyCODONE-acetaminophen **OR** oxyCODONE-acetaminophen, albuterol, cyclobenzaprine, diphenhydrAMINE, glucagon (rDNA), glucose, labetalol, metoclopramide, sennosides-docusate sodium, bisacodyl     Diagnostics:     CBC:   Recent Labs      09/17/17   0602   WBC  10.4   RBC  2.97*   HGB  8.2*   HCT  25.3*   MCV  85.3   RDW  20.0*   PLT  508*     BMP:   Recent Labs      09/17/17   0602   NA  142   K  3.4*   CL  105   CO2  24   BUN  13   CREATININE  0.88     BNP: No results for input(s): BNP in the last 72 hours. PT/INR: No results for input(s): PROTIME, INR in the last 72 hours. APTT: No results for input(s): APTT in the last 72 hours. CARDIAC ENZYMES: No results for input(s): CKMB, CKMBINDEX, TROPONINT in the last 72 hours. Invalid input(s): CKTOTAL;3  FASTING LIPID PANEL:  Lab Results   Component Value Date    CHOL 98 04/27/2017    HDL 34 (L) 04/27/2017    TRIG 165 (H) 09/04/2017     LIVER PROFILE: No results for input(s): AST, ALT, ALB, BILIDIR, BILITOT, ALKPHOS in the last 72 hours. I/O (24Hr): Intake/Output Summary (Last 24 hours) at 09/19/17 1528  Last data filed at 09/19/17 0442   Gross per 24 hour   Intake                0 ml   Output              600 ml   Net             -600 ml       Glu last 24 hour  Recent Labs      09/18/17   1701  09/18/17   1946  09/19/17   0656  09/19/17   1103   POCGLU  292*  231*  167*  225*       No results for input(s): CLARITYU, COLORU, PHUR, SPECGRAV, PROTEINU, RBCUA, BLOODU, BACTERIA, NITRU, WBCUA, LEUKOCYTESUR, YEAST, GLUCOSEU, BILIRUBINUR in the last 72 hours. Impression/Plan:    1. ADL and ambulation dysfunction secondary to Left BKA. Patient tolerating rehab. Pain controlled. Discussed at team conference today- Estimated d/c date 9/26/17 to home.  Continues to progress well.  2.  Left BKA- weaning off PO percocet at this time. lyrica  Flexeril for muscle spasms- Surgical site dry, sutures intact  3. Left foot abcess/ s/p BKIA- continue Monodox, ID requests f/u in office in 2-4 weeks, noted cont antibx for 2 weeks from dc [facility] Currently day 4 of 14   4. Post-surgical blood loss anemia- Hb 8.2 yesterday- monitor, improving f/u scheduled CBC Thurs. 5. HTN/HLD- Lisinoril, lopressor, chlorthalidone, , pravastatin, IM recs f/u- uncontrolled- notify IM - discussed with NSG, supplement K , may need daily as on chlorthalidione, recheck BMP Thursday , meds adjusted per IM ( BP)  6. Cataract/gluaucoma - on timolol and xalantan  7. Diabetes Melitis- Lantus, humalog- uncontrolled  167-292  8. Wheezing- impvoved- albuterol prn  9. GERD-protonix  10. DVT- heparin, montior CBC  11 Urinary ret at [facility] - on flomax, monitor void, bladder protocol- voiding well  12. Internal medicine to medically manage- await f/u - discussed with nsg to notify       Gayatri Singleton MS-IV    Patient seen and examined with resident, note reviewed and adjusted. Agree with above.       Joshua Moreira M.D.

## 2017-09-19 NOTE — PROGRESS NOTES
Nutrition Assessment    Type and Reason for Visit: Reassess    Nutrition Recommendations: Increase diet to 5 CHO Choices per meal, Continue Valente BID    Malnutrition Assessment:  · Malnutrition Status: No malnutrition    Nutrition Diagnosis:   · Problem: Increased nutrient needs  · Etiology: related to Increased demand for energy/nutrients due to (Left BKA)     Signs and symptoms:  as evidenced by Presence of wounds    Nutrition Assessment:  · Subjective Assessment: Patient states his appetite is good and usually just has a light lunch, writer asked if he was drinking the [place] when at [facility]. [facility] and he said yes and he would get orange flavored mixed with water with a side of ice. Pt. states he has some packs he brought with him so he could fix himself if it didnt' get ordered. · Nutrition-Focused Physical Findings: 9-18 Diarrhea reported due to lactose intolerance  · Wound Type: Surgical Wound (Left BKA)  · Current Nutrition Therapies:  · Oral Diet Orders: Carb Control 4 Carbs/Meal   · Oral Diet intake: %  · Oral Nutrition Supplement (ONS) Orders: Wound Healing Supplement  · ONS intake: %  · Anthropometric Measures:  · Ht: 5' 7\" (170.2 cm)   · Current Body Wt: 204 lb (92.5 kg)  · Ideal Body Wt: 141 lb (64 kg), % Ideal Body 145%  · Adjusted Body Wt: 141 lb (64 kg), body weight adjusted for BKA  · BMI Classification:  (Not adjusted for BKA)  · Comparative Standards (Estimated Nutrition Needs):  · Estimated Daily Total Kcal: 9046-9438 kcal  · Estimated Daily Protein (g): 76-89 g    Estimated Intake vs Estimated Needs: Intake Meets Needs    Nutrition Risk Level: Moderate    Nutrition Interventions:   Modify current diet, Continue current ONS  Continued Inpatient Monitoring    Nutrition Evaluation:   · Evaluation: Progressing toward goals   · Goals: Meet % of nutrient needs with PO diet and ONS.     · Monitoring: Meal Intake, Supplement Intake, Weight, Pertinent Labs, Wound Healing    See Adult

## 2017-09-20 LAB
GLUCOSE BLD-MCNC: 164 MG/DL (ref 75–110)
GLUCOSE BLD-MCNC: 199 MG/DL (ref 75–110)
GLUCOSE BLD-MCNC: 201 MG/DL (ref 75–110)
GLUCOSE BLD-MCNC: 236 MG/DL (ref 75–110)

## 2017-09-20 PROCEDURE — 97110 THERAPEUTIC EXERCISES: CPT

## 2017-09-20 PROCEDURE — 97542 WHEELCHAIR MNGMENT TRAINING: CPT

## 2017-09-20 PROCEDURE — 6370000000 HC RX 637 (ALT 250 FOR IP): Performed by: INTERNAL MEDICINE

## 2017-09-20 PROCEDURE — 1180000000 HC REHAB R&B

## 2017-09-20 PROCEDURE — 6370000000 HC RX 637 (ALT 250 FOR IP): Performed by: PHYSICAL MEDICINE & REHABILITATION

## 2017-09-20 PROCEDURE — 6370000000 HC RX 637 (ALT 250 FOR IP): Performed by: NURSE PRACTITIONER

## 2017-09-20 PROCEDURE — 82947 ASSAY GLUCOSE BLOOD QUANT: CPT

## 2017-09-20 PROCEDURE — 97116 GAIT TRAINING THERAPY: CPT

## 2017-09-20 PROCEDURE — 97530 THERAPEUTIC ACTIVITIES: CPT

## 2017-09-20 PROCEDURE — 99232 SBSQ HOSP IP/OBS MODERATE 35: CPT | Performed by: PHYSICAL MEDICINE & REHABILITATION

## 2017-09-20 PROCEDURE — 99231 SBSQ HOSP IP/OBS SF/LOW 25: CPT | Performed by: INTERNAL MEDICINE

## 2017-09-20 PROCEDURE — 97535 SELF CARE MNGMENT TRAINING: CPT

## 2017-09-20 PROCEDURE — 6360000002 HC RX W HCPCS: Performed by: PHYSICAL MEDICINE & REHABILITATION

## 2017-09-20 RX ORDER — HEPARIN SODIUM 5000 [USP'U]/ML
5000 INJECTION, SOLUTION INTRAVENOUS; SUBCUTANEOUS EVERY 8 HOURS SCHEDULED
Status: DISCONTINUED | OUTPATIENT
Start: 2017-09-20 | End: 2017-09-26 | Stop reason: HOSPADM

## 2017-09-20 RX ORDER — LOPERAMIDE HYDROCHLORIDE 2 MG/1
2 CAPSULE ORAL 4 TIMES DAILY PRN
Status: DISCONTINUED | OUTPATIENT
Start: 2017-09-20 | End: 2017-09-26 | Stop reason: HOSPADM

## 2017-09-20 RX ADMIN — CHLORTHALIDONE 25 MG: 25 TABLET ORAL at 09:18

## 2017-09-20 RX ADMIN — HEPARIN SODIUM 5000 UNITS: 5000 INJECTION, SOLUTION INTRAVENOUS; SUBCUTANEOUS at 20:42

## 2017-09-20 RX ADMIN — DIPHENHYDRAMINE HCL 25 MG: 25 TABLET ORAL at 13:32

## 2017-09-20 RX ADMIN — TIMOLOL MALEATE 1 DROP: 2.5 SOLUTION OPHTHALMIC at 20:20

## 2017-09-20 RX ADMIN — SPIRONOLACTONE 50 MG: 25 TABLET, FILM COATED ORAL at 09:18

## 2017-09-20 RX ADMIN — PREGABALIN 25 MG: 25 CAPSULE ORAL at 13:32

## 2017-09-20 RX ADMIN — PREGABALIN 25 MG: 25 CAPSULE ORAL at 09:18

## 2017-09-20 RX ADMIN — LISINOPRIL 40 MG: 20 TABLET ORAL at 09:18

## 2017-09-20 RX ADMIN — FOLIC ACID 1 MG: 1 TABLET ORAL at 09:18

## 2017-09-20 RX ADMIN — METOPROLOL TARTRATE 50 MG: 50 TABLET ORAL at 20:22

## 2017-09-20 RX ADMIN — LOPERAMIDE HYDROCHLORIDE 2 MG: 2 CAPSULE ORAL at 22:14

## 2017-09-20 RX ADMIN — DOXYCYCLINE 100 MG: 100 CAPSULE ORAL at 09:18

## 2017-09-20 RX ADMIN — Medication 15 UNITS: at 20:43

## 2017-09-20 RX ADMIN — PANTOPRAZOLE SODIUM 20 MG: 20 TABLET, DELAYED RELEASE ORAL at 20:22

## 2017-09-20 RX ADMIN — TAMSULOSIN HYDROCHLORIDE 0.4 MG: 0.4 CAPSULE ORAL at 09:18

## 2017-09-20 RX ADMIN — PANTOPRAZOLE SODIUM 20 MG: 20 TABLET, DELAYED RELEASE ORAL at 09:18

## 2017-09-20 RX ADMIN — PRAVASTATIN SODIUM 10 MG: 10 TABLET ORAL at 09:18

## 2017-09-20 RX ADMIN — AMLODIPINE BESYLATE 10 MG: 10 TABLET ORAL at 09:18

## 2017-09-20 RX ADMIN — TIMOLOL MALEATE 1 DROP: 2.5 SOLUTION OPHTHALMIC at 09:18

## 2017-09-20 RX ADMIN — DOXYCYCLINE 100 MG: 100 CAPSULE ORAL at 20:22

## 2017-09-20 RX ADMIN — OXYCODONE HYDROCHLORIDE AND ACETAMINOPHEN 2 TABLET: 5; 325 TABLET ORAL at 06:16

## 2017-09-20 RX ADMIN — HEPARIN SODIUM 5000 UNITS: 5000 INJECTION, SOLUTION INTRAVENOUS; SUBCUTANEOUS at 13:32

## 2017-09-20 RX ADMIN — PREGABALIN 25 MG: 25 CAPSULE ORAL at 20:22

## 2017-09-20 RX ADMIN — DIPHENHYDRAMINE HCL 25 MG: 25 TABLET ORAL at 03:19

## 2017-09-20 RX ADMIN — LATANOPROST 1 DROP: 50 SOLUTION OPHTHALMIC at 20:42

## 2017-09-20 RX ADMIN — METOPROLOL TARTRATE 50 MG: 50 TABLET ORAL at 09:18

## 2017-09-20 RX ADMIN — INSULIN LISPRO 4 UNITS: 100 INJECTION, SOLUTION INTRAVENOUS; SUBCUTANEOUS at 17:31

## 2017-09-20 RX ADMIN — INSULIN LISPRO 1 UNITS: 100 INJECTION, SOLUTION INTRAVENOUS; SUBCUTANEOUS at 20:43

## 2017-09-20 RX ADMIN — INSULIN LISPRO 4 UNITS: 100 INJECTION, SOLUTION INTRAVENOUS; SUBCUTANEOUS at 09:18

## 2017-09-20 ASSESSMENT — PAIN SCALES - GENERAL
PAINLEVEL_OUTOF10: 3
PAINLEVEL_OUTOF10: 3
PAINLEVEL_OUTOF10: 0

## 2017-09-20 ASSESSMENT — PAIN DESCRIPTION - PROGRESSION: CLINICAL_PROGRESSION: NOT CHANGED

## 2017-09-20 NOTE — PROGRESS NOTES
Physical Therapy  Facility/Department: \[facility]\""E ACUTE REHAB  Daily Treatment Note  NAME: Britney Garza  : 1965  MRN: [id number]    Date of Service: 2017    Patient Diagnosis(es):   Patient Active Problem List    Diagnosis Date Noted    Hx of BKA ([address] 2017    Subacute osteomyelitis of left foot ([address] 2017    Urinary retention 2017    Ischemic foot left 2017    Gangrene of foot ([address] 2017    Diabetic foot left 2017    Acute kidney injury ([address] 2017    Hyperlipidemia     Dysphagia 2013    Family history of colon cancer 2013    Bowel habit changes 2013    Uncontrolled hypertension     IDDM (insulin dependent diabetes mellitus) ([address]     Neuropathy ([address]     Uncontrolled type 2 diabetes with cellulitis of foot ([address]     Cerebrovascular disease     Cataract        Past Medical History:   Diagnosis Date    Acid reflux     Cerebrovascular disease 2006    TIA    Glaucoma     Hyperlipidemia     Hypertension     [facility] mellitus) [facility]     MDRO (multiple drug resistant organisms) resistance     MRSA (methicillin resistant staph aureus) culture positive 2017    foot    Neuropathy ([address]     Osteomyelitis ([address]     Left Hallux    S/P cataract extraction and insertion of intraocular lens     bilateral     Type II or unspecified type diabetes mellitus without mention of complication, not stated as uncontrolled      Past Surgical History:   Procedure Laterality Date    EYE SURGERY Bilateral     lazer both eyes    FOOT SURGERY Left 2017    surgical prep of wound bed    FOOT SURGERY Left 2017    1) Left foot surgical preparation of wound-bed, 2) Left foot application of graft     [facility], [address] DOUBLE  2017         LEG AMPUTATION BELOW KNEE Left 2017    LEFT BELOW KNEE GUILLOTINE AMPUTATION performed by Marina Villarreal MD at [address] Left

## 2017-09-21 LAB
ANION GAP SERPL CALCULATED.3IONS-SCNC: 13 MMOL/L (ref 9–17)
BUN BLDV-MCNC: 15 MG/DL (ref 6–20)
BUN/CREAT BLD: ABNORMAL (ref 9–20)
CALCIUM SERPL-MCNC: 8.4 MG/DL (ref 8.6–10.4)
CHLORIDE BLD-SCNC: 104 MMOL/L (ref 98–107)
CO2: 25 MMOL/L (ref 20–31)
CREAT SERPL-MCNC: 0.93 MG/DL (ref 0.7–1.2)
GFR AFRICAN AMERICAN: >60 ML/MIN
GFR NON-AFRICAN AMERICAN: >60 ML/MIN
GFR SERPL CREATININE-BSD FRML MDRD: ABNORMAL ML/MIN/{1.73_M2}
GFR SERPL CREATININE-BSD FRML MDRD: ABNORMAL ML/MIN/{1.73_M2}
GLUCOSE BLD-MCNC: 158 MG/DL (ref 75–110)
GLUCOSE BLD-MCNC: 177 MG/DL (ref 75–110)
GLUCOSE BLD-MCNC: 191 MG/DL (ref 75–110)
GLUCOSE BLD-MCNC: 198 MG/DL (ref 70–99)
GLUCOSE BLD-MCNC: 340 MG/DL (ref 75–110)
HCT VFR BLD CALC: 25.7 % (ref 41–53)
HEMOGLOBIN: 8.3 G/DL (ref 13.5–17.5)
MCH RBC QN AUTO: 27.4 PG (ref 26–34)
MCHC RBC AUTO-ENTMCNC: 32.1 G/DL (ref 31–37)
MCV RBC AUTO: 85.3 FL (ref 80–100)
PDW BLD-RTO: 19.5 % (ref 11.5–14.9)
PLATELET # BLD: 510 K/UL (ref 150–450)
PMV BLD AUTO: 9.3 FL (ref 6–12)
POTASSIUM SERPL-SCNC: 3.6 MMOL/L (ref 3.7–5.3)
RBC # BLD: 3.01 M/UL (ref 4.5–5.9)
SODIUM BLD-SCNC: 142 MMOL/L (ref 135–144)
WBC # BLD: 7.5 K/UL (ref 3.5–11)

## 2017-09-21 PROCEDURE — 97116 GAIT TRAINING THERAPY: CPT

## 2017-09-21 PROCEDURE — 97530 THERAPEUTIC ACTIVITIES: CPT

## 2017-09-21 PROCEDURE — 6360000002 HC RX W HCPCS: Performed by: PHYSICAL MEDICINE & REHABILITATION

## 2017-09-21 PROCEDURE — 6370000000 HC RX 637 (ALT 250 FOR IP): Performed by: INTERNAL MEDICINE

## 2017-09-21 PROCEDURE — 97110 THERAPEUTIC EXERCISES: CPT

## 2017-09-21 PROCEDURE — 85027 COMPLETE CBC AUTOMATED: CPT

## 2017-09-21 PROCEDURE — 6370000000 HC RX 637 (ALT 250 FOR IP): Performed by: PHYSICAL MEDICINE & REHABILITATION

## 2017-09-21 PROCEDURE — 99231 SBSQ HOSP IP/OBS SF/LOW 25: CPT | Performed by: INTERNAL MEDICINE

## 2017-09-21 PROCEDURE — 1180000000 HC REHAB R&B

## 2017-09-21 PROCEDURE — 97535 SELF CARE MNGMENT TRAINING: CPT

## 2017-09-21 PROCEDURE — 99232 SBSQ HOSP IP/OBS MODERATE 35: CPT | Performed by: PHYSICAL MEDICINE & REHABILITATION

## 2017-09-21 PROCEDURE — 80048 BASIC METABOLIC PNL TOTAL CA: CPT

## 2017-09-21 PROCEDURE — 36415 COLL VENOUS BLD VENIPUNCTURE: CPT

## 2017-09-21 PROCEDURE — 82947 ASSAY GLUCOSE BLOOD QUANT: CPT

## 2017-09-21 PROCEDURE — 6370000000 HC RX 637 (ALT 250 FOR IP): Performed by: NURSE PRACTITIONER

## 2017-09-21 PROCEDURE — 94760 N-INVAS EAR/PLS OXIMETRY 1: CPT

## 2017-09-21 RX ORDER — POTASSIUM CHLORIDE 20 MEQ/1
20 TABLET, EXTENDED RELEASE ORAL ONCE
Status: COMPLETED | OUTPATIENT
Start: 2017-09-21 | End: 2017-09-21

## 2017-09-21 RX ORDER — SPIRONOLACTONE 25 MG/1
100 TABLET ORAL DAILY
Status: DISCONTINUED | OUTPATIENT
Start: 2017-09-22 | End: 2017-09-26 | Stop reason: HOSPADM

## 2017-09-21 RX ORDER — INSULIN GLARGINE 100 [IU]/ML
20 INJECTION, SOLUTION SUBCUTANEOUS NIGHTLY
Status: DISCONTINUED | OUTPATIENT
Start: 2017-09-22 | End: 2017-09-26 | Stop reason: HOSPADM

## 2017-09-21 RX ORDER — POTASSIUM CHLORIDE 20 MEQ/1
10 TABLET, EXTENDED RELEASE ORAL
Status: DISCONTINUED | OUTPATIENT
Start: 2017-09-22 | End: 2017-09-26

## 2017-09-21 RX ADMIN — POTASSIUM CHLORIDE 20 MEQ: 20 TABLET, EXTENDED RELEASE ORAL at 17:39

## 2017-09-21 RX ADMIN — TIMOLOL MALEATE 1 DROP: 2.5 SOLUTION OPHTHALMIC at 20:39

## 2017-09-21 RX ADMIN — LISINOPRIL 40 MG: 20 TABLET ORAL at 07:34

## 2017-09-21 RX ADMIN — HEPARIN SODIUM 5000 UNITS: 5000 INJECTION, SOLUTION INTRAVENOUS; SUBCUTANEOUS at 20:40

## 2017-09-21 RX ADMIN — PANTOPRAZOLE SODIUM 20 MG: 20 TABLET, DELAYED RELEASE ORAL at 07:34

## 2017-09-21 RX ADMIN — PREGABALIN 25 MG: 25 CAPSULE ORAL at 07:34

## 2017-09-21 RX ADMIN — METOPROLOL TARTRATE 50 MG: 50 TABLET ORAL at 20:38

## 2017-09-21 RX ADMIN — INSULIN LISPRO 8 UNITS: 100 INJECTION, SOLUTION INTRAVENOUS; SUBCUTANEOUS at 17:40

## 2017-09-21 RX ADMIN — TIMOLOL MALEATE 1 DROP: 2.5 SOLUTION OPHTHALMIC at 07:33

## 2017-09-21 RX ADMIN — SPIRONOLACTONE 50 MG: 25 TABLET, FILM COATED ORAL at 07:34

## 2017-09-21 RX ADMIN — PREGABALIN 25 MG: 25 CAPSULE ORAL at 12:05

## 2017-09-21 RX ADMIN — LATANOPROST 1 DROP: 50 SOLUTION OPHTHALMIC at 20:39

## 2017-09-21 RX ADMIN — OXYCODONE HYDROCHLORIDE AND ACETAMINOPHEN 1 TABLET: 5; 325 TABLET ORAL at 06:08

## 2017-09-21 RX ADMIN — INSULIN LISPRO 2 UNITS: 100 INJECTION, SOLUTION INTRAVENOUS; SUBCUTANEOUS at 12:05

## 2017-09-21 RX ADMIN — Medication 15 UNITS: at 20:40

## 2017-09-21 RX ADMIN — DOXYCYCLINE 100 MG: 100 CAPSULE ORAL at 07:34

## 2017-09-21 RX ADMIN — HEPARIN SODIUM 5000 UNITS: 5000 INJECTION, SOLUTION INTRAVENOUS; SUBCUTANEOUS at 06:08

## 2017-09-21 RX ADMIN — PANTOPRAZOLE SODIUM 20 MG: 20 TABLET, DELAYED RELEASE ORAL at 20:38

## 2017-09-21 RX ADMIN — HEPARIN SODIUM 5000 UNITS: 5000 INJECTION, SOLUTION INTRAVENOUS; SUBCUTANEOUS at 12:05

## 2017-09-21 RX ADMIN — DOXYCYCLINE 100 MG: 100 CAPSULE ORAL at 20:39

## 2017-09-21 RX ADMIN — CHLORTHALIDONE 25 MG: 25 TABLET ORAL at 07:34

## 2017-09-21 RX ADMIN — AMLODIPINE BESYLATE 10 MG: 10 TABLET ORAL at 07:34

## 2017-09-21 RX ADMIN — PREGABALIN 25 MG: 25 CAPSULE ORAL at 20:39

## 2017-09-21 RX ADMIN — METOPROLOL TARTRATE 50 MG: 50 TABLET ORAL at 07:34

## 2017-09-21 RX ADMIN — OXYCODONE HYDROCHLORIDE AND ACETAMINOPHEN 1 TABLET: 5; 325 TABLET ORAL at 20:38

## 2017-09-21 RX ADMIN — FOLIC ACID 1 MG: 1 TABLET ORAL at 07:34

## 2017-09-21 RX ADMIN — TAMSULOSIN HYDROCHLORIDE 0.4 MG: 0.4 CAPSULE ORAL at 07:34

## 2017-09-21 RX ADMIN — INSULIN LISPRO 1 UNITS: 100 INJECTION, SOLUTION INTRAVENOUS; SUBCUTANEOUS at 20:40

## 2017-09-21 RX ADMIN — LOPERAMIDE HYDROCHLORIDE 2 MG: 2 CAPSULE ORAL at 12:06

## 2017-09-21 ASSESSMENT — PAIN DESCRIPTION - PROGRESSION
CLINICAL_PROGRESSION: GRADUALLY IMPROVING
CLINICAL_PROGRESSION: NOT CHANGED

## 2017-09-21 ASSESSMENT — PAIN SCALES - GENERAL
PAINLEVEL_OUTOF10: 0
PAINLEVEL_OUTOF10: 1
PAINLEVEL_OUTOF10: 0
PAINLEVEL_OUTOF10: 5
PAINLEVEL_OUTOF10: 0

## 2017-09-21 ASSESSMENT — PAIN DESCRIPTION - FREQUENCY: FREQUENCY: INTERMITTENT

## 2017-09-21 ASSESSMENT — PAIN DESCRIPTION - ONSET: ONSET: ON-GOING

## 2017-09-21 ASSESSMENT — PAIN DESCRIPTION - ORIENTATION: ORIENTATION: LEFT

## 2017-09-21 ASSESSMENT — PAIN DESCRIPTION - LOCATION: LOCATION: LEG;INCISION

## 2017-09-21 ASSESSMENT — PAIN DESCRIPTION - DESCRIPTORS: DESCRIPTORS: DISCOMFORT;ACHING

## 2017-09-21 ASSESSMENT — PAIN DESCRIPTION - PAIN TYPE: TYPE: SURGICAL PAIN;ACUTE PAIN

## 2017-09-21 NOTE — PROGRESS NOTES
Physical Medicine & Rehabilitation  Progress Note    2017 12:46 PM     CC: Ambulatory and ADL dysfunction due to Left BKA    Subjective:   Patient seen this afternoon. Pain is managed. Reporting BM last night was loose, pt states it is likely secondary to drinking milk as he believes he is lactose intolerant. We will adjust diet accordingly. ROS:   Denies fevers, chills, sweats. No chest pain, palpitations, lightheadedness. Denies coughing, wheezing or shortness of breath. Denies abdominal pain, nausea, diarrhea or constipation. No new areas of joint pain. Denies new areas of numbness or weakness. Denies new anxiety or depression issues. No new skin problems. Rehabilitation:     Transfers  Sit to Stand: Stand by assistance  Stand to sit: Stand by assistance  Stand Pivot Transfers: Contact guard assistance  Ambulation  Ambulation?: Yes  WB Status: NWB L LE (L BKA)  Ambulation 1  Surface: level tile  Device: Rolling Walker  Other Apparatus: Wheelchair follow (for safety awareness.)  Assistance: Contact guard assistance  Quality of Gait: reporeted fatigues and SOB after 100ft. Distance: 120 ft, 40ft (needed one seated rest break due to fatigue in B arms & RLE.)  Comments: vc for proper hand/foot placement and cues to stay close to the walker .      OT FIM:   Eatin - Patient feeds self  Groomin - Independent with all tasks using assistive device (completes set up and tasks at w/c level )  Bathin - Able to bathe all 10 areas with setup/sup/cues (pt completes set up/clean c supervision at w/c level)  Dressing-Upper: 6 - Independent with device/prosthesis (pt completes set up and tasks at w/c level )  Dressing-Lower: 5 - Requires setup/supervision/cues and/or staff applies TEDS/prosthesis/brace only (set up at w/c level, SBA to pull up pants, A to ojanne R ILIANA)  Toiletin - Did not occur  Toilet Transfer: 0 - Did not occur  Primary Mode: Shower  Tub Transfer: 0 - Activity does not occur  Shower Transfer: 5 - Supervision, set-up, cues (A c w/c set up over incline into shower. )     Social Interaction: 7 - Patient has appropriate behavior/relations 100% of the time  Problem Solvin - Patient independent with complex tasks  Memory: 7 - Patient independent with meds/people/schedule      Objective:  BP (!) 161/60  Pulse 66  Temp 98.2 °F (36.8 °C) (Oral)   Resp 16  Ht 5' 7\" (1.702 m)  Wt 204 lb (92.5 kg)  SpO2 100%  BMI 31.95 kg/m2 I Body mass index is 31.95 kg/(m^2). I   Wt Readings from Last 1 Encounters:   17 204 lb (92.5 kg)      Temp (24hrs), Av.2 °F (36.8 °C), Min:98.2 °F (36.8 °C), Max:98.2 °F (36.8 °C)      Alert, no distress. Good speech and language function. Lungs clear. Heart regular. Abdomen non-distended, non-tender. No calf tenderness or edema.- RLE  Dressing over stump Left  Surgical sutures in place, wound dry. - clean and intact, no s/s of infection      Medications   Scheduled Meds:   heparin (porcine)  5,000 Units Subcutaneous 3 times per day    influenza virus vaccine  0.5 mL Intramuscular Prior to discharge    spironolactone  50 mg Oral Daily    insulin glargine  15 Units Subcutaneous Nightly    amLODIPine  10 mg Oral Daily    chlorthalidone  25 mg Oral Daily    folic acid  1 mg Oral Daily    insulin lispro  0-12 Units Subcutaneous TID     insulin lispro  0-6 Units Subcutaneous Nightly    latanoprost  1 drop Both Eyes Nightly    lisinopril  40 mg Oral Daily    metoprolol tartrate  50 mg Oral BID    pantoprazole  20 mg Oral BID    pravastatin  10 mg Oral Daily    pregabalin  25 mg Oral TID    tamsulosin  0.4 mg Oral Daily    polyethylene glycol  17 g Oral Daily    doxycycline monohydrate  100 mg Oral 2 times per day    timolol  1 drop Both Eyes BID     Continuous Infusions:   PRN Meds:. loperamide, acetaminophen, magnesium hydroxide, oxyCODONE-acetaminophen **OR** oxyCODONE-acetaminophen, albuterol, cyclobenzaprine, diphenhydrAMINE, improving   5. HTN/HLD- Lisinoril, lopressor, chlorthalidone, pravastatin, aldactone. f/u- - discussed with NSG, supplement K- K 3.6 today , will supplement and place on daily dose, as pt is on chlorthalidione, meds adjusted per IM ( BP)- cont uncontrolled BP, IM follow,   6. Cataract/gluaucoma - on timolol and xalantan  7. Diabetes Melitis- Lantus, humalog-  , IM follow  8. GERD-protonix  9. DVT- heparin, montior CBC  10 Urinary ret at [facility] - on flomax, monitor void, bladder protocol- voiding well  11. Internal medicine to medically manage      Laurent Cook MS-IV     Patient seen and examined with student, note reviewed and adjusted. Agree with above.       Karina Miramontes M.D.

## 2017-09-21 NOTE — PROGRESS NOTES
Physical Therapy  Facility/Department: [facility]  Daily Treatment Note  NAME: Ismael Qureshi  : 1965  MRN: [id number]    Date of Service: 2017    Patient Diagnosis(es):   Patient Active Problem List    Diagnosis Date Noted    Hx of BKA ([address] 2017    Subacute osteomyelitis of left foot ([address] 2017    Urinary retention 2017    Ischemic foot left 2017    Gangrene of foot ([address] 2017    Diabetic foot left 2017    Acute kidney injury ([address] 2017    Hyperlipidemia     Dysphagia 2013    Family history of colon cancer 2013    Bowel habit changes 2013    Uncontrolled hypertension     IDDM (insulin dependent diabetes mellitus) ([address]     Neuropathy ([address]     Uncontrolled type 2 diabetes with cellulitis of foot ([address]     Cerebrovascular disease     Cataract        Past Medical History:   Diagnosis Date    Acid reflux     Cerebrovascular disease 2006    TIA    Glaucoma     Hyperlipidemia     Hypertension     [facility]     MDRO (multiple drug resistant organisms) resistance     MRSA (methicillin resistant staph aureus) culture positive 2017    foot    Neuropathy ([address]     Osteomyelitis ([address]     Left Hallux    S/P cataract extraction and insertion of intraocular lens     bilateral     Type II or unspecified type diabetes mellitus without mention of complication, not stated as uncontrolled      Past Surgical History:   Procedure Laterality Date    EYE SURGERY Bilateral     lazer both eyes    FOOT SURGERY Left 2017    surgical prep of wound bed    FOOT SURGERY Left 2017    1) Left foot surgical preparation of wound-bed, 2) Left foot application of graft     [facility] DOUBLE  2017         LEG AMPUTATION BELOW KNEE Left 2017    LEFT BELOW KNEE GUILLOTINE AMPUTATION performed by Alice Johnson MD at [address] Left ROM, Wheelchair Mobility Training  Safety Devices  Type of devices: Call light within reach, Left in chair, Gait belt  Restraints  Initially in place: No     Therapy Time   Individual Concurrent Group Co-treatment   Time In 0740         Time Out 0840         Minutes Artie Rivera [address], PTA

## 2017-09-21 NOTE — PROGRESS NOTES
Physical Therapy  [facility]   Acute Rehabilitation Physical Therapy Progress Note    Date: 17  Patient Name: Hua Hernandez       Room: 5825/2634-64  MRN: [id number]   Account: [de-identified]   : 1965  (46 y.o.) Gender: male     Referring Practitioner: Dr. Lv Carrizales  Diagnosis: Osteomylitis of left foot, S/P Left BKA 17  Past Medical History:  has a past medical history of Acid reflux; Cerebrovascular disease; Glaucoma; Hyperlipidemia; Hypertension; [facility]DM (insulin dependent diabetes mellitus) ([facility] MDRO (multiple drug resistant organisms) resistance; MRSA (methicillin resistant staph aureus) culture positive; Neuropathy ([facility] Osteomyelitis ([facility] S/P cataract extraction and insertion of intraocular lens; and Type II or unspecified type diabetes mellitus without mention of complication, not stated as uncontrolled. Past Surgical History:   has a past surgical history that includes Toe amputation (Left, ); eye surgery (Bilateral); Toe amputation (Left, 2016); other surgical history (Left, 2017); Foot surgery (Left, 2017); Foot surgery (Left, 2017); deep dissec foot infec,1 bursa (Left, 2017); other surgical history (Left, 2017); Toe amputation (Left, 2017);   picc powerpicc double (2017); Leg amputation below knee (Left, 2017); incis/drain thigh/knee abscess,deep (Left, 2017); and Leg amputation below knee (Left, 2017). Additional Pertinent Hx: Hua Hernandez  is a 46 y.o. right-handed     male admitted to the [address] [place] [address] on 2017. He was originally admitted to [place] on 2017 for wounds on the left foot, increased pain and discolored foot.  MRI of left foot shows osteomyelitis of the 1st and 3rd metatarsal, calcaneus and also of the 5th metatarsals , diffuse infectious myositis is seen. Concern for soft tissue gas and necrotizing infection.  abscess formation measures 1.4 x 1.7 x 1.9 cm.lateral aspect of the foot.  Patient underwent a left BKA on 9/7/17. He underwent a left BKA ReVision without closure of wound wound VAC placement on 9/12/2017. On 9/14/2017 he underwent closure of left below-knee amputation by Dr. Eyad Taylor. Restrictions/Precautions  Restrictions/Precautions: Weight Bearing;Surgical Protocols; Fall Risk;Contact Precautions  Required Braces or Orthoses?: No  Lower Extremity Weight Bearing Restrictions  Left Lower Extremity Weight Bearing: Non Weight Bearing  Position Activity Restriction  Other position/activity restrictions: discontinue use of left knee immobilizer unless note difficulty maintaining terminal left knee extension; patient reporting pressure pain from metal stays in brace. Subjective: Patient reports ramp is being built at home but may not be completed by DC date. Comments: Cooperative with PT; excellent effort      Patient Currently in Pain: Denies                   Bed Mobility:   Rolling: Independent  Supine to Sit: Independent  Sit to Supine: Independent  Scooting: Independent      Transfers:  Sit to Stand: Stand by assistance  Stand to sit: Stand by assistance  Bed to Chair: Stand by assistance  Stand Pivot Transfers: Stand by assistance        WB Status: NWB L LE (L BKA)  Ambulation 1  Surface: level tile  Device: Rolling Walker  Assistance: Contact guard assistance  Quality of Gait: No fatigue reported this PM with amb. Cues for RW safety  Distance: 120 ft  Comments: No fatigue reported with amb. during PM.  Also able to complete multiple short distances to and from equipment. Stairs/Curb  Stairs?: Yes (Patient is planning on ramp but might not be ready at DC)  Stairs  # Steps : 4  Stairs Height: 4\"  Rails:  (RW and // bars)  Device: Rolling walker  Assistance: Minimal assistance (2 person for safety)  Comment: Patient able to complete 4\" platform step FWD and Retro with RW.   He also completed with just using //

## 2017-09-22 LAB
GLUCOSE BLD-MCNC: 143 MG/DL (ref 75–110)
GLUCOSE BLD-MCNC: 146 MG/DL (ref 75–110)
GLUCOSE BLD-MCNC: 242 MG/DL (ref 75–110)
GLUCOSE BLD-MCNC: 244 MG/DL (ref 75–110)

## 2017-09-22 PROCEDURE — 1180000000 HC REHAB R&B

## 2017-09-22 PROCEDURE — 97530 THERAPEUTIC ACTIVITIES: CPT

## 2017-09-22 PROCEDURE — 82947 ASSAY GLUCOSE BLOOD QUANT: CPT

## 2017-09-22 PROCEDURE — 6360000002 HC RX W HCPCS: Performed by: PHYSICAL MEDICINE & REHABILITATION

## 2017-09-22 PROCEDURE — 97110 THERAPEUTIC EXERCISES: CPT

## 2017-09-22 PROCEDURE — 6370000000 HC RX 637 (ALT 250 FOR IP): Performed by: PHYSICAL MEDICINE & REHABILITATION

## 2017-09-22 PROCEDURE — 6370000000 HC RX 637 (ALT 250 FOR IP): Performed by: INTERNAL MEDICINE

## 2017-09-22 PROCEDURE — 97535 SELF CARE MNGMENT TRAINING: CPT

## 2017-09-22 PROCEDURE — 97022 WHIRLPOOL THERAPY: CPT

## 2017-09-22 PROCEDURE — 97116 GAIT TRAINING THERAPY: CPT

## 2017-09-22 PROCEDURE — 99231 SBSQ HOSP IP/OBS SF/LOW 25: CPT | Performed by: INTERNAL MEDICINE

## 2017-09-22 PROCEDURE — 99231 SBSQ HOSP IP/OBS SF/LOW 25: CPT | Performed by: PHYSICAL MEDICINE & REHABILITATION

## 2017-09-22 RX ADMIN — HEPARIN SODIUM 5000 UNITS: 5000 INJECTION, SOLUTION INTRAVENOUS; SUBCUTANEOUS at 21:34

## 2017-09-22 RX ADMIN — PREGABALIN 25 MG: 25 CAPSULE ORAL at 07:07

## 2017-09-22 RX ADMIN — INSULIN LISPRO 4 UNITS: 100 INJECTION, SOLUTION INTRAVENOUS; SUBCUTANEOUS at 11:12

## 2017-09-22 RX ADMIN — PRAVASTATIN SODIUM 10 MG: 10 TABLET ORAL at 07:07

## 2017-09-22 RX ADMIN — PREGABALIN 25 MG: 25 CAPSULE ORAL at 21:28

## 2017-09-22 RX ADMIN — LISINOPRIL 40 MG: 20 TABLET ORAL at 07:05

## 2017-09-22 RX ADMIN — PANTOPRAZOLE SODIUM 20 MG: 20 TABLET, DELAYED RELEASE ORAL at 21:33

## 2017-09-22 RX ADMIN — POLYETHYLENE GLYCOL 3350 17 G: 17 POWDER, FOR SOLUTION ORAL at 07:05

## 2017-09-22 RX ADMIN — DOXYCYCLINE 100 MG: 100 CAPSULE ORAL at 21:28

## 2017-09-22 RX ADMIN — INSULIN LISPRO 1 UNITS: 100 INJECTION, SOLUTION INTRAVENOUS; SUBCUTANEOUS at 21:29

## 2017-09-22 RX ADMIN — PREGABALIN 25 MG: 25 CAPSULE ORAL at 13:43

## 2017-09-22 RX ADMIN — HEPARIN SODIUM 5000 UNITS: 5000 INJECTION, SOLUTION INTRAVENOUS; SUBCUTANEOUS at 13:43

## 2017-09-22 RX ADMIN — SPIRONOLACTONE 100 MG: 25 TABLET, FILM COATED ORAL at 07:06

## 2017-09-22 RX ADMIN — CHLORTHALIDONE 25 MG: 25 TABLET ORAL at 07:08

## 2017-09-22 RX ADMIN — POTASSIUM CHLORIDE 10 MEQ: 20 TABLET, EXTENDED RELEASE ORAL at 07:05

## 2017-09-22 RX ADMIN — METOPROLOL TARTRATE 50 MG: 50 TABLET ORAL at 21:34

## 2017-09-22 RX ADMIN — OXYCODONE HYDROCHLORIDE AND ACETAMINOPHEN 2 TABLET: 5; 325 TABLET ORAL at 21:34

## 2017-09-22 RX ADMIN — INSULIN LISPRO 4 UNITS: 100 INJECTION, SOLUTION INTRAVENOUS; SUBCUTANEOUS at 16:27

## 2017-09-22 RX ADMIN — INSULIN LISPRO 2 UNITS: 100 INJECTION, SOLUTION INTRAVENOUS; SUBCUTANEOUS at 07:10

## 2017-09-22 RX ADMIN — LATANOPROST 1 DROP: 50 SOLUTION OPHTHALMIC at 21:28

## 2017-09-22 RX ADMIN — FOLIC ACID 1 MG: 1 TABLET ORAL at 07:07

## 2017-09-22 RX ADMIN — OXYCODONE HYDROCHLORIDE AND ACETAMINOPHEN 1 TABLET: 5; 325 TABLET ORAL at 07:14

## 2017-09-22 RX ADMIN — TIMOLOL MALEATE 1 DROP: 2.5 SOLUTION OPHTHALMIC at 07:07

## 2017-09-22 RX ADMIN — DOXYCYCLINE 100 MG: 100 CAPSULE ORAL at 07:06

## 2017-09-22 RX ADMIN — HEPARIN SODIUM 5000 UNITS: 5000 INJECTION, SOLUTION INTRAVENOUS; SUBCUTANEOUS at 06:05

## 2017-09-22 RX ADMIN — PANTOPRAZOLE SODIUM 20 MG: 20 TABLET, DELAYED RELEASE ORAL at 07:05

## 2017-09-22 RX ADMIN — TAMSULOSIN HYDROCHLORIDE 0.4 MG: 0.4 CAPSULE ORAL at 07:05

## 2017-09-22 RX ADMIN — INSULIN GLARGINE 20 UNITS: 100 INJECTION, SOLUTION SUBCUTANEOUS at 21:29

## 2017-09-22 RX ADMIN — OXYCODONE HYDROCHLORIDE AND ACETAMINOPHEN 2 TABLET: 5; 325 TABLET ORAL at 13:41

## 2017-09-22 RX ADMIN — METOPROLOL TARTRATE 50 MG: 50 TABLET ORAL at 07:05

## 2017-09-22 RX ADMIN — TIMOLOL MALEATE 1 DROP: 2.5 SOLUTION OPHTHALMIC at 21:28

## 2017-09-22 RX ADMIN — AMLODIPINE BESYLATE 10 MG: 10 TABLET ORAL at 07:05

## 2017-09-22 RX ADMIN — DIPHENHYDRAMINE HCL 25 MG: 25 TABLET ORAL at 14:50

## 2017-09-22 ASSESSMENT — PAIN DESCRIPTION - ONSET: ONSET: ON-GOING

## 2017-09-22 ASSESSMENT — PAIN SCALES - GENERAL
PAINLEVEL_OUTOF10: 7
PAINLEVEL_OUTOF10: 0
PAINLEVEL_OUTOF10: 7
PAINLEVEL_OUTOF10: 5
PAINLEVEL_OUTOF10: 3
PAINLEVEL_OUTOF10: 5

## 2017-09-22 ASSESSMENT — PAIN DESCRIPTION - PAIN TYPE: TYPE: SURGICAL PAIN

## 2017-09-22 ASSESSMENT — PAIN DESCRIPTION - DESCRIPTORS: DESCRIPTORS: ACHING;SORE

## 2017-09-22 ASSESSMENT — PAIN DESCRIPTION - LOCATION: LOCATION: INCISION;LEG

## 2017-09-22 ASSESSMENT — PAIN DESCRIPTION - ORIENTATION: ORIENTATION: LEFT

## 2017-09-22 ASSESSMENT — PAIN DESCRIPTION - FREQUENCY: FREQUENCY: INTERMITTENT

## 2017-09-22 NOTE — CONSULTS
functions:   Lab Results   Component Value Date    TSH 3.65 09/01/2017        Imaging/Diagonstics:      CXR: No acute cardiopulmonary findings. ASSESSMENT:    Patient Active Problem List   Diagnosis    Uncontrolled hypertension    [facility]) [facility]    Neuropathy (HCC)    Uncontrolled type 2 diabetes with cellulitis of foot ([facility]    Cerebrovascular disease    Cataract    Dysphagia    Family history of colon cancer    Bowel habit changes    Hyperlipidemia    Gangrene of foot ([facility]    Diabetic foot left    Acute kidney injury ([facility]    Ischemic foot left    Urinary retention    Subacute osteomyelitis of left foot ([address]    Hx of BKA ([facility]       PLAN  Uncontrolled htn  With low k  likey primary dino  Will do aldactone  Bmp in am  unconrolled dm   Will adjsut meds  Left BKA post osteomytoli with uncontrolled dm  :  Sep 21  incrase lantus to MD SHONDA Ortiz [address], [address] [place] [address].    Phone [phone]   Fax: [phone]  Answering Service: [phone]

## 2017-09-22 NOTE — PROGRESS NOTES
Physical Medicine & Rehabilitation  Progress Note    2017 12:19 PM     CC: Ambulatory and ADL dysfunction due to Left BKA    Subjective:   Patient seen this morning. Pain is managed. Reporting BM last night. Reports sleeping well. Denies phantom limb pain. In good spirits    ROS:   Denies fevers, chills, sweats. No chest pain, palpitations, lightheadedness. Denies coughing, wheezing or shortness of breath. Denies abdominal pain, nausea, diarrhea or constipation. No new areas of joint pain. Denies new areas of numbness or weakness. Denies new anxiety or depression issues. No new skin problems. Rehabilitation:   Transfers:  Sit to Stand: Stand by assistance  Stand to sit: Stand by assistance  Bed to Chair: Stand by assistance  Stand Pivot Transfers: Stand by assistance  WB Status: NWB L LE (L BKA)  Ambulation 1  Surface: level tile  Device: Rolling Walker  Assistance: Contact guard assistance  Quality of Gait: No fatigue reported this PM with amb. Cues for RW safety  Distance: 120 ft  Comments: No fatigue reported with amb. during PM.  Also able to complete multiple short distances to and from equipment.      OT FIM:   Eatin - Patient feeds self  Groomin - Independent with all tasks using assistive device  Bathin - Able to bathe all 10 areas with device (pt completes s/u & clean up at w/c level, TTB, LHS)  Dressing-Upper: 6 - Independent with device/prosthesis  Dressing-Lower: 5 - Requires setup/supervision/cues and/or staff applies TEDS/prosthesis/brace only  Toiletin - Requires setup/supervision/cues (supervision )  Toilet Transfer: 5 - Requires setup/supervision/cues (stand pivot w/c<>toilet using grab bar)  Primary Mode: Shower  Tub Transfer: 0 - Activity does not occur  Shower Transfer: 5 - Supervision, set-up, cues     Social Interaction: 7 - Patient has appropriate behavior/relations 100% of the time  Problem Solvin - Patient independent with complex tasks  Memory: 7 - Patient independent with meds/people/schedule      Objective:  BP (!) 116/56  Pulse 56  Temp 98.2 °F (36.8 °C) (Oral)   Resp 16  Ht 5' 7\" (1.702 m)  Wt 204 lb (92.5 kg)  SpO2 100%  BMI 31.95 kg/m2 I Body mass index is 31.95 kg/(m^2). I   Wt Readings from Last 1 Encounters:   17 204 lb (92.5 kg)      Temp (24hrs), Av °F (36.7 °C), Min:97.7 °F (36.5 °C), Max:98.2 °F (36.8 °C)      Alert, no distress. Good speech and language function. Lungs clear. Heart regular. Abdomen non-distended, non-tender. No calf tenderness or edema.- RLE  Dressing over stump Left  Surgical sutures in place, wound dry. - clean and intact, no s/s of infection      Medications   Scheduled Meds:   potassium chloride  10 mEq Oral Daily with breakfast    spironolactone  100 mg Oral Daily    insulin glargine  20 Units Subcutaneous Nightly    heparin (porcine)  5,000 Units Subcutaneous 3 times per day    influenza virus vaccine  0.5 mL Intramuscular Prior to discharge    amLODIPine  10 mg Oral Daily    chlorthalidone  25 mg Oral Daily    folic acid  1 mg Oral Daily    insulin lispro  0-12 Units Subcutaneous TID WC    insulin lispro  0-6 Units Subcutaneous Nightly    latanoprost  1 drop Both Eyes Nightly    lisinopril  40 mg Oral Daily    metoprolol tartrate  50 mg Oral BID    pantoprazole  20 mg Oral BID    pravastatin  10 mg Oral Daily    pregabalin  25 mg Oral TID    tamsulosin  0.4 mg Oral Daily    polyethylene glycol  17 g Oral Daily    doxycycline monohydrate  100 mg Oral 2 times per day    timolol  1 drop Both Eyes BID     Continuous Infusions:   PRN Meds:. loperamide, acetaminophen, magnesium hydroxide, oxyCODONE-acetaminophen **OR** oxyCODONE-acetaminophen, albuterol, cyclobenzaprine, diphenhydrAMINE, glucagon (rDNA), glucose, sennosides-docusate sodium, bisacodyl     Diagnostics:     CBC:   Recent Labs      17   0939   WBC  7.5   RBC  3.01*   HGB  8.3*   HCT  25.7*   MCV  85.3   RDW  19.5*   PLT  510* BMP:   Recent Labs      09/21/17   0939   NA  142   K  3.6*   CL  104   CO2  25   BUN  15   CREATININE  0.93     BNP: No results for input(s): BNP in the last 72 hours. PT/INR: No results for input(s): PROTIME, INR in the last 72 hours. APTT: No results for input(s): APTT in the last 72 hours. CARDIAC ENZYMES: No results for input(s): CKMB, CKMBINDEX, TROPONINT in the last 72 hours. Invalid input(s): CKTOTAL;3  FASTING LIPID PANEL:  Lab Results   Component Value Date    CHOL 98 04/27/2017    HDL 34 (L) 04/27/2017    TRIG 165 (H) 09/04/2017     LIVER PROFILE: No results for input(s): AST, ALT, ALB, BILIDIR, BILITOT, ALKPHOS in the last 72 hours. I/O (24Hr): Intake/Output Summary (Last 24 hours) at 09/22/17 1219  Last data filed at 09/22/17 3489   Gross per 24 hour   Intake                0 ml   Output             1550 ml   Net            -1550 ml       Glu last 24 hour  Recent Labs      09/21/17   1719  09/21/17   2033  09/22/17   0651  09/22/17   1051   POCGLU  340*  191*  146*  242*       No results for input(s): CLARITYU, COLORU, PHUR, SPECGRAV, PROTEINU, RBCUA, BLOODU, BACTERIA, NITRU, WBCUA, LEUKOCYTESUR, YEAST, GLUCOSEU, BILIRUBINUR in the last 72 hours. Impression/Plan:    1. ADL and ambulation dysfunction secondary to Left BKA. Patient tolerating rehab. Pain controlled. Discussed at team conference- Estimated d/c date 9/26/17 to home. Continues to progress well. 2.  Left BKA- PO percocet at this time. lyrica  Flexeril for muscle spasms- Surgical site dry, sutures intact- no s/s of infection  3. Left foot abcess/ s/p BKA- continue Monodox, ID requests f/u in office in 2-4 weeks, noted cont antibx for 2 weeks from dc [facility]. Currently day 7 /14 ( 9/22/17)  4. Post-surgical blood loss anemia- Hb 8.3 9/21/17 - monitor, improving   5. HTN/HLD- Lisinoril, lopressor, chlorthalidone, pravastatin, aldactone.  f/u- - discussed with CLAUDIO THURMAN 3.6 on 9/21/17 , on daily dose potassium chloride now,

## 2017-09-22 NOTE — PROGRESS NOTES
ReVision without closure of wound wound VAC placement on 9/12/2017. On 9/14/2017 he underwent closure of left below-knee amputation by Dr. Corntey Villagran. Response To Previous Treatment: Patient with no complaints from previous session. Family / Caregiver Present: Yes (spouse)  Subjective  Subjective: Patient reports ramp is being built at home but may not be completed by DC date. Spouse not sure on step configuration at new home. Pain Screening  Patient Currently in Pain: Denies  Vital Signs  Patient Currently in Pain: Denies       Orientation     Objective   Bed mobility  Bridging: Independent  Rolling to Left: Independent  Rolling to Right: Modified independent; Independent  Supine to Sit: Independent  Sit to Supine: Independent  Scooting: Independent  Transfers  Sit to Stand: Stand by assistance  Stand to sit: Stand by assistance  Bed to Chair: Stand by assistance  Stand Pivot Transfers: Stand by assistance  Ambulation  Ambulation?: Yes  WB Status: NWB L LE (L BKA)  Ambulation 1  Surface: level tile  Device: Rolling Walker  Assistance: Contact guard assistance  Quality of Gait: No fatigue reported this PM with amb. Cues for RW safety  Distance: 75 feet  Comments: No fatigue reported with amb. during PM.  Also able to complete multiple short distances to and from equipment. Stairs/Curb  Stairs?: Yes (Patient is planning on ramp but might not be ready at DC)  Stairs  # Steps : 8 (4 with bilateral handrails; 4 on buttocks)  Stairs Height: 6\"  Rails:  (RW and // bars)  Assistance: Minimal assistance  Comment: required use of chair to sit on with transition floor/step with training on buttocks.    Propulsion 1  Propulsion: Manual  Level: Level Tile  Method: RUE;KRISTA  Level of Assistance: Modified independent  Description/ Details: Good maneuverability in tight areas, forward, retro, and turns  Distance: 200 feet        Other exercises  Other exercises 1: BKA supine, sidelye, prone therex; 2# sound leg therex; 10-20 reps to tolerance. Other exercises 2: Seated EOM UBE 5 min forward/ 5 min backward  Other exercises 3: Ace wrapping training and reinforcement of technique. Spouse training (therapist placed written education material visual in room)  Other exercises 4: Standing //bars knee bends, heel/toe x10  Other exercises 5: static standing x 3min   Other exercises 7: Standing Dynamic balance in //bars while holding on with 1 UE while Reaching OOBOS, pt attempts standing balance without UE support for 10 sec x3, improved balance with practice                        Assessment              Discharge Recommendations:  Home with assist PRN, Patient would benefit from continued therapy after discharge    G-Code     OutComes Score                                                      Goals  Short term goals  Time Frame for Short term goals: 3 days  Short term goal 1: Mod-I transfers  Short term goal 2: Ascend/descend 4 steps with NWB LLE with 2 rails, Min A  Short term goal 3: Pt able to ambulate with RW dist of 50-70 ft SBA  Short term goal 4: Pt able to manuever W/C mod-I on level surfaces  Short term goal 5: Participate in 45 minutes of therapy to demonstrate increased endurance  Long term goals  Time Frame for Long term goals : 10 days  Long term goal 1:  Pt able to ambulate 50 ft Mod-I with RW, level surfaeces  Long term goal 2: Pt able to ambulate 10-15 ft with RW, at SBA on outside terraine  Long term goal 3: Pt able to go up and down 4 steps with 1 HR and 1 crutch or able to scoot up sitting on steps with 1 person assist .  Long term goal 4: I/S pt/family in residual limb ace wrapping. and HEP. Patient Goals   Patient goals : return home    Plan    Plan  Times per week: 5-7 x/wk, 1.5 hr/day.   Times per day: Daily  Current Treatment Recommendations: Strengthening, Balance Training, Functional Mobility Training, Transfer Training, Endurance Training, Gait Training, Stair training, Home Exercise Program, Safety Education & Training, Patient/Caregiver Education & Training, ROM, Wheelchair Mobility Training  Safety Devices  Type of devices: Gait belt  Restraints  Initially in place: No     Therapy Time     09/22/17 0849 09/22/17 1439   PT Individual Minutes   Time In 0738 1215   Time Out 0831 1245   Minutes 53 30   PT Concurrent Minutes   Time In --  2376   Time Out --  1300   Minutes --  15        Rachel Pisano, PTA

## 2017-09-22 NOTE — PROGRESS NOTES
[address]   ACUTE REHABILITATION OCCUPATIONAL THERAPY  DAILY NOTE    Date: 17  Patient Name: Andrea Irizarry      Room: 5631/2607-94    MRN: [id number]   : 1965  (46 y.o.)  Gender: male   Referring Practitioner: Dr. Josep Andersen  Diagnosis: Osteomylitis of left foot, S/P Left BKA 17  Additional Pertinent Hx: completed revision and closure of wound on 17    Restrictions  Restrictions/Precautions: Weight Bearing, Surgical Protocols, Fall Risk, Contact Precautions  Other position/activity restrictions: discontinue use of left knee immobilizer unless note difficulty maintaining terminal left knee extension; patient reporting pressure pain from metal stays in brace. Left Lower Extremity Weight Bearing: Non Weight Bearing  Position Activity Restriction  Other position/activity restrictions: discontinue use of left knee immobilizer unless note difficulty maintaining terminal left knee extension; patient reporting pressure pain from metal stays in brace. Subjective  Comments: Pt. cooperative, pleasant and motivated. Patient Currently in Pain: No  Pain Level: 0  Restrictions/Precautions: Weight Bearing;Surgical Protocols; Fall Risk;Contact Precautions        Objective  Cognition  Overall Cognitive Status: WFL  Perception  Overall Perceptual Status: WFL  Balance  Sitting Balance: Modified independent   Standing Balance: Contact guard assistance (during dynamic standing task c 0-1 UE support.)  Transfers  Stand Pivot Transfers: Supervision  Sit to stand: Supervision  Stand to sit: Supervision  Standing Balance  Time: AM: <30 secs, ~1 min PM: 5 min   Activity: AM: functional transfers, LB tasks PM: ball toss while using RW to address standing balance for functioanl tasks; 0-1 UE support-very unsteady/occ LOB when reaching outside SUHAS for therapy ball, min A to recover. Sit to stand: Supervision  Stand to sit: Supervision  Comment: pt demo good technique and safety. no LOB noted.

## 2017-09-23 LAB
GLUCOSE BLD-MCNC: 131 MG/DL (ref 75–110)
GLUCOSE BLD-MCNC: 141 MG/DL (ref 75–110)
GLUCOSE BLD-MCNC: 156 MG/DL (ref 75–110)
GLUCOSE BLD-MCNC: 253 MG/DL (ref 75–110)

## 2017-09-23 PROCEDURE — 99231 SBSQ HOSP IP/OBS SF/LOW 25: CPT | Performed by: INTERNAL MEDICINE

## 2017-09-23 PROCEDURE — 82947 ASSAY GLUCOSE BLOOD QUANT: CPT

## 2017-09-23 PROCEDURE — 6370000000 HC RX 637 (ALT 250 FOR IP): Performed by: PHYSICAL MEDICINE & REHABILITATION

## 2017-09-23 PROCEDURE — 6360000002 HC RX W HCPCS: Performed by: PHYSICAL MEDICINE & REHABILITATION

## 2017-09-23 PROCEDURE — 6370000000 HC RX 637 (ALT 250 FOR IP): Performed by: INTERNAL MEDICINE

## 2017-09-23 PROCEDURE — 6370000000 HC RX 637 (ALT 250 FOR IP): Performed by: NURSE PRACTITIONER

## 2017-09-23 PROCEDURE — 1180000000 HC REHAB R&B

## 2017-09-23 PROCEDURE — 99232 SBSQ HOSP IP/OBS MODERATE 35: CPT | Performed by: PHYSICAL MEDICINE & REHABILITATION

## 2017-09-23 RX ADMIN — PANTOPRAZOLE SODIUM 20 MG: 20 TABLET, DELAYED RELEASE ORAL at 20:20

## 2017-09-23 RX ADMIN — LOPERAMIDE HYDROCHLORIDE 2 MG: 2 CAPSULE ORAL at 13:00

## 2017-09-23 RX ADMIN — HEPARIN SODIUM 5000 UNITS: 5000 INJECTION, SOLUTION INTRAVENOUS; SUBCUTANEOUS at 17:17

## 2017-09-23 RX ADMIN — DOXYCYCLINE 100 MG: 100 CAPSULE ORAL at 20:20

## 2017-09-23 RX ADMIN — INSULIN LISPRO 6 UNITS: 100 INJECTION, SOLUTION INTRAVENOUS; SUBCUTANEOUS at 17:10

## 2017-09-23 RX ADMIN — PREGABALIN 25 MG: 25 CAPSULE ORAL at 17:17

## 2017-09-23 RX ADMIN — PREGABALIN 25 MG: 25 CAPSULE ORAL at 20:20

## 2017-09-23 RX ADMIN — PREGABALIN 25 MG: 25 CAPSULE ORAL at 08:27

## 2017-09-23 RX ADMIN — INSULIN LISPRO 2 UNITS: 100 INJECTION, SOLUTION INTRAVENOUS; SUBCUTANEOUS at 12:00

## 2017-09-23 RX ADMIN — METOPROLOL TARTRATE 50 MG: 50 TABLET ORAL at 06:49

## 2017-09-23 RX ADMIN — METOPROLOL TARTRATE 50 MG: 50 TABLET ORAL at 20:20

## 2017-09-23 RX ADMIN — POTASSIUM CHLORIDE 10 MEQ: 20 TABLET, EXTENDED RELEASE ORAL at 08:22

## 2017-09-23 RX ADMIN — FOLIC ACID 1 MG: 1 TABLET ORAL at 08:28

## 2017-09-23 RX ADMIN — SPIRONOLACTONE 100 MG: 25 TABLET, FILM COATED ORAL at 08:21

## 2017-09-23 RX ADMIN — TIMOLOL MALEATE 1 DROP: 2.5 SOLUTION OPHTHALMIC at 20:21

## 2017-09-23 RX ADMIN — AMLODIPINE BESYLATE 10 MG: 10 TABLET ORAL at 08:23

## 2017-09-23 RX ADMIN — LISINOPRIL 40 MG: 20 TABLET ORAL at 08:24

## 2017-09-23 RX ADMIN — HEPARIN SODIUM 5000 UNITS: 5000 INJECTION, SOLUTION INTRAVENOUS; SUBCUTANEOUS at 06:45

## 2017-09-23 RX ADMIN — LATANOPROST 1 DROP: 50 SOLUTION OPHTHALMIC at 20:22

## 2017-09-23 RX ADMIN — DOXYCYCLINE 100 MG: 100 CAPSULE ORAL at 08:27

## 2017-09-23 RX ADMIN — INSULIN GLARGINE 20 UNITS: 100 INJECTION, SOLUTION SUBCUTANEOUS at 20:32

## 2017-09-23 RX ADMIN — TIMOLOL MALEATE 1 DROP: 2.5 SOLUTION OPHTHALMIC at 08:37

## 2017-09-23 RX ADMIN — LOPERAMIDE HYDROCHLORIDE 2 MG: 2 CAPSULE ORAL at 19:06

## 2017-09-23 RX ADMIN — TAMSULOSIN HYDROCHLORIDE 0.4 MG: 0.4 CAPSULE ORAL at 08:24

## 2017-09-23 RX ADMIN — OXYCODONE HYDROCHLORIDE AND ACETAMINOPHEN 1 TABLET: 5; 325 TABLET ORAL at 10:00

## 2017-09-23 RX ADMIN — CHLORTHALIDONE 25 MG: 25 TABLET ORAL at 08:36

## 2017-09-23 RX ADMIN — PANTOPRAZOLE SODIUM 20 MG: 20 TABLET, DELAYED RELEASE ORAL at 08:21

## 2017-09-23 RX ADMIN — PRAVASTATIN SODIUM 10 MG: 10 TABLET ORAL at 08:35

## 2017-09-23 RX ADMIN — HEPARIN SODIUM 5000 UNITS: 5000 INJECTION, SOLUTION INTRAVENOUS; SUBCUTANEOUS at 20:32

## 2017-09-23 ASSESSMENT — PAIN SCALES - GENERAL
PAINLEVEL_OUTOF10: 4
PAINLEVEL_OUTOF10: 7
PAINLEVEL_OUTOF10: 0

## 2017-09-23 NOTE — PROGRESS NOTES
regular. Abdomen non-distended, non-tender. No calf tenderness or edema.- RLE  Dressing over stump Left        Medications   Scheduled Meds:   potassium chloride  10 mEq Oral Daily with breakfast    spironolactone  100 mg Oral Daily    insulin glargine  20 Units Subcutaneous Nightly    heparin (porcine)  5,000 Units Subcutaneous 3 times per day    influenza virus vaccine  0.5 mL Intramuscular Prior to discharge    amLODIPine  10 mg Oral Daily    chlorthalidone  25 mg Oral Daily    folic acid  1 mg Oral Daily    insulin lispro  0-12 Units Subcutaneous TID WC    insulin lispro  0-6 Units Subcutaneous Nightly    latanoprost  1 drop Both Eyes Nightly    lisinopril  40 mg Oral Daily    metoprolol tartrate  50 mg Oral BID    pantoprazole  20 mg Oral BID    pravastatin  10 mg Oral Daily    pregabalin  25 mg Oral TID    tamsulosin  0.4 mg Oral Daily    polyethylene glycol  17 g Oral Daily    doxycycline monohydrate  100 mg Oral 2 times per day    timolol  1 drop Both Eyes BID     Continuous Infusions:   PRN Meds:. loperamide, acetaminophen, magnesium hydroxide, oxyCODONE-acetaminophen **OR** oxyCODONE-acetaminophen, albuterol, cyclobenzaprine, diphenhydrAMINE, glucagon (rDNA), glucose, sennosides-docusate sodium, bisacodyl     Diagnostics:     CBC:   Recent Labs      09/21/17   0939   WBC  7.5   RBC  3.01*   HGB  8.3*   HCT  25.7*   MCV  85.3   RDW  19.5*   PLT  510*     BMP:   Recent Labs      09/21/17   0939   NA  142   K  3.6*   CL  104   CO2  25   BUN  15   CREATININE  0.93     BNP: No results for input(s): BNP in the last 72 hours. PT/INR: No results for input(s): PROTIME, INR in the last 72 hours. APTT: No results for input(s): APTT in the last 72 hours. CARDIAC ENZYMES: No results for input(s): CKMB, CKMBINDEX, TROPONINT in the last 72 hours.     Invalid input(s): CKTOTAL;3  FASTING LIPID PANEL:  Lab Results   Component Value Date    CHOL 98 04/27/2017    HDL 34 (L) 04/27/2017    TRIG 165 (H)

## 2017-09-24 LAB
GLUCOSE BLD-MCNC: 103 MG/DL (ref 75–110)
GLUCOSE BLD-MCNC: 112 MG/DL (ref 75–110)
GLUCOSE BLD-MCNC: 142 MG/DL (ref 75–110)
GLUCOSE BLD-MCNC: 223 MG/DL (ref 75–110)

## 2017-09-24 PROCEDURE — 6370000000 HC RX 637 (ALT 250 FOR IP): Performed by: PHYSICAL MEDICINE & REHABILITATION

## 2017-09-24 PROCEDURE — 1180000000 HC REHAB R&B

## 2017-09-24 PROCEDURE — 97535 SELF CARE MNGMENT TRAINING: CPT

## 2017-09-24 PROCEDURE — 82947 ASSAY GLUCOSE BLOOD QUANT: CPT

## 2017-09-24 PROCEDURE — 97530 THERAPEUTIC ACTIVITIES: CPT

## 2017-09-24 PROCEDURE — 99232 SBSQ HOSP IP/OBS MODERATE 35: CPT | Performed by: PHYSICAL MEDICINE & REHABILITATION

## 2017-09-24 PROCEDURE — 97110 THERAPEUTIC EXERCISES: CPT

## 2017-09-24 PROCEDURE — 6360000002 HC RX W HCPCS: Performed by: PHYSICAL MEDICINE & REHABILITATION

## 2017-09-24 PROCEDURE — 97116 GAIT TRAINING THERAPY: CPT

## 2017-09-24 PROCEDURE — 6370000000 HC RX 637 (ALT 250 FOR IP): Performed by: INTERNAL MEDICINE

## 2017-09-24 PROCEDURE — 97112 NEUROMUSCULAR REEDUCATION: CPT

## 2017-09-24 PROCEDURE — 99231 SBSQ HOSP IP/OBS SF/LOW 25: CPT | Performed by: INTERNAL MEDICINE

## 2017-09-24 RX ADMIN — TAMSULOSIN HYDROCHLORIDE 0.4 MG: 0.4 CAPSULE ORAL at 09:16

## 2017-09-24 RX ADMIN — PRAVASTATIN SODIUM 10 MG: 10 TABLET ORAL at 09:24

## 2017-09-24 RX ADMIN — DOXYCYCLINE 100 MG: 100 CAPSULE ORAL at 09:22

## 2017-09-24 RX ADMIN — OXYCODONE HYDROCHLORIDE AND ACETAMINOPHEN 2 TABLET: 5; 325 TABLET ORAL at 20:16

## 2017-09-24 RX ADMIN — POTASSIUM CHLORIDE 10 MEQ: 20 TABLET, EXTENDED RELEASE ORAL at 09:16

## 2017-09-24 RX ADMIN — DOXYCYCLINE 100 MG: 100 CAPSULE ORAL at 20:11

## 2017-09-24 RX ADMIN — SPIRONOLACTONE 100 MG: 25 TABLET, FILM COATED ORAL at 09:15

## 2017-09-24 RX ADMIN — HEPARIN SODIUM 5000 UNITS: 5000 INJECTION, SOLUTION INTRAVENOUS; SUBCUTANEOUS at 20:25

## 2017-09-24 RX ADMIN — LATANOPROST 1 DROP: 50 SOLUTION OPHTHALMIC at 20:13

## 2017-09-24 RX ADMIN — PREGABALIN 25 MG: 25 CAPSULE ORAL at 20:11

## 2017-09-24 RX ADMIN — PANTOPRAZOLE SODIUM 20 MG: 20 TABLET, DELAYED RELEASE ORAL at 20:12

## 2017-09-24 RX ADMIN — LISINOPRIL 40 MG: 20 TABLET ORAL at 09:28

## 2017-09-24 RX ADMIN — CYCLOBENZAPRINE HYDROCHLORIDE 5 MG: 10 TABLET, FILM COATED ORAL at 09:17

## 2017-09-24 RX ADMIN — CHLORTHALIDONE 25 MG: 25 TABLET ORAL at 09:20

## 2017-09-24 RX ADMIN — AMLODIPINE BESYLATE 10 MG: 10 TABLET ORAL at 09:16

## 2017-09-24 RX ADMIN — PREGABALIN 25 MG: 25 CAPSULE ORAL at 15:06

## 2017-09-24 RX ADMIN — OXYCODONE HYDROCHLORIDE AND ACETAMINOPHEN 2 TABLET: 5; 325 TABLET ORAL at 15:08

## 2017-09-24 RX ADMIN — TIMOLOL MALEATE 1 DROP: 2.5 SOLUTION OPHTHALMIC at 20:13

## 2017-09-24 RX ADMIN — INSULIN GLARGINE 20 UNITS: 100 INJECTION, SOLUTION SUBCUTANEOUS at 20:26

## 2017-09-24 RX ADMIN — TIMOLOL MALEATE 1 DROP: 2.5 SOLUTION OPHTHALMIC at 09:22

## 2017-09-24 RX ADMIN — FOLIC ACID 1 MG: 1 TABLET ORAL at 09:20

## 2017-09-24 RX ADMIN — DIPHENHYDRAMINE HCL 25 MG: 25 TABLET ORAL at 20:12

## 2017-09-24 RX ADMIN — PREGABALIN 25 MG: 25 CAPSULE ORAL at 09:19

## 2017-09-24 RX ADMIN — METOPROLOL TARTRATE 50 MG: 50 TABLET ORAL at 09:16

## 2017-09-24 RX ADMIN — OXYCODONE HYDROCHLORIDE AND ACETAMINOPHEN 1 TABLET: 5; 325 TABLET ORAL at 09:16

## 2017-09-24 RX ADMIN — PANTOPRAZOLE SODIUM 20 MG: 20 TABLET, DELAYED RELEASE ORAL at 09:16

## 2017-09-24 RX ADMIN — HEPARIN SODIUM 5000 UNITS: 5000 INJECTION, SOLUTION INTRAVENOUS; SUBCUTANEOUS at 06:21

## 2017-09-24 RX ADMIN — INSULIN LISPRO 2 UNITS: 100 INJECTION, SOLUTION INTRAVENOUS; SUBCUTANEOUS at 20:26

## 2017-09-24 RX ADMIN — HEPARIN SODIUM 5000 UNITS: 5000 INJECTION, SOLUTION INTRAVENOUS; SUBCUTANEOUS at 13:29

## 2017-09-24 ASSESSMENT — PAIN SCALES - GENERAL
PAINLEVEL_OUTOF10: 5
PAINLEVEL_OUTOF10: 8
PAINLEVEL_OUTOF10: 8
PAINLEVEL_OUTOF10: 3
PAINLEVEL_OUTOF10: 8
PAINLEVEL_OUTOF10: 8
PAINLEVEL_OUTOF10: 5
PAINLEVEL_OUTOF10: 8
PAINLEVEL_OUTOF10: 5

## 2017-09-24 ASSESSMENT — PAIN DESCRIPTION - PAIN TYPE
TYPE: ACUTE PAIN
TYPE: ACUTE PAIN

## 2017-09-24 ASSESSMENT — PAIN DESCRIPTION - LOCATION: LOCATION: LEG;INCISION

## 2017-09-24 ASSESSMENT — PAIN DESCRIPTION - ORIENTATION
ORIENTATION: LEFT
ORIENTATION: LEFT

## 2017-09-24 NOTE — PROGRESS NOTES
Physical Therapy  Facility/Department: [facility] ACUTE REHAB  Daily Treatment Note  NAME: Jim Domingo  : 1965  MRN: [id number]    Date of Service: 2017    Patient Diagnosis(es):   Patient Active Problem List    Diagnosis Date Noted    Impaired mobility     S/P BKA (below knee amputation) unilateral ([address] 2017    Subacute osteomyelitis of left foot ([address] 2017    Urinary retention 2017    Ischemic foot left 2017    Gangrene of foot ([address] 2017    Diabetic foot left 2017    Acute kidney injury ([address] 2017    Hyperlipidemia     Dysphagia 2013    Family history of colon cancer 2013    Bowel habit changes 2013    Essential hypertension     IDDM (insulin dependent diabetes mellitus) ([address]     Neuropathy ([address]     Uncontrolled type 2 diabetes with cellulitis of foot ([address]     Cerebrovascular disease     Cataract        Past Medical History:   Diagnosis Date    Acid reflux     Cerebrovascular disease 2006    TIA    Glaucoma     Hyperlipidemia     Hypertension     [facility] mellitus) [facility]     MDRO (multiple drug resistant organisms) resistance     MRSA (methicillin resistant staph aureus) culture positive 2017    foot    Neuropathy ([address]     Osteomyelitis ([address]     Left Hallux    S/P cataract extraction and insertion of intraocular lens     bilateral     Type II or unspecified type diabetes mellitus without mention of complication, not stated as uncontrolled      Past Surgical History:   Procedure Laterality Date    EYE SURGERY Bilateral     lazer both eyes    FOOT SURGERY Left 2017    surgical prep of wound bed    FOOT SURGERY Left 2017    1) Left foot surgical preparation of wound-bed, 2) Left foot application of graft     [facility]  [facility] DOUBLE  2017         LEG AMPUTATION BELOW KNEE Left 2017    LEFT BELOW KNEE GUILLOTINE AMPUTATION performed by Kelly Pino MD Recommendations  Equipment Needed: Yes  Mobility Devices: Wheelchair  Wheelchair: Light Weight  Other: Possible rolling walker       Discharge Recommendations:  Home with assist PRN, Patient would benefit from continued therapy after discharge    Goals  Short term goals  Time Frame for Short term goals: 3 days  Short term goal 1: Mod-I transfers  Short term goal 2: Ascend/descend 4 steps with NWB LLE with 2 rails, Min A  Short term goal 3: Pt able to ambulate with RW dist of 50-70 ft SBA  Short term goal 4: Pt able to manuever W/C mod-I on level surfaces  Short term goal 5: Participate in 45 minutes of therapy to demonstrate increased endurance  Long term goals  Time Frame for Long term goals : 10 days  Long term goal 1:  Pt able to ambulate 50 ft Mod-I with RW, level surfaeces  Long term goal 2: Pt able to ambulate 10-15 ft with RW, at SBA on outside terraine  Long term goal 3: Pt able to go up and down 4 steps with 1 HR and 1 crutch or able to scoot up sitting on steps with 1 person assist .  Long term goal 4: I/S pt/family in residual limb ace wrapping. and HEP. Patient Goals   Patient goals : return home    Plan    Plan  Times per week: 5-7 x/wk, 1.5 hr/day.   Times per day: Daily  Current Treatment Recommendations: Strengthening, Balance Training, Functional Mobility Training, Transfer Training, Endurance Training, Gait Training, Stair training, Home Exercise Program, Safety Education & Training, Patient/Caregiver Education & Training, ROM, Wheelchair Mobility Training  Safety Devices  Type of devices: Gait belt  Restraints  Initially in place: No     Therapy Time         09/24/17 0947 09/24/17 1319   PT Individual Minutes   Time In 0740 1225   Time Out 0835 1300   Minutes 54 [address]

## 2017-09-24 NOTE — PROGRESS NOTES
ALT, ALB, BILIDIR, BILITOT, ALKPHOS in the last 72 hours. I/O (24Hr): Intake/Output Summary (Last 24 hours) at 09/24/17 1001  Last data filed at 09/23/17 1754   Gross per 24 hour   Intake              470 ml   Output              200 ml   Net              270 ml       Glu last 24 hour  Recent Labs      09/23/17   1116  09/23/17   1651  09/23/17 2013 09/24/17   0654   POCGLU  156*  253*  141*  112*       No results for input(s): CLARITYU, COLORU, PHUR, SPECGRAV, PROTEINU, RBCUA, BLOODU, BACTERIA, NITRU, WBCUA, LEUKOCYTESUR, YEAST, Sudha Clapper in the last 72 hours. Impression/Plan:    1. ADL and ambulation dysfunction secondary to Left BKA. Patient tolerating rehab. Pain controlled. Discussed at team conference- Estimated d/c date 9/26/17 to home. Continues to progress well. 2.  Left BKA- PO percocet at this time. lyrica  Flexeril for muscle spasms. 3. Left foot abcess/ s/p BKA- continue Monodox, ID requests f/u in office in 2-4 weeks, noted cont antibx for 2 weeks from [place] from [facility]. Currently day 9 /14 (9/23/17)  4. Post-surgical blood loss anemia- Hb 8.3 9/21/17. 5. HTN/HLD- Lisinoril, lopressor, chlorthalidone, pravastatin, aldactone. f/u- - discussed with CLAUDIO THURMAN 3.6 on 9/21/17, on daily dose potassium chloride now, as pt is on chlorthalidione, meds adjusted per IM ( BP)- cont uncontrolled BP, IM following- IM increased aldactone to 100mg- BP improving, recheck BMP monday  6. Cataract/gluaucoma - on timolol and xalantan. 7. Diabetes Melitis- Lantus, humalog, IM follow- increased latus to 20 units. 8. GERD-protonix  9. DVT- heparin, montior CBC  10 Urinary retention at [facility] - on flomax, monitor void, bladder protocol- voiding well.   11. Internal medicine to medically manage    Electronically signed by Bonita Hagen MD on 9/24/2017 at 10:01 AM

## 2017-09-24 NOTE — PROGRESS NOTES
weights retrievel from cupboards in all planes)  Assist Level: Stand by assistance  Functional Mobility Comments: pt required min vc for safety awareness with functioanl mobility to retrieve items with use of RW  Shower Transfers  Shower - Transfer From: Wheelchair  Shower - Transfer Type: To and From  Shower - Transfer To: Shower seat with back  Shower - Technique: To right; To left  Shower Transfers: Contact Guard  Shower Transfers Comments: pt demo good safety awareness and technique with functional transfers     Type of ROM/Therapeutic Exercise  Type of ROM/Therapeutic Exercise: Free weights (3lb dumbbell BUE all available planes to tolerance)  Comment: pt completed UB ex's for increased strength and endurance for functional tasks-2x20 reps completed. Pt demo good understanding and followthrough. [id number]    OT FIM:   Eatin - Patient feeds self  Grooming: (P) 6 - Independent with all tasks using assistive device (sink side in wc)  Bathing: (P) 6 - Able to bathe all 10 areas with device (seated at shower seat)  Dressing-Upper: (P) 6 - Independent with device/prosthesis (seated at wc)  Dressing-Lower: (P) 5 - Requires setup/supervision/cues and/or staff applies TEDS/prosthesis/brace only (seated at wc, stand at grab bar to pull pants over hips)  Toileting: (P) 5 - Requires setup/supervision/cues (use of urinal at wc)  Toilet Transfer: 4 - Requires steadying assistance only < 25% assist  Primary Mode: Shower  Shower Transfer: 5 - Supervision, set-up, cues    Social Interaction: 7 - Patient has appropriate behavior/relations 100% of the time  Problem Solving: (P) 4 - Patient solves simple/routine tasks 75-90%+  (vc necessary to correct pts med managemenet activity )  Memory: (P) 4 - Patient remembers 75-90%+ of the time    Assessment  Performance deficits / Impairments: Decreased functional mobility ; Decreased balance;Decreased ADL status; Decreased high-level IADLs;Decreased endurance  Prognosis: tub bench. Long term goal 3: Pt. will demo. Mod.I with light homemgmt tasks at W/C level or using RW.        Electronically signed by ANAID Proctor on 9/24/17 at 11:06 AM

## 2017-09-25 LAB
ANION GAP SERPL CALCULATED.3IONS-SCNC: 12 MMOL/L (ref 9–17)
BUN BLDV-MCNC: 20 MG/DL (ref 6–20)
BUN/CREAT BLD: ABNORMAL (ref 9–20)
CALCIUM SERPL-MCNC: 9 MG/DL (ref 8.6–10.4)
CHLORIDE BLD-SCNC: 104 MMOL/L (ref 98–107)
CO2: 24 MMOL/L (ref 20–31)
CREAT SERPL-MCNC: 1.01 MG/DL (ref 0.7–1.2)
GFR AFRICAN AMERICAN: >60 ML/MIN
GFR NON-AFRICAN AMERICAN: >60 ML/MIN
GFR SERPL CREATININE-BSD FRML MDRD: ABNORMAL ML/MIN/{1.73_M2}
GFR SERPL CREATININE-BSD FRML MDRD: ABNORMAL ML/MIN/{1.73_M2}
GLUCOSE BLD-MCNC: 142 MG/DL (ref 75–110)
GLUCOSE BLD-MCNC: 171 MG/DL (ref 75–110)
GLUCOSE BLD-MCNC: 172 MG/DL (ref 75–110)
GLUCOSE BLD-MCNC: 184 MG/DL (ref 75–110)
GLUCOSE BLD-MCNC: 188 MG/DL (ref 70–99)
HCT VFR BLD CALC: 32.6 % (ref 41–53)
HEMOGLOBIN: 10.5 G/DL (ref 13.5–17.5)
MCH RBC QN AUTO: 28.1 PG (ref 26–34)
MCHC RBC AUTO-ENTMCNC: 32.2 G/DL (ref 31–37)
MCV RBC AUTO: 87.3 FL (ref 80–100)
PDW BLD-RTO: 19.8 % (ref 11.5–14.9)
PLATELET # BLD: 483 K/UL (ref 150–450)
PMV BLD AUTO: 9.4 FL (ref 6–12)
POTASSIUM SERPL-SCNC: 4.1 MMOL/L (ref 3.7–5.3)
RBC # BLD: 3.74 M/UL (ref 4.5–5.9)
SODIUM BLD-SCNC: 140 MMOL/L (ref 135–144)
WBC # BLD: 8.2 K/UL (ref 3.5–11)

## 2017-09-25 PROCEDURE — 97110 THERAPEUTIC EXERCISES: CPT

## 2017-09-25 PROCEDURE — 80048 BASIC METABOLIC PNL TOTAL CA: CPT

## 2017-09-25 PROCEDURE — 97530 THERAPEUTIC ACTIVITIES: CPT

## 2017-09-25 PROCEDURE — 82947 ASSAY GLUCOSE BLOOD QUANT: CPT

## 2017-09-25 PROCEDURE — 36415 COLL VENOUS BLD VENIPUNCTURE: CPT

## 2017-09-25 PROCEDURE — 1180000000 HC REHAB R&B

## 2017-09-25 PROCEDURE — 97116 GAIT TRAINING THERAPY: CPT

## 2017-09-25 PROCEDURE — 6360000002 HC RX W HCPCS: Performed by: PHYSICAL MEDICINE & REHABILITATION

## 2017-09-25 PROCEDURE — 6370000000 HC RX 637 (ALT 250 FOR IP): Performed by: PHYSICAL MEDICINE & REHABILITATION

## 2017-09-25 PROCEDURE — 85027 COMPLETE CBC AUTOMATED: CPT

## 2017-09-25 PROCEDURE — 97535 SELF CARE MNGMENT TRAINING: CPT

## 2017-09-25 PROCEDURE — 99232 SBSQ HOSP IP/OBS MODERATE 35: CPT | Performed by: PHYSICAL MEDICINE & REHABILITATION

## 2017-09-25 PROCEDURE — 99231 SBSQ HOSP IP/OBS SF/LOW 25: CPT | Performed by: INTERNAL MEDICINE

## 2017-09-25 PROCEDURE — 6370000000 HC RX 637 (ALT 250 FOR IP): Performed by: INTERNAL MEDICINE

## 2017-09-25 RX ORDER — TAMSULOSIN HYDROCHLORIDE 0.4 MG/1
0.4 CAPSULE ORAL DAILY
Qty: 30 CAPSULE | Refills: 3 | Status: SHIPPED | OUTPATIENT
Start: 2017-09-25 | End: 2021-04-21

## 2017-09-25 RX ORDER — DOXYCYCLINE 100 MG/1
100 CAPSULE ORAL EVERY 12 HOURS SCHEDULED
Qty: 6 CAPSULE | Refills: 0 | Status: SHIPPED | OUTPATIENT
Start: 2017-09-25 | End: 2017-09-28

## 2017-09-25 RX ORDER — FOLIC ACID 1 MG/1
1 TABLET ORAL DAILY
Qty: 30 TABLET | Refills: 3 | Status: ON HOLD | OUTPATIENT
Start: 2017-09-25 | End: 2021-04-30

## 2017-09-25 RX ORDER — INSULIN GLARGINE 100 [IU]/ML
20 INJECTION, SOLUTION SUBCUTANEOUS NIGHTLY
Qty: 1 VIAL | Refills: 3 | Status: SHIPPED | OUTPATIENT
Start: 2017-09-25 | End: 2021-04-21

## 2017-09-25 RX ORDER — PREGABALIN 25 MG/1
25 CAPSULE ORAL 3 TIMES DAILY
Qty: 60 CAPSULE | Refills: 0 | Status: SHIPPED | OUTPATIENT
Start: 2017-09-25 | End: 2019-06-28 | Stop reason: DRUGHIGH

## 2017-09-25 RX ORDER — SPIRONOLACTONE 100 MG/1
100 TABLET, FILM COATED ORAL DAILY
Qty: 30 TABLET | Refills: 3 | Status: ON HOLD | OUTPATIENT
Start: 2017-09-25 | End: 2018-09-25

## 2017-09-25 RX ORDER — OXYCODONE HYDROCHLORIDE AND ACETAMINOPHEN 5; 325 MG/1; MG/1
1 TABLET ORAL EVERY 8 HOURS PRN
Qty: 30 TABLET | Refills: 0 | Status: SHIPPED | OUTPATIENT
Start: 2017-09-25 | End: 2017-10-02

## 2017-09-25 RX ORDER — LISINOPRIL 40 MG/1
40 TABLET ORAL DAILY
Qty: 30 TABLET | Refills: 3 | Status: ON HOLD | OUTPATIENT
Start: 2017-09-25 | End: 2019-07-26 | Stop reason: HOSPADM

## 2017-09-25 RX ORDER — PRAVASTATIN SODIUM 10 MG
10 TABLET ORAL DAILY
Qty: 30 TABLET | Refills: 3 | Status: ON HOLD | OUTPATIENT
Start: 2017-09-25 | End: 2021-04-30

## 2017-09-25 RX ORDER — AMLODIPINE BESYLATE 10 MG/1
10 TABLET ORAL DAILY
Qty: 30 TABLET | Refills: 3 | Status: ON HOLD | OUTPATIENT
Start: 2017-09-25 | End: 2018-09-25

## 2017-09-25 RX ORDER — POTASSIUM CHLORIDE 750 MG/1
10 TABLET, EXTENDED RELEASE ORAL
Qty: 60 TABLET | Refills: 3 | Status: SHIPPED | OUTPATIENT
Start: 2017-09-25 | End: 2017-09-26 | Stop reason: HOSPADM

## 2017-09-25 RX ORDER — METOPROLOL TARTRATE 50 MG/1
50 TABLET, FILM COATED ORAL 2 TIMES DAILY
Qty: 60 TABLET | Refills: 3 | Status: SHIPPED | OUTPATIENT
Start: 2017-09-25 | End: 2018-10-22 | Stop reason: ALTCHOICE

## 2017-09-25 RX ORDER — CYCLOBENZAPRINE HCL 5 MG
5 TABLET ORAL 3 TIMES DAILY PRN
Qty: 30 TABLET | Refills: 0 | Status: SHIPPED | OUTPATIENT
Start: 2017-09-25 | End: 2017-10-05

## 2017-09-25 RX ORDER — CHLORTHALIDONE 25 MG/1
25 TABLET ORAL DAILY
Qty: 30 TABLET | Refills: 3 | Status: SHIPPED | OUTPATIENT
Start: 2017-09-25 | End: 2018-10-22 | Stop reason: ALTCHOICE

## 2017-09-25 RX ADMIN — METOPROLOL TARTRATE 50 MG: 50 TABLET ORAL at 07:43

## 2017-09-25 RX ADMIN — OXYCODONE HYDROCHLORIDE AND ACETAMINOPHEN 2 TABLET: 5; 325 TABLET ORAL at 11:56

## 2017-09-25 RX ADMIN — OXYCODONE HYDROCHLORIDE AND ACETAMINOPHEN 2 TABLET: 5; 325 TABLET ORAL at 16:20

## 2017-09-25 RX ADMIN — PANTOPRAZOLE SODIUM 20 MG: 20 TABLET, DELAYED RELEASE ORAL at 07:43

## 2017-09-25 RX ADMIN — TIMOLOL MALEATE 1 DROP: 2.5 SOLUTION OPHTHALMIC at 07:44

## 2017-09-25 RX ADMIN — OXYCODONE HYDROCHLORIDE AND ACETAMINOPHEN 1 TABLET: 5; 325 TABLET ORAL at 07:21

## 2017-09-25 RX ADMIN — LISINOPRIL 40 MG: 20 TABLET ORAL at 07:42

## 2017-09-25 RX ADMIN — OXYCODONE HYDROCHLORIDE AND ACETAMINOPHEN 1 TABLET: 5; 325 TABLET ORAL at 22:27

## 2017-09-25 RX ADMIN — DOXYCYCLINE 100 MG: 100 CAPSULE ORAL at 07:45

## 2017-09-25 RX ADMIN — INSULIN LISPRO 2 UNITS: 100 INJECTION, SOLUTION INTRAVENOUS; SUBCUTANEOUS at 11:56

## 2017-09-25 RX ADMIN — TIMOLOL MALEATE 1 DROP: 2.5 SOLUTION OPHTHALMIC at 22:26

## 2017-09-25 RX ADMIN — HEPARIN SODIUM 5000 UNITS: 5000 INJECTION, SOLUTION INTRAVENOUS; SUBCUTANEOUS at 14:00

## 2017-09-25 RX ADMIN — INSULIN LISPRO 1 UNITS: 100 INJECTION, SOLUTION INTRAVENOUS; SUBCUTANEOUS at 22:30

## 2017-09-25 RX ADMIN — LATANOPROST 1 DROP: 50 SOLUTION OPHTHALMIC at 22:33

## 2017-09-25 RX ADMIN — PANTOPRAZOLE SODIUM 20 MG: 20 TABLET, DELAYED RELEASE ORAL at 22:26

## 2017-09-25 RX ADMIN — PRAVASTATIN SODIUM 10 MG: 10 TABLET ORAL at 07:58

## 2017-09-25 RX ADMIN — TAMSULOSIN HYDROCHLORIDE 0.4 MG: 0.4 CAPSULE ORAL at 07:43

## 2017-09-25 RX ADMIN — POLYETHYLENE GLYCOL 3350 17 G: 17 POWDER, FOR SOLUTION ORAL at 07:43

## 2017-09-25 RX ADMIN — CHLORTHALIDONE 25 MG: 25 TABLET ORAL at 07:45

## 2017-09-25 RX ADMIN — POTASSIUM CHLORIDE 10 MEQ: 20 TABLET, EXTENDED RELEASE ORAL at 07:42

## 2017-09-25 RX ADMIN — HEPARIN SODIUM 5000 UNITS: 5000 INJECTION, SOLUTION INTRAVENOUS; SUBCUTANEOUS at 06:30

## 2017-09-25 RX ADMIN — SPIRONOLACTONE 100 MG: 25 TABLET, FILM COATED ORAL at 07:43

## 2017-09-25 RX ADMIN — FOLIC ACID 1 MG: 1 TABLET ORAL at 07:45

## 2017-09-25 RX ADMIN — INSULIN GLARGINE 20 UNITS: 100 INJECTION, SOLUTION SUBCUTANEOUS at 22:30

## 2017-09-25 RX ADMIN — PREGABALIN 25 MG: 25 CAPSULE ORAL at 07:45

## 2017-09-25 RX ADMIN — PREGABALIN 25 MG: 25 CAPSULE ORAL at 22:27

## 2017-09-25 RX ADMIN — DOXYCYCLINE 100 MG: 100 CAPSULE ORAL at 22:27

## 2017-09-25 RX ADMIN — AMLODIPINE BESYLATE 10 MG: 10 TABLET ORAL at 07:43

## 2017-09-25 RX ADMIN — PREGABALIN 25 MG: 25 CAPSULE ORAL at 14:00

## 2017-09-25 RX ADMIN — HEPARIN SODIUM 5000 UNITS: 5000 INJECTION, SOLUTION INTRAVENOUS; SUBCUTANEOUS at 22:30

## 2017-09-25 ASSESSMENT — PAIN SCALES - GENERAL
PAINLEVEL_OUTOF10: 0
PAINLEVEL_OUTOF10: 6
PAINLEVEL_OUTOF10: 7
PAINLEVEL_OUTOF10: 5
PAINLEVEL_OUTOF10: 0
PAINLEVEL_OUTOF10: 5
PAINLEVEL_OUTOF10: 0
PAINLEVEL_OUTOF10: 7
PAINLEVEL_OUTOF10: 3

## 2017-09-25 NOTE — PROGRESS NOTES
Diagnostics:     CBC:   Recent Labs      09/25/17   0730   WBC  8.2   RBC  3.74*   HGB  10.5*   HCT  32.6*   MCV  87.3   RDW  19.8*   PLT  483*     BMP:   Recent Labs      09/25/17   0730   NA  140   K  4.1   CL  104   CO2  24   BUN  20   CREATININE  1.01     BNP: No results for input(s): BNP in the last 72 hours. PT/INR: No results for input(s): PROTIME, INR in the last 72 hours. APTT: No results for input(s): APTT in the last 72 hours. CARDIAC ENZYMES: No results for input(s): CKMB, CKMBINDEX, TROPONINT in the last 72 hours. Invalid input(s): CKTOTAL;3  FASTING LIPID PANEL:  Lab Results   Component Value Date    CHOL 98 04/27/2017    HDL 34 (L) 04/27/2017    TRIG 165 (H) 09/04/2017     LIVER PROFILE: No results for input(s): AST, ALT, ALB, BILIDIR, BILITOT, ALKPHOS in the last 72 hours. I/O (24Hr): Intake/Output Summary (Last 24 hours) at 09/25/17 1044  Last data filed at 09/24/17 1734   Gross per 24 hour   Intake              480 ml   Output                1 ml   Net              479 ml       Glu last 24 hour  Recent Labs      09/24/17   1055  09/24/17   1624  09/24/17 2007 09/25/17   0720   POCGLU  103  142*  223*  172*       No results for input(s): CLARITYU, COLORU, PHUR, SPECGRAV, PROTEINU, RBCUA, BLOODU, BACTERIA, NITRU, WBCUA, LEUKOCYTESUR, YEAST, GLUCOSEU, BILIRUBINUR in the last 72 hours. Impression/Plan:    1. ADL and ambulation dysfunction secondary to Left BKA. Patient tolerating rehab. Pain controlled. Discussed at team conference- Estimated d/c date 9/26/17 to home. Continues to progress well. OK with DC plan tomorrow  2. Left BKA- PO percocet at this time. lyrica  Flexeril for muscle spasms. 3. Left foot abcess/ s/p BKA- continue Monodox ( 14 day course per ID note 9/16), ID requests f/u in office in 2-4 weeks, noted cont antibx for 2 weeks from [place] from [facility] [address]. Currently day 11 /14 (9/23/17)  4. Post-surgical blood loss anemia- Hb 8.3 9/21/17.    5. HTN/HLD- Lisinoril,

## 2017-09-25 NOTE — PROGRESS NOTES
[facility]      Patient name:  Susannah Hummel  Date of admission:  9/16/2017  5:03 PM  MRN:   [id number]  YOB: 1965    CC- HTN     HPI-   pt s/p left BKA for foot abscess on doxy   DM well controlled FS more than 200    No c/o chest pain or sob     REVIEW OF SYSTEMS:    · General----negative for fatigue, weight loss  · GI negative for nausea and vomiting, no dysphagia       EXAM-  BP (!) 176/69  Pulse 66  Temp 97.8 °F (36.6 °C) (Oral)   Resp 16  Ht 5' 7\" (1.702 m)  Wt 204 lb (92.5 kg)  SpO2 100%  BMI 31.95 kg/m2 I Body mass index is 31.95 kg/(m^2). I     · General appearance: NAD conversant  · Lungs: normal effort, clear to auscultation bilaterally,no wheeze. · Heart: regular rate and rhythm, S1, S2 normal, no murmur  · Abdomen: soft, non-tender; no masses, no organomegaly  · Extremities: no cyanosis, LLE stump       Laboratory Testing:  CBC:   No results for input(s): WBC, HGB, PLT in the last 72 hours. BMP:    No results for input(s): NA, K, CL, CO2, BUN, CREATININE, GLUCOSE in the last 72 hours. ASSESSMENT:    Patient Active Problem List   Diagnosis    Essential hypertension    IDDM (insulin dependent diabetes mellitus) ([address]    Neuropathy ([address]    Uncontrolled type 2 diabetes with cellulitis of foot ([address]    Cerebrovascular disease    Cataract    Dysphagia    Family history of colon cancer    Bowel habit changes    Hyperlipidemia    Gangrene of foot ([address]    Diabetic foot left    Acute kidney injury ([address]    Ischemic foot left    Urinary retention    Subacute osteomyelitis of left foot ([address]    Hx of BKA ([address]    Impaired mobility       PLAN:  HTN elevated will follow   DM improved  S/p BKA   Cont norvasc chlorthalidone lisinopril aldactone metoprolol  lantus 20 statin     MD SHONDA Spears [facility]  [address] [place] [address].    Phone [phone]   Fax: [phone]  Answering Service: [phone]

## 2017-09-25 NOTE — PATIENT CARE CONFERENCE
[address]   ACUTE REHABILITATION  TEAM CONFERENCE NOTE  Date: 17  Patient Name: Miriam Finch       Room: 9956/5201-71  MRN: [id number]       : 1965  (46 y.o.)     Gender: male      Diagnosis: L BKA    NURSING  FIMS:  Bladder: 5 - Voids, but staff empties urinal/BSC and/or requires setup/sup/cues to urinate (e.g. timed voids/self cath) (no accidents, no touching) (help w/I&O cath)  Bladder Level of Assistance: 7- Complete [place]  Bladder Frequency of Accidents: 6 - No accidents: uses device  Bowel: 6 - Uses toilet independently with device or oral medication(s)  Bowel Level of Assistance: 6- Modified [place]  Bowel Frequency of Accidents: 7 - No accidents   Bladder  Continent  Bowel   Continent  Intervention    Bowel Program    Wounds/Incisions/Ulcers: Incision healing well  Medication Education Program: Patient able to manage medications and being educated by nursing  Pain: Patient's pain is currently controlled with -  Percocet Q4    Fall Risk:  Falling star program initiated    PHYSICAL THERAPY  Bed mobility  Bridging: Independent  Rolling to Left: Independent  Rolling to Right: Independent  Supine to Sit: Independent  Sit to Supine: Independent  Scooting: Independent  Bed Mobility  Bridging: Independent  Rolling: Independent  Supine to Sit: Independent  Sit to Supine: Independent  Scooting: Independent      Transfers:  Sit to Stand: Modified independent  Stand to sit: Modified independent  Bed to Chair: Modified independent  Stand Pivot Transfers: Modified independent  Comment: Wrapped L LE in PM session- no drainage noted since AM session; incision without any redness/irritation.   Patient reports a decrease in pain with exercise/movement    Ambulation 1  Surface: level tile  Device: Rolling Walker  Assistance: Modified Independent  Quality of Gait: Good pattern; at times gets moving too quickly; occasional cues to stay centered in walker (tends to get to close to occur  Primary Mode: Shower  Tub Transfer: 0 - Activity does not occur  Shower Transfer: 6 - Modified independence    Equipment Recommendations  Equipment Needed: Yes  Transfer Tub Bench: x         Short term goals  Time Frame for Short term goals: One Week,   Short term goal 1: Pt. will demo. Mod.I with toilet transfer and SBA with toileting tasks with good . Short term goal 2: Pt. will demo. Mod.I with BADL's. Short term goal 3: Pt. will demo. Moriah Rene EC/WS tech. , good knowledge of AE/DME and modified tech. to ease indep. with ADL's. Short term goal 4: Pt. will tolerate 8-10 min. functional standing activities with one UE support to increase indep. with ADL's and mobility. Short term goal 5: Pt. will tolerate 35+ min. with functional ADL transfers/therapeutic ex to promote increased indep. with ADL's. Short term goal 6: Pt. will demo. min. A with light homemgmt tasks at W/C level. SPEECH THERAPY    Short Term Goal:     RECREATIONAL THERAPY  Comment/Participation: Participating in unit program      NUTRITION  Weight: 204 lb (92.5 kg) / Body mass index is 31.95 kg/(m^2). Diet Rx: CHO Control (5 CHO Choices per meal). PO intake appears adequate. Ref. Range 9/25/2017 07:20 9/25/2017 10:42   POC Glucose Latest Ref Range: 75 - 110 mg/dL 172 (H) 184 (H)   Please see nutrition note for details.     SOCIAL WORK ASSESSMENT  Assessment:   Pre-Admission Status:  Lives With: Spouse (two adult children ages of 32 and 29)  Type of Home: House  Home Layout: One level  Home Access: Stairs to enter with rails  Entrance Stairs - Number of Steps: 4  Entrance Stairs - Rails: Right  Bathroom Shower/Tub: Tub/Shower unit, Doors  Bathroom Toilet: Standard  Bathroom Accessibility: Accessible  Home Equipment: Rolling walker, Quad cane  Receives Help From: Family  ADL Assistance: Independent  Homemaking Assistance: Independent  Ambulation Assistance: Independent (Cane)  Transfer Assistance: Independent  Active : Yes  Mode of Transportation: SUV, Truck  Occupation: Retired  Type of occupation: Construction  Leisure & Hobbies: enjoys sleeping and relaxing  Additional Comments: Pt's wife reports sons plan to install a ramp prior to pt's discharge. Family Education: Family Education Completed    Risk for Readmission: High 14>   Readmission Risk              Readmission Risk:        22.5       Age 72 or Greater:  0    Admitted from SNF or Requires Paid or Family Care:  0    Currently has CHF,COPD,ARF,CRI,or is on dialysis:  4    Takes more than 5 Prescription Medications:  4    Takes Digoxin,Insulin,Anticoagulants,Narcotics or ASA/Plavix:  [address] in Past 12 Months:  10    On Disability:  0    Patient Considers own Health:  2.5        Critical Items:     Problem / Barrier Intervention / Plan  Results   Altered ADL status related to new amputation  Training in use of devices and modified care techniques to support slef care independence      Impaired functional mobility Functional mobility training with assistive device     Steps at entrance I/S in going up/down steps- pt/family education.  Family also considering building ramp.                             Functional FIM Gain  Admission Score:  81  Progress:  pending  Goal:  109   `  Discharge Plan   Estimated Discharge Date: 9/26/17  Overnight or Day Pass: No  Factors facilitating achievement of predicted outcomes: Family support  Barriers to the achievement of predicted outcomes: Decreased endurance, Upper extremity weakness, Lower extremity weakness, Skin Care and Medication managment    Functional Goals at discharge:  Home with family Modified independence  Discharge therapy goals:  PT: Long term goals  Time Frame for Long term goals : 10 days  Long term goal 1:  Pt able to ambulate 50 ft Mod-I with RW, level [place]  Long term goal 2: Pt able to ambulate 10-15 ft with RW, at SBA on outside terraine  Long term goal 3: Pt able to go up and down 4 steps with 1

## 2017-09-25 NOTE — PROGRESS NOTES
[facility]      Patient name:  Anel Carlisle  Date of admission:  9/16/2017  5:03 PM  MRN:   [id number]  YOB: 1965    CC- HTN     HPI-   pt s/p left BKA for foot abscess on doxy   DM well controlled -103    No c/o chest pain or sob     REVIEW OF SYSTEMS:    · General----negative for fatigue, weight loss  · GI negative for nausea and vomiting, no dysphagia       EXAM-  BP (!) 136/55  Pulse 59  Temp 97.3 °F (36.3 °C)  Resp 16  Ht 5' 7\" (1.702 m)  Wt 204 lb (92.5 kg)  SpO2 97%  BMI 31.95 kg/m2     · General appearance: NAD conversant  · Lungs: normal effort, clear to auscultation bilaterally,no wheeze. · Heart: regular rate and rhythm, S1, S2 normal, no murmur  · Abdomen: soft, non-tender; no masses, no organomegaly  · Extremities: no cyanosis, LLE stump       Laboratory Testing:  CBC:   No results for input(s): WBC, HGB, PLT in the last 72 hours. BMP:    No results for input(s): NA, K, CL, CO2, BUN, CREATININE, GLUCOSE in the last 72 hours. ASSESSMENT:    Patient Active Problem List   Diagnosis    Essential hypertension    IDDM (insulin dependent diabetes mellitus) ([address]    Neuropathy ([address]    Uncontrolled type 2 diabetes with cellulitis of foot ([address]    Cerebrovascular disease    Cataract    Dysphagia    Family history of colon cancer    Bowel habit changes    Hyperlipidemia    Gangrene of foot ([address]    Diabetic foot left    Acute kidney injury ([address]    Ischemic foot left    Urinary retention    Subacute osteomyelitis of left foot ([address]    S/P BKA (below knee amputation) unilateral (HCC)    Impaired mobility       PLAN:  HTN well controlled  DM well controlled  S/p BKA     Cont present medications  norvasc chlorthalidone lisinopril aldactone metoprolol  lantus 20 statin     MD SHONDA Ruano [address], [address] [place] [address].    Phone [phone]   Fax:

## 2017-09-26 VITALS
BODY MASS INDEX: 32.02 KG/M2 | OXYGEN SATURATION: 100 % | RESPIRATION RATE: 16 BRPM | HEART RATE: 76 BPM | WEIGHT: 204 LBS | TEMPERATURE: 98.1 F | SYSTOLIC BLOOD PRESSURE: 142 MMHG | DIASTOLIC BLOOD PRESSURE: 58 MMHG | HEIGHT: 67 IN

## 2017-09-26 LAB
GLUCOSE BLD-MCNC: 158 MG/DL (ref 75–110)
GLUCOSE BLD-MCNC: 233 MG/DL (ref 75–110)
POTASSIUM SERPL-SCNC: 4.4 MMOL/L (ref 3.7–5.3)

## 2017-09-26 PROCEDURE — 6370000000 HC RX 637 (ALT 250 FOR IP): Performed by: PHYSICAL MEDICINE & REHABILITATION

## 2017-09-26 PROCEDURE — 82947 ASSAY GLUCOSE BLOOD QUANT: CPT

## 2017-09-26 PROCEDURE — 6360000002 HC RX W HCPCS: Performed by: PHYSICAL MEDICINE & REHABILITATION

## 2017-09-26 PROCEDURE — 97110 THERAPEUTIC EXERCISES: CPT

## 2017-09-26 PROCEDURE — 99231 SBSQ HOSP IP/OBS SF/LOW 25: CPT | Performed by: INTERNAL MEDICINE

## 2017-09-26 PROCEDURE — 6370000000 HC RX 637 (ALT 250 FOR IP): Performed by: NURSE PRACTITIONER

## 2017-09-26 PROCEDURE — 36415 COLL VENOUS BLD VENIPUNCTURE: CPT

## 2017-09-26 PROCEDURE — 97535 SELF CARE MNGMENT TRAINING: CPT

## 2017-09-26 PROCEDURE — 97116 GAIT TRAINING THERAPY: CPT

## 2017-09-26 PROCEDURE — 99239 HOSP IP/OBS DSCHRG MGMT >30: CPT | Performed by: PHYSICAL MEDICINE & REHABILITATION

## 2017-09-26 PROCEDURE — 97530 THERAPEUTIC ACTIVITIES: CPT

## 2017-09-26 PROCEDURE — 84132 ASSAY OF SERUM POTASSIUM: CPT

## 2017-09-26 PROCEDURE — 6370000000 HC RX 637 (ALT 250 FOR IP): Performed by: INTERNAL MEDICINE

## 2017-09-26 RX ADMIN — INSULIN LISPRO 4 UNITS: 100 INJECTION, SOLUTION INTRAVENOUS; SUBCUTANEOUS at 11:29

## 2017-09-26 RX ADMIN — AMLODIPINE BESYLATE 10 MG: 10 TABLET ORAL at 07:21

## 2017-09-26 RX ADMIN — CHLORTHALIDONE 25 MG: 25 TABLET ORAL at 07:22

## 2017-09-26 RX ADMIN — FOLIC ACID 1 MG: 1 TABLET ORAL at 07:22

## 2017-09-26 RX ADMIN — DOXYCYCLINE 100 MG: 100 CAPSULE ORAL at 07:22

## 2017-09-26 RX ADMIN — PREGABALIN 25 MG: 25 CAPSULE ORAL at 13:43

## 2017-09-26 RX ADMIN — TAMSULOSIN HYDROCHLORIDE 0.4 MG: 0.4 CAPSULE ORAL at 07:21

## 2017-09-26 RX ADMIN — PRAVASTATIN SODIUM 10 MG: 10 TABLET ORAL at 07:25

## 2017-09-26 RX ADMIN — POTASSIUM CHLORIDE 10 MEQ: 20 TABLET, EXTENDED RELEASE ORAL at 07:21

## 2017-09-26 RX ADMIN — OXYCODONE HYDROCHLORIDE AND ACETAMINOPHEN 1 TABLET: 5; 325 TABLET ORAL at 07:17

## 2017-09-26 RX ADMIN — HEPARIN SODIUM 5000 UNITS: 5000 INJECTION, SOLUTION INTRAVENOUS; SUBCUTANEOUS at 13:42

## 2017-09-26 RX ADMIN — METOPROLOL TARTRATE 50 MG: 50 TABLET ORAL at 07:22

## 2017-09-26 RX ADMIN — OXYCODONE HYDROCHLORIDE AND ACETAMINOPHEN 2 TABLET: 5; 325 TABLET ORAL at 13:42

## 2017-09-26 RX ADMIN — LOPERAMIDE HYDROCHLORIDE 2 MG: 2 CAPSULE ORAL at 13:42

## 2017-09-26 RX ADMIN — PANTOPRAZOLE SODIUM 20 MG: 20 TABLET, DELAYED RELEASE ORAL at 07:21

## 2017-09-26 RX ADMIN — HEPARIN SODIUM 5000 UNITS: 5000 INJECTION, SOLUTION INTRAVENOUS; SUBCUTANEOUS at 06:35

## 2017-09-26 RX ADMIN — TIMOLOL MALEATE 1 DROP: 2.5 SOLUTION OPHTHALMIC at 07:24

## 2017-09-26 RX ADMIN — LISINOPRIL 40 MG: 20 TABLET ORAL at 07:21

## 2017-09-26 RX ADMIN — PREGABALIN 25 MG: 25 CAPSULE ORAL at 07:24

## 2017-09-26 RX ADMIN — SPIRONOLACTONE 100 MG: 25 TABLET, FILM COATED ORAL at 07:22

## 2017-09-26 ASSESSMENT — PAIN SCALES - GENERAL
PAINLEVEL_OUTOF10: 7
PAINLEVEL_OUTOF10: 5
PAINLEVEL_OUTOF10: 3
PAINLEVEL_OUTOF10: 2

## 2017-09-26 NOTE — FLOWSHEET NOTE
09/26/17 1143   Encounter Summary   Services provided to: Patient   Referral/Consult From: [facility] (9/26/17)   Volunteer Visit Yes   Complexity of Encounter Low   Length of Encounter 15 minutes   Routine   Type Follow up   Spiritual/Restorationist   Type Spiritual support   Intervention Communion   Sacraments   Communion Patient received communion

## 2017-09-26 NOTE — CARE COORDINATION
Acute Rehab Unit Full FIM Progress    Admission score Current score    Goal       Self-Care     Eatin - Patient feeds self 7 - Patient feeds self 7   Groomin - Requires setup/cues to do all tasks 6 - Independent with all tasks using assistive device 6   Bathin - Able to bathe all 10 areas with setup/sup/cues 6 - Able to bathe all 10 areas with device 6   Dressing-Upper: 5 - Requires setup/supervision/cues and/or requires assist with presthesis/brace only 6 - Independent with device/prosthesis 6   Dressing-Lower: 4 - Requires assist with buttons/zippers/shoelaces and/or assist with shoes only (assist with pulling up pants over the left hip and assist with maintaining balance) 6 - Independent with device/prosthesis (use of grab bar for standing to pull up pants, ) 6   Toiletin - Did not occur 0 - Did not occur 6     Spincter Control     Bladder: 5 - Voids, but staff empties urinal/BSC and/or requires setup/sup/cues to urinate (e.g. timed voids/self cath) (no accidents, no touching) (help w/I&O cath)    7 - Patient urinates in toilet independently 7             Bladder Frequency of Accidents: 6 - No accidents: uses device    6 - No accidents: uses device    Bowel: 6 - Uses toilet independently with device or oral medication(s)    6 - Uses toilet independently with device or oral medication(s) 6   Bowel Level of Assistance: 6- Modified [place] 6- Modified [place]    Bowel Frequency of Accidents: 7 - No accidents    6- Modified [place]         Transfers     Bed, Chair, Wheel Chair: 4 - Requires steadying assistance only <25% assist  and/or requires assist with one leg only    6 - Requires assistive device (slide rail) 6   Toilet Transfer: 0 - Did not occur 0 - Did not occur 6    Primary Mode: Shower 6   Tub Transfer: 0 - Activity does not occur 0 - Activity does not occur    Shower Transfer: 4 - Minimal contact assistance, pt. expends 75% or more effort 6 - Modified independence
[facility]   Date: 2017    Patient Care Needs  Patient Name: Nano Major       Room: 9931/6746-58 D/C : 17  : 1965  (46 y.o.)     Gender: male   Diagnosis: L BKA    Sensory Problems  Visual impairment: None  Vision  Vision: Within Functional Limits  Hearing  Hearing: Within functional limits    Personal Equipment and Devices: (eyeglasses, hearing,aids, dentures)  Assistive Devices: None    Feeding / Swallow:   Eatin - Patient feeds self    Diet Ordered: Carb Control 4 Carbs/Meal  Supplements: Wound Healing Supplement       Precautions: Precautions: Fall risk,Restrictions/Precautions  Restrictions/Precautions: Weight Bearing, Surgical Protocols, Fall Risk, Contact Precautions  Required Braces or Orthoses?: No  Lower Extremity Weight Bearing Restrictions  Left Lower Extremity Weight Bearing: Non Weight Bearing  Position Activity Restriction  Other position/activity restrictions: Does not need Knee immobilizer unless issues with L knee extension noted,Additional Pertinent Hx: completed revision and closure of wound on 17  Restrictions/Precautions: Weight Bearing, Surgical Protocols, Fall Risk, Contact Precautions Required Braces or Orthoses?: No    Mobility Equipment needs:  Device: Standard Walker Assistance: Modified Independent    Bed Mobility:  Bed mobility  Bridging: Independent  Rolling to Left: Independent  Rolling to Right: Independent  Supine to Sit: Independent  Sit to Supine: Independent  Scooting: Independent  Comment: flat mat no rails    Transfers:   Sit to Stand: Modified independent   Bed to Chair: Modified independent Stand Pivot Transfers: Modified independent Squat Pivot Transfers: Modified independent    Posture: Good      Ambulation Status:  Assistance: Modified Independent  Quality of Gait: Good pattern; at times gets moving too quickly; occasional cues to stay centered in walker (tends to get to close to front of walker) Distance: 75
follow  Comments: vc's to not place foot advance of the front of the RW for safety       Self-care Equipment Needs:       Self-care Assist Bathin - Able to bathe all 10 areas with setup/sup/cues (occ set up A/A c clean up. pt A at w/c level )  Dressing-Upper: 6 - Independent with device/prosthesis (pt retrieved clothing & completes task at w/c level )  Dressing-Lower: 5 - Requires setup/supervision/cues and/or staff applies TEDS/prosthesis/brace only (pt retrieves clothing at w/c level, A c TEDs/L brace. )  Toiletin - Requires setup/supervision/cues (set up/clean up using hand held urinal )  Toilet Transfer: 0 - Did not occur (pt used hand held urinal )    Nursing   Toilet Every 2 Hours-In Advance of Need: Yes (urinal at bedside)Activity: Up to chair,Activity and Safety  Activity: Up to chair  Repositioned: Supine, Pillow support  Anti-Embolism Devices: Right lower extremity, Pneumatic compression devices, below knee  Anti-Embolism Intervention: Refused (pt educated on importance),Activity: Up to chair,Pressure Ulcer or Non-Healing Wound: No  Alarm On: Chair   Bladder: 5 - Voids, but staff empties urinal/BSC and/or requires setup/sup/cues to urinate (e.g. timed voids/self cath) (no accidents, no touching) (help w/I&O cath)  Bowel: 6 - Uses toilet independently with device or oral medication(s)    Wound Care Documentation and Therapy:   Incision 17 Leg Left (Active)   Wound Assessment NANCY 2017 12:00 PM   Maria L-wound Assessment NANCY 2017 12:00 PM   Closure NANCY 2017 12:00 PM   Culture Taken No 2017  8:00 AM   Drainage Amount None 2017 12:00 PM   Drainage Description Purulent;Serosanguinous 2017  6:30 AM   Odor None 2017  4:00 PM   Dressing/Treatment Dry dressing; Ace Wrap 2017 12:00 PM   Dressing Changed Changed/New 2017 12:00 PM   Dressing Status Clean;Dry; Intact 2017 12:00 PM   Dressing Change Due 17  4:00 PM   Number of days:12       Incision

## 2017-09-26 NOTE — PLAN OF CARE
Problem: Falls - Risk of  Goal: Absence of falls  Outcome: Met This Shift  No fall this shift. Transfers well. Aware of safety issues. Calls for  Assistance and waits patiently for assistance to arrive    Problem: Pain:  Goal: Pain level will decrease  Pain level will decrease   Outcome: Ongoing  Pain in stump Is intermittent. Pain pill is effective. Pt is pleasant and polite full of humor and conversation    Problem: Risk for Impaired Skin Integrity  Goal: Tissue integrity - skin and mucous membranes  Structural intactness and normal physiological function of skin and  mucous membranes. Outcome: Met This Shift  No new red or open areas noted. Stump is healing well.   Good approximation and no drainage
Problem: Falls - Risk of  Goal: Absence of falls  Outcome: Met This Shift  No falls or injuries sustained at this time. No attempts to get out of bed without nursing assistance. Call light within reach and pt. uses appropriately for assistance. Siderails up x 2. Nonskid footwear remains on. Bed in low and locked position. Hourly nursing rounds made. Pt. Alert and oriented, aware of limitations, and exhibits good safety judgement. Pt. uses assistive devices appropriately. Pt. understands individual fall risk factors.      Bed alarm remains engaged throughout the shift as a precaution.      Problem: Pain:  Goal: Pain level will decrease  Pain level will decrease   Outcome: Met This Shift  Pain assessment and reassessment completed so far this shift. Pt. able to rest after the use of pain medication  Patient medicated for pain this shift with 2 percocet for c/o pain in Left BKA.   Will continue to monitor.    
Problem: Falls - Risk of  Goal: Absence of falls  Outcome: Met This Shift  No falls or injuries sustained at this time. No attempts to get out of bed without nursing assistance. Call light within reach and pt. uses appropriately for assistance. Siderails up x 2. Nonskid footwear remains on. Bed in low and locked position. Hourly nursing rounds made. Pt. Alert and oriented, aware of limitations, and exhibits good safety judgement. Pt. uses assistive devices appropriately. Pt. understands individual fall risk factors. Bed alarm remains engaged throughout the shift as a precaution. Problem: Pain:  Goal: Pain level will decrease  Pain level will decrease   Outcome: Met This Shift  Pain assessment completed so far this shift. Pt. able to rest without the use of pain medication  Patient denies any pain so far this shift  Will continue to monitor. Problem: Risk for Impaired Skin Integrity  Goal: Tissue integrity - skin and mucous membranes  Structural intactness and normal physiological function of skin and  mucous membranes.    Outcome: Ongoing
Problem: Falls - Risk of  Goal: Absence of falls  Outcome: Met This Shift  No falls or injuries sustained at this time. No attempts to get out of bed without nursing assistance. Call light within reach and pt. uses appropriately for assistance. Siderails up x 2. Nonskid footwear remains on. Bed in low and locked position. Hourly nursing rounds made. Pt. Alert and oriented, aware of limitations, and exhibits good safety judgement. Pt. uses assistive devices appropriately. Pt. understands individual fall risk factors. Bed alarm remains engaged throughout the shift as a precaution. Problem: Pain:  Goal: Pain level will decrease  Pain level will decrease   Outcome: Ongoing  Pain assessment and reassessment completed so far this shift. Pt. able to rest after the use of pain medication. Patient medicated with one percocet for c/o pain in left incision/leg  Patient relates a tolerable level of discomfort with the current medication. Pt. Repositions per self for comfort. Nonverbal cues indicate pain relief. Pt. Rests quietly with eyes closed after pain medication administration. Respirations easy and unlabored. Appears free from distress. Problem: Risk for Impaired Skin Integrity  Goal: Tissue integrity - skin and mucous membranes  Structural intactness and normal physiological function of skin and  mucous membranes. Outcome: Met This Shift  Skin assessment completed this shift. Nutrition and Hydration status assessed with adequate intake. Scott Score as charted. Right heel remains elevated on pillows throughout the shift. Patient able to reposition self for comfort and to prevent breakdown. Patient verbalizes understanding of pressure ulcer prevention measures. Skin integrity maintained. No new skin breakdown noted. Skin to high risk pressure areas including coccyx and heels are clear.
Problem: Falls - Risk of  Goal: Absence of falls  Outcome: Met This Shift  Pt. Free of falls and injuries this shift. Problem: Skin Integrity - Risk of, Impaired  Goal: Skin integrity is maintained or improved  Outcome: Met This Shift  Skin integrity improved/maintained this shift. See head to toe assessment. Problem: Pain:  Goal: Pain level will decrease  Pain level will decrease   Outcome: Met This Shift  Adequate pain control achieved this shift. See MAR.
Problem: Falls - Risk of  Goal: Absence of falls  Outcome: Ongoing  No falls or injuries so far this shift. Call light in place. Problem: Pain:  Goal: Pain level will decrease  Pain level will decrease   Outcome: Ongoing  Pain controlled with PRN pain medications at this time. Problem: Risk for Impaired Skin Integrity  Goal: Tissue integrity - skin and mucous membranes  Structural intactness and normal physiological function of skin and  mucous membranes. Outcome: Ongoing  No obvious new skin issues at this time.
Problem: Falls - Risk of  Goal: Absence of falls  Outcome: Ongoing  No falls or injuries sustained at this time. No attempts to get out of bed without nursing assistance. Call light within reach and pt. uses appropriately for assistance. Siderails up x 2. Nonskid footie remains on Right Foot. Bed in low and locked position. Hourly nursing rounds made. Pt. Alert and oriented, aware of limitations, and exhibits good safety judgement. Pt. uses assistive devices appropriately. Pt. understands individual fall risk factors. Bed alarm remains engaged throughout the shift as a precaution. Problem: Pain - Acute:  Goal: Control of acute pain  Control of acute pain    Control of acute pain   Outcome: Ongoing  Pain assessment completed. Pt. able to rest.  Patient medicated with 2 tabs Percocet for pain this shift as ordered. Patient relates a tolerable level of discomfort with the current medication. Pt. Repositions per self for comfort. Nonverbal cues indicate pain relief. Pt. Rests quietly with eyes closed after pain medication administration. Respirations easy and unlabored. Appears free from distress. Problem: Pain:  Goal: Control of acute pain  Control of acute pain    Control of acute pain   Outcome: Ongoing  Pain assessment completed. Pt. able to rest.  Patient medicated with 2 tabs Percocet for pain this shift as ordered. Patient relates a tolerable level of discomfort with the current medication. Pt. Repositions per self for comfort. Nonverbal cues indicate pain relief. Pt. Rests quietly with eyes closed after pain medication administration. Respirations easy and unlabored. Appears free from distress. Problem: Risk for Impaired Skin Integrity  Goal: Tissue integrity - skin and mucous membranes  Structural intactness and normal physiological function of skin and  mucous membranes. Outcome: Ongoing  Skin assessment completed this shift. Nutrition and Hydration status assessed with adequate intake.
Problem: Falls - Risk of  Goal: Absence of falls  Outcome: Ongoing  Pt absent of falls during shift. Bed locked and in lowest position, up with assistance, wheelchair locked during transfers and call light in reach. Problem: Pain:  Goal: Pain level will decrease  Pain level will decrease   Outcome: Ongoing  Pt pain level assessed during shift. Pain treated as needed.
Problem: Falls - Risk of  Goal: Absence of falls  Outcome: Ongoing  Pt absent of falls during shift. Bed locked and in lowest position, wheelchair locked during transfers, nonslip footwear on while out of bed and is up with assistance. Problem: Pain:  Goal: Pain level will decrease  Pain level will decrease   Outcome: Ongoing  Pain level assessed throughout shift. Pain treated as needed.
Problem: Nutrition  Goal: Optimal nutrition therapy  Outcome: Ongoing  Nutrition Problem: Increased nutrient needs  Intervention: Food and/or Nutrient Delivery: Continue current diet  Nutritional Goals: PO intake % of needs
protection, DVT prophylaxis, Fall precautions, Fluid/Electrolyte balance, Nutritional status, Respiratory needs, Anemia and History of heart disease                                           Physician anticipated functional outcomes: Improved independence with functional measures   Estimated length of stay for this admission 1 week  Medical Prognosis: Good  Anticipated disposition: Home with [address]. The potential to achieve the above medical and rehabilitative goals is very good. This plan of care has been developed with the assistance and input of the multidisciplinary rehabilitation team.  The plan was reviewed with the patient on 9/18/2017. The patient has had the opportunity to provide input to the therapy team.    I have reviewed this Individualized Plan of Care and agree with its contents. Above documentation has been expanded, modified, adjusted to reflect the findings of my evaluations and goals for the patient. Physician:  Electronically signed by Jama Ignacio. Eugenio Krause MD on 9/18/17 at 12:40 PM

## 2017-09-26 NOTE — PROGRESS NOTES
Physical Therapy  Facility/Department: [facility] ACUTE REHAB  Daily Treatment Note  NAME: Chandler Brown  : 1965  MRN: [id number]    Date of Service: 2017    Patient Diagnosis(es):   Patient Active Problem List    Diagnosis Date Noted    Impaired mobility     Hx of BKA ([address] 2017    Subacute osteomyelitis of left foot ([address] 2017    Urinary retention 2017    Ischemic foot left 2017    Gangrene of foot ([address] 2017    Diabetic foot left 2017    Acute kidney injury ([address] 2017    Hyperlipidemia     Dysphagia 2013    Family history of colon cancer 2013    Bowel habit changes 2013    Essential hypertension     IDDM (insulin dependent diabetes mellitus) ([address]     Neuropathy ([address]     Uncontrolled type 2 diabetes with cellulitis of foot ([address]     Cerebrovascular disease     Cataract        Past Medical History:   Diagnosis Date    Acid reflux     Cerebrovascular disease 2006    TIA    Glaucoma     Hyperlipidemia     Hypertension     [facility]     MDRO (multiple drug resistant organisms) resistance     MRSA (methicillin resistant staph aureus) culture positive 2017    foot    Neuropathy ([address]     Osteomyelitis ([address]     Left Hallux    S/P cataract extraction and insertion of intraocular lens     bilateral     Type II or unspecified type diabetes mellitus without mention of complication, not stated as uncontrolled      Past Surgical History:   Procedure Laterality Date    EYE SURGERY Bilateral     lazer both eyes    FOOT SURGERY Left 2017    surgical prep of wound bed    FOOT SURGERY Left 2017    1) Left foot surgical preparation of wound-bed, 2) Left foot application of graft     [address] DOUBLE  2017         LEG AMPUTATION BELOW KNEE Left 2017    LEFT BELOW KNEE GUILLOTINE AMPUTATION performed by Jennifer Kelley MD at [address] [place] [address] uneven  Device 2: Standard Walker  Assistance 2: Modified Independent  Quality of Gait 2: Ataxic but demonstrates stability with decreasing savanah  Distance: 20 feet  Stairs  # Steps : 4  Stairs Height: 6\"  Rails: Bilateral  Curbs: 6\"  Device: Standard walker  Assistance: Minimal assistance  Comment: patient ramp complete. plans bumping on buttocks if not able to tolerated with bilateral handrails with wearing knee extension brace. Patient wanting to demonstrate with use of bilateral handrails today. Wheelchair Activities  Wheelchair Size: 18x18  Wheelchair Type: Standard  Pressure Relief Type: Push up;Self Adjusts  Level of Assistance for pressure relief activities: Independent  Left Leg Rest Level of Assistance: Independent  Right Leg Rest Level of Assistance: Independent  Left Brakes Level of Assistance: Independent  Right Brakes Level of Assistance: Independent  Propulsion 1  Propulsion: Manual  Level: Level Tile  Method: RUE;LUE  Level of Assistance: Modified independent  Description/ Details: No issues; does not fatigue on way back to room but able to safely manuever corners and tight spaces  Distance: 200 feet  Propulsion 2  Propulsion: Manual  Level: Ramp  Method: RUE;LUE  Level of Assistance: Modified independent  Distance: 50 feet        Other exercises  Other exercises 1: reviewed written HEP for BKA and edema management and preprosthetic training. good understanding demonstrated  Other exercises 3: Wrapping L BKA; patient demonstrating following written HEP  with verbal reinforcement given. Assessment      PT Equipment Recommendations  Mobility Devices: Martha Amadeoling; Wheelchair  Walker: Rolling (wheel kit needed for current standard walker)  Wheelchair: Light Wells Laurie       Discharge Recommendations:  Home with assist PRN, Patient would benefit from continued therapy after discharge    G-Code     OutComes Score                                                      Goals  Short term

## 2017-09-29 NOTE — CONSULTS
[address] [facility]    Consult Note. Date:   9/29/2017  Patient name:  Richard Adair  Date of admission:  9/16/2017  5:03 PM  MRN:   [id number]  YOB: 1965    Pt seen at the request of No att. providers found    CHIEF COMPLAINT:   Foot pain    History Obtained From:  Patient and chart review. HISTORY OF PRESENT ILLNESS:      The patient is a 46 y.o.  male who is admitted to the hospital for  Diabetes   Duration more than 7 years  Modifying factors on Glucophage and other med  Severity uncontrolled sever  Associated signs and symtoms neuropathy/ckd/ CAD. aggravated with sugar diet and better with low sugar diet       admite dwiht left foot pain  Found to have osteomtlis  walkgin made worwse  Now psot ampuatin below knee      Past Medical History:   has a past medical history of Acid reflux; Cerebrovascular disease; Glaucoma; Hyperlipidemia; Hypertension; IDDM (insulin dependent diabetes mellitus) ([address] MDRO (multiple drug resistant organisms) resistance; MRSA (methicillin resistant staph aureus) culture positive; Neuropathy ([address] Osteomyelitis ([facility] S/P cataract extraction and insertion of intraocular lens; and Type II or unspecified type diabetes mellitus without mention of complication, not stated as uncontrolled. Past Surgical History:   has a past surgical history that includes Toe amputation (Left, 2014); eye surgery (Bilateral); Toe amputation (Left, 12/09/2016); other surgical history (Left, 01/23/2017); Foot surgery (Left, 02/24/2017); Foot surgery (Left, 04/13/2017); deep dissec foot infec,1 bursa (Left, 4/13/2017); other surgical history (Left, 04/27/2017); Toe amputation (Left, 4/27/2017);   picc powerpicc double (4/28/2017); Leg amputation below knee (Left, 9/7/2017); incis/drain thigh/knee abscess,deep (Left, 9/12/2017); and Leg amputation below knee (Left, 9/14/2017).      Home Medications: obvious abnormality. · Lungs: clear to auscultation bilaterally  · Heart: regular rate and rhythm, S1, S2 normal, no murmur, click, rub or gallop  · Abdomen: soft, non-tender; bowel sounds normal; no masses,  no organomegaly  · Extremities: extremities normal, atraumatic,left bka  ·   · Neurological: Gait normal. Reflexes normal and symmetric. Feet diabetic neuropathy  ·   · Skin - no rash, no lump   · Eye no icterus no redness  · Psych-normal affect  ·   ·   · Lymphatic system-no lymphadenopathy no splenomegaly       DIAGNOSTICS:    Laboratory Testing:  CBC:   No results for input(s): WBC, HGB, PLT in the last 72 hours. BMP:    No results for input(s): NA, K, CL, CO2, BUN, CREATININE, GLUCOSE in the last 72 hours. S. Calcium:  No results for input(s): CALCIUM in the last 72 hours. S. Ionized Calcium:No results for input(s): IONCA in the last 72 hours. S. Magnesium:No results for input(s): MG in the last 72 hours. S. Phosphorus:No results for input(s): PHOS in the last 72 hours. S. Glucose:  No results for input(s): POCGLU in the last 72 hours. Glycosylated hemoglobin A1C: No results for input(s): LABA1C in the last 72 hours. INR: No results for input(s): INR in the last 72 hours. Hepatic functions: No results for input(s): ALKPHOS, ALT, AST, PROT, BILITOT, BILIDIR, LABALBU in the last 72 hours. Pancreatic functions:No results for input(s): LACTA, AMYLASE in the last 72 hours. S. Lactic Acid: No results for input(s): LACTA in the last 72 hours. Cardiac enzymes:No results for input(s): CKTOTAL, CKMB, CKMBINDEX, TROPONINI in the last 72 hours. BNP:No results for input(s): BNP in the last 72 hours. Lipid profile: No results for input(s): CHOL, TRIG, HDL, LDLCALC in the last 72 hours.     Invalid input(s): LDL  Blood Gases: No results found for: PH, PCO2, PO2, HCO3, O2SAT  Thyroid functions:   Lab Results   Component Value Date    TSH 3.65 09/01/2017        Imaging/Diagonstics:      CXR: No acute

## 2017-09-29 NOTE — CONSULTS
obvious abnormality. · Lungs: clear to auscultation bilaterally  · Heart: regular rate and rhythm, S1, S2 normal, no murmur, click, rub or gallop  · Abdomen: soft, non-tender; bowel sounds normal; no masses,  no organomegaly  · Extremities: extremities normal, atraumatic,left bka  ·   · Neurological: Gait normal. Reflexes normal and symmetric. Feet diabetic neuropathy  ·   · Skin - no rash, no lump   · Eye no icterus no redness  · Psych-normal affect  ·   ·   · Lymphatic system-no lymphadenopathy no splenomegaly       DIAGNOSTICS:    Laboratory Testing:  CBC:   No results for input(s): WBC, HGB, PLT in the last 72 hours. BMP:    No results for input(s): NA, K, CL, CO2, BUN, CREATININE, GLUCOSE in the last 72 hours. S. Calcium:  No results for input(s): CALCIUM in the last 72 hours. S. Ionized Calcium:No results for input(s): IONCA in the last 72 hours. S. Magnesium:No results for input(s): MG in the last 72 hours. S. Phosphorus:No results for input(s): PHOS in the last 72 hours. S. Glucose:  No results for input(s): POCGLU in the last 72 hours. Glycosylated hemoglobin A1C: No results for input(s): LABA1C in the last 72 hours. INR: No results for input(s): INR in the last 72 hours. Hepatic functions: No results for input(s): ALKPHOS, ALT, AST, PROT, BILITOT, BILIDIR, LABALBU in the last 72 hours. Pancreatic functions:No results for input(s): LACTA, AMYLASE in the last 72 hours. S. Lactic Acid: No results for input(s): LACTA in the last 72 hours. Cardiac enzymes:No results for input(s): CKTOTAL, CKMB, CKMBINDEX, TROPONINI in the last 72 hours. BNP:No results for input(s): BNP in the last 72 hours. Lipid profile: No results for input(s): CHOL, TRIG, HDL, LDLCALC in the last 72 hours.     Invalid input(s): LDL  Blood Gases: No results found for: PH, PCO2, PO2, HCO3, O2SAT  Thyroid functions:   Lab Results   Component Value Date    TSH 3.65 09/01/2017        Imaging/Diagonstics:      CXR: No acute cardiopulmonary findings. ASSESSMENT:    Patient Active Problem List   Diagnosis    Essential hypertension    IDDM (insulin dependent diabetes mellitus) ([address]    Neuropathy ([address]    Uncontrolled type 2 diabetes with cellulitis of foot ([address]    Cerebrovascular disease    Cataract    Dysphagia    Family history of colon cancer    Bowel habit changes    Hyperlipidemia    Gangrene of foot ([address]    Diabetic foot left    Acute kidney injury ([address]    Ischemic foot left    Urinary retention    Subacute osteomyelitis of left foot ([address]    Hx of BKA ([address]    Impaired mobility       PLAN  Uncontrolled htn  With low k  likey primary dino  Will do aldactone  Bmp in am  unconrolled dm   Will adjsut meds  Left BKA post osteomytoli with uncontrolled dm  :  Sep 21  incrase lantus to MD SHONDA Ortiz [address], [address] [place] [address].    Phone [phone]   Fax: [phone]  Answering Service: [phone]

## 2017-10-01 NOTE — DISCHARGE SUMMARY
Physical Medicine & Rehabilitation  Discharge Summary     Patient Identification:  Chandler Brown  : 1965  Admit date: 2017  Discharge date:   17     Primary care provider: Barbara Jerez MD     Problem List:     ADL and ambulation dysfunction secondary to Left BKA    Patient Active Problem List   Diagnosis    Essential hypertension    IDDM (insulin dependent diabetes mellitus) ([address]    Neuropathy ([address]    Uncontrolled type 2 diabetes with cellulitis of foot ([address]    Cerebrovascular disease    Cataract    Dysphagia    Family history of colon cancer    Bowel habit changes    Hyperlipidemia    Gangrene of foot ([address]    Diabetic foot left    Acute kidney injury ([address]    Ischemic foot left    Urinary retention    Subacute osteomyelitis of left foot ([address]    Hx of BKA ([address]    Impaired mobility       Admission History:  Chandler Brown  is a 46 y.o. right-handed     male admitted to the [address] [place] [address] on 2017. He was originally admitted to [place] on 2017 for wounds on the left foot, increased pain and discolored foot.  MRI of left foot shows osteomyelitis of the 1st and 3rd metatarsal, calcaneus and also of the 5th metatarsals , diffuse infectious myositis is seen. Concern for soft tissue gas and necrotizing infection. abscess formation measures 1.4 x 1.7 x 1.9 cm.lateral aspect of the foot.  Patient underwent a left BKA on 17. He underwent a left BKA ReVision without closure of wound wound VAC placement on 2017. On 2017 he underwent closure of left below-knee amputation by Dr. Herberth Vigil:   Chandler Brown was admitted to inpatient rehabilitation on 2017.     Rehab course:  -Progressed well family training done and discharged to home  - Pain medications adjusted  - Blood pressure medications adjusted per internal medicine  -Diabetes monitored and insulin adjusted    The patient assistance  Comment: patient ramp complete. plans bumping on buttocks if not able to tolerated with bilateral handrails with wearing knee extension brace. Patient wanting to demonstrate with use of bilateral handrails today. Mobility: Bed, Chair, Wheel Chair: 6 - Requires assistive device (slide rail)  Walk: 5- Supervision (Subject equal 100%) (household distance MOD-I)  Distance Walked: 75 feet  Wheel Chair: 6- Modified Dolphin  Distance Traveled in Wheel Chair: 200 feet  Stairs: 2 - Patient goes up/down 4 to 6 stairs (4 steps with Yadiel-pt plans ramp), PT Equipment Recommendations  Equipment Needed: Yes  Mobility Devices: Walker, Wheelchair  Walker: Rolling (wheel kit needed for current standard walker)  Wheelchair: Light Weight  Other: Possible rolling walker, Assessment: Continues to progress with independence during functional mobility and transfers; continues with some impulsivity- cues provided to slow patient & perform activities properly/safely. Patient would benefit from continued stair training pending ramp being built at CT.   Supportive family noted    Occupational therapy: Eatin - Patient feeds self  Groomin - Independent with all tasks using assistive device  Bathin - Able to bathe all 10 areas with device  Dressing-Upper: 6 - Independent with device/prosthesis  Dressing-Lower: 6 - Independent with device/prosthesis (use of grab bar for standing to pull up pants, )  Toiletin - Requires device (grab bar/walker/etc.)  Toilet Transfer: 6 - Independent with device (grab bar/walker/slide bar)  Tub Transfer: 0 - Activity does not occur  Shower Transfer: 6 - Modified independence, Equipment Recommendations  Equipment Needed: Yes  Transfer Tub Bench: x  Reacher: X,      Speech therapy:  Comprehension: 7 - Patient understands complex ideas (math/planning)  Expression: 7 - Patient expresses complex ideas/needs  Social Interaction: 7 - Patient has appropriate behavior/relations 100% of the

## 2017-10-05 ENCOUNTER — OFFICE VISIT (OUTPATIENT)
Dept: VASCULAR SURGERY | Age: 52
End: 2017-10-05

## 2017-10-05 VITALS
SYSTOLIC BLOOD PRESSURE: 110 MMHG | BODY MASS INDEX: 32.01 KG/M2 | HEART RATE: 60 BPM | DIASTOLIC BLOOD PRESSURE: 62 MMHG | WEIGHT: 203.93 LBS | OXYGEN SATURATION: 99 % | HEIGHT: 67 IN | RESPIRATION RATE: 17 BRPM

## 2017-10-05 DIAGNOSIS — Z09 POSTOP CHECK: Primary | ICD-10-CM

## 2017-10-05 PROCEDURE — 99024 POSTOP FOLLOW-UP VISIT: CPT | Performed by: SURGERY

## 2017-10-05 ASSESSMENT — ENCOUNTER SYMPTOMS
RESPIRATORY NEGATIVE: 1
EYES NEGATIVE: 1
GASTROINTESTINAL NEGATIVE: 1

## 2017-10-05 NOTE — MR AVS SNAPSHOT
people who are more muscular. Even a small weight loss (between 5 and 10 percent of your current weight) by decreasing your calorie intake and becoming more physically active will help lower your risk of developing or worsening diseases associated with obesity.      Learn more at: [employer]             Medications and Orders      Your Current Medications Are              metFORMIN (GLUCOPHAGE) 500 MG tablet Take 500 mg by mouth 2 times daily (with meals)    pregabalin (LYRICA) 25 MG capsule Take 1 capsule by mouth 3 times daily    insulin glargine (LANTUS) 100 UNIT/ML injection vial Inject 20 Units into the skin nightly    pravastatin (PRAVACHOL) 10 MG tablet Take 1 tablet by mouth daily    lisinopril (PRINIVIL;ZESTRIL) 40 MG tablet Take 1 tablet by mouth daily    metoprolol tartrate (LOPRESSOR) 50 MG tablet Take 1 tablet by mouth 2 times daily    amLODIPine (NORVASC) 10 MG tablet Take 1 tablet by mouth daily    chlorthalidone (HYGROTON) 25 MG tablet Take 1 tablet by mouth daily    spironolactone (ALDACTONE) 100 MG tablet Take 1 tablet by mouth daily    folic acid (FOLVITE) 1 MG tablet Take 1 tablet by mouth daily    tamsulosin (FLOMAX) 0.4 MG capsule Take 1 capsule by mouth daily    cyclobenzaprine (FLEXERIL) 5 MG tablet Take 1 tablet by mouth 3 times daily as needed for Muscle spasms    docusate sodium (COLACE) 100 MG capsule Take 1 capsule by mouth 2 times daily as needed for Constipation    omeprazole (PRILOSEC) 20 MG delayed release capsule Take 20 mg by mouth 2 times daily    timolol (TIMOPTIC-XE) 0.25 % ophthalmic gel-forming 1 drop daily    travoprost, benzalkonium, (TRAVATAN) 0.004 % ophthalmic solution 1 drop nightly      Allergies           No Known Allergies         Additional Information        Basic Information     Date Of Birth Sex Race Ethnicity Preferred Language    1965 Male Other / English Problem List as of 10/5/2017  Date Reviewed: 10/5/2017                Impaired mobility    Subacute osteomyelitis of left foot ([facility]    Urinary retention    Ischemic foot left    Gangrene of foot (HCC)    Diabetic foot left    Acute kidney injury ([address]    Hyperlipidemia    Essential hypertension    IDDM (insulin dependent diabetes mellitus) ([address]    Neuropathy ([address]    Uncontrolled type 2 diabetes with cellulitis of foot ([address]    Cerebrovascular disease    Cataract    Dysphagia    Family history of colon cancer    Bowel habit changes      Immunizations as of 10/5/2017     Name Date    Pneumococcal Polysaccharide ([id number]) 5/10/2017      Preventive Care        Date Due    Hepatitis C screening is recommended for all adults regardless of risk factors born between [facility] at least once (lifetime) who have never been tested. 1965    Diabetic Foot Exam 12/17/1973    Eye Exam By An Eye Doctor 12/17/1973    HIV screening is recommended for all people regardless of risk factors  aged 15-65 years at least once (lifetime) who have never been HIV tested. 12/17/1978    Urine Check For Kidney Problems 12/17/1981    Tetanus Combination Vaccine (1 - Tdap) 12/17/1982    Colonoscopy 12/17/2013    Yearly Flu Vaccine (1) 9/1/2017    Hemoglobin A1C (Test For Long-Term Glucose Control) 12/1/2017    Cholesterol Screening 4/27/2018            [employer] Signup           [employer] allows you to send messages to your doctor, view your test results, renew your prescriptions, schedule appointments, view visit notes, and more. How Do I Sign Up? 1. In your Internet browser, go to [email or web address]  2. Click on the Sign Up Now link in the Sign In box. You will see the New Member Sign Up page. 3. Enter your [employer] Access Code exactly as it appears below. You will not need to use this code after youve completed the sign-up process.  If you do not sign up before the expiration date, you must request a new code. [employer] Access Code: [email or web address]  Expires: 11/24/2017  2:55 PM    4. Enter your Social Security Number ([id number]) and Date of Birth (mm/dd/yyyy) as indicated and click Submit. You will be taken to the next sign-up page. 5. Create a [employer] ID. This will be your [employer] login ID and cannot be changed, so think of one that is secure and easy to remember. 6. Create a [employer] password. You can change your password at any time. 7. Enter your Password Reset Question and Answer. This can be used at a later time if you forget your password. 8. Enter your e-mail address. You will receive e-mail notification when new information is available in [address] 9. Click Sign Up. You can now view your medical record. Additional Information  If you have questions, please contact the physician practice where you receive care. Remember, [employer] is NOT to be used for urgent needs. For medical emergencies, dial 911. For questions regarding your [employer] account call [phone]. If you have a clinical question, please call your doctor's office.

## 2017-10-05 NOTE — PROGRESS NOTES
below knee amputation. Small portion of his wound in the middle has dehisced, this was cleansed with hydrogen peroxide and then dressed moist to dry. I have asked them to change this dressing daily, and that if they notice it getting worse or enlarging to call me for an earlier appointment. I am pleased with his progress and encouraged this small wound will heal up with secondary intention. He is ok to be fitted for a stump , and then maybe in a few weeks for a prosthetic once the incision is completely healed. I will have him follow up with me in one month to make sure the wound has healed up completely. Electronically signed by Tommy Caballero MD on 10/5/17 at 9:46 AM      [address] [place] [address],[address]: [phone]  C: [phone]  Email: [email or web address]

## 2017-10-06 ENCOUNTER — TELEPHONE (OUTPATIENT)
Dept: VASCULAR SURGERY | Age: 52
End: 2017-10-06

## 2017-10-06 NOTE — TELEPHONE ENCOUNTER
Cuhy Villarreal called in with concerns of the pt's stomp dehiscence with very small amount of bloody discharge. Suggestions?

## 2017-10-09 ENCOUNTER — OFFICE VISIT (OUTPATIENT)
Dept: VASCULAR SURGERY | Age: 52
End: 2017-10-09

## 2017-10-09 VITALS — WEIGHT: 203.93 LBS | HEIGHT: 67 IN | BODY MASS INDEX: 32.01 KG/M2

## 2017-10-09 DIAGNOSIS — Z89.9 STATUS POST AMPUTATION: Primary | ICD-10-CM

## 2017-10-09 PROCEDURE — 99024 POSTOP FOLLOW-UP VISIT: CPT | Performed by: SURGERY

## 2017-10-09 NOTE — PROGRESS NOTES
Patient came in today after visiting nurse was concerned about his wound and some bloody drainage. Mr. Tena Ruiz underwent a left below knee amputation last month and his staples and sutures were removed a few days ago. The drainage has stopped and there is a small area of skin separation about 3cm in length with good granulation tissue below. No erythema or drainage is noted. I have advised him and his family to wash the stump daily with soap and water and place a gauze over the incision site. This should heal up on its own. I will follow up with him in 3 months, sooner if there is concerns regarding his stump.     Electronically signed by Marc Nguyen MD on 10/9/17 at 4:28 PM

## 2017-10-23 ENCOUNTER — OFFICE VISIT (OUTPATIENT)
Dept: VASCULAR SURGERY | Age: 52
End: 2017-10-23

## 2017-10-23 VITALS
HEIGHT: 67 IN | DIASTOLIC BLOOD PRESSURE: 70 MMHG | HEART RATE: 54 BPM | RESPIRATION RATE: 17 BRPM | SYSTOLIC BLOOD PRESSURE: 116 MMHG | WEIGHT: 203.93 LBS | BODY MASS INDEX: 32.01 KG/M2 | OXYGEN SATURATION: 99 %

## 2017-10-23 DIAGNOSIS — Z09 POSTOP CHECK: Primary | ICD-10-CM

## 2017-10-23 PROCEDURE — 99024 POSTOP FOLLOW-UP VISIT: CPT | Performed by: SURGERY

## 2017-11-13 ENCOUNTER — OFFICE VISIT (OUTPATIENT)
Dept: INFECTIOUS DISEASES | Age: 52
End: 2017-11-13
Payer: COMMERCIAL

## 2017-11-13 VITALS
DIASTOLIC BLOOD PRESSURE: 58 MMHG | TEMPERATURE: 97.3 F | WEIGHT: 203 LBS | BODY MASS INDEX: 31.86 KG/M2 | HEART RATE: 60 BPM | HEIGHT: 67 IN | SYSTOLIC BLOOD PRESSURE: 108 MMHG

## 2017-11-13 DIAGNOSIS — L08.9 TYPE 2 DIABETES MELLITUS WITH LEFT DIABETIC FOOT INFECTION (HCC): Primary | ICD-10-CM

## 2017-11-13 DIAGNOSIS — A49.02 MRSA INFECTION: ICD-10-CM

## 2017-11-13 DIAGNOSIS — E11.628 TYPE 2 DIABETES MELLITUS WITH LEFT DIABETIC FOOT INFECTION (HCC): Primary | ICD-10-CM

## 2017-11-13 PROCEDURE — 15852 DRESSING CHANGE NOT FOR BURN: CPT | Performed by: INTERNAL MEDICINE

## 2017-11-13 PROCEDURE — 99214 OFFICE O/P EST MOD 30 MIN: CPT | Performed by: INTERNAL MEDICINE

## 2017-11-13 ASSESSMENT — ENCOUNTER SYMPTOMS
GASTROINTESTINAL NEGATIVE: 1
RESPIRATORY NEGATIVE: 1
EYES NEGATIVE: 1
ALLERGIC/IMMUNOLOGIC NEGATIVE: 1

## 2017-11-13 NOTE — PROGRESS NOTES
prosthesis soon. He comes today for evaluation, no fever, no chills    I have personally reviewed the past medical history, past surgical history, medications, social history, and family history, and I have updated the database accordingly.   Past Medical History:     Past Medical History:   Diagnosis Date    Acid reflux     Cerebrovascular disease 2006    TIA    Diabetic foot infection ([facility] [address] [facility]     Drug (multiple) resistant infection     GERD (gastroesophageal reflux disease)     Glaucoma     Glaucoma     Hyperlipidemia     Hypertension     IDDM (insulin dependent diabetes mellitus) (HCC)     MDRO (multiple drug resistant organisms) resistance     MRSA (methicillin resistant staph aureus) culture positive 09/03/2017    foot    Neuropathy ([facility] [facility]     Osteomyelitis (HCC)     Left Hallux    S/P cataract extraction and insertion of intraocular lens 2015    bilateral     Type II or unspecified type diabetes mellitus without mention of complication, not stated as uncontrolled        Past Surgical  History:     Past Surgical History:   Procedure Laterality Date    EYE SURGERY Bilateral     lazer both eyes    FOOT SURGERY Left 02/24/2017    surgical prep of wound bed    FOOT SURGERY Left 04/13/2017    1) Left foot surgical preparation of wound-bed, 2) Left foot application of graft     [facility] DOUBLE  4/28/2017         LEG AMPUTATION BELOW KNEE Left 9/7/2017    LEFT BELOW KNEE GUILLOTINE AMPUTATION performed by Lnady Albrecht MD at [address] Left 9/14/2017    REVISION LEFT BELOW KNEE AMPUTATION, CLOSED  performed by Landy Albrecht MD at [address] Left 01/23/2017    I and D bone, bone biopsy with flap closure and pulse lavage    OTHER SURGICAL HISTORY Left 04/27/2017    I & D foot    [place] DEEP DISSEC FOOT INFEC,1 BURSA Left 4/13/2017    FOOT DEBRIDEMENT WITH EPIFIX  performed by Wesley Hernandez DPM at [address] INCIS/DRAIN Soft. Bowel sounds are normal.   Musculoskeletal: Normal range of motion. Left below knee amputation site has 2 small ulcers, superficial and very clean, no discharge. Otherwise, no signs of active inflammation. Lymphadenopathy:     He has no cervical adenopathy. Neurological: He is alert and oriented to person, place, and time. Skin: Skin is warm and dry. Psychiatric: He has a normal mood and affect. Medical Decision Making:   I have independently reviewed/ordered the following labs:    CBC with Differential:   Lab Results   Component Value Date    WBC 8.2 09/25/2017    WBC 7.5 09/21/2017    HGB 10.5 09/25/2017    HGB 8.3 09/21/2017    HCT 32.6 09/25/2017    HCT 25.7 09/21/2017     09/25/2017     09/21/2017    LYMPHOPCT 4 09/01/2017    LYMPHOPCT 2 08/31/2017    MONOPCT 6 09/01/2017    MONOPCT 3 08/31/2017     BMP:   Lab Results   Component Value Date     09/25/2017     09/21/2017    K 4.4 09/26/2017    K 4.1 09/25/2017     09/25/2017     09/21/2017    CO2 24 09/25/2017    CO2 25 09/21/2017    BUN 20 09/25/2017    BUN 15 09/21/2017    CREATININE 1.01 09/25/2017    CREATININE 0.93 09/21/2017     Hepatic Function Panel:   Lab Results   Component Value Date    LABALBU 4.0 07/13/2012    BILITOT 0.20 07/13/2012    ALKPHOS 76 07/13/2012    ALT 22 07/13/2012    AST 18 07/13/2012     No results found for: RPR  No results found for: HIV  No results found for: [facility]  Lab Results   Component Value Date    MUCUS NOT REPORTED 09/03/2017    RBC 3.74 09/25/2017    TRICHOMONAS NOT REPORTED 09/03/2017    WBC 8.2 09/25/2017    YEAST NOT REPORTED 09/03/2017    TURBIDITY CLOUDY 09/03/2017     Lab Results   Component Value Date    CREATININE 1.01 09/25/2017    GLUCOSE 188 09/25/2017     Thank you for allowing us to participate in the care of this patient. Please call with questions.       Araceli Osorio MD  - Office: [phone]    Please note that this chart was generated using

## 2017-11-28 ENCOUNTER — OFFICE VISIT (OUTPATIENT)
Dept: PODIATRY | Age: 52
End: 2017-11-28
Payer: COMMERCIAL

## 2017-11-28 VITALS
HEIGHT: 66 IN | WEIGHT: 203 LBS | RESPIRATION RATE: 16 BRPM | SYSTOLIC BLOOD PRESSURE: 144 MMHG | TEMPERATURE: 98.4 F | DIASTOLIC BLOOD PRESSURE: 82 MMHG | HEART RATE: 68 BPM | BODY MASS INDEX: 32.62 KG/M2

## 2017-11-28 DIAGNOSIS — I73.9 PERIPHERAL VASCULAR DISORDER (HCC): ICD-10-CM

## 2017-11-28 DIAGNOSIS — E11.51 TYPE II DIABETES MELLITUS WITH PERIPHERAL CIRCULATORY DISORDER (HCC): Primary | ICD-10-CM

## 2017-11-28 DIAGNOSIS — B35.1 DERMATOPHYTOSIS OF NAIL: ICD-10-CM

## 2017-11-28 PROCEDURE — 99213 OFFICE O/P EST LOW 20 MIN: CPT | Performed by: PODIATRIST

## 2017-11-28 PROCEDURE — 11720 DEBRIDE NAIL 1-5: CPT | Performed by: PODIATRIST

## 2017-11-28 RX ORDER — HYDROCODONE BITARTRATE AND ACETAMINOPHEN 5; 325 MG/1; MG/1
TABLET ORAL
Refills: 0 | Status: ON HOLD | COMMUNITY
Start: 2017-11-16 | End: 2018-09-28 | Stop reason: HOSPADM

## 2017-11-28 NOTE — PROGRESS NOTES
[facility] Podiatry  Return Patient    Chief Complaint   Patient presents with    Diabetes    Nail Problem       Subjective: Car Montoya comes to clinic for Diabetes and Nail Problem    he is a diabetic and states that he is doing well after the left below the knee amputation. Pt currently has complaint of thickened, elongated nails that they cannot manage by themselves. Pt's primary care physician is Amanda Tejada MD.  Last seen in October 13, 2017   Pt's last blood sugar was 132 . Lab Results   Component Value Date    LABA1C 9.0 (H) 09/01/2017      Complains of numbness in the feet bilat.   Past Medical History:   Diagnosis Date    Acid reflux     Cerebrovascular disease 2006    TIA    Diabetic foot infection ([address]     Drug (multiple) resistant infection     GERD (gastroesophageal reflux disease)     Glaucoma     Glaucoma     Hyperlipidemia     Hypertension     IDDM (insulin dependent diabetes mellitus) (HCC)     MDRO (multiple drug resistant organisms) resistance     MRSA (methicillin resistant staph aureus) culture positive 09/03/2017    foot    Neuropathy (HCC)     Osteomyelitis (HCC)     Left Hallux    S/P cataract extraction and insertion of intraocular lens 2015    bilateral     Type II or unspecified type diabetes mellitus without mention of complication, not stated as uncontrolled        No Known Allergies  Current Outpatient Prescriptions on File Prior to Visit   Medication Sig Dispense Refill    metFORMIN (GLUCOPHAGE) 500 MG tablet Take 500 mg by mouth 2 times daily (with meals)      pregabalin (LYRICA) 25 MG capsule Take 1 capsule by mouth 3 times daily 60 capsule 0    insulin glargine (LANTUS) 100 UNIT/ML injection vial Inject 20 Units into the skin nightly 1 vial 3    pravastatin (PRAVACHOL) 10 MG tablet Take 1 tablet by mouth daily 30 tablet 3    lisinopril (PRINIVIL;ZESTRIL) 40 MG tablet Take 1 tablet by mouth daily 30 tablet 3    metoprolol tartrate (LOPRESSOR) 50 MG tablet Take 1 tablet by mouth 2 times daily 60 tablet 3    amLODIPine (NORVASC) 10 MG tablet Take 1 tablet by mouth daily 30 tablet 3    chlorthalidone (HYGROTON) 25 MG tablet Take 1 tablet by mouth daily 30 tablet 3    spironolactone (ALDACTONE) 100 MG tablet Take 1 tablet by mouth daily 30 tablet 3    folic acid (FOLVITE) 1 MG tablet Take 1 tablet by mouth daily 30 tablet 3    tamsulosin (FLOMAX) 0.4 MG capsule Take 1 capsule by mouth daily 30 capsule 3    docusate sodium (COLACE) 100 MG capsule Take 1 capsule by mouth 2 times daily as needed for Constipation 40 capsule 0    omeprazole (PRILOSEC) 20 MG delayed release capsule Take 20 mg by mouth 2 times daily      timolol (TIMOPTIC-XE) 0.25 % ophthalmic gel-forming 1 drop daily      travoprost, benzalkonium, (TRAVATAN) 0.004 % ophthalmic solution 1 drop nightly       No current facility-administered medications on file prior to visit. Review of Systems          Objective:  General: AAO x 3 in NAD.     Derm  Toenail Description  Sites of Onychomycosis Involvement (Check affected area)  [x] [x] [x] [x] [x] [] [] [] [] []  5 4 3 2 1 1 2 3 4 5                          Right                                        Left    Thickness  [x] [x] [x] [x] [x] [] [] [] [] []  5 4 3 2 1 1 2 3 4 5                         Right                                        Left    Dystrophic Changes   [x] [x] [x] [x] [x] [] [] [] [] []  5 4 3 2 1 1 2 3 4 5                         Right                                        Left    Color   [x] [x] [x] [x] [x] [] [] [] [] []  5 4 3 2 1 1 2 3 4 5                          Right                                        Left    Incurvation/Ingrowin   [] [] [] [] [] [] [] [] [] []  5 4 3 2 1 1 2 3 4 5                         Right                                        Left    Inflammation/Pain   [x] [x] [x] [x] [x] [] [] [] [] []  5 4 3 2 1 1 2 3 4 5                         Right questions were answered in detail. Proper foot hygiene and care was discussed with the pt. Informed patient on proper diabetic foot care and importance of tight glycemic control. Patient to check feet daily and contact the office with any questions/problems/concerns.    Other comorbidity noted and will be managed by PCP.  11/28/2017    Electronically signed by Dr. Cordell Flores on 11/28/2017 at 10:17 AM

## 2017-12-20 ENCOUNTER — HOSPITAL ENCOUNTER (OUTPATIENT)
Dept: PHYSICAL THERAPY | Age: 52
Setting detail: THERAPIES SERIES
Discharge: HOME | End: 2017-12-20
Payer: COMMERCIAL

## 2017-12-20 PROCEDURE — 97162 PT EVAL MOD COMPLEX 30 MIN: CPT

## 2017-12-20 PROCEDURE — G8979 MOBILITY GOAL STATUS: HCPCS

## 2017-12-20 PROCEDURE — G8978 MOBILITY CURRENT STATUS: HCPCS

## 2017-12-20 PROCEDURE — 97161 PT EVAL LOW COMPLEX 20 MIN: CPT

## 2017-12-20 PROCEDURE — 97110 THERAPEUTIC EXERCISES: CPT

## 2017-12-21 NOTE — PROGRESS NOTES
Physical Therapy  Initial Assessment  Date: 2017  Patient Name: Keegan Altman  MRN: [id number]  : 1965     Treatment Diagnosis: gait abn, MM Weakness    Subjective   General  Referring Practitioner: nakia Lipscomb MD  Referral Date : 17  Diagnosis: L BKA  General Comment  Comments: amputation cause: diabetes  recieved prosthesis 17    PT Visit Information  Onset Date: 17  PT Insurance Information: Aetceli Wisdom  Total # of Visits to Date: 1    Subjective  Subjective: Patient uses SW to transfer and ambulate at home    Pain Screening  Patient Currently in Pain: Denies (Denies phantom pain and sensations)  Vital Signs  Patient Currently in Pain: Denies (Denies phantom pain and sensations)    Objective     Observation/Palpation  Observation: residual limb look healthy    AROM RLE (degrees)  RLE General AROM: hip IR and HS hypomobile  AROM LLE (degrees)  LLE General AROM: hip IR and HS hypomobile    Strength RLE  R Hip Extension: 3+/5  R Hip ABduction: 3+/5  Strength LLE  L Hip Extension: 3+/5  L Hip ABduction: 3+/5     Additional Measures  Special Tests: AMP=    Ambulation  Ambulation?: Yes  Ambulation 1  Surface: level tile  Device: Rolling Walker  Quality of Gait: dec prosthetic stance time, increase UE assist, increased knee flexion, unstable stance phase     Exercises  Exercise 1: gait training activities  Exercise 2: wt shifting exercises to complete HEP    Assessment   Conditions Requiring Skilled Therapeutic Intervention  Body structures, Functions, Activity limitations: Decreased functional mobility   Treatment Diagnosis: gait abn, MM Weakness  Prognosis: Good  Decision Making: Low Complexity  REQUIRES PT FOLLOW UP: Yes    Goals  Short term goals  Time Frame for Short term goals: 10 visits  Short term goal 1: AMP =  Short term goal 2: community ambulation with RW   Short term goal 3: TUG 10sec      Therapy Time   Individual Concurrent Group Co-treatment   Time In 1100 Time Out 1200         Minutes 60            Patient Goals: To walk with prosthesis    Comments/Assessment: Patient would benefit from physical therapy for instruction in IND safe ambulation wearing prosthesis    Rehab Potential:  [x] Good  [] Fair  [] Poor   Suggested Professional Referral:  [x] No  [] Yes:  Barriers to Goal Achievement:  [x] No  [] Yes:  Domestic Concerns:  [x] No  [] Yes:    Treatment Plan:  [x] Therapeutic Exercise    [] Modalities:  [] Therapeutic Activity    [] Ultrasound  [] Electrical Stimulation  [x] Gait Training     []Massage       [] Lumbar/Cervical Traction  [] Neuromuscular Re-education [] Cold/hotpack [] Other:  [x] Instruction in HEP     [] Work Conditioning                                         [] Manual Therapy             [] Aquatic Therapy               [] Iontophoresis: 4 mg/mL      Dexamethasone Sodium      Phosphate 40-80mAmin     Frequency: 2 X/wk x 5wk's      [x] Plans/Goals, Risk/Benefits discussed with pt/family  Comprehension of Education [x] yes  [] Needs Review  Pt/Family Education: [x] Verbal  [] Demo  [] Written    More objective information is available upon request.  Thank you for this referral.        Medicare/Regulatory Requirements:  I have reviewed this plan of care and certify a need for   Medically necessary rehabilitation services.   [x] Physician Signature    Date:     Electronically signed by: Liudmila Hernandez, [address] [facility] [address] [place]  [place], [address] [place] [address]  Phone [phone]  Fax [phone]

## 2018-01-10 ENCOUNTER — HOSPITAL ENCOUNTER (OUTPATIENT)
Dept: PHYSICAL THERAPY | Age: 53
Setting detail: THERAPIES SERIES
Discharge: HOME | End: 2018-01-10
Payer: COMMERCIAL

## 2018-01-10 PROCEDURE — 97110 THERAPEUTIC EXERCISES: CPT

## 2018-01-15 ENCOUNTER — HOSPITAL ENCOUNTER (OUTPATIENT)
Dept: PHYSICAL THERAPY | Age: 53
Setting detail: THERAPIES SERIES
Discharge: HOME | End: 2018-01-15
Payer: COMMERCIAL

## 2018-01-15 PROCEDURE — 97110 THERAPEUTIC EXERCISES: CPT

## 2018-01-23 ENCOUNTER — HOSPITAL ENCOUNTER (OUTPATIENT)
Dept: PHYSICAL THERAPY | Age: 53
Setting detail: THERAPIES SERIES
Discharge: HOME | End: 2018-01-23
Payer: COMMERCIAL

## 2018-01-23 PROCEDURE — 97112 NEUROMUSCULAR REEDUCATION: CPT

## 2018-01-23 PROCEDURE — 97116 GAIT TRAINING THERAPY: CPT

## 2018-01-25 ENCOUNTER — HOSPITAL ENCOUNTER (OUTPATIENT)
Dept: PHYSICAL THERAPY | Age: 53
Setting detail: THERAPIES SERIES
Discharge: HOME | End: 2018-01-25
Payer: COMMERCIAL

## 2018-01-25 NOTE — PROGRESS NOTES
[address]   Outpatient Physical Therapy  Cancel/No Show Note    Date: 18    Patient Name: Mariangel Talamantes        MRN: [id number]  Account: [de-identified] : 1965      General Information:  Referring Practitioner: Josiephine Apgar) frank Burnadette Duhamel, MD  Referral Date : (P) 17  Diagnosis: (P) L BKA  Other (Comment): (P) Ins Auth to 18  Onset Date: (P) 17  PT Insurance Information: (P) Gopi Wisdom  Total # of Visits to Date: (P) 3  No Show: (P) 1  Canceled Appointment: (P) 2 (Patient called and states he has a blister on residual limb )           Norberto Risk, PT

## 2018-01-30 ENCOUNTER — HOSPITAL ENCOUNTER (OUTPATIENT)
Dept: PHYSICAL THERAPY | Age: 53
Setting detail: THERAPIES SERIES
Discharge: HOME | End: 2018-01-30
Payer: COMMERCIAL

## 2018-01-30 PROCEDURE — 97110 THERAPEUTIC EXERCISES: CPT

## 2018-01-30 NOTE — PROGRESS NOTES
[address]   Outpatient Physical Therapy  [address] [facility]. [address]  Phone: [phone]  Fax: [phone]    Daily Progress Note    Date: 18    Patient Name: Chandler Brown        MRN: [id number]  Account: [de-identified] : 1965      General Information:  Referring Practitioner: nakia Mccloud MD  Referral Date : 17  Diagnosis: L BKA  Other (Comment): Ins Auth to 18  Onset Date: 17  PT Insurance Information: Aetceli Lemajeanie  Total # of Visits to Date: 4  No Show: 1  Canceled Appointment: 2    Subjective:  Subjective: (P) No complaints from last visit     Pain:  Patient Currently in Pain: (P) Denies        Objective:  Exercise 2: cone step over  Exercise 3: box tap F/L  Exercise 4: band around hips with gait training  Exercise 5: gait training (with 1 UE A)  Exercise 11: bridge 2x10  Exercise 12: clam shell  Exercise 13: quick ball movenents     Comment:  Multi VC's to maintain hip progression, maintain prosthetic side mm activation during stance phase. Demonstrates improved stability with ambulation with RW. Patient encouraged to use RW at home and when he comes to Therapy     Assessment:   Body structures, Functions, Activity limitations: Decreased functional mobility   Activity Tolerance: Patient Tolerated treatment well     Goals:     Short Term Goals:  Time Frame for Short term goals: 10 visits  Short term goal 1: AMP =30/47  Short term goal 2: community ambulation with RW   Short term goal 3: TUG 10sec   Long Term Goals:       Plan:  Plan: Continue with current plan     Therapy Time:2178-2274    Treatment Charges: Minutes Units   []  Ultrasound     []  Electrical-Stim     []  Iontophoresis     []  Traction     []  Massage       []  Eval     []  Gait     [x]  Ther Exercise  45 3    []  Manual Therapy       []  Ther Activities       []  Aquatics     []  Neuro Re-Ed       []  Other       Total Treatment Time: 39 3        Sara Milton, PT

## 2018-02-01 ENCOUNTER — OFFICE VISIT (OUTPATIENT)
Dept: PODIATRY | Age: 53
End: 2018-02-01
Payer: COMMERCIAL

## 2018-02-01 VITALS
HEART RATE: 74 BPM | WEIGHT: 203 LBS | DIASTOLIC BLOOD PRESSURE: 74 MMHG | RESPIRATION RATE: 17 BRPM | SYSTOLIC BLOOD PRESSURE: 167 MMHG | HEIGHT: 66 IN | BODY MASS INDEX: 32.62 KG/M2

## 2018-02-01 DIAGNOSIS — E11.51 TYPE II DIABETES MELLITUS WITH PERIPHERAL CIRCULATORY DISORDER (HCC): Primary | ICD-10-CM

## 2018-02-01 DIAGNOSIS — I73.9 PVD (PERIPHERAL VASCULAR DISEASE) (HCC): ICD-10-CM

## 2018-02-01 DIAGNOSIS — B35.1 DERMATOPHYTOSIS OF NAIL: ICD-10-CM

## 2018-02-01 DIAGNOSIS — M79.671 PAIN IN RIGHT FOOT: ICD-10-CM

## 2018-02-01 PROBLEM — J18.9 HCAP (HEALTHCARE-ASSOCIATED PNEUMONIA): Status: ACTIVE | Noted: 2017-05-05

## 2018-02-01 PROCEDURE — 11720 DEBRIDE NAIL 1-5: CPT | Performed by: PODIATRIST

## 2018-02-01 PROCEDURE — 99213 OFFICE O/P EST LOW 20 MIN: CPT | Performed by: PODIATRIST

## 2018-02-06 ENCOUNTER — HOSPITAL ENCOUNTER (OUTPATIENT)
Dept: PHYSICAL THERAPY | Age: 53
Setting detail: THERAPIES SERIES
Discharge: HOME | End: 2018-02-06
Payer: COMMERCIAL

## 2018-02-06 PROCEDURE — G8978 MOBILITY CURRENT STATUS: HCPCS

## 2018-02-06 PROCEDURE — G8979 MOBILITY GOAL STATUS: HCPCS

## 2018-02-06 PROCEDURE — 97110 THERAPEUTIC EXERCISES: CPT

## 2018-02-08 ENCOUNTER — HOSPITAL ENCOUNTER (OUTPATIENT)
Dept: PHYSICAL THERAPY | Age: 53
Setting detail: THERAPIES SERIES
Discharge: HOME | End: 2018-02-08
Payer: COMMERCIAL

## 2018-02-08 PROCEDURE — 97110 THERAPEUTIC EXERCISES: CPT

## 2018-02-12 NOTE — PROGRESS NOTES
[address]   Outpatient Physical Therapy  [address] [facility]. [address]  Phone: [phone]  Fax: [phone]    Daily Progress Note    Date: 18    Patient Name: Clark Parra        MRN: [id number]  Account: [de-identified] : 1965      General Information:  Referring Practitioner: Beth Wall MD  Referral Date : (P) 17  Diagnosis: (P) L BKA  Other (Comment): (P) Ins Auth to 18  Onset Date: (P) 17  PT Insurance Information: (P) Aetna Mycare  Total # of Visits to Date: (P) 7  No Show: (P) 1  Canceled Appointment: (P) 2    Subjective:  Subjective: (P) No complaints from last visit     Pain: Denies    Objective:  Exercise 1: (P) stair training  Exercise 2: (P) cone step over  Exercise 3: (P) box tap F/L  Exercise 5: (P) gait training exercises  Exercise 13: (P) quick ball movenents    Comment: CGA with stair training. VC's for sequencing and safe tech Patient fatigued quickly     Assessment:   Body structures, Functions, Activity limitations: (P) Decreased functional mobility   Activity Tolerance: (P) Patient Tolerated treatment well     Plan:  Plan: (P) Continue with current plan       Therapy Time:375-1000       Treatment Charges: Minutes Units   []  Ultrasound     []  Electrical-Stim     []  Iontophoresis     []  Traction     []  Massage       []  Eval     []  Gait     [x]  Ther Exercise  45  3   []  Manual Therapy       []  Ther Activities       []  Aquatics     []  Neuro Re-Ed       []  Other       Total Treatment Time: 39 3        Ned Parker, PT

## 2018-02-13 ENCOUNTER — HOSPITAL ENCOUNTER (OUTPATIENT)
Dept: PHYSICAL THERAPY | Age: 53
Setting detail: THERAPIES SERIES
Discharge: HOME | End: 2018-02-13
Payer: COMMERCIAL

## 2018-02-13 PROCEDURE — 97110 THERAPEUTIC EXERCISES: CPT

## 2018-02-15 ENCOUNTER — HOSPITAL ENCOUNTER (OUTPATIENT)
Dept: PHYSICAL THERAPY | Age: 53
Setting detail: THERAPIES SERIES
Discharge: HOME | End: 2018-02-15
Payer: COMMERCIAL

## 2018-02-20 ENCOUNTER — HOSPITAL ENCOUNTER (OUTPATIENT)
Dept: PHYSICAL THERAPY | Age: 53
Setting detail: THERAPIES SERIES
Discharge: HOME | End: 2018-02-20
Payer: COMMERCIAL

## 2018-02-20 PROCEDURE — 97110 THERAPEUTIC EXERCISES: CPT

## 2018-02-22 ENCOUNTER — APPOINTMENT (OUTPATIENT)
Dept: PHYSICAL THERAPY | Age: 53
End: 2018-02-22
Payer: COMMERCIAL

## 2018-03-07 ENCOUNTER — HOSPITAL ENCOUNTER (OUTPATIENT)
Dept: PHYSICAL THERAPY | Age: 53
Setting detail: THERAPIES SERIES
Discharge: HOME | End: 2018-03-07
Payer: COMMERCIAL

## 2018-03-07 PROCEDURE — G8978 MOBILITY CURRENT STATUS: HCPCS

## 2018-03-07 PROCEDURE — 97110 THERAPEUTIC EXERCISES: CPT

## 2018-03-07 PROCEDURE — G8979 MOBILITY GOAL STATUS: HCPCS

## 2018-03-08 ENCOUNTER — HOSPITAL ENCOUNTER (OUTPATIENT)
Dept: PHYSICAL THERAPY | Age: 53
Setting detail: THERAPIES SERIES
Discharge: HOME | End: 2018-03-08
Payer: COMMERCIAL

## 2018-03-08 PROCEDURE — 97110 THERAPEUTIC EXERCISES: CPT

## 2018-03-12 ENCOUNTER — HOSPITAL ENCOUNTER (OUTPATIENT)
Dept: PHYSICAL THERAPY | Age: 53
Setting detail: THERAPIES SERIES
Discharge: HOME | End: 2018-03-12
Payer: COMMERCIAL

## 2018-03-12 NOTE — PROGRESS NOTES
[address] [facility]   Outpatient Physical Therapy  [address] [facility]. [address]  Phone: [phone]  Fax: [phone]    Daily Progress Note    Date: 3/08/18    Patient Name: Angel Mayorga        MRN: [id number]  Account: [de-identified] : 1965      General Information:  Referring Practitioner: Rajan Villarreal MD  Referral Date : (P) 17  Diagnosis: (P) L BKA  Other (Comment): (P) Ins Auth to 18  Onset Date: (P) 17  PT Insurance Information: (P) Aetna Mycare  Total # of Visits to Date: (P) 2  Plan of Care/Certification Expiration Date: (P) 18 (10 visits)  No Show: (P) 2  Canceled Appointment: (P) 3    Subjective:  Subjective: (P) No complaints from last visit     Pain:  Patient Currently in Pain: (P) Denies     Objective:  Exercise 1: (P) wall sits  2x10  Exercise 2: (P) tap up  2x10  Exercise 3: (P) side stepping  Exercise 4: (P) clam shell  2x10 5sec hold  Exercise 5: (P) bridge  2x10 5sec hold  Exercise 6: (P) abduction with band  2x10  Exercise 7: (P) gait training  Exercise 8: (P) balance challenges with SPC and ambulating     Comment: Patient with severally sock ply changes and still demonstrated increased varus moment during midstance.  VC's to correct posture and proper mm firing while completing exercises     Assessment:     Activity Tolerance: (P) Patient Tolerated treatment well       Plan:  Plan: (P) Continue with current plan     Therapy Time:345-430        Treatment Charges: Minutes Units   []  Ultrasound     []  Electrical-Stim     []  Iontophoresis     []  Traction     []  Massage       []  Eval     []  Gait     [x]  Ther Exercise 45  3    []  Manual Therapy       []  Ther Activities       []  Aquatics     []  Neuro Re-Ed       []  Other       Total Treatment Time: [address] [facility], PT

## 2018-03-13 ENCOUNTER — HOSPITAL ENCOUNTER (OUTPATIENT)
Dept: PHYSICAL THERAPY | Age: 53
Setting detail: THERAPIES SERIES
Discharge: HOME | End: 2018-03-13
Payer: COMMERCIAL

## 2018-03-13 PROCEDURE — 97110 THERAPEUTIC EXERCISES: CPT

## 2018-03-19 ENCOUNTER — HOSPITAL ENCOUNTER (OUTPATIENT)
Dept: PHYSICAL THERAPY | Age: 53
Setting detail: THERAPIES SERIES
Discharge: HOME | End: 2018-03-19
Payer: COMMERCIAL

## 2018-03-19 PROCEDURE — 97110 THERAPEUTIC EXERCISES: CPT

## 2018-03-22 ENCOUNTER — HOSPITAL ENCOUNTER (OUTPATIENT)
Dept: PHYSICAL THERAPY | Age: 53
Setting detail: THERAPIES SERIES
Discharge: HOME | End: 2018-03-22
Payer: COMMERCIAL

## 2018-03-22 PROCEDURE — 97110 THERAPEUTIC EXERCISES: CPT

## 2018-03-22 NOTE — PROGRESS NOTES
[address]   Outpatient Physical Therapy  [address] [facility].  [address]  Phone: [phone]  Fax: [phone]    Daily Progress Note    Date: 3/22/18    Patient Name: Berneda Gottron        MRN: [id number]  Account: [de-identified] : 1965      General Information:  Referring Practitioner: Ingrid Pruitt) nakia Tran MD  Referral Date : (P) 17  Diagnosis: (P) L BKA  Other (Comment): (P) Ins Auth to 18  Onset Date: (P) 17  PT Insurance Information: (P) Aetna Mycare  Total # of Visits to Date: (P) 5  Plan of Care/Certification Expiration Date: (P) 18 (10 visits)  No Show: (P) 3  Canceled Appointment: (P) 4    Subjective: No complaints from last session      Pain:  Patient Currently in Pain: (P) Denies     Objective:  Exercise 4: (P) gait training exercises with and without AD  Exercise 7: (P) glute squeeze with ball press  Exercise 8: (P) prone glute squeezes  Exercise 9: (P) step ups  L LE      Comment: Focused exercises to isolate glute max contraction tactile and VC's to emphasize proper mm firing pattern    Assessment:  REQUIRES PT FOLLOW UP: (P) Yes  Activity Tolerance: (P) Patient Tolerated treatment well       Plan:  Plan: (P) Continue with current plan       Therapy Time:845-155        Treatment Charges: Minutes Units   []  Ultrasound     []  Electrical-Stim     []  Iontophoresis     []  Traction     []  Massage       []  Eval     []  Gait     [x]  Ther Exercise 45  3    []  Manual Therapy       []  Ther Activities       []  Aquatics     []  Neuro Re-Ed       []  Other       Total Treatment Time: 39 3        Homa Perez, PT

## 2018-03-26 ENCOUNTER — HOSPITAL ENCOUNTER (OUTPATIENT)
Dept: PHYSICAL THERAPY | Age: 53
Setting detail: THERAPIES SERIES
Discharge: HOME | End: 2018-03-26
Payer: COMMERCIAL

## 2018-03-26 PROCEDURE — 97110 THERAPEUTIC EXERCISES: CPT

## 2018-08-31 ENCOUNTER — APPOINTMENT (OUTPATIENT)
Dept: CT IMAGING | Age: 53
DRG: 683 | End: 2018-08-31
Payer: COMMERCIAL

## 2018-08-31 ENCOUNTER — APPOINTMENT (OUTPATIENT)
Dept: GENERAL RADIOLOGY | Age: 53
DRG: 683 | End: 2018-08-31
Payer: COMMERCIAL

## 2018-08-31 ENCOUNTER — HOSPITAL ENCOUNTER (INPATIENT)
Age: 53
LOS: 1 days | Discharge: HOME | DRG: 683 | End: 2018-09-02
Attending: EMERGENCY MEDICINE | Admitting: INTERNAL MEDICINE
Payer: COMMERCIAL

## 2018-08-31 DIAGNOSIS — R07.9 CHEST PAIN, UNSPECIFIED TYPE: Primary | ICD-10-CM

## 2018-08-31 DIAGNOSIS — N17.9 AKI (ACUTE KIDNEY INJURY) (HCC): ICD-10-CM

## 2018-08-31 DIAGNOSIS — M79.601 RIGHT ARM PAIN: ICD-10-CM

## 2018-08-31 LAB
EKG ATRIAL RATE: 82 BPM
EKG P AXIS: 49 DEGREES
EKG P-R INTERVAL: 148 MS
EKG Q-T INTERVAL: 382 MS
EKG QRS DURATION: 84 MS
EKG QTC CALCULATION (BAZETT): 446 MS
EKG R AXIS: 30 DEGREES
EKG T AXIS: -3 DEGREES
EKG VENTRICULAR RATE: 82 BPM

## 2018-08-31 PROCEDURE — 85014 HEMATOCRIT: CPT

## 2018-08-31 PROCEDURE — 82803 BLOOD GASES ANY COMBINATION: CPT

## 2018-08-31 PROCEDURE — 85730 THROMBOPLASTIN TIME PARTIAL: CPT

## 2018-08-31 PROCEDURE — 85025 COMPLETE CBC W/AUTO DIFF WBC: CPT

## 2018-08-31 PROCEDURE — 83930 ASSAY OF BLOOD OSMOLALITY: CPT

## 2018-08-31 PROCEDURE — 82553 CREATINE MB FRACTION: CPT

## 2018-08-31 PROCEDURE — 82435 ASSAY OF BLOOD CHLORIDE: CPT

## 2018-08-31 PROCEDURE — 82010 KETONE BODYS QUAN: CPT

## 2018-08-31 PROCEDURE — 83735 ASSAY OF MAGNESIUM: CPT

## 2018-08-31 PROCEDURE — 84132 ASSAY OF SERUM POTASSIUM: CPT

## 2018-08-31 PROCEDURE — 82330 ASSAY OF CALCIUM: CPT

## 2018-08-31 PROCEDURE — 82550 ASSAY OF CK (CPK): CPT

## 2018-08-31 PROCEDURE — 83874 ASSAY OF MYOGLOBIN: CPT

## 2018-08-31 PROCEDURE — 99285 EMERGENCY DEPT VISIT HI MDM: CPT

## 2018-08-31 PROCEDURE — 85610 PROTHROMBIN TIME: CPT

## 2018-08-31 PROCEDURE — 82565 ASSAY OF CREATININE: CPT

## 2018-08-31 PROCEDURE — 70450 CT HEAD/BRAIN W/O DYE: CPT

## 2018-08-31 PROCEDURE — 80048 BASIC METABOLIC PNL TOTAL CA: CPT

## 2018-08-31 PROCEDURE — 84295 ASSAY OF SERUM SODIUM: CPT

## 2018-08-31 PROCEDURE — 83605 ASSAY OF LACTIC ACID: CPT

## 2018-08-31 PROCEDURE — 93005 ELECTROCARDIOGRAM TRACING: CPT

## 2018-08-31 PROCEDURE — 83690 ASSAY OF LIPASE: CPT

## 2018-08-31 PROCEDURE — 84484 ASSAY OF TROPONIN QUANT: CPT

## 2018-08-31 PROCEDURE — 82947 ASSAY GLUCOSE BLOOD QUANT: CPT

## 2018-08-31 RX ORDER — 0.9 % SODIUM CHLORIDE 0.9 %
1000 INTRAVENOUS SOLUTION INTRAVENOUS ONCE
Status: COMPLETED | OUTPATIENT
Start: 2018-08-31 | End: 2018-09-01

## 2018-09-01 ENCOUNTER — APPOINTMENT (OUTPATIENT)
Dept: GENERAL RADIOLOGY | Age: 53
DRG: 683 | End: 2018-09-01
Payer: COMMERCIAL

## 2018-09-01 ENCOUNTER — APPOINTMENT (OUTPATIENT)
Dept: ULTRASOUND IMAGING | Age: 53
DRG: 683 | End: 2018-09-01
Payer: COMMERCIAL

## 2018-09-01 ENCOUNTER — APPOINTMENT (OUTPATIENT)
Dept: CT IMAGING | Age: 53
DRG: 683 | End: 2018-09-01
Payer: COMMERCIAL

## 2018-09-01 PROBLEM — R07.9 CHEST PAIN: Status: ACTIVE | Noted: 2018-09-01

## 2018-09-01 PROBLEM — R20.2 PARESTHESIA OF RIGHT UPPER EXTREMITY: Status: ACTIVE | Noted: 2018-09-01

## 2018-09-01 PROBLEM — R07.89 CHEST PAIN, ATYPICAL: Status: ACTIVE | Noted: 2018-09-01

## 2018-09-01 PROBLEM — E87.20 METABOLIC ACIDOSIS: Status: ACTIVE | Noted: 2018-09-01

## 2018-09-01 LAB
% CKMB: 3.4 % (ref 0–3.5)
-: NORMAL
ABSOLUTE EOS #: 0.16 K/UL (ref 0–0.44)
ABSOLUTE IMMATURE GRANULOCYTE: 0.06 K/UL (ref 0–0.3)
ABSOLUTE LYMPH #: 1.48 K/UL (ref 1.1–3.7)
ABSOLUTE MONO #: 1.13 K/UL (ref 0.1–1.2)
ALBUMIN SERPL-MCNC: 3.3 G/DL (ref 3.5–5.2)
ALBUMIN/GLOBULIN RATIO: 0.8 (ref 1–2.5)
ALLEN TEST: ABNORMAL
ALP BLD-CCNC: 98 U/L (ref 40–129)
ALT SERPL-CCNC: 45 U/L (ref 5–41)
AMORPHOUS: NORMAL
ANION GAP SERPL CALCULATED.3IONS-SCNC: 14 MMOL/L (ref 9–17)
ANION GAP SERPL CALCULATED.3IONS-SCNC: 18 MMOL/L (ref 9–17)
ANION GAP: 13 MMOL/L (ref 7–16)
AST SERPL-CCNC: 31 U/L
BACTERIA: NORMAL
BASOPHILS # BLD: 0 % (ref 0–2)
BASOPHILS ABSOLUTE: <0.03 K/UL (ref 0–0.2)
BETA-HYDROXYBUTYRATE: 0.09 MMOL/L (ref 0.02–0.27)
BETA-HYDROXYBUTYRATE: 0.21 MMOL/L (ref 0.02–0.27)
BILIRUB SERPL-MCNC: <0.1 MG/DL (ref 0.3–1.2)
BILIRUBIN URINE: NEGATIVE
BILIRUBIN URINE: NEGATIVE
BUN BLDV-MCNC: 34 MG/DL (ref 6–20)
BUN BLDV-MCNC: 42 MG/DL (ref 6–20)
BUN/CREAT BLD: ABNORMAL (ref 9–20)
BUN/CREAT BLD: ABNORMAL (ref 9–20)
CALCIUM SERPL-MCNC: 8.6 MG/DL (ref 8.6–10.4)
CALCIUM SERPL-MCNC: 8.7 MG/DL (ref 8.6–10.4)
CASTS UA: NORMAL /LPF (ref 0–8)
CHLORIDE BLD-SCNC: 103 MMOL/L (ref 98–107)
CHLORIDE BLD-SCNC: 113 MMOL/L (ref 98–107)
CHP ED QC CHECK: YES
CK MB: 1.3 NG/ML
CKMB INTERPRETATION: ABNORMAL
CO2: 12 MMOL/L (ref 20–31)
CO2: 12 MMOL/L (ref 20–31)
COLOR: YELLOW
COLOR: YELLOW
COMMENT UA: NORMAL
CREAT SERPL-MCNC: 1.29 MG/DL (ref 0.7–1.2)
CREAT SERPL-MCNC: 2.07 MG/DL (ref 0.7–1.2)
CRYSTALS, UA: NORMAL /HPF
DIFFERENTIAL TYPE: ABNORMAL
EOSINOPHILS RELATIVE PERCENT: 1 % (ref 1–4)
EPITHELIAL CELLS UA: NORMAL /HPF (ref 0–5)
FIO2: ABNORMAL
GFR AFRICAN AMERICAN: 41 ML/MIN
GFR AFRICAN AMERICAN: >60 ML/MIN
GFR NON-AFRICAN AMERICAN: 34 ML/MIN
GFR NON-AFRICAN AMERICAN: 41 ML/MIN
GFR NON-AFRICAN AMERICAN: 58 ML/MIN
GFR SERPL CREATININE-BSD FRML MDRD: 50 ML/MIN
GFR SERPL CREATININE-BSD FRML MDRD: ABNORMAL ML/MIN/{1.73_M2}
GLUCOSE BLD-MCNC: 171 MG/DL (ref 75–110)
GLUCOSE BLD-MCNC: 187 MG/DL (ref 75–110)
GLUCOSE BLD-MCNC: 205 MG/DL (ref 75–110)
GLUCOSE BLD-MCNC: 220 MG/DL (ref 70–99)
GLUCOSE BLD-MCNC: 227 MG/DL (ref 75–110)
GLUCOSE BLD-MCNC: 241 MG/DL
GLUCOSE BLD-MCNC: 241 MG/DL (ref 75–110)
GLUCOSE BLD-MCNC: 332 MG/DL (ref 70–99)
GLUCOSE BLD-MCNC: 354 MG/DL (ref 74–100)
GLUCOSE URINE: NEGATIVE
GLUCOSE URINE: NEGATIVE
HCO3 VENOUS: 14.1 MMOL/L (ref 22–29)
HCT VFR BLD CALC: 32.9 % (ref 40.7–50.3)
HEMOGLOBIN: 10.3 G/DL (ref 13–17)
IMMATURE GRANULOCYTES: 1 %
INR BLD: 0.9
KETONES, URINE: NEGATIVE
KETONES, URINE: NEGATIVE
LACTIC ACID, WHOLE BLOOD: 1 MMOL/L (ref 0.7–2.1)
LACTIC ACID, WHOLE BLOOD: 1.8 MMOL/L (ref 0.7–2.1)
LEUKOCYTE ESTERASE, URINE: NEGATIVE
LEUKOCYTE ESTERASE, URINE: NEGATIVE
LIPASE: 40 U/L (ref 13–60)
LV EF: 55 %
LVEF MODALITY: NORMAL
LYMPHOCYTES # BLD: 13 % (ref 24–43)
MAGNESIUM: 2 MG/DL (ref 1.6–2.6)
MCH RBC QN AUTO: 29 PG (ref 25.2–33.5)
MCHC RBC AUTO-ENTMCNC: 31.3 G/DL (ref 28.4–34.8)
MCV RBC AUTO: 92.7 FL (ref 82.6–102.9)
MODE: ABNORMAL
MONOCYTES # BLD: 10 % (ref 3–12)
MUCUS: NORMAL
MYOGLOBIN: 65 NG/ML (ref 28–72)
NEGATIVE BASE EXCESS, VEN: 12 (ref 0–2)
NITRITE, URINE: NEGATIVE
NITRITE, URINE: NEGATIVE
NRBC AUTOMATED: 0 PER 100 WBC
O2 DEVICE/FLOW/%: ABNORMAL
O2 SAT, VEN: 25 % (ref 60–85)
OTHER OBSERVATIONS UA: NORMAL
PARTIAL THROMBOPLASTIN TIME: 26.4 SEC (ref 20.5–30.5)
PATIENT TEMP: ABNORMAL
PCO2, VEN: 33.4 MM HG (ref 41–51)
PDW BLD-RTO: 13.7 % (ref 11.8–14.4)
PH UA: 7 (ref 5–8)
PH UA: 7.5 (ref 5–8)
PH VENOUS: 7.23 (ref 7.32–7.43)
PLATELET # BLD: 293 K/UL (ref 138–453)
PLATELET ESTIMATE: ABNORMAL
PMV BLD AUTO: 12.4 FL (ref 8.1–13.5)
PO2, VEN: 20.3 MM HG (ref 30–50)
POC CHLORIDE: 111 MMOL/L (ref 98–107)
POC CREATININE: 1.75 MG/DL (ref 0.51–1.19)
POC HEMATOCRIT: 31 % (ref 41–53)
POC HEMOGLOBIN: 10.6 G/DL (ref 13.5–17.5)
POC IONIZED CALCIUM: 1.26 MMOL/L (ref 1.15–1.33)
POC LACTIC ACID: 1.13 MMOL/L (ref 0.56–1.39)
POC PCO2 TEMP: ABNORMAL MM HG
POC PH TEMP: ABNORMAL
POC PO2 TEMP: ABNORMAL MM HG
POC POTASSIUM: 4.1 MMOL/L (ref 3.5–4.5)
POC SODIUM: 138 MMOL/L (ref 138–146)
POC TROPONIN I: 0 NG/ML (ref 0–0.1)
POC TROPONIN INTERP: NORMAL
POSITIVE BASE EXCESS, VEN: ABNORMAL (ref 0–3)
POTASSIUM SERPL-SCNC: 3.9 MMOL/L (ref 3.7–5.3)
POTASSIUM SERPL-SCNC: 4.3 MMOL/L (ref 3.7–5.3)
PROTEIN UA: NEGATIVE
PROTEIN UA: NEGATIVE
PROTHROMBIN TIME: 10 SEC (ref 9–12)
RBC # BLD: 3.55 M/UL (ref 4.21–5.77)
RBC # BLD: ABNORMAL 10*6/UL
RBC UA: NORMAL /HPF (ref 0–4)
RENAL EPITHELIAL, UA: NORMAL /HPF
SAMPLE SITE: ABNORMAL
SEG NEUTROPHILS: 75 % (ref 36–65)
SEGMENTED NEUTROPHILS ABSOLUTE COUNT: 8.93 K/UL (ref 1.5–8.1)
SERUM OSMOLALITY: 309 MOSM/KG (ref 275–295)
SODIUM BLD-SCNC: 133 MMOL/L (ref 135–144)
SODIUM BLD-SCNC: 139 MMOL/L (ref 135–144)
SODIUM,UR: 87 MMOL/L
SPECIFIC GRAVITY UA: 1.01 (ref 1–1.03)
SPECIFIC GRAVITY UA: 1.01 (ref 1–1.03)
TOTAL CK: 38 U/L (ref 39–308)
TOTAL CO2, VENOUS: 15 MMOL/L (ref 23–30)
TOTAL PROTEIN, URINE: 7 MG/DL
TOTAL PROTEIN: 7.2 G/DL (ref 6.4–8.3)
TRICHOMONAS: NORMAL
TROPONIN INTERP: ABNORMAL
TROPONIN INTERP: NORMAL
TROPONIN T: <0.03 NG/ML
TURBIDITY: CLEAR
TURBIDITY: CLEAR
URINE HGB: NEGATIVE
URINE HGB: NEGATIVE
UROBILINOGEN, URINE: NORMAL
UROBILINOGEN, URINE: NORMAL
WBC # BLD: 11.8 K/UL (ref 3.5–11.3)
WBC # BLD: ABNORMAL 10*3/UL
WBC UA: NORMAL /HPF (ref 0–5)
YEAST: NORMAL

## 2018-09-01 PROCEDURE — 2580000003 HC RX 258: Performed by: EMERGENCY MEDICINE

## 2018-09-01 PROCEDURE — 84300 ASSAY OF URINE SODIUM: CPT

## 2018-09-01 PROCEDURE — 6370000000 HC RX 637 (ALT 250 FOR IP): Performed by: NURSE PRACTITIONER

## 2018-09-01 PROCEDURE — 6360000002 HC RX W HCPCS: Performed by: NURSE PRACTITIONER

## 2018-09-01 PROCEDURE — 2580000003 HC RX 258: Performed by: NURSE PRACTITIONER

## 2018-09-01 PROCEDURE — 70450 CT HEAD/BRAIN W/O DYE: CPT

## 2018-09-01 PROCEDURE — 80053 COMPREHEN METABOLIC PANEL: CPT

## 2018-09-01 PROCEDURE — 76770 US EXAM ABDO BACK WALL COMP: CPT

## 2018-09-01 PROCEDURE — 81003 URINALYSIS AUTO W/O SCOPE: CPT

## 2018-09-01 PROCEDURE — 99222 1ST HOSP IP/OBS MODERATE 55: CPT | Performed by: NURSE PRACTITIONER

## 2018-09-01 PROCEDURE — 2500000003 HC RX 250 WO HCPCS: Performed by: EMERGENCY MEDICINE

## 2018-09-01 PROCEDURE — 93880 EXTRACRANIAL BILAT STUDY: CPT

## 2018-09-01 PROCEDURE — 2060000000 HC ICU INTERMEDIATE R&B

## 2018-09-01 PROCEDURE — 84156 ASSAY OF PROTEIN URINE: CPT

## 2018-09-01 PROCEDURE — 99222 1ST HOSP IP/OBS MODERATE 55: CPT | Performed by: INTERNAL MEDICINE

## 2018-09-01 PROCEDURE — 99222 1ST HOSP IP/OBS MODERATE 55: CPT | Performed by: PSYCHIATRY & NEUROLOGY

## 2018-09-01 PROCEDURE — 71045 X-RAY EXAM CHEST 1 VIEW: CPT

## 2018-09-01 PROCEDURE — 82010 KETONE BODYS QUAN: CPT

## 2018-09-01 PROCEDURE — 94762 N-INVAS EAR/PLS OXIMTRY CONT: CPT

## 2018-09-01 PROCEDURE — 36415 COLL VENOUS BLD VENIPUNCTURE: CPT

## 2018-09-01 PROCEDURE — 93306 TTE W/DOPPLER COMPLETE: CPT

## 2018-09-01 PROCEDURE — 84484 ASSAY OF TROPONIN QUANT: CPT

## 2018-09-01 PROCEDURE — 6370000000 HC RX 637 (ALT 250 FOR IP): Performed by: INTERNAL MEDICINE

## 2018-09-01 PROCEDURE — 81001 URINALYSIS AUTO W/SCOPE: CPT

## 2018-09-01 RX ORDER — SODIUM POLYSTYRENE SULFONATE 15 G/60ML
15 SUSPENSION ORAL; RECTAL
Status: ACTIVE | OUTPATIENT
Start: 2018-09-01 | End: 2018-09-01

## 2018-09-01 RX ORDER — ACETAMINOPHEN 325 MG/1
650 TABLET ORAL EVERY 4 HOURS PRN
Status: DISCONTINUED | OUTPATIENT
Start: 2018-09-01 | End: 2018-09-02 | Stop reason: HOSPADM

## 2018-09-01 RX ORDER — 0.9 % SODIUM CHLORIDE 0.9 %
1000 INTRAVENOUS SOLUTION INTRAVENOUS ONCE
Status: COMPLETED | OUTPATIENT
Start: 2018-09-01 | End: 2018-09-01

## 2018-09-01 RX ORDER — METOPROLOL TARTRATE 5 MG/5ML
5 INJECTION INTRAVENOUS ONCE
Status: DISCONTINUED | OUTPATIENT
Start: 2018-09-01 | End: 2018-09-02 | Stop reason: HOSPADM

## 2018-09-01 RX ORDER — DOCUSATE SODIUM 100 MG/1
100 CAPSULE, LIQUID FILLED ORAL 2 TIMES DAILY PRN
Status: DISCONTINUED | OUTPATIENT
Start: 2018-09-01 | End: 2018-09-02 | Stop reason: HOSPADM

## 2018-09-01 RX ORDER — ACETAZOLAMIDE 500 MG/1
500 CAPSULE, EXTENDED RELEASE ORAL 2 TIMES DAILY
Status: ON HOLD | COMMUNITY
End: 2018-09-25

## 2018-09-01 RX ORDER — TAMSULOSIN HYDROCHLORIDE 0.4 MG/1
0.4 CAPSULE ORAL DAILY
Status: DISCONTINUED | OUTPATIENT
Start: 2018-09-01 | End: 2018-09-02 | Stop reason: HOSPADM

## 2018-09-01 RX ORDER — ASPIRIN 325 MG
325 TABLET ORAL ONCE
Status: COMPLETED | OUTPATIENT
Start: 2018-09-01 | End: 2018-09-01

## 2018-09-01 RX ORDER — METOPROLOL TARTRATE 50 MG/1
50 TABLET, FILM COATED ORAL 2 TIMES DAILY
Status: DISCONTINUED | OUTPATIENT
Start: 2018-09-01 | End: 2018-09-02 | Stop reason: HOSPADM

## 2018-09-01 RX ORDER — SODIUM POLYSTYRENE SULFONATE 15 G/60ML
30 SUSPENSION ORAL; RECTAL
Status: ACTIVE | OUTPATIENT
Start: 2018-09-01 | End: 2018-09-01

## 2018-09-01 RX ORDER — SODIUM CHLORIDE 0.9 % (FLUSH) 0.9 %
10 SYRINGE (ML) INJECTION PRN
Status: DISCONTINUED | OUTPATIENT
Start: 2018-09-01 | End: 2018-09-02 | Stop reason: HOSPADM

## 2018-09-01 RX ORDER — AMLODIPINE BESYLATE 10 MG/1
10 TABLET ORAL DAILY
Status: DISCONTINUED | OUTPATIENT
Start: 2018-09-01 | End: 2018-09-02 | Stop reason: HOSPADM

## 2018-09-01 RX ORDER — NICOTINE POLACRILEX 4 MG
15 LOZENGE BUCCAL PRN
Status: DISCONTINUED | OUTPATIENT
Start: 2018-09-01 | End: 2018-09-02 | Stop reason: HOSPADM

## 2018-09-01 RX ORDER — PRAVASTATIN SODIUM 20 MG
10 TABLET ORAL DAILY
Status: DISCONTINUED | OUTPATIENT
Start: 2018-09-01 | End: 2018-09-02 | Stop reason: HOSPADM

## 2018-09-01 RX ORDER — LATANOPROST 50 UG/ML
1 SOLUTION/ DROPS OPHTHALMIC NIGHTLY
Status: DISCONTINUED | OUTPATIENT
Start: 2018-09-01 | End: 2018-09-02 | Stop reason: HOSPADM

## 2018-09-01 RX ORDER — PANTOPRAZOLE SODIUM 40 MG/1
40 TABLET, DELAYED RELEASE ORAL
Status: DISCONTINUED | OUTPATIENT
Start: 2018-09-01 | End: 2018-09-02 | Stop reason: HOSPADM

## 2018-09-01 RX ORDER — CYCLOBENZAPRINE HCL 5 MG
5 TABLET ORAL 3 TIMES DAILY PRN
Status: ON HOLD | COMMUNITY
End: 2021-04-30

## 2018-09-01 RX ORDER — HEPARIN SODIUM 5000 [USP'U]/ML
5000 INJECTION, SOLUTION INTRAVENOUS; SUBCUTANEOUS EVERY 8 HOURS SCHEDULED
Status: DISCONTINUED | OUTPATIENT
Start: 2018-09-01 | End: 2018-09-02 | Stop reason: HOSPADM

## 2018-09-01 RX ORDER — ASPIRIN 81 MG/1
81 TABLET ORAL DAILY
Status: DISCONTINUED | OUTPATIENT
Start: 2018-09-02 | End: 2018-09-02 | Stop reason: HOSPADM

## 2018-09-01 RX ORDER — MORPHINE SULFATE 2 MG/ML
2 INJECTION, SOLUTION INTRAMUSCULAR; INTRAVENOUS
Status: DISCONTINUED | OUTPATIENT
Start: 2018-09-01 | End: 2018-09-02 | Stop reason: HOSPADM

## 2018-09-01 RX ORDER — ACETAMINOPHEN 325 MG/1
650 TABLET ORAL EVERY 4 HOURS PRN
Status: DISCONTINUED | OUTPATIENT
Start: 2018-09-01 | End: 2018-09-01

## 2018-09-01 RX ORDER — HYDROCODONE BITARTRATE AND ACETAMINOPHEN 5; 325 MG/1; MG/1
1 TABLET ORAL EVERY 6 HOURS PRN
Status: DISCONTINUED | OUTPATIENT
Start: 2018-09-01 | End: 2018-09-01

## 2018-09-01 RX ORDER — POLYMYXIN B SULFATE AND TRIMETHOPRIM 1; 10000 MG/ML; [USP'U]/ML
1 SOLUTION OPHTHALMIC EVERY 6 HOURS
Status: ON HOLD | COMMUNITY
End: 2018-09-25

## 2018-09-01 RX ORDER — ONDANSETRON 2 MG/ML
4 INJECTION INTRAMUSCULAR; INTRAVENOUS EVERY 6 HOURS PRN
Status: DISCONTINUED | OUTPATIENT
Start: 2018-09-01 | End: 2018-09-02 | Stop reason: HOSPADM

## 2018-09-01 RX ORDER — GLIMEPIRIDE 2 MG/1
1 TABLET ORAL 2 TIMES DAILY
Status: DISCONTINUED | OUTPATIENT
Start: 2018-09-01 | End: 2018-09-02 | Stop reason: HOSPADM

## 2018-09-01 RX ORDER — HYDROCODONE BITARTRATE AND ACETAMINOPHEN 5; 325 MG/1; MG/1
2 TABLET ORAL EVERY 4 HOURS PRN
Status: DISCONTINUED | OUTPATIENT
Start: 2018-09-01 | End: 2018-09-02 | Stop reason: HOSPADM

## 2018-09-01 RX ORDER — NICOTINE 21 MG/24HR
1 PATCH, TRANSDERMAL 24 HOURS TRANSDERMAL DAILY PRN
Status: DISCONTINUED | OUTPATIENT
Start: 2018-09-01 | End: 2018-09-02 | Stop reason: HOSPADM

## 2018-09-01 RX ORDER — HYDROCODONE BITARTRATE AND ACETAMINOPHEN 5; 325 MG/1; MG/1
1 TABLET ORAL EVERY 4 HOURS PRN
Status: DISCONTINUED | OUTPATIENT
Start: 2018-09-01 | End: 2018-09-01

## 2018-09-01 RX ORDER — METOPROLOL TARTRATE 5 MG/5ML
5 INJECTION INTRAVENOUS EVERY 6 HOURS PRN
Status: DISCONTINUED | OUTPATIENT
Start: 2018-09-01 | End: 2018-09-02 | Stop reason: HOSPADM

## 2018-09-01 RX ORDER — CHLORTHALIDONE 25 MG/1
25 TABLET ORAL DAILY
Status: DISCONTINUED | OUTPATIENT
Start: 2018-09-01 | End: 2018-09-02 | Stop reason: HOSPADM

## 2018-09-01 RX ORDER — DEXTROSE MONOHYDRATE 50 MG/ML
100 INJECTION, SOLUTION INTRAVENOUS PRN
Status: DISCONTINUED | OUTPATIENT
Start: 2018-09-01 | End: 2018-09-02 | Stop reason: HOSPADM

## 2018-09-01 RX ORDER — HYDROCODONE BITARTRATE AND ACETAMINOPHEN 5; 325 MG/1; MG/1
1 TABLET ORAL EVERY 4 HOURS PRN
Status: DISCONTINUED | OUTPATIENT
Start: 2018-09-01 | End: 2018-09-02 | Stop reason: HOSPADM

## 2018-09-01 RX ORDER — INSULIN GLARGINE 100 [IU]/ML
20 INJECTION, SOLUTION SUBCUTANEOUS NIGHTLY
Status: DISCONTINUED | OUTPATIENT
Start: 2018-09-01 | End: 2018-09-02 | Stop reason: HOSPADM

## 2018-09-01 RX ORDER — NITROGLYCERIN 0.4 MG/1
0.4 TABLET SUBLINGUAL EVERY 5 MIN PRN
Status: DISCONTINUED | OUTPATIENT
Start: 2018-09-01 | End: 2018-09-02 | Stop reason: HOSPADM

## 2018-09-01 RX ORDER — PREGABALIN 25 MG/1
25 CAPSULE ORAL 3 TIMES DAILY
Status: DISCONTINUED | OUTPATIENT
Start: 2018-09-01 | End: 2018-09-02 | Stop reason: HOSPADM

## 2018-09-01 RX ORDER — SODIUM CHLORIDE 9 MG/ML
INJECTION, SOLUTION INTRAVENOUS CONTINUOUS
Status: DISCONTINUED | OUTPATIENT
Start: 2018-09-01 | End: 2018-09-02 | Stop reason: HOSPADM

## 2018-09-01 RX ORDER — FOLIC ACID 1 MG/1
1 TABLET ORAL DAILY
Status: DISCONTINUED | OUTPATIENT
Start: 2018-09-01 | End: 2018-09-02 | Stop reason: HOSPADM

## 2018-09-01 RX ORDER — SODIUM CHLORIDE 0.9 % (FLUSH) 0.9 %
10 SYRINGE (ML) INJECTION EVERY 12 HOURS SCHEDULED
Status: DISCONTINUED | OUTPATIENT
Start: 2018-09-01 | End: 2018-09-02 | Stop reason: HOSPADM

## 2018-09-01 RX ORDER — PREDNISOLONE SODIUM PHOSPHATE 10 MG/ML
1 SOLUTION/ DROPS OPHTHALMIC 4 TIMES DAILY
Status: ON HOLD | COMMUNITY
End: 2018-09-25

## 2018-09-01 RX ORDER — DEXTROSE MONOHYDRATE 25 G/50ML
12.5 INJECTION, SOLUTION INTRAVENOUS PRN
Status: DISCONTINUED | OUTPATIENT
Start: 2018-09-01 | End: 2018-09-02 | Stop reason: HOSPADM

## 2018-09-01 RX ORDER — MORPHINE SULFATE 4 MG/ML
4 INJECTION, SOLUTION INTRAMUSCULAR; INTRAVENOUS
Status: DISCONTINUED | OUTPATIENT
Start: 2018-09-01 | End: 2018-09-02 | Stop reason: HOSPADM

## 2018-09-01 RX ADMIN — AMLODIPINE BESYLATE 10 MG: 10 TABLET ORAL at 10:41

## 2018-09-01 RX ADMIN — INSULIN LISPRO 6 UNITS: 100 INJECTION, SOLUTION INTRAVENOUS; SUBCUTANEOUS at 18:31

## 2018-09-01 RX ADMIN — LATANOPROST 1 DROP: 50 SOLUTION OPHTHALMIC at 21:04

## 2018-09-01 RX ADMIN — ASPIRIN 325 MG: 325 TABLET, COATED ORAL at 00:33

## 2018-09-01 RX ADMIN — PANTOPRAZOLE SODIUM 40 MG: 40 TABLET, DELAYED RELEASE ORAL at 06:33

## 2018-09-01 RX ADMIN — TIMOLOL MALEATE 1 DROP: 2.5 SOLUTION OPHTHALMIC at 21:04

## 2018-09-01 RX ADMIN — SODIUM CHLORIDE 1000 ML: 9 INJECTION, SOLUTION INTRAVENOUS at 02:55

## 2018-09-01 RX ADMIN — HEPARIN SODIUM 5000 UNITS: 5000 INJECTION, SOLUTION INTRAVENOUS; SUBCUTANEOUS at 18:31

## 2018-09-01 RX ADMIN — PRAVASTATIN SODIUM 10 MG: 20 TABLET ORAL at 20:35

## 2018-09-01 RX ADMIN — ASPIRIN 325 MG: 325 TABLET, COATED ORAL at 06:33

## 2018-09-01 RX ADMIN — INSULIN LISPRO 2 UNITS: 100 INJECTION, SOLUTION INTRAVENOUS; SUBCUTANEOUS at 21:07

## 2018-09-01 RX ADMIN — SODIUM CHLORIDE 1000 ML: 9 INJECTION, SOLUTION INTRAVENOUS at 00:33

## 2018-09-01 RX ADMIN — PREGABALIN 25 MG: 25 CAPSULE ORAL at 10:41

## 2018-09-01 RX ADMIN — PREGABALIN 25 MG: 25 CAPSULE ORAL at 20:35

## 2018-09-01 RX ADMIN — CHLORTHALIDONE 25 MG: 25 TABLET ORAL at 10:43

## 2018-09-01 RX ADMIN — FOLIC ACID 1 MG: 1 TABLET ORAL at 10:42

## 2018-09-01 RX ADMIN — HEPARIN SODIUM 5000 UNITS: 5000 INJECTION, SOLUTION INTRAVENOUS; SUBCUTANEOUS at 06:33

## 2018-09-01 RX ADMIN — PREGABALIN 25 MG: 25 CAPSULE ORAL at 18:31

## 2018-09-01 RX ADMIN — HEPARIN SODIUM 5000 UNITS: 5000 INJECTION, SOLUTION INTRAVENOUS; SUBCUTANEOUS at 22:34

## 2018-09-01 RX ADMIN — METOPROLOL TARTRATE 50 MG: 50 TABLET, FILM COATED ORAL at 10:42

## 2018-09-01 RX ADMIN — METOPROLOL TARTRATE 50 MG: 50 TABLET, FILM COATED ORAL at 20:34

## 2018-09-01 RX ADMIN — TAMSULOSIN HYDROCHLORIDE 0.4 MG: 0.4 CAPSULE ORAL at 10:42

## 2018-09-01 RX ADMIN — TIMOLOL MALEATE 1 DROP: 2.5 SOLUTION OPHTHALMIC at 10:41

## 2018-09-01 RX ADMIN — Medication 10 ML: at 21:06

## 2018-09-01 RX ADMIN — INSULIN GLARGINE 20 UNITS: 100 INJECTION, SOLUTION SUBCUTANEOUS at 21:07

## 2018-09-01 RX ADMIN — SODIUM CHLORIDE: 9 INJECTION, SOLUTION INTRAVENOUS at 05:27

## 2018-09-01 ASSESSMENT — ENCOUNTER SYMPTOMS
SHORTNESS OF BREATH: 1
NAUSEA: 1
SPUTUM PRODUCTION: 0
ABDOMINAL PAIN: 0
BLOOD IN STOOL: 0
NAUSEA: 0
SHORTNESS OF BREATH: 0
SORE THROAT: 0
COUGH: 0
BACK PAIN: 1
VOMITING: 0
WHEEZING: 0
BLURRED VISION: 0

## 2018-09-01 NOTE — FLOWSHEET NOTE
[address] [facility] [address]     Emergency/Trauma Note    PATIENT NAME: Lida aMrtinez    Shift date: 8/31/2018  Shift day: Friday   Shift # 3    Room # ED15   Name: Lida Martinez            Age: 45 y.o. Gender: male          Rastafari: Lashonda Handy 33 of Buddhist: None    Trauma/Incident type: Stroke Priority  Admit Date & Time: 8/31/2018 11:12 PM  TRAUMA NAME: N/A    PATIENT/EVENT DESCRIPTION:  Lida Martinez is a 45 y.o. male who arrived to ED15 and was paged out as a \"Stroke Priority. \" Per report, patient arrived to ED with complaints of \"shortness of breath, chest pain and right-sided pain. \" Patient was moved to CT Scan at time of  visit. Pt to be admitted to 3019/3019-01. SPIRITUAL ASSESSMENT/INTERVENTION:   responded to page and met patient's family in ED13. Patient's spouse and children were present in room at time of 's arrival. Patient's family members were calm and coping well in room. Per family, they had no needs at time of visit.  provided support and hospitality to them. Patient's family thanked  for support. PATIENT BELONGINGS:  No belongings noted    ANY BELONGINGS OF SIGNIFICANT VALUE NOTED:  N/A    REGISTRATION STAFF NOTIFIED? Yes      WHAT IS YOUR SPIRITUAL CARE PLAN FOR THIS PATIENT?:  Chaplains will remain available to offer spiritual and emotional support as needed.     Electronically signed by Frieda Torres on 9/1/2018 at 5:19 AM.  [facility] [employer]  [phone]

## 2018-09-01 NOTE — CARE COORDINATION
Case Management Initial Discharge Plan  Tmaar Ute,         Readmission Risk              Risk of Unplanned Readmission:        21               Met with:spouse/SO to discuss discharge plans.    Information verified: address, contacts, phone number, , insurance Yes  PCP: Ayush Norwood MD  Date of last visit: 2018    Insurance Provider: Sandy Nelson    Discharge Planning    Living Arrangements:  Spouse/Significant Other, Children, Family Members   Support Systems:  Spouse/Significant Other, Children, Family Members    Home has one story  5 stairs to climb to get to front door    Patient able to perform ADL's:Independent    Current Services (outpatient & in home) none  DME equipment: Delle Little, wheelchair  DME provider: N/A    Pharmacy: [facility] on [address] Medications:  No  Does patient want to participate in local refill/ meds to beds program?  No    Potential Assistance Needed:  N/A    Patient agreeable to home care: No  [place] of choice provided:  n/a    Prior SNF/Rehab Placement and Facility: N/A  Agreeable to SNF/Rehab: No  [place] of choice provided: N/A   Evaluation: n/a    Expected Discharge date:  18  Patient expects to be discharged to:  home  Follow Up Appointment: Best Day/ Time:      Transportation provider: family  Transportation arrangements needed for discharge: No    Discharge Plan: home        Electronically signed by Leda Merlin, RN on 18 at 10:21 AM

## 2018-09-01 NOTE — PROGRESS NOTES
NEUROLOGY INPATIENT CONSULT NOTE    9/1/2018         I had the pleasure of seeing your patient in neurology consultation. As you would recall Olive Aguilar is a  45 y.o. male with H/O HTN, HLD, DM neuropathy, R eye implant, L foot osteomyelitis s/p L BKA, uses a prosthetic leg (2017) , who was admitted on 8/31/2018 with neck pain & R UE tingling. According to the family, pt had been c/o lightheadedness for the past 1-2 days; had poor oral intake. Wife was helping the pt to get up from a sitting position on the bed when the pt was about to fall forward but the wife caught him & helped him back to the bed. He was so lethargic that he didn't move either arm up to break his fall. Later, pt c/o pain in R side of his neck that has been on-going for the past 2 months; pain in R UE along with tingling & some weakness. He was brought to the ED as a stroke alert. Pt denied any neck injury or surgery. Wife attributed his neck pain to pt's posture. Pt reported that his R arm has been tingling but denied dropping things from his R hand. When asked about R UE pain, he denied any pain at rest but when strength was being examined he reported that he couldn't exert full motor strength due to upper arm pain. Pt stated that after he got IV fluids, he feels much better & his R UE weakness has improved. Wife reported diabetic neuropathy, L sole for which he takes Lyrica. He had L BKA due to osteomyelitis L big toe. He has a prosthetic leg & ambulates with a cane. Wife & pt denied any falls or gait imbalance. CT head - negative. MRI couldn't be done due to incomplete information of his R eye implant. CT head was repeated the next day - negative. No current facility-administered medications on file prior to encounter.       Current Outpatient Prescriptions on File Prior to Encounter   Medication Sig Dispense Refill    metFORMIN (GLUCOPHAGE) 500 MG tablet Take 500 mg by mouth 2 times daily (with meals)      pregabalin (LYRICA) 25 MG capsule Take 1 capsule by mouth 3 times daily (Patient taking differently: Take 50 mg by mouth 3 times daily. Theadore Katt ) 60 capsule 0    lisinopril (PRINIVIL;ZESTRIL) 40 MG tablet Take 1 tablet by mouth daily 30 tablet 3    metoprolol tartrate (LOPRESSOR) 50 MG tablet Take 1 tablet by mouth 2 times daily 60 tablet 3    omeprazole (PRILOSEC) 20 MG delayed release capsule Take 20 mg by mouth 2 times daily      timolol (TIMOPTIC-XE) 0.25 % ophthalmic gel-forming 1 drop daily      HYDROcodone-acetaminophen (NORCO) 5-325 MG per tablet take 1 tablet by mouth twice a day if needed for pain  0    insulin glargine (LANTUS) 100 UNIT/ML injection vial Inject 20 Units into the skin nightly 1 vial 3    pravastatin (PRAVACHOL) 10 MG tablet Take 1 tablet by mouth daily 30 tablet 3    amLODIPine (NORVASC) 10 MG tablet Take 1 tablet by mouth daily 30 tablet 3    chlorthalidone (HYGROTON) 25 MG tablet Take 1 tablet by mouth daily 30 tablet 3    spironolactone (ALDACTONE) 100 MG tablet Take 1 tablet by mouth daily 30 tablet 3    folic acid (FOLVITE) 1 MG tablet Take 1 tablet by mouth daily 30 tablet 3    tamsulosin (FLOMAX) 0.4 MG capsule Take 1 capsule by mouth daily 30 capsule 3    docusate sodium (COLACE) 100 MG capsule Take 1 capsule by mouth 2 times daily as needed for Constipation 40 capsule 0    travoprost, benzalkonium, (TRAVATAN) 0.004 % ophthalmic solution 1 drop nightly         Allergies: Rosemarie Alford has No Known Allergies.     Past Medical History:   Diagnosis Date    Acid reflux     Cerebrovascular disease 2006    TIA    Diabetic foot infection ([address]     Drug (multiple) resistant infection     GERD (gastroesophageal reflux disease)     Glaucoma     Glaucoma     Hyperlipidemia     Hypertension     [facility]DM [facility] dependent diabetes mellitus) ([facility])     MDRO (multiple drug resistant organisms) resistance     MRSA (methicillin resistant staph aureus) culture positive 09/03/2017    foot    Neuropathy Sister        ROS:  Constitutional  Negative for fever and chills    HEENT  Negative for ear discharge, ear pain, nosebleed    Eyes  B/L diminished vision. R eye implant. Negative for photophobia, pain and discharge    Respiratory  Negative for hemoptysis and sputum    Cardiovascular  Negative for orthopnea, claudication and PND    Gastrointestinal  Negative for abdominal pain, diarrhea, blood in stool    Musculoskeletal  L BKA; positive for neck pain, R UE pain & R hip pain   Skin  Negative for rash or itching    Endo/heme/allergies  Negative for polydipsia, environmental allergy    Psychiatric/behavioral  Negative for suicidal ideation.  Patient is not anxious        Medications:     metoprolol  5 mg Intravenous Once    amLODIPine  10 mg Oral Daily    chlorthalidone  25 mg Oral Daily    folic acid  1 mg Oral Daily    insulin glargine  20 Units Subcutaneous Nightly    metoprolol tartrate  50 mg Oral BID    pantoprazole  40 mg Oral QAM AC    pravastatin  10 mg Oral Daily    pregabalin  25 mg Oral TID    timolol  1 drop Both Eyes BID    latanoprost  1 drop Both Eyes Nightly    tamsulosin  0.4 mg Oral Daily    heparin (porcine)  5,000 Units Subcutaneous 3 times per day    aspirin  325 mg Oral Daily    insulin lispro  0-18 Units Subcutaneous TID WC    insulin lispro  0-9 Units Subcutaneous Nightly    sodium chloride flush  10 mL Intravenous 2 times per day     PRN Meds include: docusate sodium, acetaminophen, morphine **OR** morphine, ondansetron, nicotine, nitroGLYCERIN, sodium polystyrene, sodium polystyrene, glucose, dextrose, glucagon (rDNA), dextrose, sodium chloride flush, HYDROcodone 5 mg - acetaminophen **OR** HYDROcodone 5 mg - acetaminophen, metoprolol    Objective:   BP (!) 154/45   Pulse 72   Temp 97.4 °F (36.3 °C) (Oral)   Resp 16   Ht 5' 7\" (1.702 m)   Wt 172 lb 12.8 oz (78.4 kg)   SpO2 99%   BMI 27.06 kg/m²     Blood pressure range: Systolic (45FJL), SNY:906 , Min:137 , Max:185   ;

## 2018-09-01 NOTE — CONSULTS
uncontrolled        Past Surgical History:        Procedure Laterality Date    EYE SURGERY Bilateral     lazer both eyes    FOOT SURGERY Left 02/24/2017    surgical prep of wound bed    FOOT SURGERY Left 04/13/2017    1) Left foot surgical preparation of wound-bed, 2) Left foot application of graft     [facility]  [facility] DOUBLE  4/28/2017         LEG AMPUTATION BELOW KNEE Left 9/7/2017    LEFT BELOW KNEE GUILLOTINE AMPUTATION performed by Nadia Jackman MD at [address] Left 9/14/2017    REVISION LEFT BELOW KNEE AMPUTATION, CLOSED  performed by Nadia Jackman MD at [address] Left 01/23/2017    I and D bone, bone biopsy with flap closure and pulse lavage    OTHER SURGICAL HISTORY Left 04/27/2017    I & D foot    WI DEEP DISSEC FOOT INFEC,1 BURSA Left 4/13/2017    FOOT DEBRIDEMENT WITH EPIFIX  performed by Ulices Bah DPM at [address] INCIS/DRAIN THIGH/KNEE ABSCESS,DEEP Left 9/12/2017     LEFT BELOW KNEE AMPUTATION OPEN, WOUND VAC PLACEMENT performed by Nadia Jackman MD at [address] [facility] [address] Left 2014    hallux    TOE AMPUTATION Left 12/09/2016    hallux    TOE AMPUTATION Left 4/27/2017    I AND D FOOT, BONE BIOPSY, POSSIBLE FILET 2ND TOE performed by Ulices Bah DPM at [address] TOE AMPUTATION  04/17/2017    Left second toe        Social History:   Social History     Social History    Marital status:      Spouse name: N/A    Number of children: N/A    Years of education: N/A     Occupational History    Not on file.      Social History Main Topics    Smoking status: Former Smoker     Packs/day: 0.00     Types: Cigarettes    Smokeless tobacco: Never Used      Comment: smokes 1 cigarette a day    Alcohol use No    Drug use: No    Sexual activity: Not on file     Other Topics Concern    Not on file     Social History Narrative    No narrative on file       Family History:   Family History   Problem Relation Age of Onset    Diabetes Mother     Cancer Father         colon    Diabetes Father     Diabetes Sister     Diabetes Sister        Allergies:  Patient has no known allergies. Home Medications:  Prior to Admission medications    Medication Sig Start Date End Date Taking?  Authorizing Provider   HYDROcodone-acetaminophen (Abelino Mariam) 5-325 MG per tablet take 1 tablet by mouth twice a day if needed for pain 11/16/17   Historical Provider, MD   metFORMIN (GLUCOPHAGE) 500 MG tablet Take 500 mg by mouth 2 times daily (with meals)    Historical Provider, MD   pregabalin (LYRICA) 25 MG capsule Take 1 capsule by mouth 3 times daily 9/25/17   Margaret Aaron MD   insulin glargine (LANTUS) 100 UNIT/ML injection vial Inject 20 Units into the skin nightly 9/25/17   Margaret MD Galen   pravastatin (PRAVACHOL) 10 MG tablet Take 1 tablet by mouth daily 9/25/17   [facility] MD Galen   lisinopril (PRINIVIL;ZESTRIL) 40 MG tablet Take 1 tablet by mouth daily 9/25/17   [facility] MD Galen   metoprolol tartrate (LOPRESSOR) 50 MG tablet Take 1 tablet by mouth 2 times daily 9/25/17   [facility] MD Galen   amLODIPine (NORVASC) 10 MG tablet Take 1 tablet by mouth daily 9/25/17   [facility] MD Galen   chlorthalidone (HYGROTON) 25 MG tablet Take 1 tablet by mouth daily 9/25/17   [facility] MD Galen   spironolactone (ALDACTONE) 100 MG tablet Take 1 tablet by mouth daily 9/25/17   [facility] MD Galen   folic acid (FOLVITE) 1 MG tablet Take 1 tablet by mouth daily 9/25/17   [facility] MD Galen   tamsulosin [facility]) 0.4 MG capsule Take 1 capsule by mouth daily 9/25/17   [facility] MD Galen   docusate sodium (COLACE) 100 MG capsule Take 1 capsule by mouth 2 times daily as needed for Constipation 9/16/17   Fidencio Saucedo DO   omeprazole (PRILOSEC) 20 MG delayed release capsule Take 20 mg by mouth 2 times daily    Historical Provider, MD   timolol (TIMOPTIC-XE) 0.25 % ophthalmic gel-forming 1 drop daily    Historical Provider, MD   travoprost,

## 2018-09-01 NOTE — H&P
[facility]       HISTORY AND PHYSICAL EXAMINATION            Date:   9/1/2018  Patient name:  Minus Shown  Date of admission:  8/31/2018 11:12 PM  MRN:   [id number]  Account:  [de-identified]  YOB: 1965  PCP:    Sumi Lindsay MD  Room:   3019/3019-01  Code Status:    Full Code    Chief Complaint:     Chief Complaint   Patient presents with    Chest Pain    Arm Pain    Numbness       History Obtained From:     patient, electronic medical record    History of Present Illness: The patient is a 45 y.o. male who presents with:   Chest Pain; Arm Pain; and Numbness     Patient was admitted thru ER with:  Tommy Willingham is a 45 y.o. male who presents with Right arm pain, right-sided neck and back pain, chest pain, numbness to right side of body. 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 Patient states 15 minutes prior to arrival he had right-sided pain and numbness starting in the right side of his neck and down his right arm and right leg. Also complaining of chest pain and shortness of breath. States his blood sugars in the 400s at home earlier. Family states that he has been acting tired and fatigued and seems to be having an unsteady gait at home. Past history of TIA, not on blood thinning medications currently. \"    58-year-old male admitted to the hospital through the emergency room  He reports a variety of complaints  Chest pain / ache - nonexertional  Pain and stiffness arms, neck, back  Right-sided numbness  The patient reports a prior stroke when he was in his 35s, he was working in the fields with very high temps, he was overcome by the heat and taken to the hospital.  Had what sounds like heat stroke  Initial blood pressure has been in his 185/64     Data base has included:  Glucose 332 (H) mg/dL     BUN 42 (H) mg/dL   CREATININE 2.07 (H)    9/25/2017  8:04 AM   Component Value Ref Range & MG tablet Take 1 tablet by mouth 2 times daily 9/25/17  Yes Bárbara Mariscal MD   omeprazole (PRILOSEC) 20 MG delayed release capsule Take 20 mg by mouth 2 times daily   Yes Historical Provider, MD   timolol (TIMOPTIC-XE) 0.25 % ophthalmic gel-forming 1 drop daily   Yes Historical Provider, MD   HYDROcodone-acetaminophen (Dionte ) 5-325 MG per tablet take 1 tablet by mouth twice a day if needed for pain 11/16/17   Historical Provider, MD   insulin glargine (LANTUS) 100 UNIT/ML injection vial Inject 20 Units into the skin nightly 9/25/17   Bárbara Mariscal MD   pravastatin (PRAVACHOL) 10 MG tablet Take 1 tablet by mouth daily 9/25/17   Bárbara Mariscal MD   amLODIPine (NORVASC) 10 MG tablet Take 1 tablet by mouth daily 9/25/17   Bárbara Mariscal MD   chlorthalidone (HYGROTON) 25 MG tablet Take 1 tablet by mouth daily 9/25/17   Bárbara Mariscal MD   spironolactone (ALDACTONE) 100 MG tablet Take 1 tablet by mouth daily 9/25/17   Bárbara Mariscal MD   folic acid (FOLVITE) 1 MG tablet Take 1 tablet by mouth daily 9/25/17   Bárbara Mariscal MD   tamsulosin [facility]) 0.4 MG capsule Take 1 capsule by mouth daily 9/25/17   Bárbara Mariscal MD   docusate sodium (COLACE) 100 MG capsule Take 1 capsule by mouth 2 times daily as needed for Constipation 9/16/17   Kirill Gauthier DO   travoprost, benzalkonium, (TRAVATAN) 0.004 % ophthalmic solution 1 drop nightly    Historical Provider, MD        Allergies:     Patient has no known allergies. Social History:     Tobacco:    reports that he has quit smoking. His smoking use included Cigarettes. He smoked 0.00 packs per day. He has never used smokeless tobacco.  Alcohol:      reports that he does not drink alcohol. Drug Use:  reports that he does not use drugs.     Family History:     Family History   Problem Relation Age of Onset    Diabetes Mother     Cancer Father         colon    Diabetes Father     Diabetes Sister     Diabetes Sister        Review of Systems:     Positive and Negative as sounds. Pulmonary/Chest: Effort normal and breath sounds normal. No respiratory distress. He has no wheezes. He has no rales. Abdominal: Soft. Bowel sounds are normal. He exhibits no distension. There is no tenderness. There is no rebound and no guarding. Musculoskeletal: He exhibits deformity (BKA noted). He exhibits no edema or tenderness. Neurological: He is alert and oriented to person, place, and time. Skin: Skin is warm and dry. He is not diaphoretic.    Psychiatric: Memory and affect normal.          Investigations:      Laboratory Testing:  Recent Results (from the past 24 hour(s))   EKG 12 Lead    Collection Time: 08/31/18 11:35 PM   Result Value Ref Range    Ventricular Rate 82 BPM    Atrial Rate 82 BPM    P-R Interval 148 ms    QRS Duration 84 ms    Q-T Interval 382 ms    QTc Calculation (Bazett) 446 ms    P Axis 49 degrees    R Axis 30 degrees    T Axis -3 degrees   Venous Blood Gas, POC    Collection Time: 08/31/18 11:51 PM   Result Value Ref Range    pH, Ender 7.233 (L) 7.320 - 7.430    pCO2, Ender 33.4 (L) 41.0 - 51.0 mm Hg    pO2, Ender 20.3 (L) 30.0 - 50.0 mm Hg    HCO3, Venous 14.1 (L) 22.0 - 29.0 mmol/L    Total CO2, Venous 15 (L) 23.0 - 30.0 mmol/L    Negative Base Excess, Ender 12 (H) 0.0 - 2.0    Positive Base Excess, Ender NOT REPORTED 0.0 - 3.0    O2 Sat, Ender 25 (L) 60.0 - 85.0 %    O2 Device/Flow/% NOT REPORTED     Saji Test NOT REPORTED     Sample Site NOT REPORTED     Mode NOT REPORTED     FIO2 NOT REPORTED     Pt Temp NOT REPORTED     POC pH Temp NOT REPORTED     POC pCO2 Temp NOT REPORTED mm Hg    POC pO2 Temp NOT REPORTED mm Hg   Hemoglobin and hematocrit, blood    Collection Time: 08/31/18 11:51 PM   Result Value Ref Range    POC Hemoglobin 10.6 (L) 13.5 - 17.5 g/dL    POC Hematocrit 31 (L) 41 - 53 %   Creatinine W/GFR Point of Care    Collection Time: 08/31/18 11:51 PM   Result Value Ref Range    POC Creatinine 1.75 (H) 0.51 - 1.19 mg/dL    GFR Comment 50 (L) >60 mL/min    GFR Non- 41 (L) >60 mL/min    GFR Comment         SODIUM (POC)    Collection Time: 08/31/18 11:51 PM   Result Value Ref Range    POC Sodium 138 138 - 146 mmol/L   POTASSIUM (POC)    Collection Time: 08/31/18 11:51 PM   Result Value Ref Range    POC Potassium 4.1 3.5 - 4.5 mmol/L   CHLORIDE (POC)    Collection Time: 08/31/18 11:51 PM   Result Value Ref Range    POC Chloride 111 (H) 98 - 107 mmol/L   CALCIUM, IONIC (POC)    Collection Time: 08/31/18 11:51 PM   Result Value Ref Range    POC Ionized Calcium 1.26 1.15 - 1.33 mmol/L   Lactic Acid, POC    Collection Time: 08/31/18 11:51 PM   Result Value Ref Range    POC Lactic Acid 1.13 0.56 - 1.39 mmol/L   POCT Glucose    Collection Time: 08/31/18 11:51 PM   Result Value Ref Range    POC Glucose 354 (H) 74 - 100 mg/dL   Anion Gap (Calc) POC    Collection Time: 08/31/18 11:51 PM   Result Value Ref Range    Anion Gap 13 7 - 16 mmol/L   POCT troponin    Collection Time: 09/01/18  2:47 AM   Result Value Ref Range    POC Troponin I 0.00 0.00 - 0.10 ng/mL    POC Troponin Interp       The Troponin-I (POC) results cannot be compared to the Troponin-T results. POC Glucose Fingerstick    Collection Time: 09/01/18  2:52 AM   Result Value Ref Range    POC Glucose 241 (H) 75 - 110 mg/dL   POCT Glucose    Collection Time: 09/01/18  2:55 AM   Result Value Ref Range    Glucose 241 mg/dL    QC OK?  yes    Urinalysis Reflex to Culture    Collection Time: 09/01/18  3:36 AM   Result Value Ref Range    Color, UA YELLOW YEL    Turbidity UA CLEAR CLEAR    Glucose, Ur NEGATIVE NEG    Bilirubin Urine NEGATIVE NEG    Ketones, Urine NEGATIVE NEG    Specific Gravity, UA 1.008 1.005 - 1.030    Urine Hgb NEGATIVE NEG    pH, UA 7.0 5.0 - 8.0    Protein, UA NEGATIVE NEG    Urobilinogen, Urine Normal NORM    Nitrite, Urine NEGATIVE NEG    Leukocyte Esterase, Urine NEGATIVE NEG    Urinalysis Comments       Microscopic exam not performed based on chemical results unless requested in Troponin    Collection Time: 09/01/18  5:39 AM   Result Value Ref Range    Troponin T <0.03 <0.03 ng/mL    Troponin Interp         Comprehensive Metabolic Panel w/ Reflex to MG    Collection Time: 09/01/18  5:39 AM   Result Value Ref Range    Glucose 220 (H) 70 - 99 mg/dL    BUN 34 (H) 6 - 20 mg/dL    CREATININE 1.29 (H) 0.70 - 1.20 mg/dL    Bun/Cre Ratio NOT REPORTED 9 - 20    Calcium 8.6 8.6 - 10.4 mg/dL    Sodium 139 135 - 144 mmol/L    Potassium 3.9 3.7 - 5.3 mmol/L    Chloride 113 (H) 98 - 107 mmol/L    CO2 12 (L) 20 - 31 mmol/L    Anion Gap 14 9 - 17 mmol/L    Alkaline Phosphatase 98 40 - 129 U/L    ALT 45 (H) 5 - 41 U/L    AST 31 <40 U/L    Total Bilirubin <0.10 (L) 0.3 - 1.2 mg/dL    Total Protein 7.2 6.4 - 8.3 g/dL    Alb 3.3 (L) 3.5 - 5.2 g/dL    Albumin/Globulin Ratio 0.8 (L) 1.0 - 2.5    GFR Non- 58 (L) >60 mL/min    GFR African American >60 >60 mL/min    GFR Comment          GFR Staging NOT REPORTED    POC Glucose Fingerstick    Collection Time: 09/01/18  6:32 AM   Result Value Ref Range    POC Glucose 187 (H) 75 - 110 mg/dL   Urinalysis with microscopic    Collection Time: 09/01/18 10:54 AM   Result Value Ref Range    Color, UA YELLOW YEL    Turbidity UA CLEAR CLEAR    Glucose, Ur NEGATIVE NEG    Bilirubin Urine NEGATIVE NEG    Ketones, Urine NEGATIVE NEG    Specific Gravity, UA 1.009 1.005 - 1.030    Urine Hgb NEGATIVE NEG    pH, UA 7.5 5.0 - 8.0    Protein, UA NEGATIVE NEG    Urobilinogen, Urine Normal NORM    Nitrite, Urine NEGATIVE NEG    Leukocyte Esterase, Urine NEGATIVE NEG    -          WBC, UA 0 TO 2 0 - 5 /HPF    RBC, UA None 0 - 4 /HPF    Casts UA 2 TO 5 HYALINE 0 - 8 /LPF    Crystals UA NOT REPORTED NONE /HPF    Epithelial Cells UA 0 TO 2 0 - 5 /HPF    Renal Epithelial, Urine NOT REPORTED 0 /HPF    Bacteria, UA NOT REPORTED NONE    Mucus, UA NOT REPORTED NONE    Trichomonas, UA NOT REPORTED NONE    Amorphous, UA NOT REPORTED NONE    Other Observations UA NOT REPORTED NREQ Yeast, UA NOT REPORTED NONE   Sodium, urine, random    Collection Time: 09/01/18 10:54 AM   Result Value Ref Range    Sodium,Ur 87 mmol/L   Protein, urine, random    Collection Time: 09/01/18 10:54 AM   Result Value Ref Range    Total Protein, Urine 7 mg/dL   Troponin    Collection Time: 09/01/18 11:38 AM   Result Value Ref Range    Troponin T <0.03 <0.03 ng/mL    Troponin Interp         POC Glucose Fingerstick    Collection Time: 09/01/18 11:44 AM   Result Value Ref Range    POC Glucose 171 (H) 75 - 110 mg/dL       Imaging/Diagnostics:  See above    Assessment :      Principal Problem:    Acute kidney injury ([address]  Active Problems:    Essential hypertension    IDDM (insulin dependent diabetes mellitus) (HCC)    Neuropathy (HCC)    Hx of BKA (HCC)    Impaired mobility    Retinopathy, diabetic, bilateral (HCC)    Chest pain, atypical    Paresthesia of right upper extremity    Metabolic acidosis  Resolved Problems:    * No resolved hospital problems. *        Plan:     Admit  Aspirin  Metoprolol  Statin  EKG  Enzymes  Will monitor and control Diabetes  Check lactate  Check ketones  Neurology evaluation and workup and progress  MR pending  Check bun and creatinine   Will monitor and control Blood Pressure   Physical therapy and rehabilitation      Consultations:   IP CONSULT TO NEUROLOGY  IP CONSULT TO HOSPITALIST  [facility]     Patient is admitted as inpatient status because of co-morbidities listed above, severity of signs and symptoms as outlined, requirement for current medical therapies and most importantly because of direct risk to patient if care not provided in a hospital setting.     Maday Bella DO  9/1/2018  2:55 PM    Copy sent to Dr. Myrna Mckeon MD

## 2018-09-01 NOTE — ED PROVIDER NOTES
[facility]     Emergency Department     Faculty Attestation    I performed a history and physical examination of the patient and discussed management with the resident. I reviewed the residents note and agree with the documented findings and plan of care. Any areas of disagreement are noted on the chart. I was personally present for the key portions of any procedures. I have documented in the chart those procedures where I was not present during the key portions. I have reviewed the emergency nurses triage note. I agree with the chief complaint, past medical history, past surgical history, allergies, medications, social and family history as documented unless otherwise noted below. For Physician Assistant/ Nurse Practitioner cases/documentation I have personally evaluated this patient and have completed at least one if not all key elements of the E/M (history, physical exam, and MDM). Additional findings are as noted. I have personally seen and evaluated the patient. I find the patient's history and physical exam are consistent with the NP/PA documentation. I agree with the care provided, treatment rendered, disposition and follow-up plan. Obstruction 15 minutes of discomfort which originated in the patient's neck not describing pain primarily in the right side of his upper and lower extremities as well as right torso on patient is describing some mild dyspnea very complex history including advanced diabetes mellitus. EKG Interpretation    Interpreted by emergency department physician    Rhythm: normal sinus   Rate: normal  Axis: normal  Conduction: normal  ST Segments: no acute change  T Waves: no acute change  Q Waves: no acute change    Clinical Impression:  nonspecific EKG. Critical Care     Majorie Favre, M.D.   Attending Emergency  Physician              Chivo Sanderson MD  08/31/18 [id number]

## 2018-09-01 NOTE — ED NOTES
Leana Orlando with MRI called and said per radiologist it was ok to scan as long as there was a covering for pt's right eye. According to research that Leana Orlando looked up it is not recommended to scan within two weeks with a certain kind of tube; however family is unsure of what kind of tube was placed in pt's eye. Dr. Pete Easton made aware, and he is consulting with Dr. Kylee Quan.       Lizeth Sellers RN  09/01/18 [id number]

## 2018-09-01 NOTE — ED PROVIDER NOTES
[facility] ED  Emergency Department Encounter  Emergency Medicine Resident     Pt Name: Petr Hall  MRN: [id number]  Paologfmaria g 1965  Date of evaluation: 8/31/18  PCP:  Mehrdad Aaron MD    [address] [place] [address]       Chief Complaint   Patient presents with    Chest Pain    Arm Pain    Numbness       HISTORY OF PRESENT ILLNESS  (Location/Symptom, Timing/Onset, Context/Setting, Quality, Duration, Modifying Factors, Severity.)      Petr Hall is a 45 y.o. male who presents with Right arm pain, right-sided neck and back pain, chest pain, numbness to right side of body. 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 Patient states 15 minutes prior to arrival he had right-sided pain and numbness starting in the right side of his neck and down his right arm and right leg. Also complaining of chest pain and shortness of breath. States his blood sugars in the 400s at home earlier. Family states that he has been acting tired and fatigued and seems to be having an unsteady gait at home. Past history of TIA, not on blood thinning medications currently. PAST MEDICAL / SURGICAL / SOCIAL / FAMILY HISTORY      has a past medical history of Acid reflux; Cerebrovascular disease; Diabetic foot infection ([address] Drug (multiple) resistant infection; GERD (gastroesophageal reflux disease); Glaucoma; Glaucoma; Hyperlipidemia; Hypertension; IDDM (insulin dependent diabetes mellitus) ([address] MDRO (multiple drug resistant organisms) resistance; MRSA (methicillin resistant staph aureus) culture positive; Neuropathy; Osteomyelitis ([address] S/P cataract extraction and insertion of intraocular lens; and Type II or unspecified type diabetes mellitus without mention of complication, not stated as uncontrolled. has a past surgical history that includes Toe amputation (Left, 2014); eye surgery (Bilateral); Toe amputation (Left, 12/09/2016); other surgical history (Left, 01/23/2017);  Foot lb (84.8 kg)   SpO2 98%   BMI 29.29 kg/m²     Physical Exam   Constitutional: He is oriented to person, place, and time. He appears well-developed and well-nourished. No distress. HENT:   Head: Normocephalic and atraumatic. Mouth/Throat: Oropharynx is clear and moist.   Eyes: Conjunctivae and EOM are normal.   Neck: Normal range of motion. Neck supple. No JVD present. No thyromegaly present. Cardiovascular: Normal rate, regular rhythm, normal heart sounds and intact distal pulses. No murmur heard. Pulmonary/Chest: Effort normal and breath sounds normal. No respiratory distress. He has no wheezes. Abdominal: Soft. He exhibits no mass. There is tenderness (epigastric). There is no rebound and no guarding. Musculoskeletal: He exhibits no edema or tenderness. Lymphadenopathy:     He has no cervical adenopathy. Neurological: He is alert and oriented to person, place, and time. No cranial nerve deficit or sensory deficit. GCS eye subscore is 4. GCS verbal subscore is 5. GCS motor subscore is 6. Diminished strength, 3 out of 5 to right lower extremity finger-nose-finger testing abnormal on right   Skin: Skin is warm and dry. No rash noted. He is not diaphoretic. Psychiatric: Thought content normal.   Nursing note and vitals reviewed.       DIFFERENTIAL  DIAGNOSIS     PLAN (LABS / IMAGING / EKG):  Orders Placed This Encounter   Procedures    XR CHEST PORTABLE    CT Head WO Contrast    MRA Head WO Contrast    MRA Neck WO Contrast    MRI BRAIN WO CONTRAST    CT HEAD WO CONTRAST    VL DUP CAROTID BILATERAL    STROKE PANEL    Lipase    BETA-HYDROXYBUTYRATE    Urinalysis Reflex to Culture    Magnesium    Osmolality    Hemoglobin and hematocrit, blood    SODIUM (POC)    POTASSIUM (POC)    CHLORIDE (POC)    CALCIUM, IONIC (POC)    Lactic Acid, Whole Blood    Troponin    Telemetry monitoring    Nursing swallow assessment    Inpatient consult to Neurology    Inpatient consult to Hospitalist    Pulse oximetry, continuous    POC Blood Gas and Chemistry    Venous Blood Gas, POC    Creatinine W/GFR Point of Care    Lactic Acid, POC    POCT Glucose    Anion Gap (Calc) POC    POCT Glucose    POC Glucose Fingerstick    POCT troponin    POCT Troponin I    EKG 12 Lead    Insert peripheral IV    PATIENT STATUS (FROM ED OR OR/PROCEDURAL) Inpatient       MEDICATIONS ORDERED:  Orders Placed This Encounter   Medications    0.9 % sodium chloride bolus    aspirin tablet 325 mg    metoprolol (LOPRESSOR) injection 5 mg    0.9 % sodium chloride bolus       DDX: ACS, PE, aortic dissection, carotid dissection, stroke    DIAGNOSTIC RESULTS / EMERGENCY DEPARTMENT COURSE / MDM     LABS:  Results for orders placed or performed during the hospital encounter of 08/31/18   STROKE PANEL   Result Value Ref Range    WBC 11.8 (H) 3.5 - 11.3 k/uL    RBC 3.55 (L) 4.21 - 5.77 m/uL    Hemoglobin 10.3 (L) 13.0 - 17.0 g/dL    Hematocrit 32.9 (L) 40.7 - 50.3 %    MCV 92.7 82.6 - 102.9 fL    MCH 29.0 25.2 - 33.5 pg    MCHC 31.3 28.4 - 34.8 g/dL    RDW 13.7 11.8 - 14.4 %    Platelets 697 321 - 911 k/uL    MPV 12.4 8.1 - 13.5 fL    NRBC Automated 0.0 0.0 per 100 WBC    Differential Type NOT REPORTED     Seg Neutrophils 75 (H) 36 - 65 %    Lymphocytes 13 (L) 24 - 43 %    Monocytes 10 3 - 12 %    Eosinophils % 1 1 - 4 %    Basophils 0 0 - 2 %    Immature Granulocytes 1 (H) 0 %    Segs Absolute 8.93 (H) 1.50 - 8.10 k/uL    Absolute Lymph # 1.48 1.10 - 3.70 k/uL    Absolute Mono # 1.13 0.10 - 1.20 k/uL    Absolute Eos # 0.16 0.00 - 0.44 k/uL    Basophils # <0.03 0.00 - 0.20 k/uL    Absolute Immature Granulocyte 0.06 0.00 - 0.30 k/uL    WBC Morphology NOT REPORTED     RBC Morphology NOT REPORTED     Platelet Estimate NOT REPORTED     Protime 10.0 9.0 - 12.0 sec    INR 0.9     PTT 26.4 20.5 - 30.5 sec    Myoglobin 65 28 - 72 ng/mL    Troponin T <0.03 <0.03 ng/mL    Troponin Interp          Glucose 332 (H) 70 - 99 mg/dL calcifications. Stroke results were conveyed by Dr. Sita Thomas to Dr. Nicolas Dominguez on   9/1/2018 at 00:13. MRA Head WO Contrast    (Results Pending)   MRA Neck WO Contrast    (Results Pending)   MRI BRAIN WO CONTRAST    (Results Pending)   CT HEAD WO CONTRAST    (Results Pending)   VL DUP CAROTID BILATERAL    (Results Pending)         EKG  EKG Interpretation    Interpreted by emergency department physician    Rhythm: normal sinus   Rate: normal  Axis: normal  Ectopy: none  Conduction: low voltage qrs  ST Segments: no acute change  T Waves: no acute change  Q Waves: none    Clinical Impression: non-specific EKG    Ivonsuzanna Lima    All EKG's are interpreted by the Emergency Department Physician who either signs or Co-signs this chart in the absence of a cardiologist.    MDM/EMERGENCY DEPARTMENT COURSE:  Patient is a 78-year-old male with history of diabetes and TIA that presents with right-sided neck pain, right arm pain, right leg pain, chest pain for some breath. Vitals are stable. Patient has left BKA. Patient is also blind in both eyes. Patient has exquisite tenderness to right arm and right leg and right side of neck. Cranial nerves appear to be intact. Patient has abnormal finger-nose-finger testing on the right. Right leg with weakness. Lungs are clear bilaterally. Heart sounds regular rate and rhythm. STROKE priority, stroke panel, EKG, chest x-ray, urinalysis, VBG, CBC and CMP and lipase and plan for admission. Patient with elevated creatinine 2.07. Unable to get CTAs of head and neck and chest.  Neurology toward her MRAs head and neck and MRI brain. Glucose 332, no anion gap, EKG unremarkable, troponin negative. Lactate unremarkable. Patient unable to get MRIs due to recent cataract procedure. We'll admit patient to medicine for cardiac workup and neurology workup.     Spoke with Genie with [facility] who will admit patient primarily under

## 2018-09-02 ENCOUNTER — APPOINTMENT (OUTPATIENT)
Dept: MRI IMAGING | Age: 53
DRG: 683 | End: 2018-09-02
Payer: COMMERCIAL

## 2018-09-02 VITALS
SYSTOLIC BLOOD PRESSURE: 142 MMHG | HEART RATE: 69 BPM | WEIGHT: 172.8 LBS | TEMPERATURE: 98 F | RESPIRATION RATE: 13 BRPM | DIASTOLIC BLOOD PRESSURE: 54 MMHG | OXYGEN SATURATION: 99 % | HEIGHT: 67 IN | BODY MASS INDEX: 27.12 KG/M2

## 2018-09-02 PROBLEM — G45.1 HEMISPHERIC CAROTID ARTERY SYNDROME: Status: ACTIVE | Noted: 2018-09-02

## 2018-09-02 PROBLEM — I65.22 STENOSIS OF LEFT INTERNAL CAROTID ARTERY: Status: ACTIVE | Noted: 2018-09-02

## 2018-09-02 PROBLEM — K80.20 CALCULUS OF GALLBLADDER WITHOUT CHOLECYSTITIS WITHOUT OBSTRUCTION: Status: ACTIVE | Noted: 2018-09-02

## 2018-09-02 LAB
ANION GAP SERPL CALCULATED.3IONS-SCNC: 16 MMOL/L (ref 9–17)
BUN BLDV-MCNC: 21 MG/DL (ref 6–20)
BUN/CREAT BLD: ABNORMAL (ref 9–20)
CALCIUM SERPL-MCNC: 9 MG/DL (ref 8.6–10.4)
CHLORIDE BLD-SCNC: 112 MMOL/L (ref 98–107)
CHOLESTEROL/HDL RATIO: 5.2
CHOLESTEROL: 119 MG/DL
CO2: 12 MMOL/L (ref 20–31)
CREAT SERPL-MCNC: 0.9 MG/DL (ref 0.7–1.2)
GFR AFRICAN AMERICAN: >60 ML/MIN
GFR NON-AFRICAN AMERICAN: >60 ML/MIN
GFR SERPL CREATININE-BSD FRML MDRD: ABNORMAL ML/MIN/{1.73_M2}
GFR SERPL CREATININE-BSD FRML MDRD: ABNORMAL ML/MIN/{1.73_M2}
GLUCOSE BLD-MCNC: 121 MG/DL (ref 75–110)
GLUCOSE BLD-MCNC: 158 MG/DL (ref 75–110)
GLUCOSE BLD-MCNC: 162 MG/DL (ref 70–99)
GLUCOSE BLD-MCNC: 240 MG/DL (ref 75–110)
HCT VFR BLD CALC: 30.1 % (ref 40.7–50.3)
HDLC SERPL-MCNC: 23 MG/DL
HEMOGLOBIN: 9.6 G/DL (ref 13–17)
LDL CHOLESTEROL: 63 MG/DL (ref 0–130)
MAGNESIUM: 2 MG/DL (ref 1.6–2.6)
MCH RBC QN AUTO: 29.2 PG (ref 25.2–33.5)
MCHC RBC AUTO-ENTMCNC: 31.9 G/DL (ref 28.4–34.8)
MCV RBC AUTO: 91.5 FL (ref 82.6–102.9)
NRBC AUTOMATED: 0 PER 100 WBC
PDW BLD-RTO: 13.3 % (ref 11.8–14.4)
PLATELET # BLD: 340 K/UL (ref 138–453)
PMV BLD AUTO: 11.8 FL (ref 8.1–13.5)
POTASSIUM SERPL-SCNC: 4 MMOL/L (ref 3.7–5.3)
RBC # BLD: 3.29 M/UL (ref 4.21–5.77)
SODIUM BLD-SCNC: 140 MMOL/L (ref 135–144)
TRIGL SERPL-MCNC: 166 MG/DL
TROPONIN INTERP: NORMAL
TROPONIN T: <0.03 NG/ML
VLDLC SERPL CALC-MCNC: ABNORMAL MG/DL (ref 1–30)
WBC # BLD: 8.4 K/UL (ref 3.5–11.3)

## 2018-09-02 PROCEDURE — 70547 MR ANGIOGRAPHY NECK W/O DYE: CPT

## 2018-09-02 PROCEDURE — 70544 MR ANGIOGRAPHY HEAD W/O DYE: CPT

## 2018-09-02 PROCEDURE — 80061 LIPID PANEL: CPT

## 2018-09-02 PROCEDURE — G8978 MOBILITY CURRENT STATUS: HCPCS

## 2018-09-02 PROCEDURE — 6360000002 HC RX W HCPCS: Performed by: NURSE PRACTITIONER

## 2018-09-02 PROCEDURE — 97161 PT EVAL LOW COMPLEX 20 MIN: CPT

## 2018-09-02 PROCEDURE — 6370000000 HC RX 637 (ALT 250 FOR IP): Performed by: NURSE PRACTITIONER

## 2018-09-02 PROCEDURE — 83735 ASSAY OF MAGNESIUM: CPT

## 2018-09-02 PROCEDURE — G8979 MOBILITY GOAL STATUS: HCPCS

## 2018-09-02 PROCEDURE — 99232 SBSQ HOSP IP/OBS MODERATE 35: CPT | Performed by: PSYCHIATRY & NEUROLOGY

## 2018-09-02 PROCEDURE — 82947 ASSAY GLUCOSE BLOOD QUANT: CPT

## 2018-09-02 PROCEDURE — 84484 ASSAY OF TROPONIN QUANT: CPT

## 2018-09-02 PROCEDURE — 80048 BASIC METABOLIC PNL TOTAL CA: CPT

## 2018-09-02 PROCEDURE — 94762 N-INVAS EAR/PLS OXIMTRY CONT: CPT

## 2018-09-02 PROCEDURE — 99232 SBSQ HOSP IP/OBS MODERATE 35: CPT | Performed by: INTERNAL MEDICINE

## 2018-09-02 PROCEDURE — 36415 COLL VENOUS BLD VENIPUNCTURE: CPT

## 2018-09-02 PROCEDURE — 2580000003 HC RX 258: Performed by: NURSE PRACTITIONER

## 2018-09-02 PROCEDURE — 70551 MRI BRAIN STEM W/O DYE: CPT

## 2018-09-02 PROCEDURE — 85027 COMPLETE CBC AUTOMATED: CPT

## 2018-09-02 RX ORDER — ASPIRIN 81 MG/1
81 TABLET ORAL DAILY
Qty: 30 TABLET | Refills: 3 | Status: SHIPPED | OUTPATIENT
Start: 2018-09-03

## 2018-09-02 RX ADMIN — PREGABALIN 25 MG: 25 CAPSULE ORAL at 08:40

## 2018-09-02 RX ADMIN — ASPIRIN 81 MG: 81 TABLET, DELAYED RELEASE ORAL at 08:40

## 2018-09-02 RX ADMIN — HEPARIN SODIUM 5000 UNITS: 5000 INJECTION, SOLUTION INTRAVENOUS; SUBCUTANEOUS at 08:36

## 2018-09-02 RX ADMIN — PANTOPRAZOLE SODIUM 40 MG: 40 TABLET, DELAYED RELEASE ORAL at 08:40

## 2018-09-02 RX ADMIN — INSULIN LISPRO 3 UNITS: 100 INJECTION, SOLUTION INTRAVENOUS; SUBCUTANEOUS at 08:36

## 2018-09-02 RX ADMIN — FOLIC ACID 1 MG: 1 TABLET ORAL at 08:40

## 2018-09-02 RX ADMIN — Medication 10 ML: at 08:38

## 2018-09-02 RX ADMIN — AMLODIPINE BESYLATE 10 MG: 10 TABLET ORAL at 08:40

## 2018-09-02 RX ADMIN — PREGABALIN 25 MG: 25 CAPSULE ORAL at 16:39

## 2018-09-02 RX ADMIN — TAMSULOSIN HYDROCHLORIDE 0.4 MG: 0.4 CAPSULE ORAL at 08:40

## 2018-09-02 RX ADMIN — METOPROLOL TARTRATE 50 MG: 50 TABLET, FILM COATED ORAL at 08:40

## 2018-09-02 RX ADMIN — TIMOLOL MALEATE 1 DROP: 2.5 SOLUTION OPHTHALMIC at 10:22

## 2018-09-02 RX ADMIN — CHLORTHALIDONE 25 MG: 25 TABLET ORAL at 08:40

## 2018-09-02 ASSESSMENT — ENCOUNTER SYMPTOMS
BACK PAIN: 1
ABDOMINAL PAIN: 0
NAUSEA: 0
SHORTNESS OF BREATH: 0
SPUTUM PRODUCTION: 0
VOMITING: 0
COUGH: 0

## 2018-09-02 NOTE — PROGRESS NOTES
[facility]    Progress Note    9/2/2018    1:03 PM    Name:   Gonzalez Renner  MRN:     Teri Holstein:      [de-identified]   Room:   [address] [place] [address] Day:  1  Admit Date:  8/31/2018 11:12 PM    PCP:   Mario Hogue MD  Code Status:  Full Code    Subjective:     C/C:   Chief Complaint   Patient presents with    Chest Pain    Arm Pain    Numbness     Interval History Status:  improved    Has prosthesis on  He is ambulating  No dizziness  Numbness and paresthesias have resolved  He hopes to go home  Blood sugars improved  Blood pressure somewhat labile    Database updates:  WBC 8.4 k/uL   RBC 3.29 (L) m/uL Hemoglobin 9.6 (L) g/dL Hematocrit 30.1 (L)    BUN 21 (H) mg/dL   CREATININE 0.90    POC Glucose 121 (H)      Brief History:     The patient is a 45 y.o. male who presents with:   Chest Pain; Arm Pain; and Numbness     Patient was admitted thru ER with:  \"Kin Zhang is a 45 y. o. male who presents with Right arm pain, right-sided neck and back pain, chest pain, numbness to right side of body.  He has history of diabetes, is blind, below-knee amputation on the left.  Patient states 15 minutes prior to arrival he had right-sided pain and numbness starting in the right side of his neck and down his right arm and right leg.  Also complaining of chest pain and shortness of breath.  States his blood sugars in the 400s at home earlier.  Family states that he has been acting tired and fatigued and seems to be having an unsteady gait at home.  Past history of TIA, not on blood thinning medications currently. \"     38-year-old male admitted to the hospital through the emergency room  He reports a variety of complaints  Chest pain / ache - nonexertional  Pain and stiffness arms, neck, back  Right-sided numbness  The patient reports a prior stroke when he was in his 35s, he was working in the fields with very high temps, he was overcome by the heat and taken to the hospital.  Had what sounds like heat stroke  Initial blood pressure has been in his 185/64   The family became concerned because he was having trouble ambulating     Data base has included:  Glucose 332 (H) mg/dL     Results for Fabby Felipe (MRN [id number]) as of 9/2/2018 13:13   Ref. Range 7/14/2012 04:39 4/27/2017 10:32 9/1/2017 05:19   Hemoglobin A1C Latest Ref Range: 4.0 - 6.0 % 11.6 (H) 8.5 (H) 9.0 (H)        BUN 42 (H) mg/dL   CREATININE 2.07 (H)     9/25/2017  8:04 AM   Component Value Ref Range & Units Status Collected Lab   Glucose 188   70 - 99 mg/dL Final 09/25/2017  7:30 AM [facility]   BUN 20  6 - 20 mg/dL Final 09/25/2017  7:30 AM [facility]   CREATININE 1.01                  WBC 11.8 (H) k/uL   RBC 3.55 (L) m/uL   Hemoglobin 10.3 (L) g/dL Hematocrit 32.9 (L)  Alb 3.3 (L)     EKG:  Narrative:   Normal sinus rhythm  Low voltage QRS  Borderline ECG  No previous ECGs available     pH, Ender 7.233 (L)   pCO2, Ender 33.4 (L) mm Hg   pO2, Ender 20.3 (L)  Beta-Hydroxybutyrate 0.21  Follow-up on 9/1 was 0.09    Results for Fabby Felipe (MRN [id number]) as of 9/2/2018 13:13   Ref. Range 8/31/2018 00:11 9/1/2018 15:18   Lactic Acid, Whole Blood Latest Ref Range: 0.7 - 2.1 mmol/L 1.8 1.0      Lipase 40     CT brain:  Impression:      No acute intracranial abnormality. Prominent vascular calcifications. Carotid scan:   Right:   The internal carotid artery is normal.   The vertebral artery is patent with antegrade flow.    Left:   The internal carotid artery is normal.   The vertebral artery is patent with antegrade flow. MRA:  Impression:   Punctate high signal in the posterior limb of the internal capsule on the  right without associated high diffusion signal.  This is likely due to a  prior ischemic focus. Abnormal flow void in the left internal carotid artery as it enters the  cavernous sinus. Otherwise, no acute intracranial abnormality is noted.   Severe narrowing 1 [facility]       Physical Exam   Constitutional: He is oriented to person, place, and time. No distress. HENT:   Head: Normocephalic and atraumatic. Eyes: Conjunctivae are normal. No scleral icterus. Neck: Neck supple. No JVD present. Cardiovascular: Normal rate, regular rhythm and normal heart sounds. Pulmonary/Chest: Effort normal and breath sounds normal. No respiratory distress. He has no wheezes. Abdominal: Soft. Bowel sounds are normal.   Musculoskeletal: He exhibits deformity (BKA noted). He exhibits no edema or tenderness. Range of motion of his arms seems to be intact   Neurological: He is alert and oriented to person, place, and time. Moving extremities ×4  No gross motor or sensory deficits   Skin: Skin is warm and dry. He is not diaphoretic. Data:     I/O (24Hr):     Intake/Output Summary (Last 24 hours) at 09/02/18 1303  Last data filed at 09/02/18 1212   Gross per 24 hour   Intake              700 ml   Output             1100 ml   Net             -400 ml       Labs:    Hematology:  Recent Labs      08/31/18 0011 09/02/18   0607   WBC  11.8*  8.4   HGB  10.3*  9.6*   HCT  32.9*  30.1*   PLT  293  340   INR  0.9   --      Chemistry:  Recent Labs      08/31/18   0011 08/31/18   2351  09/01/18   0255  09/01/18   0539  09/02/18   0607   NA  133*   --    --   139  140   K  4.3   --    --   3.9  4.0   CL  103   --    --   113*  112*   CO2  12*   --    --   12*  12*   GLUCOSE  332*   --   241  220*  162*   BUN  42*   --    --   34*  21*   CREATININE  2.07*  1.75*   --   1.29*  0.90   MG  2.0   --    --    --   2.0   CALCIUM  8.7   --    --   8.6  9.0     Recent Labs      08/31/18   0011  09/01/18   0247  09/01/18   0539  09/02/18   0607   PROT   --    --   7.2   --    LABALBU   --    --   3.3*   --    AST   --    --   31   --    ALT   --    --   45*   --    ALKPHOS   --    --   98   --    BILITOT   --    --   <0.10*   --    LIPASE  40   --    --    -- CHOL   --    --    --   119   TRIG   --    --    --   166*   HDL   --    --    --   23*   TROPONINI   --   0.00   --    --    CKTOTAL  38*   --    --    --    CKMB  1.3   --    --    --        Lab Results   Component Value Date/Time    SPECIAL NOT REPORTED 09/14/2017 09:22 AM    SPECIAL RT LEG 09/14/2017 09:22 AM     Lab Results   Component Value Date/Time    CULTURE DUPLICATE ORDER 23/61/8909 09:22 AM    CULTURE  09/14/2017 09:22 AM     [address] [phone]    CULTURE NO GROWTH 5 DAYS 09/14/2017 09:22 AM    CULTURE  09/14/2017 09:22 AM     [address] [phone]       Lab Results   Component Value Date    FIO2 NOT REPORTED 08/31/2018       Radiology:  See above    Assessment:        Primary Problem  Principal Problem:    Stenosis of left internal carotid artery - at the cavernous sinus  Active Problems:    Essential hypertension    IDDM (insulin dependent diabetes mellitus) ([facility])    Neuropathy ([facility])    Acute kidney injury ([facility]    Hx of BKA (HCC)    Impaired mobility    Retinopathy, diabetic, bilateral (HCC)    Chest pain, atypical    Paresthesia of right upper extremity    Metabolic acidosis    Right arm pain    Neck pain    Calculus of gallbladder without cholecystitis without obstruction  Resolved Problems:    * No resolved hospital problems. *      Plan:        Discharge planning  Aspirin  Metoprolol  Statin  EKG  Enzymes  Will monitor and control Diabetes  Neurology evaluation and f/u - MR reviewed  Check bun and creatinine   Will monitor and control Blood Pressure   Physical therapy and rehabilitation  The patient's status and plan have been discussed with the patient and family at the bedside   Suggested possible general surgical consultation regarding cholelithiasis  Will discharge when arrangements complete and ok with other services.   Follow-up with PCP in one week, Akanksha Bennett MD  Notify PCP of discharge     IP CONSULT TO NEUROLOGY  IP CONSULT TO HOSPITALIST  IP CONSULT TO SPIRITUAL SERVICES  IP CONSULT TO [id number] Devin Pennington DO  9/2/2018  1:03 PM

## 2018-09-02 NOTE — PROGRESS NOTES
NEUROLOGY PROGRESS NOTE    9/2/2018         INTERVAL HISTORY: Akosua Galeana is a  45 y.o. male admitted on 8/31/2018 with Chest pain [R07.9]. This is a follow-up neurology progress note. The patient was seen and examined and the chart was reviewed. There were no acute events overnight. Patient states he is back to his baseline today. He received an MRI brain this morning, no acute infarct was seen. Carotid ultrasound was unremarkable, MRA head/neck did show severe narrowing at area of L ICA entering the cavernous sinus.        Medications:   metoprolol  5 mg Intravenous Once    amLODIPine  10 mg Oral Daily    chlorthalidone  25 mg Oral Daily    folic acid  1 mg Oral Daily    insulin glargine  20 Units Subcutaneous Nightly    metoprolol tartrate  50 mg Oral BID    pantoprazole  40 mg Oral QAM AC    pravastatin  10 mg Oral Daily    pregabalin  25 mg Oral TID    timolol  1 drop Both Eyes BID    latanoprost  1 drop Both Eyes Nightly    tamsulosin  0.4 mg Oral Daily    heparin (porcine)  5,000 Units Subcutaneous 3 times per day    insulin lispro  0-18 Units Subcutaneous TID WC    insulin lispro  0-9 Units Subcutaneous Nightly    sodium chloride flush  10 mL Intravenous 2 times per day    aspirin  81 mg Oral Daily       Past Medical History:   Diagnosis Date    Acid reflux     Calculus of gallbladder without cholecystitis without obstruction 9/2/2018    Cerebrovascular disease 2006    TIA    Diabetic foot infection ([address]     Drug (multiple) resistant infection     GERD (gastroesophageal reflux disease)     Glaucoma     Glaucoma     Hyperlipidemia     Hypertension     IDDM (insulin dependent diabetes mellitus) (HCC)     MDRO (multiple drug resistant organisms) resistance     MRSA (methicillin resistant staph aureus) culture positive 09/03/2017    foot    Neuropathy     Osteomyelitis (HCC)     Left Hallux    S/P cataract extraction and insertion of intraocular lens 2015    bilateral     Type II or unspecified type diabetes mellitus without mention of complication, not stated as uncontrolled        Past Surgical History:   Procedure Laterality Date    EYE SURGERY Bilateral     lazer both eyes    FOOT SURGERY Left 02/24/2017    surgical prep of wound bed    FOOT SURGERY Left 04/13/2017    1) Left foot surgical preparation of wound-bed, 2) Left foot application of graft     HC  PICC [address] DOUBLE  4/28/2017         LEG AMPUTATION BELOW KNEE Left 9/7/2017    LEFT BELOW KNEE GUILLOTINE AMPUTATION performed by Cindy Ramos MD at [address] Left 9/14/2017    REVISION LEFT BELOW KNEE AMPUTATION, CLOSED  performed by Cindy Ramos MD at [facility] Left 01/23/2017    I and D bone, bone biopsy with flap closure and pulse lavage    OTHER SURGICAL HISTORY Left 04/27/2017    I & D foot    IA DEEP DISSEC FOOT INFEC,1 [facility] Left 4/13/2017    FOOT DEBRIDEMENT WITH EPIFIX  performed by Bridger Charlton DPM at [address] INCIS/DRAIN THIGH/KNEE ABSCESS,DEEP Left 9/12/2017     LEFT BELOW KNEE AMPUTATION OPEN, WOUND VAC PLACEMENT performed by Cindy Ramos MD at [address] Left 2014    hallux    TOE AMPUTATION Left 12/09/2016    hallux    TOE AMPUTATION Left 4/27/2017    I AND D FOOT, BONE BIOPSY, POSSIBLE FILET 2ND TOE performed by Bridger Charlton DPM at [facility]  04/17/2017    Left second toe          Objective:   BP (!) 142/54   Pulse 69   Temp 98 °F (36.7 °C) (Oral)   Resp 13   Ht 5' 7\" (1.702 m)   Wt 172 lb 12.8 oz (78.4 kg)   SpO2 99%   BMI 27.06 kg/m²       General examination:   Head: Normocephalic, atraumatic  Eyes: R eye implant. Extraocular movements intact  Lungs: Respirations unlabored, chest wall no deformity  ENT: Normal external ear canals, no sinus tenderness  Heart: Regular rate rhythm  Abdomen: No masses, tenderness  Extremities: L BKA.  No cyanosis or edema, 2+ pulses  Skin: Intact, normal skin color        NEUROLOGIC EXAMINATION  GENERAL  Appears comfortable and in no distress   HEENT  NC/ AT   NECK  Supple and no bruits heard   MENTAL STATUS:  Alert, oriented, intact memory, no confusion, normal speech, normal language, no hallucination or delusion   CRANIAL NERVES: II     -      R eye implant; diminished vision b/l  III,IV,VI -  EOMs full, no afferent defect, no GYPSY, no ptosis  V     -     Normal facial sensation  VII    -     Normal facial symmetry  VIII   -     Intact hearing  IX,X -     Symmetrical palate  XI    -     Symmetrical shoulder shrug  XII   -     Midline tongue, no atrophy    MOTOR FUNCTION:  strength of 5/5 throughout. L BKA; normal tone   SENSORY FUNCTION: sensation intact b/l to light touch. CEREBELLAR FUNCTION:  No visible tremors   REFLEX FUNCTION:  L BKA; hyporeflexia throughout; R plantar - equivocal   STATION and GAIT  Not tested        LABS:    CBC:   Recent Labs      08/31/18   0011  09/02/18   0607   WBC  11.8*  8.4   HGB  10.3*  9.6*   PLT  293  340     BMP:    Recent Labs      08/31/18   0011  08/31/18   2351  09/01/18   0255  09/01/18   0539  09/02/18   0607   NA  133*   --    --   139  140   K  4.3   --    --   3.9  4.0   CL  103   --    --   113*  112*   CO2  12*   --    --   12*  12*   BUN  42*   --    --   34*  21*   CREATININE  2.07*  1.75*   --   1.29*  0.90   GLUCOSE  332*   --   241  220*  162*         Lab Results   Component Value Date    CHOL 119 09/02/2018    LDLCHOLESTEROL 63 09/02/2018    HDL 23 (L) 09/02/2018    TRIG 166 (H) 09/02/2018    ALT 45 (H) 09/01/2018    AST 31 09/01/2018    TSH 3.65 09/01/2017    INR 0.9 08/31/2018    LABA1C 9.0 (H) 09/01/2017    [id number] >2000 (H) 09/01/2017         IMAGING:   Carotid U/S: unremarkable. MRA head/neck: severe narrowing of L ICA as it enters cavernous sinus. MRI brain  - no acute infarct.  Questionable old ischemic focus in R int capsul  2 D echo - EF 55%         Impression and Plan:  Chago Faulkner is a 45 y.o. male with   Pain & stiffness neck & R UE with R UE tingling/weakness - now resolved. - CT head x 2 negative. MRI negative. Frannie Rain shows severe narrowing of L ICA near cavernous sinus; will place on dual anti-platelet therapy with ASA 81 mg and Plavix 75 mg.     H/O DM neuropathy, R foot; takes Lyrica 25 mg TID     L BKA; uses a prosthetic leg & walks with a cane     Maintain Pravachol 10 mg HS     F/u endovascular neurology in 3-4 weeks.      Stable for discharge from Neurology standpoint.        Electronically signed by Monisha Tejada DO on 9/2/2018 at 3:41 PM      Monisha Tejada DO  Neurology

## 2018-09-02 NOTE — PROGRESS NOTES
Physical Therapy    Facility/Department: [facility], [place] CAR 3  Initial Assessment    NAME: Marge Flaherty  : 1965  MRN: [id number]    Date of Service: 2018  The patient is a 45 y.o. male who presents with:   Chest Pain; Arm Pain; and Numbness     Patient was admitted thru ER with:  \"Kin Zhang is a 45 y. o. male who presents with Right arm pain, right-sided neck and back pain, chest pain, numbness to right side of body.  He has history of diabetes, is blind, below-knee amputation on the left.  Patient states 15 minutes prior to arrival he had right-sided pain and numbness starting in the right side of his neck and down his right arm and right leg.  Also complaining of chest pain and shortness of breath.  States his blood sugars in the 400s at home earlier.  Family states that he has been acting tired and fatigued and seems to be having an unsteady gait at home.  Past history of TIA, not on blood thinning medications currently. \"     Discharge Recommendations:  24 hour supervision or assist   PT Equipment Recommendations  Equipment Needed: Yes  Other: Pt may benefit from use of rolling walker. Will advise. Patient Diagnosis(es): The primary encounter diagnosis was Chest pain, unspecified type. Diagnoses of Right arm pain and COLT (acute kidney injury) ([address] were also pertinent to this visit. has a past medical history of Acid reflux; Cerebrovascular disease; Diabetic foot infection ([address] Drug (multiple) resistant infection; GERD (gastroesophageal reflux disease); Glaucoma; Glaucoma; Hyperlipidemia; Hypertension; IDDM (insulin dependent diabetes mellitus) ([address] MDRO (multiple drug resistant organisms) resistance; MRSA (methicillin resistant staph aureus) culture positive; Neuropathy; Osteomyelitis ([address] S/P cataract extraction and insertion of intraocular lens; and Type II or unspecified type diabetes mellitus without mention of complication, not stated as uncontrolled.    has a past surgical history that includes Toe amputation (Left, 2014); eye surgery (Bilateral); Toe amputation (Left, 12/09/2016); other surgical history (Left, 01/23/2017); Foot surgery (Left, 02/24/2017); Foot surgery (Left, 04/13/2017); pr deep dissec foot infec,1 bursa (Left, 4/13/2017); other surgical history (Left, 04/27/2017); Toe amputation (Left, 4/27/2017); hc  picc powerpicc double (4/28/2017); Leg amputation below knee (Left, 9/7/2017); pr incis/drain thigh/knee abscess,deep (Left, 9/12/2017); Leg amputation below knee (Left, 9/14/2017); and Toe amputation (04/17/2017). Restrictions  Restrictions/Precautions  Restrictions/Precautions: Fall Risk  Vision/Hearing  Vision: Impaired  Vision Exceptions: Legally blind  Hearing: Within functional limits     Subjective  General  Patient assessed for rehabilitation services?: Yes  Additional Pertinent Hx: Lt BKA  Family / Caregiver Present: Yes  Follows Commands: Within Functional Limits  Pain Screening  Patient Currently in Pain: Denies  Vital Signs  Patient Currently in Pain: Denies       Orientation  Orientation  Overall Orientation Status: Within Functional Limits    Social/Functional History  Social/Functional History  Lives With: Family  Type of Home: House  Home Layout: One level  Home Access: Stairs to enter with rails  Entrance Stairs - Number of Steps: [phone]: Shower chair  Home Equipment: [place] Help From: Family  ADL Assistance: Needs assistance  Homemaking Responsibilities: No  Ambulation Assistance: Independent with cane.   Transfer Assistance: Independent  Active : No  Objective          AROM RLE (degrees)  RLE AROM: WFL  AROM LLE (degrees)  LLE AROM : WFL  LLE General AROM: Lt BKA  AROM RUE (degrees)  RUE AROM : WFL  AROM LUE (degrees)  LUE AROM : WFL  Strength RLE  Strength RLE: WFL  Strength LLE  Strength LLE: WFL  Strength RUE  Strength RUE: WFL  Strength LUE  Strength LUE: WFL  Motor Control  Gross Motor?: WFL  Sensation  Overall Sensation goals: 10 visits  Short term goal 1: Pt to transfer SBA  Short term goal 2: Pt to ambulate with least restricitve device 150 ft with assist for guidance only. (blind)  Short term goal 3: Pt to navigate 5 stairs CGA.   Patient Goals   Patient goals : Go home soon       Therapy Time   Individual Concurrent Group Co-treatment   Time In 1050         Time Out 1103         Minutes [address] Reg[address] Mcdonnell, PT

## 2018-09-02 NOTE — DISCHARGE SUMMARY
[facility]    Discharge Summary     Patient ID: Kellee Frey  :  1965   MRN: [id number]     ACCOUNT:  [de-identified]   Patient's PCP: Arti Gay MD  Admit Date: 2018   Discharge Date: 2018    Discharge Physician: Mino Burger DO     The patient was seen and examined on day of discharge and this discharge summary is in conjunction with any daily progress note from day of discharge. Active Discharge Diagnoses:     Primary Problem  Hemispheric carotid artery syndrome      Hospital Problems  Active Hospital Problems    Diagnosis Date Noted    Internal carotid artery stenosis, left [I65.22] 2018    Calculus of gallbladder without cholecystitis without obstruction [K80.20] 2018    Hemispheric carotid artery syndrome [G45.1] 2018    Chest pain, atypical [R07.89] 2018    Paresthesia of right upper extremity [R20.2]     Metabolic acidosis [I47.6] 2018    Right arm pain [M79.601]     Neck pain [M54.2]     Impaired mobility [Z74.09]     Hx of BKA ([facility] [Z89.519] 2017    Acute kidney injury ([address] [N17.9] 2017    Retinopathy, diabetic, bilateral ([address] [E11.319] 2016    Essential hypertension [I10]     IDDM (insulin dependent diabetes mellitus) ([address] [E11.9, Z79.4]     Neuropathy ([address] [G62.9]          Hospital Course:     Brief History:  The patient is a 45 y. o. male who presents with:   Chest Pain; Arm Pain; and Numbness     Patient was admitted thru ER with:  \"Kin Zhang is a 45 y. o. male who presents with Right arm pain, right-sided neck and back pain, chest pain, numbness to right side of body.  He has history of diabetes, is blind, below-knee amputation on the left.  Patient states 15 minutes prior to arrival he had right-sided pain and numbness starting in the right side of his neck and down his right arm and right leg.  Also complaining of chest pain and shortness of breath.  States his blood sugars in the 400s at home earlier. [facility] states that he has been acting tired and fatigued and seems to be having an unsteady gait at home.  Past history of TIA, not on blood thinning medications currently. \"     49-year-old male admitted to the hospital through the emergency room  He reports a variety of complaints  Chest pain / ache - nonexertional  Pain and stiffness arms, neck, back  Right-sided numbness  The patient reports a prior stroke when he was in his 35s, he was working in the fields with very high temps, he was overcome by the heat and taken to the hospital. [facility] what sounds like heat stroke  Initial blood pressure has been in his 185/64   The family became concerned because he was having trouble ambulating     Data base has included:  Glucose 332 (H) mg/dL      Results for Dona Cerna (MRN [id number]) as of 9/2/2018 13:13    Ref. Range 7/14/2012 04:39 4/27/2017 10:32 9/1/2017 05:19   Hemoglobin A1C Latest Ref Range: 4.0 - 6.0 % 11.6 (H) 8.5 (H) 9.0 (H)         BUN 42 (H) mg/dL   CREATININE 2.07 (H)     9/25/2017  8:04 AM   Component Value Ref Range & Units Status Collected Lab   Glucose 188   70 - 99 mg/dL Final 09/25/2017  7:30 AM [facility]- [address] Lab   BUN 20  6 - 20 mg/dL Final 09/25/2017  7:30 AM [facility] [address] Lab   CREATININE 1.01                  WBC 11.8 (H) k/uL   RBC 3.55 (L) m/uL   Hemoglobin 10.3 (L) g/dL Hematocrit 32.9 (L)  Alb 3.3 (L)     EKG:  Narrative:   Normal sinus rhythm  Low voltage QRS  Borderline ECG  No previous ECGs available     pH, Ender 7.233 (L)   pCO2, Ender 33.4 (L) mm Hg   pO2, Ender 20.3 (L)  Beta-Hydroxybutyrate 0.21  Follow-up on 9/1 was 0.09     Results for Dona Cerna (MRN [id number]) as of 9/2/2018 13:13    Ref. Range 8/31/2018 00:11 9/1/2018 15:18   Lactic Acid, Whole Blood Latest Ref Range: 0.7 - 2.1 mmol/L 1.8 1.0      Lipase 40     CT brain:  Impression:      No acute intracranial abnormality.   Prominent vascular calcifications.     Carotid scan:   Right:   The internal carotid artery is normal.   The vertebral artery is patent with antegrade flow.    Left:   The internal carotid artery is normal.   The vertebral artery is patent with antegrade flow.     MRA:  Impression:   Punctate high signal in the posterior limb of the internal capsule on the  right without associated high diffusion signal.  This is likely due to a  prior ischemic focus. Abnormal flow void in the left internal carotid artery as it enters the  cavernous sinus. Otherwise, no acute intracranial abnormality is noted. Severe narrowing of the left internal carotid artery as it enters the  cavernous sinus.  Otherwise, mild multifocal cavernous carotid artery  narrowing is noted. No focal significant arterial narrowing in the neck     The patient was admitted  Neurology was consulted  His renal functions carefully observed  His blood pressure was monitored  His blood sugars were monitored and controlled     The patient has responded to medical therapy  His condition has been stabilized  Discharge planning initiated  Will discharge when cleared by neurology  He will follow-up with the endovascular service      Discharge plan:     Aspirin  Metoprolol  Statin  EKG  Enzymes  Will monitor and control Diabetes  Neurology evaluation and f/u - MR reviewed  Check bun and creatinine   Will monitor and control Blood Pressure   Physical therapy and rehabilitation  The patient's status and plan have been discussed with the patient and family at the bedside   Suggested possible general surgical consultation regarding cholelithiasis  Will discharge when arrangements complete and ok with other services.   Follow-up with PCP in one week, Leighton Lombard, MD  Notify PCP of discharge     Significant Diagnostic Studies:   Labs / Micro:   Hematology:  Recent Labs      08/31/18   0011  09/02/18   0607   WBC  11.8*  8.4   HGB  10.3*  9.6*   HCT  32.9*  30.1*   PLT  293  340 paranasal sinuses and mastoid air cells demonstrate no acute abnormality. Trace paranasal sinus mucosal thickening. SOFT TISSUES/SKULL:  No acute abnormality of the visualized skull or soft tissues. No acute intracranial abnormality. Prominent vascular calcifications. Stroke results were conveyed by Dr. Fior Sarmiento to Dr. Jackelyn Arevalo on 9/1/2018 at 00:13. Ct Head Wo Contrast    Result Date: 9/1/2018  EXAMINATION: CT OF THE HEAD WITHOUT CONTRAST  9/1/2018 8:15 am TECHNIQUE: CT of the head was performed without the administration of intravenous contrast. Dose modulation, iterative reconstruction, and/or weight based adjustment of the mA/kV was utilized to reduce the radiation dose to as low as reasonably achievable. COMPARISON: August 31 HISTORY: ORDERING SYSTEM PROVIDED HISTORY: STROKE TECHNOLOGIST PROVIDED HISTORY: FINDINGS: BRAIN/VENTRICLES: Ventricles and sulci are stable. Punctate low-density in the right posterior inferior thalamus is stable. Punctate low-density in the right inferior lentiform nucleus is stable. No definite new areas of abnormal density are noted. Posterior fossa and trevor are limited due to artifact. There is low-density in the trevor anteriorly, likely artifactual. Numerous vascular calcifications are noted. No hyperdense vessels are otherwise noted ORBITS: The visualized portion of the orbits demonstrate no acute abnormality. SINUSES: No air-fluid levels are noted SOFT TISSUES/SKULL:  Irregularity of the lateral margin of the left maxilla is again noted, unchanged. No acute osseous abnormality is noted. No acute intracranial abnormality.      Mra Head Wo Contrast    Result Date: 9/2/2018  EXAMINATION: MRI OF THE BRAIN WITHOUT CONTRAST; MRA OF THE HEAD WITHOUT CONTRAST; MRA OF THE NECK WITHOUT CONTRAST  9/2/2018 11:45 am TECHNIQUE: Multiplanar multisequence MRI of the brain was performed without the administration of intravenous contrast.; MRA of the head was performed short of breath Acuity: Unknown Type of Exam: Unknown FINDINGS: Cardiomediastinal contour is within normal limits. No focal consolidation. No significant pleural effusion. 1. No acute cardiopulmonary disease. Vl Dup Carotid Bilateral    Result Date: 9/1/2018    [facility]  Vascular Carotid Procedure   Patient Name  Michelle Virgen     Date of Study         09/01/2018                JAMEL   Date of Birth 1965  Gender                Male   Age           45 year(s)  Race                  Other   Room Number   3019        Height:               67 inch, 170.18 cm   Corporate ID  [id number]  Weight:               187 pounds, 84.8 kg  #   Patient Acct  [de-identified]   BSA:       1.97 m^2   BMI:         29.29 kg/m^2  #   MR #          [id number]     Sonographer           Tamar Dallas   Accession #   [id number]   Interpreting          Miles Pedro                            Physician   Referring                 Referring Physician   Miya Lazcano MD  Nurse  Practitioner  Procedure Type of Study:   Cerebral: Carotid, Carotid Scan Bilateral.  Indications for Study:CVA. Blood Pressure:Right arm 181/84 mmHg. Left arm 169/78 mmHg. Patient Status: In Patient. Comments:Basic Classification of ICA Stenosis: PSV - Peak Systolic Velocity Normal: No plaque or calcification identified, no elevation of PSV Mild: <50% spectral broadening without increased PSV Moderate: 50 - 69% PSV >125 - <230 cm/sec Severe: 70 - 99% PSV >230 cm/sec Critical: 80 - 99% PSV >230cm/sec and/or End Diastolic Velocities >693ZD/FVK  Conclusions   Summary   Simultaneous real time imaging utilizing B-Mode, color flow doppler and  spectral waveform analysis was performed on the bilateral extracerebral  vascular system. The study demonstrates:   Right:  The internal carotid artery is normal.  The vertebral artery is patent with antegrade flow. Left:  The internal carotid artery is normal.  The vertebral artery is patent with antegrade flow. Signature   ----------------------------------------------------------------  Electronically signed by Tamar Dallas(Sonographer) on  09/01/2018 10:26 AM  ----------------------------------------------------------------   ----------------------------------------------------------------  Electronically signed by Loyd Pete(Interpreting physician)  on 09/01/2018 08:22 PM  ----------------------------------------------------------------  Findings:   Right Impression:                  Left Impression:  The common carotid artery is       The common carotid artery is normal.  normal.                                     The carotid bulb has an irregular  The carotid bulb has a smooth      fibrocalcific plaque causing a <50%  fibrous plaque causing a <50%      stenosis based on velocities. stenosis based on velocities. The internal carotid artery is normal.  The internal carotid artery is  normal.                            The external carotid artery is normal.   The external carotid artery is     The vertebral artery is patent with  normal.                            antegrade flow. The vertebral artery is patent  with antegrade flow. Risk Factors History +---------------------------------------+----------+-----------------------+ ! Diagnosis                              ! Date      ! Comments               ! +---------------------------------------+----------+-----------------------+ ! Previous Scan                          !11/22/2016! WNL                    ! +---------------------------------------+----------+-----------------------+ ! Peripheral vascular disease->Amputation!           !LT great and 2nd       ! !                                       !          !toe--BKA               ! +---------------------------------------+----------+-----------------------+   - The patient's risk factor(s) include: diabetes mellitus, dyslipidemia     and arterial hypertension.   - The patient has +------------+-------+-------+--------+-------+------------+---------------+ ! Location    ! PSV    ! EDV    ! Angle   ! RI     !%Stenosis   ! Tortuosity     ! +------------+-------+-------+--------+-------+------------+---------------+ ! Prox CCA    !69.6   !13     !60      !0.81   !            !               ! +------------+-------+-------+--------+-------+------------+---------------+ ! Mid CCA     !71.4   !       !60      !       !            !               ! +------------+-------+-------+--------+-------+------------+---------------+ ! Dist CCA    !72.1   !17.4   !        !0.76   !            !               ! +------------+-------+-------+--------+-------+------------+---------------+ ! Bulb        !44.7   !16.8   !60      !0.62   !            !               ! +------------+-------+-------+--------+-------+------------+---------------+ ! Prox ICA    !62.7   !23     !60      !0.63   !            !               ! +------------+-------+-------+--------+-------+------------+---------------+ ! Mid ICA     !80.8   !34.8   !60      !0.57   !            !               ! +------------+-------+-------+--------+-------+------------+---------------+ ! Dist ICA    !80.8   !31.7   !60      !0.61   !            !               ! +------------+-------+-------+--------+-------+------------+---------------+ ! Prox ECA    !103    !13.7   !60      !0.87   !            !               ! +------------+-------+-------+--------+-------+------------+---------------+ ! Vertebral   !84.5   !21.7   !60      !0.74   !            !               ! +------------+-------+-------+--------+-------+------------+---------------+   - There is antegrade vertebral flow noted on the left side. - Additional Measurements:ICAPSV/CCAPSV 1.16. ICAEDV/CCAEDV 2.68.     Mra Neck Wo Contrast    Result Date: 9/2/2018  EXAMINATION: MRI OF THE BRAIN WITHOUT CONTRAST; MRA OF THE HEAD WITHOUT CONTRAST; MRA OF THE NECK WITHOUT CONTRAST  9/2/2018 11:45 am TECHNIQUE: significant arterial narrowing in the neck         Consultations:    Consults:     Final Specialist Recommendations/Findings:   IP CONSULT TO NEUROLOGY  IP CONSULT TO HOSPITALIST  IP CONSULT TO SPIRITUAL SERVICES  IP CONSULT TO NEUROLOGY        Discharged Condition:    Stable     Disposition: Home    Physician Follow Up:   MD Nima Landrum[address] [facility]  [phone]    In 7 days      Mathew Puri MD  [facility]  [address] [address]  [phone]    In 3 weeks         Diet:   ADA    Activity:   As tolerated    Discharge Medications:      Medication List      START taking these medications    aspirin 81 MG EC tablet  Take 1 tablet by mouth daily        CHANGE how you take these medications    pregabalin 25 MG capsule  Commonly known as:  LYRICA  Take 1 capsule by mouth 3 times daily  What changed:  how much to take        CONTINUE taking these medications    acetaZOLAMIDE 500 MG extended release capsule  Commonly known as:  DIAMOX     amLODIPine 10 MG tablet  Commonly known as:  NORVASC  Take 1 tablet by mouth daily     chlorthalidone 25 MG tablet  Commonly known as:  HYGROTON  Take 1 tablet by mouth daily     cyclobenzaprine 5 MG tablet  Commonly known as:  FLEXERIL     docusate sodium 100 MG capsule  Commonly known as:  COLACE  Take 1 capsule by mouth 2 times daily as needed for Constipation     folic acid 1 MG tablet  Commonly known as:  FOLVITE  Take 1 tablet by mouth daily     HYDROcodone-acetaminophen 5-325 MG per tablet  Commonly known as:  NORCO     insulin glargine 100 UNIT/ML injection vial  Commonly known as:  LANTUS  Inject 20 Units into the skin nightly     lisinopril 40 MG tablet  Commonly known as:  PRINIVIL;ZESTRIL  Take 1 tablet by mouth daily     metFORMIN 500 MG tablet  Commonly known as:  GLUCOPHAGE     metoprolol tartrate 50 MG tablet  Commonly known as:  LOPRESSOR  Take 1 tablet by mouth 2 times daily     omeprazole 20 MG delayed release

## 2018-09-17 ENCOUNTER — APPOINTMENT (OUTPATIENT)
Dept: GENERAL RADIOLOGY | Age: 53
End: 2018-09-17
Payer: COMMERCIAL

## 2018-09-17 ENCOUNTER — APPOINTMENT (OUTPATIENT)
Dept: CT IMAGING | Age: 53
End: 2018-09-17
Payer: COMMERCIAL

## 2018-09-17 ENCOUNTER — HOSPITAL ENCOUNTER (EMERGENCY)
Age: 53
Discharge: HOME | End: 2018-09-18
Attending: EMERGENCY MEDICINE
Payer: COMMERCIAL

## 2018-09-17 DIAGNOSIS — R10.9 ABDOMINAL PAIN, UNSPECIFIED ABDOMINAL LOCATION: Primary | ICD-10-CM

## 2018-09-17 DIAGNOSIS — K80.80 BILIARY CALCULUS OF OTHER SITE WITHOUT OBSTRUCTION: ICD-10-CM

## 2018-09-17 LAB
ABSOLUTE EOS #: 0.31 K/UL (ref 0–0.44)
ABSOLUTE IMMATURE GRANULOCYTE: 0.07 K/UL (ref 0–0.3)
ABSOLUTE LYMPH #: 3.99 K/UL (ref 1.1–3.7)
ABSOLUTE MONO #: 1.04 K/UL (ref 0.1–1.2)
ALBUMIN SERPL-MCNC: 3.7 G/DL (ref 3.5–5.2)
ALBUMIN/GLOBULIN RATIO: 1 (ref 1–2.5)
ALP BLD-CCNC: 99 U/L (ref 40–129)
ALT SERPL-CCNC: 10 U/L (ref 5–41)
ANION GAP SERPL CALCULATED.3IONS-SCNC: 16 MMOL/L (ref 9–17)
AST SERPL-CCNC: 11 U/L
BASOPHILS # BLD: 1 % (ref 0–2)
BASOPHILS ABSOLUTE: 0.08 K/UL (ref 0–0.2)
BILIRUB SERPL-MCNC: <0.1 MG/DL (ref 0.3–1.2)
BILIRUBIN DIRECT: <0.08 MG/DL
BILIRUBIN, INDIRECT: ABNORMAL MG/DL (ref 0–1)
BUN BLDV-MCNC: 30 MG/DL (ref 6–20)
BUN/CREAT BLD: ABNORMAL (ref 9–20)
CALCIUM SERPL-MCNC: 9 MG/DL (ref 8.6–10.4)
CHLORIDE BLD-SCNC: 112 MMOL/L (ref 98–107)
CO2: 12 MMOL/L (ref 20–31)
CREAT SERPL-MCNC: 1.41 MG/DL (ref 0.7–1.2)
DIFFERENTIAL TYPE: ABNORMAL
EKG ATRIAL RATE: 81 BPM
EKG P AXIS: 58 DEGREES
EKG P-R INTERVAL: 144 MS
EKG Q-T INTERVAL: 392 MS
EKG QRS DURATION: 90 MS
EKG QTC CALCULATION (BAZETT): 455 MS
EKG R AXIS: 61 DEGREES
EKG T AXIS: 44 DEGREES
EKG VENTRICULAR RATE: 81 BPM
EOSINOPHILS RELATIVE PERCENT: 2 % (ref 1–4)
GFR AFRICAN AMERICAN: >60 ML/MIN
GFR NON-AFRICAN AMERICAN: 52 ML/MIN
GFR SERPL CREATININE-BSD FRML MDRD: ABNORMAL ML/MIN/{1.73_M2}
GFR SERPL CREATININE-BSD FRML MDRD: ABNORMAL ML/MIN/{1.73_M2}
GLOBULIN: ABNORMAL G/DL (ref 1.5–3.8)
GLUCOSE BLD-MCNC: 249 MG/DL (ref 70–99)
HCT VFR BLD CALC: 34.2 % (ref 40.7–50.3)
HEMOGLOBIN: 10.8 G/DL (ref 13–17)
IMMATURE GRANULOCYTES: 1 %
LACTIC ACID, WHOLE BLOOD: 1.6 MMOL/L (ref 0.7–2.1)
LIPASE: 72 U/L (ref 13–60)
LYMPHOCYTES # BLD: 27 % (ref 24–43)
MCH RBC QN AUTO: 28.7 PG (ref 25.2–33.5)
MCHC RBC AUTO-ENTMCNC: 31.6 G/DL (ref 28.4–34.8)
MCV RBC AUTO: 91 FL (ref 82.6–102.9)
MONOCYTES # BLD: 7 % (ref 3–12)
NRBC AUTOMATED: 0 PER 100 WBC
PDW BLD-RTO: 14.2 % (ref 11.8–14.4)
PLATELET # BLD: ABNORMAL K/UL (ref 138–453)
PLATELET ESTIMATE: ABNORMAL
PLATELET, FLUORESCENCE: 338 K/UL (ref 138–453)
PLATELET, IMMATURE FRACTION: 5.6 % (ref 1.1–10.3)
PMV BLD AUTO: ABNORMAL FL (ref 8.1–13.5)
POC TROPONIN I: 0.01 NG/ML (ref 0–0.1)
POC TROPONIN INTERP: NORMAL
POTASSIUM SERPL-SCNC: 3.6 MMOL/L (ref 3.7–5.3)
RBC # BLD: 3.76 M/UL (ref 4.21–5.77)
RBC # BLD: ABNORMAL 10*6/UL
SEG NEUTROPHILS: 63 % (ref 36–65)
SEGMENTED NEUTROPHILS ABSOLUTE COUNT: 9.23 K/UL (ref 1.5–8.1)
SODIUM BLD-SCNC: 140 MMOL/L (ref 135–144)
TOTAL PROTEIN: 7.3 G/DL (ref 6.4–8.3)
WBC # BLD: 14.7 K/UL (ref 3.5–11.3)
WBC # BLD: ABNORMAL 10*3/UL

## 2018-09-17 PROCEDURE — 71046 X-RAY EXAM CHEST 2 VIEWS: CPT

## 2018-09-17 PROCEDURE — 85025 COMPLETE CBC W/AUTO DIFF WBC: CPT

## 2018-09-17 PROCEDURE — 6360000002 HC RX W HCPCS: Performed by: STUDENT IN AN ORGANIZED HEALTH CARE EDUCATION/TRAINING PROGRAM

## 2018-09-17 PROCEDURE — 6360000004 HC RX CONTRAST MEDICATION: Performed by: STUDENT IN AN ORGANIZED HEALTH CARE EDUCATION/TRAINING PROGRAM

## 2018-09-17 PROCEDURE — 84484 ASSAY OF TROPONIN QUANT: CPT

## 2018-09-17 PROCEDURE — 83605 ASSAY OF LACTIC ACID: CPT

## 2018-09-17 PROCEDURE — 93005 ELECTROCARDIOGRAM TRACING: CPT

## 2018-09-17 PROCEDURE — 74177 CT ABD & PELVIS W/CONTRAST: CPT

## 2018-09-17 PROCEDURE — 80048 BASIC METABOLIC PNL TOTAL CA: CPT

## 2018-09-17 PROCEDURE — 85055 RETICULATED PLATELET ASSAY: CPT

## 2018-09-17 PROCEDURE — 96375 TX/PRO/DX INJ NEW DRUG ADDON: CPT

## 2018-09-17 PROCEDURE — 99285 EMERGENCY DEPT VISIT HI MDM: CPT

## 2018-09-17 PROCEDURE — 80076 HEPATIC FUNCTION PANEL: CPT

## 2018-09-17 PROCEDURE — 96374 THER/PROPH/DIAG INJ IV PUSH: CPT

## 2018-09-17 PROCEDURE — 83690 ASSAY OF LIPASE: CPT

## 2018-09-17 RX ORDER — MORPHINE SULFATE 4 MG/ML
4 INJECTION, SOLUTION INTRAMUSCULAR; INTRAVENOUS ONCE
Status: COMPLETED | OUTPATIENT
Start: 2018-09-17 | End: 2018-09-17

## 2018-09-17 RX ORDER — ONDANSETRON 4 MG/1
4 TABLET, ORALLY DISINTEGRATING ORAL ONCE
Status: COMPLETED | OUTPATIENT
Start: 2018-09-17 | End: 2018-09-17

## 2018-09-17 RX ADMIN — MORPHINE SULFATE 4 MG: 4 INJECTION INTRAVENOUS at 23:07

## 2018-09-17 RX ADMIN — ONDANSETRON 4 MG: 4 TABLET, ORALLY DISINTEGRATING ORAL at 23:07

## 2018-09-17 RX ADMIN — IOPAMIDOL 75 ML: 755 INJECTION, SOLUTION INTRAVENOUS at 23:51

## 2018-09-17 ASSESSMENT — PAIN SCALES - GENERAL: PAINLEVEL_OUTOF10: 9

## 2018-09-17 NOTE — Clinical Note
Patient Class: Inpatient [101]   REQUIRED: Diagnosis: Abdominal pain [796636]   Estimated Length of Stay: Estimated stay of more than 2 midnights   Future Attending Provider: Lauren Thomas [[id number]]   Telemetry Bed Required?: Yes

## 2018-09-18 ENCOUNTER — APPOINTMENT (OUTPATIENT)
Dept: ULTRASOUND IMAGING | Age: 53
End: 2018-09-18
Payer: COMMERCIAL

## 2018-09-18 ENCOUNTER — APPOINTMENT (OUTPATIENT)
Dept: MRI IMAGING | Age: 53
End: 2018-09-18
Payer: COMMERCIAL

## 2018-09-18 VITALS
RESPIRATION RATE: 20 BRPM | DIASTOLIC BLOOD PRESSURE: 65 MMHG | HEART RATE: 58 BPM | OXYGEN SATURATION: 100 % | TEMPERATURE: 96.8 F | SYSTOLIC BLOOD PRESSURE: 148 MMHG

## 2018-09-18 PROBLEM — R10.9 ABDOMINAL PAIN: Status: ACTIVE | Noted: 2018-09-18

## 2018-09-18 LAB
-: ABNORMAL
AFP: 1.3 UG/L
AMORPHOUS: ABNORMAL
BACTERIA: ABNORMAL
BILIRUBIN URINE: NEGATIVE
CA 19-9: 9 U/ML (ref 0–35)
CASTS UA: ABNORMAL /LPF (ref 0–2)
COLOR: YELLOW
COMMENT UA: ABNORMAL
CRYSTALS, UA: ABNORMAL /HPF
EPITHELIAL CELLS UA: ABNORMAL /HPF (ref 0–5)
GLUCOSE BLD-MCNC: 250 MG/DL (ref 75–110)
GLUCOSE URINE: ABNORMAL
KETONES, URINE: NEGATIVE
LEUKOCYTE ESTERASE, URINE: ABNORMAL
MUCUS: ABNORMAL
NITRITE, URINE: NEGATIVE
OTHER OBSERVATIONS UA: ABNORMAL
PH UA: 5.5 (ref 5–8)
POC TROPONIN I: 0.01 NG/ML (ref 0–0.1)
POC TROPONIN INTERP: NORMAL
PROTEIN UA: ABNORMAL
RBC UA: ABNORMAL /HPF (ref 0–2)
RENAL EPITHELIAL, UA: ABNORMAL /HPF
SPECIFIC GRAVITY UA: 1.04 (ref 1–1.03)
TRICHOMONAS: ABNORMAL
TURBIDITY: CLEAR
URINE HGB: NEGATIVE
UROBILINOGEN, URINE: NORMAL
WBC UA: ABNORMAL /HPF (ref 0–5)
YEAST: ABNORMAL

## 2018-09-18 PROCEDURE — 84484 ASSAY OF TROPONIN QUANT: CPT

## 2018-09-18 PROCEDURE — 82105 ALPHA-FETOPROTEIN SERUM: CPT

## 2018-09-18 PROCEDURE — 82947 ASSAY GLUCOSE BLOOD QUANT: CPT

## 2018-09-18 PROCEDURE — 2580000003 HC RX 258: Performed by: SURGERY

## 2018-09-18 PROCEDURE — 2580000003 HC RX 258: Performed by: STUDENT IN AN ORGANIZED HEALTH CARE EDUCATION/TRAINING PROGRAM

## 2018-09-18 PROCEDURE — A9576 INJ PROHANCE MULTIPACK: HCPCS | Performed by: STUDENT IN AN ORGANIZED HEALTH CARE EDUCATION/TRAINING PROGRAM

## 2018-09-18 PROCEDURE — G0378 HOSPITAL OBSERVATION PER HR: HCPCS

## 2018-09-18 PROCEDURE — 87086 URINE CULTURE/COLONY COUNT: CPT

## 2018-09-18 PROCEDURE — 6360000004 HC RX CONTRAST MEDICATION: Performed by: STUDENT IN AN ORGANIZED HEALTH CARE EDUCATION/TRAINING PROGRAM

## 2018-09-18 PROCEDURE — 6370000000 HC RX 637 (ALT 250 FOR IP): Performed by: STUDENT IN AN ORGANIZED HEALTH CARE EDUCATION/TRAINING PROGRAM

## 2018-09-18 PROCEDURE — 76705 ECHO EXAM OF ABDOMEN: CPT

## 2018-09-18 PROCEDURE — 96365 THER/PROPH/DIAG IV INF INIT: CPT

## 2018-09-18 PROCEDURE — 96376 TX/PRO/DX INJ SAME DRUG ADON: CPT

## 2018-09-18 PROCEDURE — 81001 URINALYSIS AUTO W/SCOPE: CPT

## 2018-09-18 PROCEDURE — 76376 3D RENDER W/INTRP POSTPROCES: CPT

## 2018-09-18 PROCEDURE — 74183 MRI ABD W/O CNTR FLWD CNTR: CPT

## 2018-09-18 PROCEDURE — 6360000002 HC RX W HCPCS: Performed by: STUDENT IN AN ORGANIZED HEALTH CARE EDUCATION/TRAINING PROGRAM

## 2018-09-18 PROCEDURE — 6360000002 HC RX W HCPCS: Performed by: SURGERY

## 2018-09-18 PROCEDURE — 86301 IMMUNOASSAY TUMOR CA 19-9: CPT

## 2018-09-18 PROCEDURE — 96372 THER/PROPH/DIAG INJ SC/IM: CPT

## 2018-09-18 RX ORDER — AMLODIPINE BESYLATE 10 MG/1
10 TABLET ORAL DAILY
Status: DISCONTINUED | OUTPATIENT
Start: 2018-09-18 | End: 2018-09-18 | Stop reason: HOSPADM

## 2018-09-18 RX ORDER — LATANOPROST 50 UG/ML
1 SOLUTION/ DROPS OPHTHALMIC NIGHTLY
Status: DISCONTINUED | OUTPATIENT
Start: 2018-09-18 | End: 2018-09-18 | Stop reason: HOSPADM

## 2018-09-18 RX ORDER — LISINOPRIL 10 MG/1
40 TABLET ORAL DAILY
Status: DISCONTINUED | OUTPATIENT
Start: 2018-09-18 | End: 2018-09-18 | Stop reason: HOSPADM

## 2018-09-18 RX ORDER — SODIUM CHLORIDE 0.9 % (FLUSH) 0.9 %
10 SYRINGE (ML) INJECTION EVERY 12 HOURS SCHEDULED
Status: CANCELLED | OUTPATIENT
Start: 2018-09-18

## 2018-09-18 RX ORDER — PREGABALIN 25 MG/1
25 CAPSULE ORAL 3 TIMES DAILY
Status: DISCONTINUED | OUTPATIENT
Start: 2018-09-18 | End: 2018-09-18 | Stop reason: HOSPADM

## 2018-09-18 RX ORDER — INSULIN GLARGINE 100 [IU]/ML
20 INJECTION, SOLUTION SUBCUTANEOUS NIGHTLY
Status: CANCELLED | OUTPATIENT
Start: 2018-09-18

## 2018-09-18 RX ORDER — ACETAZOLAMIDE 500 MG/1
500 CAPSULE, EXTENDED RELEASE ORAL 2 TIMES DAILY
Status: DISCONTINUED | OUTPATIENT
Start: 2018-09-18 | End: 2018-09-18 | Stop reason: HOSPADM

## 2018-09-18 RX ORDER — CHLORTHALIDONE 25 MG/1
25 TABLET ORAL DAILY
Status: DISCONTINUED | OUTPATIENT
Start: 2018-09-18 | End: 2018-09-18 | Stop reason: HOSPADM

## 2018-09-18 RX ORDER — DICYCLOMINE HYDROCHLORIDE 10 MG/1
10 CAPSULE ORAL EVERY 6 HOURS PRN
Qty: 20 CAPSULE | Refills: 0 | Status: SHIPPED | OUTPATIENT
Start: 2018-09-18 | End: 2018-10-22 | Stop reason: ALTCHOICE

## 2018-09-18 RX ORDER — SODIUM CHLORIDE 9 MG/ML
INJECTION, SOLUTION INTRAVENOUS CONTINUOUS
Status: DISCONTINUED | OUTPATIENT
Start: 2018-09-18 | End: 2018-09-18 | Stop reason: HOSPADM

## 2018-09-18 RX ORDER — MORPHINE SULFATE 4 MG/ML
2 INJECTION, SOLUTION INTRAMUSCULAR; INTRAVENOUS
Status: DISCONTINUED | OUTPATIENT
Start: 2018-09-18 | End: 2018-09-18 | Stop reason: HOSPADM

## 2018-09-18 RX ORDER — PANTOPRAZOLE SODIUM 40 MG/1
40 TABLET, DELAYED RELEASE ORAL
Status: DISCONTINUED | OUTPATIENT
Start: 2018-09-18 | End: 2018-09-18 | Stop reason: HOSPADM

## 2018-09-18 RX ORDER — DOCUSATE SODIUM 100 MG/1
100 CAPSULE, LIQUID FILLED ORAL 2 TIMES DAILY PRN
Status: DISCONTINUED | OUTPATIENT
Start: 2018-09-18 | End: 2018-09-18 | Stop reason: HOSPADM

## 2018-09-18 RX ORDER — ASPIRIN 81 MG/1
81 TABLET ORAL DAILY
Status: DISCONTINUED | OUTPATIENT
Start: 2018-09-18 | End: 2018-09-18 | Stop reason: HOSPADM

## 2018-09-18 RX ORDER — CYCLOBENZAPRINE HCL 10 MG
5 TABLET ORAL 3 TIMES DAILY PRN
Status: DISCONTINUED | OUTPATIENT
Start: 2018-09-18 | End: 2018-09-18 | Stop reason: HOSPADM

## 2018-09-18 RX ORDER — PRAVASTATIN SODIUM 20 MG
10 TABLET ORAL DAILY
Status: DISCONTINUED | OUTPATIENT
Start: 2018-09-18 | End: 2018-09-18 | Stop reason: HOSPADM

## 2018-09-18 RX ORDER — SODIUM CHLORIDE 0.9 % (FLUSH) 0.9 %
10 SYRINGE (ML) INJECTION PRN
Status: CANCELLED | OUTPATIENT
Start: 2018-09-18

## 2018-09-18 RX ORDER — TAMSULOSIN HYDROCHLORIDE 0.4 MG/1
0.4 CAPSULE ORAL DAILY
Status: DISCONTINUED | OUTPATIENT
Start: 2018-09-18 | End: 2018-09-18 | Stop reason: HOSPADM

## 2018-09-18 RX ORDER — METOPROLOL TARTRATE 50 MG/1
50 TABLET, FILM COATED ORAL 2 TIMES DAILY
Status: DISCONTINUED | OUTPATIENT
Start: 2018-09-18 | End: 2018-09-18 | Stop reason: HOSPADM

## 2018-09-18 RX ORDER — SPIRONOLACTONE 100 MG/1
100 TABLET, FILM COATED ORAL DAILY
Status: DISCONTINUED | OUTPATIENT
Start: 2018-09-18 | End: 2018-09-18 | Stop reason: HOSPADM

## 2018-09-18 RX ORDER — FOLIC ACID 1 MG/1
1 TABLET ORAL DAILY
Status: DISCONTINUED | OUTPATIENT
Start: 2018-09-18 | End: 2018-09-18 | Stop reason: HOSPADM

## 2018-09-18 RX ORDER — GLIMEPIRIDE 2 MG/1
1 TABLET ORAL 2 TIMES DAILY
Status: DISCONTINUED | OUTPATIENT
Start: 2018-09-18 | End: 2018-09-18 | Stop reason: HOSPADM

## 2018-09-18 RX ORDER — ONDANSETRON 4 MG/1
4 TABLET, FILM COATED ORAL EVERY 12 HOURS PRN
Qty: 5 TABLET | Refills: 0 | Status: SHIPPED | OUTPATIENT
Start: 2018-09-18 | End: 2018-10-22 | Stop reason: ALTCHOICE

## 2018-09-18 RX ORDER — ACETAMINOPHEN 325 MG/1
650 TABLET ORAL EVERY 4 HOURS PRN
Status: DISCONTINUED | OUTPATIENT
Start: 2018-09-18 | End: 2018-09-18 | Stop reason: HOSPADM

## 2018-09-18 RX ADMIN — AMLODIPINE BESYLATE 10 MG: 10 TABLET ORAL at 10:33

## 2018-09-18 RX ADMIN — TAMSULOSIN HYDROCHLORIDE 0.4 MG: 0.4 CAPSULE ORAL at 10:32

## 2018-09-18 RX ADMIN — GADOTERIDOL 15 ML: 279.3 INJECTION, SOLUTION INTRAVENOUS at 09:05

## 2018-09-18 RX ADMIN — ACETAZOLAMIDE 500 MG: 500 CAPSULE, EXTENDED RELEASE ORAL at 10:33

## 2018-09-18 RX ADMIN — ASPIRIN 81 MG: 81 TABLET, COATED ORAL at 10:30

## 2018-09-18 RX ADMIN — FOLIC ACID 1 MG: 1 TABLET ORAL at 10:32

## 2018-09-18 RX ADMIN — SODIUM CHLORIDE: 9 INJECTION, SOLUTION INTRAVENOUS at 10:40

## 2018-09-18 RX ADMIN — PANTOPRAZOLE SODIUM 40 MG: 40 TABLET, DELAYED RELEASE ORAL at 10:32

## 2018-09-18 RX ADMIN — MORPHINE SULFATE 2 MG: 4 INJECTION INTRAVENOUS at 02:54

## 2018-09-18 RX ADMIN — ENOXAPARIN SODIUM 40 MG: 40 INJECTION SUBCUTANEOUS at 10:39

## 2018-09-18 RX ADMIN — TIMOLOL MALEATE 1 DROP: 2.5 SOLUTION OPHTHALMIC at 10:38

## 2018-09-18 RX ADMIN — CHLORTHALIDONE 25 MG: 25 TABLET ORAL at 10:33

## 2018-09-18 RX ADMIN — METOPROLOL TARTRATE 50 MG: 50 TABLET, FILM COATED ORAL at 10:32

## 2018-09-18 RX ADMIN — PREGABALIN 25 MG: 25 CAPSULE ORAL at 10:30

## 2018-09-18 RX ADMIN — CEFAZOLIN 2 G: 10 INJECTION, POWDER, FOR SOLUTION INTRAVENOUS; PARENTERAL at 10:39

## 2018-09-18 RX ADMIN — SPIRONOLACTONE 100 MG: 100 TABLET ORAL at 10:30

## 2018-09-18 RX ADMIN — LISINOPRIL 40 MG: 10 TABLET ORAL at 13:24

## 2018-09-18 RX ADMIN — PRAVASTATIN SODIUM 10 MG: 20 TABLET ORAL at 10:30

## 2018-09-18 ASSESSMENT — ENCOUNTER SYMPTOMS
EYE REDNESS: 0
ABDOMINAL PAIN: 1
VOMITING: 0
EYE DISCHARGE: 0
NAUSEA: 0
SHORTNESS OF BREATH: 0
CHEST TIGHTNESS: 0
COLOR CHANGE: 0

## 2018-09-18 ASSESSMENT — PAIN SCALES - GENERAL
PAINLEVEL_OUTOF10: 8
PAINLEVEL_OUTOF10: 8

## 2018-09-18 NOTE — ED PROVIDER NOTES
[facility]  Faculty Handoff       Handoff taken on the following patient    Pt Name: Omar Altamirano  PCP:  Rossi Robison MD      [facility]       Chief Complaint   Patient presents with    Abdominal Pain         CURRENT MEDICATIONS     Previous Medications  Previous Medications    ACETAZOLAMIDE (DIAMOX) 500 MG EXTENDED RELEASE CAPSULE    Take 500 mg by mouth 2 times daily    AMLODIPINE (NORVASC) 10 MG TABLET    Take 1 tablet by mouth daily    ASPIRIN 81 MG EC TABLET    Take 1 tablet by mouth daily    CHLORTHALIDONE (HYGROTON) 25 MG TABLET    Take 1 tablet by mouth daily    CYCLOBENZAPRINE (FLEXERIL) 5 MG TABLET    Take 5 mg by mouth 3 times daily as needed for Muscle spasms    DOCUSATE SODIUM (COLACE) 100 MG CAPSULE    Take 1 capsule by mouth 2 times daily as needed for Constipation    FOLIC ACID (FOLVITE) 1 MG TABLET    Take 1 tablet by mouth daily    HYDROCODONE-ACETAMINOPHEN (NORCO) 5-325 MG PER TABLET    take 1 tablet by mouth twice a day if needed for pain    INSULIN GLARGINE (LANTUS) 100 UNIT/ML INJECTION VIAL    Inject 20 Units into the skin nightly    LISINOPRIL (PRINIVIL;ZESTRIL) 40 MG TABLET    Take 1 tablet by mouth daily    METFORMIN (GLUCOPHAGE) 500 MG TABLET    Take 500 mg by mouth 2 times daily (with meals)    METOPROLOL TARTRATE (LOPRESSOR) 50 MG TABLET    Take 1 tablet by mouth 2 times daily    OMEPRAZOLE (PRILOSEC) 20 MG DELAYED RELEASE CAPSULE    Take 20 mg by mouth 2 times daily    PRAVASTATIN (PRAVACHOL) 10 MG TABLET    Take 1 tablet by mouth daily    PREDNISOLONE SODIUM PHOSPHATE (INFLAMASE FORTE) 1 % OPHTHALMIC SOLUTION    Place 1 drop into the right eye 4 times daily For ten days    PREGABALIN (LYRICA) 25 MG CAPSULE    Take 1 capsule by mouth 3 times daily    SPIRONOLACTONE (ALDACTONE) 100 MG TABLET    Take 1 tablet by mouth daily    TAMSULOSIN (FLOMAX) 0.4 MG CAPSULE    Take 1 capsule by mouth daily    TIMOLOL (TIMOPTIC-XE) 0.25 % OPHTHALMIC Hematocrit 34.2 (*)     Immature Granulocytes 1 (*)     Segs Absolute 9.23 (*)     Absolute Lymph # 3.99 (*)     All other components within normal limits   HEPATIC FUNCTION PANEL - Abnormal; Notable for the following: Total Bilirubin <0.10 (*)     All other components within normal limits   LIPASE - Abnormal; Notable for the following:     Lipase 72 (*)     All other components within normal limits   LACTIC ACID, WHOLE BLOOD   IMMATURE PLATELET FRACTION   CANCER ANTIGEN 19-9   AFP TUMOR MARKER   URINE RT REFLEX TO CULTURE   POCT TROPONIN   POCT TROPONIN   POCT TROPONIN   POCT TROPONIN           PLAN/ TASKS OUTSTANDING     Handoff patient  Verbal report taken  No outstanding tasks    [place] disposition        (Please note that portions of this note were completed with a voice recognition program.  Efforts were made to edit the dictations but occasionally words are mis-transcribed.)    Solano MD, F.A.C.E.P.   Attending Emergency Physician                   Bud Solis MD  09/18/18 1892

## 2018-09-18 NOTE — ED PROVIDER NOTES
[facility] [facility]     Emergency Department     Faculty Attestation    I performed a history and physical examination of the patient and discussed management with the resident. I reviewed the residents note and agree with the documented findings and plan of care. Any areas of disagreement are noted on the chart. I was personally present for the key portions of any procedures. I have documented in the chart those procedures where I was not present during the key portions. I have reviewed the emergency nurses triage note. I agree with the chief complaint, past medical history, past surgical history, allergies, medications, social and family history as documented unless otherwise noted below. For Physician Assistant/ Nurse Practitioner cases/documentation I have personally evaluated this patient and have completed at least one if not all key elements of the E/M (history, physical exam, and MDM). Additional findings are as noted. Patient presents with abdominal pain that he says has been coming and going for the past couple of days. Says he has had some nausea but denies vomiting. He denies fever, chills. He says he has had occasional chest pain. He denies diarrhea or constipation. He says he has had a little bit of burning with urination. On exam, patient is lying in the bed and appears comfortable. Lungs clear to auscultation bilaterally heart sounds are normal.  Abdomen is soft with moderate epigastric and right-sided tenderness. No rebound or guarding is present. We'll get labs, EKG, CT scan. We'll treat patient's pain and nausea and reassess.     EKG Interpretation    Interpreted by me    Rhythm: normal sinus   Rate: normal  Axis: normal  Ectopy: none  Conduction: normal  ST Segments: no acute change  T Waves: no acute change  Q Waves: none    Clinical Impression: no acute changes and normal EKG      Joanna Husain MD  Attending Emergency  Physician              Enma Lopez MD  09/17/18 [phone]

## 2018-09-18 NOTE — ED NOTES
PT given warm blanket. PT resting in bed. NAD noted. RR even and unlabored.        Te Cruz RN  09/18/18 1946

## 2018-09-18 NOTE — PROGRESS NOTES
US of RUQ and MRCP results reviewed. No radiographic signs strongly suggesting acute cholecystitis and although the CBD is dilated to 1.2cm there is no filling defect. General surgery recommends for continued management and workup per primary team and pt can follow up as out pt with Dr. Holley Earl for evaluation for cholecystectomy as an elective procedure, no recommendations for ERCP at this time. GS to sign off at this time. Please re-contact with any questions or concerns.      Thank you,    Electronically signed by Juanito Montana DO on 9/18/2018 at 11:13 AM

## 2018-09-18 NOTE — ED NOTES
Pt asleep on cot with call light in reach. Pt family at bedside. Pt rr even and non labored, nad, will continue to monitor.       Patsy Borjas RN  09/18/18 0294

## 2018-09-18 NOTE — ED PROVIDER NOTES
[facility] ED  Emergency Department  Emergency Medicine Resident Sign-out     Care of Erin Vizcaino was assumed from Dr. Kathleen Chávez and is being seen for Abdominal Pain  . The patient's initial evaluation and plan have been discussed with the prior provider who initially evaluated the patient. EMERGENCY DEPARTMENT COURSE / MEDICAL DECISION MAKING:       MEDICATIONS GIVEN:  Orders Placed This Encounter   Medications    morphine (PF) injection 4 mg    ondansetron (ZOFRAN-ODT) disintegrating tablet 4 mg    iopamidol (ISOVUE-370) 76 % injection 75 mL    morphine (PF) injection 2 mg       LABS / RADIOLOGY:     Labs Reviewed   BASIC METABOLIC PANEL - Abnormal; Notable for the following:        Result Value    Glucose 249 (*)     BUN 30 (*)     CREATININE 1.41 (*)     Potassium 3.6 (*)     Chloride 112 (*)     CO2 12 (*)     GFR Non- 52 (*)     All other components within normal limits   CBC WITH AUTO DIFFERENTIAL - Abnormal; Notable for the following:     WBC 14.7 (*)     RBC 3.76 (*)     Hemoglobin 10.8 (*)     Hematocrit 34.2 (*)     Immature Granulocytes 1 (*)     Segs Absolute 9.23 (*)     Absolute Lymph # 3.99 (*)     All other components within normal limits   HEPATIC FUNCTION PANEL - Abnormal; Notable for the following:      Total Bilirubin <0.10 (*)     All other components within normal limits   LIPASE - Abnormal; Notable for the following:     Lipase 72 (*)     All other components within normal limits   LACTIC ACID, WHOLE BLOOD   IMMATURE PLATELET FRACTION   CANCER ANTIGEN 19-9   AFP TUMOR MARKER   URINE RT REFLEX TO CULTURE   POCT TROPONIN   POCT TROPONIN   POCT TROPONIN   POCT TROPONIN       Xr Chest Standard (2 Vw)    Result Date: 9/17/2018  EXAMINATION: TWO VIEWS OF THE CHEST 9/17/2018 10:51 pm COMPARISON: September 1, 2018 HISTORY: ORDERING SYSTEM PROVIDED HISTORY: Chest Pain TECHNOLOGIST PROVIDED HISTORY: Chest Pain FINDINGS: The lungs are without acute focal PROVIDED HISTORY: STROKE TECHNOLOGIST PROVIDED HISTORY: FINDINGS: BRAIN/VENTRICLES: Ventricles and sulci are stable. Punctate low-density in the right posterior inferior thalamus is stable. Punctate low-density in the right inferior lentiform nucleus is stable. No definite new areas of abnormal density are noted. Posterior fossa and trevor are limited due to artifact. There is low-density in the trevor anteriorly, likely artifactual. Numerous vascular calcifications are noted. No hyperdense vessels are otherwise noted ORBITS: The visualized portion of the orbits demonstrate no acute abnormality. SINUSES: No air-fluid levels are noted SOFT TISSUES/SKULL:  Irregularity of the lateral margin of the left maxilla is again noted, unchanged. No acute osseous abnormality is noted. No acute intracranial abnormality. Mra Head Wo Contrast    Result Date: 9/2/2018  EXAMINATION: MRI OF THE BRAIN WITHOUT CONTRAST; MRA OF THE HEAD WITHOUT CONTRAST; MRA OF THE NECK WITHOUT CONTRAST  9/2/2018 11:45 am TECHNIQUE: Multiplanar multisequence MRI of the brain was performed without the administration of intravenous contrast.; MRA of the head was performed utilizing time-of-flight imaging with MIP images. No intravenous contrast was administered.; Multiplanar multisequence MRA of the neck was performed without the administration of intravenous contrast. Stenosis of the internal carotid arteries measured using NASCET criteria. COMPARISON: None. HISTORY: ORDERING SYSTEM PROVIDED HISTORY: stroke FINDINGS: INTRACRANIAL STRUCTURES/VENTRICLES: Ventricles are normal.  The right lateral ventricle slightly larger than the left which is a normal variant. Punctate increased T2/FLAIR signal is noted projecting over the posterior limb of the internal capsule on the right. There is no corresponding hyperintense diffusion signal.  Otherwise, there is no abnormal increased T2, FLAIR or diffusion signal in the parenchyma.   Flow voids are patent. The left internal carotid artery flow void as it enters the cavernous sinus on image 9 of series 6 is slightly smaller. Remaining flow voids are widely patent. ORBITS: The visualized portion of the orbits demonstrate no acute abnormality. SINUSES: The visualized paranasal sinuses and mastoid air cells are well aerated. BONES/SOFT TISSUES: The bone marrow signal intensity appears normal. The soft tissues demonstrate no acute abnormality. MR angiogram head Posterior circulation: Distal vertebral arteries are widely patent. The basilar artery is widely patent. Both posterior cerebral arteries are widely patent. Anterior circulation: Distal internal carotid arteries are patent. There is significant narrowing of the left internal carotid artery as it enters the cavernous sinus. This is seen best on axial image 41. Otherwise, the cavernous carotid segments are patent bilaterally. There is mild multifocal additional narrowing bilaterally. Anterior and middle cerebral arteries are widely patent. MR angiogram neck: Vertebral arteries: Both vertebral arteries are patent without focal significant narrowing. Proximal segments are limited due to artifact. Carotid arteries: Both common carotid arteries, bifurcations, and internal carotid arteries are patent without focal significant narrowing. There is no definitive dissection. The aortic arch level is limited     Punctate high signal in the posterior limb of the internal capsule on the right without associated high diffusion signal.  This is likely due to a prior ischemic focus. Abnormal flow void in the left internal carotid artery as it enters the cavernous sinus. Otherwise, no acute intracranial abnormality is noted. Severe narrowing of the left internal carotid artery as it enters the cavernous sinus. Otherwise, mild multifocal cavernous carotid artery narrowing is noted.  No focal significant arterial narrowing in the neck     Us Renal Complete    Result Date: 9/1/2018  EXAMINATION: RETROPERITONEAL ULTRASOUND OF THE KIDNEYS AND URINARY BLADDER 9/1/2018 COMPARISON: 09/05/2017. HISTORY: ORDERING SYSTEM PROVIDED HISTORY: ABDOMINAL PAIN TECHNOLOGIST PROVIDED HISTORY: US RETROPERITONEAL COMPLETE ARF Initial evaluation. FINDINGS: Kidneys: The right kidney measures 11.6 cm in length and the left kidney measures 11.3 cm in length. The kidneys demonstrate normal cortical echogenicity. No focal renal mass demonstrated. There is no hydronephrosis or perinephric stranding. There appear to be multiple stones within the gallbladder with mild gallbladder wall thickening. Bladder: Unremarkable appearance of the bladder. Bilateral ureteral jets were demonstrated. Prevoid volume 312 mL. Patient did not have the urge to void for the examination. 1. There appears to be cholelithiasis with mild gallbladder wall thickening. 2. Unremarkable sonographic appearance of the kidneys or urinary bladder. Ct Abdomen Pelvis W Iv Contrast    Result Date: 9/18/2018  EXAMINATION: CT OF THE ABDOMEN AND PELVIS WITH CONTRAST 9/17/2018 11:51 pm TECHNIQUE: CT of the abdomen and pelvis was performed with the administration of intravenous contrast. Multiplanar reformatted images are provided for review. Dose modulation, iterative reconstruction, and/or weight based adjustment of the mA/kV was utilized to reduce the radiation dose to as low as reasonably achievable. COMPARISON: None. HISTORY: ORDERING SYSTEM PROVIDED HISTORY: Abdominal pain; concern for AAA TECHNOLOGIST PROVIDED HISTORY: IV Only Contrast FINDINGS: Lower Chest:  Visualized portion of the lower chest demonstrates no acute abnormality. Organs: Liver, spleen, pancreas and adrenal glands are within normal limits. Unremarkable gallbladder. Borderline dilated common bile duct measuring up to 0.8 cm. Gallbladder wall thickness is in the upper limit of normal.  No calcified gallstone. No pericholecystic edema.   No enlarged lymph nodes identified in the abdomen and pelvis. No suspicious renal lesions. No evidence of obstructive uropathy. GI/Bowel: No focal enterocolitis. No bowel obstruction. No evidence of appendicitis. No free air. Pelvis: No acute findings in the pelvis. Peritoneum/Retroperitoneum:  No abdominal aortic aneurysm. Atherosclerotic calcifications. Bones/Soft Tissues: No acute osseous findings identified. 1. Borderline gallbladder wall thickening. Consider partial gallbladder contraction, cholecystitis and portal hypertension. Ultrasound exam may be obtained for further assessment as clinically indicated. 2. Common bile duct is mildly dilated measuring 0.8 cm in diameter distally. If there are clinical concerns for biliary ductal obstruction, MRCP or ERCP may be obtained for further assessment as indicated. Xr Chest Portable    Result Date: 9/1/2018  EXAMINATION: SINGLE XRAY VIEW OF THE CHEST 9/1/2018 12:29 am COMPARISON: None. HISTORY: ORDERING SYSTEM PROVIDED HISTORY: short of breath TECHNOLOGIST PROVIDED HISTORY: short of breath Acuity: Unknown Type of Exam: Unknown FINDINGS: Cardiomediastinal contour is within normal limits. No focal consolidation. No significant pleural effusion. 1. No acute cardiopulmonary disease.      Vl Dup Carotid Bilateral    Result Date: 9/1/2018    [facility]  Vascular Carotid Procedure   Patient Name  Michelle Virgen     Date of Study         09/01/2018                JAMEL   Date of Birth 1965  Gender                Male   Age           45 year(s)  Race                  Other   Room Number   3019        Height:               67 inch, 170.18 cm   Corporate ID  [id number]  Weight:               187 pounds, 84.8 kg  #   Patient Acct  [de-identified]   BSA:       1.97 m^2   BMI:         29.29 kg/m^2  #   MR #          [id number]     Sonographer           Tamar Dallas   Accession #   [id number]   Interpreting          [facility]                            Physician   Referring Referring Physician   Stefany Beaver MD  Nurse  Practitioner  Procedure Type of Study:   Cerebral: Carotid, Carotid Scan Bilateral.  Indications for Study:CVA. Blood Pressure:Right arm 181/84 mmHg. Left arm 169/78 mmHg. Patient Status: In Patient. Comments:Basic Classification of ICA Stenosis: PSV - Peak Systolic Velocity Normal: No plaque or calcification identified, no elevation of PSV Mild: <50% spectral broadening without increased PSV Moderate: 50 - 69% PSV >125 - <230 cm/sec Severe: 70 - 99% PSV >230 cm/sec Critical: 80 - 99% PSV >230cm/sec and/or End Diastolic Velocities >079WH/BZR  Conclusions   Summary   Simultaneous real time imaging utilizing B-Mode, color flow doppler and  spectral waveform analysis was performed on the bilateral extracerebral  vascular system. The study demonstrates:   Right:  The internal carotid artery is normal.  The vertebral artery is patent with antegrade flow. Left:  The internal carotid artery is normal.  The vertebral artery is patent with antegrade flow. Signature   ----------------------------------------------------------------  Electronically signed by Tamar Dallas(Sonographer) on  09/01/2018 10:26 AM  ----------------------------------------------------------------   ----------------------------------------------------------------  Electronically signed by Loyd Pete(Interpreting physician)  on 09/01/2018 08:22 PM  ----------------------------------------------------------------  Findings:   Right Impression:                  Left Impression:  The common carotid artery is       The common carotid artery is normal.  normal.                                     The carotid bulb has an irregular  The carotid bulb has a smooth      fibrocalcific plaque causing a <50%  fibrous plaque causing a <50%      stenosis based on velocities. stenosis based on velocities.                                      The internal carotid artery is normal.  The internal carotid artery is  normal.                            The external carotid artery is normal.   The external carotid artery is     The vertebral artery is patent with  normal.                            antegrade flow. The vertebral artery is patent  with antegrade flow. Risk Factors History +---------------------------------------+----------+-----------------------+ ! Diagnosis                              ! Date      ! Comments               ! +---------------------------------------+----------+-----------------------+ ! Previous Scan                          !11/22/2016! WNL                    ! +---------------------------------------+----------+-----------------------+ ! Peripheral vascular disease->Amputation! !LT great and 2nd       ! !                                       !          !toe--BKA               ! +---------------------------------------+----------+-----------------------+   - The patient's risk factor(s) include: diabetes mellitus, dyslipidemia     and arterial hypertension.   - The patient has a former tobacco history. Allergies   - Allergy:*No Known Allergies(Miscellaneous). Velocities are measured in cm/s ; Diameters are measured in cm Carotid Right Measurements +------------+-------+-------+--------+-------+------------+---------------+ ! Location    ! PSV    ! EDV    ! Angle   ! RI     !%Stenosis   ! Tortuosity     ! +------------+-------+-------+--------+-------+------------+---------------+ ! Prox CCA    !63.4   !9.94   !60      !0.84   !            !               ! +------------+-------+-------+--------+-------+------------+---------------+ ! Mid CCA     !71.4   !19.3   !60      !0.73   !            !               ! +------------+-------+-------+--------+-------+------------+---------------+ ! Dist CCA    !72.1   !19.9   !60      !0.72   !            !               ! +------------+-------+-------+--------+-------+------------+---------------+ ! Bulb        !67.7   !26.1   ! 60      !0.61 patent. The left internal carotid artery flow void as it enters the cavernous sinus on image 9 of series 6 is slightly smaller. Remaining flow voids are widely patent. ORBITS: The visualized portion of the orbits demonstrate no acute abnormality. SINUSES: The visualized paranasal sinuses and mastoid air cells are well aerated. BONES/SOFT TISSUES: The bone marrow signal intensity appears normal. The soft tissues demonstrate no acute abnormality. MR angiogram head Posterior circulation: Distal vertebral arteries are widely patent. The basilar artery is widely patent. Both posterior cerebral arteries are widely patent. Anterior circulation: Distal internal carotid arteries are patent. There is significant narrowing of the left internal carotid artery as it enters the cavernous sinus. This is seen best on axial image 41. Otherwise, the cavernous carotid segments are patent bilaterally. There is mild multifocal additional narrowing bilaterally. Anterior and middle cerebral arteries are widely patent. MR angiogram neck: Vertebral arteries: Both vertebral arteries are patent without focal significant narrowing. Proximal segments are limited due to artifact. Carotid arteries: Both common carotid arteries, bifurcations, and internal carotid arteries are patent without focal significant narrowing. There is no definitive dissection. The aortic arch level is limited     Punctate high signal in the posterior limb of the internal capsule on the right without associated high diffusion signal.  This is likely due to a prior ischemic focus. Abnormal flow void in the left internal carotid artery as it enters the cavernous sinus. Otherwise, no acute intracranial abnormality is noted. Severe narrowing of the left internal carotid artery as it enters the cavernous sinus. Otherwise, mild multifocal cavernous carotid artery narrowing is noted.  No focal significant arterial narrowing in the neck     Mri Brain Wo the cavernous carotid segments are patent bilaterally. There is mild multifocal additional narrowing bilaterally. Anterior and middle cerebral arteries are widely patent. MR angiogram neck: Vertebral arteries: Both vertebral arteries are patent without focal significant narrowing. Proximal segments are limited due to artifact. Carotid arteries: Both common carotid arteries, bifurcations, and internal carotid arteries are patent without focal significant narrowing. There is no definitive dissection. The aortic arch level is limited     Punctate high signal in the posterior limb of the internal capsule on the right without associated high diffusion signal.  This is likely due to a prior ischemic focus. Abnormal flow void in the left internal carotid artery as it enters the cavernous sinus. Otherwise, no acute intracranial abnormality is noted. Severe narrowing of the left internal carotid artery as it enters the cavernous sinus. Otherwise, mild multifocal cavernous carotid artery narrowing is noted. No focal significant arterial narrowing in the neck       RECENT VITALS:     Temp: 96.8 °F (36 °C),  Pulse: 64, Resp: 11, BP: (!) 139/59, SpO2: 100 %    This patient is a 45 y.o. Male with abd pain, n/v. US showing thickened GB wall, gen sug consulted, rec ETU admission for GI consult. OUTSTANDING TASKS / RECOMMENDATIONS:    1. Admitted  Patient received GB ultrasound and MRCP, general surgery reviewed imaging results, evaluated patient, clear for discharge from their standpoint. Initially patient had been admitted to internal medicine, but we will discharge from the ED given Gen. surgery's recommendations, internal medicine notified and agrees with plan. Patient tolerating fluids orally and a turkey sandwich without any nausea or vomiting or abdominal pain, abdomen soft and nontender. Discussed discharge the patient, follow-up plan return precautions, patient understands and agrees with plan.      FINAL IMPRESSION:     1.  Abdominal pain, unspecified abdominal location        DISPOSITION:         DISPOSITION:  []  Discharge   []  Transfer -    [x]  Admission -     []  Against Medical Advice   []  Eloped   FOLLOW-UP: William Mendosa MD  [facility] [address]  [phone]           DISCHARGE MEDICATIONS: New Prescriptions    No medications on file           Ellen Young DO  Emergency Medicine Resident  [id number] [facility]       Ellen Young [place]  Resident  09/18/18 2114

## 2018-09-18 NOTE — ED NOTES
Pt to ed with c/o mild cp and sob and abd pain x 3 days. Pt reports having hx of gallstones and family thinks he has them again. Pt reports abd pain to be more on the left side. Pt placed on cardiac monitor. EKG obtained, family at bedside, awaiting orders.       Irlanda Bustos RN  09/17/18 3626

## 2018-09-18 NOTE — ED NOTES
Pt packed up and pt moved ed rooms with family following along side. Pt given new call light. NAD, rr even and non labored, awaiting further orders.       Naomy Nickerson RN  09/18/18 5530

## 2018-09-18 NOTE — ED NOTES
Patient resting on cot even respirations unlabored no signs of distress. No questions or concerns updated on plan will continue to monitor.      Emmett Goodman RN  09/18/18 [id number]

## 2018-09-18 NOTE — ED PROVIDER NOTES
[facility]  ED  Emergency Department Encounter  Emergency Medicine Resident     Pt Name: Gonzalez Renner  MRN: [id number]  [place] 1965  Date of evaluation: 9/17/18  PCP:  Mario Hogue MD    [address] [place] [address]       Chief Complaint   Patient presents with    Abdominal Pain       HISTORY OF PRESENT ILLNESS  (Location/Symptom, Timing/Onset, Context/Setting, Quality, Duration, Modifying Factors, Severity.)      Gonzalez Renner is a 45 y.o. male who presents with Generalized abdominal pain for the past 2-3 days. States pain comes and goes as diffuse and sharp in nature. States he does have some dysuria that his groin hurts when he urinates. Since the pain and is worse as time. Denies any exacerbating or remitting factors. He doesn't history of gallstones and denies any nausea, vomiting, diarrhea, constipation. His last bowel movement was today. PAST MEDICAL / SURGICAL / SOCIAL / FAMILY HISTORY      has a past medical history of Acid reflux; Calculus of gallbladder without cholecystitis without obstruction; Cerebrovascular disease; Diabetic foot infection ([address] Drug (multiple) resistant infection; GERD (gastroesophageal reflux disease); Glaucoma; Glaucoma; Hemispheric carotid artery syndrome; Hyperlipidemia; Hypertension; IDDM (insulin dependent diabetes mellitus) ([address] MDRO (multiple drug resistant organisms) resistance; MRSA (methicillin resistant staph aureus) culture positive; Neuropathy; Osteomyelitis ([address] S/P cataract extraction and insertion of intraocular lens; and Type II or unspecified type diabetes mellitus without mention of complication, not stated as uncontrolled. has a past surgical history that includes Toe amputation (Left, 2014); eye surgery (Bilateral); Toe amputation (Left, 12/09/2016); other surgical history (Left, 01/23/2017); Foot surgery (Left, 02/24/2017);  Foot surgery (Left, 04/13/2017); pr deep dissec foot infec,1 bursa (Left, 4/13/2017); other surgical history (Left, 04/27/2017); Toe amputation (Left, 4/27/2017); hc  picc powerpicc double (4/28/2017); Leg amputation below knee (Left, 9/7/2017); pr incis/drain thigh/knee abscess,deep (Left, 9/12/2017); Leg amputation below knee (Left, 9/14/2017); and Toe amputation (04/17/2017). Social History     Social History    Marital status:      Spouse name: N/A    Number of children: N/A    Years of education: N/A     Occupational History    Not on file. Social History Main Topics    Smoking status: Former Smoker     Packs/day: 0.00     Types: Cigarettes    Smokeless tobacco: Never Used      Comment: smokes 1 cigarette a day    Alcohol use No    Drug use: No    Sexual activity: Not on file     Other Topics Concern    Not on file     Social History Narrative    No narrative on file       Family History   Problem Relation Age of Onset    Diabetes Mother     Cancer Father         colon    Diabetes Father     Diabetes Sister     Diabetes Sister        Allergies:  Patient has no known allergies. Home Medications:  Prior to Admission medications    Medication Sig Start Date End Date Taking? Authorizing Provider   aspirin 81 MG EC tablet Take 1 tablet by mouth daily 9/3/18  Yes Rupert Busby DO   cyclobenzaprine (FLEXERIL) 5 MG tablet Take 5 mg by mouth 3 times daily as needed for Muscle spasms   Yes Historical Provider, MD   acetaZOLAMIDE (DIAMOX) 500 MG extended release capsule Take 500 mg by mouth 2 times daily   Yes Historical Provider, MD   HYDROcodone-acetaminophen (NORCO) 5-325 MG per tablet take 1 tablet by mouth twice a day if needed for pain 11/16/17  Yes Historical Provider, MD   metFORMIN (GLUCOPHAGE) 500 MG tablet Take 500 mg by mouth 2 times daily (with meals)   Yes Historical Provider, MD   pregabalin (LYRICA) 25 MG capsule Take 1 capsule by mouth 3 times daily  Patient taking differently: Take 50 mg by mouth 3 times daily.  . 9/25/17  Yes Dru Zamudio MD   insulin glargine shortness of breath. Cardiovascular: Negative for chest pain and palpitations. Gastrointestinal: Positive for abdominal pain. Negative for nausea and vomiting. Genitourinary: Positive for dysuria. Negative for flank pain. Musculoskeletal: Negative for arthralgias and myalgias. Skin: Negative for color change and rash. Neurological: Negative for weakness and headaches. Psychiatric/Behavioral: Negative for agitation and confusion. PHYSICAL EXAM   (up to 7 for level 4, 8 or more for level 5)      INITIAL VITALS:    oral temperature is 96.8 °F (36 °C). His blood pressure is 106/61 and his pulse is 84. His respiration is 18 and oxygen saturation is 100%. Physical Exam   Constitutional: He is oriented to person, place, and time. He appears well-developed and well-nourished. HENT:   Head: Normocephalic and atraumatic. Eyes: Pupils are equal, round, and reactive to light. Conjunctivae are normal. No scleral icterus. Cardiovascular: Normal rate, regular rhythm and normal heart sounds. Exam reveals no gallop and no friction rub. No murmur heard. Pulmonary/Chest: Effort normal and breath sounds normal. No respiratory distress. He has no wheezes. He has no rales. Abdominal: Soft. Bowel sounds are normal. He exhibits no distension and no mass. There is tenderness. There is no rebound and no guarding. Moderate tenderness across the entire abdomen worse in the epigastric region and right upper quadrant. No rebound, guarding, masses palpated. Bowel sounds ×4 quadrants. Abdomen is soft. Musculoskeletal: Normal range of motion. Neurological: He is alert and oriented to person, place, and time. Skin: Skin is warm and dry. No rash noted. No erythema. Psychiatric: He has a normal mood and affect. His behavior is normal.   Nursing note and vitals reviewed.       DIFFERENTIAL  DIAGNOSIS     PLAN (LABS / IMAGING / EKG):  Orders Placed This Encounter   Procedures    XR CHEST STANDARD (2 VW)  CT ABDOMEN PELVIS W IV CONTRAST    Basic Metabolic Panel    CBC Auto Differential    Hepatic Function Panel    Lipase    Urinalysis Reflex to Culture    Lactic Acid, Whole Blood    Immature Platelet Fraction    CANCER ANTIGEN 19-9    AFP TUMOR MARKER    Inpatient consult to General Surgery    POCT troponin    POCT troponin    POCT troponin    EKG 12 Lead    PATIENT STATUS (FROM ED OR OR/PROCEDURAL) Observation       MEDICATIONS ORDERED:  Orders Placed This Encounter   Medications    morphine (PF) injection 4 mg    ondansetron (ZOFRAN-ODT) disintegrating tablet 4 mg    iopamidol (ISOVUE-370) 76 % injection 75 mL    morphine (PF) injection 2 mg       DDX: Peptic ulcer versus pancreatitis versus constipation versus cholecystitis    Initial MDM/Plan: 45 y.o. male who presents with acute abdominal pain. We'll check abdominal labs, CT abdomen and treat pain with morphine. Reassessment after. DIAGNOSTIC RESULTS / EMERGENCY DEPARTMENT COURSE / MDM     LABS:  Labs Reviewed   BASIC METABOLIC PANEL - Abnormal; Notable for the following:        Result Value    Glucose 249 (*)     BUN 30 (*)     CREATININE 1.41 (*)     Potassium 3.6 (*)     Chloride 112 (*)     CO2 12 (*)     GFR Non- 52 (*)     All other components within normal limits   CBC WITH AUTO DIFFERENTIAL - Abnormal; Notable for the following:     WBC 14.7 (*)     RBC 3.76 (*)     Hemoglobin 10.8 (*)     Hematocrit 34.2 (*)     Immature Granulocytes 1 (*)     Segs Absolute 9.23 (*)     Absolute Lymph # 3.99 (*)     All other components within normal limits   HEPATIC FUNCTION PANEL - Abnormal; Notable for the following:      Total Bilirubin <0.10 (*)     All other components within normal limits   LIPASE - Abnormal; Notable for the following:     Lipase 72 (*)     All other components within normal limits   LACTIC ACID, WHOLE BLOOD   IMMATURE PLATELET FRACTION   URINE RT REFLEX TO CULTURE   CANCER ANTIGEN 19-9   AFP TUMOR MARKER   POCT TROPONIN   POCT TROPONIN   POCT TROPONIN   POCT TROPONIN         RADIOLOGY:  Xr Chest Standard (2 Vw)    Result Date: 9/17/2018  EXAMINATION: TWO VIEWS OF THE CHEST 9/17/2018 10:51 pm COMPARISON: September 1, 2018 HISTORY: ORDERING SYSTEM PROVIDED HISTORY: Chest Pain TECHNOLOGIST PROVIDED HISTORY: Chest Pain FINDINGS: The lungs are without acute focal process. There is no effusion or pneumothorax. The cardiomediastinal silhouette is without acute process. The osseous structures are without acute process. No acute process. Ct Abdomen Pelvis W Iv Contrast    Result Date: 9/18/2018  EXAMINATION: CT OF THE ABDOMEN AND PELVIS WITH CONTRAST 9/17/2018 11:51 pm TECHNIQUE: CT of the abdomen and pelvis was performed with the administration of intravenous contrast. Multiplanar reformatted images are provided for review. Dose modulation, iterative reconstruction, and/or weight based adjustment of the mA/kV was utilized to reduce the radiation dose to as low as reasonably achievable. COMPARISON: None. HISTORY: ORDERING SYSTEM PROVIDED HISTORY: Abdominal pain; concern for AAA TECHNOLOGIST PROVIDED HISTORY: IV Only Contrast FINDINGS: Lower Chest:  Visualized portion of the lower chest demonstrates no acute abnormality. Organs: Liver, spleen, pancreas and adrenal glands are within normal limits. Unremarkable gallbladder. Borderline dilated common bile duct measuring up to 0.8 cm. Gallbladder wall thickness is in the upper limit of normal.  No calcified gallstone. No pericholecystic edema. No enlarged lymph nodes identified in the abdomen and pelvis. No suspicious renal lesions. No evidence of obstructive uropathy. GI/Bowel: No focal enterocolitis. No bowel obstruction. No evidence of appendicitis. No free air. Pelvis: No acute findings in the pelvis. Peritoneum/Retroperitoneum:  No abdominal aortic aneurysm. Atherosclerotic calcifications. Bones/Soft Tissues: No acute osseous findings identified. 1. Borderline gallbladder wall thickening. Consider partial gallbladder contraction, cholecystitis and portal hypertension. Ultrasound exam may be obtained for further assessment as clinically indicated. 2. Common bile duct is mildly dilated measuring 0.8 cm in diameter distally. If there are clinical concerns for biliary ductal obstruction, MRCP or ERCP may be obtained for further assessment as indicated. EMERGENCY DEPARTMENT COURSE:  ED Course as of Sep 18 0303   Tue Sep 18, 2018   0121 WBC: (!) 14.7 [MS]      ED Course User Index  [MS] Tracee Parsons DO     Due to the dilated common bile duct of 8 mm and the leukocytosis. Surgery was consulted. After discussing the case with Gen. surgery resident seeing patient. This was not a surgical case and that could be seen by GI in the morning and we have an ultrasound done. Discussed this plan with the patient and agrees. We'll plan admission to the observation unit for GI consultation and ultrasound. Sign out was given to Dr. Kareen Pena. PROCEDURES:  None    CONSULTS:  IP CONSULT TO GENERAL SURGERY  IP CONSULT TO GI    CRITICAL CARE:  Please see attending note    FINAL IMPRESSION      1.  Abdominal pain, unspecified abdominal location          DISPOSITION / PLAN     DISPOSITION Admitted 09/18/2018 02:15:41 AM    Admitted to ETU    PATIENT REFERRED TO:  aKrla Romero MD  [address] [place] [address]  [phone]            DISCHARGE MEDICATIONS:  New Prescriptions    No medications on file       Tracee Parsons DO  Emergency Medicine Resident    (Please note that portions of this note were completed with a voice recognition program.  Efforts were made to edit the dictations but occasionally words are mis-transcribed.)     Tracee Parsons DO  Resident  09/18/18 [id number]

## 2018-09-18 NOTE — ED PROVIDER NOTES
PROVIDED HISTORY: FINDINGS: BRAIN/VENTRICLES: Ventricles and sulci are stable. Punctate low-density in the right posterior inferior thalamus is stable. Punctate low-density in the right inferior lentiform nucleus is stable. No definite new areas of abnormal density are noted. Posterior fossa and trevor are limited due to artifact. There is low-density in the trevor anteriorly, likely artifactual. Numerous vascular calcifications are noted. No hyperdense vessels are otherwise noted ORBITS: The visualized portion of the orbits demonstrate no acute abnormality. SINUSES: No air-fluid levels are noted SOFT TISSUES/SKULL:  Irregularity of the lateral margin of the left maxilla is again noted, unchanged. No acute osseous abnormality is noted. No acute intracranial abnormality. Mra Head Wo Contrast    Result Date: 9/2/2018  EXAMINATION: MRI OF THE BRAIN WITHOUT CONTRAST; MRA OF THE HEAD WITHOUT CONTRAST; MRA OF THE NECK WITHOUT CONTRAST  9/2/2018 11:45 am TECHNIQUE: Multiplanar multisequence MRI of the brain was performed without the administration of intravenous contrast.; MRA of the head was performed utilizing time-of-flight imaging with MIP images. No intravenous contrast was administered.; Multiplanar multisequence MRA of the neck was performed without the administration of intravenous contrast. Stenosis of the internal carotid arteries measured using NASCET criteria. COMPARISON: None. HISTORY: ORDERING SYSTEM PROVIDED HISTORY: stroke FINDINGS: INTRACRANIAL STRUCTURES/VENTRICLES: Ventricles are normal.  The right lateral ventricle slightly larger than the left which is a normal variant. Punctate increased T2/FLAIR signal is noted projecting over the posterior limb of the internal capsule on the right. There is no corresponding hyperintense diffusion signal.  Otherwise, there is no abnormal increased T2, FLAIR or diffusion signal in the parenchyma. Flow voids are patent.   The left internal carotid artery and pelvis. No suspicious renal lesions. No evidence of obstructive uropathy. GI/Bowel: No focal enterocolitis. No bowel obstruction. No evidence of appendicitis. No free air. Pelvis: No acute findings in the pelvis. Peritoneum/Retroperitoneum:  No abdominal aortic aneurysm. Atherosclerotic calcifications. Bones/Soft Tissues: No acute osseous findings identified. 1. Borderline gallbladder wall thickening. Consider partial gallbladder contraction, cholecystitis and portal hypertension. Ultrasound exam may be obtained for further assessment as clinically indicated. 2. Common bile duct is mildly dilated measuring 0.8 cm in diameter distally. If there are clinical concerns for biliary ductal obstruction, MRCP or ERCP may be obtained for further assessment as indicated. Xr Chest Portable    Result Date: 9/1/2018  EXAMINATION: SINGLE XRAY VIEW OF THE CHEST 9/1/2018 12:29 am COMPARISON: None. HISTORY: ORDERING SYSTEM PROVIDED HISTORY: short of breath TECHNOLOGIST PROVIDED HISTORY: short of breath Acuity: Unknown Type of Exam: Unknown FINDINGS: Cardiomediastinal contour is within normal limits. No focal consolidation. No significant pleural effusion. 1. No acute cardiopulmonary disease.      Vl Dup Carotid Bilateral    Result Date: 9/1/2018    [facility]  Vascular Carotid Procedure   Patient Name  Michelle Virgen     Date of Study         09/01/2018                JAMEL   Date of Birth 1965  Gender                Male   Age           45 year(s)  Race                  Other   Room Number   3019        Height:               67 inch, 170.18 cm   Corporate ID  [id number]  Weight:               187 pounds, 84.8 kg  #   Patient Acct  [de-identified]   BSA:       1.97 m^2   BMI:         29.29 kg/m^2  #   MR #          [id number]     Sonographer           Tamar Dallas   Accession #   [id number]   Interpreting          [facility]                            Physician   Referring                 Referring ! +------------+-------+-------+--------+-------+------------+---------------+ ! Prox ICA    !80.1   !34.2   ! 60      !0.57   !            !               ! +------------+-------+-------+--------+-------+------------+---------------+ ! Mid ICA     ! 90.7   !34.2   ! 60      !0.62   !            !               ! +------------+-------+-------+--------+-------+------------+---------------+ ! Dist ICA    ! 79.5   !30.4   !60      !0.62   !            !               ! +------------+-------+-------+--------+-------+------------+---------------+ ! Prox ECA    !75.2   !11.8   !60      !0.84   !            !               ! +------------+-------+-------+--------+-------+------------+---------------+ ! Vertebral   !63.4   !19.9   !60      !0.69   !            !               ! +------------+-------+-------+--------+-------+------------+---------------+   - There is antegrade vertebral flow noted on the right side. - Additional Measurements:ICAPSV/CCAPSV 1.43. ICAEDV/CCAEDV 3.44. Carotid Left Measurements +------------+-------+-------+--------+-------+------------+---------------+ ! Location    ! PSV    ! EDV    ! Angle   ! RI     !%Stenosis   ! Tortuosity     ! +------------+-------+-------+--------+-------+------------+---------------+ ! Prox CCA    !69.6   !13     !60      !0.81   !            !               ! +------------+-------+-------+--------+-------+------------+---------------+ ! Mid CCA     !71.4   !       !60      !       !            !               ! +------------+-------+-------+--------+-------+------------+---------------+ ! Dist CCA    !72.1   !17.4   !        !0.76   !            !               ! +------------+-------+-------+--------+-------+------------+---------------+ ! Bulb        !44.7   !16.8   !60      !0.62   !            !               ! +------------+-------+-------+--------+-------+------------+---------------+ ! Prox ICA    !62.7   !23     !60      !0.63   !            !               ! +------------+-------+-------+--------+-------+------------+---------------+ ! Mid ICA     !80.8   !34.8   !60      !0.57   !            !               ! +------------+-------+-------+--------+-------+------------+---------------+ ! Dist ICA    !80.8   !31.7   !60      !0.61   !            !               ! +------------+-------+-------+--------+-------+------------+---------------+ ! Prox ECA    !103    !13.7   !60      !0.87   !            !               ! +------------+-------+-------+--------+-------+------------+---------------+ ! Vertebral   !84.5   !21.7   !60      !0.74   !            !               ! +------------+-------+-------+--------+-------+------------+---------------+   - There is antegrade vertebral flow noted on the left side. - Additional Measurements:ICAPSV/CCAPSV 1.16. ICAEDV/CCAEDV 2.68. Mra Neck Wo Contrast    Result Date: 9/2/2018  EXAMINATION: MRI OF THE BRAIN WITHOUT CONTRAST; MRA OF THE HEAD WITHOUT CONTRAST; MRA OF THE NECK WITHOUT CONTRAST  9/2/2018 11:45 am TECHNIQUE: Multiplanar multisequence MRI of the brain was performed without the administration of intravenous contrast.; MRA of the head was performed utilizing time-of-flight imaging with MIP images. No intravenous contrast was administered.; Multiplanar multisequence MRA of the neck was performed without the administration of intravenous contrast. Stenosis of the internal carotid arteries measured using NASCET criteria. COMPARISON: None. HISTORY: ORDERING SYSTEM PROVIDED HISTORY: stroke FINDINGS: INTRACRANIAL STRUCTURES/VENTRICLES: Ventricles are normal.  The right lateral ventricle slightly larger than the left which is a normal variant. Punctate increased T2/FLAIR signal is noted projecting over the posterior limb of the internal capsule on the right. There is no corresponding hyperintense diffusion signal.  Otherwise, there is no abnormal increased T2, FLAIR or diffusion signal in the parenchyma. Flow voids are patent. 9/2/2018  EXAMINATION: MRI OF THE BRAIN WITHOUT CONTRAST; MRA OF THE HEAD WITHOUT CONTRAST; MRA OF THE NECK WITHOUT CONTRAST  9/2/2018 11:45 am TECHNIQUE: Multiplanar multisequence MRI of the brain was performed without the administration of intravenous contrast.; MRA of the head was performed utilizing time-of-flight imaging with MIP images. No intravenous contrast was administered.; Multiplanar multisequence MRA of the neck was performed without the administration of intravenous contrast. Stenosis of the internal carotid arteries measured using NASCET criteria. COMPARISON: None. HISTORY: ORDERING SYSTEM PROVIDED HISTORY: stroke FINDINGS: INTRACRANIAL STRUCTURES/VENTRICLES: Ventricles are normal.  The right lateral ventricle slightly larger than the left which is a normal variant. Punctate increased T2/FLAIR signal is noted projecting over the posterior limb of the internal capsule on the right. There is no corresponding hyperintense diffusion signal.  Otherwise, there is no abnormal increased T2, FLAIR or diffusion signal in the parenchyma. Flow voids are patent. The left internal carotid artery flow void as it enters the cavernous sinus on image 9 of series 6 is slightly smaller. Remaining flow voids are widely patent. ORBITS: The visualized portion of the orbits demonstrate no acute abnormality. SINUSES: The visualized paranasal sinuses and mastoid air cells are well aerated. BONES/SOFT TISSUES: The bone marrow signal intensity appears normal. The soft tissues demonstrate no acute abnormality. MR angiogram head Posterior circulation: Distal vertebral arteries are widely patent. The basilar artery is widely patent. Both posterior cerebral arteries are widely patent. Anterior circulation: Distal internal carotid arteries are patent. There is significant narrowing of the left internal carotid artery as it enters the cavernous sinus. This is seen best on axial image 41.   Otherwise, the cavernous carotid segments are patent bilaterally. There is mild multifocal additional narrowing bilaterally. Anterior and middle cerebral arteries are widely patent. MR angiogram neck: Vertebral arteries: Both vertebral arteries are patent without focal significant narrowing. Proximal segments are limited due to artifact. Carotid arteries: Both common carotid arteries, bifurcations, and internal carotid arteries are patent without focal significant narrowing. There is no definitive dissection. The aortic arch level is limited     Punctate high signal in the posterior limb of the internal capsule on the right without associated high diffusion signal.  This is likely due to a prior ischemic focus. Abnormal flow void in the left internal carotid artery as it enters the cavernous sinus. Otherwise, no acute intracranial abnormality is noted. Severe narrowing of the left internal carotid artery as it enters the cavernous sinus. Otherwise, mild multifocal cavernous carotid artery narrowing is noted. No focal significant arterial narrowing in the neck       RECENT VITALS:     Temp: 96.8 °F (36 °C),  Pulse: 84, Resp: 18, BP: 106/61, SpO2: 100 %    This patient is a 45 y.o. Male with abdominal pain. Patient with symptoms for last few days including nausea and vomiting. Patient with gallbladder wall thickening CBD dilation and leukocytosis. Surgery consulted. Patient admitted to ETU with plan for right upper quadrant ultrasound as well as GI consult and possible ER or MRCP      OUTSTANDING TASKS / RECOMMENDATIONS:    1. Admitted to ETU. Awaiting transfer to floor. FINAL IMPRESSION:     1.  Abdominal pain, unspecified abdominal location        DISPOSITION:         DISPOSITION:  []  Discharge   []  Transfer -    [x]  Admission -  ETU   []  Against Medical Advice   []  Eloped   FOLLOW-UP: Rossi Robison MD  [facility]  [phone]           DISCHARGE MEDICATIONS: New Prescriptions    No medications on file           Adriane Garcia,   Emergency Medicine Resident  [facility]        Adriane Garcia, [place]  09/18/18 0651

## 2018-09-18 NOTE — CONSULTS
and insertion of intraocular lens 2015    bilateral     Type II or unspecified type diabetes mellitus without mention of complication, not stated as uncontrolled        Past Surgical History:        Procedure Laterality Date    EYE SURGERY Bilateral     lazer both eyes    FOOT SURGERY Left 02/24/2017    surgical prep of wound bed    FOOT SURGERY Left 04/13/2017    1) Left foot surgical preparation of wound-bed, 2) Left foot application of graft     HC  PICC [address] DOUBLE  4/28/2017         LEG AMPUTATION BELOW KNEE Left 9/7/2017    LEFT BELOW KNEE GUILLOTINE AMPUTATION performed by Armen Anaya MD at [address] Left 9/14/2017    REVISION LEFT BELOW KNEE AMPUTATION, CLOSED  performed by Armen Anaya MD at [address] Left 01/23/2017    I and D bone, bone biopsy with flap closure and pulse lavage    OTHER SURGICAL HISTORY Left 04/27/2017    I & D foot    [place] DEEP DISSEC FOOT INFEC,1 BURSA Left 4/13/2017    FOOT DEBRIDEMENT WITH EPIFIX  performed by Mauro Moscoso DPM at [address] INCIS/DRAIN THIGH/KNEE ABSCESS,DEEP Left 9/12/2017     LEFT BELOW KNEE AMPUTATION OPEN, WOUND VAC PLACEMENT performed by Armen Anaya MD at [address] Left 2014    hallux    TOE AMPUTATION Left 12/09/2016    hallux    TOE AMPUTATION Left 4/27/2017    I AND D FOOT, BONE BIOPSY, POSSIBLE FILET 2ND TOE performed by Mauro Moscoso DPM at [address]  04/17/2017    Left second toe        Medications Prior to Admission:   Not in a hospital admission. Allergies:  Patient has no known allergies. Social History:   Social History     Social History    Marital status:      Spouse name: N/A    Number of children: N/A    Years of education: N/A     Occupational History    Not on file.      Social History Main Topics    Smoking status: Former Smoker     Packs/day: 0.00     Types: Cigarettes    Smokeless tobacco: Never Used      Comment: smokes 1 cigarette a day    Alcohol use No    Drug use: No    Sexual activity: Not on file     Other Topics Concern    Not on file     Social History Narrative    No narrative on file       Family History:   Family History   Problem Relation Age of Onset    Diabetes Mother     Cancer Father         colon    Diabetes Father     Diabetes Sister     Diabetes Sister        REVIEW OF SYSTEMS:      General ROS: negative for - chills, fatigue, fever or night sweats  Psychological ROS: negative for - anxiety, behavioral disorder or depression  Ophthalmic ROS: Positive for visual deficits, Negative for dry eyes or eye pain  ENT ROS: negative for - epistaxis, headaches or nasal congestion  Hematological and Lymphatic ROS: negative for - bleeding problems, blood clots or bruising  Endocrine ROS: negative for - malaise/lethargy, mood swings or palpitations  Respiratory ROS: negative for - cough, hemoptysis or shortness of breath  Cardiovascular ROS: no chest pain or dyspnea on exertion  Gastrointestinal ROS: Positive for periumbilical pain. Negative for nausea, vomiting, diarrhea or hematochezia. Genito-Urinary ROS: Positive for dysuria and trouble voiding.   Negative for hematuria  Musculoskeletal ROS: negative for - joint pain, joint swelling or muscle pain  Neurological ROS: no TIA or stroke symptoms  Dermatological ROS: negative for dry skin and eczema        PHYSICAL EXAM:    VITALS:  /61   Pulse 84   Temp 96.8 °F (36 °C) (Oral)   Resp 18   SpO2 100%   INTAKE/OUTPUT:   No intake or output data in the 24 hours ending 09/18/18 0127      CONSTITUTIONAL:  Alert and oriented, no acute distress  HEAD: normocephalic, atraumatic  EYES: Sclera non icteric  NECK:  supple, symmetrical, trachea midline   LUNGS:  Good air movement bilaterally, unlabored respirations, no wheezes or rhonchi  CARDIOVASCULAR: Regular rate and rhythm, no murmurs rubs or gallops  ABDOMEN: soft, diffuse tenderness acute  abnormality. Organs: Liver, spleen, pancreas and adrenal glands are within normal limits. Unremarkable gallbladder. Borderline dilated common bile duct measuring up  to 0.8 cm. Gallbladder wall thickness is in the upper limit of normal.  No  calcified gallstone. No pericholecystic edema. No enlarged lymph nodes  identified in the abdomen and pelvis. No suspicious renal lesions. No  evidence of obstructive uropathy. GI/Bowel: No focal enterocolitis. No bowel obstruction. No evidence of  appendicitis. No free air. Pelvis: No acute findings in the pelvis. Peritoneum/Retroperitoneum:  No abdominal aortic aneurysm. Atherosclerotic  calcifications. Bones/Soft Tissues: No acute osseous findings identified. Impression: 1. Borderline gallbladder wall thickening. Consider partial gallbladder  contraction, cholecystitis and portal hypertension. Ultrasound exam may be  obtained for further assessment as clinically indicated. 2. Common bile duct is mildly dilated measuring 0.8 cm in diameter distally. If there are clinical concerns for biliary ductal obstruction, MRCP or ERCP  may be obtained for further assessment as indicated.         ASSESSMENT   Patient Active Problem List   Diagnosis    Essential hypertension    IDDM (insulin dependent diabetes mellitus) ([address]    Neuropathy ([address]    Uncontrolled type 2 diabetes with cellulitis of foot ([address]    Cerebrovascular disease    Cataract    Dysphagia    Family history of colon cancer    Bowel habit changes    Hyperlipidemia    Gangrene of foot ([address]    Diabetic foot left    Acute kidney injury ([address]    Ischemic foot left    Urinary retention    Subacute osteomyelitis of left foot ([address]    Hx of BKA (HCC)    Impaired mobility    HCAP (healthcare-associated pneumonia)    Primary open angle glaucoma of both eyes, moderate stage    Pseudophakia of both eyes    Retinopathy, diabetic, bilateral ([address]    Chest pain, atypical   

## 2018-09-19 LAB
CULTURE: ABNORMAL
Lab: ABNORMAL
SPECIMEN DESCRIPTION: ABNORMAL
STATUS: ABNORMAL

## 2018-09-24 ENCOUNTER — ANESTHESIA EVENT (OUTPATIENT)
Dept: OPERATING ROOM | Age: 53
DRG: 417 | End: 2018-09-24
Payer: COMMERCIAL

## 2018-09-25 ENCOUNTER — ANESTHESIA (OUTPATIENT)
Dept: OPERATING ROOM | Age: 53
DRG: 417 | End: 2018-09-25
Payer: COMMERCIAL

## 2018-09-25 ENCOUNTER — HOSPITAL ENCOUNTER (INPATIENT)
Age: 53
LOS: 2 days | Discharge: HOME | DRG: 417 | End: 2018-09-28
Attending: SURGERY | Admitting: SURGERY
Payer: COMMERCIAL

## 2018-09-25 VITALS — OXYGEN SATURATION: 100 % | DIASTOLIC BLOOD PRESSURE: 69 MMHG | TEMPERATURE: 97.9 F | SYSTOLIC BLOOD PRESSURE: 154 MMHG

## 2018-09-25 DIAGNOSIS — K81.9 CHOLECYSTITIS: Primary | ICD-10-CM

## 2018-09-25 PROBLEM — K81.2 ACUTE CHOLECYSTITIS WITH CHRONIC CHOLECYSTITIS: Status: ACTIVE | Noted: 2018-09-25

## 2018-09-25 LAB
CREAT SERPL-MCNC: 0.91 MG/DL (ref 0.7–1.2)
GFR AFRICAN AMERICAN: >60 ML/MIN
GFR NON-AFRICAN AMERICAN: >60 ML/MIN
GFR SERPL CREATININE-BSD FRML MDRD: NORMAL ML/MIN/{1.73_M2}
GFR SERPL CREATININE-BSD FRML MDRD: NORMAL ML/MIN/{1.73_M2}
GLUCOSE BLD-MCNC: 170 MG/DL (ref 75–110)
GLUCOSE BLD-MCNC: 227 MG/DL (ref 75–110)
GLUCOSE BLD-MCNC: 260 MG/DL (ref 75–110)
GLUCOSE BLD-MCNC: 334 MG/DL (ref 75–110)

## 2018-09-25 PROCEDURE — G0378 HOSPITAL OBSERVATION PER HR: HCPCS

## 2018-09-25 PROCEDURE — 3700000000 HC ANESTHESIA ATTENDED CARE: Performed by: SURGERY

## 2018-09-25 PROCEDURE — 6360000002 HC RX W HCPCS: Performed by: ANESTHESIOLOGY

## 2018-09-25 PROCEDURE — 7100000001 HC PACU RECOVERY - ADDTL 15 MIN: Performed by: SURGERY

## 2018-09-25 PROCEDURE — 2720000010 HC SURG SUPPLY STERILE: Performed by: SURGERY

## 2018-09-25 PROCEDURE — 3600000003 HC SURGERY LEVEL 3 BASE: Performed by: SURGERY

## 2018-09-25 PROCEDURE — 2500000003 HC RX 250 WO HCPCS: Performed by: ANESTHESIOLOGY

## 2018-09-25 PROCEDURE — 96376 TX/PRO/DX INJ SAME DRUG ADON: CPT

## 2018-09-25 PROCEDURE — 2580000003 HC RX 258: Performed by: SURGERY

## 2018-09-25 PROCEDURE — 2500000003 HC RX 250 WO HCPCS: Performed by: SURGERY

## 2018-09-25 PROCEDURE — 2500000003 HC RX 250 WO HCPCS: Performed by: NURSE ANESTHETIST, CERTIFIED REGISTERED

## 2018-09-25 PROCEDURE — 3700000001 HC ADD 15 MINUTES (ANESTHESIA): Performed by: SURGERY

## 2018-09-25 PROCEDURE — 6360000002 HC RX W HCPCS: Performed by: NURSE ANESTHETIST, CERTIFIED REGISTERED

## 2018-09-25 PROCEDURE — 6360000002 HC RX W HCPCS: Performed by: SURGERY

## 2018-09-25 PROCEDURE — 2580000003 HC RX 258: Performed by: ANESTHESIOLOGY

## 2018-09-25 PROCEDURE — 96375 TX/PRO/DX INJ NEW DRUG ADDON: CPT

## 2018-09-25 PROCEDURE — 82565 ASSAY OF CREATININE: CPT

## 2018-09-25 PROCEDURE — 88304 TISSUE EXAM BY PATHOLOGIST: CPT

## 2018-09-25 PROCEDURE — 3600000013 HC SURGERY LEVEL 3 ADDTL 15MIN: Performed by: SURGERY

## 2018-09-25 PROCEDURE — 87186 SC STD MICRODIL/AGAR DIL: CPT

## 2018-09-25 PROCEDURE — 86403 PARTICLE AGGLUT ANTBDY SCRN: CPT

## 2018-09-25 PROCEDURE — 2709999900 HC NON-CHARGEABLE SUPPLY: Performed by: SURGERY

## 2018-09-25 PROCEDURE — 0FT44ZZ RESECTION OF GALLBLADDER, PERCUTANEOUS ENDOSCOPIC APPROACH: ICD-10-PCS | Performed by: SURGERY

## 2018-09-25 PROCEDURE — 7100000000 HC PACU RECOVERY - FIRST 15 MIN: Performed by: SURGERY

## 2018-09-25 PROCEDURE — 96366 THER/PROPH/DIAG IV INF ADDON: CPT

## 2018-09-25 PROCEDURE — 87205 SMEAR GRAM STAIN: CPT

## 2018-09-25 PROCEDURE — 96367 TX/PROPH/DG ADDL SEQ IV INF: CPT

## 2018-09-25 PROCEDURE — 6370000000 HC RX 637 (ALT 250 FOR IP): Performed by: INTERNAL MEDICINE

## 2018-09-25 PROCEDURE — 96365 THER/PROPH/DIAG IV INF INIT: CPT

## 2018-09-25 PROCEDURE — 87070 CULTURE OTHR SPECIMN AEROBIC: CPT

## 2018-09-25 PROCEDURE — 87075 CULTR BACTERIA EXCEPT BLOOD: CPT

## 2018-09-25 PROCEDURE — 36415 COLL VENOUS BLD VENIPUNCTURE: CPT

## 2018-09-25 PROCEDURE — 82947 ASSAY GLUCOSE BLOOD QUANT: CPT

## 2018-09-25 RX ORDER — CEFAZOLIN SODIUM 1 G/3ML
INJECTION, POWDER, FOR SOLUTION INTRAMUSCULAR; INTRAVENOUS
Status: DISPENSED
Start: 2018-09-25 | End: 2018-09-26

## 2018-09-25 RX ORDER — LISINOPRIL 20 MG/1
40 TABLET ORAL DAILY
Status: DISCONTINUED | OUTPATIENT
Start: 2018-09-25 | End: 2018-09-28 | Stop reason: HOSPADM

## 2018-09-25 RX ORDER — INSULIN GLARGINE 100 [IU]/ML
20 INJECTION, SOLUTION SUBCUTANEOUS DAILY
COMMUNITY
End: 2019-06-28 | Stop reason: SDUPTHER

## 2018-09-25 RX ORDER — ONDANSETRON 2 MG/ML
4 INJECTION INTRAMUSCULAR; INTRAVENOUS
Status: DISCONTINUED | OUTPATIENT
Start: 2018-09-25 | End: 2018-09-25 | Stop reason: HOSPADM

## 2018-09-25 RX ORDER — CYCLOBENZAPRINE HCL 10 MG
5 TABLET ORAL 3 TIMES DAILY PRN
Status: DISCONTINUED | OUTPATIENT
Start: 2018-09-25 | End: 2018-09-28 | Stop reason: HOSPADM

## 2018-09-25 RX ORDER — METOPROLOL TARTRATE 50 MG/1
50 TABLET, FILM COATED ORAL 2 TIMES DAILY
Status: DISCONTINUED | OUTPATIENT
Start: 2018-09-25 | End: 2018-09-28 | Stop reason: HOSPADM

## 2018-09-25 RX ORDER — SODIUM CHLORIDE, SODIUM LACTATE, POTASSIUM CHLORIDE, CALCIUM CHLORIDE 600; 310; 30; 20 MG/100ML; MG/100ML; MG/100ML; MG/100ML
INJECTION, SOLUTION INTRAVENOUS CONTINUOUS
Status: DISCONTINUED | OUTPATIENT
Start: 2018-09-25 | End: 2018-09-25

## 2018-09-25 RX ORDER — LIDOCAINE HYDROCHLORIDE 10 MG/ML
INJECTION, SOLUTION EPIDURAL; INFILTRATION; INTRACAUDAL; PERINEURAL PRN
Status: DISCONTINUED | OUTPATIENT
Start: 2018-09-25 | End: 2018-09-25 | Stop reason: SDUPTHER

## 2018-09-25 RX ORDER — LATANOPROST 50 UG/ML
1 SOLUTION/ DROPS OPHTHALMIC NIGHTLY
Status: DISCONTINUED | OUTPATIENT
Start: 2018-09-25 | End: 2018-09-28 | Stop reason: HOSPADM

## 2018-09-25 RX ORDER — KETOROLAC TROMETHAMINE 30 MG/ML
30 INJECTION, SOLUTION INTRAMUSCULAR; INTRAVENOUS EVERY 6 HOURS
Status: COMPLETED | OUTPATIENT
Start: 2018-09-25 | End: 2018-09-27

## 2018-09-25 RX ORDER — PANTOPRAZOLE SODIUM 40 MG/1
40 TABLET, DELAYED RELEASE ORAL
Status: DISCONTINUED | OUTPATIENT
Start: 2018-09-26 | End: 2018-09-28 | Stop reason: HOSPADM

## 2018-09-25 RX ORDER — ONDANSETRON 2 MG/ML
INJECTION INTRAMUSCULAR; INTRAVENOUS PRN
Status: DISCONTINUED | OUTPATIENT
Start: 2018-09-25 | End: 2018-09-25 | Stop reason: SDUPTHER

## 2018-09-25 RX ORDER — DEXTROSE MONOHYDRATE 25 G/50ML
12.5 INJECTION, SOLUTION INTRAVENOUS PRN
Status: DISCONTINUED | OUTPATIENT
Start: 2018-09-25 | End: 2018-09-28 | Stop reason: HOSPADM

## 2018-09-25 RX ORDER — TAMSULOSIN HYDROCHLORIDE 0.4 MG/1
0.4 CAPSULE ORAL DAILY
Status: DISCONTINUED | OUTPATIENT
Start: 2018-09-25 | End: 2018-09-28 | Stop reason: HOSPADM

## 2018-09-25 RX ORDER — DICYCLOMINE HYDROCHLORIDE 10 MG/1
10 CAPSULE ORAL EVERY 6 HOURS PRN
Status: DISCONTINUED | OUTPATIENT
Start: 2018-09-25 | End: 2018-09-28 | Stop reason: HOSPADM

## 2018-09-25 RX ORDER — ROCURONIUM BROMIDE 10 MG/ML
INJECTION, SOLUTION INTRAVENOUS PRN
Status: DISCONTINUED | OUTPATIENT
Start: 2018-09-25 | End: 2018-09-25 | Stop reason: SDUPTHER

## 2018-09-25 RX ORDER — HYDROCODONE BITARTRATE AND ACETAMINOPHEN 5; 325 MG/1; MG/1
1 TABLET ORAL EVERY 4 HOURS PRN
Status: DISCONTINUED | OUTPATIENT
Start: 2018-09-25 | End: 2018-09-28 | Stop reason: HOSPADM

## 2018-09-25 RX ORDER — ACETAMINOPHEN 325 MG/1
650 TABLET ORAL EVERY 4 HOURS PRN
Status: DISCONTINUED | OUTPATIENT
Start: 2018-09-25 | End: 2018-09-28 | Stop reason: HOSPADM

## 2018-09-25 RX ORDER — GLIMEPIRIDE 2 MG/1
1 TABLET ORAL DAILY
Status: DISCONTINUED | OUTPATIENT
Start: 2018-09-25 | End: 2018-09-28 | Stop reason: HOSPADM

## 2018-09-25 RX ORDER — FENTANYL CITRATE 50 UG/ML
INJECTION, SOLUTION INTRAMUSCULAR; INTRAVENOUS PRN
Status: DISCONTINUED | OUTPATIENT
Start: 2018-09-25 | End: 2018-09-25 | Stop reason: SDUPTHER

## 2018-09-25 RX ORDER — SODIUM CHLORIDE 0.9 % (FLUSH) 0.9 %
10 SYRINGE (ML) INJECTION PRN
Status: DISCONTINUED | OUTPATIENT
Start: 2018-09-25 | End: 2018-09-28 | Stop reason: HOSPADM

## 2018-09-25 RX ORDER — PREGABALIN 50 MG/1
50 CAPSULE ORAL 3 TIMES DAILY
Status: DISCONTINUED | OUTPATIENT
Start: 2018-09-25 | End: 2018-09-28 | Stop reason: HOSPADM

## 2018-09-25 RX ORDER — MEPERIDINE HYDROCHLORIDE 50 MG/ML
12.5 INJECTION INTRAMUSCULAR; INTRAVENOUS; SUBCUTANEOUS EVERY 5 MIN PRN
Status: DISCONTINUED | OUTPATIENT
Start: 2018-09-25 | End: 2018-09-25 | Stop reason: HOSPADM

## 2018-09-25 RX ORDER — ONDANSETRON 2 MG/ML
4 INJECTION INTRAMUSCULAR; INTRAVENOUS EVERY 6 HOURS PRN
Status: DISCONTINUED | OUTPATIENT
Start: 2018-09-25 | End: 2018-09-28 | Stop reason: HOSPADM

## 2018-09-25 RX ORDER — DEXTROSE MONOHYDRATE 50 MG/ML
100 INJECTION, SOLUTION INTRAVENOUS PRN
Status: DISCONTINUED | OUTPATIENT
Start: 2018-09-25 | End: 2018-09-28 | Stop reason: HOSPADM

## 2018-09-25 RX ORDER — CHLORTHALIDONE 25 MG/1
25 TABLET ORAL DAILY
Status: DISCONTINUED | OUTPATIENT
Start: 2018-09-25 | End: 2018-09-28 | Stop reason: HOSPADM

## 2018-09-25 RX ORDER — ASPIRIN 81 MG/1
81 TABLET ORAL DAILY
Status: DISCONTINUED | OUTPATIENT
Start: 2018-09-25 | End: 2018-09-28 | Stop reason: HOSPADM

## 2018-09-25 RX ORDER — TIMOLOL MALEATE 2.5 MG/ML
1 SOLUTION OPHTHALMIC DAILY
Status: DISCONTINUED | OUTPATIENT
Start: 2018-09-25 | End: 2018-09-25

## 2018-09-25 RX ORDER — BUPIVACAINE HYDROCHLORIDE 2.5 MG/ML
INJECTION, SOLUTION EPIDURAL; INFILTRATION; INTRACAUDAL PRN
Status: DISCONTINUED | OUTPATIENT
Start: 2018-09-25 | End: 2018-09-25 | Stop reason: HOSPADM

## 2018-09-25 RX ORDER — PRAVASTATIN SODIUM 10 MG
10 TABLET ORAL NIGHTLY
Status: DISCONTINUED | OUTPATIENT
Start: 2018-09-25 | End: 2018-09-28 | Stop reason: HOSPADM

## 2018-09-25 RX ORDER — HYDROCODONE BITARTRATE AND ACETAMINOPHEN 5; 325 MG/1; MG/1
2 TABLET ORAL EVERY 4 HOURS PRN
Status: DISCONTINUED | OUTPATIENT
Start: 2018-09-25 | End: 2018-09-28 | Stop reason: HOSPADM

## 2018-09-25 RX ORDER — SODIUM CHLORIDE 0.9 % (FLUSH) 0.9 %
10 SYRINGE (ML) INJECTION EVERY 12 HOURS SCHEDULED
Status: DISCONTINUED | OUTPATIENT
Start: 2018-09-25 | End: 2018-09-25 | Stop reason: HOSPADM

## 2018-09-25 RX ORDER — LABETALOL HYDROCHLORIDE 5 MG/ML
5 INJECTION, SOLUTION INTRAVENOUS EVERY 10 MIN PRN
Status: DISCONTINUED | OUTPATIENT
Start: 2018-09-25 | End: 2018-09-25 | Stop reason: HOSPADM

## 2018-09-25 RX ORDER — PROPOFOL 10 MG/ML
INJECTION, EMULSION INTRAVENOUS PRN
Status: DISCONTINUED | OUTPATIENT
Start: 2018-09-25 | End: 2018-09-25 | Stop reason: SDUPTHER

## 2018-09-25 RX ORDER — DEXTROSE, SODIUM CHLORIDE, AND POTASSIUM CHLORIDE 5; .45; .15 G/100ML; G/100ML; G/100ML
INJECTION INTRAVENOUS CONTINUOUS
Status: DISCONTINUED | OUTPATIENT
Start: 2018-09-25 | End: 2018-09-28 | Stop reason: HOSPADM

## 2018-09-25 RX ORDER — SODIUM CHLORIDE 0.9 % (FLUSH) 0.9 %
10 SYRINGE (ML) INJECTION EVERY 12 HOURS SCHEDULED
Status: DISCONTINUED | OUTPATIENT
Start: 2018-09-25 | End: 2018-09-28 | Stop reason: HOSPADM

## 2018-09-25 RX ORDER — MIDAZOLAM HYDROCHLORIDE 1 MG/ML
INJECTION INTRAMUSCULAR; INTRAVENOUS PRN
Status: DISCONTINUED | OUTPATIENT
Start: 2018-09-25 | End: 2018-09-25 | Stop reason: SDUPTHER

## 2018-09-25 RX ORDER — MORPHINE SULFATE 2 MG/ML
2 INJECTION, SOLUTION INTRAMUSCULAR; INTRAVENOUS EVERY 5 MIN PRN
Status: DISCONTINUED | OUTPATIENT
Start: 2018-09-25 | End: 2018-09-25 | Stop reason: HOSPADM

## 2018-09-25 RX ORDER — DOCUSATE SODIUM 100 MG/1
100 CAPSULE, LIQUID FILLED ORAL 2 TIMES DAILY PRN
Status: DISCONTINUED | OUTPATIENT
Start: 2018-09-25 | End: 2018-09-28 | Stop reason: HOSPADM

## 2018-09-25 RX ORDER — SODIUM CHLORIDE 0.9 % (FLUSH) 0.9 %
10 SYRINGE (ML) INJECTION PRN
Status: DISCONTINUED | OUTPATIENT
Start: 2018-09-25 | End: 2018-09-25 | Stop reason: HOSPADM

## 2018-09-25 RX ORDER — FOLIC ACID 1 MG/1
1 TABLET ORAL DAILY
Status: DISCONTINUED | OUTPATIENT
Start: 2018-09-25 | End: 2018-09-28 | Stop reason: HOSPADM

## 2018-09-25 RX ORDER — NICOTINE POLACRILEX 4 MG
15 LOZENGE BUCCAL PRN
Status: DISCONTINUED | OUTPATIENT
Start: 2018-09-25 | End: 2018-09-28 | Stop reason: HOSPADM

## 2018-09-25 RX ORDER — DIPHENHYDRAMINE HYDROCHLORIDE 50 MG/ML
12.5 INJECTION INTRAMUSCULAR; INTRAVENOUS
Status: DISCONTINUED | OUTPATIENT
Start: 2018-09-25 | End: 2018-09-25 | Stop reason: HOSPADM

## 2018-09-25 RX ADMIN — SODIUM CHLORIDE, POTASSIUM CHLORIDE, SODIUM LACTATE AND CALCIUM CHLORIDE: 600; 310; 30; 20 INJECTION, SOLUTION INTRAVENOUS at 12:47

## 2018-09-25 RX ADMIN — SUGAMMADEX 320 MG: 100 INJECTION, SOLUTION INTRAVENOUS at 16:06

## 2018-09-25 RX ADMIN — CEFOXITIN SODIUM 2 G: 2 POWDER, FOR SOLUTION INTRAVENOUS at 21:23

## 2018-09-25 RX ADMIN — KETOROLAC TROMETHAMINE 30 MG: 30 INJECTION, SOLUTION INTRAMUSCULAR; INTRAVENOUS at 18:40

## 2018-09-25 RX ADMIN — LISINOPRIL 40 MG: 20 TABLET ORAL at 18:40

## 2018-09-25 RX ADMIN — TAMSULOSIN HYDROCHLORIDE 0.4 MG: 0.4 CAPSULE ORAL at 18:40

## 2018-09-25 RX ADMIN — POTASSIUM CHLORIDE, DEXTROSE MONOHYDRATE AND SODIUM CHLORIDE: 150; 5; 450 INJECTION, SOLUTION INTRAVENOUS at 18:25

## 2018-09-25 RX ADMIN — KETOROLAC TROMETHAMINE 30 MG: 30 INJECTION, SOLUTION INTRAMUSCULAR; INTRAVENOUS at 23:43

## 2018-09-25 RX ADMIN — PRAVASTATIN SODIUM 10 MG: 10 TABLET ORAL at 21:23

## 2018-09-25 RX ADMIN — ONDANSETRON 4 MG: 2 INJECTION INTRAMUSCULAR; INTRAVENOUS at 15:56

## 2018-09-25 RX ADMIN — METOPROLOL TARTRATE 50 MG: 50 TABLET ORAL at 21:24

## 2018-09-25 RX ADMIN — FENTANYL CITRATE 50 MCG: 50 INJECTION, SOLUTION INTRAMUSCULAR; INTRAVENOUS at 14:22

## 2018-09-25 RX ADMIN — FENTANYL CITRATE 50 MCG: 50 INJECTION, SOLUTION INTRAMUSCULAR; INTRAVENOUS at 14:53

## 2018-09-25 RX ADMIN — ROCURONIUM BROMIDE 40 MG: 10 INJECTION INTRAVENOUS at 13:51

## 2018-09-25 RX ADMIN — INSULIN LISPRO 2 UNITS: 100 INJECTION, SOLUTION INTRAVENOUS; SUBCUTANEOUS at 21:24

## 2018-09-25 RX ADMIN — MIDAZOLAM 2 MG: 1 INJECTION INTRAMUSCULAR; INTRAVENOUS at 13:48

## 2018-09-25 RX ADMIN — FENTANYL CITRATE 50 MCG: 50 INJECTION, SOLUTION INTRAMUSCULAR; INTRAVENOUS at 14:20

## 2018-09-25 RX ADMIN — PROPOFOL 150 MG: 10 INJECTION, EMULSION INTRAVENOUS at 13:51

## 2018-09-25 RX ADMIN — LATANOPROST 1 DROP: 50 SOLUTION OPHTHALMIC at 21:24

## 2018-09-25 RX ADMIN — PREGABALIN 50 MG: 50 CAPSULE ORAL at 21:23

## 2018-09-25 RX ADMIN — HYDROMORPHONE HYDROCHLORIDE 0.5 MG: 1 INJECTION, SOLUTION INTRAMUSCULAR; INTRAVENOUS; SUBCUTANEOUS at 16:27

## 2018-09-25 RX ADMIN — Medication 2 G: at 13:49

## 2018-09-25 RX ADMIN — ROCURONIUM BROMIDE 10 MG: 10 INJECTION INTRAVENOUS at 14:36

## 2018-09-25 RX ADMIN — LIDOCAINE HYDROCHLORIDE 50 MG: 10 INJECTION, SOLUTION EPIDURAL; INFILTRATION; INTRACAUDAL; PERINEURAL at 13:51

## 2018-09-25 RX ADMIN — FENTANYL CITRATE 100 MCG: 50 INJECTION, SOLUTION INTRAMUSCULAR; INTRAVENOUS at 13:51

## 2018-09-25 RX ADMIN — HYDROMORPHONE HYDROCHLORIDE 0.5 MG: 1 INJECTION, SOLUTION INTRAMUSCULAR; INTRAVENOUS; SUBCUTANEOUS at 16:45

## 2018-09-25 ASSESSMENT — PULMONARY FUNCTION TESTS
PIF_VALUE: 22
PIF_VALUE: 12
PIF_VALUE: 18
PIF_VALUE: 23
PIF_VALUE: 23
PIF_VALUE: 21
PIF_VALUE: 21
PIF_VALUE: 23
PIF_VALUE: 19
PIF_VALUE: 11
PIF_VALUE: 11
PIF_VALUE: 16
PIF_VALUE: 23
PIF_VALUE: 21
PIF_VALUE: 23
PIF_VALUE: 23
PIF_VALUE: 12
PIF_VALUE: 22
PIF_VALUE: 13
PIF_VALUE: 23
PIF_VALUE: 22
PIF_VALUE: 20
PIF_VALUE: 13
PIF_VALUE: 23
PIF_VALUE: 21
PIF_VALUE: 22
PIF_VALUE: 15
PIF_VALUE: 26
PIF_VALUE: 11
PIF_VALUE: 24
PIF_VALUE: 1
PIF_VALUE: 12
PIF_VALUE: 22
PIF_VALUE: 22
PIF_VALUE: 3
PIF_VALUE: 22
PIF_VALUE: 21
PIF_VALUE: 22
PIF_VALUE: 23
PIF_VALUE: 22
PIF_VALUE: 12
PIF_VALUE: 13
PIF_VALUE: 22
PIF_VALUE: 21
PIF_VALUE: 12
PIF_VALUE: 13
PIF_VALUE: 11
PIF_VALUE: 4
PIF_VALUE: 12
PIF_VALUE: 24
PIF_VALUE: 24
PIF_VALUE: 23
PIF_VALUE: 23
PIF_VALUE: 22
PIF_VALUE: 23
PIF_VALUE: 15
PIF_VALUE: 23
PIF_VALUE: 1
PIF_VALUE: 14
PIF_VALUE: 21
PIF_VALUE: 18
PIF_VALUE: 13
PIF_VALUE: 24
PIF_VALUE: 22
PIF_VALUE: 15
PIF_VALUE: 23
PIF_VALUE: 22
PIF_VALUE: 23
PIF_VALUE: 6
PIF_VALUE: 23
PIF_VALUE: 22
PIF_VALUE: 3
PIF_VALUE: 23
PIF_VALUE: 13
PIF_VALUE: 22
PIF_VALUE: 0
PIF_VALUE: 8
PIF_VALUE: 22
PIF_VALUE: 13
PIF_VALUE: 22
PIF_VALUE: 22
PIF_VALUE: 2
PIF_VALUE: 16
PIF_VALUE: 21
PIF_VALUE: 21
PIF_VALUE: 15
PIF_VALUE: 23
PIF_VALUE: 24
PIF_VALUE: 22
PIF_VALUE: 20
PIF_VALUE: 13
PIF_VALUE: 13
PIF_VALUE: 17
PIF_VALUE: 13
PIF_VALUE: 24
PIF_VALUE: 24
PIF_VALUE: 22
PIF_VALUE: 13
PIF_VALUE: 26
PIF_VALUE: 24
PIF_VALUE: 22
PIF_VALUE: 26
PIF_VALUE: 21
PIF_VALUE: 17
PIF_VALUE: 22
PIF_VALUE: 12
PIF_VALUE: 24
PIF_VALUE: 22
PIF_VALUE: 20
PIF_VALUE: 13
PIF_VALUE: 22
PIF_VALUE: 13
PIF_VALUE: 24
PIF_VALUE: 2
PIF_VALUE: 23
PIF_VALUE: 22
PIF_VALUE: 14
PIF_VALUE: 22
PIF_VALUE: 13
PIF_VALUE: 20
PIF_VALUE: 15
PIF_VALUE: 12
PIF_VALUE: 12
PIF_VALUE: 5
PIF_VALUE: 3
PIF_VALUE: 12
PIF_VALUE: 15
PIF_VALUE: 22
PIF_VALUE: 1
PIF_VALUE: 22
PIF_VALUE: 21
PIF_VALUE: 21
PIF_VALUE: 23
PIF_VALUE: 21
PIF_VALUE: 12
PIF_VALUE: 3
PIF_VALUE: 22
PIF_VALUE: 23
PIF_VALUE: 23
PIF_VALUE: 13
PIF_VALUE: 21
PIF_VALUE: 20

## 2018-09-25 ASSESSMENT — ENCOUNTER SYMPTOMS
SHORTNESS OF BREATH: 0
STRIDOR: 0

## 2018-09-25 ASSESSMENT — PAIN DESCRIPTION - PROGRESSION: CLINICAL_PROGRESSION: NOT CHANGED

## 2018-09-25 ASSESSMENT — PAIN DESCRIPTION - PAIN TYPE
TYPE: SURGICAL PAIN
TYPE: SURGICAL PAIN

## 2018-09-25 ASSESSMENT — PAIN - FUNCTIONAL ASSESSMENT: PAIN_FUNCTIONAL_ASSESSMENT: 0-10

## 2018-09-25 ASSESSMENT — PAIN SCALES - GENERAL
PAINLEVEL_OUTOF10: 7
PAINLEVEL_OUTOF10: 8
PAINLEVEL_OUTOF10: 3
PAINLEVEL_OUTOF10: 8
PAINLEVEL_OUTOF10: 5

## 2018-09-25 ASSESSMENT — PAIN DESCRIPTION - ORIENTATION
ORIENTATION: MID;RIGHT
ORIENTATION: MID

## 2018-09-25 ASSESSMENT — PAIN DESCRIPTION - LOCATION
LOCATION: ABDOMEN
LOCATION: ABDOMEN

## 2018-09-25 ASSESSMENT — PAIN DESCRIPTION - FREQUENCY: FREQUENCY: CONTINUOUS

## 2018-09-25 ASSESSMENT — PAIN DESCRIPTION - ONSET: ONSET: AWAKENED FROM SLEEP

## 2018-09-25 ASSESSMENT — LIFESTYLE VARIABLES: SMOKING_STATUS: 0

## 2018-09-25 ASSESSMENT — PAIN DESCRIPTION - DESCRIPTORS: DESCRIPTORS: PATIENT UNABLE TO DESCRIBE

## 2018-09-25 NOTE — ANESTHESIA PRE PROCEDURE
Department of Anesthesiology  Preprocedure Note       Name:  Jim Williamson   Age:  45 y.o.  :  1965                                          MRN:  [id number]         Date:  2018      Surgeon: Sole Milner):  Alvin Qureshi MD    Procedure: Procedure(s):  CHOLECYSTECTOMY LAPAROSCOPIC W/POSS GRAM    Medications prior to admission:   Prior to Admission medications    Medication Sig Start Date End Date Taking? Authorizing Provider   ondansetron (ZOFRAN) 4 MG tablet Take 1 tablet by mouth every 12 hours as needed for Nausea or Vomiting 18  Yes Terri Mcnamara DO   dicyclomine (BENTYL) 10 MG capsule Take 1 capsule by mouth every 6 hours as needed (cramps) 18 Yes Terri Mcnamara DO   trimethoprim-polymyxin b (POLYTRIM) 39421-7.1 UNIT/ML-% ophthalmic solution Place 1 drop into the right eye every 6 hours For ten days   Yes Historical Provider, MD   prednisoLONE sodium phosphate (INFLAMASE FORTE) 1 % ophthalmic solution Place 1 drop into the right eye 4 times daily For ten days   Yes Historical Provider, MD   cyclobenzaprine (FLEXERIL) 5 MG tablet Take 5 mg by mouth 3 times daily as needed for Muscle spasms   Yes Historical Provider, MD   acetaZOLAMIDE (DIAMOX) 500 MG extended release capsule Take 500 mg by mouth 2 times daily   Yes Historical Provider, MD   metFORMIN (GLUCOPHAGE) 500 MG tablet Take 500 mg by mouth 2 times daily (with meals)   Yes Historical Provider, MD   pregabalin (LYRICA) 25 MG capsule Take 1 capsule by mouth 3 times daily  Patient taking differently: Take 50 mg by mouth 3 times daily.  . 17  Yes Rios Steele MD   insulin glargine (LANTUS) 100 UNIT/ML injection vial Inject 20 Units into the skin nightly 17  Yes Rios Steele MD   pravastatin (PRAVACHOL) 10 MG tablet Take 1 tablet by mouth daily 17  Yes Rios Steele MD   lisinopril (PRINIVIL;ZESTRIL) 40 MG tablet Take 1 tablet by mouth daily 17  Yes Rios Steele MD   metoprolol tartrate (LOPRESSOR) 50 MG tablet without mention of complication, not stated as uncontrolled        Past Surgical History:        Procedure Laterality Date    EYE SURGERY Bilateral     lazer both eyes    FOOT SURGERY Left 02/24/2017    surgical prep of wound bed    FOOT SURGERY Left 04/13/2017    1) Left foot surgical preparation of wound-bed, 2) Left foot application of graft     [facility]  PICC [address] DOUBLE  4/28/2017         LEG AMPUTATION BELOW KNEE Left 9/7/2017    LEFT BELOW KNEE GUILLOTINE AMPUTATION performed by Hasmukh Ruvalcaba MD at [address] Left 9/14/2017    REVISION LEFT BELOW KNEE AMPUTATION, CLOSED  performed by Hasmukh Ruvalcaba MD at [address] Left 01/23/2017    I and D bone, bone biopsy with flap closure and pulse lavage    OTHER SURGICAL HISTORY Left 04/27/2017    I & D foot    [place] DEEP DISSEC FOOT INFEC,1 BURSA Left 4/13/2017    FOOT DEBRIDEMENT WITH EPIFIX  performed by Riley Knight DPM at [address] INCIS/DRAIN THIGH/KNEE ABSCESS,DEEP Left 9/12/2017     LEFT BELOW KNEE AMPUTATION OPEN, WOUND VAC PLACEMENT performed by Hasmukh Ruvalcaba MD at [address] [facility] Left 2014    hallux    TOE AMPUTATION Left 12/09/2016    hallux    TOE AMPUTATION Left 4/27/2017    I AND D FOOT, BONE BIOPSY, POSSIBLE FILET 2ND TOE performed by Riley Knight DPM at [facility] TOE AMPUTATION  04/17/2017    Left second toe        Social History:    Social History   Substance Use Topics    Smoking status: Former Smoker     Packs/day: 0.00     Types: Cigarettes     Quit date: 9/18/2018    Smokeless tobacco: Never Used      Comment: smokes 1 cigarette a day    Alcohol use No                                Counseling given: Not Answered      Vital Signs (Current):   Vitals:    09/25/18 1203   BP: (!) 152/71   Pulse: 80   Resp: 16   Temp: 99.1 °F (37.3 °C)   TempSrc: Oral   Weight: 172 lb (78 kg)   Height: 5' 7\" (1.702 m)                                              BP Readings from Last 3 Encounters:   09/25/18 (!) 152/71   09/18/18 (!) 148/65   09/02/18 (!) 142/54       NPO Status: Time of last liquid consumption: 2200                        Time of last solid consumption: 2200                        Date of last liquid consumption: 09/24/18                        Date of last solid food consumption: 09/24/18    BMI:   Wt Readings from Last 3 Encounters:   09/25/18 172 lb (78 kg)   09/01/18 172 lb 12.8 oz (78.4 kg)   02/01/18 203 lb (92.1 kg)     Body mass index is 26.94 kg/m². CBC:   Lab Results   Component Value Date    WBC 14.7 09/17/2018    RBC 3.76 09/17/2018    HGB 10.8 09/17/2018    HCT 34.2 09/17/2018    MCV 91.0 09/17/2018    RDW 14.2 09/17/2018    PLT See Reflexed IPF Result 09/17/2018     HB 10.8, K 3.6    CMP:   Lab Results   Component Value Date     09/17/2018    K 3.6 09/17/2018     09/17/2018    CO2 12 09/17/2018    BUN 30 09/17/2018    CREATININE 1.41 09/17/2018    GFRAA >60 09/17/2018    LABGLOM 52 09/17/2018    GLUCOSE 249 09/17/2018    PROT 7.3 09/17/2018    CALCIUM 9.0 09/17/2018    BILITOT <0.10 09/17/2018    ALKPHOS 99 09/17/2018    AST 11 09/17/2018    ALT 10 09/17/2018       POC Tests: No results for input(s): POCGLU, POCNA, POCK, POCCL, POCBUN, POCHEMO, POCHCT in the last 72 hours.     Coags:   Lab Results   Component Value Date    PROTIME 10.0 08/31/2018    INR 0.9 08/31/2018    APTT 26.4 08/31/2018       HCG (If Applicable): No results found for: PREGTESTUR, PREGSERUM, HCG, HCGQUANT     ABGs: No results found for: PHART, PO2ART, FAP5FQN, JVB0POU, BEART, K8UKRZSH     Type & Screen (If Applicable):  No results found for: LABABO, [address]    Anesthesia Evaluation  Patient summary reviewed history of anesthetic complications:   Airway: Mallampati: II  TM distance: >3 FB   Neck ROM: full  Mouth opening: > = 3 FB Dental:          Pulmonary:normal exam  breath sounds clear to auscultation  (+) pneumonia: no interval change,      (-) COPD, asthma,

## 2018-09-25 NOTE — H&P
(HYGROTON) 25 MG tablet Take 1 tablet by mouth daily 30 tablet 3    spironolactone (ALDACTONE) 100 MG tablet Take 1 tablet by mouth daily 30 tablet 3    folic acid (FOLVITE) 1 MG tablet Take 1 tablet by mouth daily 30 tablet 3    tamsulosin (FLOMAX) 0.4 MG capsule Take 1 capsule by mouth daily 30 capsule 3    docusate sodium (COLACE) 100 MG capsule Take 1 capsule by mouth 2 times daily as needed for Constipation 40 capsule 0    omeprazole (PRILOSEC) 20 MG delayed release capsule Take 20 mg by mouth 2 times daily      timolol (TIMOPTIC-XE) 0.25 % ophthalmic gel-forming 1 drop daily      travoprost, benzalkonium, (TRAVATAN) 0.004 % ophthalmic solution 1 drop nightly      aspirin 81 MG EC tablet Take 1 tablet by mouth daily 30 tablet 3    HYDROcodone-acetaminophen (NORCO) 5-325 MG per tablet take 1 tablet by mouth twice a day if needed for pain  0                      General health:  Patient states health is overall good, He has no fever today No chills. Skin:  No itching,  No skin lesions. Head, eyes, ears, nose, throat neck:  Has no headache and no dizziness and no change in hearing or vision, Very swollen left face with cellulitis       Cardiovascular/Respiratory system:  Denies any chest pain or palpatations, Has no  shortness of breath or cough,    Gastrointestinal tract: Denies any abdominal pain,  or change in bowel habits, He has scars on abd from picking skin     Genitourinary:  Currently has no UTI sx or pain with voiding. Locomotor:  NB/K amp of LLE . Neuropsychiatric:  No referable complaints. See HPI. Cholecystectomy     GENERAL PHYSICAL EXAM:         Vitals: BP (!) 152/71   Pulse 80   Temp 99.1 °F (37.3 °C) (Oral)   Resp 16   Ht 5' 7\" (1.702 m)   Wt 172 lb (78 kg)   BMI 26.94 kg/m²  Body mass index is 26.94 kg/m². GENERAL APPEARANCE:  Dieudonne Ruffin is 45 y.o.,  male,  Is alert with  clear speech  .   Denies pain today           SKIN:  Warm, dry, Has no rashes. HEAD:  Normocephalic. Face swollen on left side and has cellulitis  over left zygoma and warmth    EYES:  YUKI EOMI and has normal conjunctiva and sclera        EARS:  No marked hearing loss. NOSE:  Nares patent and mucosa is moist      THROAT:  Pharynx is not erythematous, Uvula is midline. Dentition is poor condition and no loose teeth. NECK:  No stiffness, trachea midline. Has no adenopathy . Ann Marquis CHEST:   Non labored breathing No wheezing . HEART:  Regular rate and rhythm. No murmur. LUNGS:  Lungs clear and no wheezes        ABDOMEN:  Abdomen is soft on palpation and not tender and has no  guarding or rigidity. No palpable organomegaly. LYMPHATICS:  No palpable cervical adenopathy. LOCOMOTOR, BACK AND SPINE:  Spine midline and has no scoliosis        EXTREMITIES:   Has no lower leg edema and has amp left leg below knee left  Weak post tib pulse on Rt ankle,  No calf pain with flexion extension of feet, Many scars on skin, staining from scars,  Grasp strength is 5/5     NEUROLOGIC:  The patient is conscious, alert, oriented, No apparent focal sensory or motor deficits. Cr N ll-Xll intact,       PROVISIONAL DIAGNOSES:    Gall bladder disease, For choleycystectomy    Peripheral vascular disease  Active Problems:    * No active hospital problems. *  Resolved Problems:    * No resolved hospital problems.  JANELLE Swanson CNP on 9/25/2018 at 12:37 PM

## 2018-09-26 PROBLEM — E43 SEVERE MALNUTRITION (HCC): Status: ACTIVE | Noted: 2018-09-26

## 2018-09-26 LAB
ABSOLUTE EOS #: 0 K/UL (ref 0–0.4)
ABSOLUTE IMMATURE GRANULOCYTE: ABNORMAL K/UL (ref 0–0.3)
ABSOLUTE LYMPH #: 1.19 K/UL (ref 1–4.8)
ABSOLUTE MONO #: 0.75 K/UL (ref 0.1–1.3)
BASOPHILS # BLD: 0 % (ref 0–2)
BASOPHILS ABSOLUTE: 0 K/UL (ref 0–0.2)
DIFFERENTIAL TYPE: ABNORMAL
EKG ATRIAL RATE: 64 BPM
EKG P AXIS: 18 DEGREES
EKG P-R INTERVAL: 146 MS
EKG Q-T INTERVAL: 404 MS
EKG QRS DURATION: 88 MS
EKG QTC CALCULATION (BAZETT): 416 MS
EKG R AXIS: 10 DEGREES
EKG T AXIS: 10 DEGREES
EKG VENTRICULAR RATE: 64 BPM
EOSINOPHILS RELATIVE PERCENT: 0 % (ref 0–4)
GLUCOSE BLD-MCNC: 291 MG/DL (ref 75–110)
GLUCOSE BLD-MCNC: 318 MG/DL (ref 75–110)
GLUCOSE BLD-MCNC: 354 MG/DL (ref 75–110)
GLUCOSE BLD-MCNC: 417 MG/DL (ref 75–110)
HCT VFR BLD CALC: 28.1 % (ref 41–53)
HEMOGLOBIN: 8.9 G/DL (ref 13.5–17.5)
IMMATURE GRANULOCYTES: ABNORMAL %
LYMPHOCYTES # BLD: 8 % (ref 24–44)
MCH RBC QN AUTO: 28.7 PG (ref 26–34)
MCHC RBC AUTO-ENTMCNC: 31.7 G/DL (ref 31–37)
MCV RBC AUTO: 90.7 FL (ref 80–100)
MONOCYTES # BLD: 5 % (ref 1–7)
MORPHOLOGY: NORMAL
NRBC AUTOMATED: ABNORMAL PER 100 WBC
PDW BLD-RTO: 14.3 % (ref 11.5–14.9)
PLATELET # BLD: 192 K/UL (ref 150–450)
PLATELET ESTIMATE: ABNORMAL
PMV BLD AUTO: 10.9 FL (ref 6–12)
RBC # BLD: 3.09 M/UL (ref 4.5–5.9)
RBC # BLD: ABNORMAL 10*6/UL
SEG NEUTROPHILS: 87 % (ref 36–66)
SEGMENTED NEUTROPHILS ABSOLUTE COUNT: 12.96 K/UL (ref 1.3–9.1)
WBC # BLD: 14.9 K/UL (ref 3.5–11)
WBC # BLD: ABNORMAL 10*3/UL

## 2018-09-26 PROCEDURE — G0008 ADMIN INFLUENZA VIRUS VAC: HCPCS | Performed by: INTERNAL MEDICINE

## 2018-09-26 PROCEDURE — 6360000002 HC RX W HCPCS: Performed by: INTERNAL MEDICINE

## 2018-09-26 PROCEDURE — 2500000003 HC RX 250 WO HCPCS: Performed by: SURGERY

## 2018-09-26 PROCEDURE — 96376 TX/PRO/DX INJ SAME DRUG ADON: CPT

## 2018-09-26 PROCEDURE — 96366 THER/PROPH/DIAG IV INF ADDON: CPT

## 2018-09-26 PROCEDURE — 6370000000 HC RX 637 (ALT 250 FOR IP): Performed by: SURGERY

## 2018-09-26 PROCEDURE — 2580000003 HC RX 258: Performed by: INTERNAL MEDICINE

## 2018-09-26 PROCEDURE — 90686 IIV4 VACC NO PRSV 0.5 ML IM: CPT | Performed by: INTERNAL MEDICINE

## 2018-09-26 PROCEDURE — 85025 COMPLETE CBC W/AUTO DIFF WBC: CPT

## 2018-09-26 PROCEDURE — 1200000000 HC SEMI PRIVATE

## 2018-09-26 PROCEDURE — 2580000003 HC RX 258: Performed by: SURGERY

## 2018-09-26 PROCEDURE — 82947 ASSAY GLUCOSE BLOOD QUANT: CPT

## 2018-09-26 PROCEDURE — 36415 COLL VENOUS BLD VENIPUNCTURE: CPT

## 2018-09-26 PROCEDURE — 6370000000 HC RX 637 (ALT 250 FOR IP): Performed by: INTERNAL MEDICINE

## 2018-09-26 PROCEDURE — 6360000002 HC RX W HCPCS: Performed by: SURGERY

## 2018-09-26 RX ORDER — INSULIN GLARGINE 100 [IU]/ML
20 INJECTION, SOLUTION SUBCUTANEOUS 2 TIMES DAILY
Status: DISCONTINUED | OUTPATIENT
Start: 2018-09-26 | End: 2018-09-28 | Stop reason: HOSPADM

## 2018-09-26 RX ADMIN — KETOROLAC TROMETHAMINE 30 MG: 30 INJECTION, SOLUTION INTRAMUSCULAR; INTRAVENOUS at 05:53

## 2018-09-26 RX ADMIN — INSULIN LISPRO 6 UNITS: 100 INJECTION, SOLUTION INTRAVENOUS; SUBCUTANEOUS at 11:24

## 2018-09-26 RX ADMIN — CYCLOBENZAPRINE HYDROCHLORIDE 5 MG: 10 TABLET, FILM COATED ORAL at 11:24

## 2018-09-26 RX ADMIN — POTASSIUM CHLORIDE, DEXTROSE MONOHYDRATE AND SODIUM CHLORIDE: 150; 5; 450 INJECTION, SOLUTION INTRAVENOUS at 05:54

## 2018-09-26 RX ADMIN — HYDROMORPHONE HYDROCHLORIDE 0.5 MG: 1 INJECTION, SOLUTION INTRAMUSCULAR; INTRAVENOUS; SUBCUTANEOUS at 04:39

## 2018-09-26 RX ADMIN — VANCOMYCIN HYDROCHLORIDE 1250 MG: 5 INJECTION, POWDER, LYOPHILIZED, FOR SOLUTION INTRAVENOUS at 14:34

## 2018-09-26 RX ADMIN — PANTOPRAZOLE SODIUM 40 MG: 40 TABLET, DELAYED RELEASE ORAL at 05:53

## 2018-09-26 RX ADMIN — Medication 10 ML: at 08:12

## 2018-09-26 RX ADMIN — INSULIN GLARGINE 20 UNITS: 100 INJECTION, SOLUTION SUBCUTANEOUS at 20:34

## 2018-09-26 RX ADMIN — PREGABALIN 50 MG: 50 CAPSULE ORAL at 20:33

## 2018-09-26 RX ADMIN — PREGABALIN 50 MG: 50 CAPSULE ORAL at 08:12

## 2018-09-26 RX ADMIN — LATANOPROST 1 DROP: 50 SOLUTION OPHTHALMIC at 20:34

## 2018-09-26 RX ADMIN — KETOROLAC TROMETHAMINE 30 MG: 30 INJECTION, SOLUTION INTRAMUSCULAR; INTRAVENOUS at 17:59

## 2018-09-26 RX ADMIN — METOPROLOL TARTRATE 50 MG: 50 TABLET ORAL at 20:33

## 2018-09-26 RX ADMIN — DICYCLOMINE HYDROCHLORIDE 10 MG: 10 CAPSULE ORAL at 19:12

## 2018-09-26 RX ADMIN — INSULIN LISPRO 2 UNITS: 100 INJECTION, SOLUTION INTRAVENOUS; SUBCUTANEOUS at 20:33

## 2018-09-26 RX ADMIN — INSULIN LISPRO 5 UNITS: 100 INJECTION, SOLUTION INTRAVENOUS; SUBCUTANEOUS at 08:28

## 2018-09-26 RX ADMIN — CEFOXITIN SODIUM 2 G: 2 POWDER, FOR SOLUTION INTRAVENOUS at 01:37

## 2018-09-26 RX ADMIN — LISINOPRIL 40 MG: 20 TABLET ORAL at 08:11

## 2018-09-26 RX ADMIN — TAMSULOSIN HYDROCHLORIDE 0.4 MG: 0.4 CAPSULE ORAL at 08:12

## 2018-09-26 RX ADMIN — METFORMIN HYDROCHLORIDE 500 MG: 500 TABLET ORAL at 08:11

## 2018-09-26 RX ADMIN — INSULIN LISPRO 8 UNITS: 100 INJECTION, SOLUTION INTRAVENOUS; SUBCUTANEOUS at 17:59

## 2018-09-26 RX ADMIN — HYDROCODONE BITARTRATE AND ACETAMINOPHEN 2 TABLET: 5; 325 TABLET ORAL at 19:11

## 2018-09-26 RX ADMIN — KETOROLAC TROMETHAMINE 30 MG: 30 INJECTION, SOLUTION INTRAMUSCULAR; INTRAVENOUS at 11:24

## 2018-09-26 RX ADMIN — HYDROCODONE BITARTRATE AND ACETAMINOPHEN 2 TABLET: 5; 325 TABLET ORAL at 14:33

## 2018-09-26 RX ADMIN — DOCUSATE SODIUM 100 MG: 100 CAPSULE, LIQUID FILLED ORAL at 20:33

## 2018-09-26 RX ADMIN — TIMOLOL MALEATE 1 DROP: 2.5 SOLUTION OPHTHALMIC at 08:15

## 2018-09-26 RX ADMIN — FOLIC ACID 1 MG: 1 TABLET ORAL at 08:11

## 2018-09-26 RX ADMIN — DEXTROSE MONOHYDRATE 100 ML/HR: 50 INJECTION, SOLUTION INTRAVENOUS at 14:33

## 2018-09-26 RX ADMIN — CYCLOBENZAPRINE HYDROCHLORIDE 5 MG: 10 TABLET, FILM COATED ORAL at 18:14

## 2018-09-26 RX ADMIN — METFORMIN HYDROCHLORIDE 500 MG: 500 TABLET ORAL at 17:59

## 2018-09-26 RX ADMIN — ASPIRIN 81 MG: 81 TABLET, COATED ORAL at 08:12

## 2018-09-26 RX ADMIN — INFLUENZA A VIRUS A/MICHIGAN/45/2015 X-275 (H1N1) ANTIGEN (FORMALDEHYDE INACTIVATED), INFLUENZA A VIRUS A/SINGAPORE/INFIMH-16-0019/2016 IVR-186 (H3N2) ANTIGEN (FORMALDEHYDE INACTIVATED), INFLUENZA B VIRUS B/PHUKET/3073/2013 ANTIGEN (FORMALDEHYDE INACTIVATED), AND INFLUENZA B VIRUS B/MARYLAND/15/2016 BX-69A ANTIGEN (FORMALDEHYDE INACTIVATED) 0.5 ML: 15; 15; 15; 15 INJECTION, SUSPENSION INTRAMUSCULAR at 08:09

## 2018-09-26 RX ADMIN — METOPROLOL TARTRATE 50 MG: 50 TABLET ORAL at 08:12

## 2018-09-26 RX ADMIN — PREGABALIN 50 MG: 50 CAPSULE ORAL at 14:32

## 2018-09-26 RX ADMIN — HYDROCODONE BITARTRATE AND ACETAMINOPHEN 2 TABLET: 5; 325 TABLET ORAL at 08:12

## 2018-09-26 RX ADMIN — CEFOXITIN SODIUM 2 G: 2 POWDER, FOR SOLUTION INTRAVENOUS at 08:23

## 2018-09-26 RX ADMIN — HYDROMORPHONE HYDROCHLORIDE 0.5 MG: 1 INJECTION, SOLUTION INTRAMUSCULAR; INTRAVENOUS; SUBCUTANEOUS at 01:37

## 2018-09-26 RX ADMIN — DEXTROSE MONOHYDRATE 100 ML/HR: 50 INJECTION, SOLUTION INTRAVENOUS at 14:34

## 2018-09-26 RX ADMIN — POTASSIUM CHLORIDE, DEXTROSE MONOHYDRATE AND SODIUM CHLORIDE: 150; 5; 450 INJECTION, SOLUTION INTRAVENOUS at 14:34

## 2018-09-26 RX ADMIN — CHLORTHALIDONE 25 MG: 25 TABLET ORAL at 08:13

## 2018-09-26 RX ADMIN — PRAVASTATIN SODIUM 10 MG: 10 TABLET ORAL at 20:33

## 2018-09-26 ASSESSMENT — PAIN DESCRIPTION - LOCATION
LOCATION: ABDOMEN

## 2018-09-26 ASSESSMENT — PAIN SCALES - GENERAL
PAINLEVEL_OUTOF10: 5
PAINLEVEL_OUTOF10: 3
PAINLEVEL_OUTOF10: 9
PAINLEVEL_OUTOF10: 7
PAINLEVEL_OUTOF10: 7
PAINLEVEL_OUTOF10: 8
PAINLEVEL_OUTOF10: 6
PAINLEVEL_OUTOF10: 7
PAINLEVEL_OUTOF10: 8
PAINLEVEL_OUTOF10: 2
PAINLEVEL_OUTOF10: 7
PAINLEVEL_OUTOF10: 8
PAINLEVEL_OUTOF10: 8

## 2018-09-26 ASSESSMENT — ENCOUNTER SYMPTOMS
COUGH: 0
SHORTNESS OF BREATH: 0

## 2018-09-26 ASSESSMENT — PAIN DESCRIPTION - PAIN TYPE
TYPE: SURGICAL PAIN

## 2018-09-26 NOTE — PLAN OF CARE
Problem: Nutrition  Goal: Optimal nutrition therapy  Outcome: Ongoing  Nutrition Problem: Inadequate oral intake  Intervention: Food and/or Nutrient Delivery: Continue current diet, Start ONS  Nutritional Goals: PO intakes are greater than 75% at meals

## 2018-09-26 NOTE — FLOWSHEET NOTE
09/26/18 1400   Encounter Summary   Services provided to: Patient   Referral/Consult From: Hieu   Continue Visiting (9/26/18)   Complexity of Encounter Low   Length of Encounter 15 minutes   Sacraments   Sacrament of Sick-Anointing Anointed  (Bang Vivas 9/26/18)

## 2018-09-26 NOTE — PLAN OF CARE
All abdominal staples to lap sites all intact. All were cleansed during patient bath this bhavana Mckee

## 2018-09-26 NOTE — PLAN OF CARE
Problem: Falls - Risk of: Intervention: Assess potential safety hazards  Pt. Free of falls and injuries this shift. Problem: Risk for Impaired Skin Integrity  Goal: Tissue integrity - skin and mucous membranes  Structural intactness and normal physiological function of skin and  mucous membranes. Intervention: SKIN ASSESSMENT  Skin integrity improved/maintained this shift. See head to toe assessment. Problem: Pain:  Intervention: Promote participation in pain management plan  Adequate pain control achieved this shift. See MAR.

## 2018-09-27 ENCOUNTER — APPOINTMENT (OUTPATIENT)
Dept: CT IMAGING | Age: 53
DRG: 417 | End: 2018-09-27
Attending: SURGERY
Payer: COMMERCIAL

## 2018-09-27 LAB
ABSOLUTE EOS #: 0.2 K/UL (ref 0–0.4)
ABSOLUTE IMMATURE GRANULOCYTE: ABNORMAL K/UL (ref 0–0.3)
ABSOLUTE LYMPH #: 1 K/UL (ref 1–4.8)
ABSOLUTE MONO #: 0.9 K/UL (ref 0.1–1.3)
ANION GAP SERPL CALCULATED.3IONS-SCNC: 13 MMOL/L (ref 9–17)
BASOPHILS # BLD: 0 % (ref 0–2)
BASOPHILS ABSOLUTE: 0 K/UL (ref 0–0.2)
BUN BLDV-MCNC: 12 MG/DL (ref 6–20)
BUN/CREAT BLD: ABNORMAL (ref 9–20)
CALCIUM SERPL-MCNC: 8 MG/DL (ref 8.6–10.4)
CHLORIDE BLD-SCNC: 106 MMOL/L (ref 98–107)
CO2: 14 MMOL/L (ref 20–31)
CREAT SERPL-MCNC: 0.9 MG/DL (ref 0.7–1.2)
DIFFERENTIAL TYPE: ABNORMAL
EOSINOPHILS RELATIVE PERCENT: 2 % (ref 0–4)
GFR AFRICAN AMERICAN: >60 ML/MIN
GFR NON-AFRICAN AMERICAN: >60 ML/MIN
GFR SERPL CREATININE-BSD FRML MDRD: ABNORMAL ML/MIN/{1.73_M2}
GFR SERPL CREATININE-BSD FRML MDRD: ABNORMAL ML/MIN/{1.73_M2}
GLUCOSE BLD-MCNC: 250 MG/DL (ref 75–110)
GLUCOSE BLD-MCNC: 286 MG/DL (ref 70–99)
GLUCOSE BLD-MCNC: 295 MG/DL (ref 75–110)
GLUCOSE BLD-MCNC: 309 MG/DL (ref 75–110)
GLUCOSE BLD-MCNC: 341 MG/DL (ref 75–110)
HCT VFR BLD CALC: 29.4 % (ref 41–53)
HEMOGLOBIN: 9.4 G/DL (ref 13.5–17.5)
IMMATURE GRANULOCYTES: ABNORMAL %
LYMPHOCYTES # BLD: 9 % (ref 24–44)
MCH RBC QN AUTO: 28.6 PG (ref 26–34)
MCHC RBC AUTO-ENTMCNC: 31.9 G/DL (ref 31–37)
MCV RBC AUTO: 89.7 FL (ref 80–100)
MONOCYTES # BLD: 8 % (ref 1–7)
NRBC AUTOMATED: ABNORMAL PER 100 WBC
PDW BLD-RTO: 13.9 % (ref 11.5–14.9)
PLATELET # BLD: 201 K/UL (ref 150–450)
PLATELET ESTIMATE: ABNORMAL
PMV BLD AUTO: 11 FL (ref 6–12)
POTASSIUM SERPL-SCNC: 3.6 MMOL/L (ref 3.7–5.3)
PROSTATE SPECIFIC ANTIGEN: 0.47 UG/L
RBC # BLD: 3.28 M/UL (ref 4.5–5.9)
RBC # BLD: ABNORMAL 10*6/UL
SEG NEUTROPHILS: 81 % (ref 36–66)
SEGMENTED NEUTROPHILS ABSOLUTE COUNT: 9.5 K/UL (ref 1.3–9.1)
SODIUM BLD-SCNC: 133 MMOL/L (ref 135–144)
WBC # BLD: 11.6 K/UL (ref 3.5–11)
WBC # BLD: ABNORMAL 10*3/UL

## 2018-09-27 PROCEDURE — 2580000003 HC RX 258: Performed by: INTERNAL MEDICINE

## 2018-09-27 PROCEDURE — 99222 1ST HOSP IP/OBS MODERATE 55: CPT | Performed by: INTERNAL MEDICINE

## 2018-09-27 PROCEDURE — G0103 PSA SCREENING: HCPCS

## 2018-09-27 PROCEDURE — 1200000000 HC SEMI PRIVATE

## 2018-09-27 PROCEDURE — 6360000002 HC RX W HCPCS: Performed by: INTERNAL MEDICINE

## 2018-09-27 PROCEDURE — 6370000000 HC RX 637 (ALT 250 FOR IP): Performed by: INTERNAL MEDICINE

## 2018-09-27 PROCEDURE — 6360000002 HC RX W HCPCS: Performed by: SURGERY

## 2018-09-27 PROCEDURE — 93005 ELECTROCARDIOGRAM TRACING: CPT

## 2018-09-27 PROCEDURE — 85025 COMPLETE CBC W/AUTO DIFF WBC: CPT

## 2018-09-27 PROCEDURE — 70487 CT MAXILLOFACIAL W/DYE: CPT

## 2018-09-27 PROCEDURE — 80048 BASIC METABOLIC PNL TOTAL CA: CPT

## 2018-09-27 PROCEDURE — 6370000000 HC RX 637 (ALT 250 FOR IP): Performed by: SURGERY

## 2018-09-27 PROCEDURE — 2500000003 HC RX 250 WO HCPCS: Performed by: SURGERY

## 2018-09-27 PROCEDURE — 82947 ASSAY GLUCOSE BLOOD QUANT: CPT

## 2018-09-27 PROCEDURE — 36415 COLL VENOUS BLD VENIPUNCTURE: CPT

## 2018-09-27 PROCEDURE — 6360000004 HC RX CONTRAST MEDICATION: Performed by: INTERNAL MEDICINE

## 2018-09-27 RX ORDER — 0.9 % SODIUM CHLORIDE 0.9 %
80 INTRAVENOUS SOLUTION INTRAVENOUS ONCE
Status: COMPLETED | OUTPATIENT
Start: 2018-09-27 | End: 2018-09-27

## 2018-09-27 RX ORDER — SODIUM CHLORIDE 0.9 % (FLUSH) 0.9 %
10 SYRINGE (ML) INJECTION PRN
Status: DISCONTINUED | OUTPATIENT
Start: 2018-09-27 | End: 2018-09-28 | Stop reason: HOSPADM

## 2018-09-27 RX ADMIN — INSULIN GLARGINE 20 UNITS: 100 INJECTION, SOLUTION SUBCUTANEOUS at 20:31

## 2018-09-27 RX ADMIN — TIMOLOL MALEATE 1 DROP: 2.5 SOLUTION OPHTHALMIC at 07:46

## 2018-09-27 RX ADMIN — TAMSULOSIN HYDROCHLORIDE 0.4 MG: 0.4 CAPSULE ORAL at 07:41

## 2018-09-27 RX ADMIN — PREGABALIN 50 MG: 50 CAPSULE ORAL at 20:31

## 2018-09-27 RX ADMIN — KETOROLAC TROMETHAMINE 30 MG: 30 INJECTION, SOLUTION INTRAMUSCULAR; INTRAVENOUS at 06:12

## 2018-09-27 RX ADMIN — LATANOPROST 1 DROP: 50 SOLUTION OPHTHALMIC at 20:31

## 2018-09-27 RX ADMIN — POTASSIUM CHLORIDE, DEXTROSE MONOHYDRATE AND SODIUM CHLORIDE: 150; 5; 450 INJECTION, SOLUTION INTRAVENOUS at 00:38

## 2018-09-27 RX ADMIN — IOPAMIDOL 75 ML: 755 INJECTION, SOLUTION INTRAVENOUS at 15:33

## 2018-09-27 RX ADMIN — LISINOPRIL 40 MG: 20 TABLET ORAL at 07:41

## 2018-09-27 RX ADMIN — METFORMIN HYDROCHLORIDE 500 MG: 500 TABLET ORAL at 07:41

## 2018-09-27 RX ADMIN — VANCOMYCIN HYDROCHLORIDE 1250 MG: 5 INJECTION, POWDER, LYOPHILIZED, FOR SOLUTION INTRAVENOUS at 13:28

## 2018-09-27 RX ADMIN — HYDROCODONE BITARTRATE AND ACETAMINOPHEN 2 TABLET: 5; 325 TABLET ORAL at 20:30

## 2018-09-27 RX ADMIN — Medication 10 ML: at 15:33

## 2018-09-27 RX ADMIN — KETOROLAC TROMETHAMINE 30 MG: 30 INJECTION, SOLUTION INTRAMUSCULAR; INTRAVENOUS at 11:45

## 2018-09-27 RX ADMIN — INSULIN LISPRO 3 UNITS: 100 INJECTION, SOLUTION INTRAVENOUS; SUBCUTANEOUS at 16:44

## 2018-09-27 RX ADMIN — VANCOMYCIN HYDROCHLORIDE 1250 MG: 5 INJECTION, POWDER, LYOPHILIZED, FOR SOLUTION INTRAVENOUS at 02:24

## 2018-09-27 RX ADMIN — PRAVASTATIN SODIUM 10 MG: 10 TABLET ORAL at 20:30

## 2018-09-27 RX ADMIN — PREGABALIN 50 MG: 50 CAPSULE ORAL at 14:20

## 2018-09-27 RX ADMIN — KETOROLAC TROMETHAMINE 30 MG: 30 INJECTION, SOLUTION INTRAMUSCULAR; INTRAVENOUS at 00:38

## 2018-09-27 RX ADMIN — METOPROLOL TARTRATE 50 MG: 50 TABLET ORAL at 20:30

## 2018-09-27 RX ADMIN — PREGABALIN 50 MG: 50 CAPSULE ORAL at 07:41

## 2018-09-27 RX ADMIN — INSULIN GLARGINE 20 UNITS: 100 INJECTION, SOLUTION SUBCUTANEOUS at 09:47

## 2018-09-27 RX ADMIN — DEXTROSE MONOHYDRATE 100 ML/HR: 50 INJECTION, SOLUTION INTRAVENOUS at 01:39

## 2018-09-27 RX ADMIN — ASPIRIN 81 MG: 81 TABLET, COATED ORAL at 07:41

## 2018-09-27 RX ADMIN — METOPROLOL TARTRATE 50 MG: 50 TABLET ORAL at 07:42

## 2018-09-27 RX ADMIN — PANTOPRAZOLE SODIUM 40 MG: 40 TABLET, DELAYED RELEASE ORAL at 06:12

## 2018-09-27 RX ADMIN — INSULIN LISPRO 3 UNITS: 100 INJECTION, SOLUTION INTRAVENOUS; SUBCUTANEOUS at 07:42

## 2018-09-27 RX ADMIN — INSULIN LISPRO 4 UNITS: 100 INJECTION, SOLUTION INTRAVENOUS; SUBCUTANEOUS at 11:38

## 2018-09-27 RX ADMIN — INSULIN LISPRO 2 UNITS: 100 INJECTION, SOLUTION INTRAVENOUS; SUBCUTANEOUS at 20:31

## 2018-09-27 RX ADMIN — FOLIC ACID 1 MG: 1 TABLET ORAL at 07:41

## 2018-09-27 RX ADMIN — CHLORTHALIDONE 25 MG: 25 TABLET ORAL at 07:46

## 2018-09-27 RX ADMIN — HYDROCODONE BITARTRATE AND ACETAMINOPHEN 2 TABLET: 5; 325 TABLET ORAL at 14:23

## 2018-09-27 RX ADMIN — SODIUM CHLORIDE 80 ML: 9 INJECTION, SOLUTION INTRAVENOUS at 15:32

## 2018-09-27 ASSESSMENT — PAIN SCALES - GENERAL
PAINLEVEL_OUTOF10: 7
PAINLEVEL_OUTOF10: 0
PAINLEVEL_OUTOF10: 6
PAINLEVEL_OUTOF10: 0
PAINLEVEL_OUTOF10: 7
PAINLEVEL_OUTOF10: 0
PAINLEVEL_OUTOF10: 9
PAINLEVEL_OUTOF10: 5
PAINLEVEL_OUTOF10: 4

## 2018-09-27 ASSESSMENT — PAIN DESCRIPTION - PAIN TYPE
TYPE: SURGICAL PAIN

## 2018-09-27 ASSESSMENT — ENCOUNTER SYMPTOMS: SHORTNESS OF BREATH: 0

## 2018-09-27 ASSESSMENT — PAIN DESCRIPTION - LOCATION
LOCATION: ABDOMEN

## 2018-09-27 ASSESSMENT — PAIN DESCRIPTION - FREQUENCY: FREQUENCY: INTERMITTENT

## 2018-09-27 ASSESSMENT — PAIN DESCRIPTION - ONSET: ONSET: ON-GOING

## 2018-09-27 NOTE — CONSULTS
LEFT BELOW KNEE GUILLOTINE AMPUTATION performed by Oliva Hernandez MD at [address] Left 9/14/2017    REVISION LEFT BELOW KNEE AMPUTATION, CLOSED  performed by Oliva Hernandez MD at [address] Left 01/23/2017    I and D bone, bone biopsy with flap closure and pulse lavage    OTHER SURGICAL HISTORY Left 04/27/2017    I & D foot    [place] DEEP DISSEC FOOT INFEC,1 BURSA Left 4/13/2017    FOOT DEBRIDEMENT WITH EPIFIX  performed by Guillermina Rai DPM at [address] INCIS/DRAIN THIGH/KNEE ABSCESS,DEEP Left 9/12/2017     LEFT BELOW KNEE AMPUTATION OPEN, WOUND VAC PLACEMENT performed by Oliva Hernandez MD at [address] [place] [address] 9/25/2018    CHOLECYSTECTOMY LAPAROSCOPIC performed by Kwabena Gandhi MD at [facility] Left 2014    hallux    TOE AMPUTATION Left 12/09/2016    hallux    TOE AMPUTATION Left 4/27/2017    I AND D FOOT, BONE BIOPSY, POSSIBLE FILET 2ND TOE performed by Guillermina Rai DPM at [facility]  04/17/2017    Left second toe           Admission Meds  No current facility-administered medications on file prior to encounter.       Current Outpatient Prescriptions on File Prior to Encounter   Medication Sig Dispense Refill    ondansetron (ZOFRAN) 4 MG tablet Take 1 tablet by mouth every 12 hours as needed for Nausea or Vomiting 5 tablet 0    dicyclomine (BENTYL) 10 MG capsule Take 1 capsule by mouth every 6 hours as needed (cramps) 20 capsule 0    aspirin 81 MG EC tablet Take 1 tablet by mouth daily 30 tablet 3    cyclobenzaprine (FLEXERIL) 5 MG tablet Take 5 mg by mouth 3 times daily as needed for Muscle spasms      HYDROcodone-acetaminophen (NORCO) 5-325 MG per tablet take 1 tablet by mouth twice a day if needed for pain  0    metFORMIN (GLUCOPHAGE) 500 MG tablet Take 500 mg by mouth 2 times daily (with meals)      pregabalin (LYRICA) 25 MG capsule Take 1 capsule by mouth 3 times daily (Patient taking differently: Take 50 mg by mouth 3 times daily. Anna Bar ) 60 capsule 0    insulin glargine (LANTUS) 100 UNIT/ML injection vial Inject 20 Units into the skin nightly 1 vial 3    pravastatin (PRAVACHOL) 10 MG tablet Take 1 tablet by mouth daily 30 tablet 3    lisinopril (PRINIVIL;ZESTRIL) 40 MG tablet Take 1 tablet by mouth daily 30 tablet 3    metoprolol tartrate (LOPRESSOR) 50 MG tablet Take 1 tablet by mouth 2 times daily 60 tablet 3    chlorthalidone (HYGROTON) 25 MG tablet Take 1 tablet by mouth daily 30 tablet 3    folic acid (FOLVITE) 1 MG tablet Take 1 tablet by mouth daily 30 tablet 3    tamsulosin (FLOMAX) 0.4 MG capsule Take 1 capsule by mouth daily 30 capsule 3    docusate sodium (COLACE) 100 MG capsule Take 1 capsule by mouth 2 times daily as needed for Constipation 40 capsule 0    omeprazole (PRILOSEC) 20 MG delayed release capsule Take 20 mg by mouth 2 times daily      timolol (TIMOPTIC-XE) 0.25 % ophthalmic gel-forming 1 drop daily      travoprost, benzalkonium, (TRAVATAN) 0.004 % ophthalmic solution 1 drop nightly             Allergies  Allergies   Allergen Reactions    Adhesive Tape         Social   Social History   Substance Use Topics    Smoking status: Former Smoker     Packs/day: 0.00     Types: Cigarettes     Quit date: 9/18/2018    Smokeless tobacco: Never Used      Comment: smokes 1 cigarette a day    Alcohol use No             Family History   Problem Relation Age of Onset    Diabetes Mother     Cancer Father         colon    Diabetes Father     Diabetes Sister     Diabetes Sister           Review of Systems  As per [place]    No oral lesion, sore throat, dysphagia. Denies cough / sputum. Denies chest pain, palpitations. Denies dysuria or change in urinary function. Denies joint swelling or pain. Other than above 10 systems reviewed were negative . Tolerating antibiotics.          Physical Exam  BP (!) 154/87   Pulse 72   Temp 98.2 °F (36.8 °C) (Oral)

## 2018-09-27 NOTE — DISCHARGE SUMMARY
Hospital Discharge Summary      Patient ID: Shayy Jameson      Patient's PCP: Sarai Love MD    Admit Date: 9/25/2018     Discharge Date:       Admitting Physician: Rodolfo Grissom MD    Discharge Physician: Rodolfo Grissom MD     Discharge Diagnoses: Active Hospital Problems    Diagnosis Date Noted    Severe malnutrition ([address] [E43] 09/26/2018    Acute cholecystitis with chronic cholecystitis [K81.2] 09/25/2018       The patient was seen and examined on day of discharge and this discharge summary is in conjunction with any daily progress note from day of discharge. Hospital Course: satisfactory   There were no encounter diagnoses. Consults: as noted in the chart    Treatments: as above    Disposition: home    Discharged Condition: Stable    Follow Up:  No Follow-up on file.     Discharge Medications:    Wyelizabethsamanta Miaugustin   Home Medication Instructions [id number]    Printed on:09/27/18 1016   Medication Information                      aspirin 81 MG EC tablet  Take 1 tablet by mouth daily             chlorthalidone (HYGROTON) 25 MG tablet  Take 1 tablet by mouth daily             cyclobenzaprine (FLEXERIL) 5 MG tablet  Take 5 mg by mouth 3 times daily as needed for Muscle spasms             dicyclomine (BENTYL) 10 MG capsule  Take 1 capsule by mouth every 6 hours as needed (cramps)             docusate sodium (COLACE) 100 MG capsule  Take 1 capsule by mouth 2 times daily as needed for Constipation             folic acid (FOLVITE) 1 MG tablet  Take 1 tablet by mouth daily             HYDROcodone-acetaminophen (NORCO) 5-325 MG per tablet  take 1 tablet by mouth twice a day if needed for pain             insulin glargine (LANTUS) 100 UNIT/ML injection vial  Inject 20 Units into the skin nightly             insulin glargine (LANTUS) 100 UNIT/ML injection vial  Inject 20 Units/kg into the skin daily             lisinopril (PRINIVIL;ZESTRIL) 40 MG tablet  Take 1 tablet by mouth daily

## 2018-09-27 NOTE — PLAN OF CARE
Problem: Falls - Risk of:  Goal: Will remain free from falls  Will remain free from falls   Outcome: Ongoing  Pt. Free of falls and injuries this shift. Problem: Risk for Impaired Skin Integrity  Goal: Tissue integrity - skin and mucous membranes  Structural intactness and normal physiological function of skin and  mucous membranes. Outcome: Ongoing  Skin integrity improved/maintained this shift. See head to toe assessment. Problem: Pain:  Goal: Pain level will decrease  Pain level will decrease   Outcome: Ongoing  Adequate pain control achieved this shift. See MAR.

## 2018-09-28 VITALS
RESPIRATION RATE: 18 BRPM | TEMPERATURE: 98 F | OXYGEN SATURATION: 100 % | WEIGHT: 172 LBS | HEIGHT: 67 IN | BODY MASS INDEX: 27 KG/M2 | HEART RATE: 65 BPM | DIASTOLIC BLOOD PRESSURE: 76 MMHG | SYSTOLIC BLOOD PRESSURE: 162 MMHG

## 2018-09-28 LAB
ABSOLUTE EOS #: 0.2 K/UL (ref 0–0.4)
ABSOLUTE IMMATURE GRANULOCYTE: ABNORMAL K/UL (ref 0–0.3)
ABSOLUTE LYMPH #: 1.1 K/UL (ref 1–4.8)
ABSOLUTE MONO #: 1.5 K/UL (ref 0.1–1.3)
ANION GAP SERPL CALCULATED.3IONS-SCNC: 13 MMOL/L (ref 9–17)
BASOPHILS # BLD: 0 % (ref 0–2)
BASOPHILS ABSOLUTE: 0.1 K/UL (ref 0–0.2)
BUN BLDV-MCNC: 10 MG/DL (ref 6–20)
BUN/CREAT BLD: ABNORMAL (ref 9–20)
CALCIUM SERPL-MCNC: 8.3 MG/DL (ref 8.6–10.4)
CHLORIDE BLD-SCNC: 105 MMOL/L (ref 98–107)
CO2: 15 MMOL/L (ref 20–31)
CREAT SERPL-MCNC: 1 MG/DL (ref 0.7–1.2)
DIFFERENTIAL TYPE: ABNORMAL
EOSINOPHILS RELATIVE PERCENT: 2 % (ref 0–4)
GFR AFRICAN AMERICAN: >60 ML/MIN
GFR NON-AFRICAN AMERICAN: >60 ML/MIN
GFR SERPL CREATININE-BSD FRML MDRD: ABNORMAL ML/MIN/{1.73_M2}
GFR SERPL CREATININE-BSD FRML MDRD: ABNORMAL ML/MIN/{1.73_M2}
GLUCOSE BLD-MCNC: 260 MG/DL (ref 75–110)
GLUCOSE BLD-MCNC: 311 MG/DL (ref 70–99)
GLUCOSE BLD-MCNC: 314 MG/DL (ref 75–110)
HCT VFR BLD CALC: 28 % (ref 41–53)
HEMOGLOBIN: 9.1 G/DL (ref 13.5–17.5)
IMMATURE GRANULOCYTES: ABNORMAL %
LYMPHOCYTES # BLD: 8 % (ref 24–44)
MCH RBC QN AUTO: 28.6 PG (ref 26–34)
MCHC RBC AUTO-ENTMCNC: 32.3 G/DL (ref 31–37)
MCV RBC AUTO: 88.5 FL (ref 80–100)
MONOCYTES # BLD: 11 % (ref 1–7)
NRBC AUTOMATED: ABNORMAL PER 100 WBC
PDW BLD-RTO: 14 % (ref 11.5–14.9)
PLATELET # BLD: 239 K/UL (ref 150–450)
PLATELET ESTIMATE: ABNORMAL
PMV BLD AUTO: 10.3 FL (ref 6–12)
POTASSIUM SERPL-SCNC: 3.6 MMOL/L (ref 3.7–5.3)
RBC # BLD: 3.17 M/UL (ref 4.5–5.9)
RBC # BLD: ABNORMAL 10*6/UL
SEG NEUTROPHILS: 79 % (ref 36–66)
SEGMENTED NEUTROPHILS ABSOLUTE COUNT: 11 K/UL (ref 1.3–9.1)
SODIUM BLD-SCNC: 133 MMOL/L (ref 135–144)
SURGICAL PATHOLOGY REPORT: NORMAL
VANCOMYCIN TROUGH DATE LAST DOSE: ABNORMAL
VANCOMYCIN TROUGH DOSE AMOUNT: 1250
VANCOMYCIN TROUGH TIME LAST DOSE: 128
VANCOMYCIN TROUGH: 23.1 UG/ML (ref 10–20)
WBC # BLD: 13.8 K/UL (ref 3.5–11)
WBC # BLD: ABNORMAL 10*3/UL

## 2018-09-28 PROCEDURE — 82947 ASSAY GLUCOSE BLOOD QUANT: CPT

## 2018-09-28 PROCEDURE — 2580000003 HC RX 258: Performed by: INTERNAL MEDICINE

## 2018-09-28 PROCEDURE — 99233 SBSQ HOSP IP/OBS HIGH 50: CPT | Performed by: INTERNAL MEDICINE

## 2018-09-28 PROCEDURE — 85025 COMPLETE CBC W/AUTO DIFF WBC: CPT

## 2018-09-28 PROCEDURE — 80048 BASIC METABOLIC PNL TOTAL CA: CPT

## 2018-09-28 PROCEDURE — 80202 ASSAY OF VANCOMYCIN: CPT

## 2018-09-28 PROCEDURE — 6360000002 HC RX W HCPCS: Performed by: INTERNAL MEDICINE

## 2018-09-28 PROCEDURE — 6370000000 HC RX 637 (ALT 250 FOR IP): Performed by: INTERNAL MEDICINE

## 2018-09-28 PROCEDURE — 6370000000 HC RX 637 (ALT 250 FOR IP): Performed by: SURGERY

## 2018-09-28 PROCEDURE — 36415 COLL VENOUS BLD VENIPUNCTURE: CPT

## 2018-09-28 RX ORDER — DOXYCYCLINE 100 MG/1
100 CAPSULE ORAL EVERY 12 HOURS SCHEDULED
Status: DISCONTINUED | OUTPATIENT
Start: 2018-09-28 | End: 2018-09-28 | Stop reason: HOSPADM

## 2018-09-28 RX ORDER — HYDROCODONE BITARTRATE AND ACETAMINOPHEN 5; 325 MG/1; MG/1
1 TABLET ORAL EVERY 4 HOURS PRN
Qty: 10 TABLET | Refills: 0
Start: 2018-09-28 | End: 2018-10-05

## 2018-09-28 RX ORDER — DOXYCYCLINE HYCLATE 100 MG
100 TABLET ORAL 2 TIMES DAILY
Qty: 20 TABLET | Refills: 0 | Status: SHIPPED | OUTPATIENT
Start: 2018-09-28 | End: 2018-10-08

## 2018-09-28 RX ADMIN — INSULIN LISPRO 3 UNITS: 100 INJECTION, SOLUTION INTRAVENOUS; SUBCUTANEOUS at 07:26

## 2018-09-28 RX ADMIN — TIMOLOL MALEATE 1 DROP: 2.5 SOLUTION OPHTHALMIC at 07:28

## 2018-09-28 RX ADMIN — PANTOPRAZOLE SODIUM 40 MG: 40 TABLET, DELAYED RELEASE ORAL at 06:38

## 2018-09-28 RX ADMIN — VANCOMYCIN HYDROCHLORIDE 1000 MG: 1 INJECTION, POWDER, LYOPHILIZED, FOR SOLUTION INTRAVENOUS at 06:38

## 2018-09-28 RX ADMIN — INSULIN GLARGINE 20 UNITS: 100 INJECTION, SOLUTION SUBCUTANEOUS at 10:19

## 2018-09-28 RX ADMIN — PREGABALIN 50 MG: 50 CAPSULE ORAL at 07:27

## 2018-09-28 RX ADMIN — ASPIRIN 81 MG: 81 TABLET, COATED ORAL at 07:27

## 2018-09-28 RX ADMIN — HYDROCODONE BITARTRATE AND ACETAMINOPHEN 2 TABLET: 5; 325 TABLET ORAL at 13:57

## 2018-09-28 RX ADMIN — FOLIC ACID 1 MG: 1 TABLET ORAL at 07:26

## 2018-09-28 RX ADMIN — LISINOPRIL 40 MG: 20 TABLET ORAL at 07:26

## 2018-09-28 RX ADMIN — CHLORTHALIDONE 25 MG: 25 TABLET ORAL at 07:28

## 2018-09-28 RX ADMIN — METOPROLOL TARTRATE 50 MG: 50 TABLET ORAL at 07:27

## 2018-09-28 RX ADMIN — HYDROCODONE BITARTRATE AND ACETAMINOPHEN 2 TABLET: 5; 325 TABLET ORAL at 07:12

## 2018-09-28 RX ADMIN — PREGABALIN 50 MG: 50 CAPSULE ORAL at 13:56

## 2018-09-28 RX ADMIN — TAMSULOSIN HYDROCHLORIDE 0.4 MG: 0.4 CAPSULE ORAL at 07:27

## 2018-09-28 ASSESSMENT — PAIN SCALES - GENERAL
PAINLEVEL_OUTOF10: 8
PAINLEVEL_OUTOF10: 9

## 2018-09-28 NOTE — CONSULTS
SpO2 100%   BMI 26.94 kg/m²           General Appearance: alert and oriented to person, place and time, well-developed and well-nourished, in no acute distress  Skin: warm and dry, no rash ,left cheek area of swelling ,erythema ,induration improved . Head: normocephalic and atraumatic  Eyes: pupils equal, round  ENT: hearing grossly normal bilaterally  Neck: neck supple and non tender without mass, no thyromegaly or thyroid nodules, no cervical lymphadenopathy   Pulmonary/Chest: clear to auscultation bilaterally- no wheezes, rales or rhonchi, normal air movement, no respiratory distress  Cardiovascular: normal rate, regular rhythm, normal S1 and S2, no murmurs  Abdomen: soft, non-tender, non-distended,surgical incision intact ,drain was removed    Extremities: no cyanosis, clubbing   Left BKA      Data Review:    Recent Labs      09/26/18   1246  09/27/18   0643  09/28/18   0700   WBC  14.9*  11.6*  13.8*   HGB  8.9*  9.4*  9.1*   HCT  28.1*  29.4*  28.0*   MCV  90.7  89.7  88.5   PLT  192  201  239     Recent Labs      09/25/18   1853  09/27/18   0643  09/28/18   0700   NA   --   133*  133*   K   --   3.6*  3.6*   CL   --   106  105   CO2   --   14*  15*   BUN   --   12  10   CREATININE  0.91  0.90  1.00                        Assessment:   Facial abscess  Active Problems:  Chronic cholecystitis S/P cholecystectomy        Recommendations:   D/C IV Vancomycin   Po Doxycycline for 10 days . Follow up with surgery ,may need debridement if no improvement . Will sign off ,Dr Mino Batista will be covering for the weekend ,please call if needed .          Warden Pelon MD

## 2018-09-28 NOTE — PROGRESS NOTES
Doing very well postop day 1. He denied any N/V or sore throat. Pain was controllable per surgery. Anesthesia signed off.
Dr. Sammi Angulo calls the unit to check on patient. Ok to discharge from his standpoint. F/u 10 days.
I sent a perfect serve message to Dr. Fabian Torrez at 6:40 pm on 9/25/18. [address] [place] [address]
Patient ordered CT w/ contrast on 9/27. Metformin discontinued per protocol. Pharmacy will assess for completion of ordered procedure and administration of IV contrast media. Serum creatinine: 0.9 mg/dL 09/27/18 0643  Estimated creatinine clearance: 88 mL/min  Vandana Shepard [facility]
Pharmacy Vancomycin Consult     Vancomycin Day: 3  Current Dosing: vancomycin 1250 mg every 12 hours    Temp max:  98.7    Recent Labs      09/27/18   0643   BUN  12       Recent Labs      09/25/18   1853  09/27/18   0643   CREATININE  0.91  0.90       Recent Labs      09/26/18   1246  09/27/18   0643   WBC  14.9*  11.6*         Intake/Output Summary (Last 24 hours) at 09/28/18 0123  Last data filed at 09/28/18 0101   Gross per 24 hour   Intake 1868 ml   Output 2085 ml   Net -217 ml       Culture Date      Source                       Results  See micro    Ht Readings from Last 1 Encounters:   09/25/18 5' 7\" (1.702 m)        Wt Readings from Last 1 Encounters:   09/25/18 172 lb (78 kg)         Body mass index is 26.94 kg/m². Estimated Creatinine Clearance: 88 mL/min (based on SCr of 0.9 mg/dL).     Trough: 23.4 at [address] [place] [address] on 9/28/2018    Assessment/Plan:  Change vancomycin to 1000 mg every 12 hours    [employer] [place]   9/28/2018   1:25 AM
500 mg Oral BID WC Anthoney Schirmer, MD   500 mg at 09/26/18 1759    lisinopril (PRINIVIL;ZESTRIL) tablet 40 mg  40 mg Oral Daily Anthoney Schirmer, MD   40 mg at 14/68/48 1957    folic acid (FOLVITE) tablet 1 mg  1 mg Oral Daily Anthoney Schirmer, MD   1 mg at 09/26/18 1669    docusate sodium (COLACE) capsule 100 mg  100 mg Oral BID PRN Anthoney Schirmer, MD        dicyclomine (BENTYL) capsule 10 mg  10 mg Oral Q6H PRN Anthoney Schirmer, MD        cyclobenzaprine (FLEXERIL) tablet 5 mg  5 mg Oral TID PRN Anthoney Schirmer, MD   5 mg at 09/26/18 1814    pravastatin (PRAVACHOL) tablet 10 mg  10 mg Oral Nightly Anthoney Schirmer, MD   10 mg at 09/25/18 2123    tamsulosin (FLOMAX) capsule 0.4 mg  0.4 mg Oral Daily Anthoney Schirmer, MD   0.4 mg at 09/26/18 6194    aspirin EC tablet 81 mg  81 mg Oral Daily Anthoney Schirmer, MD   81 mg at 09/26/18 7681    chlorthalidone (HYGROTON) tablet 25 mg  25 mg Oral Daily Anthoney Schirmer, MD   25 mg at 09/26/18 0813    pregabalin (LYRICA) capsule 50 mg  50 mg Oral TID Anthoney Schirmer, MD   50 mg at 09/26/18 1432    pantoprazole (PROTONIX) tablet 40 mg  40 mg Oral QAM AC Anthoney Schirmer, MD   40 mg at 09/26/18 0553    glucose (GLUTOSE) 40 % oral gel 15 g  15 g Oral PRN Anthoney Schirmer, MD        dextrose 50 % solution 12.5 g  12.5 g Intravenous PRN Anthoney Schirmer, MD        glucagon (rDNA) injection 1 mg  1 mg Intramuscular PRN Anthoney Schirmer, MD        dextrose 5 % solution  100 mL/hr Intravenous PRN Anthoney Schirmer,  mL/hr at 09/26/18 1434 100 mL/hr at 09/26/18 1434    timolol (TIMOPTIC) 0.25 % ophthalmic solution 1 drop  1 drop Both Eyes Daily Anthoney Schirmer, MD   1 drop at 09/26/18 0815    latanoprost (XALATAN) 0.005 % ophthalmic solution 1 drop  1 drop Both Eyes Nightly Anthoney Schirmer, MD   1 drop at 09/25/18 2124     Allergies   Allergen Reactions    Adhesive Tape      Active Problems:    Acute cholecystitis with chronic cholecystitis    Severe malnutrition ([facility] [address]  Resolved Problems:    * No resolved hospital
MD Sam   50 mg at 09/27/18 0742    lisinopril (PRINIVIL;ZESTRIL) tablet 40 mg  40 mg Oral Daily Kera Stevens MD   40 mg at 42/57/27 0375    folic acid (FOLVITE) tablet 1 mg  1 mg Oral Daily Kera Stevens MD   1 mg at 09/27/18 0741    docusate sodium (COLACE) capsule 100 mg  100 mg Oral BID PRN Kera Stevens MD   100 mg at 09/26/18 2033    dicyclomine (BENTYL) capsule 10 mg  10 mg Oral Q6H PRN Kera Stevens MD   10 mg at 09/26/18 1912    cyclobenzaprine (FLEXERIL) tablet 5 mg  5 mg Oral TID PRN Kera Stevens MD   5 mg at 09/26/18 1814    pravastatin (PRAVACHOL) tablet 10 mg  10 mg Oral Nightly Kera Stevens MD   10 mg at 09/26/18 2033    tamsulosin (FLOMAX) capsule 0.4 mg  0.4 mg Oral Daily Kera Stevens MD   0.4 mg at 09/27/18 0741    aspirin EC tablet 81 mg  81 mg Oral Daily Kera Stevens MD   81 mg at 09/27/18 0741    chlorthalidone (HYGROTON) tablet 25 mg  25 mg Oral Daily Kera Stevens MD   25 mg at 09/27/18 0746    pregabalin (LYRICA) capsule 50 mg  50 mg Oral TID Kera Stevens MD   50 mg at 09/27/18 1420    pantoprazole (PROTONIX) tablet 40 mg  40 mg Oral QAM AC Kera Stevens MD   40 mg at 09/27/18 0612    glucose (GLUTOSE) 40 % oral gel 15 g  15 g Oral PRN Kera Stevens MD        dextrose 50 % solution 12.5 g  12.5 g Intravenous PRN Kera Stevens MD        glucagon (rDNA) injection 1 mg  1 mg Intramuscular PRN Kera Stevens MD        dextrose 5 % solution  100 mL/hr Intravenous PRN Kera Stevens  mL/hr at 09/27/18 0139 100 mL/hr at 09/27/18 0139    timolol (TIMOPTIC) 0.25 % ophthalmic solution 1 drop  1 drop Both Eyes Daily Kera Stevens MD   1 drop at 09/27/18 0746    latanoprost (XALATAN) 0.005 % ophthalmic solution 1 drop  1 drop Both Eyes Nightly Kera Stevens MD   1 drop at 09/26/18 2034     Allergies   Allergen Reactions    Adhesive Tape      Active Problems:    Acute cholecystitis with chronic cholecystitis    Severe malnutrition ([address]  Resolved Problems:    * No resolved

## 2018-09-30 LAB
CULTURE: ABNORMAL
CULTURE: ABNORMAL
DIRECT EXAM: ABNORMAL
DIRECT EXAM: ABNORMAL
Lab: ABNORMAL
ORGANISM: ABNORMAL
SPECIMEN DESCRIPTION: ABNORMAL
STATUS: ABNORMAL

## 2018-10-22 ENCOUNTER — OFFICE VISIT (OUTPATIENT)
Dept: NEUROLOGY | Age: 53
End: 2018-10-22
Payer: COMMERCIAL

## 2018-10-22 VITALS
HEIGHT: 67 IN | SYSTOLIC BLOOD PRESSURE: 102 MMHG | HEART RATE: 90 BPM | BODY MASS INDEX: 26.81 KG/M2 | DIASTOLIC BLOOD PRESSURE: 59 MMHG | WEIGHT: 170.8 LBS

## 2018-10-22 DIAGNOSIS — I65.22 INTRACRANIAL CAROTID STENOSIS, LEFT: ICD-10-CM

## 2018-10-22 DIAGNOSIS — I67.9 CEREBROVASCULAR DISEASE: Primary | ICD-10-CM

## 2018-10-22 DIAGNOSIS — G62.9 NEUROPATHY: ICD-10-CM

## 2018-10-22 PROCEDURE — 99214 OFFICE O/P EST MOD 30 MIN: CPT | Performed by: NURSE PRACTITIONER

## 2018-10-22 NOTE — PROGRESS NOTES
[facility]            [address]          [place], [address] [facility]          Dept: [phone]          Dept Fax: [phone]        MD Marty Oliva MD Ahmed B. Margean Drilling, MD Danney Plumb, MD Jolinda Hageman, MD Storm Manas, WILVER            10/22/2018    HPI:      Your patient, Sorin Nelson returns for continuing neurologic care. Patient is a 68-year-old man who was hospitalized at [address] [place] [address] on September 1, 2018 with complaints of right neck pain and right arm tingling and heaviness. In addition to that he also was having gait instability and dizziness. During his hospitalization, MRI of the brain showed no acute infarction and MR angiography showed severe narrowing of the left internal carotid artery at the cavernous sinus. He also had a carotid ultrasound showing no stenosis bilaterally. A 2-D echocardiogram revealed an ejection fraction of 55%. Other studies include cholesterol 119, HDL 23, LDL 63, triglycerides 166, hemoglobin A1c 9, and vitamin B12 greater than 2000. Patient also displays symptoms of neuropathy on exam in his right lower extremity. He has a below-the-knee amputation in his left leg. Patient had a  previous history of stroke in 2006. She was discharged on dual antiplatelet therapy with Plavix 75 mg daily, aspirin 81 mg daily and Pravachol 10 mg daily. Patient is here today for reevaluation accompanied by his wife. Patient has limited vision and walks with a walker.   There've been no new symptoms                  Past Medical History:   Diagnosis Date    Acid reflux     Calculus of gallbladder without cholecystitis without obstruction 9/2/2018    Cerebrovascular disease 2006    TIA    Diabetic foot infection ([facility] [address]     Drug (multiple) resistant infection     GERD (gastroesophageal reflux disease)     Glaucoma     mg daily  2. Left carotid stenosis on MRI angiography  1. CT angiography of the head and neck with contrast  2. Depending upon the results, referral to Dr. Wally Santana  3. Follow-up in 3 months  3.  Neuropathy likely secondary to diabetes mellitus type 2            Signed: Rabia Arnold CNP      *Please note that portions of this note were completed with a voice recognition program.  Although every effort was made to insure the accuracy of this automated transcription, some errors in transcription may have occurred, occasionally words and are mis-transcribed

## 2018-11-07 ENCOUNTER — APPOINTMENT (OUTPATIENT)
Dept: GENERAL RADIOLOGY | Age: 53
DRG: 304 | End: 2018-11-07
Payer: COMMERCIAL

## 2018-11-07 ENCOUNTER — APPOINTMENT (OUTPATIENT)
Dept: MRI IMAGING | Age: 53
DRG: 304 | End: 2018-11-07
Payer: COMMERCIAL

## 2018-11-07 ENCOUNTER — HOSPITAL ENCOUNTER (INPATIENT)
Age: 53
LOS: 3 days | Discharge: HOME | DRG: 304 | End: 2018-11-10
Attending: EMERGENCY MEDICINE | Admitting: PSYCHIATRY & NEUROLOGY
Payer: COMMERCIAL

## 2018-11-07 ENCOUNTER — APPOINTMENT (OUTPATIENT)
Dept: CT IMAGING | Age: 53
DRG: 304 | End: 2018-11-07
Payer: COMMERCIAL

## 2018-11-07 DIAGNOSIS — R41.82 ALTERED MENTAL STATUS, UNSPECIFIED ALTERED MENTAL STATUS TYPE: Primary | ICD-10-CM

## 2018-11-07 DIAGNOSIS — I67.83 PRES (POSTERIOR REVERSIBLE ENCEPHALOPATHY SYNDROME): ICD-10-CM

## 2018-11-07 DIAGNOSIS — I16.1 HYPERTENSIVE EMERGENCY: ICD-10-CM

## 2018-11-07 LAB
-: ABNORMAL
-: NORMAL
-: NORMAL
ABSOLUTE EOS #: 0.03 K/UL (ref 0–0.44)
ABSOLUTE IMMATURE GRANULOCYTE: 0.08 K/UL (ref 0–0.3)
ABSOLUTE LYMPH #: 1.31 K/UL (ref 1.1–3.7)
ABSOLUTE MONO #: 0.43 K/UL (ref 0.1–1.2)
ALBUMIN SERPL-MCNC: 3.4 G/DL (ref 3.5–5.2)
ALBUMIN/GLOBULIN RATIO: 0.9 (ref 1–2.5)
ALLEN TEST: ABNORMAL
ALP BLD-CCNC: 101 U/L (ref 40–129)
ALT SERPL-CCNC: 15 U/L (ref 5–41)
AMMONIA: 31 UMOL/L (ref 16–60)
AMORPHOUS: ABNORMAL
ANION GAP SERPL CALCULATED.3IONS-SCNC: 13 MMOL/L (ref 9–17)
ANION GAP: 8 MMOL/L (ref 7–16)
APPEARANCE CSF: CLEAR
AST SERPL-CCNC: 19 U/L
BACTERIA: ABNORMAL
BASOPHILS # BLD: 0 % (ref 0–2)
BASOPHILS ABSOLUTE: 0.06 K/UL (ref 0–0.2)
BILIRUB SERPL-MCNC: 0.32 MG/DL (ref 0.3–1.2)
BILIRUBIN URINE: NEGATIVE
BNP INTERPRETATION: ABNORMAL
BUN BLDV-MCNC: 9 MG/DL (ref 6–20)
BUN/CREAT BLD: ABNORMAL (ref 9–20)
CALCIUM SERPL-MCNC: 8.8 MG/DL (ref 8.6–10.4)
CASTS UA: ABNORMAL /LPF (ref 0–2)
CHLORIDE BLD-SCNC: 98 MMOL/L (ref 98–107)
CHOLESTEROL/HDL RATIO: 3.8
CHOLESTEROL: 129 MG/DL
CO2: 24 MMOL/L (ref 20–31)
COLOR: YELLOW
CREAT SERPL-MCNC: 0.68 MG/DL (ref 0.7–1.2)
CRYPTOCOCCUS NEOFORMANS/GATTI CSF FILM ARR.: NOT DETECTED
CRYSTALS, UA: ABNORMAL /HPF
CYTOMEGALOVIRUS (CMV) CSF FILM ARRAY: NOT DETECTED
DIFFERENTIAL TYPE: ABNORMAL
EKG ATRIAL RATE: 104 BPM
EKG P AXIS: 55 DEGREES
EKG P-R INTERVAL: 178 MS
EKG Q-T INTERVAL: 382 MS
EKG QRS DURATION: 82 MS
EKG QTC CALCULATION (BAZETT): 502 MS
EKG R AXIS: 39 DEGREES
EKG T AXIS: 31 DEGREES
EKG VENTRICULAR RATE: 104 BPM
ENTEROVIRUS CSF FILM ARRAY: NOT DETECTED
EOSINOPHILS RELATIVE PERCENT: 0 % (ref 1–4)
EPITHELIAL CELLS UA: ABNORMAL /HPF (ref 0–5)
ESCHERICHIA COLI K1 CSF FILM ARRAY: NOT DETECTED
ESTIMATED AVERAGE GLUCOSE: 206 MG/DL
FIO2: ABNORMAL
GFR AFRICAN AMERICAN: >60 ML/MIN
GFR NON-AFRICAN AMERICAN: >60 ML/MIN
GFR NON-AFRICAN AMERICAN: >60 ML/MIN
GFR SERPL CREATININE-BSD FRML MDRD: >60 ML/MIN
GFR SERPL CREATININE-BSD FRML MDRD: ABNORMAL ML/MIN/{1.73_M2}
GFR SERPL CREATININE-BSD FRML MDRD: ABNORMAL ML/MIN/{1.73_M2}
GFR SERPL CREATININE-BSD FRML MDRD: NORMAL ML/MIN/{1.73_M2}
GLUCOSE BLD-MCNC: 124 MG/DL (ref 75–110)
GLUCOSE BLD-MCNC: 173 MG/DL (ref 75–110)
GLUCOSE BLD-MCNC: 298 MG/DL (ref 75–110)
GLUCOSE BLD-MCNC: 321 MG/DL (ref 75–110)
GLUCOSE BLD-MCNC: 347 MG/DL (ref 70–99)
GLUCOSE BLD-MCNC: 360 MG/DL (ref 75–110)
GLUCOSE BLD-MCNC: 374 MG/DL (ref 74–100)
GLUCOSE URINE: ABNORMAL
GLUCOSE, CSF: 124 MG/DL (ref 40–70)
HAEMOPHILUS INFLUENZA CSF FILM ARRAY: NOT DETECTED
HBA1C MFR BLD: 8.8 % (ref 4–6)
HCO3 VENOUS: 23.7 MMOL/L (ref 22–29)
HCT VFR BLD CALC: 35.2 % (ref 40.7–50.3)
HDLC SERPL-MCNC: 34 MG/DL
HEMOGLOBIN: 11.1 G/DL (ref 13–17)
HHV-6 (HERPESVIRUS 6) CSF FILM ARRAY: NOT DETECTED
HSV-1 CSF FILM ARRAY: NOT DETECTED
HSV-2 CSF FILM ARRAY: NOT DETECTED
IMMATURE GRANULOCYTES: 1 %
INR BLD: 1
KETONES, URINE: ABNORMAL
LACTIC ACID, WHOLE BLOOD: 2.1 MMOL/L (ref 0.7–2.1)
LACTIC ACID, WHOLE BLOOD: 3.2 MMOL/L (ref 0.7–2.1)
LDL CHOLESTEROL: 72 MG/DL (ref 0–130)
LEUKOCYTE ESTERASE, URINE: NEGATIVE
LISTERIA MONOCYTOGENES CSF FILM ARRAY: NOT DETECTED
LYMPHOCYTES # BLD: 8 % (ref 24–43)
MAGNESIUM: 1.4 MG/DL (ref 1.6–2.6)
MCH RBC QN AUTO: 27.5 PG (ref 25.2–33.5)
MCHC RBC AUTO-ENTMCNC: 31.5 G/DL (ref 28.4–34.8)
MCV RBC AUTO: 87.3 FL (ref 82.6–102.9)
MODE: ABNORMAL
MONOCYTES # BLD: 3 % (ref 3–12)
MUCUS: ABNORMAL
NEGATIVE BASE EXCESS, VEN: ABNORMAL (ref 0–2)
NEISSERIA MENIGITIDIS CSF FILM ARRAY: NOT DETECTED
NITRITE, URINE: NEGATIVE
NRBC AUTOMATED: 0 PER 100 WBC
O2 DEVICE/FLOW/%: ABNORMAL
O2 SAT, VEN: 96 % (ref 60–85)
OTHER OBSERVATIONS UA: ABNORMAL
PARECHOVIRUS CSF FILM ARRAY: NOT DETECTED
PARTIAL THROMBOPLASTIN TIME: 23.6 SEC (ref 20.5–30.5)
PATIENT TEMP: ABNORMAL
PCO2, VEN: 31.4 MM HG (ref 41–51)
PDW BLD-RTO: 14.4 % (ref 11.8–14.4)
PH UA: 7.5 (ref 5–8)
PH VENOUS: 7.48 (ref 7.32–7.43)
PLATELET # BLD: 397 K/UL (ref 138–453)
PLATELET ESTIMATE: ABNORMAL
PMV BLD AUTO: 11.6 FL (ref 8.1–13.5)
PO2, VEN: 73.1 MM HG (ref 30–50)
POC CHLORIDE: 103 MMOL/L (ref 98–107)
POC CREATININE: 0.72 MG/DL (ref 0.51–1.19)
POC HEMATOCRIT: 41 % (ref 41–53)
POC HEMOGLOBIN: 13.9 G/DL (ref 13.5–17.5)
POC IONIZED CALCIUM: 1.05 MMOL/L (ref 1.15–1.33)
POC LACTIC ACID: 3.81 MMOL/L (ref 0.56–1.39)
POC PCO2 TEMP: ABNORMAL MM HG
POC PH TEMP: ABNORMAL
POC PO2 TEMP: ABNORMAL MM HG
POC POTASSIUM: 4.8 MMOL/L (ref 3.5–4.5)
POC SODIUM: 135 MMOL/L (ref 138–146)
POC TROPONIN I: 0.02 NG/ML (ref 0–0.1)
POC TROPONIN I: 0.03 NG/ML (ref 0–0.1)
POC TROPONIN INTERP: NORMAL
POC TROPONIN INTERP: NORMAL
POSITIVE BASE EXCESS, VEN: 1 (ref 0–3)
POTASSIUM SERPL-SCNC: 4.2 MMOL/L (ref 3.7–5.3)
PRO-BNP: 5008 PG/ML
PROCALCITONIN: 0.07 NG/ML
PROTEIN CSF: 52.5 MG/DL (ref 15–45)
PROTEIN UA: ABNORMAL
PROTHROMBIN TIME: 10.5 SEC (ref 9–12)
RBC # BLD: 4.03 M/UL (ref 4.21–5.77)
RBC # BLD: ABNORMAL 10*6/UL
RBC CSF: 15 /MM3
RBC UA: ABNORMAL /HPF (ref 0–2)
REASON FOR REJECTION: NORMAL
REASON FOR REJECTION: NORMAL
RENAL EPITHELIAL, UA: ABNORMAL /HPF
SAMPLE SITE: ABNORMAL
SEG NEUTROPHILS: 88 % (ref 36–65)
SEGMENTED NEUTROPHILS ABSOLUTE COUNT: 15.1 K/UL (ref 1.5–8.1)
SODIUM BLD-SCNC: 135 MMOL/L (ref 135–144)
SOURCE: NORMAL
SPECIFIC GRAVITY UA: 1.02 (ref 1–1.03)
STREPTOCOCCUS AGALACTIAE CSF FILM ARRAY: NOT DETECTED
STREPTOCOCCUS PNEUMONIAE CSF FILM ARRAY: NOT DETECTED
SUPERNAT COLOR CSF: ABNORMAL
TOTAL CO2, VENOUS: 25 MMOL/L (ref 23–30)
TOTAL PROTEIN: 7.3 G/DL (ref 6.4–8.3)
TRICHOMONAS: ABNORMAL
TRIGL SERPL-MCNC: 113 MG/DL
TUBE NUMBER CSF: 3
TURBIDITY: CLEAR
URINE HGB: ABNORMAL
UROBILINOGEN, URINE: NORMAL
VARICELLA-ZOSTER CSF FILM ARRAY: NOT DETECTED
VLDLC SERPL CALC-MCNC: ABNORMAL MG/DL (ref 1–30)
VOLUME CSF: 18
WBC # BLD: 17 K/UL (ref 3.5–11.3)
WBC # BLD: ABNORMAL 10*3/UL
WBC CSF: 65 /MM3
WBC UA: ABNORMAL /HPF (ref 0–5)
XANTHOCHROMIA: ABNORMAL
YEAST: ABNORMAL
ZZ NTE CLEAN UP: ORDERED TEST: NORMAL
ZZ NTE CLEAN UP: ORDERED TEST: NORMAL
ZZ NTE WITH NAME CLEAN UP: SPECIMEN SOURCE: NORMAL
ZZ NTE WITH NAME CLEAN UP: SPECIMEN SOURCE: NORMAL

## 2018-11-07 PROCEDURE — 92610 EVALUATE SWALLOWING FUNCTION: CPT

## 2018-11-07 PROCEDURE — 6360000002 HC RX W HCPCS: Performed by: STUDENT IN AN ORGANIZED HEALTH CARE EDUCATION/TRAINING PROGRAM

## 2018-11-07 PROCEDURE — 89051 BODY FLUID CELL COUNT: CPT

## 2018-11-07 PROCEDURE — 2000000003 HC NEURO ICU R&B

## 2018-11-07 PROCEDURE — 84295 ASSAY OF SERUM SODIUM: CPT

## 2018-11-07 PROCEDURE — 70496 CT ANGIOGRAPHY HEAD: CPT

## 2018-11-07 PROCEDURE — 70551 MRI BRAIN STEM W/O DYE: CPT

## 2018-11-07 PROCEDURE — 84132 ASSAY OF SERUM POTASSIUM: CPT

## 2018-11-07 PROCEDURE — 6370000000 HC RX 637 (ALT 250 FOR IP): Performed by: NEUROLOGICAL SURGERY

## 2018-11-07 PROCEDURE — 82803 BLOOD GASES ANY COMBINATION: CPT

## 2018-11-07 PROCEDURE — 93005 ELECTROCARDIOGRAM TRACING: CPT

## 2018-11-07 PROCEDURE — 96365 THER/PROPH/DIAG IV INF INIT: CPT

## 2018-11-07 PROCEDURE — 82945 GLUCOSE OTHER FLUID: CPT

## 2018-11-07 PROCEDURE — 80053 COMPREHEN METABOLIC PANEL: CPT

## 2018-11-07 PROCEDURE — 84145 PROCALCITONIN (PCT): CPT

## 2018-11-07 PROCEDURE — 85014 HEMATOCRIT: CPT

## 2018-11-07 PROCEDURE — 80061 LIPID PANEL: CPT

## 2018-11-07 PROCEDURE — 36415 COLL VENOUS BLD VENIPUNCTURE: CPT

## 2018-11-07 PROCEDURE — 2500000003 HC RX 250 WO HCPCS: Performed by: STUDENT IN AN ORGANIZED HEALTH CARE EDUCATION/TRAINING PROGRAM

## 2018-11-07 PROCEDURE — 96367 TX/PROPH/DG ADDL SEQ IV INF: CPT

## 2018-11-07 PROCEDURE — 87483 CNS DNA AMP PROBE TYPE 12-25: CPT

## 2018-11-07 PROCEDURE — 6370000000 HC RX 637 (ALT 250 FOR IP): Performed by: FAMILY MEDICINE

## 2018-11-07 PROCEDURE — 2580000003 HC RX 258: Performed by: STUDENT IN AN ORGANIZED HEALTH CARE EDUCATION/TRAINING PROGRAM

## 2018-11-07 PROCEDURE — 4A10X4Z MONITORING OF CENTRAL NERVOUS ELECTRICAL ACTIVITY, EXTERNAL APPROACH: ICD-10-PCS | Performed by: NEUROLOGICAL SURGERY

## 2018-11-07 PROCEDURE — 99222 1ST HOSP IP/OBS MODERATE 55: CPT | Performed by: PSYCHIATRY & NEUROLOGY

## 2018-11-07 PROCEDURE — 71045 X-RAY EXAM CHEST 1 VIEW: CPT

## 2018-11-07 PROCEDURE — 2500000003 HC RX 250 WO HCPCS: Performed by: FAMILY MEDICINE

## 2018-11-07 PROCEDURE — 95812 EEG 41-60 MINUTES: CPT

## 2018-11-07 PROCEDURE — G8996 SWALLOW CURRENT STATUS: HCPCS

## 2018-11-07 PROCEDURE — 2500000003 HC RX 250 WO HCPCS

## 2018-11-07 PROCEDURE — 6360000002 HC RX W HCPCS: Performed by: NURSE PRACTITIONER

## 2018-11-07 PROCEDURE — 83880 ASSAY OF NATRIURETIC PEPTIDE: CPT

## 2018-11-07 PROCEDURE — 83735 ASSAY OF MAGNESIUM: CPT

## 2018-11-07 PROCEDURE — 009U3ZX DRAINAGE OF SPINAL CANAL, PERCUTANEOUS APPROACH, DIAGNOSTIC: ICD-10-PCS | Performed by: NEUROLOGICAL SURGERY

## 2018-11-07 PROCEDURE — 94762 N-INVAS EAR/PLS OXIMTRY CONT: CPT

## 2018-11-07 PROCEDURE — 85025 COMPLETE CBC W/AUTO DIFF WBC: CPT

## 2018-11-07 PROCEDURE — 87086 URINE CULTURE/COLONY COUNT: CPT

## 2018-11-07 PROCEDURE — 83605 ASSAY OF LACTIC ACID: CPT

## 2018-11-07 PROCEDURE — 82565 ASSAY OF CREATININE: CPT

## 2018-11-07 PROCEDURE — 82330 ASSAY OF CALCIUM: CPT

## 2018-11-07 PROCEDURE — 62270 DX LMBR SPI PNXR: CPT | Performed by: NEUROLOGICAL SURGERY

## 2018-11-07 PROCEDURE — 99291 CRITICAL CARE FIRST HOUR: CPT | Performed by: NEUROLOGICAL SURGERY

## 2018-11-07 PROCEDURE — 97530 THERAPEUTIC ACTIVITIES: CPT

## 2018-11-07 PROCEDURE — 6370000000 HC RX 637 (ALT 250 FOR IP): Performed by: NURSE PRACTITIONER

## 2018-11-07 PROCEDURE — G8987 SELF CARE CURRENT STATUS: HCPCS

## 2018-11-07 PROCEDURE — 87205 SMEAR GRAM STAIN: CPT

## 2018-11-07 PROCEDURE — 6360000002 HC RX W HCPCS: Performed by: FAMILY MEDICINE

## 2018-11-07 PROCEDURE — 84157 ASSAY OF PROTEIN OTHER: CPT

## 2018-11-07 PROCEDURE — 2580000003 HC RX 258: Performed by: NEUROLOGICAL SURGERY

## 2018-11-07 PROCEDURE — 2580000003 HC RX 258: Performed by: FAMILY MEDICINE

## 2018-11-07 PROCEDURE — 82140 ASSAY OF AMMONIA: CPT

## 2018-11-07 PROCEDURE — 6360000002 HC RX W HCPCS: Performed by: NEUROLOGICAL SURGERY

## 2018-11-07 PROCEDURE — 87015 SPECIMEN INFECT AGNT CONCNTJ: CPT

## 2018-11-07 PROCEDURE — 87040 BLOOD CULTURE FOR BACTERIA: CPT

## 2018-11-07 PROCEDURE — 83036 HEMOGLOBIN GLYCOSYLATED A1C: CPT

## 2018-11-07 PROCEDURE — 70498 CT ANGIOGRAPHY NECK: CPT

## 2018-11-07 PROCEDURE — 6360000004 HC RX CONTRAST MEDICATION: Performed by: NEUROLOGICAL SURGERY

## 2018-11-07 PROCEDURE — 2500000003 HC RX 250 WO HCPCS: Performed by: NURSE PRACTITIONER

## 2018-11-07 PROCEDURE — 6360000002 HC RX W HCPCS

## 2018-11-07 PROCEDURE — 85730 THROMBOPLASTIN TIME PARTIAL: CPT

## 2018-11-07 PROCEDURE — 99285 EMERGENCY DEPT VISIT HI MDM: CPT

## 2018-11-07 PROCEDURE — G8997 SWALLOW GOAL STATUS: HCPCS

## 2018-11-07 PROCEDURE — 2700000000 HC OXYGEN THERAPY PER DAY

## 2018-11-07 PROCEDURE — 96375 TX/PRO/DX INJ NEW DRUG ADDON: CPT

## 2018-11-07 PROCEDURE — 84484 ASSAY OF TROPONIN QUANT: CPT

## 2018-11-07 PROCEDURE — 87070 CULTURE OTHR SPECIMN AEROBIC: CPT

## 2018-11-07 PROCEDURE — 6370000000 HC RX 637 (ALT 250 FOR IP): Performed by: STUDENT IN AN ORGANIZED HEALTH CARE EDUCATION/TRAINING PROGRAM

## 2018-11-07 PROCEDURE — 82435 ASSAY OF BLOOD CHLORIDE: CPT

## 2018-11-07 PROCEDURE — 82947 ASSAY GLUCOSE BLOOD QUANT: CPT

## 2018-11-07 PROCEDURE — G8988 SELF CARE GOAL STATUS: HCPCS

## 2018-11-07 PROCEDURE — 81001 URINALYSIS AUTO W/SCOPE: CPT

## 2018-11-07 PROCEDURE — 85610 PROTHROMBIN TIME: CPT

## 2018-11-07 PROCEDURE — 97166 OT EVAL MOD COMPLEX 45 MIN: CPT

## 2018-11-07 PROCEDURE — 70450 CT HEAD/BRAIN W/O DYE: CPT

## 2018-11-07 RX ORDER — LEVETIRACETAM 5 MG/ML
500 INJECTION INTRAVASCULAR ONCE
Status: COMPLETED | OUTPATIENT
Start: 2018-11-07 | End: 2018-11-07

## 2018-11-07 RX ORDER — ASPIRIN 300 MG/1
300 SUPPOSITORY RECTAL ONCE
Status: COMPLETED | OUTPATIENT
Start: 2018-11-07 | End: 2018-11-07

## 2018-11-07 RX ORDER — SODIUM CHLORIDE, SODIUM LACTATE, POTASSIUM CHLORIDE, CALCIUM CHLORIDE 600; 310; 30; 20 MG/100ML; MG/100ML; MG/100ML; MG/100ML
INJECTION, SOLUTION INTRAVENOUS CONTINUOUS
Status: DISCONTINUED | OUTPATIENT
Start: 2018-11-07 | End: 2018-11-08

## 2018-11-07 RX ORDER — LEVETIRACETAM 5 MG/ML
500 INJECTION INTRAVASCULAR EVERY 12 HOURS
Status: DISCONTINUED | OUTPATIENT
Start: 2018-11-08 | End: 2018-11-08

## 2018-11-07 RX ORDER — PANTOPRAZOLE SODIUM 40 MG/1
40 TABLET, DELAYED RELEASE ORAL
Status: DISCONTINUED | OUTPATIENT
Start: 2018-11-07 | End: 2018-11-10 | Stop reason: HOSPADM

## 2018-11-07 RX ORDER — HEPARIN SODIUM 5000 [USP'U]/ML
5000 INJECTION, SOLUTION INTRAVENOUS; SUBCUTANEOUS EVERY 8 HOURS SCHEDULED
Status: DISCONTINUED | OUTPATIENT
Start: 2018-11-07 | End: 2018-11-10 | Stop reason: HOSPADM

## 2018-11-07 RX ORDER — SODIUM CHLORIDE 0.9 % (FLUSH) 0.9 %
10 SYRINGE (ML) INJECTION EVERY 12 HOURS SCHEDULED
Status: DISCONTINUED | OUTPATIENT
Start: 2018-11-07 | End: 2018-11-10 | Stop reason: HOSPADM

## 2018-11-07 RX ORDER — LIDOCAINE HYDROCHLORIDE 10 MG/ML
INJECTION, SOLUTION INFILTRATION; PERINEURAL
Status: COMPLETED
Start: 2018-11-07 | End: 2018-11-07

## 2018-11-07 RX ORDER — ATORVASTATIN CALCIUM 40 MG/1
40 TABLET, FILM COATED ORAL NIGHTLY
Status: DISCONTINUED | OUTPATIENT
Start: 2018-11-07 | End: 2018-11-10 | Stop reason: HOSPADM

## 2018-11-07 RX ORDER — PROMETHAZINE HYDROCHLORIDE 25 MG/ML
12.5 INJECTION, SOLUTION INTRAMUSCULAR; INTRAVENOUS ONCE
Status: DISCONTINUED | OUTPATIENT
Start: 2018-11-07 | End: 2018-11-08

## 2018-11-07 RX ORDER — ASPIRIN 81 MG/1
81 TABLET ORAL DAILY
Status: DISCONTINUED | OUTPATIENT
Start: 2018-11-07 | End: 2018-11-10 | Stop reason: HOSPADM

## 2018-11-07 RX ORDER — 0.9 % SODIUM CHLORIDE 0.9 %
500 INTRAVENOUS SOLUTION INTRAVENOUS ONCE
Status: COMPLETED | OUTPATIENT
Start: 2018-11-07 | End: 2018-11-07

## 2018-11-07 RX ORDER — PRAVASTATIN SODIUM 20 MG
10 TABLET ORAL NIGHTLY
Status: CANCELLED | OUTPATIENT
Start: 2018-11-07

## 2018-11-07 RX ORDER — FUROSEMIDE 10 MG/ML
20 INJECTION INTRAMUSCULAR; INTRAVENOUS ONCE
Status: COMPLETED | OUTPATIENT
Start: 2018-11-07 | End: 2018-11-07

## 2018-11-07 RX ORDER — FENTANYL CITRATE 50 UG/ML
INJECTION, SOLUTION INTRAMUSCULAR; INTRAVENOUS
Status: COMPLETED
Start: 2018-11-07 | End: 2018-11-07

## 2018-11-07 RX ORDER — DIPHENHYDRAMINE HYDROCHLORIDE 50 MG/ML
12.5 INJECTION INTRAMUSCULAR; INTRAVENOUS ONCE
Status: DISCONTINUED | OUTPATIENT
Start: 2018-11-07 | End: 2018-11-08

## 2018-11-07 RX ORDER — TAMSULOSIN HYDROCHLORIDE 0.4 MG/1
0.4 CAPSULE ORAL DAILY
Status: DISCONTINUED | OUTPATIENT
Start: 2018-11-07 | End: 2018-11-10 | Stop reason: HOSPADM

## 2018-11-07 RX ORDER — ACETAMINOPHEN 325 MG/1
650 TABLET ORAL EVERY 6 HOURS PRN
Status: DISCONTINUED | OUTPATIENT
Start: 2018-11-07 | End: 2018-11-10 | Stop reason: HOSPADM

## 2018-11-07 RX ORDER — LORAZEPAM 2 MG/ML
1 INJECTION INTRAMUSCULAR ONCE
Status: COMPLETED | OUTPATIENT
Start: 2018-11-07 | End: 2018-11-07

## 2018-11-07 RX ORDER — ACETAMINOPHEN 650 MG/1
650 SUPPOSITORY RECTAL ONCE
Status: COMPLETED | OUTPATIENT
Start: 2018-11-07 | End: 2018-11-07

## 2018-11-07 RX ORDER — LABETALOL HYDROCHLORIDE 5 MG/ML
10 INJECTION, SOLUTION INTRAVENOUS
Status: DISCONTINUED | OUTPATIENT
Start: 2018-11-07 | End: 2018-11-10 | Stop reason: HOSPADM

## 2018-11-07 RX ORDER — HYDRALAZINE HYDROCHLORIDE 20 MG/ML
10 INJECTION INTRAMUSCULAR; INTRAVENOUS
Status: DISCONTINUED | OUTPATIENT
Start: 2018-11-07 | End: 2018-11-10 | Stop reason: HOSPADM

## 2018-11-07 RX ORDER — SODIUM CHLORIDE 0.9 % (FLUSH) 0.9 %
10 SYRINGE (ML) INJECTION PRN
Status: DISCONTINUED | OUTPATIENT
Start: 2018-11-07 | End: 2018-11-10 | Stop reason: HOSPADM

## 2018-11-07 RX ORDER — ATORVASTATIN CALCIUM 80 MG/1
80 TABLET, FILM COATED ORAL NIGHTLY
Status: DISCONTINUED | OUTPATIENT
Start: 2018-11-07 | End: 2018-11-07

## 2018-11-07 RX ORDER — MAGNESIUM SULFATE 1 G/100ML
INJECTION INTRAVENOUS
Status: DISCONTINUED
Start: 2018-11-07 | End: 2018-11-08

## 2018-11-07 RX ORDER — LEVETIRACETAM 10 MG/ML
1000 INJECTION INTRAVASCULAR ONCE
Status: COMPLETED | OUTPATIENT
Start: 2018-11-07 | End: 2018-11-07

## 2018-11-07 RX ORDER — GLIMEPIRIDE 2 MG/1
1 TABLET ORAL 2 TIMES DAILY
Status: DISCONTINUED | OUTPATIENT
Start: 2018-11-07 | End: 2018-11-10 | Stop reason: HOSPADM

## 2018-11-07 RX ORDER — MAGNESIUM SULFATE 1 G/100ML
1 INJECTION INTRAVENOUS
Status: COMPLETED | OUTPATIENT
Start: 2018-11-07 | End: 2018-11-07

## 2018-11-07 RX ORDER — LATANOPROST 50 UG/ML
1 SOLUTION/ DROPS OPHTHALMIC NIGHTLY
Status: DISCONTINUED | OUTPATIENT
Start: 2018-11-07 | End: 2018-11-10 | Stop reason: HOSPADM

## 2018-11-07 RX ORDER — ONDANSETRON 2 MG/ML
4 INJECTION INTRAMUSCULAR; INTRAVENOUS ONCE
Status: COMPLETED | OUTPATIENT
Start: 2018-11-07 | End: 2018-11-07

## 2018-11-07 RX ORDER — MIDAZOLAM HYDROCHLORIDE 1 MG/ML
INJECTION INTRAMUSCULAR; INTRAVENOUS
Status: DISCONTINUED
Start: 2018-11-07 | End: 2018-11-08

## 2018-11-07 RX ORDER — ONDANSETRON 2 MG/ML
4 INJECTION INTRAMUSCULAR; INTRAVENOUS EVERY 6 HOURS PRN
Status: DISCONTINUED | OUTPATIENT
Start: 2018-11-07 | End: 2018-11-10 | Stop reason: HOSPADM

## 2018-11-07 RX ORDER — 0.9 % SODIUM CHLORIDE 0.9 %
1800 INTRAVENOUS SOLUTION INTRAVENOUS ONCE
Status: COMPLETED | OUTPATIENT
Start: 2018-11-07 | End: 2018-11-07

## 2018-11-07 RX ORDER — SENNA AND DOCUSATE SODIUM 50; 8.6 MG/1; MG/1
2 TABLET, FILM COATED ORAL DAILY
Status: DISCONTINUED | OUTPATIENT
Start: 2018-11-07 | End: 2018-11-10 | Stop reason: HOSPADM

## 2018-11-07 RX ORDER — PROMETHAZINE HYDROCHLORIDE 25 MG/ML
INJECTION, SOLUTION INTRAMUSCULAR; INTRAVENOUS
Status: COMPLETED
Start: 2018-11-07 | End: 2018-11-07

## 2018-11-07 RX ORDER — LISINOPRIL 20 MG/1
40 TABLET ORAL DAILY
Status: DISCONTINUED | OUTPATIENT
Start: 2018-11-07 | End: 2018-11-10 | Stop reason: HOSPADM

## 2018-11-07 RX ORDER — PROMETHAZINE HYDROCHLORIDE 25 MG/ML
12.5 INJECTION, SOLUTION INTRAMUSCULAR; INTRAVENOUS ONCE
Status: COMPLETED | OUTPATIENT
Start: 2018-11-07 | End: 2018-11-07

## 2018-11-07 RX ADMIN — ASPIRIN 81 MG: 81 TABLET ORAL at 10:57

## 2018-11-07 RX ADMIN — Medication 10 MG: at 18:07

## 2018-11-07 RX ADMIN — ONDANSETRON 4 MG: 2 INJECTION INTRAMUSCULAR; INTRAVENOUS at 01:33

## 2018-11-07 RX ADMIN — FUROSEMIDE 20 MG: 10 INJECTION, SOLUTION INTRAMUSCULAR; INTRAVENOUS at 04:33

## 2018-11-07 RX ADMIN — INSULIN LISPRO 3 UNITS: 100 INJECTION, SOLUTION INTRAVENOUS; SUBCUTANEOUS at 17:43

## 2018-11-07 RX ADMIN — FENTANYL CITRATE 25 MCG: 50 INJECTION, SOLUTION INTRAMUSCULAR; INTRAVENOUS at 17:41

## 2018-11-07 RX ADMIN — SODIUM CHLORIDE 1800 ML: 9 INJECTION, SOLUTION INTRAVENOUS at 04:48

## 2018-11-07 RX ADMIN — TIMOLOL MALEATE 1 DROP: 2.5 SOLUTION/ DROPS OPHTHALMIC at 22:01

## 2018-11-07 RX ADMIN — LORAZEPAM 1 MG: 2 INJECTION INTRAMUSCULAR; INTRAVENOUS at 01:36

## 2018-11-07 RX ADMIN — SODIUM CHLORIDE, POTASSIUM CHLORIDE, SODIUM LACTATE AND CALCIUM CHLORIDE: 600; 310; 30; 20 INJECTION, SOLUTION INTRAVENOUS at 09:34

## 2018-11-07 RX ADMIN — LEVETIRACETAM 500 MG: 5 INJECTION INTRAVENOUS at 04:32

## 2018-11-07 RX ADMIN — IOPAMIDOL 75 ML: 755 INJECTION, SOLUTION INTRAVENOUS at 03:53

## 2018-11-07 RX ADMIN — TIMOLOL MALEATE 1 DROP: 2.5 SOLUTION/ DROPS OPHTHALMIC at 10:57

## 2018-11-07 RX ADMIN — FOLIC ACID 1 MG: 5 INJECTION, SOLUTION INTRAMUSCULAR; INTRAVENOUS; SUBCUTANEOUS at 21:58

## 2018-11-07 RX ADMIN — PIPERACILLIN AND TAZOBACTAM 3.38 G: 3; .375 INJECTION, POWDER, LYOPHILIZED, FOR SOLUTION INTRAVENOUS; PARENTERAL at 04:32

## 2018-11-07 RX ADMIN — HEPARIN SODIUM 5000 UNITS: 5000 INJECTION, SOLUTION INTRAVENOUS; SUBCUTANEOUS at 14:01

## 2018-11-07 RX ADMIN — INSULIN LISPRO 12 UNITS: 100 INJECTION, SOLUTION INTRAVENOUS; SUBCUTANEOUS at 07:46

## 2018-11-07 RX ADMIN — ACETAMINOPHEN 650 MG: 650 SUPPOSITORY RECTAL at 05:07

## 2018-11-07 RX ADMIN — PANTOPRAZOLE SODIUM 40 MG: 40 TABLET, DELAYED RELEASE ORAL at 10:56

## 2018-11-07 RX ADMIN — TAMSULOSIN HYDROCHLORIDE 0.4 MG: 0.4 CAPSULE ORAL at 10:56

## 2018-11-07 RX ADMIN — NICARDIPINE HYDROCHLORIDE 5 MG/HR: 0.1 INJECTION, SOLUTION INTRAVENOUS at 12:55

## 2018-11-07 RX ADMIN — Medication 1250 MG: at 09:30

## 2018-11-07 RX ADMIN — Medication 1250 MG: at 22:04

## 2018-11-07 RX ADMIN — HEPARIN SODIUM 5000 UNITS: 5000 INJECTION, SOLUTION INTRAVENOUS; SUBCUTANEOUS at 10:57

## 2018-11-07 RX ADMIN — FOLIC ACID 1 MG: 5 INJECTION, SOLUTION INTRAMUSCULAR; INTRAVENOUS; SUBCUTANEOUS at 17:43

## 2018-11-07 RX ADMIN — NICARDIPINE HYDROCHLORIDE 5 MG/HR: 0.1 INJECTION, SOLUTION INTRAVENOUS at 01:37

## 2018-11-07 RX ADMIN — LEVETIRACETAM 1000 MG: 10 INJECTION INTRAVENOUS at 03:07

## 2018-11-07 RX ADMIN — LISINOPRIL 40 MG: 20 TABLET ORAL at 10:56

## 2018-11-07 RX ADMIN — LATANOPROST 1 DROP: 50 SOLUTION OPHTHALMIC at 22:01

## 2018-11-07 RX ADMIN — Medication 10 MG: at 10:03

## 2018-11-07 RX ADMIN — HEPARIN SODIUM 5000 UNITS: 5000 INJECTION, SOLUTION INTRAVENOUS; SUBCUTANEOUS at 21:58

## 2018-11-07 RX ADMIN — PROMETHAZINE HYDROCHLORIDE 12.5 MG: 25 INJECTION INTRAMUSCULAR; INTRAVENOUS at 10:44

## 2018-11-07 RX ADMIN — ATORVASTATIN CALCIUM 40 MG: 80 TABLET, FILM COATED ORAL at 21:58

## 2018-11-07 RX ADMIN — Medication 10 MG: at 22:04

## 2018-11-07 RX ADMIN — CEFEPIME HYDROCHLORIDE 2 G: 2 INJECTION, POWDER, FOR SOLUTION INTRAVENOUS at 17:43

## 2018-11-07 RX ADMIN — ASPIRIN 300 MG: 300 SUPPOSITORY RECTAL at 04:33

## 2018-11-07 RX ADMIN — INSULIN LISPRO 9 UNITS: 100 INJECTION, SOLUTION INTRAVENOUS; SUBCUTANEOUS at 11:05

## 2018-11-07 RX ADMIN — MAGNESIUM SULFATE HEPTAHYDRATE 1 G: 1 INJECTION, SOLUTION INTRAVENOUS at 14:02

## 2018-11-07 RX ADMIN — NICARDIPINE HYDROCHLORIDE 5 MG/HR: 0.1 INJECTION, SOLUTION INTRAVENOUS at 08:19

## 2018-11-07 RX ADMIN — MAGNESIUM SULFATE HEPTAHYDRATE 1 G: 1 INJECTION, SOLUTION INTRAVENOUS at 13:06

## 2018-11-07 RX ADMIN — SODIUM CHLORIDE 500 ML: 9 INJECTION, SOLUTION INTRAVENOUS at 04:31

## 2018-11-07 RX ADMIN — LIDOCAINE HYDROCHLORIDE: 10 INJECTION, SOLUTION INFILTRATION; PERINEURAL at 17:42

## 2018-11-07 RX ADMIN — ONDANSETRON 4 MG: 2 INJECTION INTRAMUSCULAR; INTRAVENOUS at 09:30

## 2018-11-07 RX ADMIN — PROMETHAZINE HYDROCHLORIDE 12.5 MG: 25 INJECTION, SOLUTION INTRAMUSCULAR; INTRAVENOUS at 10:44

## 2018-11-07 ASSESSMENT — PAIN SCALES - GENERAL
PAINLEVEL_OUTOF10: 4
PAINLEVEL_OUTOF10: 0
PAINLEVEL_OUTOF10: 0

## 2018-11-07 NOTE — ED PROVIDER NOTES
Take 1 capsule by mouth 3 times daily  Patient taking differently: Take 50 mg by mouth 3 times daily. . 9/25/17   Modesta Larson MD   insulin glargine (LANTUS) 100 UNIT/ML injection vial Inject 20 Units into the skin nightly 9/25/17   Modesta Larson MD   pravastatin (PRAVACHOL) 10 MG tablet Take 1 tablet by mouth daily 9/25/17   Modesta Larson MD   lisinopril (PRINIVIL;ZESTRIL) 40 MG tablet Take 1 tablet by mouth daily 9/25/17   Modesta Larson MD   folic acid (FOLVITE) 1 MG tablet Take 1 tablet by mouth daily 9/25/17   [place] MD Marsha   tamsulosin [facility]) 0.4 MG capsule Take 1 capsule by mouth daily 9/25/17   Modesta Larson MD   docusate sodium (COLACE) 100 MG capsule Take 1 capsule by mouth 2 times daily as needed for Constipation 9/16/17   Mathew Sterling,    omeprazole (PRILOSEC) 20 MG delayed release capsule Take 20 mg by mouth 2 times daily    Historical Provider, MD   timolol (TIMOPTIC-XE) 0.25 % ophthalmic gel-forming 1 drop daily    Historical Provider, MD   travoprost, benzalkonium, (TRAVATAN) 0.004 % ophthalmic solution 1 drop nightly    Historical Provider, MD       REVIEW OF SYSTEMS    (2-9 systems for level 4, 10 or more for level 5)      Limited due to patient condition    Please see above HPI    Patient denies current headache, chest pain, difficulty breathing, abdominal pain, dysuria. Patient admit of fevers and chills today. Patient admits to nausea and vomiting.               PHYSICAL EXAM   (up to 7 for level 4, 8 or more for level 5)      INITIAL VITALS:   BP (!) 152/60   Pulse 97   Temp 102.4 °F (39.1 °C) (Core)   Resp 19   Ht 5' 7\" (1.702 m)   Wt 170 lb (77.1 kg)   SpO2 96%   BMI 26.63 kg/m²     General Appearance: Somnolent, maintaining open airway, occasionally opening eyes to voice     HEENT: Head: normocephalic/atraumatic eyes: PERRLA, EOMT, conjunctiva not injected, sclerae nonicteric ears: External canals patent nose: Nares patent, no rhinorrhea, throat: Appropriate Temp NOT REPORTED     POC pH Temp NOT REPORTED     POC pCO2 Temp NOT REPORTED mm Hg    POC pO2 Temp NOT REPORTED mm Hg   Creatinine W/GFR Point of Care   Result Value Ref Range    POC Creatinine 0.72 0.51 - 1.19 mg/dL    GFR Comment >60 >60 mL/min    GFR Non-African American >60 >60 mL/min    GFR Comment         Lactic Acid, POC   Result Value Ref Range    POC Lactic Acid 3.81 (H) 0.56 - 1.39 mmol/L   POCT Glucose   Result Value Ref Range    POC Glucose 374 (H) 74 - 100 mg/dL   Anion Gap (Calc) POC   Result Value Ref Range    Anion Gap 8 7 - 16 mmol/L     Sepsis Times and Checklist  Vital Signs: BP: (!) 152/60  Pulse: 97  Resp: 19  Temp: 102.4 °F (39.1 °C) SpO2: 96 %  SIRS (>2)   Temp > 38.3C or < 36C   HR > 90   RR > 20   WBC > 12 or < 4 or >10% bands  SIRS (>2) and confirmed or suspected source of infection = Sepsis  Is Sepsis due to likely bacterial infection?: Yes  If not, then sepsis is due to likely   etiology. Sepsis Orders:  ·  CBC: Yes  ·  CMP: Yes  ·  PT/PTT: Yes  ·  Blood Cultures x2: Yes  ·  Urinalysis and Urine Culture: Yes  ·  Lactate: Yes  ·  Broad Spectrum Antibiotics Given (within 3 hours of sepsis identification,  after blood cultures):  Yes    (If unable to obtain IV access for IV antibiotics within 3 hours of  identification of sepsis, IM antibiotics is acceptable.)  ·             If lactate >2.0 MUST repeat within 6 hours    If elevated, is elevated lactate from a likely infectious source?: Yes. IV Fluid Bolus:  Is lactate > 4.0:  No  If lactate >  4.0 OR hypotension (with 2 BP readings) 30ml/kg crystalloid MUST be ordered. Fluids must be completed within 3 hours of sepsis identification. · Is the patient Morbidly Obese (BMI > 30): No  · IV Fluids given prior to arrival can be used in this calculation but the following information must be documented:  Start time: 0115, Type of fluid ns, Volume of fluid 500, and Rate (Duration or End time) 30 min.     · Does the patient or the Emergency Department Physician who either signs or Co-signs this chart in the absence of a cardiologist.    EMERGENCY DEPARTMENT COURSE:  ED Course as of Nov 07 0433   Wed Nov 07, 2018   0057 Temp: 99.6 °F (37.6 °C) [EF]   0108 BP: (!) 236/102 [EF]   0117 Lactic Acid, Whole Blood: (!) 3.2 [EF]   0117 Lactate elevated, will repeat after fluid resus. POC Glucose: (!) 360 [EF]   0132 Pt meets SIRS, potential source sepsis workup initiated  [EF]   0133 Pt having right eye deviation in CT scanner with head and leg generalized tonic- clonic movements    DDx is still wide, no acute bleed on CT, will give 1 mg Ativan and check if symptoms subside to check for epileptic source  [EF]   0135 POC Troponin I: 0.02 [EF]   0143 Pt has vascular congestion on CXR, poor air movement, RLE pitting edema, likely has CHF if not already pre diagnosed. [EF]   0205 CT neg for bleed however suspicious for posterior reversible encephalopathy syndrome.   [EF]   0217 WBC: (!) 17.0 [EF]   0217 Hemoglobin Quant: (!) 11.1 [EF]   2518 On reexam systolic down to 640, pt still favors right gaze devidation with shaking in LE still present. Pt minimally verbally responsive, still maintaining good airway, opens eyes to verbal, good saturations    Continue to titrate drip for systolic <763    [facility] consulted, ICU paged for admission  [EF]   0228 Ammonia: 31 [EF]   0229 POC Creatinine: 0.72 [EF]   0229 Seg Neutrophils: (!) 88 [EF]   0309 Pro-BNP: (!) 5,008 [EF]   0312 Decision made with team that low suspision for nuerologic infectious eitiology, will procede with braod spec abx and 2nd lactate  [EF]   0326 Spoke with ICU who stated they will assess pt but are down to 1 bed and might not be able to admit.      Nuerology has placed orders for keppra and MRI    Will also consult [facility] ICU for potential admission  [EF]   0418 Temp: 102.4 °F (39.1 °C) [EF]   0422 [facility] ICU has assessed at bedside   Lactic Acid, Whole Blood: 2.1 [EF]   0432 On reexame

## 2018-11-07 NOTE — ED NOTES
Per pt's wife, pt was discharged from the hospital approx one month ago on eight different BP medications, but was sleeping all the time. Pt's wife states she called pt's PCP and was told to stop all of the BP medications except two. States he has been taking those medications as prescribed. States he has seen his PCP since that time and his BP was normal in the office. When asked what the normal numbers were, pt's wife states she believes it was 109/84 \"or something like that\".       Yamel Maradiaga RN  11/07/18 9093

## 2018-11-07 NOTE — PROGRESS NOTES
with L prosthetic as available with LRD and max A x2  Short term goal 3: demo A&Ox3 for 3/4 tx sessions  Short term goal 4: demo static/dynamic sitting on EOB for 15 min during func activity with mod I  Short term goal 5: demo supine<>sit transfer to EOB with mod Ax1 only  Short term goal 6: demo stand>pivot transfer to chair from EOB with max A, L prosthetic, and LRD. Therapy Time   Individual Concurrent Group Co-treatment   Time In 1311         Time Out 1339         Minutes 28            Discharge recommendations discussed with patient during initial evaluation. In my professional opinion, this patient could tolerate a total of 3 hours of combined therapies post-acute care. Pt would benefit from additional acute care and post-acute care therapy services prior to a safe discharge home to address ADL completion, func mob/transfers, and cognition/safety awareness.       Dash Garcia, OTR/L

## 2018-11-07 NOTE — ED PROVIDER NOTES
All other components within normal limits   COMPREHENSIVE METABOLIC PANEL - Abnormal; Notable for the following:     Glucose 347 (*)     CREATININE 0.68 (*)     Alb 3.4 (*)     Albumin/Globulin Ratio 0.9 (*)     All other components within normal limits   MAGNESIUM - Abnormal; Notable for the following:     Magnesium 1.4 (*)     All other components within normal limits   CBC WITH AUTO DIFFERENTIAL - Abnormal; Notable for the following:     WBC 17.0 (*)     RBC 4.03 (*)     Hemoglobin 11.1 (*)     Hematocrit 35.2 (*)     Seg Neutrophils 88 (*)     Lymphocytes 8 (*)     Eosinophils % 0 (*)     Immature Granulocytes 1 (*)     Segs Absolute 15.10 (*)     All other components within normal limits   POC GLUCOSE FINGERSTICK - Abnormal; Notable for the following:     POC Glucose 360 (*)     All other components within normal limits   VENOUS BLOOD GAS, POINT OF CARE - Abnormal; Notable for the following:     pH, Ender 7.484 (*)     pCO2, Ender 31.4 (*)     pO2, Ender 73.1 (*)     O2 Sat, Ender 96 (*)     All other components within normal limits   LACTIC ACID,POINT OF CARE - Abnormal; Notable for the following:     POC Lactic Acid 3.81 (*)     All other components within normal limits   POCT GLUCOSE - Abnormal; Notable for the following:     POC Glucose 374 (*)     All other components within normal limits   CULTURE BLOOD #1   CULTURE BLOOD #1   URINE CULTURE   HGB/HCT   CHLORIDE (POC)   SPECIMEN REJECTION   AMMONIA   PROTIME-INR   APTT   LACTIC ACID, WHOLE BLOOD   PREVIOUS SPECIMEN   PROTEIN, URINE   PROCALCITONIN   POC BLOOD GAS   POCT TROPONIN   POCT TROPONIN   POCT TROPONIN   CREATININE W/GFR POINT OF CARE   ANION GAP (CALC) POC       Ct Head Wo Contrast    Result Date: 11/7/2018  1. Subcortical white matter low attenuation involving the supratentorial brain parenchyma, predominantly posteriorly. This is new from prior study. Findings are suspicious for posterior reversible encephalopathy syndrome (PRES).   This can be seen in

## 2018-11-07 NOTE — ED NOTES
To CT via cart. Patient having generalized body twitching. Dr. Maribell Quintero and Dr. Chase Ferguson notified and called to bedside.      Keara Parry, RN  11/07/18 5220

## 2018-11-07 NOTE — ED PROVIDER NOTES
[facility]     Emergency Department     Faculty Attestation    I performed a history and physical examination of the patient and discussed management with the resident. I reviewed the residents note and agree with the documented findings and plan of care. Any areas of disagreement are noted on the chart. I was personally present for the key portions of any procedures. I have documented in the chart those procedures where I was not present during the key portions. I have reviewed the emergency nurses triage note. I agree with the chief complaint, past medical history, past surgical history, allergies, medications, social and family history as documented unless otherwise noted below. For Physician Assistant/ Nurse Practitioner cases/documentation I have personally evaluated this patient and have completed at least one if not all key elements of the E/M (history, physical exam, and MDM). Additional findings are as noted. I have personally seen and evaluated the patient. I find the patient's history and physical exam are consistent with the NP/PA documentation. I agree with the care provided, treatment rendered, disposition and follow-up plan. 45year old male presenting with one day of altered mental status. Has had vomiting at home, became progressively more confused this evening. Answers some orientation questions correctly, but does not follow commands. Moving all extremities. Multiple scabs on body, picked open. Eyes deviated to right, although patient is legally blind secondary to diabetes per family. No tonic-clonic motions. Extremely hypertensive. DDx includes, but is not limited to: intracranial bleed, hypertensive crisis, meningitis/encephalitis, ACS/MI, CVA, subclinical seizures. Will evaluate for infection, get STAT CT head to rule out intracranial bleed. CT head negative for large bleed.  Patient having more rhythmic motions of head and leg, continued gaze

## 2018-11-08 LAB
ABSOLUTE EOS #: 0.13 K/UL (ref 0–0.44)
ABSOLUTE IMMATURE GRANULOCYTE: <0.03 K/UL (ref 0–0.3)
ABSOLUTE LYMPH #: 2.36 K/UL (ref 1.1–3.7)
ABSOLUTE MONO #: 0.69 K/UL (ref 0.1–1.2)
ANION GAP SERPL CALCULATED.3IONS-SCNC: 9 MMOL/L (ref 9–17)
BANDS, CSF: NORMAL %
BASO CSF: NORMAL %
BASOPHILS # BLD: 1 % (ref 0–2)
BASOPHILS ABSOLUTE: 0.04 K/UL (ref 0–0.2)
BLAST CSF: NORMAL %
BUN BLDV-MCNC: 7 MG/DL (ref 6–20)
BUN/CREAT BLD: ABNORMAL (ref 9–20)
CALCIUM SERPL-MCNC: 8.7 MG/DL (ref 8.6–10.4)
CHLORIDE BLD-SCNC: 104 MMOL/L (ref 98–107)
CO2: 25 MMOL/L (ref 20–31)
CREAT SERPL-MCNC: 0.69 MG/DL (ref 0.7–1.2)
CULTURE: NO GROWTH
DIFFERENTIAL TYPE: ABNORMAL
EOS CSF: NORMAL %
EOSINOPHILS RELATIVE PERCENT: 2 % (ref 1–4)
FLUID DIFF COMMENT: NORMAL
GFR AFRICAN AMERICAN: >60 ML/MIN
GFR NON-AFRICAN AMERICAN: >60 ML/MIN
GFR SERPL CREATININE-BSD FRML MDRD: ABNORMAL ML/MIN/{1.73_M2}
GFR SERPL CREATININE-BSD FRML MDRD: ABNORMAL ML/MIN/{1.73_M2}
GLUCOSE BLD-MCNC: 124 MG/DL (ref 75–110)
GLUCOSE BLD-MCNC: 160 MG/DL (ref 70–99)
GLUCOSE BLD-MCNC: 162 MG/DL (ref 75–110)
GLUCOSE BLD-MCNC: 190 MG/DL (ref 75–110)
GLUCOSE BLD-MCNC: 221 MG/DL (ref 75–110)
HCT VFR BLD CALC: 29.8 % (ref 40.7–50.3)
HEMOGLOBIN: 9.2 G/DL (ref 13–17)
IMMATURE GRANULOCYTES: 0 %
LACTIC ACID, WHOLE BLOOD: 1.3 MMOL/L (ref 0.7–2.1)
LYMPHOCYTES # BLD: 31 % (ref 24–43)
LYMPHS CSF: 6 %
Lab: NORMAL
MAGNESIUM: 1.9 MG/DL (ref 1.6–2.6)
MCH RBC QN AUTO: 28 PG (ref 25.2–33.5)
MCHC RBC AUTO-ENTMCNC: 30.9 G/DL (ref 28.4–34.8)
MCV RBC AUTO: 90.6 FL (ref 82.6–102.9)
METAYELO CSF: NORMAL %
MONO/MACROPHAGE CSF (MANUAL): NORMAL %
MONOCYTES # BLD: 9 % (ref 3–12)
MYELOCYTE CSF: NORMAL %
NEUTROPHILS, CSF: 88 %
NRBC AUTOMATED: 0 PER 100 WBC
OTHER CELLS FLUID: NORMAL %
PDW BLD-RTO: 14.7 % (ref 11.8–14.4)
PLATELET # BLD: 297 K/UL (ref 138–453)
PLATELET ESTIMATE: ABNORMAL
PMV BLD AUTO: 11.6 FL (ref 8.1–13.5)
POTASSIUM SERPL-SCNC: 3.5 MMOL/L (ref 3.7–5.3)
RBC # BLD: 3.29 M/UL (ref 4.21–5.77)
RBC # BLD: ABNORMAL 10*6/UL
SEG NEUTROPHILS: 58 % (ref 36–65)
SEGMENTED NEUTROPHILS ABSOLUTE COUNT: 4.35 K/UL (ref 1.5–8.1)
SODIUM BLD-SCNC: 138 MMOL/L (ref 135–144)
SPECIMEN DESCRIPTION: NORMAL
STATUS: NORMAL
WBC # BLD: 7.6 K/UL (ref 3.5–11.3)
WBC # BLD: ABNORMAL 10*3/UL

## 2018-11-08 PROCEDURE — 99232 SBSQ HOSP IP/OBS MODERATE 35: CPT | Performed by: PSYCHIATRY & NEUROLOGY

## 2018-11-08 PROCEDURE — 83605 ASSAY OF LACTIC ACID: CPT

## 2018-11-08 PROCEDURE — 6360000002 HC RX W HCPCS: Performed by: NEUROLOGICAL SURGERY

## 2018-11-08 PROCEDURE — 99233 SBSQ HOSP IP/OBS HIGH 50: CPT | Performed by: PSYCHIATRY & NEUROLOGY

## 2018-11-08 PROCEDURE — G8979 MOBILITY GOAL STATUS: HCPCS

## 2018-11-08 PROCEDURE — G9175 SPEECH LANG GOAL STATUS: HCPCS

## 2018-11-08 PROCEDURE — 97162 PT EVAL MOD COMPLEX 30 MIN: CPT

## 2018-11-08 PROCEDURE — 6370000000 HC RX 637 (ALT 250 FOR IP): Performed by: STUDENT IN AN ORGANIZED HEALTH CARE EDUCATION/TRAINING PROGRAM

## 2018-11-08 PROCEDURE — G8978 MOBILITY CURRENT STATUS: HCPCS

## 2018-11-08 PROCEDURE — 2500000003 HC RX 250 WO HCPCS: Performed by: NURSE PRACTITIONER

## 2018-11-08 PROCEDURE — 94762 N-INVAS EAR/PLS OXIMTRY CONT: CPT

## 2018-11-08 PROCEDURE — 6360000002 HC RX W HCPCS: Performed by: NURSE PRACTITIONER

## 2018-11-08 PROCEDURE — 97535 SELF CARE MNGMENT TRAINING: CPT

## 2018-11-08 PROCEDURE — 6370000000 HC RX 637 (ALT 250 FOR IP): Performed by: INTERNAL MEDICINE

## 2018-11-08 PROCEDURE — 92523 SPEECH SOUND LANG COMPREHEN: CPT

## 2018-11-08 PROCEDURE — 2580000003 HC RX 258: Performed by: FAMILY MEDICINE

## 2018-11-08 PROCEDURE — 82947 ASSAY GLUCOSE BLOOD QUANT: CPT

## 2018-11-08 PROCEDURE — 80048 BASIC METABOLIC PNL TOTAL CA: CPT

## 2018-11-08 PROCEDURE — 83735 ASSAY OF MAGNESIUM: CPT

## 2018-11-08 PROCEDURE — 97530 THERAPEUTIC ACTIVITIES: CPT

## 2018-11-08 PROCEDURE — 85025 COMPLETE CBC W/AUTO DIFF WBC: CPT

## 2018-11-08 PROCEDURE — 2060000000 HC ICU INTERMEDIATE R&B

## 2018-11-08 PROCEDURE — 6360000002 HC RX W HCPCS: Performed by: STUDENT IN AN ORGANIZED HEALTH CARE EDUCATION/TRAINING PROGRAM

## 2018-11-08 PROCEDURE — 6370000000 HC RX 637 (ALT 250 FOR IP): Performed by: NURSE PRACTITIONER

## 2018-11-08 PROCEDURE — 6370000000 HC RX 637 (ALT 250 FOR IP): Performed by: NEUROLOGICAL SURGERY

## 2018-11-08 PROCEDURE — 2580000003 HC RX 258: Performed by: NEUROLOGICAL SURGERY

## 2018-11-08 PROCEDURE — 36415 COLL VENOUS BLD VENIPUNCTURE: CPT

## 2018-11-08 PROCEDURE — 2500000003 HC RX 250 WO HCPCS: Performed by: FAMILY MEDICINE

## 2018-11-08 PROCEDURE — G9174 SPEECH LANG CURRENT STATUS: HCPCS

## 2018-11-08 RX ORDER — INSULIN GLARGINE 100 [IU]/ML
15 INJECTION, SOLUTION SUBCUTANEOUS NIGHTLY
Status: DISCONTINUED | OUTPATIENT
Start: 2018-11-08 | End: 2018-11-10 | Stop reason: HOSPADM

## 2018-11-08 RX ORDER — FOLIC ACID 1 MG/1
1 TABLET ORAL DAILY
Status: DISCONTINUED | OUTPATIENT
Start: 2018-11-09 | End: 2018-11-10 | Stop reason: HOSPADM

## 2018-11-08 RX ORDER — POTASSIUM CHLORIDE 20 MEQ/1
40 TABLET, EXTENDED RELEASE ORAL ONCE
Status: COMPLETED | OUTPATIENT
Start: 2018-11-08 | End: 2018-11-08

## 2018-11-08 RX ORDER — DEXTROSE MONOHYDRATE 50 MG/ML
100 INJECTION, SOLUTION INTRAVENOUS PRN
Status: DISCONTINUED | OUTPATIENT
Start: 2018-11-08 | End: 2018-11-10 | Stop reason: HOSPADM

## 2018-11-08 RX ORDER — NICOTINE POLACRILEX 4 MG
15 LOZENGE BUCCAL PRN
Status: DISCONTINUED | OUTPATIENT
Start: 2018-11-08 | End: 2018-11-10 | Stop reason: HOSPADM

## 2018-11-08 RX ORDER — MAGNESIUM SULFATE 1 G/100ML
1 INJECTION INTRAVENOUS ONCE
Status: COMPLETED | OUTPATIENT
Start: 2018-11-08 | End: 2018-11-08

## 2018-11-08 RX ORDER — CARVEDILOL 6.25 MG/1
6.25 TABLET ORAL 2 TIMES DAILY WITH MEALS
Status: DISCONTINUED | OUTPATIENT
Start: 2018-11-08 | End: 2018-11-10

## 2018-11-08 RX ORDER — AMLODIPINE BESYLATE 10 MG/1
10 TABLET ORAL DAILY
Status: DISCONTINUED | OUTPATIENT
Start: 2018-11-08 | End: 2018-11-10 | Stop reason: HOSPADM

## 2018-11-08 RX ORDER — LEVETIRACETAM 500 MG/1
500 TABLET ORAL 2 TIMES DAILY
Status: DISCONTINUED | OUTPATIENT
Start: 2018-11-08 | End: 2018-11-10 | Stop reason: HOSPADM

## 2018-11-08 RX ORDER — DEXTROSE MONOHYDRATE 25 G/50ML
12.5 INJECTION, SOLUTION INTRAVENOUS PRN
Status: DISCONTINUED | OUTPATIENT
Start: 2018-11-08 | End: 2018-11-10 | Stop reason: HOSPADM

## 2018-11-08 RX ADMIN — TIMOLOL MALEATE 1 DROP: 2.5 SOLUTION/ DROPS OPHTHALMIC at 08:36

## 2018-11-08 RX ADMIN — LATANOPROST 1 DROP: 50 SOLUTION OPHTHALMIC at 20:10

## 2018-11-08 RX ADMIN — INSULIN LISPRO 2 UNITS: 100 INJECTION, SOLUTION INTRAVENOUS; SUBCUTANEOUS at 20:27

## 2018-11-08 RX ADMIN — ASPIRIN 81 MG: 81 TABLET ORAL at 08:32

## 2018-11-08 RX ADMIN — Medication 1250 MG: at 08:32

## 2018-11-08 RX ADMIN — Medication 10 MG: at 03:09

## 2018-11-08 RX ADMIN — INSULIN GLARGINE 15 UNITS: 100 INJECTION, SOLUTION SUBCUTANEOUS at 22:25

## 2018-11-08 RX ADMIN — HEPARIN SODIUM 5000 UNITS: 5000 INJECTION, SOLUTION INTRAVENOUS; SUBCUTANEOUS at 13:56

## 2018-11-08 RX ADMIN — CEFEPIME HYDROCHLORIDE 2 G: 2 INJECTION, POWDER, FOR SOLUTION INTRAVENOUS at 05:19

## 2018-11-08 RX ADMIN — Medication 10 ML: at 09:17

## 2018-11-08 RX ADMIN — HYDRALAZINE HYDROCHLORIDE 10 MG: 20 INJECTION INTRAMUSCULAR; INTRAVENOUS at 20:26

## 2018-11-08 RX ADMIN — Medication 10 ML: at 20:10

## 2018-11-08 RX ADMIN — Medication 10 MG: at 04:48

## 2018-11-08 RX ADMIN — INSULIN LISPRO 6 UNITS: 100 INJECTION, SOLUTION INTRAVENOUS; SUBCUTANEOUS at 06:00

## 2018-11-08 RX ADMIN — AMLODIPINE BESYLATE 10 MG: 10 TABLET ORAL at 13:42

## 2018-11-08 RX ADMIN — MAGNESIUM SULFATE HEPTAHYDRATE 1 G: 1 INJECTION, SOLUTION INTRAVENOUS at 08:58

## 2018-11-08 RX ADMIN — HEPARIN SODIUM 5000 UNITS: 5000 INJECTION, SOLUTION INTRAVENOUS; SUBCUTANEOUS at 05:30

## 2018-11-08 RX ADMIN — SENNOSIDES AND DOCUSATE SODIUM 2 TABLET: 8.6; 5 TABLET ORAL at 08:32

## 2018-11-08 RX ADMIN — CARVEDILOL 6.25 MG: 6.25 TABLET, FILM COATED ORAL at 22:24

## 2018-11-08 RX ADMIN — LEVETIRACETAM 500 MG: 500 TABLET, FILM COATED ORAL at 20:19

## 2018-11-08 RX ADMIN — PANTOPRAZOLE SODIUM 40 MG: 40 TABLET, DELAYED RELEASE ORAL at 07:53

## 2018-11-08 RX ADMIN — ATORVASTATIN CALCIUM 40 MG: 80 TABLET, FILM COATED ORAL at 20:19

## 2018-11-08 RX ADMIN — HYDRALAZINE HYDROCHLORIDE 10 MG: 20 INJECTION INTRAMUSCULAR; INTRAVENOUS at 05:17

## 2018-11-08 RX ADMIN — FOLIC ACID 1 MG: 5 INJECTION, SOLUTION INTRAMUSCULAR; INTRAVENOUS; SUBCUTANEOUS at 08:41

## 2018-11-08 RX ADMIN — TAMSULOSIN HYDROCHLORIDE 0.4 MG: 0.4 CAPSULE ORAL at 08:32

## 2018-11-08 RX ADMIN — Medication 10 MG: at 11:06

## 2018-11-08 RX ADMIN — SODIUM CHLORIDE, POTASSIUM CHLORIDE, SODIUM LACTATE AND CALCIUM CHLORIDE: 600; 310; 30; 20 INJECTION, SOLUTION INTRAVENOUS at 05:17

## 2018-11-08 RX ADMIN — HEPARIN SODIUM 5000 UNITS: 5000 INJECTION, SOLUTION INTRAVENOUS; SUBCUTANEOUS at 22:24

## 2018-11-08 RX ADMIN — POTASSIUM CHLORIDE 40 MEQ: 20 TABLET, EXTENDED RELEASE ORAL at 08:58

## 2018-11-08 RX ADMIN — INSULIN LISPRO 3 UNITS: 100 INJECTION, SOLUTION INTRAVENOUS; SUBCUTANEOUS at 07:48

## 2018-11-08 RX ADMIN — LISINOPRIL 40 MG: 20 TABLET ORAL at 08:32

## 2018-11-08 RX ADMIN — TIMOLOL MALEATE 1 DROP: 2.5 SOLUTION/ DROPS OPHTHALMIC at 20:10

## 2018-11-08 NOTE — PROGRESS NOTES
Units Subcutaneous TID WC    insulin lispro  0-9 Units Subcutaneous Nightly    aspirin  81 mg Oral Daily    lisinopril  40 mg Oral Daily    pantoprazole  40 mg Oral QAM AC    tamsulosin  0.4 mg Oral Daily    timolol  1 drop Both Eyes BID    latanoprost  1 drop Both Eyes Nightly    atorvastatin  40 mg Oral Nightly    sennosides-docusate sodium  2 tablet Oral Daily    vancomycin  1,250 mg Intravenous Q12H    vancomycin (VANCOCIN) intermittent dosing (placeholder)   Other RX Placeholder    promethazine  12.5 mg Intramuscular Once     CONTINUOUS INFUSIONS:   niCARdipine Stopped (18 1321)    lactated ringers 75 mL/hr at 18 0517     PRN MEDICATIONS:   magnesium hydroxide, ondansetron, sodium chloride flush, labetalol, hydrALAZINE, acetaminophen    VITALS:  Temperature Range: Temp: 98.6 °F (37 °C) Temp  Av.6 °F (37 °C)  Min: 98.3 °F (36.8 °C)  Max: 98.8 °F (37.1 °C)  BP Range: Systolic (87DCY), PI , Min:119 , NXX:770     Diastolic (61PUG), OGR:10, Min:39, Max:157    Pulse Range: Pulse  Av.9  Min: 72  Max: 114  Respiration Range: Resp  Av.3  Min: 2  Max: 24  Current Pulse Ox: SpO2: 100 %  24HR Pulse Ox Range: SpO2  Av.3 %  Min: 93 %  Max: 100 %  Patient Vitals for the past 12 hrs:   BP Temp Temp src Pulse Resp SpO2 Weight   18 0622 (!) 182/66 - - 79 18 - -   18 0603 (!) 191/68 - - 78 16 - -   18 0525 (!) 163/39 - - 76 13 - -   18 0516 (!) 183/66 - - 80 23 - -   18 0503 (!) 203/71 - - 79 11 - -   18 0403 (!) 179/58 98.6 °F (37 °C) Oral 79 17 100 % 173 lb 1 oz (78.5 kg)   18 0303 (!) 180/66 - - 75 11 99 % -   18 0203 (!) 172/66 - - 72 9 99 % -   18 0103 (!) 159/52 - - 75 (!) 2 100 % -   18 0003 (!) 146/50 98.6 °F (37 °C) Oral 72 9 99 % -   18 2303 (!) 160/66 - - 72 14 100 % -   18 2203 (!) 170/71 - - 76 14 - -   18 2103 (!) 153/60 - - 79 14 100 % -   18 2038 (!) 166/66 - - 80 21 100 % - hemorrhage. The gray-white matter differentiation is maintained. No abnormal extra-axial fluid collection. No hydrocephalus. Intracranial atherosclerotic disease present. ORBITS: The visualized portion of the orbits demonstrate no acute abnormality. SINUSES: The visualized paranasal sinuses and mastoid air cells demonstrate no acute abnormality. SOFT TISSUES/SKULL:  No acute abnormality of the visualized skull or soft tissues. 1. Subcortical white matter low attenuation involving the supratentorial brain parenchyma, predominantly posteriorly. This is new from prior study. Findings are suspicious for posterior reversible encephalopathy syndrome (PRES). This can be seen in the setting of acute hypertension, among other causes. 2. No acute intracranial hemorrhage. RECOMMENDATIONS: MRI of the brain recommended for further evaluation. Xr Chest Portable    Result Date: 11/7/2018  EXAMINATION: SINGLE XRAY VIEW OF THE CHEST 11/7/2018 1:18 am COMPARISON: September 17, 2018 HISTORY: ORDERING SYSTEM PROVIDED HISTORY: altered septic workup TECHNOLOGIST PROVIDED HISTORY: altered septic workup FINDINGS: Low lung volumes. Central pulmonary vascular congestion. Cardiomediastinal silhouette is within normal limits for projection and low lung volumes. No pneumothorax, focal lung consolidation or large pleural effusion. Stable osseous structures. Low lung volumes and mild central pulmonary vascular congestion. Cta Neck W Contrast    Result Date: 11/7/2018  EXAMINATION: CTA OF THE NECK; CTA OF THE HEAD WITH CONTRAST 11/7/2018 4:10 am TECHNIQUE: CTA of the neck was performed with the administration of intravenous contrast. Multiplanar reformatted images are provided for review. MIP images are provided for review. Stenosis of the internal carotid arteries measured using NASCET criteria.  Dose modulation, iterative reconstruction, and/or weight based adjustment of the mA/kV was utilized to reduce the radiation dose to as low as reasonably achievable.; CTA of the head/brain was performed with the administration of intravenous contrast. Multiplanar reformatted images are provided for review. MIP images are provided for review. Dose modulation, iterative reconstruction, and/or weight based adjustment of the mA/kV was utilized to reduce the radiation dose to as low as reasonably achievable. COMPARISON: MRI brain 09/02/2018 HISTORY: ORDERING SYSTEM PROVIDED HISTORY: R gaze deviation TECHNOLOGIST PROVIDED HISTORY: FINDINGS: CTA NECK: AORTIC ARCH/ARCH VESSELS: There is a normal branch pattern of the aortic arch. No significant stenosis is seen of the innominate artery or subclavian arteries. CAROTID ARTERIES: The common carotid arteries are normal in appearance without evidence of a flow limiting stenosis. There is artifact obscuring portions of the left internal carotid artery. Allowing for artifact, the internal carotid arteries demonstrate minimal atherosclerotic plaques of the proximal segments without evidence of a flow limiting stenosis by NASCET criteria. No dissection or arterial injury is seen. VERTEBRAL ARTERIES: The vertebral arteries both arise from the subclavian arteries and are normal in caliber without evidence of flow limiting stenosis or dissection. Circumferential calcified plaques of the bilateral vertebral artery, V4 segments, result in moderate stenosis of the right vertebral artery and moderate stenosis of the left vertebral artery. The vertebrobasilar junction is patent. SOFT TISSUES: Left facial skin thickening with subcutaneous stranding. The thyroid and major salivary glands are unremarkable. Numerous left jugular chain lymph nodes are present. BONES: No destructive osseous lesion. Moderate cervical spondylosis. Poor dentition. CTA HEAD: ANTERIOR CIRCULATION: Carotid siphon calcifications with severe stenosis of the left ICA, petrous and cavernous segment.   There is reconstitution of flow within the distal cavernous and supraclinoid segments of the left ICA. Right ICA, cavernous segment. Moderate stenosis. The MELISSA demonstrates multifocal mild stenosis. Right MCA, M3 segment severe focal stenosis. Left MCA is patent. Scattered low-grade stenoses of the anterior circulation are present. POSTERIOR CIRCULATION: Posterior cerebral arteries are patent proximally with attenuated flow within the bilateral P4 segments. Distal vertebral arteries described above. BRAIN: Patchy cerebral and cerebellar hypoattenuation. Development of hypoattenuation of the cerebral and cerebellar white matter concerning for encephalopathy such as hypertensive encephalopathy. Correlation with MRI brain with and without IV contrast recommended. Left ICA, petrous and caval segment, severe focal stenosis. Right ICA cavernous segment moderate stenosis. Right MCA, M3 segment, severe focal stenosis. Remainder of intracranial circulation appears patent. Left cheek skin thickening. Clinical correlation recommended. Reactive left cervical lymph nodes. Cta Head W Contrast    Result Date: 11/7/2018  EXAMINATION: CTA OF THE NECK; CTA OF THE HEAD WITH CONTRAST 11/7/2018 4:10 am TECHNIQUE: CTA of the neck was performed with the administration of intravenous contrast. Multiplanar reformatted images are provided for review. MIP images are provided for review. Stenosis of the internal carotid arteries measured using NASCET criteria. Dose modulation, iterative reconstruction, and/or weight based adjustment of the mA/kV was utilized to reduce the radiation dose to as low as reasonably achievable.; CTA of the head/brain was performed with the administration of intravenous contrast. Multiplanar reformatted images are provided for review. MIP images are provided for review. Dose modulation, iterative reconstruction, and/or weight based adjustment of the mA/kV was utilized to reduce the radiation dose to as low as reasonably achievable. acute abnormality. 1. Limited study secondary to motion artifact. 2. No evidence of an acute infarct. 3. Multifocal scattered and confluent areas of increased T2 signal are noted both supra and infratentorially. The distribution is slightly more pronounced in the cerebellar hemispheres and posterior cerebral hemispheres suspicious for posterior reversible encephalopathy syndrome. Labs and Images reviewed with:  Dr. Jose Eduardo Babin. PHYSICAL EXAM       CONSTITUTIONAL:  Alert and oriented x person, place, and year, forgetful. In no acute distress. No dysarthria. No aphasia. HEAD:  normocephalic, atraumatic    EYES:  Right pupil 4mm, non-reactive. Left pupil 6mm, nonreactive. ENT:  moist mucous membranes   NECK:  supple, symmetric   LUNGS:  Equal air entry bilaterally, clear   CARDIOVASCULAR:  normal s1 / s2, RRR, distal pulses intact   ABDOMEN:  Soft, no rigidity, normal bowel sounds   NEUROLOGIC:  Mental Status:  A & O xperson, place, and year and Not oriented to date, Awake             Cranial Nerves:    II: Visual acuity:  abnormal - blind at baseline  III: Pupils:  Right pupil 4mm, non-reactive. Left pupil 6mm, nonreactive. III,IV,VI: Extra Ocular Movements: intact  V: Facial sensation:  intact  VII: Facial strength: intact    Motor Exam:    Drift:  absent  Tone:  normal    Motor exam is symmetrical 5 out of 5 all extremities bilaterally. Able to lift left stump off bed. Sensory:    Touch:    Right Upper Extremity:  normal  Left Upper Extremity:  normal  Right Lower Extremity:  normal  Left Lower Extremity:  normal    Coordination/Dysmetria:  Heel to Shin:  Right:  normal  Finger to Nose:   Right:  normal  Left:  normal      NIH Stroke Scale Total (if not done complete detailed one below):    1a.  Level of consciousness:  0 - alert; keenly responsive  1b. Level of consciousness questions:  1 - answers one question correctly  1c.   Level of consciousness questions:  0 - performs both tasks

## 2018-11-08 NOTE — PROGRESS NOTES
House  Home Layout: One level  Home Access: Stairs to enter with rails  Entrance Stairs - Number of Steps: 5  Entrance Stairs - Rails: Both  Bathroom Shower/Tub: Tub/Shower unit  Bathroom Toilet: Standard  Bathroom Equipment: Tub transfer bench (with arm-rests per family)  Home Equipment: Rolling walker, [address] Help From: Family  ADL Assistance: Needs assistance  Homemaking Assistance: Needs assistance  Homemaking Responsibilities: No  Ambulation Assistance: Needs assistance  Transfer Assistance: Independent  Active : No  Patient's  Info: Spouse does all driving  Mode of Transportation: Family  Occupation: Retired  Leisure & Hobbies: sleeping  Additional Comments: 24 hr supervision available from spouse/dtr, spouse in fair health, dtr in good health   Objective  AROM RLE (degrees)  RLE AROM: WFL  AROM LLE (degrees)  LLE AROM : WFL  LLE General AROM: Lt BKA  AROM RUE (degrees)  RUE AROM : WFL  AROM LUE (degrees)  LUE AROM : WFL  Strength RLE  Strength RLE: WFL  Strength LLE  Strength LLE: WFL  Strength RUE  Strength RUE: WFL  Strength LUE  Strength LUE: WFL  Motor Control  Gross Motor?: WFL  Sensation  Overall Sensation Status: WFL  Bed mobility  Supine to Sit: Supervision  Scooting: Supervision  Pt sitting EOB SBA. Multiple attempts to joanne prosthesis but was unable d/t LLE edema. Pt did not wear  last night  Transfers  Sit to Stand: Contact guard assistance  Stand to sit: Contact guard assistance  Bed to Chair: Contact guard assistance  Ambulation  Ambulation?: Yes  Ambulation 1  Surface: level tile  Device: Rolling Walker  Assistance: Contact guard assistance  Distance: Pt hopped 5 ft to chair  Comments: LLE was slightly swollen from not wearing . Wife was unable to joanne prosthesis.      Balance  Sitting - Static: Good  Sitting - Dynamic: Good  Standing - Static: Good (-)  Standing - Dynamic: Fair (+)        Assessment   Body structures, Functions, Activity

## 2018-11-09 LAB
ABSOLUTE EOS #: 0.52 K/UL (ref 0–0.44)
ABSOLUTE IMMATURE GRANULOCYTE: <0.03 K/UL (ref 0–0.3)
ABSOLUTE LYMPH #: 1.73 K/UL (ref 1.1–3.7)
ABSOLUTE MONO #: 0.64 K/UL (ref 0.1–1.2)
ANION GAP SERPL CALCULATED.3IONS-SCNC: 11 MMOL/L (ref 9–17)
BASOPHILS # BLD: 1 % (ref 0–2)
BASOPHILS ABSOLUTE: 0.06 K/UL (ref 0–0.2)
BUN BLDV-MCNC: 7 MG/DL (ref 6–20)
BUN/CREAT BLD: ABNORMAL (ref 9–20)
CALCIUM IONIZED: 1.19 MMOL/L (ref 1.13–1.33)
CALCIUM SERPL-MCNC: 9.2 MG/DL (ref 8.6–10.4)
CHLORIDE BLD-SCNC: 103 MMOL/L (ref 98–107)
CO2: 24 MMOL/L (ref 20–31)
CREAT SERPL-MCNC: 0.62 MG/DL (ref 0.7–1.2)
DIFFERENTIAL TYPE: ABNORMAL
EOSINOPHILS RELATIVE PERCENT: 6 % (ref 1–4)
GFR AFRICAN AMERICAN: >60 ML/MIN
GFR NON-AFRICAN AMERICAN: >60 ML/MIN
GFR SERPL CREATININE-BSD FRML MDRD: ABNORMAL ML/MIN/{1.73_M2}
GFR SERPL CREATININE-BSD FRML MDRD: ABNORMAL ML/MIN/{1.73_M2}
GLUCOSE BLD-MCNC: 113 MG/DL (ref 75–110)
GLUCOSE BLD-MCNC: 159 MG/DL (ref 75–110)
GLUCOSE BLD-MCNC: 177 MG/DL (ref 75–110)
GLUCOSE BLD-MCNC: 181 MG/DL (ref 75–110)
GLUCOSE BLD-MCNC: 185 MG/DL (ref 70–99)
HCT VFR BLD CALC: 35.1 % (ref 40.7–50.3)
HEMOGLOBIN: 10.7 G/DL (ref 13–17)
IMMATURE GRANULOCYTES: 0 %
LYMPHOCYTES # BLD: 19 % (ref 24–43)
MAGNESIUM: 1.9 MG/DL (ref 1.6–2.6)
MCH RBC QN AUTO: 27.6 PG (ref 25.2–33.5)
MCHC RBC AUTO-ENTMCNC: 30.5 G/DL (ref 28.4–34.8)
MCV RBC AUTO: 90.7 FL (ref 82.6–102.9)
MONOCYTES # BLD: 7 % (ref 3–12)
NRBC AUTOMATED: 0 PER 100 WBC
PDW BLD-RTO: 15 % (ref 11.8–14.4)
PHOSPHORUS: 2.6 MG/DL (ref 2.5–4.5)
PLATELET # BLD: 389 K/UL (ref 138–453)
PLATELET ESTIMATE: ABNORMAL
PMV BLD AUTO: 11.9 FL (ref 8.1–13.5)
POTASSIUM SERPL-SCNC: 4.2 MMOL/L (ref 3.7–5.3)
RBC # BLD: 3.87 M/UL (ref 4.21–5.77)
RBC # BLD: ABNORMAL 10*6/UL
SEG NEUTROPHILS: 67 % (ref 36–65)
SEGMENTED NEUTROPHILS ABSOLUTE COUNT: 6.11 K/UL (ref 1.5–8.1)
SODIUM BLD-SCNC: 138 MMOL/L (ref 135–144)
WBC # BLD: 9.1 K/UL (ref 3.5–11.3)
WBC # BLD: ABNORMAL 10*3/UL

## 2018-11-09 PROCEDURE — 6360000002 HC RX W HCPCS: Performed by: NEUROLOGICAL SURGERY

## 2018-11-09 PROCEDURE — 6370000000 HC RX 637 (ALT 250 FOR IP): Performed by: NURSE PRACTITIONER

## 2018-11-09 PROCEDURE — 6370000000 HC RX 637 (ALT 250 FOR IP): Performed by: INTERNAL MEDICINE

## 2018-11-09 PROCEDURE — 97116 GAIT TRAINING THERAPY: CPT

## 2018-11-09 PROCEDURE — 82330 ASSAY OF CALCIUM: CPT

## 2018-11-09 PROCEDURE — 6370000000 HC RX 637 (ALT 250 FOR IP): Performed by: STUDENT IN AN ORGANIZED HEALTH CARE EDUCATION/TRAINING PROGRAM

## 2018-11-09 PROCEDURE — 83735 ASSAY OF MAGNESIUM: CPT

## 2018-11-09 PROCEDURE — 2580000003 HC RX 258: Performed by: NEUROLOGICAL SURGERY

## 2018-11-09 PROCEDURE — 82947 ASSAY GLUCOSE BLOOD QUANT: CPT

## 2018-11-09 PROCEDURE — 84100 ASSAY OF PHOSPHORUS: CPT

## 2018-11-09 PROCEDURE — 94762 N-INVAS EAR/PLS OXIMTRY CONT: CPT

## 2018-11-09 PROCEDURE — 2500000003 HC RX 250 WO HCPCS: Performed by: NURSE PRACTITIONER

## 2018-11-09 PROCEDURE — 99221 1ST HOSP IP/OBS SF/LOW 40: CPT | Performed by: INTERNAL MEDICINE

## 2018-11-09 PROCEDURE — 6370000000 HC RX 637 (ALT 250 FOR IP): Performed by: NEUROLOGICAL SURGERY

## 2018-11-09 PROCEDURE — 85025 COMPLETE CBC W/AUTO DIFF WBC: CPT

## 2018-11-09 PROCEDURE — 99232 SBSQ HOSP IP/OBS MODERATE 35: CPT | Performed by: PSYCHIATRY & NEUROLOGY

## 2018-11-09 PROCEDURE — 2060000000 HC ICU INTERMEDIATE R&B

## 2018-11-09 PROCEDURE — 36415 COLL VENOUS BLD VENIPUNCTURE: CPT

## 2018-11-09 PROCEDURE — 97127 HC SP THER IVNTJ W/FOCUS COG FUNCJ: CPT

## 2018-11-09 PROCEDURE — 6360000002 HC RX W HCPCS: Performed by: NURSE PRACTITIONER

## 2018-11-09 PROCEDURE — 97110 THERAPEUTIC EXERCISES: CPT

## 2018-11-09 PROCEDURE — 80048 BASIC METABOLIC PNL TOTAL CA: CPT

## 2018-11-09 RX ADMIN — TIMOLOL MALEATE 1 DROP: 2.5 SOLUTION/ DROPS OPHTHALMIC at 20:53

## 2018-11-09 RX ADMIN — Medication 10 ML: at 10:33

## 2018-11-09 RX ADMIN — HEPARIN SODIUM 5000 UNITS: 5000 INJECTION, SOLUTION INTRAVENOUS; SUBCUTANEOUS at 21:56

## 2018-11-09 RX ADMIN — INSULIN LISPRO 3 UNITS: 100 INJECTION, SOLUTION INTRAVENOUS; SUBCUTANEOUS at 12:55

## 2018-11-09 RX ADMIN — HEPARIN SODIUM 5000 UNITS: 5000 INJECTION, SOLUTION INTRAVENOUS; SUBCUTANEOUS at 05:29

## 2018-11-09 RX ADMIN — LISINOPRIL 40 MG: 20 TABLET ORAL at 10:22

## 2018-11-09 RX ADMIN — CARVEDILOL 6.25 MG: 6.25 TABLET, FILM COATED ORAL at 18:11

## 2018-11-09 RX ADMIN — INSULIN LISPRO 2 UNITS: 100 INJECTION, SOLUTION INTRAVENOUS; SUBCUTANEOUS at 20:52

## 2018-11-09 RX ADMIN — FOLIC ACID 1 MG: 1 TABLET ORAL at 10:22

## 2018-11-09 RX ADMIN — ATORVASTATIN CALCIUM 40 MG: 80 TABLET, FILM COATED ORAL at 20:52

## 2018-11-09 RX ADMIN — TIMOLOL MALEATE 1 DROP: 2.5 SOLUTION/ DROPS OPHTHALMIC at 10:24

## 2018-11-09 RX ADMIN — LEVETIRACETAM 500 MG: 500 TABLET, FILM COATED ORAL at 10:22

## 2018-11-09 RX ADMIN — LEVETIRACETAM 500 MG: 500 TABLET, FILM COATED ORAL at 20:52

## 2018-11-09 RX ADMIN — INSULIN LISPRO 3 UNITS: 100 INJECTION, SOLUTION INTRAVENOUS; SUBCUTANEOUS at 09:00

## 2018-11-09 RX ADMIN — Medication 10 ML: at 20:52

## 2018-11-09 RX ADMIN — PANTOPRAZOLE SODIUM 40 MG: 40 TABLET, DELAYED RELEASE ORAL at 10:22

## 2018-11-09 RX ADMIN — CARVEDILOL 6.25 MG: 6.25 TABLET, FILM COATED ORAL at 10:22

## 2018-11-09 RX ADMIN — LATANOPROST 1 DROP: 50 SOLUTION OPHTHALMIC at 20:54

## 2018-11-09 RX ADMIN — HEPARIN SODIUM 5000 UNITS: 5000 INJECTION, SOLUTION INTRAVENOUS; SUBCUTANEOUS at 15:48

## 2018-11-09 RX ADMIN — INSULIN GLARGINE 15 UNITS: 100 INJECTION, SOLUTION SUBCUTANEOUS at 20:52

## 2018-11-09 RX ADMIN — ASPIRIN 81 MG: 81 TABLET ORAL at 10:22

## 2018-11-09 RX ADMIN — Medication 10 MG: at 04:23

## 2018-11-09 RX ADMIN — HYDRALAZINE HYDROCHLORIDE 10 MG: 20 INJECTION INTRAMUSCULAR; INTRAVENOUS at 02:35

## 2018-11-09 RX ADMIN — TAMSULOSIN HYDROCHLORIDE 0.4 MG: 0.4 CAPSULE ORAL at 10:22

## 2018-11-09 RX ADMIN — AMLODIPINE BESYLATE 10 MG: 10 TABLET ORAL at 10:22

## 2018-11-09 ASSESSMENT — PAIN SCALES - GENERAL
PAINLEVEL_OUTOF10: 0

## 2018-11-09 NOTE — PROGRESS NOTES
in the cerebellar hemispheres and posterior cerebral hemispheres   suspicious for posterior reversible encephalopathy syndrome.          CTA head/Neck 11/7/18      Impression   Development of hypoattenuation of the cerebral and cerebellar white matter   concerning for encephalopathy such as hypertensive encephalopathy. Correlation with MRI brain with and without IV contrast recommended.       Left ICA, petrous and caval segment, severe focal stenosis.  Right ICA   cavernous segment moderate stenosis.       Right MCA, M3 segment, severe focal stenosis.  Remainder of intracranial   circulation appears patent.       Left cheek skin thickening.  Clinical correlation recommended.  Reactive left   cervical lymph nodes.          CT head 11/7/18      Impression   1. Subcortical white matter low attenuation involving the supratentorial   brain parenchyma, predominantly posteriorly.  This is new from prior study. Findings are suspicious for posterior reversible encephalopathy syndrome   (PRES).  This can be seen in the setting of acute hypertension, among other   causes.    2. No acute intracranial hemorrhage.       RECOMMENDATIONS:   MRI of the brain recommended for further evaluation.      CXR 11/7/18      Impression   Low lung volumes and mild central pulmonary vascular congestion.            Assessment and Plan:     ASSESSMENT/PLAN:     HTN emergency- resolved (in the setting if Essential HTN)  -Currently on Norvasc 10 mg & Lisinopril 40 mg   -Initiate Coreg 6.25 BID  -Monitor vitals, Creatinine  -SBP goal < 160 per neurology     DM type II- uncontrolled  -HBa1c 8.8 on 11/2018  -Start Lantus 15 units nightly   -High dose ISS  -Hypoglycemia protocol and glucose checks per protocol      HLD  -Lipitor 40 mg  -CHol 129, HDL 34, LDL 72, TRi 113 on 11/7/18     PRESS syndrome  -Neuro checks   -Neurology following     Left BKA     PVD  -ASA, Lipitor      DVT ppx: Heparin  GI ppx: Protonix    Lita Rodriguez MD  Internal

## 2018-11-09 NOTE — PLAN OF CARE
Problem: Falls - Risk of:  Goal: Will remain free from falls  Will remain free from falls   Outcome: Met This Shift  Fall precautions in place. Bed in lowest position, wheels locked, bed alarm in place and activated. Non-skid socks on patient. Fall risk ID on patient, call light within reach. Environment free of clutter and adequate lighting provided. Patient is encouraged to call out before getting out of bed and for any other needs. Patient remained free from falls during this shift. Will continue to monitor. Yana Baron RN        Problem: Risk for Impaired Skin Integrity  Goal: Tissue integrity - skin and mucous membranes  Structural intactness and normal physiological function of skin and  mucous membranes. Outcome: Met This Shift  Skin assessment performed during this shift. No new skin breakdown noted. Patient is reminded to turn every 2 hours. Will continue to monitor. Yana Baron RN      Problem: Pain:  Goal: Pain level will decrease  Pain level will decrease   Outcome: Ongoing  Pain assessment completed. Patient educated on pain scale and to call out with any new onset of pain or unrelieved pain. PRN pain medication given per patient request. Will continue to monitor. Yana Baron RN

## 2018-11-09 NOTE — PROGRESS NOTES
Physical Therapy  [address]  Daily Treatment Note  NAME: Greyson Marroquin  : 1965  MRN: [id number]    Date of Service: 2018    Discharge Recommendations:  Continue to assess pending progress, 24 hour supervision or assist   PT Equipment Recommendations  Equipment Needed: No    Patient Diagnosis(es): The primary encounter diagnosis was Altered mental status, unspecified altered mental status type. A diagnosis of PRES (posterior reversible encephalopathy syndrome) was also pertinent to this visit. has a past medical history of Acid reflux; Calculus of gallbladder without cholecystitis without obstruction; Cerebrovascular disease; Diabetic foot infection ([facility] [address] Drug (multiple) resistant infection; GERD (gastroesophageal reflux disease); Glaucoma; Glaucoma; Hemispheric carotid artery syndrome; Hyperlipidemia; Hypertension; IDDM (insulin dependent diabetes mellitus) ([facility] [address] MDRO (multiple drug resistant organisms) resistance; MRSA (methicillin resistant staph aureus) culture positive; Neuropathy; Osteomyelitis ([facility] [address] S/P cataract extraction and insertion of intraocular lens; and Type II or unspecified type diabetes mellitus without mention of complication, not stated as uncontrolled. has a past surgical history that includes Toe amputation (Left, ); eye surgery (Bilateral); Toe amputation (Left, 2016); other surgical history (Left, 2017); Foot surgery (Left, 2017); Foot surgery (Left, 2017); pr deep dissec foot infec,1 bursa (Left, 2017); other surgical history (Left, 2017); Toe amputation (Left, 2017);   picc powerpicc double (2017); Leg amputation below knee (Left, 2017); pr incis/drain thigh/knee abscess,deep (Left, 2017); Leg amputation below knee (Left, 2017); Toe amputation (2017); and pr lap,cholecystectomy (N/A, 2018).     Restrictions  Restrictions/Precautions  Restrictions/Precautions: Fall Risk,

## 2018-11-09 NOTE — CONSULTS
gallbladder without cholecystitis without obstruction 9/2/2018    Cerebrovascular disease 2006    TIA    Diabetic foot infection ([address]     Drug (multiple) resistant infection     GERD (gastroesophageal reflux disease)     Glaucoma     Glaucoma     Hemispheric carotid artery syndrome 9/2/2018    Hyperlipidemia     Hypertension     IDDM (insulin dependent diabetes mellitus) (HCC)     MDRO (multiple drug resistant organisms) resistance     MRSA (methicillin resistant staph aureus) culture positive 09/03/2017    foot    Neuropathy     Osteomyelitis (HCC)     Left Hallux    S/P cataract extraction and insertion of intraocular lens 2015    bilateral     Type II or unspecified type diabetes mellitus without mention of complication, not stated as uncontrolled        PAST SURGICAL HISTORY:        Procedure Laterality Date    EYE SURGERY Bilateral     lazer both eyes    FOOT SURGERY Left 02/24/2017    surgical prep of wound bed    FOOT SURGERY Left 04/13/2017    1) Left foot surgical preparation of wound-bed, 2) Left foot application of graft     [facility] DOUBLE  4/28/2017         LEG AMPUTATION BELOW KNEE Left 9/7/2017    LEFT BELOW KNEE GUILLOTINE AMPUTATION performed by Srinivas Colvin MD at [facility] Left 9/14/2017    REVISION LEFT BELOW KNEE AMPUTATION, CLOSED  performed by Srinivas Colvin MD at [address] [place] [address] Left 01/23/2017    I and D bone, bone biopsy with flap closure and pulse lavage    OTHER SURGICAL HISTORY Left 04/27/2017    I & D foot    [place] DEEP DISSEC FOOT INFEC,1 BURSA Left 4/13/2017    FOOT DEBRIDEMENT WITH EPIFIX  performed by Jennifer Solorzano DPM at [address] INCIS/DRAIN THIGH/KNEE ABSCESS,DEEP Left 9/12/2017     LEFT BELOW KNEE AMPUTATION OPEN, WOUND VAC PLACEMENT performed by Srinivas Colvin MD at [facility] N/A 9/25/2018    CHOLECYSTECTOMY LAPAROSCOPIC performed by Madalyn Winter MD at

## 2018-11-10 VITALS
TEMPERATURE: 97.8 F | SYSTOLIC BLOOD PRESSURE: 122 MMHG | DIASTOLIC BLOOD PRESSURE: 59 MMHG | WEIGHT: 173.06 LBS | BODY MASS INDEX: 27.16 KG/M2 | OXYGEN SATURATION: 100 % | HEIGHT: 67 IN | HEART RATE: 68 BPM | RESPIRATION RATE: 18 BRPM

## 2018-11-10 LAB
ABSOLUTE EOS #: 0.48 K/UL (ref 0–0.44)
ABSOLUTE IMMATURE GRANULOCYTE: <0.03 K/UL (ref 0–0.3)
ABSOLUTE LYMPH #: 1.92 K/UL (ref 1.1–3.7)
ABSOLUTE MONO #: 0.61 K/UL (ref 0.1–1.2)
ANION GAP SERPL CALCULATED.3IONS-SCNC: 10 MMOL/L (ref 9–17)
BASOPHILS # BLD: 1 % (ref 0–2)
BASOPHILS ABSOLUTE: 0.06 K/UL (ref 0–0.2)
BUN BLDV-MCNC: 7 MG/DL (ref 6–20)
BUN/CREAT BLD: ABNORMAL (ref 9–20)
CALCIUM IONIZED: 1.15 MMOL/L (ref 1.13–1.33)
CALCIUM SERPL-MCNC: 8.7 MG/DL (ref 8.6–10.4)
CHLORIDE BLD-SCNC: 105 MMOL/L (ref 98–107)
CO2: 24 MMOL/L (ref 20–31)
CREAT SERPL-MCNC: 0.6 MG/DL (ref 0.7–1.2)
CULTURE: ABNORMAL
DIFFERENTIAL TYPE: ABNORMAL
DIRECT EXAM: ABNORMAL
EOSINOPHILS RELATIVE PERCENT: 6 % (ref 1–4)
GFR AFRICAN AMERICAN: >60 ML/MIN
GFR NON-AFRICAN AMERICAN: >60 ML/MIN
GFR SERPL CREATININE-BSD FRML MDRD: ABNORMAL ML/MIN/{1.73_M2}
GFR SERPL CREATININE-BSD FRML MDRD: ABNORMAL ML/MIN/{1.73_M2}
GLUCOSE BLD-MCNC: 123 MG/DL (ref 75–110)
GLUCOSE BLD-MCNC: 130 MG/DL (ref 75–110)
GLUCOSE BLD-MCNC: 164 MG/DL (ref 70–99)
GLUCOSE BLD-MCNC: 212 MG/DL (ref 75–110)
HCT VFR BLD CALC: 33.5 % (ref 40.7–50.3)
HEMOGLOBIN: 9.9 G/DL (ref 13–17)
IMMATURE GRANULOCYTES: 0 %
LYMPHOCYTES # BLD: 25 % (ref 24–43)
Lab: ABNORMAL
MAGNESIUM: 1.9 MG/DL (ref 1.6–2.6)
MCH RBC QN AUTO: 27.5 PG (ref 25.2–33.5)
MCHC RBC AUTO-ENTMCNC: 29.6 G/DL (ref 28.4–34.8)
MCV RBC AUTO: 93.1 FL (ref 82.6–102.9)
MONOCYTES # BLD: 8 % (ref 3–12)
NRBC AUTOMATED: 0 PER 100 WBC
PDW BLD-RTO: 14.8 % (ref 11.8–14.4)
PHOSPHORUS: 3.6 MG/DL (ref 2.5–4.5)
PLATELET # BLD: 311 K/UL (ref 138–453)
PLATELET ESTIMATE: ABNORMAL
PMV BLD AUTO: 11.6 FL (ref 8.1–13.5)
POTASSIUM SERPL-SCNC: 3.8 MMOL/L (ref 3.7–5.3)
RBC # BLD: 3.6 M/UL (ref 4.21–5.77)
RBC # BLD: ABNORMAL 10*6/UL
SEG NEUTROPHILS: 60 % (ref 36–65)
SEGMENTED NEUTROPHILS ABSOLUTE COUNT: 4.76 K/UL (ref 1.5–8.1)
SODIUM BLD-SCNC: 139 MMOL/L (ref 135–144)
SPECIMEN DESCRIPTION: ABNORMAL
STATUS: ABNORMAL
WBC # BLD: 7.9 K/UL (ref 3.5–11.3)
WBC # BLD: ABNORMAL 10*3/UL

## 2018-11-10 PROCEDURE — 6360000002 HC RX W HCPCS: Performed by: NURSE PRACTITIONER

## 2018-11-10 PROCEDURE — 36415 COLL VENOUS BLD VENIPUNCTURE: CPT

## 2018-11-10 PROCEDURE — 80048 BASIC METABOLIC PNL TOTAL CA: CPT

## 2018-11-10 PROCEDURE — 99232 SBSQ HOSP IP/OBS MODERATE 35: CPT | Performed by: INTERNAL MEDICINE

## 2018-11-10 PROCEDURE — 6360000002 HC RX W HCPCS: Performed by: NEUROLOGICAL SURGERY

## 2018-11-10 PROCEDURE — 94762 N-INVAS EAR/PLS OXIMTRY CONT: CPT

## 2018-11-10 PROCEDURE — 6370000000 HC RX 637 (ALT 250 FOR IP): Performed by: NEUROLOGICAL SURGERY

## 2018-11-10 PROCEDURE — 6370000000 HC RX 637 (ALT 250 FOR IP): Performed by: INTERNAL MEDICINE

## 2018-11-10 PROCEDURE — 6370000000 HC RX 637 (ALT 250 FOR IP): Performed by: NURSE PRACTITIONER

## 2018-11-10 PROCEDURE — 99239 HOSP IP/OBS DSCHRG MGMT >30: CPT | Performed by: PSYCHIATRY & NEUROLOGY

## 2018-11-10 PROCEDURE — 84100 ASSAY OF PHOSPHORUS: CPT

## 2018-11-10 PROCEDURE — 6370000000 HC RX 637 (ALT 250 FOR IP): Performed by: STUDENT IN AN ORGANIZED HEALTH CARE EDUCATION/TRAINING PROGRAM

## 2018-11-10 PROCEDURE — 97116 GAIT TRAINING THERAPY: CPT

## 2018-11-10 PROCEDURE — 82947 ASSAY GLUCOSE BLOOD QUANT: CPT

## 2018-11-10 PROCEDURE — 83735 ASSAY OF MAGNESIUM: CPT

## 2018-11-10 PROCEDURE — 85025 COMPLETE CBC W/AUTO DIFF WBC: CPT

## 2018-11-10 PROCEDURE — 97110 THERAPEUTIC EXERCISES: CPT

## 2018-11-10 PROCEDURE — 2580000003 HC RX 258: Performed by: NEUROLOGICAL SURGERY

## 2018-11-10 PROCEDURE — 82330 ASSAY OF CALCIUM: CPT

## 2018-11-10 RX ORDER — CARVEDILOL 12.5 MG/1
12.5 TABLET ORAL 2 TIMES DAILY WITH MEALS
Status: DISCONTINUED | OUTPATIENT
Start: 2018-11-10 | End: 2018-11-10 | Stop reason: HOSPADM

## 2018-11-10 RX ORDER — LEVETIRACETAM 500 MG/1
500 TABLET ORAL 2 TIMES DAILY
Qty: 60 TABLET | Refills: 3 | Status: SHIPPED | OUTPATIENT
Start: 2018-11-10 | End: 2019-03-11 | Stop reason: SDUPTHER

## 2018-11-10 RX ORDER — HYDROCHLOROTHIAZIDE 25 MG/1
25 TABLET ORAL DAILY
Qty: 30 TABLET | Refills: 3 | Status: ON HOLD | OUTPATIENT
Start: 2018-11-11 | End: 2019-07-26 | Stop reason: HOSPADM

## 2018-11-10 RX ORDER — AMLODIPINE BESYLATE 10 MG/1
10 TABLET ORAL DAILY
Qty: 30 TABLET | Refills: 3 | Status: SHIPPED | OUTPATIENT
Start: 2018-11-11

## 2018-11-10 RX ORDER — CARVEDILOL 12.5 MG/1
12.5 TABLET ORAL 2 TIMES DAILY WITH MEALS
Qty: 60 TABLET | Refills: 3 | Status: SHIPPED | OUTPATIENT
Start: 2018-11-10 | End: 2021-04-21

## 2018-11-10 RX ORDER — HYDROCHLOROTHIAZIDE 25 MG/1
25 TABLET ORAL DAILY
Status: DISCONTINUED | OUTPATIENT
Start: 2018-11-10 | End: 2018-11-10 | Stop reason: HOSPADM

## 2018-11-10 RX ADMIN — ASPIRIN 81 MG: 81 TABLET ORAL at 08:33

## 2018-11-10 RX ADMIN — CARVEDILOL 12.5 MG: 12.5 TABLET, FILM COATED ORAL at 18:42

## 2018-11-10 RX ADMIN — HEPARIN SODIUM 5000 UNITS: 5000 INJECTION, SOLUTION INTRAVENOUS; SUBCUTANEOUS at 05:13

## 2018-11-10 RX ADMIN — TAMSULOSIN HYDROCHLORIDE 0.4 MG: 0.4 CAPSULE ORAL at 08:32

## 2018-11-10 RX ADMIN — LISINOPRIL 40 MG: 20 TABLET ORAL at 08:32

## 2018-11-10 RX ADMIN — SENNOSIDES AND DOCUSATE SODIUM 2 TABLET: 8.6; 5 TABLET ORAL at 08:32

## 2018-11-10 RX ADMIN — FOLIC ACID 1 MG: 1 TABLET ORAL at 08:32

## 2018-11-10 RX ADMIN — PANTOPRAZOLE SODIUM 40 MG: 40 TABLET, DELAYED RELEASE ORAL at 08:32

## 2018-11-10 RX ADMIN — CARVEDILOL 12.5 MG: 12.5 TABLET, FILM COATED ORAL at 08:33

## 2018-11-10 RX ADMIN — INSULIN LISPRO 6 UNITS: 100 INJECTION, SOLUTION INTRAVENOUS; SUBCUTANEOUS at 13:52

## 2018-11-10 RX ADMIN — HYDROCHLOROTHIAZIDE 25 MG: 25 TABLET ORAL at 10:30

## 2018-11-10 RX ADMIN — HEPARIN SODIUM 5000 UNITS: 5000 INJECTION, SOLUTION INTRAVENOUS; SUBCUTANEOUS at 15:37

## 2018-11-10 RX ADMIN — LEVETIRACETAM 500 MG: 500 TABLET, FILM COATED ORAL at 08:32

## 2018-11-10 RX ADMIN — AMLODIPINE BESYLATE 10 MG: 10 TABLET ORAL at 08:32

## 2018-11-10 RX ADMIN — Medication 10 ML: at 08:33

## 2018-11-10 RX ADMIN — HYDRALAZINE HYDROCHLORIDE 10 MG: 20 INJECTION INTRAMUSCULAR; INTRAVENOUS at 08:33

## 2018-11-10 RX ADMIN — TIMOLOL MALEATE 1 DROP: 2.5 SOLUTION/ DROPS OPHTHALMIC at 10:30

## 2018-11-10 ASSESSMENT — PAIN SCALES - GENERAL: PAINLEVEL_OUTOF10: 0

## 2018-11-10 NOTE — PROGRESS NOTES
Orthoses?: Yes (L BKA prosthetic)  Position Activity Restriction  Other position/activity restrictions: up with assist, legally blind  Subjective   General  Chart Reviewed: Yes  Additional Pertinent Hx: Lt BKA  Response To Previous Treatment: Patient with no complaints from previous session. Family / Caregiver Present: Yes  Subjective  Subjective: Pt and RN agreeable to PT at this time. Pt awake in bed upon arrival. Pt denies pain and states \"I feel good\"  General Comment  Comments: Pt seated in chair at conclusion of session. Call light within reach. Pain Screening  Patient Currently in Pain: Denies  Vital Signs  Patient Currently in Pain: Denies       Orientation  Orientation  Overall Orientation Status: Within Functional Limits  Cognition      Objective   Bed mobility  Supine to Sit: Stand by assistance  Scooting: Stand by assistance  Transfers  Sit to Stand: Contact guard assistance (multiple STS to joanne prosthetic )  Stand to sit: Contact guard assistance  Ambulation  Ambulation?: Yes  Ambulation 1  Surface: level tile  Device: Rolling Walker  Assistance: Minimal assistance  Quality of Gait: steady  Distance: 300'  Comments: Assistance needed for directions and guiding AD d/t visual deficits  Stairs/Curb  Stairs?: No     Balance  Sitting - Static: Good  Sitting - Dynamic: Good  Standing - Static: Good  Standing - Dynamic: Fair  Comments: pt sat EOB for extended time while donning prosthetic SBA with good trunk control; family present and assisting, able to joanne after multiple attempts  Exercises  Seated LE exercise program: Long Arc Quads, hip abduction/adduction, heel/toe raises, and marches. Reps: x 20  Upper extremity exercises: Bicep curl, shoulder flexion/extension, punches, tricep curl, shoulder abduction/adduction. Reps: x 20            Assessment   Body structures, Functions, Activity limitations: Decreased functional mobility ; Decreased balance  Assessment: Pt able to amb 300' w/RW and [place] for guiding pt d/t visual deficits, Recommending home with 24 hour assist.   Specific instructions for Next Treatment: steps  Patient Education: Safety, importance of mobility   REQUIRES PT FOLLOW UP: Yes  Activity Tolerance  Activity Tolerance: Patient Tolerated treatment well                  Goals  Short term goals  Time Frame for Short term goals: 10 visits  Short term goal 1: Transfers SBA  Short term goal 2: Ambulate with rolling walker 150 ft SBA  Short term goal 3: Navigate 5 stairs with 2 rails CGA  Short term goal 4: Pt to tolerate 30 minutes of PT treatment to improve endurance.   Patient Goals   Patient goals : Go home    Plan    Plan  Times per week: 5-6x/week  Specific instructions for Next Treatment: steps  Current Treatment Recommendations: Strengthening, Balance Training, Functional Mobility Training, Transfer Training, Gait Training, Stair training, Endurance Training, Safety Education & Training  Safety Devices  Type of devices: Left in chair, Nurse notified, Gait belt, Call light within reach, All fall risk precautions in place (family present )  Restraints  Initially in place: No     Therapy Time   Individual Concurrent Group Co-treatment   Time In 0940         Time Out 1005         Minutes [address], [facility]

## 2018-11-10 NOTE — PROGRESS NOTES
mg Oral Daily    lisinopril  40 mg Oral Daily    pantoprazole  40 mg Oral QAM AC    tamsulosin  0.4 mg Oral Daily    timolol  1 drop Both Eyes BID    latanoprost  1 drop Both Eyes Nightly    atorvastatin  40 mg Oral Nightly    sennosides-docusate sodium  2 tablet Oral Daily       PRN Medications    glucose 15 g PRN   dextrose 12.5 g PRN   glucagon (rDNA) 1 mg PRN   dextrose 100 mL/hr PRN   magnesium hydroxide 30 mL Daily PRN   ondansetron 4 mg Q6H PRN   sodium chloride flush 10 mL PRN   labetalol 10 mg Q1H PRN   hydrALAZINE 10 mg Q2H PRN   acetaminophen 650 mg Q6H PRN       Diagnostic Labs:  CBC:   Recent Labs      11/08/18   0414  11/09/18   0536  11/10/18   0509   WBC  7.6  9.1  7.9   RBC  3.29*  3.87*  3.60*   HGB  9.2*  10.7*  9.9*   HCT  29.8*  35.1*  33.5*   MCV  90.6  90.7  93.1   RDW  14.7*  15.0*  14.8*   PLT  297  389  311     BMP:   Recent Labs      11/08/18   0414  11/09/18   0536  11/10/18   0509   NA  138  138  139   K  3.5*  4.2  3.8   CL  104  103  105   CO2  25  24  24   PHOS   --   2.6  3.6   BUN  7  7  7   CREATININE  0.69*  0.62*  0.60*     BNP: No results for input(s): BNP in the last 72 hours. PT/INR:   No results for input(s): PROTIME, INR in the last 72 hours. CARDIAC ENZYMES: No results for input(s): CKMB, CKMBINDEX, TROPONINI in the last 72 hours. Invalid input(s): CKTOTAL;3  FASTING LIPID PANEL:  Lab Results   Component Value Date    CHOL 129 11/07/2018    HDL 34 (L) 11/07/2018    TRIG 113 11/07/2018     LIVER PROFILE: No results for input(s): AST, ALT, ALB, BILIDIR, BILITOT, ALKPHOS in the last 72 hours. Imaging:      MRI Brain 11/7/18  Impression   1. Limited study secondary to motion artifact. 2. No evidence of an acute infarct.    3. Multifocal scattered and confluent areas of increased T2 signal are noted   both supra and infratentorially.  The distribution is slightly more   pronounced in the cerebellar hemispheres and posterior cerebral hemispheres   suspicious for discussed the care of Jarrett Jackson, including pertinent history and exam findings with the resident. I have reviewed the key elements of all parts of the encounter with the resident. I have seen and examined the patient with the resident and the key elements of all parts of the encounter have been performed by me.

## 2018-11-13 LAB
CULTURE: NORMAL
CULTURE: NORMAL
Lab: NORMAL
Lab: NORMAL
SPECIMEN DESCRIPTION: NORMAL
SPECIMEN DESCRIPTION: NORMAL
STATUS: NORMAL
STATUS: NORMAL

## 2018-12-20 ENCOUNTER — HOSPITAL ENCOUNTER (OUTPATIENT)
Dept: MRI IMAGING | Age: 53
Discharge: HOME | End: 2018-12-22
Payer: COMMERCIAL

## 2018-12-20 DIAGNOSIS — I67.83 PRES (POSTERIOR REVERSIBLE ENCEPHALOPATHY SYNDROME): ICD-10-CM

## 2018-12-20 PROCEDURE — A9576 INJ PROHANCE MULTIPACK: HCPCS | Performed by: PSYCHIATRY & NEUROLOGY

## 2018-12-20 PROCEDURE — 70553 MRI BRAIN STEM W/O & W/DYE: CPT

## 2018-12-20 PROCEDURE — 6360000004 HC RX CONTRAST MEDICATION: Performed by: PSYCHIATRY & NEUROLOGY

## 2018-12-20 RX ADMIN — GADOTERIDOL 15 ML: 279.3 INJECTION, SOLUTION INTRAVENOUS at 09:53

## 2019-01-22 ENCOUNTER — OFFICE VISIT (OUTPATIENT)
Dept: NEUROLOGY | Age: 54
End: 2019-01-22
Payer: COMMERCIAL

## 2019-01-22 VITALS
BODY MASS INDEX: 28.35 KG/M2 | RESPIRATION RATE: 18 BRPM | SYSTOLIC BLOOD PRESSURE: 133 MMHG | DIASTOLIC BLOOD PRESSURE: 65 MMHG | HEART RATE: 60 BPM | WEIGHT: 181 LBS | OXYGEN SATURATION: 90 %

## 2019-01-22 DIAGNOSIS — R56.9 NEW ONSET SEIZURE (HCC): ICD-10-CM

## 2019-01-22 DIAGNOSIS — I65.22 INTRACRANIAL CAROTID STENOSIS, LEFT: ICD-10-CM

## 2019-01-22 DIAGNOSIS — I67.83 PRES (POSTERIOR REVERSIBLE ENCEPHALOPATHY SYNDROME): Primary | ICD-10-CM

## 2019-01-22 DIAGNOSIS — H54.3 BLIND IN BOTH EYES: ICD-10-CM

## 2019-01-22 DIAGNOSIS — I16.1 HYPERTENSIVE EMERGENCY: ICD-10-CM

## 2019-01-22 DIAGNOSIS — I67.9 CEREBROVASCULAR DISEASE: ICD-10-CM

## 2019-01-22 PROCEDURE — 99215 OFFICE O/P EST HI 40 MIN: CPT | Performed by: STUDENT IN AN ORGANIZED HEALTH CARE EDUCATION/TRAINING PROGRAM

## 2019-01-22 ASSESSMENT — ENCOUNTER SYMPTOMS
CONSTIPATION: 0
COUGH: 0
VOMITING: 0
ABDOMINAL PAIN: 0
PHOTOPHOBIA: 0
NAUSEA: 0
EYE PAIN: 0
SHORTNESS OF BREATH: 0
DIARRHEA: 0

## 2019-02-26 ENCOUNTER — OFFICE VISIT (OUTPATIENT)
Dept: PODIATRY | Age: 54
End: 2019-02-26
Payer: COMMERCIAL

## 2019-02-26 VITALS — HEIGHT: 67 IN | WEIGHT: 173 LBS | BODY MASS INDEX: 27.15 KG/M2

## 2019-02-26 DIAGNOSIS — B35.1 DERMATOPHYTOSIS OF NAIL: ICD-10-CM

## 2019-02-26 DIAGNOSIS — M79.671 PAIN IN BOTH FEET: Primary | ICD-10-CM

## 2019-02-26 DIAGNOSIS — R26.2 TROUBLE WALKING: ICD-10-CM

## 2019-02-26 DIAGNOSIS — M79.672 PAIN IN BOTH FEET: Primary | ICD-10-CM

## 2019-02-26 DIAGNOSIS — E11.51 TYPE II DIABETES MELLITUS WITH PERIPHERAL CIRCULATORY DISORDER (HCC): ICD-10-CM

## 2019-02-26 DIAGNOSIS — I73.9 PERIPHERAL VASCULAR DISORDER (HCC): ICD-10-CM

## 2019-02-26 PROCEDURE — 11720 DEBRIDE NAIL 1-5: CPT | Performed by: PODIATRIST

## 2019-02-26 PROCEDURE — 99213 OFFICE O/P EST LOW 20 MIN: CPT | Performed by: PODIATRIST

## 2019-02-26 RX ORDER — AMMONIUM LACTATE 12 G/100G
LOTION TOPICAL
Qty: 1 BOTTLE | Refills: 3 | Status: ON HOLD | OUTPATIENT
Start: 2019-02-26 | End: 2019-07-26 | Stop reason: HOSPADM

## 2019-03-12 RX ORDER — AMLODIPINE BESYLATE 10 MG/1
TABLET ORAL
Qty: 30 TABLET | Refills: 2 | OUTPATIENT
Start: 2019-03-12

## 2019-03-12 RX ORDER — LEVETIRACETAM 500 MG/1
TABLET ORAL
Qty: 60 TABLET | Refills: 2 | Status: SHIPPED | OUTPATIENT
Start: 2019-03-12 | End: 2019-07-02 | Stop reason: SDUPTHER

## 2019-03-12 RX ORDER — HYDROCHLOROTHIAZIDE 25 MG/1
TABLET ORAL
Qty: 30 TABLET | Refills: 2 | OUTPATIENT
Start: 2019-03-12

## 2019-03-15 RX ORDER — AMLODIPINE BESYLATE 10 MG/1
TABLET ORAL
Qty: 30 TABLET | Refills: 2 | OUTPATIENT
Start: 2019-03-15

## 2019-03-15 RX ORDER — HYDROCHLOROTHIAZIDE 25 MG/1
TABLET ORAL
Qty: 30 TABLET | Refills: 2 | OUTPATIENT
Start: 2019-03-15

## 2019-05-30 ENCOUNTER — OFFICE VISIT (OUTPATIENT)
Dept: PODIATRY | Age: 54
End: 2019-05-30
Payer: COMMERCIAL

## 2019-05-30 VITALS — HEIGHT: 67 IN | BODY MASS INDEX: 28.09 KG/M2 | RESPIRATION RATE: 16 BRPM | WEIGHT: 179 LBS

## 2019-05-30 DIAGNOSIS — M79.672 PAIN IN BOTH FEET: Primary | ICD-10-CM

## 2019-05-30 DIAGNOSIS — B35.1 DERMATOPHYTOSIS OF NAIL: ICD-10-CM

## 2019-05-30 DIAGNOSIS — E11.51 TYPE II DIABETES MELLITUS WITH PERIPHERAL CIRCULATORY DISORDER (HCC): ICD-10-CM

## 2019-05-30 DIAGNOSIS — I73.9 PERIPHERAL VASCULAR DISORDER (HCC): ICD-10-CM

## 2019-05-30 DIAGNOSIS — M79.671 PAIN IN BOTH FEET: Primary | ICD-10-CM

## 2019-05-30 DIAGNOSIS — R26.2 TROUBLE WALKING: ICD-10-CM

## 2019-05-30 PROCEDURE — 11720 DEBRIDE NAIL 1-5: CPT | Performed by: PODIATRIST

## 2019-05-30 PROCEDURE — 99213 OFFICE O/P EST LOW 20 MIN: CPT | Performed by: PODIATRIST

## 2019-05-30 RX ORDER — DOXYCYCLINE HYCLATE 100 MG
100 TABLET ORAL 2 TIMES DAILY
Qty: 20 TABLET | Refills: 0 | Status: SHIPPED | OUTPATIENT
Start: 2019-05-30 | End: 2019-06-09

## 2019-05-30 NOTE — PROGRESS NOTES
[facility]YSICIANS  [facility] [address]  [address] [place] [address]  Dept: [phone]  Dept Fax: [phone]    DIABETIC NAIL PROGRESS NOTE  Date of patient's visit: 5/30/2019  Patient's Name:  Cici Clifford YOB: 1965            Patient Care Team:  Peggy Way MD as PCP - General (Cardiology)  Peggy Way MD as PCP - Internal Medicine (Cardiology)  Art Claros MD as Consulting Physician (Gastroenterology)  Tom Vera MD as Consulting Physician (Infectious Diseases)  Rissa Key DPM as Physician (Podiatry)          Chief Complaint   Patient presents with    Diabetes    Peripheral Neuropathy    Nail Problem    Benign Neoplasm    Foot Pain       Subjective:   Cici Clifford comes to clinic for Diabetes; Peripheral Neuropathy; Nail Problem; Benign Neoplasm; and Foot Pain    he is a diabetic and states that he has a blister the right foot at the 2nd toe. Clarissa Rodriguez Pt currently has complaint of thickened, elongated nails that they cannot manage by themselves. Pt's primary care physician is Peggy Way MD last seen 4/9/2019. Pt's last blood sugar was 120 . Lab Results   Component Value Date    LABA1C 8.8 (H) 11/07/2018      Complains of numbness in the feet bilat.   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    Hyperlipidemia     Hypertension     IDDM (insulin dependent diabetes mellitus) (HCC)     MDRO (multiple drug resistant organisms) resistance     MRSA (methicillin resistant staph aureus) culture positive 09/03/2017    foot    Neuropathy     Osteomyelitis (HCC)     Left Hallux    S/P cataract extraction and insertion of intraocular lens 2015    bilateral     Type II or unspecified type diabetes mellitus without mention of complication, not stated as uncontrolled        Allergies   Allergen Reactions    Adhesive Tape      Current Outpatient Medications on File Prior to Visit   Medication Sig Dispense Refill    levETIRAcetam (KEPPRA) 500 MG tablet take 1 tablet by mouth twice a day 60 tablet 2    ammonium lactate (LAC-HYDRIN) 12 % lotion Apply topically daily. 1 Bottle 3    carvedilol (COREG) 12.5 MG tablet Take 1 tablet by mouth 2 times daily (with meals) 60 tablet 3    amLODIPine (NORVASC) 10 MG tablet Take 1 tablet by mouth daily 30 tablet 3    hydrochlorothiazide (HYDRODIURIL) 25 MG tablet Take 1 tablet by mouth daily 30 tablet 3    insulin glargine (LANTUS) 100 UNIT/ML injection vial Inject 20 Units/kg into the skin daily      aspirin 81 MG EC tablet Take 1 tablet by mouth daily 30 tablet 3    cyclobenzaprine (FLEXERIL) 5 MG tablet Take 5 mg by mouth 3 times daily as needed for Muscle spasms      metFORMIN (GLUCOPHAGE) 500 MG tablet Take 500 mg by mouth 2 times daily (with meals)      pregabalin (LYRICA) 25 MG capsule Take 1 capsule by mouth 3 times daily (Patient taking differently: Take 50 mg by mouth 3 times daily. Garcia Challenger ) 60 capsule 0    insulin glargine (LANTUS) 100 UNIT/ML injection vial Inject 20 Units into the skin nightly 1 vial 3    pravastatin (PRAVACHOL) 10 MG tablet Take 1 tablet by mouth daily 30 tablet 3    lisinopril (PRINIVIL;ZESTRIL) 40 MG tablet Take 1 tablet by mouth daily 30 tablet 3    folic acid (FOLVITE) 1 MG tablet Take 1 tablet by mouth daily 30 tablet 3    tamsulosin (FLOMAX) 0.4 MG capsule Take 1 capsule by mouth daily 30 capsule 3    docusate sodium (COLACE) 100 MG capsule Take 1 capsule by mouth 2 times daily as needed for Constipation 40 capsule 0    omeprazole (PRILOSEC) 20 MG delayed release capsule Take 20 mg by mouth 2 times daily      timolol (TIMOPTIC-XE) 0.25 % ophthalmic gel-forming 1 drop daily      travoprost, benzalkonium, (TRAVATAN) 0.004 % ophthalmic solution 1 drop nightly       No current facility-administered medications on file prior to visit. Review of Systems    Review of Systems:   History obtained from chart review and the patient  General ROS: negative for - chills, fatigue, fever, night sweats or weight gain  Constitutional: Negative for chills, diaphoresis, fatigue, fever and unexpected weight change. Musculoskeletal: Positive for arthralgias, gait problem and joint swelling. Neurological ROS: negative for - behavioral changes, confusion, headaches or seizures. Negative for weakness and numbness. Dermatological ROS: negative for - mole changes, rash  Cardiovascular: Negative for leg swelling. Gastrointestinal: Negative for constipation, diarrhea, nausea and vomiting. Objective:  General: AAO x 3 in NAD. Derm  Toenail Description  Sites of Onychomycosis Involvement (Check affected area)  [x] [x] [x] [x] [x] [] [] [] [] []  5 4 3 2 1 1 2 3 4 5                          Right                                        Left    Thickness  [x] [x] [x] [x] [x] [] [] [] [] []  5 4 3 2 1 1 2 3 4 5                         Right                                        Left    Dystrophic Changes   [x] [x] [x] [x] [x] [] [] [] [] []  5 4 3 2 1 1 2 3 4 5                         Right                                        Left    Color   [x] [x] [x] [x] [x] [] [] [] [] []  5 4 3 2 1 1 2 3 4 5                          Right                                        Left    Incurvation/Ingrowin   [] [] [] [] [] [] [] [] [] []  5 4 3 2 1 1 2 3 4 5                         Right                                        Left    Inflammation/Pain   [x] [x] [x] [x] [x] [] [] [] [] []  5 4 3 2 1 1 2 3 4 5                         Right                                        Left      Dermatologic Exam:  Skin lesion/ulceration Absent .    Skin No rashes or nodules noted..       Musculoskeletal:     1st MPJ ROM decreased, right. Muscle strength 5/5, right. Pain present upon palpation of toenails 1-5, right. decreased medial longitudinal arch, right. Ankle ROM decreased,right. Dorsally contracted digits present digits 2, right. Vascular: DP and PT pulses palpable 1/4, right. CFT <5 seconds, right. Hair growth absent to the level of the digits, right. Edema present, right. Varicosities absent, right. Erythema absent, right    Neurological: Sensation diminshed to light touch to level of digits, right. Protective sensation intact 6/10 sites via 5.07/10g Alexandria-Maria R Monofilament, right.  negative Tinel's, right.  negative Valleix sign, right. Integument: Warm, dry, supple, right. Open lesion absent, right. Interdigital maceration absent to web spaces 4, right. Nails 1-5 right thickened > 3.0 mm, dystrophic and crumbly, discolored with subungual debris. Fissures absent, right. Visual inspection:  Deformity: hammertoe deformity quinn feet  amputation: present - left leg    Skin lesions: absent  Edema: right- 2+ pitting edema, left- 2+ pitting edema    Sensory exam:  Monofilament sensation: abnormal - 6/10 via SW 5.07/10g monofilament to the plantar foot bilateral feet    Pulses: abnormal - 1/4 dorsalis pedis pulse and 1/4 Posterior tibial pulse,   Pinprick: Impaired  Proprioception: Impaired  Vibration (128 Hz): Impaired       DM with PVD       [x]Yes    []No      Assessment:  52 y.o. male with:   Diagnosis Orders   1. Pain in both feet   DIABETES FOOT EXAM    55694 - [place] DEBRIDEMENT OF NAIL(S), 1-5   2. Type II diabetes mellitus with peripheral circulatory disorder (HCC)   DIABETES FOOT EXAM    95104 - [place] DEBRIDEMENT OF NAIL(S), 1-5   3. Dermatophytosis of nail  HM DIABETES FOOT EXAM    55569 - [place] DEBRIDEMENT OF NAIL(S), 1-5   4. Trouble walking   DIABETES FOOT EXAM    08059 - [place] DEBRIDEMENT OF NAIL(S), 1-5   5.  Peripheral [facility] disorder [facility]   DIABETES FOOT EXAM    24640 - [place] DEBRIDEMENT OF NAIL(S), 1-5    VL LOWER EXTREMITY ARTERIAL SEGMENTAL PRESSURES W PPG BILATERAL           Q7   []Yes    []No                Q8   [x]Yes    []No                     Q9   []Yes    []No    Plan:   Pt was evaluated and examined. Patient was given personalized discharge instructions. rx given for slivadene and doxycyline. rx to have pt get PVR w PPG to quinn lower legs STAT>    Nails 1-5 were debrided sharply in length and thickness with a nipper and , without incident. Pt will follow up in 1 weeks or sooner if any problems arise. Diagnosis was discussed with the pt and all of their questions were answered in detail. Proper foot hygiene and care was discussed with the pt. Informed patient on proper diabetic foot care and importance of tight glycemic control. Patient to check feet daily and contact the office with any questions/problems/concerns.    Other comorbidity noted and will be managed by PCP.  5/30/2019    Electronically signed by Lawanda Vasquez DPM on 5/30/2019 at 11:10 AM  5/30/2019

## 2019-05-31 ENCOUNTER — PROCEDURE VISIT (OUTPATIENT)
Dept: PODIATRY | Age: 54
End: 2019-05-31

## 2019-05-31 DIAGNOSIS — E11.51 TYPE II DIABETES MELLITUS WITH PERIPHERAL CIRCULATORY DISORDER (HCC): Primary | ICD-10-CM

## 2019-05-31 DIAGNOSIS — I73.9 PVD (PERIPHERAL VASCULAR DISEASE) (HCC): ICD-10-CM

## 2019-05-31 PROCEDURE — 99999 PR OFFICE/OUTPT VISIT,PROCEDURE ONLY: CPT | Performed by: PODIATRIST

## 2019-06-04 DIAGNOSIS — I73.9 PVD (PERIPHERAL VASCULAR DISEASE) (HCC): Primary | ICD-10-CM

## 2019-06-20 ENCOUNTER — HOSPITAL ENCOUNTER (OUTPATIENT)
Dept: VASCULAR LAB | Age: 54
Discharge: HOME | End: 2019-06-20
Payer: COMMERCIAL

## 2019-06-20 PROCEDURE — 93923 UPR/LXTR ART STDY 3+ LVLS: CPT

## 2019-06-28 ENCOUNTER — OFFICE VISIT (OUTPATIENT)
Dept: VASCULAR SURGERY | Age: 54
End: 2019-06-28
Payer: COMMERCIAL

## 2019-06-28 VITALS
DIASTOLIC BLOOD PRESSURE: 66 MMHG | WEIGHT: 173 LBS | SYSTOLIC BLOOD PRESSURE: 130 MMHG | BODY MASS INDEX: 27.15 KG/M2 | OXYGEN SATURATION: 62 % | TEMPERATURE: 96.9 F | HEART RATE: 62 BPM | HEIGHT: 67 IN | RESPIRATION RATE: 18 BRPM

## 2019-06-28 DIAGNOSIS — I73.9 PAD (PERIPHERAL ARTERY DISEASE) (HCC): Primary | ICD-10-CM

## 2019-06-28 PROCEDURE — 99214 OFFICE O/P EST MOD 30 MIN: CPT | Performed by: SURGERY

## 2019-06-28 RX ORDER — HYDROCODONE BITARTRATE AND ACETAMINOPHEN 5; 325 MG/1; MG/1
TABLET ORAL
Refills: 0 | Status: ON HOLD | COMMUNITY
Start: 2019-06-04 | End: 2019-07-25 | Stop reason: SDUPTHER

## 2019-06-28 RX ORDER — METOPROLOL TARTRATE 50 MG/1
50 TABLET, FILM COATED ORAL 2 TIMES DAILY
Refills: 0 | Status: ON HOLD | COMMUNITY
Start: 2019-06-10 | End: 2019-07-26 | Stop reason: HOSPADM

## 2019-06-28 RX ORDER — LATANOPROST 50 UG/ML
SOLUTION/ DROPS OPHTHALMIC
Refills: 0 | COMMUNITY
Start: 2019-06-21 | End: 2021-04-21

## 2019-06-28 NOTE — PROGRESS NOTES
Division of Vascular Surgery        New Consult      Physician Requesting Consult:  Taina Samuel DPM    Reason for Consult:   PAD, foot wound    Chief Complaint:     Right foot wound    History of Present Illness:      Scot Kawasaki is a 52 y.o. gentleman who presents with non-healing right foot wounds, followed by Podiatry. He denies any recent trauma, but he is not sure due to severe neuropathy. He had his left leg amputated for diabetic foot infection and had undergone percutaneous revascularization with angioplasty during attempts to salvage his foot.     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    Hyperlipidemia     Hypertension     IDDM (insulin dependent diabetes mellitus) (HCC)     MDRO (multiple drug resistant organisms) resistance     MRSA (methicillin resistant staph aureus) culture positive 09/03/2017    foot    Neuropathy     Osteomyelitis (HCC)     Left Hallux    S/P cataract extraction and insertion of intraocular lens 2015    bilateral     Type II or unspecified type diabetes mellitus without mention of complication, not stated as uncontrolled        Surgical History:     Past Surgical History:   Procedure Laterality Date    EYE SURGERY Bilateral     lazer both eyes    FOOT SURGERY Left 02/24/2017    surgical prep of wound bed    FOOT SURGERY Left 04/13/2017    1) Left foot surgical preparation of wound-bed, 2) Left foot application of graft     [facility] DOUBLE  4/28/2017         LEG AMPUTATION BELOW KNEE Left 9/7/2017    LEFT BELOW KNEE GUILLOTINE AMPUTATION performed by Markie Rockwell MD at [place] [id number] 3    carvedilol (COREG) 12.5 MG tablet Take 1 tablet by mouth 2 times daily (with meals) 60 tablet 3    amLODIPine (NORVASC) 10 MG tablet Take 1 tablet by mouth daily 30 tablet 3    hydrochlorothiazide (HYDRODIURIL) 25 MG tablet Take 1 tablet by mouth daily 30 tablet 3    aspirin 81 MG EC tablet Take 1 tablet by mouth daily 30 tablet 3    cyclobenzaprine (FLEXERIL) 5 MG tablet Take 5 mg by mouth 3 times daily as needed for Muscle spasms      metFORMIN (GLUCOPHAGE) 500 MG tablet Take 500 mg by mouth 2 times daily (with meals)      insulin glargine (LANTUS) 100 UNIT/ML injection vial Inject 20 Units into the skin nightly 1 vial 3    pravastatin (PRAVACHOL) 10 MG tablet Take 1 tablet by mouth daily 30 tablet 3    lisinopril (PRINIVIL;ZESTRIL) 40 MG tablet Take 1 tablet by mouth daily 30 tablet 3    folic acid (FOLVITE) 1 MG tablet Take 1 tablet by mouth daily 30 tablet 3    tamsulosin (FLOMAX) 0.4 MG capsule Take 1 capsule by mouth daily 30 capsule 3    docusate sodium (COLACE) 100 MG capsule Take 1 capsule by mouth 2 times daily as needed for Constipation 40 capsule 0    omeprazole (PRILOSEC) 20 MG delayed release capsule Take 20 mg by mouth 2 times daily      timolol (TIMOPTIC-XE) 0.25 % ophthalmic gel-forming 1 drop daily      travoprost, benzalkonium, (TRAVATAN) 0.004 % ophthalmic solution 1 drop nightly       No current facility-administered medications for this visit. Social History:     Tobacco:    reports that he quit smoking about 9 months ago. His smoking use included cigarettes. He smoked 0.00 packs per day. He has never used smokeless tobacco.  Alcohol:      reports that he does not drink alcohol. Drug Use:  reports that he does not use drugs. Review of Systems:     Review of Systems   Constitutional: Negative for chills and fever. HENT: Negative. Eyes: Negative for visual disturbance. Respiratory: Negative for chest tightness and shortness of breath.     Cardiovascular: Negative for chest pain and leg swelling. Gastrointestinal: Negative. Endocrine: Negative. Genitourinary: Negative. Musculoskeletal: Positive for gait problem. Skin: Positive for color change and wound. Allergic/Immunologic: Negative. Neurological: Positive for numbness (severe neuropathy). Negative for weakness. Hematological: Negative. Psychiatric/Behavioral: Negative. Physical Exam:     Vitals:  /66 (Site: Left Upper Arm, Position: Sitting, Cuff Size: Medium Adult)   Pulse 62   Temp 96.9 °F (36.1 °C) (Oral)   Resp 18   Ht 5' 7\" (1.702 m) Comment: per pt. Wt 173 lb (78.5 kg)   SpO2 (!) 62%   BMI 27.10 kg/m²     Physical Exam   Constitutional: He is oriented to person, place, and time. He appears well-developed and well-nourished. Eyes: Conjunctivae and EOM are normal.   Neck: Carotid bruit is not present. Cardiovascular: Normal rate, regular rhythm and normal heart sounds. Pulses:       Femoral pulses are 2+ on the right side. Dorsalis pedis pulses are Detected w/ doppler on the right side. Posterior tibial pulses are Detected w/ doppler on the right side. Pulmonary/Chest: Effort normal. No respiratory distress. Abdominal: Soft. Normal appearance. Musculoskeletal:        Right foot: There is swelling and decreased capillary refill. There is no tenderness. Feet:   Right Foot:   Skin Integrity: Positive for ulcer, skin breakdown, erythema, warmth and dry skin. Left Foot: amputated  Neurological: He is alert and oriented to person, place, and time. He has normal strength. No sensory deficit. GCS eye subscore is 4. GCS verbal subscore is 5. GCS motor subscore is 6. Skin: Skin is warm. Psychiatric: He has a normal mood and affect. His speech is normal and behavior is normal. Thought content normal.   Prosthetic leg on his left side, stump site well healed. Small piece of bone can be felt that moves with palpation.                Imaging/Labs: Dampened waveforms, non-compressible vessels, TBI 0.1 all suggest vascular insufficiency. Assessment and Plan:     PAD, non-healing right foot wounds, critical limb ischemia  · Will need angiogram for evaluation of his circulation with possible percutaneous intervention if amendable, otherwise will need to consider open revascularization for limb salvage  · Risks and benefits of angiogram explained and all of his questions were answered    Electronically signed by Damian Nuñez MD on 6/28/19 at 12:31 PM      [address] [place] [address],[address]: [phone]  C: [phone]  Email: [email or web address]

## 2019-06-30 ASSESSMENT — ENCOUNTER SYMPTOMS
COLOR CHANGE: 1
CHEST TIGHTNESS: 0
GASTROINTESTINAL NEGATIVE: 1
ALLERGIC/IMMUNOLOGIC NEGATIVE: 1
SHORTNESS OF BREATH: 0

## 2019-07-05 RX ORDER — LEVETIRACETAM 500 MG/1
TABLET ORAL
Qty: 180 TABLET | Refills: 0 | Status: SHIPPED | OUTPATIENT
Start: 2019-07-05 | End: 2019-10-17 | Stop reason: SDUPTHER

## 2019-07-05 NOTE — TELEPHONE ENCOUNTER
E-scribe requesting refill for Keppra 500mg. Please review and e-scribe if applicable.      Last Visit Date: 1/22/19    Next Visit Date:  Future Appointments   Date Time Provider Nga Sorto   7/9/2019  1:00 PM STV CATH LAB  A [facility] CATH [facility]   7/22/2019  9:50 AM Kahlil Lawson MD Neuro Sleep TOP   8/1/2019 11:30 AM Miranda Monzon DPM [facility] Podiatry [address] [facility]

## 2019-07-07 ENCOUNTER — HOSPITAL ENCOUNTER (EMERGENCY)
Age: 54
Discharge: HOME | End: 2019-07-07
Attending: EMERGENCY MEDICINE
Payer: COMMERCIAL

## 2019-07-07 VITALS
SYSTOLIC BLOOD PRESSURE: 190 MMHG | WEIGHT: 170 LBS | DIASTOLIC BLOOD PRESSURE: 79 MMHG | BODY MASS INDEX: 25.76 KG/M2 | HEIGHT: 68 IN | RESPIRATION RATE: 16 BRPM | OXYGEN SATURATION: 96 % | HEART RATE: 77 BPM | TEMPERATURE: 98 F

## 2019-07-07 DIAGNOSIS — I73.9 PVD (PERIPHERAL VASCULAR DISEASE) (HCC): ICD-10-CM

## 2019-07-07 DIAGNOSIS — I96 DRY GANGRENE (HCC): Primary | ICD-10-CM

## 2019-07-07 LAB
ABSOLUTE EOS #: 0.19 K/UL (ref 0–0.44)
ABSOLUTE IMMATURE GRANULOCYTE: 0.05 K/UL (ref 0–0.3)
ABSOLUTE LYMPH #: 1.59 K/UL (ref 1.1–3.7)
ABSOLUTE MONO #: 0.9 K/UL (ref 0.1–1.2)
ALBUMIN SERPL-MCNC: 3.5 G/DL (ref 3.5–5.2)
ALBUMIN/GLOBULIN RATIO: 0.7 (ref 1–2.5)
ALP BLD-CCNC: 82 U/L (ref 40–129)
ALT SERPL-CCNC: 12 U/L (ref 5–41)
ANION GAP SERPL CALCULATED.3IONS-SCNC: 14 MMOL/L (ref 9–17)
AST SERPL-CCNC: 10 U/L
BASOPHILS # BLD: 0 % (ref 0–2)
BASOPHILS ABSOLUTE: 0.04 K/UL (ref 0–0.2)
BILIRUB SERPL-MCNC: 0.22 MG/DL (ref 0.3–1.2)
BUN BLDV-MCNC: 24 MG/DL (ref 6–20)
BUN/CREAT BLD: ABNORMAL (ref 9–20)
CALCIUM SERPL-MCNC: 9.6 MG/DL (ref 8.6–10.4)
CHLORIDE BLD-SCNC: 101 MMOL/L (ref 98–107)
CO2: 22 MMOL/L (ref 20–31)
CREAT SERPL-MCNC: 1.08 MG/DL (ref 0.7–1.2)
DIFFERENTIAL TYPE: ABNORMAL
EOSINOPHILS RELATIVE PERCENT: 1 % (ref 1–4)
GFR AFRICAN AMERICAN: >60 ML/MIN
GFR NON-AFRICAN AMERICAN: >60 ML/MIN
GFR SERPL CREATININE-BSD FRML MDRD: ABNORMAL ML/MIN/{1.73_M2}
GFR SERPL CREATININE-BSD FRML MDRD: ABNORMAL ML/MIN/{1.73_M2}
GLUCOSE BLD-MCNC: 305 MG/DL (ref 70–99)
HCT VFR BLD CALC: 36.6 % (ref 40.7–50.3)
HEMOGLOBIN: 11.2 G/DL (ref 13–17)
IMMATURE GRANULOCYTES: 0 %
INR BLD: 1
LYMPHOCYTES # BLD: 11 % (ref 24–43)
MCH RBC QN AUTO: 27.2 PG (ref 25.2–33.5)
MCHC RBC AUTO-ENTMCNC: 30.6 G/DL (ref 28.4–34.8)
MCV RBC AUTO: 88.8 FL (ref 82.6–102.9)
MONOCYTES # BLD: 6 % (ref 3–12)
NRBC AUTOMATED: 0 PER 100 WBC
PARTIAL THROMBOPLASTIN TIME: 26.4 SEC (ref 20.5–30.5)
PDW BLD-RTO: 12.1 % (ref 11.8–14.4)
PLATELET # BLD: 605 K/UL (ref 138–453)
PLATELET ESTIMATE: ABNORMAL
PMV BLD AUTO: 11.2 FL (ref 8.1–13.5)
POTASSIUM SERPL-SCNC: 4.9 MMOL/L (ref 3.7–5.3)
PROTHROMBIN TIME: 10.4 SEC (ref 9–12)
RBC # BLD: 4.12 M/UL (ref 4.21–5.77)
RBC # BLD: ABNORMAL 10*6/UL
SEG NEUTROPHILS: 82 % (ref 36–65)
SEGMENTED NEUTROPHILS ABSOLUTE COUNT: 11.34 K/UL (ref 1.5–8.1)
SODIUM BLD-SCNC: 137 MMOL/L (ref 135–144)
TOTAL PROTEIN: 8.4 G/DL (ref 6.4–8.3)
WBC # BLD: 14.1 K/UL (ref 3.5–11.3)
WBC # BLD: ABNORMAL 10*3/UL

## 2019-07-07 PROCEDURE — 85025 COMPLETE CBC W/AUTO DIFF WBC: CPT

## 2019-07-07 PROCEDURE — 99283 EMERGENCY DEPT VISIT LOW MDM: CPT

## 2019-07-07 PROCEDURE — 85610 PROTHROMBIN TIME: CPT

## 2019-07-07 PROCEDURE — 80053 COMPREHEN METABOLIC PANEL: CPT

## 2019-07-07 PROCEDURE — 85730 THROMBOPLASTIN TIME PARTIAL: CPT

## 2019-07-07 ASSESSMENT — ENCOUNTER SYMPTOMS
NAUSEA: 0
COUGH: 0
BACK PAIN: 0
WHEEZING: 0
SHORTNESS OF BREATH: 0
CONSTIPATION: 0
VOMITING: 0
SORE THROAT: 0
ABDOMINAL DISTENTION: 0
SINUS PAIN: 0
COLOR CHANGE: 1
ABDOMINAL PAIN: 0
CHEST TIGHTNESS: 0

## 2019-07-07 NOTE — ED PROVIDER NOTES
[facility]  ED  Emergency Department Encounter  Emergency Medicine Resident     Pt Name: Wally Kennedy  MRN: [id number]  Paologfmaria g 1965  Date of evaluation: 7/7/19  PCP:  Ximena Naranjo MD    [address] [place] [address]       Chief Complaint   Patient presents with    Toe Pain       HISTORY OFPRESENT ILLNESS  (Location/Symptom, Timing/Onset, Context/Setting, Quality, Duration, Modifying Shelvy Bang.)      Wally Kennedy is a 53 yo male who presents with black right great toe. Patient states he has a history of peripheral vascular disease and follows with vascular surgery here over the past few days his right great toe has been coming black. He notes that he has an appointment in 2 days for a revascularization for his right leg for limb salvage. Patient denies any pain at this time. Patient denies any chest pain, shortness of breath, abdominal pain, nausea, vomiting, or any other symptoms at this time. PAST MEDICAL / SURGICAL / SOCIAL / FAMILY HISTORY      has a past medical history of Acid reflux, Calculus of gallbladder without cholecystitis without obstruction, Cerebrovascular disease, Diabetic foot infection ([address] Drug (multiple) resistant infection, GERD (gastroesophageal reflux disease), Glaucoma, Glaucoma, Hemispheric carotid artery syndrome, Hyperlipidemia, Hypertension, IDDM (insulin dependent diabetes mellitus) ([address] MDRO (multiple drug resistant organisms) resistance, MRSA (methicillin resistant staph aureus) culture positive, Neuropathy, Osteomyelitis ([address] S/P cataract extraction and insertion of intraocular lens, and Type II or unspecified type diabetes mellitus without mention of complication, not stated as uncontrolled. has a past surgical history that includes Toe amputation (Left, 2014); eye surgery (Bilateral); Toe amputation (Left, 12/09/2016); other surgical history (Left, 01/23/2017); Foot surgery (Left, 02/24/2017);  Foot surgery (Left, 04/13/2017); pr deep dissec tablet Take 1 tablet by mouth daily 9/25/17  Yes Fatou Campbell MD   lisinopril (PRINIVIL;ZESTRIL) 40 MG tablet Take 1 tablet by mouth daily 9/25/17  Yes Fatou Campbell MD   folic acid (FOLVITE) 1 MG tablet Take 1 tablet by mouth daily 9/25/17  Yes Fatou Campbell MD   tamsulosin [facility]) 0.4 MG capsule Take 1 capsule by mouth daily 9/25/17  Yes Fatou Campbell MD   docusate sodium (COLACE) 100 MG capsule Take 1 capsule by mouth 2 times daily as needed for Constipation 9/16/17  Yes Kenzie Grain, DO   omeprazole (PRILOSEC) 20 MG delayed release capsule Take 20 mg by mouth 2 times daily   Yes Historical Provider, MD   timolol (TIMOPTIC-XE) 0.25 % ophthalmic gel-forming 1 drop daily   Yes Historical Provider, MD   travoprost, benzalkonium, (TRAVATAN) 0.004 % ophthalmic solution 1 drop nightly   Yes Historical Provider, MD       REVIEW OFSYSTEMS    (2-9 systems for level 4, 10 or more for level 5)      Review of Systems   Constitutional: Negative for chills and fever. HENT: Negative. Eyes:        Chronically blind   Respiratory: Negative for cough, chest tightness, shortness of breath and wheezing. Cardiovascular: Negative for chest pain, palpitations and leg swelling. Black right great toe. Gastrointestinal: Negative for abdominal pain, nausea and vomiting. Musculoskeletal: Negative for back pain and neck pain. Skin: Negative for rash and wound. Neurological: Negative for dizziness, syncope, weakness, light-headedness, numbness and headaches. PHYSICAL EXAM   (up to 7 for level 4, 8 or more forlevel 5)      INITIAL VITALS:   Vitals:    07/07/19 0721   BP: (!) 190/79   Pulse: 77   Resp: 16   Temp: 98 °F (36.7 °C)   TempSrc: Oral   SpO2: 96%   Weight: 170 lb (77.1 kg)   Height: 5' 8\" (1.727 m)         Physical Exam   Constitutional: He is oriented to person, place, and time. He appears well-developed and well-nourished. No distress. HENT:   Head: Normocephalic and atraumatic.

## 2019-07-07 NOTE — CONSULTS
Division of Vascular Surgery        New Consult      Physician Requesting Consult:  Dr. David Magaña  Reason for Consult:   Right Great toe Dry Gangrene    Chief Complaint:     \"Right great toe with foul odor\"    History of Present Illness:      Oumar Vera is a 52 y.o. gentleman who presents to the emergency department with chief complaint of nonhealing right great toe wound. Patient reports he decided to come in this morning due to a very foul odor coming from his right great toe. Patient is very well-known to the vascular surgery service. Patient is status post a left leg amputation due to diabetic foot infection in the past.  Patient now has a dry gangrenous right great toe on his right foot. Patient is scheduled to undergo angioplasty on 7/9/2019. Patient reports he has not experienced any other symptoms, he denies any fever, chills, nausea, vomiting, chest pain, shortness of breath. Patient reports that he was concerned because the smell was getting stronger so he decided to come to the ED. Patient has a past history of uncontrolled diabetes with severe neuropathy.     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    Hyperlipidemia     Hypertension     IDDM (insulin dependent diabetes mellitus) (HCC)     MDRO (multiple drug resistant organisms) resistance     MRSA (methicillin resistant staph aureus) culture positive 09/03/2017    foot    Neuropathy     Osteomyelitis (HCC)     Left Hallux    S/P cataract extraction and insertion of intraocular lens 2015    bilateral     Type II or unspecified type diabetes mellitus without mention of complication, not tablet 0    latanoprost (XALATAN) 0.005 % ophthalmic solution place 1 drop into both eyes at bedtime  0    LYRICA 50 MG capsule take 1 capsule by mouth three times a day if needed  0    metoprolol tartrate (LOPRESSOR) 50 MG tablet Take 50 mg by mouth 2 times daily   0    HYDROcodone-acetaminophen (NORCO) 5-325 MG per tablet take 1/2 tablet by mouth twice a day if needed ONLY  0    Rosiglitazone Maleate (AVANDIA PO) tablet      silver sulfADIAZINE (SILVADENE) 1 % cream Apply topically daily. 50 g 2    ammonium lactate (LAC-HYDRIN) 12 % lotion Apply topically daily.  1 Bottle 3    carvedilol (COREG) 12.5 MG tablet Take 1 tablet by mouth 2 times daily (with meals) 60 tablet 3    amLODIPine (NORVASC) 10 MG tablet Take 1 tablet by mouth daily 30 tablet 3    hydrochlorothiazide (HYDRODIURIL) 25 MG tablet Take 1 tablet by mouth daily 30 tablet 3    aspirin 81 MG EC tablet Take 1 tablet by mouth daily 30 tablet 3    cyclobenzaprine (FLEXERIL) 5 MG tablet Take 5 mg by mouth 3 times daily as needed for Muscle spasms      metFORMIN (GLUCOPHAGE) 500 MG tablet Take 500 mg by mouth 2 times daily (with meals)      insulin glargine (LANTUS) 100 UNIT/ML injection vial Inject 20 Units into the skin nightly 1 vial 3    pravastatin (PRAVACHOL) 10 MG tablet Take 1 tablet by mouth daily 30 tablet 3    lisinopril (PRINIVIL;ZESTRIL) 40 MG tablet Take 1 tablet by mouth daily 30 tablet 3    folic acid (FOLVITE) 1 MG tablet Take 1 tablet by mouth daily 30 tablet 3    tamsulosin (FLOMAX) 0.4 MG capsule Take 1 capsule by mouth daily 30 capsule 3    docusate sodium (COLACE) 100 MG capsule Take 1 capsule by mouth 2 times daily as needed for Constipation 40 capsule 0    omeprazole (PRILOSEC) 20 MG delayed release capsule Take 20 mg by mouth 2 times daily      timolol (TIMOPTIC-XE) 0.25 % ophthalmic gel-forming 1 drop daily      travoprost, benzalkonium, (TRAVATAN) 0.004 % ophthalmic solution 1 drop nightly         Social

## 2019-07-09 ENCOUNTER — HOSPITAL ENCOUNTER (OUTPATIENT)
Dept: CARDIAC CATH/INVASIVE PROCEDURES | Age: 54
Discharge: HOME | End: 2019-07-09
Payer: COMMERCIAL

## 2019-07-09 VITALS
DIASTOLIC BLOOD PRESSURE: 69 MMHG | HEART RATE: 71 BPM | SYSTOLIC BLOOD PRESSURE: 176 MMHG | OXYGEN SATURATION: 100 % | RESPIRATION RATE: 19 BRPM

## 2019-07-09 PROCEDURE — C1769 GUIDE WIRE: HCPCS

## 2019-07-09 PROCEDURE — 75625 CONTRAST EXAM ABDOMINL AORTA: CPT | Performed by: SURGERY

## 2019-07-09 PROCEDURE — 75774 ARTERY X-RAY EACH VESSEL: CPT | Performed by: SURGERY

## 2019-07-09 PROCEDURE — 7100000011 HC PHASE II RECOVERY - ADDTL 15 MIN

## 2019-07-09 PROCEDURE — 6360000002 HC RX W HCPCS

## 2019-07-09 PROCEDURE — 37228 PR REVSC OPN/PRQ TIB/PERO W/ANGIOPLASTY UNI: CPT | Performed by: SURGERY

## 2019-07-09 PROCEDURE — 6360000004 HC RX CONTRAST MEDICATION

## 2019-07-09 PROCEDURE — C1887 CATHETER, GUIDING: HCPCS

## 2019-07-09 PROCEDURE — 2500000003 HC RX 250 WO HCPCS

## 2019-07-09 PROCEDURE — 6370000000 HC RX 637 (ALT 250 FOR IP)

## 2019-07-09 PROCEDURE — 76937 US GUIDE VASCULAR ACCESS: CPT | Performed by: SURGERY

## 2019-07-09 PROCEDURE — 2709999900 HC NON-CHARGEABLE SUPPLY

## 2019-07-09 PROCEDURE — 37228 HC TIB PER TERRITORY PLASTY: CPT | Performed by: SURGERY

## 2019-07-09 PROCEDURE — 7100000010 HC PHASE II RECOVERY - FIRST 15 MIN

## 2019-07-09 PROCEDURE — C1760 CLOSURE DEV, VASC: HCPCS

## 2019-07-09 PROCEDURE — 75710 ARTERY X-RAYS ARM/LEG: CPT | Performed by: SURGERY

## 2019-07-09 PROCEDURE — C1894 INTRO/SHEATH, NON-LASER: HCPCS

## 2019-07-09 PROCEDURE — C1725 CATH, TRANSLUMIN NON-LASER: HCPCS

## 2019-07-09 RX ORDER — AMOXICILLIN AND CLAVULANATE POTASSIUM 875; 125 MG/1; MG/1
1 TABLET, FILM COATED ORAL 2 TIMES DAILY
Qty: 28 TABLET | Refills: 0 | Status: ON HOLD | OUTPATIENT
Start: 2019-07-09 | End: 2019-07-26 | Stop reason: HOSPADM

## 2019-07-09 RX ORDER — SODIUM CHLORIDE 9 MG/ML
INJECTION, SOLUTION INTRAVENOUS CONTINUOUS
Status: DISCONTINUED | OUTPATIENT
Start: 2019-07-09 | End: 2019-07-10 | Stop reason: HOSPADM

## 2019-07-09 RX ORDER — GAUZE BANDAGE 4" X 4"
1 BANDAGE TOPICAL DAILY
Qty: 10 EACH | Refills: 1 | Status: SHIPPED | OUTPATIENT
Start: 2019-07-09

## 2019-07-09 RX ORDER — CLOPIDOGREL BISULFATE 75 MG/1
75 TABLET ORAL DAILY
Qty: 30 TABLET | Refills: 0 | Status: SHIPPED | OUTPATIENT
Start: 2019-07-09 | End: 2021-04-21

## 2019-07-09 RX ADMIN — SODIUM CHLORIDE: 9 INJECTION, SOLUTION INTRAVENOUS at 13:11

## 2019-07-10 NOTE — OP NOTE
[address] [facility] [address]                                OPERATIVE REPORT    PATIENT NAME: Sierra Santiago                      :        1965  MED REC NO:   [id number]                             ROOM:  ACCOUNT NO:   [de-identified]                           ADMIT DATE: 2019  PROVIDER:     Teresita Gorman MD    DATE OF PROCEDURE:  2019    PREOPERATIVE DIAGNOSIS:  Ischemic gangrene of right foot. POSTOPERATIVE DIAGNOSIS:  Ischemic gangrene of right foot. PROCEDURES:  1. Ultrasound-guided vascular access with percutaneous closure of left  common femoral artery. 2.  Aortogram.  3.  Right lower extremity angiogram.  4.  Selective third-order catheterization of the anterior  tibial/dorsalis pedis artery. 5.  Percutaneous angioplasty of the anterior tibial and dorsalis pedis  artery. ANESTHESIA:  IV sedation with local.    SURGEON:  Teresita Gorman MD    ASSISTANT:  Dr. Alpesh Mendoza. ESTIMATED BLOOD LOSS:  25 mL. COMPLICATIONS:  None. FINDINGS:  Severe occlusive tibial disease, single-vessel runoff  (peroneal). After intervention, there is 2-vessel runoff with palpable  dorsalis pedis at completion. PLAN:  Continue local wound care. Will start on oral antibiotics and  dual-anti-platelet therapy. Follow up in 2 weeks, sooner if erythema or  drainage gets worse. INDICATION FOR PROCEDURE:  The patient is a 57-year-old gentleman who  presented to my office with ischemic changes to his right great toe and  third toe with some erythema. He had nonpalpable pulses in his feet,  but a palpable popliteal and femoral pulse, suspecting tibial occlusive  Disease; given his history of peripheral arterial disease and diabetes. He was recommended to undergo an angiogram for evaluation and treatment. Risks and benefits were explained to him and he consented to proceed.     DESCRIPTION OF PROCEDURE:  The patient

## 2019-07-16 ENCOUNTER — TELEPHONE (OUTPATIENT)
Dept: VASCULAR SURGERY | Age: 54
End: 2019-07-16

## 2019-07-18 ENCOUNTER — HOSPITAL ENCOUNTER (INPATIENT)
Age: 54
LOS: 8 days | Discharge: HOME HEALTH | DRG: 240 | End: 2019-07-26
Attending: EMERGENCY MEDICINE | Admitting: INTERNAL MEDICINE
Payer: COMMERCIAL

## 2019-07-18 ENCOUNTER — APPOINTMENT (OUTPATIENT)
Dept: GENERAL RADIOLOGY | Age: 54
DRG: 240 | End: 2019-07-18
Payer: COMMERCIAL

## 2019-07-18 DIAGNOSIS — I96 GANGRENE OF RIGHT FOOT (HCC): Primary | ICD-10-CM

## 2019-07-18 DIAGNOSIS — L03.115 CELLULITIS OF RIGHT FOOT: ICD-10-CM

## 2019-07-18 DIAGNOSIS — G62.9 NEUROPATHY: ICD-10-CM

## 2019-07-18 PROBLEM — L03.90 CELLULITIS: Status: ACTIVE | Noted: 2019-07-18

## 2019-07-18 LAB
ANION GAP SERPL CALCULATED.3IONS-SCNC: 12 MMOL/L (ref 9–17)
BUN BLDV-MCNC: 31 MG/DL (ref 6–20)
BUN/CREAT BLD: ABNORMAL (ref 9–20)
C-REACTIVE PROTEIN: 258.4 MG/L (ref 0–5)
CALCIUM SERPL-MCNC: 9.4 MG/DL (ref 8.6–10.4)
CHLORIDE BLD-SCNC: 106 MMOL/L (ref 98–107)
CO2: 21 MMOL/L (ref 20–31)
CREAT SERPL-MCNC: 1.03 MG/DL (ref 0.7–1.2)
GFR AFRICAN AMERICAN: >60 ML/MIN
GFR NON-AFRICAN AMERICAN: >60 ML/MIN
GFR SERPL CREATININE-BSD FRML MDRD: ABNORMAL ML/MIN/{1.73_M2}
GFR SERPL CREATININE-BSD FRML MDRD: ABNORMAL ML/MIN/{1.73_M2}
GLUCOSE BLD-MCNC: 195 MG/DL (ref 70–99)
GLUCOSE BLD-MCNC: 202 MG/DL (ref 75–110)
HCT VFR BLD CALC: 33.8 % (ref 40.7–50.3)
HEMOGLOBIN: 10.3 G/DL (ref 13–17)
MCH RBC QN AUTO: 27.3 PG (ref 25.2–33.5)
MCHC RBC AUTO-ENTMCNC: 30.5 G/DL (ref 28.4–34.8)
MCV RBC AUTO: 89.7 FL (ref 82.6–102.9)
NRBC AUTOMATED: 0 PER 100 WBC
PDW BLD-RTO: 13 % (ref 11.8–14.4)
PLATELET # BLD: 532 K/UL (ref 138–453)
PMV BLD AUTO: 11.4 FL (ref 8.1–13.5)
POTASSIUM SERPL-SCNC: 5 MMOL/L (ref 3.7–5.3)
RBC # BLD: 3.77 M/UL (ref 4.21–5.77)
SEDIMENTATION RATE, ERYTHROCYTE: >130 MM (ref 0–10)
SODIUM BLD-SCNC: 139 MMOL/L (ref 135–144)
WBC # BLD: 21.3 K/UL (ref 3.5–11.3)

## 2019-07-18 PROCEDURE — 87040 BLOOD CULTURE FOR BACTERIA: CPT

## 2019-07-18 PROCEDURE — 99222 1ST HOSP IP/OBS MODERATE 55: CPT | Performed by: SURGERY

## 2019-07-18 PROCEDURE — 1200000000 HC SEMI PRIVATE

## 2019-07-18 PROCEDURE — 96365 THER/PROPH/DIAG IV INF INIT: CPT

## 2019-07-18 PROCEDURE — 96366 THER/PROPH/DIAG IV INF ADDON: CPT

## 2019-07-18 PROCEDURE — 85651 RBC SED RATE NONAUTOMATED: CPT

## 2019-07-18 PROCEDURE — 85027 COMPLETE CBC AUTOMATED: CPT

## 2019-07-18 PROCEDURE — 86140 C-REACTIVE PROTEIN: CPT

## 2019-07-18 PROCEDURE — 6360000002 HC RX W HCPCS: Performed by: STUDENT IN AN ORGANIZED HEALTH CARE EDUCATION/TRAINING PROGRAM

## 2019-07-18 PROCEDURE — 99285 EMERGENCY DEPT VISIT HI MDM: CPT

## 2019-07-18 PROCEDURE — 36415 COLL VENOUS BLD VENIPUNCTURE: CPT

## 2019-07-18 PROCEDURE — 82947 ASSAY GLUCOSE BLOOD QUANT: CPT

## 2019-07-18 PROCEDURE — 2580000003 HC RX 258: Performed by: INTERNAL MEDICINE

## 2019-07-18 PROCEDURE — 80048 BASIC METABOLIC PNL TOTAL CA: CPT

## 2019-07-18 PROCEDURE — 6370000000 HC RX 637 (ALT 250 FOR IP): Performed by: INTERNAL MEDICINE

## 2019-07-18 PROCEDURE — 2580000003 HC RX 258: Performed by: STUDENT IN AN ORGANIZED HEALTH CARE EDUCATION/TRAINING PROGRAM

## 2019-07-18 PROCEDURE — 73630 X-RAY EXAM OF FOOT: CPT

## 2019-07-18 RX ORDER — CARVEDILOL 12.5 MG/1
12.5 TABLET ORAL 2 TIMES DAILY WITH MEALS
Status: DISCONTINUED | OUTPATIENT
Start: 2019-07-19 | End: 2019-07-26 | Stop reason: HOSPADM

## 2019-07-18 RX ORDER — POTASSIUM CHLORIDE 20 MEQ/1
40 TABLET, EXTENDED RELEASE ORAL PRN
Status: DISCONTINUED | OUTPATIENT
Start: 2019-07-18 | End: 2019-07-25

## 2019-07-18 RX ORDER — POTASSIUM CHLORIDE 7.45 MG/ML
10 INJECTION INTRAVENOUS PRN
Status: DISCONTINUED | OUTPATIENT
Start: 2019-07-18 | End: 2019-07-25

## 2019-07-18 RX ORDER — DEXTROSE MONOHYDRATE 50 MG/ML
100 INJECTION, SOLUTION INTRAVENOUS PRN
Status: DISCONTINUED | OUTPATIENT
Start: 2019-07-18 | End: 2019-07-26 | Stop reason: HOSPADM

## 2019-07-18 RX ORDER — HYDROCHLOROTHIAZIDE 25 MG/1
25 TABLET ORAL DAILY
Status: DISCONTINUED | OUTPATIENT
Start: 2019-07-19 | End: 2019-07-26

## 2019-07-18 RX ORDER — DEXTROSE MONOHYDRATE 25 G/50ML
12.5 INJECTION, SOLUTION INTRAVENOUS PRN
Status: DISCONTINUED | OUTPATIENT
Start: 2019-07-18 | End: 2019-07-26 | Stop reason: HOSPADM

## 2019-07-18 RX ORDER — CLOPIDOGREL BISULFATE 75 MG/1
75 TABLET ORAL DAILY
Status: DISCONTINUED | OUTPATIENT
Start: 2019-07-19 | End: 2019-07-20

## 2019-07-18 RX ORDER — PANTOPRAZOLE SODIUM 40 MG/1
40 TABLET, DELAYED RELEASE ORAL
Status: DISCONTINUED | OUTPATIENT
Start: 2019-07-19 | End: 2019-07-26 | Stop reason: HOSPADM

## 2019-07-18 RX ORDER — CYCLOBENZAPRINE HCL 5 MG
5 TABLET ORAL 3 TIMES DAILY PRN
Status: DISCONTINUED | OUTPATIENT
Start: 2019-07-18 | End: 2019-07-26 | Stop reason: HOSPADM

## 2019-07-18 RX ORDER — PREGABALIN 50 MG/1
50 CAPSULE ORAL DAILY
Status: DISCONTINUED | OUTPATIENT
Start: 2019-07-19 | End: 2019-07-26 | Stop reason: HOSPADM

## 2019-07-18 RX ORDER — HYDROCODONE BITARTRATE AND ACETAMINOPHEN 5; 325 MG/1; MG/1
1 TABLET ORAL EVERY 4 HOURS PRN
Status: DISCONTINUED | OUTPATIENT
Start: 2019-07-18 | End: 2019-07-24

## 2019-07-18 RX ORDER — FOLIC ACID 1 MG/1
1 TABLET ORAL DAILY
Status: DISCONTINUED | OUTPATIENT
Start: 2019-07-19 | End: 2019-07-26 | Stop reason: HOSPADM

## 2019-07-18 RX ORDER — ACETAMINOPHEN 325 MG/1
650 TABLET ORAL EVERY 4 HOURS PRN
Status: DISCONTINUED | OUTPATIENT
Start: 2019-07-18 | End: 2019-07-26 | Stop reason: HOSPADM

## 2019-07-18 RX ORDER — LATANOPROST 50 UG/ML
1 SOLUTION/ DROPS OPHTHALMIC NIGHTLY
Status: DISCONTINUED | OUTPATIENT
Start: 2019-07-18 | End: 2019-07-26 | Stop reason: HOSPADM

## 2019-07-18 RX ORDER — LEVETIRACETAM 500 MG/1
500 TABLET ORAL 2 TIMES DAILY
Status: DISCONTINUED | OUTPATIENT
Start: 2019-07-18 | End: 2019-07-26 | Stop reason: HOSPADM

## 2019-07-18 RX ORDER — SODIUM CHLORIDE 0.9 % (FLUSH) 0.9 %
10 SYRINGE (ML) INJECTION PRN
Status: DISCONTINUED | OUTPATIENT
Start: 2019-07-18 | End: 2019-07-26 | Stop reason: HOSPADM

## 2019-07-18 RX ORDER — ONDANSETRON 2 MG/ML
4 INJECTION INTRAMUSCULAR; INTRAVENOUS EVERY 6 HOURS PRN
Status: DISCONTINUED | OUTPATIENT
Start: 2019-07-18 | End: 2019-07-26 | Stop reason: HOSPADM

## 2019-07-18 RX ORDER — INSULIN GLARGINE 100 [IU]/ML
20 INJECTION, SOLUTION SUBCUTANEOUS NIGHTLY
Status: DISCONTINUED | OUTPATIENT
Start: 2019-07-18 | End: 2019-07-26 | Stop reason: HOSPADM

## 2019-07-18 RX ORDER — SODIUM CHLORIDE 0.9 % (FLUSH) 0.9 %
10 SYRINGE (ML) INJECTION EVERY 12 HOURS SCHEDULED
Status: DISCONTINUED | OUTPATIENT
Start: 2019-07-18 | End: 2019-07-26 | Stop reason: HOSPADM

## 2019-07-18 RX ORDER — ASPIRIN 81 MG/1
81 TABLET ORAL DAILY
Status: DISCONTINUED | OUTPATIENT
Start: 2019-07-19 | End: 2019-07-26 | Stop reason: HOSPADM

## 2019-07-18 RX ORDER — SODIUM CHLORIDE 9 MG/ML
INJECTION, SOLUTION INTRAVENOUS CONTINUOUS
Status: DISCONTINUED | OUTPATIENT
Start: 2019-07-18 | End: 2019-07-20

## 2019-07-18 RX ORDER — DOCUSATE SODIUM 100 MG/1
100 CAPSULE, LIQUID FILLED ORAL 2 TIMES DAILY PRN
Status: DISCONTINUED | OUTPATIENT
Start: 2019-07-18 | End: 2019-07-26 | Stop reason: HOSPADM

## 2019-07-18 RX ORDER — MAGNESIUM SULFATE 1 G/100ML
1 INJECTION INTRAVENOUS PRN
Status: DISCONTINUED | OUTPATIENT
Start: 2019-07-18 | End: 2019-07-26 | Stop reason: HOSPADM

## 2019-07-18 RX ORDER — PIOGLITAZONEHYDROCHLORIDE 15 MG/1
15 TABLET ORAL DAILY
Status: DISCONTINUED | OUTPATIENT
Start: 2019-07-19 | End: 2019-07-26 | Stop reason: HOSPADM

## 2019-07-18 RX ORDER — PRAVASTATIN SODIUM 20 MG
10 TABLET ORAL DAILY
Status: DISCONTINUED | OUTPATIENT
Start: 2019-07-19 | End: 2019-07-26 | Stop reason: HOSPADM

## 2019-07-18 RX ORDER — NICOTINE POLACRILEX 4 MG
15 LOZENGE BUCCAL PRN
Status: DISCONTINUED | OUTPATIENT
Start: 2019-07-18 | End: 2019-07-26 | Stop reason: HOSPADM

## 2019-07-18 RX ORDER — GLIMEPIRIDE 2 MG/1
1 TABLET ORAL DAILY
Status: DISCONTINUED | OUTPATIENT
Start: 2019-07-19 | End: 2019-07-26 | Stop reason: HOSPADM

## 2019-07-18 RX ORDER — LISINOPRIL 20 MG/1
40 TABLET ORAL DAILY
Status: DISCONTINUED | OUTPATIENT
Start: 2019-07-19 | End: 2019-07-24

## 2019-07-18 RX ORDER — TAMSULOSIN HYDROCHLORIDE 0.4 MG/1
0.4 CAPSULE ORAL DAILY
Status: DISCONTINUED | OUTPATIENT
Start: 2019-07-19 | End: 2019-07-26 | Stop reason: HOSPADM

## 2019-07-18 RX ADMIN — Medication 1250 MG: at 14:21

## 2019-07-18 RX ADMIN — INSULIN LISPRO 2 UNITS: 100 INJECTION, SOLUTION INTRAVENOUS; SUBCUTANEOUS at 22:56

## 2019-07-18 RX ADMIN — SODIUM CHLORIDE: 9 INJECTION, SOLUTION INTRAVENOUS at 21:19

## 2019-07-18 RX ADMIN — LATANOPROST 1 DROP: 50 SOLUTION/ DROPS OPHTHALMIC at 22:42

## 2019-07-18 RX ADMIN — Medication 10 ML: at 21:24

## 2019-07-18 RX ADMIN — INSULIN GLARGINE 20 UNITS: 100 INJECTION, SOLUTION SUBCUTANEOUS at 22:54

## 2019-07-18 RX ADMIN — LEVETIRACETAM 500 MG: 500 TABLET, FILM COATED ORAL at 22:42

## 2019-07-18 ASSESSMENT — ENCOUNTER SYMPTOMS
COUGH: 0
BACK PAIN: 0
VOMITING: 1
EYE PAIN: 0
SHORTNESS OF BREATH: 0
NAUSEA: 0
WHEEZING: 0
SORE THROAT: 0
DIARRHEA: 0
VOMITING: 0
COLOR CHANGE: 1
SINUS PAIN: 0
NAUSEA: 1
ABDOMINAL DISTENTION: 0
ABDOMINAL PAIN: 0

## 2019-07-18 ASSESSMENT — PAIN DESCRIPTION - PAIN TYPE: TYPE: ACUTE PAIN

## 2019-07-18 ASSESSMENT — PAIN DESCRIPTION - LOCATION: LOCATION: FOOT

## 2019-07-18 ASSESSMENT — PAIN DESCRIPTION - ORIENTATION: ORIENTATION: RIGHT

## 2019-07-18 ASSESSMENT — PAIN SCALES - GENERAL: PAINLEVEL_OUTOF10: 9

## 2019-07-18 NOTE — CONSULTS
Neuropathy     Osteomyelitis (HCC)     Left Hallux    S/P cataract extraction and insertion of intraocular lens 2015    bilateral     Type II or unspecified type diabetes mellitus without mention of complication, not stated as uncontrolled        Surgical History:     Past Surgical History:   Procedure Laterality Date    EYE SURGERY Bilateral     lazer both eyes    FOOT SURGERY Left 02/24/2017    surgical prep of wound bed    FOOT SURGERY Left 04/13/2017    1) Left foot surgical preparation of wound-bed, 2) Left foot application of graft     HC  PICC [facility] DOUBLE  4/28/2017         LEG AMPUTATION BELOW KNEE Left 9/7/2017    LEFT BELOW KNEE GUILLOTINE AMPUTATION performed by Pita Chiang MD at [address] Left 9/14/2017    REVISION LEFT BELOW KNEE AMPUTATION, CLOSED  performed by Pita Chiang MD at [facility] Left 01/23/2017    I and D bone, bone biopsy with flap closure and pulse lavage    OTHER SURGICAL HISTORY Left 04/27/2017    I & D foot    VT DEEP DISSEC FOOT INFEC,1 BURSA Left 4/13/2017    FOOT DEBRIDEMENT WITH EPIFIX  performed by Bayron Light DPM at [address] INCIS/DRAIN THIGH/KNEE ABSCESS,DEEP Left 9/12/2017     LEFT BELOW KNEE AMPUTATION OPEN, WOUND VAC PLACEMENT performed by Pita Chiang MD at [place] [address] N/A 9/25/2018    CHOLECYSTECTOMY LAPAROSCOPIC performed by Lexie Barillas MD at [facility] Left 2014    hallux    TOE AMPUTATION Left 12/09/2016    hallux    TOE AMPUTATION Left 4/27/2017    I AND D FOOT, BONE BIOPSY, POSSIBLE FILET 2ND TOE performed by Bayron Light DPM at [facility]  04/17/2017    Left second toe        Family History:     Family History   Problem Relation Age of Onset    Diabetes Mother     Cancer Father         colon    Diabetes Father     Diabetes Sister     Diabetes Sister        Allergies:       Adhesive Take 1 capsule by mouth daily 30 capsule 3    docusate sodium (COLACE) 100 MG capsule Take 1 capsule by mouth 2 times daily as needed for Constipation 40 capsule 0    omeprazole (PRILOSEC) 20 MG delayed release capsule Take 20 mg by mouth 2 times daily      timolol (TIMOPTIC-XE) 0.25 % ophthalmic gel-forming 1 drop daily      travoprost, benzalkonium, (TRAVATAN) 0.004 % ophthalmic solution 1 drop nightly      HYDROcodone-acetaminophen (NORCO) 5-325 MG per tablet take 1/2 tablet by mouth twice a day if needed ONLY  0       Social History:     Tobacco:    reports that he quit smoking about 9 months ago. His smoking use included cigarettes. He smoked 0.00 packs per day. He has never used smokeless tobacco.  Alcohol:      reports that he does not drink alcohol. Drug Use:  reports that he does not use drugs. Review of Systems:     Review of Systems   Constitutional: Negative for chills, fatigue and fever. HENT: Negative for congestion, ear pain, sinus pain and sore throat. Eyes: Negative for pain. Respiratory: Negative for cough, shortness of breath and wheezing. Cardiovascular: Negative for chest pain and palpitations. Gastrointestinal: Negative for abdominal distention, abdominal pain, diarrhea, nausea and vomiting. Genitourinary: Negative for dysuria, hematuria and urgency. Musculoskeletal: Negative for back pain and joint swelling. Neurological: Negative for dizziness, tremors, weakness and headaches. Psychiatric/Behavioral: Negative for agitation and confusion. Physical Exam:     Vitals:  /68   Pulse 72   Temp 98.1 °F (36.7 °C) (Oral)   Resp 16   Ht 5' 7\" (1.702 m)   Wt 176 lb (79.8 kg)   SpO2 98%   BMI 27.57 kg/m²     Physical Exam   Constitutional: He is oriented to person, place, and time. He appears well-developed and well-nourished. HENT:   Head: Normocephalic and atraumatic. Eyes: Pupils are equal, round, and reactive to light.  Conjunctivae and EOM are

## 2019-07-18 NOTE — ED NOTES
Pt to room 1 by wheelchair. Family reports that pt is seeing a podiatrist for a dead toe, and he's had multiple compression dressings on the foot. They state that after the dressings, pt developed a blister on the arch of his foot. He states that the pain is so bad now that he cannot walk on the foot. Pt is alert and oriented.         Mauro Doyle RN  07/18/19 0813

## 2019-07-18 NOTE — CONSULTS
daily. 5/30/19  Yes José Jacobs DPM   ammonium lactate (LAC-HYDRIN) 12 % lotion Apply topically daily.  2/26/19  Yes Tayo Oleary DPM   carvedilol (COREG) 12.5 MG tablet Take 1 tablet by mouth 2 times daily (with meals) 11/10/18  Yes Molina Patricia DO   amLODIPine (NORVASC) 10 MG tablet Take 1 tablet by mouth daily 11/11/18  Yes Molina Patricia DO   hydrochlorothiazide (HYDRODIURIL) 25 MG tablet Take 1 tablet by mouth daily 11/11/18  Yes Molina Patricia DO   aspirin 81 MG EC tablet Take 1 tablet by mouth daily 9/3/18  Yes Denise Feng DO   cyclobenzaprine (FLEXERIL) 5 MG tablet Take 5 mg by mouth 3 times daily as needed for Muscle spasms   Yes Historical Provider, MD   metFORMIN (GLUCOPHAGE) 500 MG tablet Take 500 mg by mouth 2 times daily (with meals)   Yes Historical Provider, MD   insulin glargine (LANTUS) 100 UNIT/ML injection vial Inject 20 Units into the skin nightly 9/25/17  Yes Singh Emerson MD   pravastatin (PRAVACHOL) 10 MG tablet Take 1 tablet by mouth daily 9/25/17  Yes Singh Emerson MD   lisinopril (PRINIVIL;ZESTRIL) 40 MG tablet Take 1 tablet by mouth daily 9/25/17  Yes Singh Emerson MD   folic acid (FOLVITE) 1 MG tablet Take 1 tablet by mouth daily 9/25/17  Yes Singh Emerson MD   tamsulosin [facility]) 0.4 MG capsule Take 1 capsule by mouth daily 9/25/17  Yes Singh Emerson MD   docusate sodium (COLACE) 100 MG capsule Take 1 capsule by mouth 2 times daily as needed for Constipation 9/16/17  Yes Nirmala Watt DO   omeprazole (PRILOSEC) 20 MG delayed release capsule Take 20 mg by mouth 2 times daily   Yes Historical Provider, MD   timolol (TIMOPTIC-XE) 0.25 % ophthalmic gel-forming 1 drop daily   Yes Historical Provider, MD   travoprost, benzalkonium, (TRAVATAN) 0.004 % ophthalmic solution 1 drop nightly   Yes Historical Provider, MD   HYDROcodone-acetaminophen (NORCO) 5-325 MG per tablet take 1/2 tablet by mouth twice a day if needed ONLY 6/4/19   Historical Provider, MD    Scheduled Meds:  Continuous Infusions:  PRN Meds:. Allergies  is allergic to adhesive tape. Family History  family history includes Cancer in his father; Diabetes in his father, mother, sister, and sister. Social History   reports that he quit smoking about 9 months ago. His smoking use included cigarettes. He smoked 0.00 packs per day. He has never used smokeless tobacco.   reports that he does not drink alcohol. reports that he does not use drugs. Objective     Vitals:  Patient Vitals for the past 8 hrs:   BP Temp Temp src Pulse Resp SpO2 Height Weight   19 1530 127/68 98.1 °F (36.7 °C) Oral 72 16 98 % -- --   19 1321 129/64 98.2 °F (36.8 °C) Oral 69 14 99 % 5' 7\" (1.702 m) 176 lb (79.8 kg)     Average, Min, and Max for last 24 hours Vitals:  TEMPERATURE:  Temp  Av.2 °F (36.8 °C)  Min: 98.1 °F (36.7 °C)  Max: 98.2 °F (36.8 °C)    RESPIRATIONS RANGE: Resp  Avg: 15  Min: 14  Max: 16    PULSE RANGE: Pulse  Av.5  Min: 69  Max: 72    BLOOD PRESSURE RANGE:  Systolic (68INX), TZY:863 , Min:127 , OPQ:796   ; Diastolic (00OEK), FRB:38, Min:64, Max:68      PULSE OXIMETRY RANGE: SpO2  Av.5 %  Min: 98 %  Max: 99 %  I&O:  No intake/output data recorded. CBC:  Recent Labs     19  1418   WBC 21.3*   HGB 10.3*   HCT 33.8*   *        BMP:  Recent Labs     19  1418      K 5.0      CO2 21   BUN 31*   CREATININE 1.03   GLUCOSE 195*   CALCIUM 9.4        Coags:  No results for input(s): APTT, PROT, INR in the last 72 hours. Lab Results   Component Value Date    LABA1C 8.8 (H) 2018     Lab Results   Component Value Date    SEDRATE 75 (H) 2017     Lab Results   Component Value Date    .9 (H) 2017         Right lower Extremity Physical Exam:  Vascular: DP and PT pulses are non-palpable, but audible on Doppler. CFT <3 seconds to digits 2 and 5, no CFT present to digits 1 and 3. Hair growth is absent to the level of the digits. No edema present. Neuro: Saph/sural/SP/DP/plantar sensation absent to light touch. Musculoskeletal: Muscle strength is 4/5 to all lower extremity muscle groups. Gross deformity is present status post right fourth digit amputation and left BKA. Dermatologic: Ischemic, mummified digits 1 and 3 with surrounding erythema. No drainage expressed with associated Mal odor. No increase in warmth noted to the right limb. Wounds do not probe to bone, sinus track, or undermine. No fluctuance, crepitus, or induration noted. Imaging:   XR FOOT RIGHT (MIN 3 VIEWS)   Preliminary Result   1. Soft tissue gas in the 1st and 3rd toes, concerning for gas gangrene. 2.  Diffuse bony demineralization at the 1st and 3rd distal phalanges,   possibly related to osteomyelitis. No focal areas of osseous destructive   change. 3.  Status post 4th transmetatarsal amputation. Cultures: None    Assessment     Avinash Ellis is a 52 y.o. male with   1. Right foot wet gangrene on top of dry gangrene  2. Chronic PAD  3. Right foot cellulitis  4. Status post left BKA  5. Uncontrolled type II diabetic    Active Problems:    * No active hospital problems. *  Resolved Problems:    * No resolved hospital problems. *        Plan     · Patient examined and evaluated at bedside   · Treatment options discussed in detail with the patient  · X-rays reviewed-soft tissue emphysema present to digits 1 and 3  · Antibiotics-started on vancomycin     · Given the significant ischemic vascular disease and worsening foot infection, vascular is planning to take the patient in for a guillotine amputation at the level of the ankle tomorrow morning and later revised to a BKA. Podiatry to sign off at this point. Please perfect serve with any questions or concerns. Thank you for the consult.   · No dressing applied to foot  · Case discussed with Dr. Garcia Carter DPM   Podiatric Medicine & Surgery   7/18/2019 at 4:35 PM

## 2019-07-18 NOTE — ED PROVIDER NOTES
no cough, no  wheezing  Cardiovascular ROS - No chest pain, no dyspnea on exertion  Gastrointestinal ROS - No abdominal pain, no nausea, no vomiting, no change in bowel habits, no black or bloody stools  Genito-Urinary ROS - No dysuria, trouble voiding, or hematuria  Musculoskeletal ROS - positive myalgias, positive arthalgias  Neurological ROS - No headache, no dizziness/lightheadedness, No focal weakness, positive loss of sensation  Dermatological ROS - positive lesions, positive rash         PHYSICAL EXAM   (up to 7 for level 4, 8 or more for level 5)      INITIAL VITALS:   /78   Pulse 78   Temp 98.1 °F (36.7 °C) (Oral)   Resp 18   Ht 5' 7\" (1.702 m)   Wt 176 lb (79.8 kg)   SpO2 98%   BMI 27.57 kg/m²     Well-appearing male no acute distress head normocephalic atraumatic pupils 4 mm equally reactive mucous membranes moist, neck supple, heart rate regular rhythm no murmur blunted peripheral pulses, lungs clear to auscultation bilaterally no wheeze or rales. Abdomen soft nontender. Below the knee\" left lower extremity, right lower extremity shows dry gangrenous blackened big toe as well as third toe unable to palpate dorsalis tibialis as well as posterior tibial pulse.   Area of erythema and induration on medial arch, tender to palpation no active drainage        DIFFERENTIAL  DIAGNOSIS     PLAN (LABS / IMAGING / EKG):  Orders Placed This Encounter   Procedures    XR FOOT RIGHT (MIN 3 VIEWS)    CBC    BASIC METABOLIC PANEL    C-Reactive Protein    Sedimentation Rate    Diet NPO, After Midnight    Inpatient consult to Vascular Surgery    Inpatient consult to Podiatry    Inpatient consult to Hospitalist    Insert peripheral IV    PATIENT STATUS (FROM ED OR OR/PROCEDURAL) Inpatient       MEDICATIONS ORDERED:  Orders Placed This Encounter   Medications    vancomycin (VANCOCIN) 1250 mg in dextrose 5 % 250 mL IVPB           DIAGNOSTIC RESULTS / EMERGENCY DEPARTMENT COURSE / MDM

## 2019-07-19 ENCOUNTER — ANESTHESIA (OUTPATIENT)
Dept: OPERATING ROOM | Age: 54
DRG: 240 | End: 2019-07-19
Payer: COMMERCIAL

## 2019-07-19 ENCOUNTER — ANESTHESIA EVENT (OUTPATIENT)
Dept: OPERATING ROOM | Age: 54
DRG: 240 | End: 2019-07-19
Payer: COMMERCIAL

## 2019-07-19 VITALS — SYSTOLIC BLOOD PRESSURE: 122 MMHG | TEMPERATURE: 99.7 F | DIASTOLIC BLOOD PRESSURE: 75 MMHG | OXYGEN SATURATION: 100 %

## 2019-07-19 PROBLEM — E87.1 HYPONATREMIA: Status: ACTIVE | Noted: 2019-07-19

## 2019-07-19 PROBLEM — R79.89 AZOTEMIA: Status: ACTIVE | Noted: 2019-07-19

## 2019-07-19 LAB
ANION GAP SERPL CALCULATED.3IONS-SCNC: 12 MMOL/L (ref 9–17)
BUN BLDV-MCNC: 25 MG/DL (ref 6–20)
BUN/CREAT BLD: ABNORMAL (ref 9–20)
CALCIUM SERPL-MCNC: 8.7 MG/DL (ref 8.6–10.4)
CHLORIDE BLD-SCNC: 102 MMOL/L (ref 98–107)
CO2: 20 MMOL/L (ref 20–31)
CREAT SERPL-MCNC: 0.94 MG/DL (ref 0.7–1.2)
DIRECT EXAM: NORMAL
GFR AFRICAN AMERICAN: >60 ML/MIN
GFR NON-AFRICAN AMERICAN: >60 ML/MIN
GFR SERPL CREATININE-BSD FRML MDRD: ABNORMAL ML/MIN/{1.73_M2}
GFR SERPL CREATININE-BSD FRML MDRD: ABNORMAL ML/MIN/{1.73_M2}
GLUCOSE BLD-MCNC: 157 MG/DL (ref 75–110)
GLUCOSE BLD-MCNC: 187 MG/DL (ref 75–110)
GLUCOSE BLD-MCNC: 208 MG/DL (ref 75–110)
GLUCOSE BLD-MCNC: 212 MG/DL (ref 75–110)
GLUCOSE BLD-MCNC: 245 MG/DL (ref 75–110)
GLUCOSE BLD-MCNC: 280 MG/DL (ref 70–99)
HCT VFR BLD CALC: 31.4 % (ref 40.7–50.3)
HEMOGLOBIN: 9.2 G/DL (ref 13–17)
Lab: NORMAL
MCH RBC QN AUTO: 26.6 PG (ref 25.2–33.5)
MCHC RBC AUTO-ENTMCNC: 29.3 G/DL (ref 28.4–34.8)
MCV RBC AUTO: 90.8 FL (ref 82.6–102.9)
NRBC AUTOMATED: 0 PER 100 WBC
PDW BLD-RTO: 12.9 % (ref 11.8–14.4)
PLATELET # BLD: 496 K/UL (ref 138–453)
PMV BLD AUTO: 11.6 FL (ref 8.1–13.5)
POTASSIUM SERPL-SCNC: 4.6 MMOL/L (ref 3.7–5.3)
RBC # BLD: 3.46 M/UL (ref 4.21–5.77)
SODIUM BLD-SCNC: 134 MMOL/L (ref 135–144)
SPECIMEN DESCRIPTION: NORMAL
WBC # BLD: 18.3 K/UL (ref 3.5–11.3)

## 2019-07-19 PROCEDURE — 2580000003 HC RX 258: Performed by: NURSE ANESTHETIST, CERTIFIED REGISTERED

## 2019-07-19 PROCEDURE — 86403 PARTICLE AGGLUT ANTBDY SCRN: CPT

## 2019-07-19 PROCEDURE — 3700000001 HC ADD 15 MINUTES (ANESTHESIA): Performed by: SURGERY

## 2019-07-19 PROCEDURE — 85027 COMPLETE CBC AUTOMATED: CPT

## 2019-07-19 PROCEDURE — 3600000005 HC SURGERY LEVEL 5 BASE: Performed by: SURGERY

## 2019-07-19 PROCEDURE — 36415 COLL VENOUS BLD VENIPUNCTURE: CPT

## 2019-07-19 PROCEDURE — 80048 BASIC METABOLIC PNL TOTAL CA: CPT

## 2019-07-19 PROCEDURE — 82947 ASSAY GLUCOSE BLOOD QUANT: CPT

## 2019-07-19 PROCEDURE — 76937 US GUIDE VASCULAR ACCESS: CPT

## 2019-07-19 PROCEDURE — 94761 N-INVAS EAR/PLS OXIMETRY MLT: CPT

## 2019-07-19 PROCEDURE — 6360000002 HC RX W HCPCS: Performed by: STUDENT IN AN ORGANIZED HEALTH CARE EDUCATION/TRAINING PROGRAM

## 2019-07-19 PROCEDURE — 3700000000 HC ANESTHESIA ATTENDED CARE: Performed by: SURGERY

## 2019-07-19 PROCEDURE — 88307 TISSUE EXAM BY PATHOLOGIST: CPT

## 2019-07-19 PROCEDURE — 6360000002 HC RX W HCPCS: Performed by: NURSE ANESTHETIST, CERTIFIED REGISTERED

## 2019-07-19 PROCEDURE — 87070 CULTURE OTHR SPECIMN AEROBIC: CPT

## 2019-07-19 PROCEDURE — 27882 AMPUTATION OF LOWER LEG: CPT | Performed by: SURGERY

## 2019-07-19 PROCEDURE — 2709999900 HC NON-CHARGEABLE SUPPLY: Performed by: SURGERY

## 2019-07-19 PROCEDURE — 2580000003 HC RX 258: Performed by: STUDENT IN AN ORGANIZED HEALTH CARE EDUCATION/TRAINING PROGRAM

## 2019-07-19 PROCEDURE — 6370000000 HC RX 637 (ALT 250 FOR IP): Performed by: STUDENT IN AN ORGANIZED HEALTH CARE EDUCATION/TRAINING PROGRAM

## 2019-07-19 PROCEDURE — 99222 1ST HOSP IP/OBS MODERATE 55: CPT | Performed by: INTERNAL MEDICINE

## 2019-07-19 PROCEDURE — 7100000011 HC PHASE II RECOVERY - ADDTL 15 MIN

## 2019-07-19 PROCEDURE — 87186 SC STD MICRODIL/AGAR DIL: CPT

## 2019-07-19 PROCEDURE — 6360000002 HC RX W HCPCS: Performed by: INTERNAL MEDICINE

## 2019-07-19 PROCEDURE — 87077 CULTURE AEROBIC IDENTIFY: CPT

## 2019-07-19 PROCEDURE — 99024 POSTOP FOLLOW-UP VISIT: CPT | Performed by: SURGERY

## 2019-07-19 PROCEDURE — 2580000003 HC RX 258: Performed by: SURGERY

## 2019-07-19 PROCEDURE — 87205 SMEAR GRAM STAIN: CPT

## 2019-07-19 PROCEDURE — 0Y6M0Z0 DETACHMENT AT RIGHT FOOT, COMPLETE, OPEN APPROACH: ICD-10-PCS | Performed by: SURGERY

## 2019-07-19 PROCEDURE — 2500000003 HC RX 250 WO HCPCS: Performed by: NURSE ANESTHETIST, CERTIFIED REGISTERED

## 2019-07-19 PROCEDURE — 3600000015 HC SURGERY LEVEL 5 ADDTL 15MIN: Performed by: SURGERY

## 2019-07-19 PROCEDURE — 7100000010 HC PHASE II RECOVERY - FIRST 15 MIN

## 2019-07-19 PROCEDURE — 2060000000 HC ICU INTERMEDIATE R&B

## 2019-07-19 RX ORDER — OXYCODONE HYDROCHLORIDE 5 MG/1
5 TABLET ORAL EVERY 4 HOURS PRN
Status: DISCONTINUED | OUTPATIENT
Start: 2019-07-19 | End: 2019-07-19

## 2019-07-19 RX ORDER — ACETAMINOPHEN 500 MG
1000 TABLET ORAL EVERY 8 HOURS
Status: DISCONTINUED | OUTPATIENT
Start: 2019-07-19 | End: 2019-07-26

## 2019-07-19 RX ORDER — FENTANYL CITRATE 50 UG/ML
INJECTION, SOLUTION INTRAMUSCULAR; INTRAVENOUS PRN
Status: DISCONTINUED | OUTPATIENT
Start: 2019-07-19 | End: 2019-07-19 | Stop reason: SDUPTHER

## 2019-07-19 RX ORDER — LIDOCAINE HYDROCHLORIDE 10 MG/ML
INJECTION, SOLUTION EPIDURAL; INFILTRATION; INTRACAUDAL; PERINEURAL PRN
Status: DISCONTINUED | OUTPATIENT
Start: 2019-07-19 | End: 2019-07-19 | Stop reason: SDUPTHER

## 2019-07-19 RX ORDER — MORPHINE SULFATE 2 MG/ML
2 INJECTION, SOLUTION INTRAMUSCULAR; INTRAVENOUS EVERY 4 HOURS PRN
Status: DISCONTINUED | OUTPATIENT
Start: 2019-07-19 | End: 2019-07-26 | Stop reason: HOSPADM

## 2019-07-19 RX ORDER — GLYCOPYRROLATE 1 MG/5 ML
SYRINGE (ML) INTRAVENOUS PRN
Status: DISCONTINUED | OUTPATIENT
Start: 2019-07-19 | End: 2019-07-19 | Stop reason: SDUPTHER

## 2019-07-19 RX ORDER — NEOSTIGMINE METHYLSULFATE 5 MG/5 ML
SYRINGE (ML) INTRAVENOUS PRN
Status: DISCONTINUED | OUTPATIENT
Start: 2019-07-19 | End: 2019-07-19 | Stop reason: SDUPTHER

## 2019-07-19 RX ORDER — ONDANSETRON 2 MG/ML
INJECTION INTRAMUSCULAR; INTRAVENOUS PRN
Status: DISCONTINUED | OUTPATIENT
Start: 2019-07-19 | End: 2019-07-19 | Stop reason: SDUPTHER

## 2019-07-19 RX ORDER — PROPOFOL 10 MG/ML
INJECTION, EMULSION INTRAVENOUS PRN
Status: DISCONTINUED | OUTPATIENT
Start: 2019-07-19 | End: 2019-07-19 | Stop reason: SDUPTHER

## 2019-07-19 RX ORDER — MAGNESIUM HYDROXIDE 1200 MG/15ML
LIQUID ORAL CONTINUOUS PRN
Status: COMPLETED | OUTPATIENT
Start: 2019-07-19 | End: 2019-07-19

## 2019-07-19 RX ORDER — ROCURONIUM BROMIDE 10 MG/ML
INJECTION, SOLUTION INTRAVENOUS PRN
Status: DISCONTINUED | OUTPATIENT
Start: 2019-07-19 | End: 2019-07-19 | Stop reason: SDUPTHER

## 2019-07-19 RX ORDER — SODIUM CHLORIDE 9 MG/ML
INJECTION, SOLUTION INTRAVENOUS CONTINUOUS PRN
Status: DISCONTINUED | OUTPATIENT
Start: 2019-07-19 | End: 2019-07-19 | Stop reason: SDUPTHER

## 2019-07-19 RX ORDER — MORPHINE SULFATE 4 MG/ML
4 INJECTION, SOLUTION INTRAMUSCULAR; INTRAVENOUS EVERY 4 HOURS PRN
Status: DISCONTINUED | OUTPATIENT
Start: 2019-07-19 | End: 2019-07-26 | Stop reason: HOSPADM

## 2019-07-19 RX ADMIN — SODIUM CHLORIDE: 9 INJECTION, SOLUTION INTRAVENOUS at 10:23

## 2019-07-19 RX ADMIN — Medication 0.2 MG: at 08:37

## 2019-07-19 RX ADMIN — FENTANYL CITRATE 50 MCG: 50 INJECTION, SOLUTION INTRAMUSCULAR; INTRAVENOUS at 08:18

## 2019-07-19 RX ADMIN — LEVETIRACETAM 500 MG: 500 TABLET, FILM COATED ORAL at 22:11

## 2019-07-19 RX ADMIN — ACETAMINOPHEN 1000 MG: 500 TABLET ORAL at 16:25

## 2019-07-19 RX ADMIN — PROPOFOL 110 MG: 10 INJECTION, EMULSION INTRAVENOUS at 08:18

## 2019-07-19 RX ADMIN — LIDOCAINE HYDROCHLORIDE 50 MG: 10 INJECTION, SOLUTION EPIDURAL; INFILTRATION; INTRACAUDAL at 08:18

## 2019-07-19 RX ADMIN — INSULIN LISPRO 2 UNITS: 100 INJECTION, SOLUTION INTRAVENOUS; SUBCUTANEOUS at 12:38

## 2019-07-19 RX ADMIN — METFORMIN HYDROCHLORIDE 500 MG: 500 TABLET ORAL at 16:25

## 2019-07-19 RX ADMIN — INSULIN GLARGINE 20 UNITS: 100 INJECTION, SOLUTION SUBCUTANEOUS at 22:14

## 2019-07-19 RX ADMIN — Medication 2 MG: at 08:37

## 2019-07-19 RX ADMIN — Medication 1250 MG: at 14:22

## 2019-07-19 RX ADMIN — PANTOPRAZOLE SODIUM 40 MG: 40 TABLET, DELAYED RELEASE ORAL at 10:34

## 2019-07-19 RX ADMIN — ONDANSETRON 4 MG: 2 INJECTION, SOLUTION INTRAMUSCULAR; INTRAVENOUS at 08:37

## 2019-07-19 RX ADMIN — Medication 1250 MG: at 02:20

## 2019-07-19 RX ADMIN — METFORMIN HYDROCHLORIDE 500 MG: 500 TABLET ORAL at 10:35

## 2019-07-19 RX ADMIN — MORPHINE SULFATE 2 MG: 2 INJECTION, SOLUTION INTRAMUSCULAR; INTRAVENOUS at 09:22

## 2019-07-19 RX ADMIN — LATANOPROST 1 DROP: 50 SOLUTION/ DROPS OPHTHALMIC at 22:11

## 2019-07-19 RX ADMIN — INSULIN LISPRO 1 UNITS: 100 INJECTION, SOLUTION INTRAVENOUS; SUBCUTANEOUS at 16:27

## 2019-07-19 RX ADMIN — HYDROCODONE BITARTRATE AND ACETAMINOPHEN 1 TABLET: 5; 325 TABLET ORAL at 22:14

## 2019-07-19 RX ADMIN — CARVEDILOL 12.5 MG: 12.5 TABLET, FILM COATED ORAL at 10:26

## 2019-07-19 RX ADMIN — MORPHINE SULFATE 2 MG: 2 INJECTION, SOLUTION INTRAMUSCULAR; INTRAVENOUS at 23:18

## 2019-07-19 RX ADMIN — CARVEDILOL 12.5 MG: 12.5 TABLET, FILM COATED ORAL at 16:26

## 2019-07-19 RX ADMIN — SODIUM CHLORIDE: 900 INJECTION, SOLUTION INTRAVENOUS at 08:12

## 2019-07-19 RX ADMIN — ROCURONIUM BROMIDE 30 MG: 10 INJECTION INTRAVENOUS at 08:18

## 2019-07-19 RX ADMIN — HYDROCODONE BITARTRATE AND ACETAMINOPHEN 1 TABLET: 5; 325 TABLET ORAL at 14:19

## 2019-07-19 RX ADMIN — ACETAMINOPHEN 1000 MG: 500 TABLET ORAL at 10:25

## 2019-07-19 ASSESSMENT — PAIN SCALES - GENERAL
PAINLEVEL_OUTOF10: 0
PAINLEVEL_OUTOF10: 6
PAINLEVEL_OUTOF10: 1
PAINLEVEL_OUTOF10: 0
PAINLEVEL_OUTOF10: 6
PAINLEVEL_OUTOF10: 0
PAINLEVEL_OUTOF10: 7
PAINLEVEL_OUTOF10: 3
PAINLEVEL_OUTOF10: 1
PAINLEVEL_OUTOF10: 8

## 2019-07-19 ASSESSMENT — PAIN DESCRIPTION - PAIN TYPE
TYPE: ACUTE PAIN;SURGICAL PAIN
TYPE: ACUTE PAIN;SURGICAL PAIN
TYPE: SURGICAL PAIN

## 2019-07-19 ASSESSMENT — PULMONARY FUNCTION TESTS
PIF_VALUE: 16
PIF_VALUE: 16
PIF_VALUE: 0
PIF_VALUE: 1
PIF_VALUE: 3
PIF_VALUE: 14
PIF_VALUE: 25
PIF_VALUE: 17
PIF_VALUE: 13
PIF_VALUE: 16
PIF_VALUE: 0
PIF_VALUE: 16
PIF_VALUE: 0
PIF_VALUE: 16
PIF_VALUE: 16
PIF_VALUE: 3
PIF_VALUE: 18
PIF_VALUE: 15
PIF_VALUE: 12
PIF_VALUE: 16
PIF_VALUE: 0
PIF_VALUE: 25
PIF_VALUE: 15
PIF_VALUE: 1
PIF_VALUE: 16
PIF_VALUE: 14
PIF_VALUE: 17
PIF_VALUE: 14
PIF_VALUE: 25
PIF_VALUE: 16
PIF_VALUE: 14
PIF_VALUE: 0
PIF_VALUE: 6

## 2019-07-19 ASSESSMENT — PAIN DESCRIPTION - ONSET
ONSET: SUDDEN
ONSET: SUDDEN

## 2019-07-19 ASSESSMENT — ENCOUNTER SYMPTOMS
BLOOD IN STOOL: 0
SHORTNESS OF BREATH: 0
COUGH: 0
NAUSEA: 0
ABDOMINAL DISTENTION: 0
WHEEZING: 0
VOMITING: 0
PHOTOPHOBIA: 0

## 2019-07-19 ASSESSMENT — PAIN DESCRIPTION - DESCRIPTORS
DESCRIPTORS: JABBING;SHARP
DESCRIPTORS: JABBING;SHARP

## 2019-07-19 ASSESSMENT — PAIN DESCRIPTION - ORIENTATION
ORIENTATION: RIGHT
ORIENTATION: RIGHT

## 2019-07-19 ASSESSMENT — PAIN DESCRIPTION - FREQUENCY
FREQUENCY: INTERMITTENT
FREQUENCY: INTERMITTENT

## 2019-07-19 ASSESSMENT — PAIN DESCRIPTION - PROGRESSION
CLINICAL_PROGRESSION: NOT CHANGED
CLINICAL_PROGRESSION: GRADUALLY IMPROVING

## 2019-07-19 ASSESSMENT — PAIN DESCRIPTION - LOCATION
LOCATION: INCISION;LEG
LOCATION: INCISION;LEG
LOCATION: FOOT

## 2019-07-19 NOTE — ANESTHESIA PRE PROCEDURE
 Cerebrovascular disease I67.9    Cataract H26.9    Dysphagia R13.10    Family history of colon cancer Z80.0    Bowel habit changes R19.4    Hyperlipidemia E78.5    Gangrene of foot ([facility] I96    Diabetic foot left E11.8    Acute kidney injury ([facility] N17.9    Ischemic foot left I99.8    Urinary retention R33.9    Subacute osteomyelitis of left foot ([facility]) M86.272    Hx of BKA ([facility]) Z89.519    Impaired mobility Z74.09    HCAP (healthcare-associated pneumonia) J18.9    Primary open angle glaucoma of both eyes, moderate stage H40.1132    Pseudophakia of both eyes Z96.1    Retinopathy, diabetic, bilateral ([facility]) E11.319    Chest pain, atypical R07.89    Paresthesia of right upper extremity Z71.2    Metabolic acidosis S96.5    Right arm pain M79.601    Neck pain M54.2    Intracranial carotid stenosis, left I65.22    Calculus of gallbladder without cholecystitis without obstruction K80.20    Hemispheric carotid artery syndrome G45.1    Abdominal pain R10.9    Acute cholecystitis with chronic cholecystitis K81.2    Severe malnutrition ([facility]) E43    Altered mental status R41.82    PRES (posterior reversible encephalopathy syndrome) I67.83    New onset seizure ([facility]) R56.9    Hypertensive urgency I16.0    Hypertensive emergency I16.1    PAD (peripheral artery disease) ([facility]) I73.9    Cellulitis L03.90    Cellulitis of right foot L03.115       Past Medical History:        Diagnosis Date    Acid reflux     Calculus of gallbladder without cholecystitis without obstruction 9/2/2018    Cerebrovascular disease 2006    TIA    Diabetic foot infection ([facility]     Drug (multiple) resistant infection     GERD (gastroesophageal reflux disease)     Glaucoma     Glaucoma     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

## 2019-07-19 NOTE — PROGRESS NOTES
over 11 mo per EMR; pt w/ 3.5% wt loss x 9 mo  · Ideal Body Wt: 139 lb (63 kg)(per BKA), % Ideal Body 118% (Adm/ideal)  · Adjusted Body Wt: 139 lb (63 kg), body weight adjusted for BKA  · BMI Classification: BMI 25.0 - 29.9 Overweight    Nutrition Interventions:   Modify current diet, Start ONS  Continued Inpatient Monitoring, Education Not Indicated    Nutrition Evaluation:   · Evaluation: Goals set   · Goals: Pt to consume >75% of meals/supplements     · Monitoring: Nutrition Progression, Meal Intake, Supplement Intake, Diet Tolerance, Skin Integrity, Wound Healing, I&O, Weight, Pertinent Labs, Monitor Bowel Function      Electronically signed by Agora Shopping, RD, LD on 7/19/19 at 12:09 PM    Contact Number: [phone]

## 2019-07-19 NOTE — H&P
[facility]        HISTORY AND PHYSICAL EXAMINATION            Date:   7/19/2019  Patient name:  Olivia Mcgowan  Date of admission:  7/18/2019  1:14 PM  MRN:   [id number]  Account:  [de-identified]  YOB: 1965  PCP:    Dana Gill MD  Room:   2024/2024-01  Code Status:    Full Code    Chief Complaint:     Chief Complaint   Patient presents with    Foot Pain     right foot. arch pain where a blister has formed. being seen by podiotry for a toe that is dead on the same foot. History Obtained From:     patient, electronic medical record    History of Present Illness: The patient is a 52 y.o. male who is admitted to the hospital for the management of Diabetic foot    Patient was admitted thru ER with:  Samantha Lion is a 52 y.o. male 57-year-old male history of severe atherosclerotic disease diabetes, above-the-knee amputation left side, history of dry gangrene right lower extremity foot multiple toes, follows with Dr. Andrea Sam for vascular and Mir for podietry   Brought in with family for worsening pain and redness of right foot inability to bear weight without pain   Patient recently had aortogram with runoff done 2 weeks ago was also admitted for vascular insufficiency as well as gangrene of the foot, aortogram did show anterior tibial artery occlusion distally, that was opened patient was placed on aspirin Plavix as well as placed on Augmentin which patient is currently taking   Family concern for worsening infection, patient does admit to chills no fevers no cough no shortness of breath no chest pain. 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    Hyperlipidemia     Hypertension     IDDM (insulin dependent diabetes mellitus) (HCC)     MDRO (multiple drug resistant organisms) resistance     MRSA (methicillin resistant staph aureus) culture positive 09/03/2017    foot    Neuropathy     Osteomyelitis (HCC)     Left Hallux    S/P cataract extraction and insertion of intraocular lens 2015    bilateral     Type II or unspecified type diabetes mellitus without mention of complication, not stated as uncontrolled         Past Surgical History:     Past Surgical History:   Procedure Laterality Date    EYE SURGERY Bilateral     lazer both eyes    FOOT SURGERY Left 02/24/2017    surgical prep of wound bed    FOOT SURGERY Left 04/13/2017    1) Left foot surgical preparation of wound-bed, 2) Left foot application of graft     [facility].  PICC [facility] DOUBLE  4/28/2017         LEG AMPUTATION BELOW KNEE Left 9/7/2017    LEFT BELOW KNEE GUILLOTINE AMPUTATION performed by Ernesto Banda MD at [address] Left 9/14/2017    REVISION LEFT BELOW KNEE AMPUTATION, CLOSED  performed by Ernesto Banda MD at [facility] [address] Left 01/23/2017    I and D bone, bone biopsy with flap closure and pulse lavage    OTHER SURGICAL HISTORY Left 04/27/2017    I & D foot    [place] DEEP DISSEC FOOT INFEC,1 BURSA Left 4/13/2017    FOOT DEBRIDEMENT WITH EPIFIX  performed by Max Keane DPM at [facility] INCIS/DRAIN THIGH/KNEE ABSCESS,DEEP Left 9/12/2017     LEFT BELOW KNEE AMPUTATION OPEN, WOUND VAC PLACEMENT performed by Ernesto Banda MD at [facility] 5 N/A 9/25/2018    CHOLECYSTECTOMY LAPAROSCOPIC performed by Mahendra Mckee MD at [address] now s/p BKA right and  left   Neurological: He is alert and oriented to person, place, and time. Skin: Skin is warm and dry. He is not diaphoretic. Vitals reviewed. Investigations:      Laboratory Testing:  Recent Results (from the past 24 hour(s))   Wound Gram stain    Collection Time: 07/18/19  9:00 PM   Result Value Ref Range    Specimen Description . WOUND     Special Requests NOT REPORTED     Direct Exam DUPLICATE ORDER INCLUDED IN CULTURE    CULTURE BLOOD #1    Collection Time: 07/18/19  9:15 PM   Result Value Ref Range    Specimen Description . BLOOD     Special Requests r arm 10cc     Culture NO GROWTH 14 HOURS    CULTURE BLOOD #2    Collection Time: 07/18/19  9:16 PM   Result Value Ref Range    Specimen Description . BLOOD     Special Requests 10MLS LT ARM     Culture NO GROWTH 14 HOURS    POC Glucose Fingerstick    Collection Time: 07/18/19 10:53 PM   Result Value Ref Range    POC Glucose 202 (H) 75 - 110 mg/dL   Basic Metabolic Panel w/ Reflex to MG    Collection Time: 07/19/19  4:49 AM   Result Value Ref Range    Glucose 280 (H) 70 - 99 mg/dL    BUN 25 (H) 6 - 20 mg/dL    CREATININE 0.94 0.70 - 1.20 mg/dL    Bun/Cre Ratio NOT REPORTED 9 - 20    Calcium 8.7 8.6 - 10.4 mg/dL    Sodium 134 (L) 135 - 144 mmol/L    Potassium 4.6 3.7 - 5.3 mmol/L    Chloride 102 98 - 107 mmol/L    CO2 20 20 - 31 mmol/L    Anion Gap 12 9 - 17 mmol/L    GFR Non-African American >60 >60 mL/min    GFR African American >60 >60 mL/min    GFR Comment          GFR Staging NOT REPORTED    CBC    Collection Time: 07/19/19  4:49 AM   Result Value Ref Range    WBC 18.3 (H) 3.5 - 11.3 k/uL    RBC 3.46 (L) 4.21 - 5.77 m/uL    Hemoglobin 9.2 (L) 13.0 - 17.0 g/dL    Hematocrit 31.4 (L) 40.7 - 50.3 %    MCV 90.8 82.6 - 102.9 fL    MCH 26.6 25.2 - 33.5 pg    MCHC 29.3 28.4 - 34.8 g/dL    RDW 12.9 11.8 - 14.4 %    Platelets 879 (H) 358 - 453 k/uL    MPV 11.6 8.1 - 13.5 fL    NRBC Automated 0.0 0.0 per 100 WBC   Wound Culture    Collection

## 2019-07-20 PROBLEM — E88.09 HYPOALBUMINEMIA DUE TO PROTEIN-CALORIE MALNUTRITION (HCC): Status: ACTIVE | Noted: 2019-07-20

## 2019-07-20 PROBLEM — E46 HYPOALBUMINEMIA DUE TO PROTEIN-CALORIE MALNUTRITION (HCC): Status: ACTIVE | Noted: 2019-07-20

## 2019-07-20 PROBLEM — E83.51 HYPOCALCEMIA: Status: ACTIVE | Noted: 2019-07-20

## 2019-07-20 LAB
ALBUMIN SERPL-MCNC: 2.4 G/DL (ref 3.5–5.2)
ALBUMIN/GLOBULIN RATIO: 0.6 (ref 1–2.5)
ALP BLD-CCNC: 80 U/L (ref 40–129)
ALT SERPL-CCNC: 23 U/L (ref 5–41)
ANION GAP SERPL CALCULATED.3IONS-SCNC: 10 MMOL/L (ref 9–17)
AST SERPL-CCNC: 19 U/L
BILIRUB SERPL-MCNC: <0.1 MG/DL (ref 0.3–1.2)
BILIRUBIN DIRECT: <0.08 MG/DL
BILIRUBIN, INDIRECT: ABNORMAL MG/DL (ref 0–1)
BUN BLDV-MCNC: 19 MG/DL (ref 6–20)
CALCIUM SERPL-MCNC: 8.3 MG/DL (ref 8.6–10.4)
CHLORIDE BLD-SCNC: 105 MMOL/L (ref 98–107)
CO2: 22 MMOL/L (ref 20–31)
CREAT SERPL-MCNC: 0.95 MG/DL (ref 0.7–1.2)
GFR AFRICAN AMERICAN: >60 ML/MIN
GFR NON-AFRICAN AMERICAN: >60 ML/MIN
GFR SERPL CREATININE-BSD FRML MDRD: ABNORMAL ML/MIN/{1.73_M2}
GFR SERPL CREATININE-BSD FRML MDRD: ABNORMAL ML/MIN/{1.73_M2}
GLUCOSE BLD-MCNC: 105 MG/DL (ref 75–110)
GLUCOSE BLD-MCNC: 112 MG/DL (ref 75–110)
GLUCOSE BLD-MCNC: 123 MG/DL (ref 75–110)
GLUCOSE BLD-MCNC: 154 MG/DL (ref 70–99)
GLUCOSE BLD-MCNC: 96 MG/DL (ref 75–110)
HCT VFR BLD CALC: 29.7 % (ref 40.7–50.3)
HEMOGLOBIN: 8.9 G/DL (ref 13–17)
MCH RBC QN AUTO: 26.9 PG (ref 25.2–33.5)
MCHC RBC AUTO-ENTMCNC: 30 G/DL (ref 28.4–34.8)
MCV RBC AUTO: 89.7 FL (ref 82.6–102.9)
NRBC AUTOMATED: 0 PER 100 WBC
PDW BLD-RTO: 12.7 % (ref 11.8–14.4)
PLATELET # BLD: 455 K/UL (ref 138–453)
PMV BLD AUTO: 11.4 FL (ref 8.1–13.5)
POTASSIUM SERPL-SCNC: 4.4 MMOL/L (ref 3.7–5.3)
RBC # BLD: 3.31 M/UL (ref 4.21–5.77)
SODIUM BLD-SCNC: 137 MMOL/L (ref 135–144)
TOTAL PROTEIN: 6.5 G/DL (ref 6.4–8.3)
VANCOMYCIN TROUGH DATE LAST DOSE: ABNORMAL
VANCOMYCIN TROUGH DOSE AMOUNT: ABNORMAL
VANCOMYCIN TROUGH TIME LAST DOSE: ABNORMAL
VANCOMYCIN TROUGH: 28.9 UG/ML (ref 10–20)
WBC # BLD: 8.9 K/UL (ref 3.5–11.3)

## 2019-07-20 PROCEDURE — 2580000003 HC RX 258: Performed by: STUDENT IN AN ORGANIZED HEALTH CARE EDUCATION/TRAINING PROGRAM

## 2019-07-20 PROCEDURE — 36415 COLL VENOUS BLD VENIPUNCTURE: CPT

## 2019-07-20 PROCEDURE — 99232 SBSQ HOSP IP/OBS MODERATE 35: CPT | Performed by: INTERNAL MEDICINE

## 2019-07-20 PROCEDURE — 80053 COMPREHEN METABOLIC PANEL: CPT

## 2019-07-20 PROCEDURE — 6360000002 HC RX W HCPCS: Performed by: STUDENT IN AN ORGANIZED HEALTH CARE EDUCATION/TRAINING PROGRAM

## 2019-07-20 PROCEDURE — 85027 COMPLETE CBC AUTOMATED: CPT

## 2019-07-20 PROCEDURE — 80202 ASSAY OF VANCOMYCIN: CPT

## 2019-07-20 PROCEDURE — 6370000000 HC RX 637 (ALT 250 FOR IP): Performed by: STUDENT IN AN ORGANIZED HEALTH CARE EDUCATION/TRAINING PROGRAM

## 2019-07-20 PROCEDURE — 2060000000 HC ICU INTERMEDIATE R&B

## 2019-07-20 PROCEDURE — 82947 ASSAY GLUCOSE BLOOD QUANT: CPT

## 2019-07-20 PROCEDURE — 99024 POSTOP FOLLOW-UP VISIT: CPT | Performed by: SURGERY

## 2019-07-20 PROCEDURE — 82248 BILIRUBIN DIRECT: CPT

## 2019-07-20 RX ORDER — METOPROLOL TARTRATE 5 MG/5ML
5 INJECTION INTRAVENOUS EVERY 4 HOURS PRN
Status: DISCONTINUED | OUTPATIENT
Start: 2019-07-20 | End: 2019-07-26 | Stop reason: HOSPADM

## 2019-07-20 RX ADMIN — CLOPIDOGREL 75 MG: 75 TABLET, FILM COATED ORAL at 08:28

## 2019-07-20 RX ADMIN — TAMSULOSIN HYDROCHLORIDE 0.4 MG: 0.4 CAPSULE ORAL at 08:43

## 2019-07-20 RX ADMIN — CYCLOBENZAPRINE HYDROCHLORIDE 5 MG: 5 TABLET, FILM COATED ORAL at 08:28

## 2019-07-20 RX ADMIN — FOLIC ACID 1 MG: 1 TABLET ORAL at 08:43

## 2019-07-20 RX ADMIN — METFORMIN HYDROCHLORIDE 500 MG: 500 TABLET ORAL at 08:43

## 2019-07-20 RX ADMIN — HYDROCODONE BITARTRATE AND ACETAMINOPHEN 1 TABLET: 5; 325 TABLET ORAL at 21:27

## 2019-07-20 RX ADMIN — LATANOPROST 1 DROP: 50 SOLUTION/ DROPS OPHTHALMIC at 21:25

## 2019-07-20 RX ADMIN — LEVETIRACETAM 500 MG: 500 TABLET, FILM COATED ORAL at 21:25

## 2019-07-20 RX ADMIN — PANTOPRAZOLE SODIUM 40 MG: 40 TABLET, DELAYED RELEASE ORAL at 06:31

## 2019-07-20 RX ADMIN — INSULIN GLARGINE 20 UNITS: 100 INJECTION, SOLUTION SUBCUTANEOUS at 21:25

## 2019-07-20 RX ADMIN — HYDROCODONE BITARTRATE AND ACETAMINOPHEN 1 TABLET: 5; 325 TABLET ORAL at 02:21

## 2019-07-20 RX ADMIN — ASPIRIN 81 MG: 81 TABLET ORAL at 08:31

## 2019-07-20 RX ADMIN — ENOXAPARIN SODIUM 40 MG: 40 INJECTION SUBCUTANEOUS at 08:27

## 2019-07-20 RX ADMIN — MORPHINE SULFATE 2 MG: 2 INJECTION, SOLUTION INTRAMUSCULAR; INTRAVENOUS at 18:21

## 2019-07-20 RX ADMIN — LEVETIRACETAM 500 MG: 500 TABLET, FILM COATED ORAL at 08:44

## 2019-07-20 RX ADMIN — PREGABALIN 50 MG: 50 CAPSULE ORAL at 08:43

## 2019-07-20 RX ADMIN — MORPHINE SULFATE 2 MG: 2 INJECTION, SOLUTION INTRAMUSCULAR; INTRAVENOUS at 04:30

## 2019-07-20 RX ADMIN — METFORMIN HYDROCHLORIDE 500 MG: 500 TABLET ORAL at 18:17

## 2019-07-20 RX ADMIN — LISINOPRIL 40 MG: 20 TABLET ORAL at 08:43

## 2019-07-20 RX ADMIN — ACETAMINOPHEN 1000 MG: 500 TABLET ORAL at 02:21

## 2019-07-20 RX ADMIN — CARVEDILOL 12.5 MG: 12.5 TABLET, FILM COATED ORAL at 08:43

## 2019-07-20 RX ADMIN — TIMOLOL MALEATE 1 DROP: 2.5 SOLUTION/ DROPS OPHTHALMIC at 13:12

## 2019-07-20 RX ADMIN — PRAVASTATIN SODIUM 10 MG: 20 TABLET ORAL at 08:27

## 2019-07-20 RX ADMIN — HYDROCODONE BITARTRATE AND ACETAMINOPHEN 1 TABLET: 5; 325 TABLET ORAL at 08:56

## 2019-07-20 RX ADMIN — Medication 1250 MG: at 02:21

## 2019-07-20 RX ADMIN — CYCLOBENZAPRINE HYDROCHLORIDE 5 MG: 5 TABLET, FILM COATED ORAL at 02:20

## 2019-07-20 RX ADMIN — SODIUM CHLORIDE: 9 INJECTION, SOLUTION INTRAVENOUS at 05:18

## 2019-07-20 RX ADMIN — MORPHINE SULFATE 2 MG: 2 INJECTION, SOLUTION INTRAMUSCULAR; INTRAVENOUS at 13:13

## 2019-07-20 RX ADMIN — Medication 10 ML: at 21:26

## 2019-07-20 ASSESSMENT — PAIN DESCRIPTION - DESCRIPTORS
DESCRIPTORS: JABBING;SHARP
DESCRIPTORS: SPASM
DESCRIPTORS: JABBING;SHARP

## 2019-07-20 ASSESSMENT — ENCOUNTER SYMPTOMS
ABDOMINAL PAIN: 0
SHORTNESS OF BREATH: 0
COUGH: 0
NAUSEA: 0
VOMITING: 0

## 2019-07-20 ASSESSMENT — PAIN SCALES - GENERAL
PAINLEVEL_OUTOF10: 4
PAINLEVEL_OUTOF10: 5
PAINLEVEL_OUTOF10: 3
PAINLEVEL_OUTOF10: 5
PAINLEVEL_OUTOF10: 6
PAINLEVEL_OUTOF10: 5
PAINLEVEL_OUTOF10: 5
PAINLEVEL_OUTOF10: 6
PAINLEVEL_OUTOF10: 8

## 2019-07-20 ASSESSMENT — PAIN DESCRIPTION - LOCATION
LOCATION: LEG;INCISION
LOCATION: INCISION;LEG
LOCATION: INCISION;LEG

## 2019-07-20 ASSESSMENT — PAIN DESCRIPTION - PROGRESSION

## 2019-07-20 ASSESSMENT — PAIN DESCRIPTION - PAIN TYPE
TYPE: ACUTE PAIN;SURGICAL PAIN
TYPE: ACUTE PAIN;SURGICAL PAIN
TYPE: ACUTE PAIN;SUPERFICIAL SOMATIC PAIN

## 2019-07-20 ASSESSMENT — PAIN DESCRIPTION - FREQUENCY
FREQUENCY: INTERMITTENT

## 2019-07-20 ASSESSMENT — PAIN DESCRIPTION - ORIENTATION
ORIENTATION: RIGHT

## 2019-07-20 ASSESSMENT — PAIN DESCRIPTION - ONSET
ONSET: GRADUAL
ONSET: SUDDEN
ONSET: SUDDEN

## 2019-07-20 NOTE — OP NOTE
[facility]                                OPERATIVE REPORT    PATIENT NAME: Lenny Qureshi                      :        1965  MED REC NO:   [id number]                             ROOM:         ACCOUNT NO:   [de-identified]                           ADMIT DATE: 2019  PROVIDER:     Jody Waller MD    DATE OF PROCEDURE:  2019    PREOPERATIVE DIAGNOSIS:  Ischemic wet gangrene of right foot. POSTOPERATIVE DIAGNOSIS:  Ischemic wet gangrene of right foot. PROCEDURE:  Right below-knee guillotine amputation. ANESTHESIA:  General.    SURGEON:  Jody Waller MD    ASSISTANT:  Dr. Niles Musa. COMPLICATIONS:  None. ESTIMATED BLOOD LOSS:  30 mL. SPECIMENS:  Right foot. INDICATION FOR PROCEDURE:  The patient is a 47-year-old gentleman who  had presented to the emergency room yesterday with worsening ischemia to  his right foot with wet gangrene. He recently underwent percutaneous  revascularization for peripheral arterial disease and tibial occlusive  disease. Unfortunately, his wound had progressed and had become worse,  and his foot was not salvageable. He was recommended to undergo  guillotine amputation for source control of his infection and then  revision to a formal below-knee amputation in the near future. Risks  and benefits had been explained to him and his family, and he consented  to proceed. DESCRIPTION OF PROCEDURE:  The patient was brought to the operating  room. After appropriate anesthesia was delivered, the right foot was  wrapped in [address] [place] [address],[address] and then was prepped and draped to the knee in a   standard sterile fashion. Time-out was performed and agreed upon. The patient was already on systemic  antibiotics, so none was given.   I began by making a circumferential  incision above the malleolus using the scalpel to cut through the skin,  soft tissue, tendons, all

## 2019-07-20 NOTE — PROGRESS NOTES
per primary team      [address] [place] [address],[address]: [phone]      Electronically signed by Nikole Rhodes DO on 7/20/2019 at 6:56 AM

## 2019-07-20 NOTE — PROGRESS NOTES
Cerebrovascular disease, Diabetic foot infection ([facility] Drug (multiple) resistant infection, GERD (gastroesophageal reflux disease), Glaucoma, Glaucoma, Hemispheric carotid artery syndrome, Hyperlipidemia, Hypertension, IDDM (insulin dependent diabetes mellitus) ([facility] MDRO (multiple drug resistant organisms) resistance, MRSA (methicillin resistant staph aureus) culture positive, Neuropathy, Osteomyelitis ([facility] S/P cataract extraction and insertion of intraocular lens, and Type II or unspecified type diabetes mellitus without mention of complication, not stated as uncontrolled. Social History:   reports that he quit smoking about 10 months ago. His smoking use included cigarettes. He smoked 0.00 packs per day. He has never used smokeless tobacco. He reports that he does not drink alcohol or use drugs. Family History:   Family History   Problem Relation Age of Onset    Diabetes Mother     Cancer Father         colon    Diabetes Father     Diabetes Sister     Diabetes Sister        Vitals:  BP (!) 114/52   Pulse 62   Temp 97.9 °F (36.6 °C) (Oral)   Resp 14   Ht 5' 7\" (1.702 m)   Wt 164 lb 3.9 oz (74.5 kg)   SpO2 100%   BMI 25.72 kg/m²   Temp (24hrs), Av °F (36.7 °C), Min:97.8 °F (36.6 °C), Max:98.2 °F (36.8 °C)    Recent Labs     19  1626 19  2207 19  0816 19  1212   POCGLU 187* 157* 123* 112*       I/O (24Hr): Intake/Output Summary (Last 24 hours) at 2019 1539  Last data filed at 2019 0900  Gross per 24 hour   Intake 1895.78 ml   Output 1370 ml   Net 525.78 ml         Review of Systems:     Review of Systems   Constitutional: Negative for chills, diaphoresis and fever. Respiratory: Negative for cough and shortness of breath. Cardiovascular: Negative for chest pain and palpitations. Gastrointestinal: Negative for abdominal pain, nausea and vomiting. Genitourinary: Negative for flank pain and hematuria.    Musculoskeletal:        His incisional pain

## 2019-07-21 PROBLEM — D75.839 THROMBOCYTOSIS: Status: ACTIVE | Noted: 2019-07-21

## 2019-07-21 PROBLEM — D62 ACUTE BLOOD LOSS AS CAUSE OF POSTOPERATIVE ANEMIA: Status: ACTIVE | Noted: 2019-07-21

## 2019-07-21 PROBLEM — A49.02 MRSA INFECTION: Status: ACTIVE | Noted: 2019-07-21

## 2019-07-21 LAB
CULTURE: ABNORMAL
DIRECT EXAM: ABNORMAL
GLUCOSE BLD-MCNC: 102 MG/DL (ref 75–110)
GLUCOSE BLD-MCNC: 118 MG/DL (ref 75–110)
GLUCOSE BLD-MCNC: 92 MG/DL (ref 75–110)
GLUCOSE BLD-MCNC: 97 MG/DL (ref 75–110)
Lab: ABNORMAL
SPECIMEN DESCRIPTION: ABNORMAL

## 2019-07-21 PROCEDURE — 97530 THERAPEUTIC ACTIVITIES: CPT

## 2019-07-21 PROCEDURE — 99024 POSTOP FOLLOW-UP VISIT: CPT | Performed by: SURGERY

## 2019-07-21 PROCEDURE — 99232 SBSQ HOSP IP/OBS MODERATE 35: CPT | Performed by: INTERNAL MEDICINE

## 2019-07-21 PROCEDURE — 6370000000 HC RX 637 (ALT 250 FOR IP): Performed by: STUDENT IN AN ORGANIZED HEALTH CARE EDUCATION/TRAINING PROGRAM

## 2019-07-21 PROCEDURE — 97535 SELF CARE MNGMENT TRAINING: CPT

## 2019-07-21 PROCEDURE — 2580000003 HC RX 258: Performed by: STUDENT IN AN ORGANIZED HEALTH CARE EDUCATION/TRAINING PROGRAM

## 2019-07-21 PROCEDURE — 2500000003 HC RX 250 WO HCPCS: Performed by: NURSE PRACTITIONER

## 2019-07-21 PROCEDURE — 82947 ASSAY GLUCOSE BLOOD QUANT: CPT

## 2019-07-21 PROCEDURE — 2060000000 HC ICU INTERMEDIATE R&B

## 2019-07-21 PROCEDURE — 97166 OT EVAL MOD COMPLEX 45 MIN: CPT

## 2019-07-21 PROCEDURE — 6360000002 HC RX W HCPCS: Performed by: STUDENT IN AN ORGANIZED HEALTH CARE EDUCATION/TRAINING PROGRAM

## 2019-07-21 PROCEDURE — 94760 N-INVAS EAR/PLS OXIMETRY 1: CPT

## 2019-07-21 PROCEDURE — 97162 PT EVAL MOD COMPLEX 30 MIN: CPT

## 2019-07-21 RX ADMIN — MORPHINE SULFATE 4 MG: 4 INJECTION INTRAVENOUS at 20:41

## 2019-07-21 RX ADMIN — PRAVASTATIN SODIUM 10 MG: 20 TABLET ORAL at 08:25

## 2019-07-21 RX ADMIN — METOPROLOL TARTRATE 5 MG: 5 INJECTION, SOLUTION INTRAVENOUS at 04:16

## 2019-07-21 RX ADMIN — Medication 10 ML: at 00:52

## 2019-07-21 RX ADMIN — METFORMIN HYDROCHLORIDE 500 MG: 500 TABLET ORAL at 08:25

## 2019-07-21 RX ADMIN — MORPHINE SULFATE 2 MG: 2 INJECTION, SOLUTION INTRAMUSCULAR; INTRAVENOUS at 04:57

## 2019-07-21 RX ADMIN — PANTOPRAZOLE SODIUM 40 MG: 40 TABLET, DELAYED RELEASE ORAL at 06:06

## 2019-07-21 RX ADMIN — Medication 10 ML: at 20:41

## 2019-07-21 RX ADMIN — HYDROCODONE BITARTRATE AND ACETAMINOPHEN 1 TABLET: 5; 325 TABLET ORAL at 13:21

## 2019-07-21 RX ADMIN — TAMSULOSIN HYDROCHLORIDE 0.4 MG: 0.4 CAPSULE ORAL at 08:25

## 2019-07-21 RX ADMIN — LATANOPROST 1 DROP: 50 SOLUTION/ DROPS OPHTHALMIC at 22:16

## 2019-07-21 RX ADMIN — ENOXAPARIN SODIUM 40 MG: 40 INJECTION SUBCUTANEOUS at 08:24

## 2019-07-21 RX ADMIN — CARVEDILOL 12.5 MG: 12.5 TABLET, FILM COATED ORAL at 08:25

## 2019-07-21 RX ADMIN — TIMOLOL MALEATE 1 DROP: 2.5 SOLUTION/ DROPS OPHTHALMIC at 20:15

## 2019-07-21 RX ADMIN — ACETAMINOPHEN 1000 MG: 500 TABLET ORAL at 02:29

## 2019-07-21 RX ADMIN — LEVETIRACETAM 500 MG: 500 TABLET, FILM COATED ORAL at 22:16

## 2019-07-21 RX ADMIN — METFORMIN HYDROCHLORIDE 500 MG: 500 TABLET ORAL at 17:34

## 2019-07-21 RX ADMIN — CYCLOBENZAPRINE HYDROCHLORIDE 5 MG: 5 TABLET, FILM COATED ORAL at 08:25

## 2019-07-21 RX ADMIN — PREGABALIN 50 MG: 50 CAPSULE ORAL at 08:25

## 2019-07-21 RX ADMIN — FOLIC ACID 1 MG: 1 TABLET ORAL at 08:25

## 2019-07-21 RX ADMIN — VANCOMYCIN HYDROCHLORIDE 1250 MG: 10 INJECTION, POWDER, LYOPHILIZED, FOR SOLUTION INTRAVENOUS at 06:06

## 2019-07-21 RX ADMIN — LEVETIRACETAM 500 MG: 500 TABLET, FILM COATED ORAL at 08:25

## 2019-07-21 RX ADMIN — LISINOPRIL 40 MG: 20 TABLET ORAL at 08:25

## 2019-07-21 RX ADMIN — Medication 10 ML: at 04:57

## 2019-07-21 RX ADMIN — MORPHINE SULFATE 2 MG: 2 INJECTION, SOLUTION INTRAMUSCULAR; INTRAVENOUS at 00:51

## 2019-07-21 RX ADMIN — MORPHINE SULFATE 4 MG: 4 INJECTION INTRAVENOUS at 10:21

## 2019-07-21 RX ADMIN — ASPIRIN 81 MG: 81 TABLET ORAL at 08:25

## 2019-07-21 RX ADMIN — Medication 10 ML: at 22:17

## 2019-07-21 RX ADMIN — Medication 10 ML: at 10:22

## 2019-07-21 ASSESSMENT — PAIN SCALES - GENERAL
PAINLEVEL_OUTOF10: 7
PAINLEVEL_OUTOF10: 9
PAINLEVEL_OUTOF10: 9
PAINLEVEL_OUTOF10: 3
PAINLEVEL_OUTOF10: 9
PAINLEVEL_OUTOF10: 8
PAINLEVEL_OUTOF10: 4
PAINLEVEL_OUTOF10: 6
PAINLEVEL_OUTOF10: 8
PAINLEVEL_OUTOF10: 3
PAINLEVEL_OUTOF10: 9
PAINLEVEL_OUTOF10: 9

## 2019-07-21 ASSESSMENT — PAIN DESCRIPTION - ORIENTATION
ORIENTATION: RIGHT
ORIENTATION: RIGHT

## 2019-07-21 ASSESSMENT — ENCOUNTER SYMPTOMS
VOMITING: 0
SHORTNESS OF BREATH: 0
ABDOMINAL PAIN: 0
NAUSEA: 0
COUGH: 0

## 2019-07-21 ASSESSMENT — PAIN DESCRIPTION - LOCATION
LOCATION: LEG
LOCATION: LEG

## 2019-07-21 ASSESSMENT — PAIN DESCRIPTION - PAIN TYPE: TYPE: ACUTE PAIN

## 2019-07-21 NOTE — PROGRESS NOTES
basement, One level  Home Access: Stairs to enter with rails  Entrance Stairs - Number of Steps: 5  Entrance Stairs - Rails: Both  Bathroom Shower/Tub: Tub/Shower unit  Bathroom Toilet: Standard  Bathroom Equipment: Shower chair, Grab bars around toilet  Bathroom Accessibility: Accessible  Home Equipment: 4 wheeled walker, Wheelchair-manual, Rolling walker(uses RW. w/c used in house without prosthetic)  Receives Help From: Family  ADL Assistance: Needs assistance  Bath: Minimal assistance  Dressing: Minimal assistance  Homemaking Assistance: Needs assistance  Homemaking Responsibilities: No  Ambulation Assistance: Independent  Transfer Assistance: Independent  Active : No  Patient's  Info: Spouse does all driving  Leisure & Hobbies: TV or sleep, time with granddchildren        Objective   Vision: Impaired  Vision Exceptions: Legally blind  Hearing: Within functional limits    Orientation  Overall Orientation Status: Within Functional Limits     Balance  Sitting Balance: Supervision  Standing Balance: Minimal assistance  Standing Balance  Time: ~2minutes   Activity: pivoting into bedside chair   Functional Mobility  Functional - Mobility Device: Rolling Walker  Activity: Other  Assist Level: Minimal assistance  ADL  Feeding: Stand by assistance  Grooming: Stand by assistance  UE Bathing: Minimal assistance  LE Bathing: Minimal assistance  UE Dressing: Minimal assistance  LE Dressing: Moderate assistance  Toileting: Moderate assistance  Additional Comments: Patient completed bed mobility at [address] [place] [address] with verbal cueing provided to compensate for decreased vision. Patient sat EOB with good trunk control and Min A to properly don prosthetic on L LE with verbal cues provided for accuracy. Patient Min A to sit to stand and Min A to complete pivot to get into the bedside chair. Patient able to doff prosthetic with assistance and retired to recliner.   Educated patient and family on continuation of therapy services while patient is in the hospital with good return.    Tone RUE  RUE Tone: Normotonic  Tone LUE  LUE Tone: Normotonic  Coordination  Movements Are Fluid And Coordinated: Yes     Bed mobility  Supine to Sit: Minimal assistance  Scooting: Minimal assistance  Comment: verbal cueing provided to compensate for vision deficits   Transfers  Sit to stand: Minimal assistance  Stand to sit: Minimal assistance     Cognition  Overall Cognitive Status: WFL        Sensation  Overall Sensation Status: Impaired      LUE AROM : WFL  Left Hand AROM: WFL  RUE AROM : WFL  Right Hand AROM: WFL  LUE Strength  L Hand General: 4/5  RUE Strength  R Hand General: 4/5              Plan   Plan  Times per week: 4-5x/wk  Current Treatment Recommendations: Strengthening, Endurance Training, Patient/Caregiver Education & Training, Self-Care / ADL, Equipment Evaluation, Education, & procurement, Home Management Training, Safety Education & Training, Functional Mobility Training    AM-PAC Score        AM-PAC Inpatient Daily Activity Raw Score: 17 (07/21/19 1441)  AM-PAC Inpatient ADL T-Scale Score : 37.26 (07/21/19 1441)  ADL Inpatient CMS 0-100% Score: 50.11 (07/21/19 1441)  ADL Inpatient CMS G-Code Modifier : CK (07/21/19 1441)    Goals  Short term goals  Time Frame for Short term goals: Patient will, by discharge  Short term goal 1: demonstrate bed mobility supine --> sit at SBA with verbal cues provided   Short term goal 2: demonstrate functional mobility/transfers with LRD at [address] term goal 3: demonstrate ~8 minutes of dynamic standing tolerance to engage in self-care tasks using LRD  Short term goal 4: demonstrate LB self-cares at SBA with verbal cues provided   Short term goal 5: demonstrate UB self-cares at SBA with verbal cues provided        Therapy Time   Individual Concurrent Group Co-treatment   Time In 1035         Time Out 1059         Minutes 24                 Mikhail Atkinson OTR/L

## 2019-07-21 NOTE — PROGRESS NOTES
[facility]    Progress Note    7/21/2019    1:45 PM    Name:   Freddie Santos  MRN:     Alyce Samuel:      [de-identified]   Room:   2024/2024-01  IP Day:  3  Admit Date:  7/18/2019  1:14 PM    PCP:   Dickson Zelaya MD  Code Status:  Full Code    Subjective:     C/C:   Chief Complaint   Patient presents with    Foot Pain     right foot. arch pain where a blister has formed. being seen by podiotry for a toe that is dead on the same foot. Interval History Status:   Less incisional pain  Doing okay with diet  Dressings were changed  No bleeding noted  Hemoglobin trending down: 10.3-8.9  Has been afebrile  Azotemia resolved    Data Base Updates:  Glucose satisfactory:  Glucose 92       Culture:  Specimen Description . SKIN Special Requests NOT REPORTED Direct Exam NO NEUTROPHILS SEEN FEW GRAM POSITIVE COCCI IN PAIRSAbnormal RARE GRAM NEGATIVE RODSAbnormal Culture PROVIDENCIA (PROTEUS) RETTGERI MODERATE GROWTHAbnormal METHICILLIN RESISTANT STAPHYLOCOCCUS AUREUS HEAVY GROWTHAbnormal STREPTOCOCCI, BETA HEMOLYTIC GROUP B MODERATE GROWTHAbnormal NORMAL SKIN FLORAAbnormal         Brief History:     The patient is a 52 y.o. male who is admitted to the hospital for the management of Diabetic foot     Patient was admitted thru ER with:  \"Kin Zhang is a 52 y. o. male 52year-old male history of severe atherosclerotic disease diabetes, above-the-knee amputation left side, history of dry gangrene right lower extremity foot multiple toes, follows with Dr. Tereza Arteaga for vascular and Mir for podietry   Brought in with family for worsening pain and redness of right foot inability to bear weight without pain   Patient recently had aortogram with runoff done 2 weeks ago was also admitted for vascular insufficiency as well as gangrene of the foot, aortogram did show anterior tibial artery occlusion distally, that was opened patient was placed on aspirin Plavix as reflux, Calculus of gallbladder without cholecystitis without obstruction, Cerebrovascular disease, Diabetic foot infection ([facility] Drug (multiple) resistant infection, GERD (gastroesophageal reflux disease), Glaucoma, Glaucoma, Hemispheric carotid artery syndrome, Hyperlipidemia, Hypertension, IDDM (insulin dependent diabetes mellitus) ([facility] MDRO (multiple drug resistant organisms) resistance, MRSA (methicillin resistant staph aureus) culture positive, Neuropathy, Osteomyelitis ([facility] S/P cataract extraction and insertion of intraocular lens, and Type II or unspecified type diabetes mellitus without mention of complication, not stated as uncontrolled. Social History:   reports that he quit smoking about 10 months ago. His smoking use included cigarettes. He smoked 0.00 packs per day. He has never used smokeless tobacco. He reports that he does not drink alcohol or use drugs. Family History:   Family History   Problem Relation Age of Onset    Diabetes Mother     Cancer Father         colon    Diabetes Father     Diabetes Sister     Diabetes Sister        Vitals:  BP (!) 107/56   Pulse 63   Temp 98.5 °F (36.9 °C) (Oral)   Resp 18   Ht 5' 7\" (1.702 m)   Wt 183 lb 13.8 oz (83.4 kg)   SpO2 100%   BMI 28.80 kg/m²   Temp (24hrs), Av.1 °F (36.7 °C), Min:97.8 °F (36.6 °C), Max:98.5 °F (36.9 °C)    Recent Labs     19  1603 19  1950 19  0759 19  1203   POCGLU 96 105 102 92       I/O (24Hr): Intake/Output Summary (Last 24 hours) at 2019 1345  Last data filed at 2019 0617  Gross per 24 hour   Intake 270 ml   Output 900 ml   Net -630 ml         Review of Systems:     Review of Systems   Constitutional: Negative for chills, diaphoresis and fever. Respiratory: Negative for cough and shortness of breath. Cardiovascular: Negative for chest pain and palpitations. Gastrointestinal: Negative for abdominal pain, nausea and vomiting.    Genitourinary: Negative for flank

## 2019-07-22 ENCOUNTER — ANESTHESIA EVENT (OUTPATIENT)
Dept: OPERATING ROOM | Age: 54
DRG: 240 | End: 2019-07-22
Payer: COMMERCIAL

## 2019-07-22 LAB
GLUCOSE BLD-MCNC: 112 MG/DL (ref 75–110)
GLUCOSE BLD-MCNC: 78 MG/DL (ref 75–110)
GLUCOSE BLD-MCNC: 89 MG/DL (ref 75–110)
GLUCOSE BLD-MCNC: 98 MG/DL (ref 75–110)
SURGICAL PATHOLOGY REPORT: NORMAL

## 2019-07-22 PROCEDURE — 99232 SBSQ HOSP IP/OBS MODERATE 35: CPT | Performed by: INTERNAL MEDICINE

## 2019-07-22 PROCEDURE — 2580000003 HC RX 258: Performed by: INTERNAL MEDICINE

## 2019-07-22 PROCEDURE — 2060000000 HC ICU INTERMEDIATE R&B

## 2019-07-22 PROCEDURE — 99024 POSTOP FOLLOW-UP VISIT: CPT | Performed by: SURGERY

## 2019-07-22 PROCEDURE — 97110 THERAPEUTIC EXERCISES: CPT

## 2019-07-22 PROCEDURE — 6360000002 HC RX W HCPCS: Performed by: STUDENT IN AN ORGANIZED HEALTH CARE EDUCATION/TRAINING PROGRAM

## 2019-07-22 PROCEDURE — 2580000003 HC RX 258: Performed by: STUDENT IN AN ORGANIZED HEALTH CARE EDUCATION/TRAINING PROGRAM

## 2019-07-22 PROCEDURE — 97530 THERAPEUTIC ACTIVITIES: CPT

## 2019-07-22 PROCEDURE — 6360000002 HC RX W HCPCS: Performed by: INTERNAL MEDICINE

## 2019-07-22 PROCEDURE — 82947 ASSAY GLUCOSE BLOOD QUANT: CPT

## 2019-07-22 PROCEDURE — 2500000003 HC RX 250 WO HCPCS: Performed by: NURSE PRACTITIONER

## 2019-07-22 PROCEDURE — 6370000000 HC RX 637 (ALT 250 FOR IP): Performed by: STUDENT IN AN ORGANIZED HEALTH CARE EDUCATION/TRAINING PROGRAM

## 2019-07-22 PROCEDURE — 94760 N-INVAS EAR/PLS OXIMETRY 1: CPT

## 2019-07-22 RX ORDER — KETOROLAC TROMETHAMINE 15 MG/ML
15 INJECTION, SOLUTION INTRAMUSCULAR; INTRAVENOUS EVERY 6 HOURS
Status: DISCONTINUED | OUTPATIENT
Start: 2019-07-22 | End: 2019-07-24

## 2019-07-22 RX ADMIN — KETOROLAC TROMETHAMINE 15 MG: 15 INJECTION, SOLUTION INTRAMUSCULAR; INTRAVENOUS at 12:14

## 2019-07-22 RX ADMIN — Medication 10 ML: at 21:34

## 2019-07-22 RX ADMIN — FOLIC ACID 1 MG: 1 TABLET ORAL at 08:30

## 2019-07-22 RX ADMIN — PANTOPRAZOLE SODIUM 40 MG: 40 TABLET, DELAYED RELEASE ORAL at 05:37

## 2019-07-22 RX ADMIN — ACETAMINOPHEN 1000 MG: 500 TABLET ORAL at 02:35

## 2019-07-22 RX ADMIN — METFORMIN HYDROCHLORIDE 500 MG: 500 TABLET ORAL at 08:29

## 2019-07-22 RX ADMIN — CEFTRIAXONE SODIUM 1 G: 1 INJECTION, POWDER, FOR SOLUTION INTRAMUSCULAR; INTRAVENOUS at 12:05

## 2019-07-22 RX ADMIN — Medication 10 ML: at 08:33

## 2019-07-22 RX ADMIN — Medication 10 ML: at 08:31

## 2019-07-22 RX ADMIN — TAMSULOSIN HYDROCHLORIDE 0.4 MG: 0.4 CAPSULE ORAL at 08:29

## 2019-07-22 RX ADMIN — LATANOPROST 1 DROP: 50 SOLUTION/ DROPS OPHTHALMIC at 21:32

## 2019-07-22 RX ADMIN — HYDROCODONE BITARTRATE AND ACETAMINOPHEN 1 TABLET: 5; 325 TABLET ORAL at 05:40

## 2019-07-22 RX ADMIN — MORPHINE SULFATE 4 MG: 4 INJECTION INTRAVENOUS at 04:31

## 2019-07-22 RX ADMIN — TIMOLOL MALEATE 1 DROP: 2.5 SOLUTION/ DROPS OPHTHALMIC at 08:33

## 2019-07-22 RX ADMIN — PRAVASTATIN SODIUM 10 MG: 20 TABLET ORAL at 08:31

## 2019-07-22 RX ADMIN — HYDROCODONE BITARTRATE AND ACETAMINOPHEN 1 TABLET: 5; 325 TABLET ORAL at 00:23

## 2019-07-22 RX ADMIN — METOPROLOL TARTRATE 5 MG: 5 INJECTION, SOLUTION INTRAVENOUS at 04:57

## 2019-07-22 RX ADMIN — PREGABALIN 50 MG: 50 CAPSULE ORAL at 08:30

## 2019-07-22 RX ADMIN — ENOXAPARIN SODIUM 40 MG: 40 INJECTION SUBCUTANEOUS at 08:32

## 2019-07-22 RX ADMIN — LEVETIRACETAM 500 MG: 500 TABLET, FILM COATED ORAL at 08:30

## 2019-07-22 RX ADMIN — MORPHINE SULFATE 4 MG: 4 INJECTION INTRAVENOUS at 06:46

## 2019-07-22 RX ADMIN — CARVEDILOL 12.5 MG: 12.5 TABLET, FILM COATED ORAL at 18:26

## 2019-07-22 RX ADMIN — VANCOMYCIN HYDROCHLORIDE 1250 MG: 10 INJECTION, POWDER, LYOPHILIZED, FOR SOLUTION INTRAVENOUS at 05:37

## 2019-07-22 RX ADMIN — CARVEDILOL 12.5 MG: 12.5 TABLET, FILM COATED ORAL at 08:30

## 2019-07-22 RX ADMIN — LISINOPRIL 40 MG: 20 TABLET ORAL at 08:30

## 2019-07-22 RX ADMIN — KETOROLAC TROMETHAMINE 15 MG: 15 INJECTION, SOLUTION INTRAMUSCULAR; INTRAVENOUS at 21:33

## 2019-07-22 RX ADMIN — ACETAMINOPHEN 1000 MG: 500 TABLET ORAL at 08:30

## 2019-07-22 RX ADMIN — LEVETIRACETAM 500 MG: 500 TABLET, FILM COATED ORAL at 21:32

## 2019-07-22 RX ADMIN — METFORMIN HYDROCHLORIDE 500 MG: 500 TABLET ORAL at 18:28

## 2019-07-22 RX ADMIN — ASPIRIN 81 MG: 81 TABLET ORAL at 08:32

## 2019-07-22 RX ADMIN — PIOGLITAZONE 15 MG: 15 TABLET ORAL at 08:30

## 2019-07-22 RX ADMIN — KETOROLAC TROMETHAMINE 15 MG: 15 INJECTION, SOLUTION INTRAMUSCULAR; INTRAVENOUS at 18:25

## 2019-07-22 RX ADMIN — HYDROCODONE BITARTRATE AND ACETAMINOPHEN 1 TABLET: 5; 325 TABLET ORAL at 15:40

## 2019-07-22 RX ADMIN — ACETAMINOPHEN 1000 MG: 500 TABLET ORAL at 17:00

## 2019-07-22 ASSESSMENT — PAIN SCALES - GENERAL
PAINLEVEL_OUTOF10: 4
PAINLEVEL_OUTOF10: 5
PAINLEVEL_OUTOF10: 8
PAINLEVEL_OUTOF10: 8
PAINLEVEL_OUTOF10: 9
PAINLEVEL_OUTOF10: 8
PAINLEVEL_OUTOF10: 10
PAINLEVEL_OUTOF10: 6
PAINLEVEL_OUTOF10: 8
PAINLEVEL_OUTOF10: 10
PAINLEVEL_OUTOF10: 8
PAINLEVEL_OUTOF10: 6
PAINLEVEL_OUTOF10: 7

## 2019-07-22 NOTE — PLAN OF CARE
Problem: Falls - Risk of:  Goal: Will remain free from falls  Description  Will remain free from falls  Outcome: Ongoing  Goal: Absence of physical injury  Description  Absence of physical injury  Outcome: Ongoing     Problem: Nutrition  Goal: Optimal nutrition therapy  Outcome: Ongoing     Problem: Musculor/Skeletal Functional Status  Goal: Highest potential functional level  Outcome: Ongoing     Problem: Risk for Impaired Skin Integrity  Goal: Tissue integrity - skin and mucous membranes  Description  Structural intactness and normal physiological function of skin and  mucous membranes.   Outcome: Ongoing     Problem: Pain:  Goal: Pain level will decrease  Description  Pain level will decrease  Outcome: Ongoing  Goal: Control of acute pain  Description  Control of acute pain  Outcome: Ongoing  Goal: Control of chronic pain  Description  Control of chronic pain  Outcome: Ongoing

## 2019-07-22 NOTE — PROGRESS NOTES
Division of Vascular Surgery             Progress Note      Name: Breanne Cardoza  MRN: [id number]         Overnight Events:     None     Subjective:     Patient seen and examined bedside this morning. No acute events overnight. Afebrile. Patient reports his pain is well controlled, he does report some mild muscle spasms every once in a while. Patient is tolerating a general diet with no nausea or vomiting. Patient will be consented for surgery tomorrow, will take patient to the OR for BKA revision to right lower extremity. Will discuss with family when they arrive today. Physical Exam:     Vitals:  BP (!) 167/68   Pulse 62   Temp 97.7 °F (36.5 °C) (Oral)   Resp 18   Ht 5' 7\" (1.702 m)   Wt 182 lb 14.4 oz (83 kg)   SpO2 100%   BMI 28.65 kg/m²     General appearance - alert, well appearing and in no acute distress  Mental status - oriented to person, place and time with normal affect  Head - normocephalic  Neck - supple  Chest - clear to auscultation, normal effort  Heart - normal rate, regular rhythm, no murmurs  Abdomen - soft, non-tender, non-distended  Neurological - normal speech, no focal findings or movement disorder noted, cranial nerves II through XII grossly intact  Extremities -right guillotine amputation site with no sign of infection or bleeding, dressing changed at bedside this morning, left lower extremity BKA site healed well  Skin - no gross lesions, rashes, or induration noted      Assessment:     3 57-year-old male postop day 2 status post right guillotine amputation due to wet gangrene of the right foot     Plan:     1. Patient seen and examined at bedside this morning, dressing change to right lower extremity guillotine amputation site. 2. Continue medical management per primary team  3. Continue pain control with oral pain meds IV for breakthrough  4. General diet as tolerated, patient will be made n.p.o. tonight at midnight for OR 7/23  5.  Dressing change at bedside this morning, wound with no signs of infection, continue wet-to-dry dressing with overlying Kerlix and Ace wrap. 6. Patient to OR tomorrow on 7/23, will revise guillotine amputation to right lower extremity BKA, will obtain formal consent for right lower extremity BKA revision, will discuss with family at bedside today. 7. Patient is to be out of bed to ambulate, patient can go off the unit outside with his family in wheelchair.         [address] [place] [address],[address] [place]: [phone]      Electronically signed by Amaury Cisneros DO on 7/22/2019 at 7:58 AM

## 2019-07-22 NOTE — PROGRESS NOTES
[facility]    Progress Note    7/22/2019    6:29 PM    Name:   Dawn Cunningham  MRN:     Nithin Alley:      [de-identified]   Room:   2024/2024-01  IP Day:  4  Admit Date:  7/18/2019  1:14 PM    PCP:   Mariaa Osman MD  Code Status:  Full Code    Subjective:     C/C:   Chief Complaint   Patient presents with    Foot Pain     right foot. arch pain where a blister has formed. being seen by podiotry for a toe that is dead on the same foot. Interval History Status: not changed. Patient is resting in a chair. Family is present. He has some mild pain at his right LE amputation site. No fevers. Brief History:     Per my partner: \"The patient is a 45 y. o. male who is admitted to the hospital for the management of Diabetic foot     Patient was admitted thru ER with:  \"Kin Zhang is a 52 y. o. male 52year-old male history of severe atherosclerotic disease diabetes, above-the-knee amputation left side, history of dry gangrene right lower extremity foot multiple toes, follows with Dr. 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 \"     The patient was admitted  Pain control was undertaken  Surgery consultation was obtained     On 7/19 the patient underwent:  Procedure(s):  RIGHT below knee GUILLOTINE AMPUTATION\"    Review of Systems:     Constitutional:  negative for chills, fevers, sweats  Respiratory:  negative for cough, dyspnea on exertion, hemoptysis, shortness of breath, wheezing  Cardiovascular:  negative for chest pain, chest pressure/discomfort, lower extremity edema, palpitations  Gastrointestinal:  negative for abdominal pain, constipation, diarrhea, nausea, vomiting  Neurological:  negative for dizziness, headache    Medications: Allergies:     Allergies   Allergen Reactions    Adhesive Tape        Current Meds:   Scheduled Meds:    ketorolac  15 mg Intravenous Q6H    cefTRIAXone (ROCEPHIN) IV  1 g Intravenous Q24H    vancomycin  1,250 mg Intravenous Q24H    acetaminophen  1,000 mg Oral Q8H    aspirin  81 mg Oral Daily    levETIRAcetam  500 mg Oral BID    pregabalin  50 mg Oral Daily    insulin glargine  20 Units Subcutaneous Nightly    metFORMIN  500 mg Oral BID WC    pioglitazone  15 mg Oral Daily    pravastatin  10 mg Oral Daily    lisinopril  40 mg Oral Daily    carvedilol  12.5 mg Oral BID WC    [Held by provider] hydrochlorothiazide  25 mg Oral Daily    folic acid  1 mg Oral Daily    tamsulosin  0.4 mg Oral Daily    latanoprost  1 drop Both Eyes Nightly    timolol  1 drop Both Eyes Daily    pantoprazole  40 mg Oral QAM AC    sodium chloride flush  10 mL Intravenous 2 times per day    enoxaparin  40 mg Subcutaneous Daily    insulin lispro  0-6 Units Subcutaneous TID WC    insulin lispro  0-3 Units Subcutaneous Nightly    sodium chloride flush  10 mL Intravenous 2 times per day    vancomycin (VANCOCIN) intermittent dosing (placeholder)   Other RX Placeholder     Continuous Infusions:    dextrose       PRN Meds: metoprolol, morphine **OR** morphine, HYDROcodone-acetaminophen, docusate sodium, cyclobenzaprine, sodium chloride flush, potassium chloride **OR** potassium alternative oral replacement **OR** potassium chloride, magnesium sulfate, magnesium hydroxide, ondansetron, acetaminophen, glucose, dextrose, glucagon (rDNA), dextrose    Data:     Past

## 2019-07-22 NOTE — ANESTHESIA PRE PROCEDURE
Department of Anesthesiology  Preprocedure Note       Name:  Ru Montesinos   Age:  52 y.o.  :  1965                                          MRN:  [id number]         Date:  2019      Surgeon: Dr. Qasim Zee (Right )        Medications prior to admission:   Prior to Admission medications    Medication Sig Start Date End Date Taking? Authorizing Provider   amoxicillin-clavulanate (AUGMENTIN) 875-125 MG per tablet Take 1 tablet by mouth 2 times daily for 14 days 19 Yes Jarocho Ruiz, DO   clopidogrel (PLAVIX) 75 MG tablet Take 1 tablet by mouth daily 19  Yes Benjamín Main, DO   Gauze Pads & Dressings (GAUZE DRESSING) 4\"X4\" PADS 1 each by Does not apply route daily 19  Yes Benjamín Main, DO   levETIRAcetam (KEPPRA) 500 MG tablet take 1 tablet by mouth twice a day 19  Yes Sis Lange MD   latanoprost (XALATAN) 0.005 % ophthalmic solution place 1 drop into both eyes at bedtime 19  Yes Historical Provider, MD   LYRICA 50 MG capsule take 1 capsule by mouth three times a day if needed 19  Yes Historical Provider, MD   metoprolol tartrate (LOPRESSOR) 50 MG tablet Take 50 mg by mouth 2 times daily  6/10/19  Yes Historical Provider, MD   Rosiglitazone Maleate (AVANDIA PO) tablet   Yes Historical Provider, MD   silver sulfADIAZINE (SILVADENE) 1 % cream Apply topically daily. 19  Yes Hussain Jacobs DPM   ammonium lactate (LAC-HYDRIN) 12 % lotion Apply topically daily.  19  Yes Shu Rodrigues DPM   carvedilol (COREG) 12.5 MG tablet Take 1 tablet by mouth 2 times daily (with meals) 11/10/18  Yes Molina Patricia DO   amLODIPine (NORVASC) 10 MG tablet Take 1 tablet by mouth daily 18  Yes Molina Patricia DO   hydrochlorothiazide (HYDRODIURIL) 25 MG tablet Take 1 tablet by mouth daily 18  Yes Molina Patricia DO   aspirin 81 MG EC tablet Take 1 tablet by mouth daily 9/3/18  Yes Aruna Britain, DO   cyclobenzaprine (FLEXERIL) 5 MG tablet Intramuscular PRN Bean Leofty, DO        dextrose 5 % solution  100 mL/hr Intravenous PRN Bean Hefty, DO        sodium chloride flush 0.9 % injection 10 mL  10 mL Intravenous 2 times per day Geneva Taylory, DO   10 mL at 07/23/19 1873    vancomycin (VANCOCIN) intermittent dosing (placeholder)   Other RX Placeholder Bean Hefty, DO           Allergies:     Allergies   Allergen Reactions    Adhesive Tape        Problem List:    Patient Active Problem List   Diagnosis Code    Essential hypertension I10    IDDM (insulin dependent diabetes mellitus) ([address] E11.9, Z79.4    Neuropathy ([address] G62.9    Uncontrolled type 2 diabetes with cellulitis of foot ([address] E11.628, L03.119, E11.65    Cerebrovascular disease I67.9    Cataract H26.9    Dysphagia R13.10    Family history of colon cancer Z80.0    Bowel habit changes R19.4    Hyperlipidemia E78.5    Gangrene of right foot ([address] I96    Diabetic foot left E11.8    Acute kidney injury ([address] N17.9    Ischemic foot left I99.8    Urinary retention R33.9    Subacute osteomyelitis of left foot ([address] M86.272    Hx of BKA ([address] Z89.519    Impaired mobility Z74.09    HCAP (healthcare-associated pneumonia) J18.9    Primary open angle glaucoma of both eyes, moderate stage H40.1132    Pseudophakia of both eyes Z96.1    Retinopathy, diabetic, bilateral (HCC) E11.319    Chest pain, atypical R07.89    Paresthesia of right upper extremity S68.5    Metabolic acidosis W84.6    Right arm pain M79.601    Neck pain M54.2    Intracranial carotid stenosis, left I65.22    Calculus of gallbladder without cholecystitis without obstruction K80.20    Hemispheric carotid artery syndrome G45.1    Abdominal pain R10.9    Acute cholecystitis with chronic cholecystitis K81.2    Severe malnutrition (HCC) E43    Altered mental status R41.82    PRES (posterior reversible encephalopathy syndrome) I67.83    New onset seizure (HCC) R56.9    Hypertensive urgency kg)   07/07/19 170 lb (77.1 kg)   06/28/19 173 lb (78.5 kg)     Body mass index is 28.65 kg/m². CBC:   Lab Results   Component Value Date    WBC 12.6 07/23/2019    RBC 3.50 07/23/2019    HGB 9.4 07/23/2019    HCT 32.0 07/23/2019    MCV 91.4 07/23/2019    RDW 12.8 07/23/2019     07/23/2019       CMP:   Lab Results   Component Value Date     07/23/2019    K 5.3 07/23/2019     07/23/2019    CO2 20 07/23/2019    BUN 20 07/23/2019    CREATININE 1.24 07/23/2019    GFRAA >60 07/23/2019    LABGLOM >60 07/23/2019    GLUCOSE 97 07/23/2019    PROT 6.5 07/20/2019    CALCIUM 8.7 07/23/2019    BILITOT <0.10 07/20/2019    ALKPHOS 80 07/20/2019    AST 19 07/20/2019    ALT 23 07/20/2019       POC Tests:   Recent Labs     07/23/19  0741   POCGLU 108       Coags:   Lab Results   Component Value Date    PROTIME 10.4 07/07/2019    INR 1.0 07/07/2019    APTT 26.4 07/07/2019       HCG (If Applicable): No results found for: PREGTESTUR, PREGSERUM, HCG, HCGQUANT     ABGs: No results found for: PHART, PO2ART, JUY8LNO, BEL8VNL, BEART, A9YLGFGX     Type & Screen (If Applicable):  No results found for: LABABO, [address]    Anesthesia Evaluation  Patient summary reviewed and Nursing notes reviewed no history of anesthetic complications:   Airway: Mallampati: II  TM distance: >3 FB   Neck ROM: full  Mouth opening: > = 3 FB Dental:          Pulmonary:Negative Pulmonary ROS and normal exam    (+) pneumonia: resolved,                             Cardiovascular:    (+) hypertension: moderate, hyperlipidemia    (-) past MI, CAD, CABG/stent, dysrhythmias and  angina    ECG reviewed  Rhythm: regular  Rate: normal           Beta Blocker:  Not on Beta Blocker         Neuro/Psych:   (+) seizures:, TIA,             GI/Hepatic/Renal:   (+) GERD: well controlled, renal disease:,           Endo/Other:    (+) DiabetesType II DM, using insulin, . Abdominal:           Vascular:   + PVD, aortic or cerebral, .

## 2019-07-23 ENCOUNTER — ANESTHESIA (OUTPATIENT)
Dept: OPERATING ROOM | Age: 54
DRG: 240 | End: 2019-07-23
Payer: COMMERCIAL

## 2019-07-23 VITALS — OXYGEN SATURATION: 100 % | TEMPERATURE: 97 F | SYSTOLIC BLOOD PRESSURE: 191 MMHG | DIASTOLIC BLOOD PRESSURE: 77 MMHG

## 2019-07-23 LAB
ANION GAP SERPL CALCULATED.3IONS-SCNC: 10 MMOL/L (ref 9–17)
BUN BLDV-MCNC: 20 MG/DL (ref 6–20)
BUN/CREAT BLD: ABNORMAL (ref 9–20)
CALCIUM SERPL-MCNC: 8.7 MG/DL (ref 8.6–10.4)
CHLORIDE BLD-SCNC: 106 MMOL/L (ref 98–107)
CO2: 20 MMOL/L (ref 20–31)
CREAT SERPL-MCNC: 1.24 MG/DL (ref 0.7–1.2)
GFR AFRICAN AMERICAN: >60 ML/MIN
GFR NON-AFRICAN AMERICAN: >60 ML/MIN
GFR SERPL CREATININE-BSD FRML MDRD: ABNORMAL ML/MIN/{1.73_M2}
GFR SERPL CREATININE-BSD FRML MDRD: ABNORMAL ML/MIN/{1.73_M2}
GLUCOSE BLD-MCNC: 104 MG/DL (ref 75–110)
GLUCOSE BLD-MCNC: 108 MG/DL (ref 75–110)
GLUCOSE BLD-MCNC: 111 MG/DL (ref 75–110)
GLUCOSE BLD-MCNC: 115 MG/DL (ref 75–110)
GLUCOSE BLD-MCNC: 76 MG/DL (ref 75–110)
GLUCOSE BLD-MCNC: 97 MG/DL (ref 70–99)
HCT VFR BLD CALC: 32 % (ref 40.7–50.3)
HEMOGLOBIN: 9.4 G/DL (ref 13–17)
MCH RBC QN AUTO: 26.9 PG (ref 25.2–33.5)
MCHC RBC AUTO-ENTMCNC: 29.4 G/DL (ref 28.4–34.8)
MCV RBC AUTO: 91.4 FL (ref 82.6–102.9)
NRBC AUTOMATED: 0 PER 100 WBC
PDW BLD-RTO: 12.8 % (ref 11.8–14.4)
PLATELET # BLD: 404 K/UL (ref 138–453)
PMV BLD AUTO: 11.4 FL (ref 8.1–13.5)
POTASSIUM SERPL-SCNC: 5.3 MMOL/L (ref 3.7–5.3)
RBC # BLD: 3.5 M/UL (ref 4.21–5.77)
SODIUM BLD-SCNC: 136 MMOL/L (ref 135–144)
VANCOMYCIN TROUGH DATE LAST DOSE: NORMAL
VANCOMYCIN TROUGH DOSE AMOUNT: NORMAL
VANCOMYCIN TROUGH TIME LAST DOSE: NORMAL
VANCOMYCIN TROUGH: 17.8 UG/ML (ref 10–20)
WBC # BLD: 12.6 K/UL (ref 3.5–11.3)

## 2019-07-23 PROCEDURE — 76937 US GUIDE VASCULAR ACCESS: CPT

## 2019-07-23 PROCEDURE — 27886 AMPUTATION FOLLOW-UP SURGERY: CPT | Performed by: SURGERY

## 2019-07-23 PROCEDURE — 2580000003 HC RX 258: Performed by: STUDENT IN AN ORGANIZED HEALTH CARE EDUCATION/TRAINING PROGRAM

## 2019-07-23 PROCEDURE — 2060000000 HC ICU INTERMEDIATE R&B

## 2019-07-23 PROCEDURE — 3600000013 HC SURGERY LEVEL 3 ADDTL 15MIN: Performed by: SURGERY

## 2019-07-23 PROCEDURE — 94760 N-INVAS EAR/PLS OXIMETRY 1: CPT

## 2019-07-23 PROCEDURE — 6360000002 HC RX W HCPCS: Performed by: STUDENT IN AN ORGANIZED HEALTH CARE EDUCATION/TRAINING PROGRAM

## 2019-07-23 PROCEDURE — 80202 ASSAY OF VANCOMYCIN: CPT

## 2019-07-23 PROCEDURE — 6370000000 HC RX 637 (ALT 250 FOR IP): Performed by: STUDENT IN AN ORGANIZED HEALTH CARE EDUCATION/TRAINING PROGRAM

## 2019-07-23 PROCEDURE — 99232 SBSQ HOSP IP/OBS MODERATE 35: CPT | Performed by: INTERNAL MEDICINE

## 2019-07-23 PROCEDURE — 88307 TISSUE EXAM BY PATHOLOGIST: CPT

## 2019-07-23 PROCEDURE — 0Y6H0Z1 DETACHMENT AT RIGHT LOWER LEG, HIGH, OPEN APPROACH: ICD-10-PCS | Performed by: SURGERY

## 2019-07-23 PROCEDURE — 6360000002 HC RX W HCPCS: Performed by: NURSE ANESTHETIST, CERTIFIED REGISTERED

## 2019-07-23 PROCEDURE — 2500000003 HC RX 250 WO HCPCS: Performed by: NURSE ANESTHETIST, CERTIFIED REGISTERED

## 2019-07-23 PROCEDURE — 3700000001 HC ADD 15 MINUTES (ANESTHESIA): Performed by: SURGERY

## 2019-07-23 PROCEDURE — 3600000003 HC SURGERY LEVEL 3 BASE: Performed by: SURGERY

## 2019-07-23 PROCEDURE — 36415 COLL VENOUS BLD VENIPUNCTURE: CPT

## 2019-07-23 PROCEDURE — 99024 POSTOP FOLLOW-UP VISIT: CPT | Performed by: SURGERY

## 2019-07-23 PROCEDURE — 3700000000 HC ANESTHESIA ATTENDED CARE: Performed by: SURGERY

## 2019-07-23 PROCEDURE — 85027 COMPLETE CBC AUTOMATED: CPT

## 2019-07-23 PROCEDURE — 7100000000 HC PACU RECOVERY - FIRST 15 MIN

## 2019-07-23 PROCEDURE — 2580000003 HC RX 258: Performed by: INTERNAL MEDICINE

## 2019-07-23 PROCEDURE — 2709999900 HC NON-CHARGEABLE SUPPLY: Performed by: SURGERY

## 2019-07-23 PROCEDURE — 6360000002 HC RX W HCPCS: Performed by: INTERNAL MEDICINE

## 2019-07-23 PROCEDURE — 7100000001 HC PACU RECOVERY - ADDTL 15 MIN

## 2019-07-23 PROCEDURE — 2580000003 HC RX 258: Performed by: SURGERY

## 2019-07-23 PROCEDURE — 80048 BASIC METABOLIC PNL TOTAL CA: CPT

## 2019-07-23 PROCEDURE — 82947 ASSAY GLUCOSE BLOOD QUANT: CPT

## 2019-07-23 RX ORDER — MEPERIDINE HYDROCHLORIDE 50 MG/ML
12.5 INJECTION INTRAMUSCULAR; INTRAVENOUS; SUBCUTANEOUS EVERY 5 MIN PRN
Status: DISCONTINUED | OUTPATIENT
Start: 2019-07-23 | End: 2019-07-23 | Stop reason: HOSPADM

## 2019-07-23 RX ORDER — ROCURONIUM BROMIDE 10 MG/ML
INJECTION, SOLUTION INTRAVENOUS PRN
Status: DISCONTINUED | OUTPATIENT
Start: 2019-07-23 | End: 2019-07-23 | Stop reason: SDUPTHER

## 2019-07-23 RX ORDER — VANCOMYCIN HYDROCHLORIDE 1 G/200ML
1000 INJECTION, SOLUTION INTRAVENOUS EVERY 24 HOURS
Status: DISCONTINUED | OUTPATIENT
Start: 2019-07-24 | End: 2019-07-25

## 2019-07-23 RX ORDER — FENTANYL CITRATE 50 UG/ML
25 INJECTION, SOLUTION INTRAMUSCULAR; INTRAVENOUS EVERY 5 MIN PRN
Status: DISCONTINUED | OUTPATIENT
Start: 2019-07-23 | End: 2019-07-23 | Stop reason: HOSPADM

## 2019-07-23 RX ORDER — EPHEDRINE SULFATE/0.9% NACL/PF 50 MG/5 ML
SYRINGE (ML) INTRAVENOUS PRN
Status: DISCONTINUED | OUTPATIENT
Start: 2019-07-23 | End: 2019-07-23 | Stop reason: SDUPTHER

## 2019-07-23 RX ORDER — FENTANYL CITRATE 50 UG/ML
INJECTION, SOLUTION INTRAMUSCULAR; INTRAVENOUS PRN
Status: DISCONTINUED | OUTPATIENT
Start: 2019-07-23 | End: 2019-07-23 | Stop reason: SDUPTHER

## 2019-07-23 RX ORDER — ONDANSETRON 2 MG/ML
4 INJECTION INTRAMUSCULAR; INTRAVENOUS
Status: DISCONTINUED | OUTPATIENT
Start: 2019-07-23 | End: 2019-07-23 | Stop reason: HOSPADM

## 2019-07-23 RX ORDER — PROPOFOL 10 MG/ML
INJECTION, EMULSION INTRAVENOUS PRN
Status: DISCONTINUED | OUTPATIENT
Start: 2019-07-23 | End: 2019-07-23 | Stop reason: SDUPTHER

## 2019-07-23 RX ORDER — FENTANYL CITRATE 50 UG/ML
50 INJECTION, SOLUTION INTRAMUSCULAR; INTRAVENOUS EVERY 5 MIN PRN
Status: DISCONTINUED | OUTPATIENT
Start: 2019-07-23 | End: 2019-07-23 | Stop reason: HOSPADM

## 2019-07-23 RX ORDER — DEXTROSE AND SODIUM CHLORIDE 5; .45 G/100ML; G/100ML
INJECTION, SOLUTION INTRAVENOUS CONTINUOUS
Status: DISCONTINUED | OUTPATIENT
Start: 2019-07-23 | End: 2019-07-23

## 2019-07-23 RX ORDER — SODIUM CHLORIDE 9 MG/ML
INJECTION, SOLUTION INTRAVENOUS CONTINUOUS
Status: DISCONTINUED | OUTPATIENT
Start: 2019-07-23 | End: 2019-07-25

## 2019-07-23 RX ORDER — MAGNESIUM HYDROXIDE 1200 MG/15ML
LIQUID ORAL CONTINUOUS PRN
Status: COMPLETED | OUTPATIENT
Start: 2019-07-23 | End: 2019-07-23

## 2019-07-23 RX ORDER — LIDOCAINE HYDROCHLORIDE 10 MG/ML
INJECTION, SOLUTION EPIDURAL; INFILTRATION; INTRACAUDAL; PERINEURAL PRN
Status: DISCONTINUED | OUTPATIENT
Start: 2019-07-23 | End: 2019-07-23 | Stop reason: SDUPTHER

## 2019-07-23 RX ADMIN — PIOGLITAZONE 15 MG: 15 TABLET ORAL at 08:41

## 2019-07-23 RX ADMIN — PREGABALIN 50 MG: 50 CAPSULE ORAL at 08:41

## 2019-07-23 RX ADMIN — PHENYLEPHRINE HYDROCHLORIDE 100 MCG: 10 INJECTION INTRAVENOUS at 16:02

## 2019-07-23 RX ADMIN — LATANOPROST 1 DROP: 50 SOLUTION/ DROPS OPHTHALMIC at 21:26

## 2019-07-23 RX ADMIN — PHENYLEPHRINE HYDROCHLORIDE 100 MCG: 10 INJECTION INTRAVENOUS at 15:38

## 2019-07-23 RX ADMIN — Medication 10 ML: at 21:48

## 2019-07-23 RX ADMIN — ASPIRIN 81 MG: 81 TABLET ORAL at 08:41

## 2019-07-23 RX ADMIN — Medication 10 MG: at 15:10

## 2019-07-23 RX ADMIN — PHENYLEPHRINE HYDROCHLORIDE 100 MCG: 10 INJECTION INTRAVENOUS at 15:30

## 2019-07-23 RX ADMIN — PROPOFOL 150 MG: 10 INJECTION, EMULSION INTRAVENOUS at 14:53

## 2019-07-23 RX ADMIN — KETOROLAC TROMETHAMINE 15 MG: 15 INJECTION, SOLUTION INTRAMUSCULAR; INTRAVENOUS at 18:42

## 2019-07-23 RX ADMIN — LIDOCAINE HYDROCHLORIDE 50 MG: 10 INJECTION, SOLUTION EPIDURAL; INFILTRATION; INTRACAUDAL at 14:53

## 2019-07-23 RX ADMIN — ROCURONIUM BROMIDE 35 MG: 10 INJECTION INTRAVENOUS at 14:53

## 2019-07-23 RX ADMIN — PHENYLEPHRINE HYDROCHLORIDE 100 MCG: 10 INJECTION INTRAVENOUS at 16:09

## 2019-07-23 RX ADMIN — SODIUM CHLORIDE: 9 INJECTION, SOLUTION INTRAVENOUS at 20:44

## 2019-07-23 RX ADMIN — PHENYLEPHRINE HYDROCHLORIDE 150 MCG: 10 INJECTION INTRAVENOUS at 15:53

## 2019-07-23 RX ADMIN — LISINOPRIL 40 MG: 20 TABLET ORAL at 08:41

## 2019-07-23 RX ADMIN — MORPHINE SULFATE 4 MG: 4 INJECTION INTRAVENOUS at 14:26

## 2019-07-23 RX ADMIN — DEXTROSE AND SODIUM CHLORIDE: 5; 450 INJECTION, SOLUTION INTRAVENOUS at 14:26

## 2019-07-23 RX ADMIN — Medication 10 MG: at 15:51

## 2019-07-23 RX ADMIN — PHENYLEPHRINE HYDROCHLORIDE 100 MCG: 10 INJECTION INTRAVENOUS at 15:35

## 2019-07-23 RX ADMIN — VANCOMYCIN HYDROCHLORIDE 1250 MG: 10 INJECTION, POWDER, LYOPHILIZED, FOR SOLUTION INTRAVENOUS at 05:53

## 2019-07-23 RX ADMIN — Medication 10 ML: at 21:47

## 2019-07-23 RX ADMIN — ACETAMINOPHEN 1000 MG: 500 TABLET ORAL at 08:41

## 2019-07-23 RX ADMIN — HYDROCODONE BITARTRATE AND ACETAMINOPHEN 1 TABLET: 5; 325 TABLET ORAL at 20:45

## 2019-07-23 RX ADMIN — ONDANSETRON 4 MG: 2 INJECTION INTRAMUSCULAR; INTRAVENOUS at 20:30

## 2019-07-23 RX ADMIN — Medication 10 ML: at 08:43

## 2019-07-23 RX ADMIN — FENTANYL CITRATE 25 MCG: 50 INJECTION, SOLUTION INTRAMUSCULAR; INTRAVENOUS at 16:20

## 2019-07-23 RX ADMIN — PHENYLEPHRINE HYDROCHLORIDE 150 MCG: 10 INJECTION INTRAVENOUS at 15:47

## 2019-07-23 RX ADMIN — CYCLOBENZAPRINE HYDROCHLORIDE 5 MG: 5 TABLET, FILM COATED ORAL at 20:44

## 2019-07-23 RX ADMIN — KETOROLAC TROMETHAMINE 15 MG: 15 INJECTION, SOLUTION INTRAMUSCULAR; INTRAVENOUS at 04:29

## 2019-07-23 RX ADMIN — CYCLOBENZAPRINE HYDROCHLORIDE 5 MG: 5 TABLET, FILM COATED ORAL at 16:55

## 2019-07-23 RX ADMIN — Medication 10 MG: at 15:14

## 2019-07-23 RX ADMIN — MORPHINE SULFATE 4 MG: 4 INJECTION INTRAVENOUS at 16:35

## 2019-07-23 RX ADMIN — Medication 10 MG: at 15:47

## 2019-07-23 RX ADMIN — CARVEDILOL 12.5 MG: 12.5 TABLET, FILM COATED ORAL at 08:41

## 2019-07-23 RX ADMIN — KETOROLAC TROMETHAMINE 15 MG: 15 INJECTION, SOLUTION INTRAMUSCULAR; INTRAVENOUS at 10:17

## 2019-07-23 RX ADMIN — CEFTRIAXONE SODIUM 1 G: 1 INJECTION, POWDER, FOR SOLUTION INTRAMUSCULAR; INTRAVENOUS at 14:26

## 2019-07-23 RX ADMIN — FENTANYL CITRATE 50 MCG: 50 INJECTION, SOLUTION INTRAMUSCULAR; INTRAVENOUS at 14:49

## 2019-07-23 RX ADMIN — PHENYLEPHRINE HYDROCHLORIDE 100 MCG: 10 INJECTION INTRAVENOUS at 15:41

## 2019-07-23 RX ADMIN — CYCLOBENZAPRINE HYDROCHLORIDE 5 MG: 5 TABLET, FILM COATED ORAL at 08:41

## 2019-07-23 RX ADMIN — PRAVASTATIN SODIUM 10 MG: 20 TABLET ORAL at 08:41

## 2019-07-23 RX ADMIN — PHENYLEPHRINE HYDROCHLORIDE 100 MCG: 10 INJECTION INTRAVENOUS at 15:17

## 2019-07-23 RX ADMIN — Medication 10 MG: at 16:00

## 2019-07-23 RX ADMIN — METFORMIN HYDROCHLORIDE 500 MG: 500 TABLET ORAL at 08:41

## 2019-07-23 RX ADMIN — HYDROCODONE BITARTRATE AND ACETAMINOPHEN 1 TABLET: 5; 325 TABLET ORAL at 16:56

## 2019-07-23 RX ADMIN — LEVETIRACETAM 500 MG: 500 TABLET, FILM COATED ORAL at 20:44

## 2019-07-23 RX ADMIN — FENTANYL CITRATE 25 MCG: 50 INJECTION, SOLUTION INTRAMUSCULAR; INTRAVENOUS at 16:16

## 2019-07-23 RX ADMIN — TAMSULOSIN HYDROCHLORIDE 0.4 MG: 0.4 CAPSULE ORAL at 08:41

## 2019-07-23 RX ADMIN — LEVETIRACETAM 500 MG: 500 TABLET, FILM COATED ORAL at 08:41

## 2019-07-23 RX ADMIN — FOLIC ACID 1 MG: 1 TABLET ORAL at 08:41

## 2019-07-23 RX ADMIN — KETOROLAC TROMETHAMINE 15 MG: 15 INJECTION, SOLUTION INTRAMUSCULAR; INTRAVENOUS at 22:28

## 2019-07-23 ASSESSMENT — PULMONARY FUNCTION TESTS
PIF_VALUE: 15
PIF_VALUE: 15
PIF_VALUE: 16
PIF_VALUE: 15
PIF_VALUE: 15
PIF_VALUE: 18
PIF_VALUE: 14
PIF_VALUE: 16
PIF_VALUE: 18
PIF_VALUE: 16
PIF_VALUE: 15
PIF_VALUE: 18
PIF_VALUE: 18
PIF_VALUE: 6
PIF_VALUE: 17
PIF_VALUE: 15
PIF_VALUE: 0
PIF_VALUE: 15
PIF_VALUE: 18
PIF_VALUE: 18
PIF_VALUE: 2
PIF_VALUE: 15
PIF_VALUE: 28
PIF_VALUE: 18
PIF_VALUE: 16
PIF_VALUE: 17
PIF_VALUE: 18
PIF_VALUE: 15
PIF_VALUE: 1
PIF_VALUE: 14
PIF_VALUE: 18
PIF_VALUE: 18
PIF_VALUE: 15
PIF_VALUE: 27
PIF_VALUE: 15
PIF_VALUE: 18
PIF_VALUE: 14
PIF_VALUE: 18
PIF_VALUE: 15
PIF_VALUE: 7
PIF_VALUE: 17
PIF_VALUE: 17
PIF_VALUE: 15
PIF_VALUE: 18
PIF_VALUE: 15
PIF_VALUE: 18
PIF_VALUE: 15
PIF_VALUE: 15
PIF_VALUE: 1
PIF_VALUE: 15
PIF_VALUE: 18
PIF_VALUE: 15
PIF_VALUE: 15
PIF_VALUE: 18
PIF_VALUE: 15
PIF_VALUE: 14
PIF_VALUE: 15
PIF_VALUE: 18
PIF_VALUE: 2
PIF_VALUE: 15
PIF_VALUE: 18
PIF_VALUE: 19
PIF_VALUE: 15
PIF_VALUE: 18
PIF_VALUE: 18
PIF_VALUE: 2
PIF_VALUE: 18
PIF_VALUE: 0
PIF_VALUE: 0
PIF_VALUE: 29
PIF_VALUE: 18
PIF_VALUE: 14
PIF_VALUE: 17
PIF_VALUE: 18

## 2019-07-23 ASSESSMENT — PAIN SCALES - GENERAL
PAINLEVEL_OUTOF10: 6
PAINLEVEL_OUTOF10: 8
PAINLEVEL_OUTOF10: 10
PAINLEVEL_OUTOF10: 10
PAINLEVEL_OUTOF10: 8
PAINLEVEL_OUTOF10: 0
PAINLEVEL_OUTOF10: 6

## 2019-07-23 NOTE — CARE COORDINATION
Transitional planning-[facility]. Ilia served Dr. Madelyn Gonzalez for PM&R consult.

## 2019-07-23 NOTE — PROGRESS NOTES
AMPUTATION\"    Review of Systems:     Constitutional:  negative for chills, fevers, sweats  Respiratory:  negative for cough, dyspnea on exertion, hemoptysis, shortness of breath, wheezing  Cardiovascular:  negative for chest pain, chest pressure/discomfort, lower extremity edema, palpitations  Gastrointestinal:  negative for abdominal pain, constipation, diarrhea, nausea, vomiting  Neurological:  negative for dizziness, headache    Medications: Allergies:     Allergies   Allergen Reactions    Adhesive Tape        Current Meds:   Scheduled Meds:    [START ON 7/24/2019] vancomycin  1,000 mg Intravenous Q24H    ketorolac  15 mg Intravenous Q6H    cefTRIAXone (ROCEPHIN) IV  1 g Intravenous Q24H    acetaminophen  1,000 mg Oral Q8H    aspirin  81 mg Oral Daily    levETIRAcetam  500 mg Oral BID    pregabalin  50 mg Oral Daily    insulin glargine  20 Units Subcutaneous Nightly    metFORMIN  500 mg Oral BID WC    pioglitazone  15 mg Oral Daily    pravastatin  10 mg Oral Daily    lisinopril  40 mg Oral Daily    carvedilol  12.5 mg Oral BID WC    [Held by provider] hydrochlorothiazide  25 mg Oral Daily    folic acid  1 mg Oral Daily    tamsulosin  0.4 mg Oral Daily    latanoprost  1 drop Both Eyes Nightly    timolol  1 drop Both Eyes Daily    pantoprazole  40 mg Oral QAM AC    sodium chloride flush  10 mL Intravenous 2 times per day    enoxaparin  40 mg Subcutaneous Daily    insulin lispro  0-6 Units Subcutaneous TID WC    insulin lispro  0-3 Units Subcutaneous Nightly    sodium chloride flush  10 mL Intravenous 2 times per day    vancomycin (VANCOCIN) intermittent dosing (placeholder)   Other RX Placeholder     Continuous Infusions:    dextrose       PRN Meds: metoprolol, morphine **OR** morphine, HYDROcodone-acetaminophen, docusate sodium, cyclobenzaprine, sodium chloride flush, potassium chloride **OR** potassium alternative oral replacement **OR** potassium chloride, magnesium sulfate,

## 2019-07-23 NOTE — PROGRESS NOTES
Division of Vascular Surgery             Progress Note      Name: Dawn Cunningham  MRN: [id number]         Overnight Events:     None      Subjective:     Patient seen and examined at bedside this morning. No acute events overnight. Afebrile. Patient with no complaints this morning. Pain well controlled. Patient currently n.p.o. for OR. Consent in chart, right lower extremity marked for surgical site. Physical Exam:     Vitals:  BP (!) 176/69   Pulse 69   Temp 97.9 °F (36.6 °C) (Oral)   Resp 13   Ht 5' 7\" (1.702 m)   Wt 182 lb 14.4 oz (83 kg)   SpO2 100%   BMI 28.65 kg/m²     General appearance - alert, well appearing and in no acute distress  Mental status - oriented to person, place and time with normal affect  Head - normocephalic and atraumatic  Neck - supple, no carotid bruits, thyroid not palpable, no JVD  Chest - clear to auscultation, normal effort  Heart - normal rate, regular rhythm, no murmurs  Abdomen - soft, non-tender, non-distended, bowel sounds present all four quadrants, no masses  Neurological - normal speech, no focal findings or movement disorder noted, cranial nerves II through XII grossly intact  Extremities -right guillotine\" side with no signs of infection or bleeding, no noted drainage, wound clean, left lower  BKA site well-healed  Skin - no gross lesions, rashes, or induration noted    Assessment:     3 40-year-old male postop day 3 status post right guillotine amputation due to wet gangrene of the right foot      Plan:     1. Patient seen and examined at bedside this morning. 2. Continue medical management per primary team  3. Patient currently n.p.o. for OR  4. Will take patient to the OR this morning for right guillotine amputation revision to right lower extremity BKA. Patient has been consented for this procedure all risks and possible comp occasions were discussed in great detail with his wife and family.   Patient's family agrees, he would like to proceed with the

## 2019-07-24 ENCOUNTER — APPOINTMENT (OUTPATIENT)
Dept: ULTRASOUND IMAGING | Age: 54
DRG: 240 | End: 2019-07-24
Payer: COMMERCIAL

## 2019-07-24 LAB
ANION GAP SERPL CALCULATED.3IONS-SCNC: 9 MMOL/L (ref 9–17)
BUN BLDV-MCNC: 22 MG/DL (ref 6–20)
BUN/CREAT BLD: ABNORMAL (ref 9–20)
CALCIUM SERPL-MCNC: 8.3 MG/DL (ref 8.6–10.4)
CHLORIDE BLD-SCNC: 108 MMOL/L (ref 98–107)
CO2: 22 MMOL/L (ref 20–31)
COMPLEMENT C3: 129 MG/DL (ref 90–180)
COMPLEMENT C4: 22 MG/DL (ref 10–40)
CREAT SERPL-MCNC: 1.35 MG/DL (ref 0.7–1.2)
CULTURE: NORMAL
CULTURE: NORMAL
GFR AFRICAN AMERICAN: >60 ML/MIN
GFR NON-AFRICAN AMERICAN: 55 ML/MIN
GFR SERPL CREATININE-BSD FRML MDRD: ABNORMAL ML/MIN/{1.73_M2}
GFR SERPL CREATININE-BSD FRML MDRD: ABNORMAL ML/MIN/{1.73_M2}
GLUCOSE BLD-MCNC: 103 MG/DL (ref 75–110)
GLUCOSE BLD-MCNC: 135 MG/DL (ref 75–110)
GLUCOSE BLD-MCNC: 150 MG/DL (ref 75–110)
GLUCOSE BLD-MCNC: 185 MG/DL (ref 75–110)
GLUCOSE BLD-MCNC: 87 MG/DL (ref 70–99)
Lab: NORMAL
Lab: NORMAL
POTASSIUM SERPL-SCNC: 4.8 MMOL/L (ref 3.7–5.3)
SODIUM BLD-SCNC: 139 MMOL/L (ref 135–144)
SPECIMEN DESCRIPTION: NORMAL
SPECIMEN DESCRIPTION: NORMAL
VANCOMYCIN RANDOM DATE LAST DOSE: NORMAL
VANCOMYCIN RANDOM DOSE AMOUNT: NORMAL
VANCOMYCIN RANDOM TIME LAST DOSE: NORMAL
VANCOMYCIN RANDOM: 19.3 UG/ML

## 2019-07-24 PROCEDURE — 86160 COMPLEMENT ANTIGEN: CPT

## 2019-07-24 PROCEDURE — 99024 POSTOP FOLLOW-UP VISIT: CPT | Performed by: SURGERY

## 2019-07-24 PROCEDURE — 99222 1ST HOSP IP/OBS MODERATE 55: CPT | Performed by: PHYSICAL MEDICINE & REHABILITATION

## 2019-07-24 PROCEDURE — 6370000000 HC RX 637 (ALT 250 FOR IP): Performed by: INTERNAL MEDICINE

## 2019-07-24 PROCEDURE — 97530 THERAPEUTIC ACTIVITIES: CPT

## 2019-07-24 PROCEDURE — 84165 PROTEIN E-PHORESIS SERUM: CPT

## 2019-07-24 PROCEDURE — 80048 BASIC METABOLIC PNL TOTAL CA: CPT

## 2019-07-24 PROCEDURE — 83883 ASSAY NEPHELOMETRY NOT SPEC: CPT

## 2019-07-24 PROCEDURE — 2500000003 HC RX 250 WO HCPCS: Performed by: STUDENT IN AN ORGANIZED HEALTH CARE EDUCATION/TRAINING PROGRAM

## 2019-07-24 PROCEDURE — 84156 ASSAY OF PROTEIN URINE: CPT

## 2019-07-24 PROCEDURE — 76775 US EXAM ABDO BACK WALL LIM: CPT

## 2019-07-24 PROCEDURE — 6370000000 HC RX 637 (ALT 250 FOR IP): Performed by: STUDENT IN AN ORGANIZED HEALTH CARE EDUCATION/TRAINING PROGRAM

## 2019-07-24 PROCEDURE — 80074 ACUTE HEPATITIS PANEL: CPT

## 2019-07-24 PROCEDURE — 82570 ASSAY OF URINE CREATININE: CPT

## 2019-07-24 PROCEDURE — 99232 SBSQ HOSP IP/OBS MODERATE 35: CPT | Performed by: INTERNAL MEDICINE

## 2019-07-24 PROCEDURE — 86334 IMMUNOFIX E-PHORESIS SERUM: CPT

## 2019-07-24 PROCEDURE — 2580000003 HC RX 258: Performed by: STUDENT IN AN ORGANIZED HEALTH CARE EDUCATION/TRAINING PROGRAM

## 2019-07-24 PROCEDURE — 2060000000 HC ICU INTERMEDIATE R&B

## 2019-07-24 PROCEDURE — 80202 ASSAY OF VANCOMYCIN: CPT

## 2019-07-24 PROCEDURE — 97542 WHEELCHAIR MNGMENT TRAINING: CPT

## 2019-07-24 PROCEDURE — 82947 ASSAY GLUCOSE BLOOD QUANT: CPT

## 2019-07-24 PROCEDURE — 6360000002 HC RX W HCPCS: Performed by: STUDENT IN AN ORGANIZED HEALTH CARE EDUCATION/TRAINING PROGRAM

## 2019-07-24 PROCEDURE — 36415 COLL VENOUS BLD VENIPUNCTURE: CPT

## 2019-07-24 PROCEDURE — 84155 ASSAY OF PROTEIN SERUM: CPT

## 2019-07-24 PROCEDURE — 86038 ANTINUCLEAR ANTIBODIES: CPT

## 2019-07-24 RX ORDER — OXYCODONE HYDROCHLORIDE 5 MG/1
5 TABLET ORAL EVERY 4 HOURS PRN
Status: DISCONTINUED | OUTPATIENT
Start: 2019-07-24 | End: 2019-07-26 | Stop reason: HOSPADM

## 2019-07-24 RX ORDER — HYDRALAZINE HYDROCHLORIDE 25 MG/1
25 TABLET, FILM COATED ORAL EVERY 8 HOURS SCHEDULED
Status: DISCONTINUED | OUTPATIENT
Start: 2019-07-24 | End: 2019-07-25

## 2019-07-24 RX ORDER — OXYCODONE HYDROCHLORIDE 5 MG/1
10 TABLET ORAL EVERY 4 HOURS PRN
Status: DISCONTINUED | OUTPATIENT
Start: 2019-07-24 | End: 2019-07-26 | Stop reason: HOSPADM

## 2019-07-24 RX ADMIN — OXYCODONE HYDROCHLORIDE 10 MG: 5 TABLET ORAL at 12:28

## 2019-07-24 RX ADMIN — INSULIN LISPRO 1 UNITS: 100 INJECTION, SOLUTION INTRAVENOUS; SUBCUTANEOUS at 17:44

## 2019-07-24 RX ADMIN — HYDROCODONE BITARTRATE AND ACETAMINOPHEN 1 TABLET: 5; 325 TABLET ORAL at 05:18

## 2019-07-24 RX ADMIN — ACETAMINOPHEN 1000 MG: 500 TABLET ORAL at 17:36

## 2019-07-24 RX ADMIN — LATANOPROST 1 DROP: 50 SOLUTION/ DROPS OPHTHALMIC at 22:09

## 2019-07-24 RX ADMIN — PANTOPRAZOLE SODIUM 40 MG: 40 TABLET, DELAYED RELEASE ORAL at 05:18

## 2019-07-24 RX ADMIN — FOLIC ACID 1 MG: 1 TABLET ORAL at 09:26

## 2019-07-24 RX ADMIN — CYCLOBENZAPRINE HYDROCHLORIDE 5 MG: 5 TABLET, FILM COATED ORAL at 21:47

## 2019-07-24 RX ADMIN — OXYCODONE HYDROCHLORIDE 10 MG: 5 TABLET ORAL at 16:07

## 2019-07-24 RX ADMIN — OXYCODONE HYDROCHLORIDE 10 MG: 5 TABLET ORAL at 20:01

## 2019-07-24 RX ADMIN — HYDRALAZINE HYDROCHLORIDE 25 MG: 25 TABLET, FILM COATED ORAL at 21:47

## 2019-07-24 RX ADMIN — ENOXAPARIN SODIUM 40 MG: 40 INJECTION SUBCUTANEOUS at 09:27

## 2019-07-24 RX ADMIN — ACETAMINOPHEN 1000 MG: 500 TABLET ORAL at 09:26

## 2019-07-24 RX ADMIN — PREGABALIN 50 MG: 50 CAPSULE ORAL at 09:27

## 2019-07-24 RX ADMIN — CYCLOBENZAPRINE HYDROCHLORIDE 5 MG: 5 TABLET, FILM COATED ORAL at 12:28

## 2019-07-24 RX ADMIN — INSULIN GLARGINE 20 UNITS: 100 INJECTION, SOLUTION SUBCUTANEOUS at 21:00

## 2019-07-24 RX ADMIN — LEVETIRACETAM 500 MG: 500 TABLET, FILM COATED ORAL at 09:26

## 2019-07-24 RX ADMIN — TAMSULOSIN HYDROCHLORIDE 0.4 MG: 0.4 CAPSULE ORAL at 09:27

## 2019-07-24 RX ADMIN — CEFTRIAXONE SODIUM 1 G: 1 INJECTION, POWDER, FOR SOLUTION INTRAMUSCULAR; INTRAVENOUS at 12:28

## 2019-07-24 RX ADMIN — Medication 10 ML: at 09:30

## 2019-07-24 RX ADMIN — PIOGLITAZONE 15 MG: 15 TABLET ORAL at 09:26

## 2019-07-24 RX ADMIN — KETOROLAC TROMETHAMINE 15 MG: 15 INJECTION, SOLUTION INTRAMUSCULAR; INTRAVENOUS at 05:20

## 2019-07-24 RX ADMIN — VANCOMYCIN HYDROCHLORIDE 1000 MG: 1 INJECTION, SOLUTION INTRAVENOUS at 06:18

## 2019-07-24 RX ADMIN — HYDROCODONE BITARTRATE AND ACETAMINOPHEN 1 TABLET: 5; 325 TABLET ORAL at 01:05

## 2019-07-24 RX ADMIN — LEVETIRACETAM 500 MG: 500 TABLET, FILM COATED ORAL at 21:00

## 2019-07-24 RX ADMIN — INSULIN LISPRO 1 UNITS: 100 INJECTION, SOLUTION INTRAVENOUS; SUBCUTANEOUS at 21:48

## 2019-07-24 RX ADMIN — ASPIRIN 81 MG: 81 TABLET ORAL at 09:26

## 2019-07-24 RX ADMIN — CYCLOBENZAPRINE HYDROCHLORIDE 5 MG: 5 TABLET, FILM COATED ORAL at 17:36

## 2019-07-24 RX ADMIN — CARVEDILOL 12.5 MG: 12.5 TABLET, FILM COATED ORAL at 09:26

## 2019-07-24 RX ADMIN — HYDRALAZINE HYDROCHLORIDE 25 MG: 25 TABLET, FILM COATED ORAL at 11:22

## 2019-07-24 RX ADMIN — PRAVASTATIN SODIUM 10 MG: 20 TABLET ORAL at 09:26

## 2019-07-24 RX ADMIN — METOPROLOL TARTRATE 5 MG: 5 INJECTION, SOLUTION INTRAVENOUS at 04:19

## 2019-07-24 RX ADMIN — CARVEDILOL 12.5 MG: 12.5 TABLET, FILM COATED ORAL at 17:36

## 2019-07-24 ASSESSMENT — PAIN SCALES - GENERAL
PAINLEVEL_OUTOF10: 9
PAINLEVEL_OUTOF10: 8
PAINLEVEL_OUTOF10: 9
PAINLEVEL_OUTOF10: 9
PAINLEVEL_OUTOF10: 7
PAINLEVEL_OUTOF10: 9
PAINLEVEL_OUTOF10: 8
PAINLEVEL_OUTOF10: 8

## 2019-07-25 LAB
-: ABNORMAL
ALBUMIN (CALCULATED): 2.8 G/DL (ref 3.2–5.2)
ALBUMIN PERCENT: 49 % (ref 45–65)
ALPHA 1 PERCENT: 4 % (ref 3–6)
ALPHA 2 PERCENT: 14 % (ref 6–13)
ALPHA-1-GLOBULIN: 0.2 G/DL (ref 0.1–0.4)
ALPHA-2-GLOBULIN: 0.8 G/DL (ref 0.5–0.9)
AMORPHOUS: ABNORMAL
ANION GAP SERPL CALCULATED.3IONS-SCNC: 10 MMOL/L (ref 9–17)
ANION GAP SERPL CALCULATED.3IONS-SCNC: 7 MMOL/L (ref 9–17)
ANTI-NUCLEAR ANTIBODY (ANA): NEGATIVE
BACTERIA: ABNORMAL
BETA GLOBULIN: 0.7 G/DL (ref 0.5–1.1)
BETA PERCENT: 12 % (ref 11–19)
BILIRUBIN URINE: NEGATIVE
BUN BLDV-MCNC: 17 MG/DL (ref 6–20)
BUN/CREAT BLD: ABNORMAL (ref 9–20)
CALCIUM SERPL-MCNC: 8.8 MG/DL (ref 8.6–10.4)
CASTS UA: ABNORMAL /LPF (ref 0–8)
CHLORIDE BLD-SCNC: 106 MMOL/L (ref 98–107)
CHLORIDE BLD-SCNC: 107 MMOL/L (ref 98–107)
CHLORIDE, UR: 98 MMOL/L
CO2: 22 MMOL/L (ref 20–31)
CO2: 24 MMOL/L (ref 20–31)
COLOR: YELLOW
CREAT SERPL-MCNC: 1.03 MG/DL (ref 0.7–1.2)
CREATININE URINE: 58.5 MG/DL (ref 39–259)
CRYSTALS, UA: ABNORMAL /HPF
EPITHELIAL CELLS UA: ABNORMAL /HPF (ref 0–5)
FREE KAPPA/LAMBDA RATIO: 1.11 (ref 0.26–1.65)
GAMMA GLOBULIN %: 21 % (ref 9–20)
GAMMA GLOBULIN: 1.2 G/DL (ref 0.5–1.5)
GFR AFRICAN AMERICAN: >60 ML/MIN
GFR NON-AFRICAN AMERICAN: >60 ML/MIN
GFR SERPL CREATININE-BSD FRML MDRD: ABNORMAL ML/MIN/{1.73_M2}
GFR SERPL CREATININE-BSD FRML MDRD: ABNORMAL ML/MIN/{1.73_M2}
GLUCOSE BLD-MCNC: 125 MG/DL (ref 75–110)
GLUCOSE BLD-MCNC: 131 MG/DL (ref 75–110)
GLUCOSE BLD-MCNC: 139 MG/DL (ref 75–110)
GLUCOSE BLD-MCNC: 144 MG/DL (ref 75–110)
GLUCOSE BLD-MCNC: 163 MG/DL (ref 70–99)
GLUCOSE URINE: NEGATIVE
HAV IGM SER IA-ACNC: NONREACTIVE
HEPATITIS B CORE IGM ANTIBODY: NONREACTIVE
HEPATITIS B SURFACE ANTIGEN: NONREACTIVE
HEPATITIS C ANTIBODY: NONREACTIVE
KAPPA FREE LIGHT CHAINS QNT: 4.99 MG/DL (ref 0.37–1.94)
KETONES, URINE: NEGATIVE
LAMBDA FREE LIGHT CHAINS QNT: 4.49 MG/DL (ref 0.57–2.63)
LEUKOCYTE ESTERASE, URINE: ABNORMAL
MUCUS: ABNORMAL
NITRITE, URINE: NEGATIVE
OTHER OBSERVATIONS UA: ABNORMAL
PATHOLOGIST: ABNORMAL
PATHOLOGIST: NORMAL
PH UA: 5.5 (ref 5–8)
POTASSIUM SERPL-SCNC: 4.6 MMOL/L (ref 3.7–5.3)
POTASSIUM SERPL-SCNC: 5.4 MMOL/L (ref 3.7–5.3)
POTASSIUM, UR: 22.7 MMOL/L
PROTEIN ELECTROPHORESIS, SERUM: ABNORMAL
PROTEIN UA: NEGATIVE
RBC UA: ABNORMAL /HPF (ref 0–4)
RENAL EPITHELIAL, UA: ABNORMAL /HPF
SERUM IFX INTERP: NORMAL
SODIUM BLD-SCNC: 138 MMOL/L (ref 135–144)
SODIUM BLD-SCNC: 138 MMOL/L (ref 135–144)
SODIUM,UR: 91 MMOL/L
SPECIFIC GRAVITY UA: 1.01 (ref 1–1.03)
SURGICAL PATHOLOGY REPORT: NORMAL
TOTAL PROT. SUM,%: 100 % (ref 98–102)
TOTAL PROT. SUM: 5.7 G/DL (ref 6.3–8.2)
TOTAL PROTEIN, URINE: 15 MG/DL
TOTAL PROTEIN: 5.7 G/DL (ref 6.4–8.3)
TRICHOMONAS: ABNORMAL
TURBIDITY: CLEAR
URINE HGB: NEGATIVE
UROBILINOGEN, URINE: NORMAL
WBC UA: ABNORMAL /HPF (ref 0–5)
YEAST: ABNORMAL

## 2019-07-25 PROCEDURE — 84300 ASSAY OF URINE SODIUM: CPT

## 2019-07-25 PROCEDURE — 82570 ASSAY OF URINE CREATININE: CPT

## 2019-07-25 PROCEDURE — 2060000000 HC ICU INTERMEDIATE R&B

## 2019-07-25 PROCEDURE — 36415 COLL VENOUS BLD VENIPUNCTURE: CPT

## 2019-07-25 PROCEDURE — 84133 ASSAY OF URINE POTASSIUM: CPT

## 2019-07-25 PROCEDURE — 97542 WHEELCHAIR MNGMENT TRAINING: CPT

## 2019-07-25 PROCEDURE — 99232 SBSQ HOSP IP/OBS MODERATE 35: CPT | Performed by: PHYSICAL MEDICINE & REHABILITATION

## 2019-07-25 PROCEDURE — 6360000002 HC RX W HCPCS: Performed by: STUDENT IN AN ORGANIZED HEALTH CARE EDUCATION/TRAINING PROGRAM

## 2019-07-25 PROCEDURE — 2580000003 HC RX 258: Performed by: STUDENT IN AN ORGANIZED HEALTH CARE EDUCATION/TRAINING PROGRAM

## 2019-07-25 PROCEDURE — 99232 SBSQ HOSP IP/OBS MODERATE 35: CPT | Performed by: INTERNAL MEDICINE

## 2019-07-25 PROCEDURE — 6370000000 HC RX 637 (ALT 250 FOR IP): Performed by: STUDENT IN AN ORGANIZED HEALTH CARE EDUCATION/TRAINING PROGRAM

## 2019-07-25 PROCEDURE — 80048 BASIC METABOLIC PNL TOTAL CA: CPT

## 2019-07-25 PROCEDURE — 84156 ASSAY OF PROTEIN URINE: CPT

## 2019-07-25 PROCEDURE — 51798 US URINE CAPACITY MEASURE: CPT

## 2019-07-25 PROCEDURE — 2580000003 HC RX 258: Performed by: INTERNAL MEDICINE

## 2019-07-25 PROCEDURE — 82436 ASSAY OF URINE CHLORIDE: CPT

## 2019-07-25 PROCEDURE — 6370000000 HC RX 637 (ALT 250 FOR IP): Performed by: INTERNAL MEDICINE

## 2019-07-25 PROCEDURE — 81001 URINALYSIS AUTO W/SCOPE: CPT

## 2019-07-25 PROCEDURE — 97535 SELF CARE MNGMENT TRAINING: CPT

## 2019-07-25 PROCEDURE — 82947 ASSAY GLUCOSE BLOOD QUANT: CPT

## 2019-07-25 PROCEDURE — 99024 POSTOP FOLLOW-UP VISIT: CPT | Performed by: SURGERY

## 2019-07-25 PROCEDURE — 97110 THERAPEUTIC EXERCISES: CPT

## 2019-07-25 PROCEDURE — 80051 ELECTROLYTE PANEL: CPT

## 2019-07-25 PROCEDURE — 97530 THERAPEUTIC ACTIVITIES: CPT

## 2019-07-25 PROCEDURE — 84166 PROTEIN E-PHORESIS/URINE/CSF: CPT

## 2019-07-25 RX ORDER — GABAPENTIN 100 MG/1
100 CAPSULE ORAL 3 TIMES DAILY
Qty: 90 CAPSULE | Refills: 1 | Status: SHIPPED | OUTPATIENT
Start: 2019-07-25 | End: 2022-02-11

## 2019-07-25 RX ORDER — HYDROCODONE BITARTRATE AND ACETAMINOPHEN 5; 325 MG/1; MG/1
1 TABLET ORAL EVERY 6 HOURS PRN
Qty: 20 TABLET | Refills: 0 | Status: SHIPPED | OUTPATIENT
Start: 2019-07-25 | End: 2019-07-30

## 2019-07-25 RX ORDER — DEXTROSE MONOHYDRATE 25 G/50ML
25 INJECTION, SOLUTION INTRAVENOUS ONCE
Status: COMPLETED | OUTPATIENT
Start: 2019-07-25 | End: 2019-07-25

## 2019-07-25 RX ORDER — DOCUSATE SODIUM 100 MG/1
100 CAPSULE, LIQUID FILLED ORAL 2 TIMES DAILY
Qty: 60 CAPSULE | Refills: 0 | Status: SHIPPED | OUTPATIENT
Start: 2019-07-25 | End: 2019-08-24

## 2019-07-25 RX ORDER — HYDRALAZINE HYDROCHLORIDE 50 MG/1
50 TABLET, FILM COATED ORAL EVERY 8 HOURS SCHEDULED
Status: DISCONTINUED | OUTPATIENT
Start: 2019-07-25 | End: 2019-07-26 | Stop reason: HOSPADM

## 2019-07-25 RX ORDER — DOCUSATE SODIUM 100 MG/1
100 CAPSULE, LIQUID FILLED ORAL 2 TIMES DAILY PRN
Qty: 40 CAPSULE | Refills: 0 | Status: SHIPPED | OUTPATIENT
Start: 2019-07-25 | End: 2019-07-26 | Stop reason: HOSPADM

## 2019-07-25 RX ADMIN — TIMOLOL MALEATE 1 DROP: 2.5 SOLUTION/ DROPS OPHTHALMIC at 10:12

## 2019-07-25 RX ADMIN — MORPHINE SULFATE 2 MG: 2 INJECTION, SOLUTION INTRAMUSCULAR; INTRAVENOUS at 21:46

## 2019-07-25 RX ADMIN — LATANOPROST 1 DROP: 50 SOLUTION/ DROPS OPHTHALMIC at 20:25

## 2019-07-25 RX ADMIN — CYCLOBENZAPRINE HYDROCHLORIDE 5 MG: 5 TABLET, FILM COATED ORAL at 17:05

## 2019-07-25 RX ADMIN — ACETAMINOPHEN 1000 MG: 500 TABLET ORAL at 17:08

## 2019-07-25 RX ADMIN — HYDRALAZINE HYDROCHLORIDE 25 MG: 25 TABLET, FILM COATED ORAL at 13:59

## 2019-07-25 RX ADMIN — Medication 10 ML: at 10:13

## 2019-07-25 RX ADMIN — HYDRALAZINE HYDROCHLORIDE 25 MG: 25 TABLET, FILM COATED ORAL at 06:43

## 2019-07-25 RX ADMIN — PIOGLITAZONE 15 MG: 15 TABLET ORAL at 10:06

## 2019-07-25 RX ADMIN — Medication 10 ML: at 21:50

## 2019-07-25 RX ADMIN — ACETAMINOPHEN 1000 MG: 500 TABLET ORAL at 10:05

## 2019-07-25 RX ADMIN — LEVETIRACETAM 500 MG: 500 TABLET, FILM COATED ORAL at 10:07

## 2019-07-25 RX ADMIN — CYCLOBENZAPRINE HYDROCHLORIDE 5 MG: 5 TABLET, FILM COATED ORAL at 10:06

## 2019-07-25 RX ADMIN — CARVEDILOL 12.5 MG: 12.5 TABLET, FILM COATED ORAL at 10:04

## 2019-07-25 RX ADMIN — PANTOPRAZOLE SODIUM 40 MG: 40 TABLET, DELAYED RELEASE ORAL at 06:43

## 2019-07-25 RX ADMIN — DEXTROSE MONOHYDRATE 25 G: 500 INJECTION PARENTERAL at 10:19

## 2019-07-25 RX ADMIN — PRAVASTATIN SODIUM 10 MG: 20 TABLET ORAL at 10:10

## 2019-07-25 RX ADMIN — TAMSULOSIN HYDROCHLORIDE 0.4 MG: 0.4 CAPSULE ORAL at 10:05

## 2019-07-25 RX ADMIN — FOLIC ACID 1 MG: 1 TABLET ORAL at 10:07

## 2019-07-25 RX ADMIN — PREGABALIN 50 MG: 50 CAPSULE ORAL at 10:05

## 2019-07-25 RX ADMIN — HYDRALAZINE HYDROCHLORIDE 50 MG: 50 TABLET, FILM COATED ORAL at 21:46

## 2019-07-25 RX ADMIN — INSULIN HUMAN 10 UNITS: 100 INJECTION, SOLUTION PARENTERAL at 10:19

## 2019-07-25 RX ADMIN — VANCOMYCIN HYDROCHLORIDE 1000 MG: 1 INJECTION, SOLUTION INTRAVENOUS at 06:45

## 2019-07-25 RX ADMIN — OXYCODONE HYDROCHLORIDE 10 MG: 5 TABLET ORAL at 02:53

## 2019-07-25 RX ADMIN — CARVEDILOL 12.5 MG: 12.5 TABLET, FILM COATED ORAL at 17:05

## 2019-07-25 RX ADMIN — OXYCODONE HYDROCHLORIDE 10 MG: 5 TABLET ORAL at 12:31

## 2019-07-25 RX ADMIN — LEVETIRACETAM 500 MG: 500 TABLET, FILM COATED ORAL at 20:24

## 2019-07-25 RX ADMIN — MORPHINE SULFATE 4 MG: 4 INJECTION INTRAVENOUS at 06:44

## 2019-07-25 RX ADMIN — ENOXAPARIN SODIUM 40 MG: 40 INJECTION SUBCUTANEOUS at 10:05

## 2019-07-25 RX ADMIN — ACETAMINOPHEN 1000 MG: 500 TABLET ORAL at 02:59

## 2019-07-25 RX ADMIN — ASPIRIN 81 MG: 81 TABLET ORAL at 10:07

## 2019-07-25 RX ADMIN — INSULIN LISPRO 1 UNITS: 100 INJECTION, SOLUTION INTRAVENOUS; SUBCUTANEOUS at 17:09

## 2019-07-25 ASSESSMENT — PAIN DESCRIPTION - PAIN TYPE: TYPE: ACUTE PAIN

## 2019-07-25 ASSESSMENT — PAIN DESCRIPTION - ONSET: ONSET: GRADUAL

## 2019-07-25 ASSESSMENT — PAIN DESCRIPTION - DESCRIPTORS: DESCRIPTORS: CRAMPING;SPASM

## 2019-07-25 ASSESSMENT — PAIN SCALES - GENERAL
PAINLEVEL_OUTOF10: 3
PAINLEVEL_OUTOF10: 9
PAINLEVEL_OUTOF10: 10
PAINLEVEL_OUTOF10: 10
PAINLEVEL_OUTOF10: 9

## 2019-07-25 ASSESSMENT — PAIN DESCRIPTION - LOCATION
LOCATION: LEG
LOCATION: LEG

## 2019-07-25 ASSESSMENT — PAIN DESCRIPTION - ORIENTATION
ORIENTATION: RIGHT
ORIENTATION: RIGHT

## 2019-07-25 ASSESSMENT — PAIN DESCRIPTION - FREQUENCY: FREQUENCY: INTERMITTENT

## 2019-07-25 NOTE — PROGRESS NOTES
Constitutional:  negative for chills, fevers, sweats  Respiratory:  negative for cough, dyspnea on exertion, hemoptysis, shortness of breath, wheezing  Cardiovascular:  negative for chest pain, chest pressure/discomfort, lower extremity edema, palpitations  Gastrointestinal:  negative for abdominal pain, constipation, diarrhea, nausea, vomiting  Neurological:  negative for dizziness, headache    Medications: Allergies:     Allergies   Allergen Reactions    Adhesive Tape        Current Meds:   Scheduled Meds:    hydrALAZINE  25 mg Oral 3 times per day    vancomycin  1,000 mg Intravenous Q24H    cefTRIAXone (ROCEPHIN) IV  1 g Intravenous Q24H    acetaminophen  1,000 mg Oral Q8H    aspirin  81 mg Oral Daily    levETIRAcetam  500 mg Oral BID    pregabalin  50 mg Oral Daily    insulin glargine  20 Units Subcutaneous Nightly    pioglitazone  15 mg Oral Daily    pravastatin  10 mg Oral Daily    carvedilol  12.5 mg Oral BID WC    [Held by provider] hydrochlorothiazide  25 mg Oral Daily    folic acid  1 mg Oral Daily    tamsulosin  0.4 mg Oral Daily    latanoprost  1 drop Both Eyes Nightly    timolol  1 drop Both Eyes Daily    pantoprazole  40 mg Oral QAM AC    sodium chloride flush  10 mL Intravenous 2 times per day    enoxaparin  40 mg Subcutaneous Daily    insulin lispro  0-6 Units Subcutaneous TID WC    insulin lispro  0-3 Units Subcutaneous Nightly    sodium chloride flush  10 mL Intravenous 2 times per day    vancomycin (VANCOCIN) intermittent dosing (placeholder)   Other RX Placeholder     Continuous Infusions:    sodium chloride 75 mL/hr at 07/23/19 2044    dextrose       PRN Meds: oxyCODONE **OR** oxyCODONE, metoprolol, morphine **OR** morphine, docusate sodium, cyclobenzaprine, sodium chloride flush, potassium chloride **OR** potassium alternative oral replacement **OR** potassium chloride, magnesium sulfate, magnesium hydroxide, ondansetron, acetaminophen, glucose, dextrose, glucagon (rDNA), dextrose    Data:     Past Medical History:   has a past medical history of Acid reflux, Calculus of gallbladder without cholecystitis without obstruction, Cerebrovascular disease, Diabetic foot infection ([facility] Drug (multiple) resistant infection, GERD (gastroesophageal reflux disease), Glaucoma, Glaucoma, Hemispheric carotid artery syndrome, Hyperlipidemia, Hypertension, IDDM (insulin dependent diabetes mellitus) ([facility] MDRO (multiple drug resistant organisms) resistance, MRSA (methicillin resistant staph aureus) culture positive, Neuropathy, Osteomyelitis ([facility] S/P cataract extraction and insertion of intraocular lens, and Type II or unspecified type diabetes mellitus without mention of complication, not stated as uncontrolled. Social History:   reports that he quit smoking about 10 months ago. His smoking use included cigarettes. He smoked 0.00 packs per day. He has never used smokeless tobacco. He reports that he does not drink alcohol or use drugs. Family History:   Family History   Problem Relation Age of Onset    Diabetes Mother     Cancer Father         colon    Diabetes Father     Diabetes Sister     Diabetes Sister        Vitals:  /60   Pulse 68   Temp 98.1 °F (36.7 °C) (Oral)   Resp 18   Ht 5' 7\" (1.702 m)   Wt 182 lb 14.4 oz (83 kg)   SpO2 100%   BMI 28.65 kg/m²   Temp (24hrs), Av.8 °F (36.6 °C), Min:97.5 °F (36.4 °C), Max:98.1 °F (36.7 °C)    Recent Labs     19  0805 19  1128 19  1604 19  2120   POCGLU 103 135* 150* 185*       I/O (24Hr):     Intake/Output Summary (Last 24 hours) at 20199  Last data filed at 2019  Gross per 24 hour   Intake 1692.35 ml   Output 1275 ml   Net 417.35 ml       Labs:  Hematology:  Recent Labs     19  0420   WBC 12.6*   RBC 3.50*   HGB 9.4*   HCT 32.0*   MCV 91.4   MCH 26.9   MCHC 29.4   RDW 12.8      MPV 11.4     Chemistry:  Recent Labs     19  0420 19  0540

## 2019-07-25 NOTE — DISCHARGE INSTR - COC
HISTORY Left 01/23/2017    I and D bone, bone biopsy with flap closure and pulse lavage    OTHER SURGICAL HISTORY Left 04/27/2017    I & D foot    WI DEEP DISSEC FOOT INFEC,1 BURSA Left 4/13/2017    FOOT DEBRIDEMENT WITH EPIFIX  performed by Emeka Siddiqui DPM at [address] [place] [address] INCIS/DRAIN THIGH/KNEE ABSCESS,DEEP Left 9/12/2017     LEFT BELOW KNEE AMPUTATION OPEN, WOUND VAC PLACEMENT performed by Teresa Augustin MD at [place] [address] N/A 9/25/2018    CHOLECYSTECTOMY LAPAROSCOPIC performed by Duglas Barbour MD at [address] [place] [address] TOE AMPUTATION Left 2014    hallux    TOE AMPUTATION Left 12/09/2016    hallux    TOE AMPUTATION Left 4/27/2017    I AND D FOOT, BONE BIOPSY, POSSIBLE FILET 2ND TOE performed by Emeka Siddiqui DPM at [place] [address]  04/17/2017    Left second toe        Immunization History:   Immunization History   Administered Date(s) Administered    Influenza, Quadv, 6 mo and older, IM, PF (Flulaval, Fluarix) 09/26/2018    Pneumococcal Polysaccharide ([id number]) 05/10/2017       Active Problems:  Patient Active Problem List   Diagnosis Code    Essential hypertension I10    IDDM (insulin dependent diabetes mellitus) ([facility] E11.9, Z79.4    Neuropathy ([address] G62.9    Uncontrolled type 2 diabetes with cellulitis of foot ([address] E11.628, L03.119, E11.65    Cerebrovascular disease I67.9    Cataract H26.9    Dysphagia R13.10    Family history of colon cancer Z80.0    Bowel habit changes R19.4    Hyperlipidemia E78.5    Gangrene of right foot ([address] I96    Diabetic foot left E11.8    COLT (acute kidney injury) ([address] N17.9    Ischemic foot left I99.8    Urinary retention R33.9    Subacute osteomyelitis of left foot ([facility]) M86.272    Hx of BKA (HCC) Z89.519    Impaired mobility Z74.09    HCAP (healthcare-associated pneumonia) J18.9    Primary open angle glaucoma of both eyes, moderate stage H40.1132    Pseudophakia of both eyes Z96.1    Retinopathy, Risk of Unplanned Readmission:        25           Discharging to Facility/ Agency   · Name: Naina Mccarthy  · Address:  · Phone:  · Fax:    Dialysis Facility (if applicable)   · Name:  · Address:  · Dialysis Schedule:  · Phone:  · Fax:    / signature: Electronically signed by Mukul Brand RN on 7/26/19 at 5:25 PM    PHYSICIAN SECTION    Prognosis: Fair    Condition at Discharge: Stable    Rehab Potential (if transferring to Rehab): Good    Recommended Labs or Other Treatments After Discharge: Daily dressing changes of right BKA stump    Physician Certification: I certify the above information and transfer of Rayna Tomas  is necessary for the continuing treatment of the diagnosis listed and that he requires home care for less 30 days.      Update Admission H&P: No change in H&P    PHYSICIAN SIGNATURE:  Electronically signed by Annita Garcia MD on 7/25/19 at 6:05 PM

## 2019-07-25 NOTE — PROGRESS NOTES
07/25/19  0808 07/25/19  1148   POCGLU 150* 185* 139* 125*       Recent Labs     07/25/19  1046   COLORU YELLOW   PHUR 5.5   SPECGRAV 1.013   PROTEINU NEGATIVE   RBCUA None   BACTERIA NOT REPORTED   NITRU NEGATIVE   WBCUA 2 TO 5   LEUKOCYTESUR TRACE*   YEAST NOT REPORTED   GLUCOSEU NEGATIVE   BILIRUBINUR NEGATIVE         Impression: Mr. Adriano London is a 52 y.o. right handed male with a history of Gangrene of right foot ([facility]     1. Ambulatory and ADL dysfunction secondary to right below-knee amputation with history of left below-knee amputation  2. ID- off Rocephin and vancomycin   3. Diabetes-insulin  4. Hypertension hyperlipidemia-Coreg, hydralazine, Lopressor, Pravachol  5. Glaucoma- xalantan and Timoptic, pt is blind  6. Pain-morphine received this am, Roxicodone, Tylenol, , - need to off IV pain meds  7. BDH-Flomax  8. Postop anemia  9. Acute on chronic kidney disease-improving, w/u in progress  10. Mild leukocytosis     Recommendations:  1. Diagnosis: Right below-knee amputation with history of left below-knee amputation- has prosth from [employer]  2. Therapy: has PT needs, some OT needs  3. Medical  Necessity: As above  4. Support: family and planning on ramp- supportive  5. Rehab recommendation-will benefit from acute inpt rehab when med ready- work on trans, mobility, preprosthetic training, adl, family training, etc , taper off IV pain meds  6. DVT proph: Lovenox    Discussed with pt, family and nsg    Josafat Suh MD       This note is created with the assistance of a speech recognition program.  While intending to generate a document that actually reflects the content of the visit, the document can still have some errors including those of syntax and sound a like substitutions which may escape proof reading.   In such instances, actual meaning can be extrapolated by contextual diversion

## 2019-07-25 NOTE — PROGRESS NOTES
times daily (with meals)  insulin glargine (LANTUS) 100 UNIT/ML injection vial, Inject 20 Units into the skin nightly  pravastatin (PRAVACHOL) 10 MG tablet, Take 1 tablet by mouth daily  lisinopril (PRINIVIL;ZESTRIL) 40 MG tablet, Take 1 tablet by mouth daily  folic acid (FOLVITE) 1 MG tablet, Take 1 tablet by mouth daily  tamsulosin (FLOMAX) 0.4 MG capsule, Take 1 capsule by mouth daily  docusate sodium (COLACE) 100 MG capsule, Take 1 capsule by mouth 2 times daily as needed for Constipation  omeprazole (PRILOSEC) 20 MG delayed release capsule, Take 20 mg by mouth 2 times daily  timolol (TIMOPTIC-XE) 0.25 % ophthalmic gel-forming, 1 drop daily  travoprost, benzalkonium, (TRAVATAN) 0.004 % ophthalmic solution, 1 drop nightly  HYDROcodone-acetaminophen (NORCO) 5-325 MG per tablet, take 1/2 tablet by mouth twice a day if needed ONLY    INVESTIGATIONS     Last 3 CMP:    Recent Labs     07/23/19  0420 07/24/19  0540 07/24/19 2011 07/25/19  0450    139  --  138   K 5.3 4.8  --  5.4*    108*  --  107   CO2 20 22  --  24   BUN 20 22*  --  17   CREATININE 1.24* 1.35*  --  1.03   CALCIUM 8.7 8.3*  --  8.8   PROT  --   --  5.7*  --        Last 3 CBC:  Recent Labs     07/23/19  0420   WBC 12.6*   RBC 3.50*   HGB 9.4*   HCT 32.0*   MCV 91.4   MCH 26.9   MCHC 29.4   RDW 12.8      MPV 11.4       ASSESSMENT     1. COLT pre renal from hemodynamic effects of NSAID - resolved  2. Hyperkalemia exclude urinary retention  3. S/p Rt BKA for wet gangrene  4. Hx of Left AKA  5. DM2  6. HTN  7. LVDD    PLAN     1. Medically treat hyperkalemia  2. Recheck lytes  3. Check PVR  4. Continue to hold Lisinopril  5.  Will follow    ATTEND  Recheck K normal  Ok to resume Lisinopril  Will sign off  Please do not hesitate to call with questions    This note is created with the assistance of a speech-recognition program. While intending to generate a document that actually reflects the content of the visit, no guarantees can be

## 2019-07-25 NOTE — PROGRESS NOTES
or unspecified type diabetes mellitus without mention of complication, not stated as uncontrolled. has a past surgical history that includes Toe amputation (Left, 2014); eye surgery (Bilateral); Toe amputation (Left, 12/09/2016); other surgical history (Left, 01/23/2017); Foot surgery (Left, 02/24/2017); Foot surgery (Left, 04/13/2017); pr deep dissec foot infec,1 bursa (Left, 4/13/2017); other surgical history (Left, 04/27/2017); Toe amputation (Left, 4/27/2017);   picc powerpicc double (4/28/2017); Leg amputation below knee (Left, 9/7/2017); pr incis/drain thigh/knee abscess,deep (Left, 9/12/2017); Leg amputation below knee (Left, 9/14/2017); Toe amputation (04/17/2017); pr lap,cholecystectomy (N/A, 9/25/2018); Leg amputation below knee (Right, 7/19/2019); and Leg amputation below knee (Right, 7/23/2019). Restrictions  Restrictions/Precautions  Restrictions/Precautions: Fall Risk, Contact Precautions  Required Braces or Orthoses?: No  Position Activity Restriction  Other position/activity restrictions: chronic L AKA, prosthetic used baseline. new R BKA 7/19. Subjective   General  Chart Reviewed: Yes  Response To Previous Treatment: Patient with no complaints from previous session. Family / Caregiver Present: Yes  Subjective  Subjective: Pt was awake and alert while laying supine in bed upon writers entry. RN and pt agreed to PT on this date with a pain rating at 9/10 in posterior R thigh. General Comment  Comments: Pt was left in personal wheelchair with family with sling underneith him for RN to transfer safely.    Pain Screening  Patient Currently in Pain: Yes  Pain Assessment  Pain Assessment: 0-10  Pain Level: 9  Pain Location: Leg  Pain Orientation: Right  Vital Signs  Patient Currently in Pain: Yes       Orientation  Orientation  Overall Orientation Status: Within Functional Limits    Objective   Bed mobility  Rolling to Right: Minimal assistance  Supine to Sit: Minimal assistance  Scooting: Minimal assistance  Transfers  Comment: slide board transfer was performed with Moose of 2 for safety. Verbal cues were given for safety and tactile cues were given for hand placement. Ambulation  Ambulation?: No  Stairs/Curb  Stairs?: No  Wheelchair Activities  Wheelchair Type: Standard  Wheelchair Cushion: None  Propulsion: Yes  Propulsion 1  Propulsion: Manual  Level: Level Tile  Method: RUE;LUE  Level of Assistance: Supervision(Pt req supervision while propelling 2/2 vision impairment. )  Distance: 200ft     Balance  Posture: Good  Sitting - Static: Good   Sitting - Dynamic: Good (pt sat EOB ~6 minutes and performed seated exercises with red Tband)  Comments: Standing was not assessed on this date     Exercises:  Upper extremity exercises:Shoulder flexion/extension, shoulder abduction/adduction, shoulder scaption. Reps: x10 with red Tband   BLE supine: SLR, quad sets, heel slides.  Reps: x10 AROM      Goals  Short term goals  Time Frame for Short term goals: 14 visits  Short term goal 1: Pt will be Yared bed mobility  Short term goal 2: Pt will be Yared transfers  Short term goal 3: Pt will be CGA amb 10' RW  Short term goal 4: Pt will propel w/c 200' Yared  Short term goal 5: Pt will safely complete slide board transfer from bed to W/C with [address]  Times per week: 5-6x/wk  Current Treatment Recommendations: Strengthening, Balance Training, Functional Mobility Training, Endurance Training, Transfer Training, Gait Training, Home Exercise Program, Safety Education & Training, Patient/Caregiver Education & Training, Equipment Evaluation, Education, & procurement  Safety Devices  Type of devices: Call light within reach, Patient at risk for falls, Nurse notified, Left in chair, All fall risk precautions in place, Gait belt  Restraints  Initially in place: No     Therapy Time   Individual Concurrent Group Co-treatment   Time In 1308         Time Out 1355         Minutes [address] [place] [address]

## 2019-07-25 NOTE — PROGRESS NOTES
protein-calorie malnutrition ([facility] [E46] 07/20/2019    Hypocalcemia [E83.51] 07/20/2019    Hyponatremia [E87.1] 07/19/2019    Azotemia [R79.89] 07/19/2019    Cellulitis [L03.90] 07/18/2019    PAD (peripheral artery disease) ([facility] [I73.9]     Impaired mobility [Z74.09]     Diabetic foot left [E11.8] 08/31/2017    Gangrene of right foot ([facility] Teddi Leventhal 08/31/2017    COLT (acute kidney injury) ([facility] [N17.9] 08/31/2017    Essential hypertension [I10]     Neuropathy ([facility] [G62.9]     Uncontrolled type 2 diabetes with cellulitis of foot ([facility] [E11.628, L03.119, E11.65]        Plan:        1. Gangrene right foot s/p amputation- Patient s/p revision of right LE amp. Vascular surgery and Podiatry following.  + cultures +PROVIDENCIA (PROTEUS)    METHICILLIN RESISTANT STAPHYLOCOCCUS AUREUS, STREPTOCOCCI, BETA HEMOLYTIC GROUP B, stop IV antibiotics  2. DM2- monitor BS, SSI,   3. HTN-elevated BP, increase hydralazine 50 mg 3 times daily  4. COLT- IVF, Cr improving  5. Hyperkalemia- resolved after medical treatment  6. Gi/ DVT proph  7. PT/OT  8. SW - needs SNF upon discharge    dw nurse and wife, discharge planning as soon as arrangements made.   JENN and med rec done    Guy Momin MD  7/25/2019  10:11 AM

## 2019-07-25 NOTE — PROGRESS NOTES
diabetes mellitus without mention of complication, not stated as uncontrolled. has a past surgical history that includes Toe amputation (Left, 2014); eye surgery (Bilateral); Toe amputation (Left, 12/09/2016); other surgical history (Left, 01/23/2017); Foot surgery (Left, 02/24/2017); Foot surgery (Left, 04/13/2017); pr deep dissec foot infec,1 bursa (Left, 4/13/2017); other surgical history (Left, 04/27/2017); Toe amputation (Left, 4/27/2017);   picc powerpicc double (4/28/2017); Leg amputation below knee (Left, 9/7/2017); pr incis/drain thigh/knee abscess,deep (Left, 9/12/2017); Leg amputation below knee (Left, 9/14/2017); Toe amputation (04/17/2017); pr lap,cholecystectomy (N/A, 9/25/2018); Leg amputation below knee (Right, 7/19/2019); and Leg amputation below knee (Right, 7/23/2019). Restrictions  Restrictions/Precautions  Restrictions/Precautions: Fall Risk, Contact Precautions  Required Braces or Orthoses?: No  Position Activity Restriction  Other position/activity restrictions: chronic L AKA, prosthetic used baseline. new R BKA 7/19. Subjective   General  Chart Reviewed: Progress Notes, History and Physical  Patient assessed for rehabilitation services?: Yes  Family / Caregiver Present: Yes(Many family members present)      Orientation  Orientation  Overall Orientation Status: Within Functional Limits     Objective    ADL  UE Bathing: Minimal assistance;Setup(Able to wash arms and chest.  Needed assistance with back. Sponge bathed.)  LE Bathing: Setup;Stand by assistance(Pt able to clean entire LE.)  UE Dressing: Stand by assistance;Setup(Able to don gown. )  LE Dressing: Stand by assistance;Setup(Able to don prosthetic sleeve.)  Additional Comments: Pt was supine upon arrival.  Pt stated that pt completed bathing activity with aide using surgical soap. Agreeable to bathing simulation. Pt was able to complete bed med mobility supine>sit. Pt asissted and completed ADL activities stated above.  Pt denies dizziness or SOB. Pt returned to supine, call light withing reach, and RN notified upon exit. Balance  Sitting Balance: Supervision(Sat in bed ~20 min.)  Bed mobility  Supine to Sit: Contact guard assistance  Sit to Supine: Contact guard assistance  Transfers  Transfer Comments: Pt remained in bed for session     Cognition  Overall Cognitive Status: WFL  Perception  Overall Perceptual Status: [facility]     Plan   Plan  Times per week: 4-5x/wk  Current Treatment Recommendations: Strengthening, Endurance Training, Patient/Caregiver Education & Training, Self-Care / ADL, Equipment Evaluation, Education, & procurement, Home Management Training, Safety Education & Training, Functional Mobility Training    Goals  Short term goals  Time Frame for Short term goals: Patient will, by discharge  Short term goal 1: demonstrate bed mobility supine --> sit at SBA with verbal cues provided   Short term goal 2: demonstrate functional mobility/transfers with LRD at [facility]   Short term goal 3: demonstrate ~8 minutes of dynamic standing tolerance to engage in self-care tasks using LRD  Short term goal 4: demonstrate LB self-cares at SBA with verbal cues provided   Short term goal 5: demonstrate UB self-cares at SBA with verbal cues provided      Therapy Time   Individual Concurrent Group Co-treatment   Time In 1133         Time Out 1200         Minutes 27         See above for LOF: RN reports patient is medically stable for therapy treatment this date. Chart reviewed prior to treatment and patient is agreeable for therapy. All lines intact and patient positioned comfortably at end of treatment. All patient needs addressed prior to ending therapy session.       Gwendolyn Palacios, OTS

## 2019-07-26 VITALS
HEART RATE: 70 BPM | WEIGHT: 182.9 LBS | SYSTOLIC BLOOD PRESSURE: 143 MMHG | BODY MASS INDEX: 28.71 KG/M2 | OXYGEN SATURATION: 99 % | DIASTOLIC BLOOD PRESSURE: 54 MMHG | TEMPERATURE: 98.3 F | RESPIRATION RATE: 12 BRPM | HEIGHT: 67 IN

## 2019-07-26 LAB
ANION GAP SERPL CALCULATED.3IONS-SCNC: 12 MMOL/L (ref 9–17)
BUN BLDV-MCNC: 13 MG/DL (ref 6–20)
BUN/CREAT BLD: ABNORMAL (ref 9–20)
CALCIUM SERPL-MCNC: 8.6 MG/DL (ref 8.6–10.4)
CHLORIDE BLD-SCNC: 106 MMOL/L (ref 98–107)
CO2: 21 MMOL/L (ref 20–31)
CREAT SERPL-MCNC: 0.87 MG/DL (ref 0.7–1.2)
GFR AFRICAN AMERICAN: >60 ML/MIN
GFR NON-AFRICAN AMERICAN: >60 ML/MIN
GFR SERPL CREATININE-BSD FRML MDRD: ABNORMAL ML/MIN/{1.73_M2}
GFR SERPL CREATININE-BSD FRML MDRD: ABNORMAL ML/MIN/{1.73_M2}
GLUCOSE BLD-MCNC: 137 MG/DL (ref 75–110)
GLUCOSE BLD-MCNC: 147 MG/DL (ref 75–110)
GLUCOSE BLD-MCNC: 151 MG/DL (ref 70–99)
GLUCOSE BLD-MCNC: 219 MG/DL (ref 75–110)
POTASSIUM SERPL-SCNC: 4.8 MMOL/L (ref 3.7–5.3)
SODIUM BLD-SCNC: 139 MMOL/L (ref 135–144)

## 2019-07-26 PROCEDURE — 2580000003 HC RX 258: Performed by: STUDENT IN AN ORGANIZED HEALTH CARE EDUCATION/TRAINING PROGRAM

## 2019-07-26 PROCEDURE — 97110 THERAPEUTIC EXERCISES: CPT

## 2019-07-26 PROCEDURE — 82947 ASSAY GLUCOSE BLOOD QUANT: CPT

## 2019-07-26 PROCEDURE — 94760 N-INVAS EAR/PLS OXIMETRY 1: CPT

## 2019-07-26 PROCEDURE — 6370000000 HC RX 637 (ALT 250 FOR IP): Performed by: INTERNAL MEDICINE

## 2019-07-26 PROCEDURE — 99239 HOSP IP/OBS DSCHRG MGMT >30: CPT | Performed by: INTERNAL MEDICINE

## 2019-07-26 PROCEDURE — 6370000000 HC RX 637 (ALT 250 FOR IP): Performed by: STUDENT IN AN ORGANIZED HEALTH CARE EDUCATION/TRAINING PROGRAM

## 2019-07-26 PROCEDURE — 36415 COLL VENOUS BLD VENIPUNCTURE: CPT

## 2019-07-26 PROCEDURE — 80048 BASIC METABOLIC PNL TOTAL CA: CPT

## 2019-07-26 PROCEDURE — 97542 WHEELCHAIR MNGMENT TRAINING: CPT

## 2019-07-26 PROCEDURE — 6360000002 HC RX W HCPCS: Performed by: STUDENT IN AN ORGANIZED HEALTH CARE EDUCATION/TRAINING PROGRAM

## 2019-07-26 PROCEDURE — 97530 THERAPEUTIC ACTIVITIES: CPT

## 2019-07-26 PROCEDURE — 99024 POSTOP FOLLOW-UP VISIT: CPT | Performed by: SURGERY

## 2019-07-26 PROCEDURE — 99232 SBSQ HOSP IP/OBS MODERATE 35: CPT | Performed by: PHYSICAL MEDICINE & REHABILITATION

## 2019-07-26 RX ORDER — LATANOPROST 50 UG/ML
1 SOLUTION/ DROPS OPHTHALMIC NIGHTLY
Status: CANCELLED | OUTPATIENT
Start: 2019-07-26

## 2019-07-26 RX ORDER — DEXTROSE MONOHYDRATE 25 G/50ML
12.5 INJECTION, SOLUTION INTRAVENOUS PRN
Status: CANCELLED | OUTPATIENT
Start: 2019-07-26

## 2019-07-26 RX ORDER — NICOTINE POLACRILEX 4 MG
15 LOZENGE BUCCAL PRN
Status: CANCELLED | OUTPATIENT
Start: 2019-07-26

## 2019-07-26 RX ORDER — PANTOPRAZOLE SODIUM 40 MG/1
40 TABLET, DELAYED RELEASE ORAL
Status: CANCELLED | OUTPATIENT
Start: 2019-07-27

## 2019-07-26 RX ORDER — ACETAMINOPHEN 325 MG/1
650 TABLET ORAL EVERY 6 HOURS PRN
Status: DISCONTINUED | OUTPATIENT
Start: 2019-07-26 | End: 2019-07-26 | Stop reason: HOSPADM

## 2019-07-26 RX ORDER — CARVEDILOL 12.5 MG/1
12.5 TABLET ORAL 2 TIMES DAILY WITH MEALS
Status: CANCELLED | OUTPATIENT
Start: 2019-07-26

## 2019-07-26 RX ORDER — PRAVASTATIN SODIUM 20 MG
10 TABLET ORAL DAILY
Status: CANCELLED | OUTPATIENT
Start: 2019-07-27

## 2019-07-26 RX ORDER — DOCUSATE SODIUM 100 MG/1
100 CAPSULE, LIQUID FILLED ORAL 2 TIMES DAILY PRN
Status: CANCELLED | OUTPATIENT
Start: 2019-07-26

## 2019-07-26 RX ORDER — HYDROCHLOROTHIAZIDE 25 MG/1
25 TABLET ORAL DAILY
Status: DISCONTINUED | OUTPATIENT
Start: 2019-07-26 | End: 2019-07-26 | Stop reason: HOSPADM

## 2019-07-26 RX ORDER — TAMSULOSIN HYDROCHLORIDE 0.4 MG/1
0.4 CAPSULE ORAL DAILY
Status: CANCELLED | OUTPATIENT
Start: 2019-07-27

## 2019-07-26 RX ORDER — INSULIN GLARGINE 100 [IU]/ML
20 INJECTION, SOLUTION SUBCUTANEOUS NIGHTLY
Status: CANCELLED | OUTPATIENT
Start: 2019-07-26

## 2019-07-26 RX ORDER — CLOPIDOGREL BISULFATE 75 MG/1
75 TABLET ORAL DAILY
Status: CANCELLED | OUTPATIENT
Start: 2019-07-26

## 2019-07-26 RX ORDER — PIOGLITAZONEHYDROCHLORIDE 15 MG/1
15 TABLET ORAL DAILY
Status: CANCELLED | OUTPATIENT
Start: 2019-07-27

## 2019-07-26 RX ORDER — HYDROCHLOROTHIAZIDE 25 MG/1
25 TABLET ORAL DAILY
Qty: 30 TABLET | Refills: 3 | DISCHARGE
Start: 2019-07-26 | End: 2021-04-21

## 2019-07-26 RX ORDER — HYDROCHLOROTHIAZIDE 25 MG/1
25 TABLET ORAL DAILY
Status: CANCELLED | OUTPATIENT
Start: 2019-07-27

## 2019-07-26 RX ORDER — GAUZE BANDAGE 4" X 4"
1 BANDAGE TOPICAL DAILY
Status: CANCELLED | OUTPATIENT
Start: 2019-07-26

## 2019-07-26 RX ORDER — HYDROCODONE BITARTRATE AND ACETAMINOPHEN 5; 325 MG/1; MG/1
1 TABLET ORAL EVERY 6 HOURS PRN
Status: CANCELLED | OUTPATIENT
Start: 2019-07-26

## 2019-07-26 RX ORDER — ACETAMINOPHEN 325 MG/1
650 TABLET ORAL EVERY 4 HOURS PRN
Status: CANCELLED | OUTPATIENT
Start: 2019-07-26

## 2019-07-26 RX ORDER — HYDRALAZINE HYDROCHLORIDE 50 MG/1
50 TABLET, FILM COATED ORAL EVERY 8 HOURS SCHEDULED
Status: CANCELLED | OUTPATIENT
Start: 2019-07-26

## 2019-07-26 RX ORDER — GLIMEPIRIDE 2 MG/1
1 TABLET ORAL DAILY
Status: CANCELLED | OUTPATIENT
Start: 2019-07-27

## 2019-07-26 RX ORDER — DEXTROSE MONOHYDRATE 50 MG/ML
100 INJECTION, SOLUTION INTRAVENOUS PRN
Status: CANCELLED | OUTPATIENT
Start: 2019-07-26

## 2019-07-26 RX ORDER — CYCLOBENZAPRINE HCL 5 MG
5 TABLET ORAL 3 TIMES DAILY PRN
Status: CANCELLED | OUTPATIENT
Start: 2019-07-26

## 2019-07-26 RX ORDER — LEVETIRACETAM 500 MG/1
500 TABLET ORAL 2 TIMES DAILY
Status: CANCELLED | OUTPATIENT
Start: 2019-07-26

## 2019-07-26 RX ORDER — FOLIC ACID 1 MG/1
1 TABLET ORAL DAILY
Status: CANCELLED | OUTPATIENT
Start: 2019-07-27

## 2019-07-26 RX ORDER — ASPIRIN 81 MG/1
81 TABLET ORAL DAILY
Status: CANCELLED | OUTPATIENT
Start: 2019-07-27

## 2019-07-26 RX ADMIN — LEVETIRACETAM 500 MG: 500 TABLET, FILM COATED ORAL at 09:28

## 2019-07-26 RX ADMIN — CYCLOBENZAPRINE HYDROCHLORIDE 5 MG: 5 TABLET, FILM COATED ORAL at 17:10

## 2019-07-26 RX ADMIN — INSULIN LISPRO 2 UNITS: 100 INJECTION, SOLUTION INTRAVENOUS; SUBCUTANEOUS at 12:30

## 2019-07-26 RX ADMIN — CYCLOBENZAPRINE HYDROCHLORIDE 5 MG: 5 TABLET, FILM COATED ORAL at 09:29

## 2019-07-26 RX ADMIN — PIOGLITAZONE 15 MG: 15 TABLET ORAL at 09:28

## 2019-07-26 RX ADMIN — ENOXAPARIN SODIUM 40 MG: 40 INJECTION SUBCUTANEOUS at 09:28

## 2019-07-26 RX ADMIN — ASPIRIN 81 MG: 81 TABLET ORAL at 09:28

## 2019-07-26 RX ADMIN — PANTOPRAZOLE SODIUM 40 MG: 40 TABLET, DELAYED RELEASE ORAL at 06:13

## 2019-07-26 RX ADMIN — MORPHINE SULFATE 2 MG: 2 INJECTION, SOLUTION INTRAMUSCULAR; INTRAVENOUS at 19:54

## 2019-07-26 RX ADMIN — INSULIN LISPRO 1 UNITS: 100 INJECTION, SOLUTION INTRAVENOUS; SUBCUTANEOUS at 09:30

## 2019-07-26 RX ADMIN — ACETAMINOPHEN 1000 MG: 500 TABLET ORAL at 09:28

## 2019-07-26 RX ADMIN — CARVEDILOL 12.5 MG: 12.5 TABLET, FILM COATED ORAL at 09:28

## 2019-07-26 RX ADMIN — TIMOLOL MALEATE 1 DROP: 2.5 SOLUTION/ DROPS OPHTHALMIC at 10:20

## 2019-07-26 RX ADMIN — TAMSULOSIN HYDROCHLORIDE 0.4 MG: 0.4 CAPSULE ORAL at 09:27

## 2019-07-26 RX ADMIN — INSULIN LISPRO 1 UNITS: 100 INJECTION, SOLUTION INTRAVENOUS; SUBCUTANEOUS at 17:10

## 2019-07-26 RX ADMIN — Medication 10 ML: at 09:29

## 2019-07-26 RX ADMIN — PRAVASTATIN SODIUM 10 MG: 20 TABLET ORAL at 09:28

## 2019-07-26 RX ADMIN — OXYCODONE HYDROCHLORIDE 5 MG: 5 TABLET ORAL at 12:55

## 2019-07-26 RX ADMIN — ACETAMINOPHEN 650 MG: 325 TABLET ORAL at 01:50

## 2019-07-26 RX ADMIN — FOLIC ACID 1 MG: 1 TABLET ORAL at 09:27

## 2019-07-26 RX ADMIN — PREGABALIN 50 MG: 50 CAPSULE ORAL at 09:27

## 2019-07-26 RX ADMIN — HYDRALAZINE HYDROCHLORIDE 50 MG: 50 TABLET, FILM COATED ORAL at 06:13

## 2019-07-26 RX ADMIN — CARVEDILOL 12.5 MG: 12.5 TABLET, FILM COATED ORAL at 17:10

## 2019-07-26 ASSESSMENT — PAIN SCALES - GENERAL
PAINLEVEL_OUTOF10: 8
PAINLEVEL_OUTOF10: 5
PAINLEVEL_OUTOF10: 5
PAINLEVEL_OUTOF10: 3
PAINLEVEL_OUTOF10: 0
PAINLEVEL_OUTOF10: 3
PAINLEVEL_OUTOF10: 8

## 2019-07-26 ASSESSMENT — PAIN DESCRIPTION - PAIN TYPE: TYPE: ACUTE PAIN

## 2019-07-26 ASSESSMENT — PAIN DESCRIPTION - LOCATION: LOCATION: LEG

## 2019-07-26 NOTE — PROGRESS NOTES
>60  --  >60   CALCIUM 8.3* 8.8  --  8.6     Recent Labs     07/24/19 2011 07/24/19  2120 07/25/19  0808 07/25/19  1148 07/25/19  1653 07/25/19 2034 07/26/19  0749   PROT 5.7*  --   --   --   --   --   --    POCGLU  --  185* 139* 125* 144* 131* 137*     ABG:  Lab Results   Component Value Date    FIO2 NOT REPORTED 11/07/2018     Lab Results   Component Value Date/Time    SPECIAL NOT REPORTED 07/19/2019 04:56 AM     Lab Results   Component Value Date/Time    CULTURE PROVIDENCIA (PROTEUS) RETTGERI MODERATE GROWTH (A) 07/19/2019 04:56 AM    CULTURE (A) 07/19/2019 04:56 AM     METHICILLIN RESISTANT STAPHYLOCOCCUS AUREUS HEAVY GROWTH    CULTURE (A) 07/19/2019 04:56 AM     STREPTOCOCCI, BETA HEMOLYTIC GROUP B MODERATE GROWTH    CULTURE NORMAL SKIN MARIELA (A) 07/19/2019 04:56 AM       Radiology:    Annita Samples Foot Right (min 3 Views)    Result Date: 7/18/2019  1. Soft tissue gas in the 1st and 3rd toes, concerning for gas gangrene. 2.  Diffuse bony demineralization at the 1st and 3rd distal phalanges, possibly related to osteomyelitis. No focal areas of osseous destructive change. 3.  Status post 4th transmetatarsal amputation.        Physical Examination:        General appearance:  alert, cooperative and in pain, eyes closed  Mental Status:  oriented to person, place and time and normal affect  Lungs:  clear to auscultation bilaterally, normal effort  Heart:  regular rate and rhythm, no murmur  Abdomen:  soft, nontender, nondistended, normal bowel sounds, no masses, hepatomegaly, splenomegaly  Extremities:  Bilat below the knee amputations, right wound c/d/i  Skin:  no gross lesions, rashes, induration    Assessment:        Primary Problem  Gangrene of right foot [facility])    Active Hospital Problems    Diagnosis Date Noted    Below knee amputation status, right ([facility] [Z89.511]     Thrombocytosis ([facility] [D47.3] 07/21/2019    Acute blood loss as cause of postoperative anemia [D62] 07/21/2019    MRSA infection - diabetic foot

## 2019-07-26 NOTE — PLAN OF CARE
Problem: Falls - Risk of:  Goal: Will remain free from falls  Description  Will remain free from falls  7/26/2019 1018 by Stefano Still RN  Outcome: Ongoing  7/26/2019 0634 by Antonieta Delacruz RN  Outcome: Ongoing  Goal: Absence of physical injury  Description  Absence of physical injury  7/26/2019 1018 by Stefano Still RN  Outcome: Ongoing  7/26/2019 0634 by Antonieta Delacruz RN  Outcome: Ongoing     Problem: Nutrition  Goal: Optimal nutrition therapy  7/26/2019 1018 by Stefano Still RN  Outcome: Ongoing  7/26/2019 0634 by Antonieta Delacruz RN  Outcome: Ongoing     Problem: Risk for Impaired Skin Integrity  Goal: Tissue integrity - skin and mucous membranes  Description  Structural intactness and normal physiological function of skin and  mucous membranes.   7/26/2019 1018 by Stefano Still RN  Outcome: Ongoing  7/26/2019 0634 by Antonieta Delacruz RN  Outcome: Ongoing     Problem: Pain:  Goal: Pain level will decrease  Description  Pain level will decrease  7/26/2019 1018 by Stefano Still RN  Outcome: Ongoing  7/26/2019 0634 by Antonieta Delacruz RN  Outcome: Ongoing  Goal: Control of acute pain  Description  Control of acute pain  7/26/2019 1018 by Stefano Still RN  Outcome: Ongoing  7/26/2019 0634 by nAtonieta Delacruz RN  Outcome: Ongoing  Goal: Control of chronic pain  Description  Control of chronic pain  7/26/2019 1018 by Stefano Still RN  Outcome: Ongoing  7/26/2019 0634 by Antonieta Delacruz RN  Outcome: Ongoing     Problem: Musculor/Skeletal Functional Status  Goal: Highest potential functional level  7/26/2019 1018 by Stefano Still RN  Outcome: Ongoing  7/26/2019 0634 by Antonieta Delacruz RN  Outcome: Ongoing     Problem: Infection - Surgical Site:  Goal: Will show no infection signs and symptoms  Description  Will show no infection signs and symptoms  7/26/2019 1018 by Stefano Still RN  Outcome: Ongoing  7/26/2019 0634 by Antonieta Delacruz RN  Outcome: Ongoing

## 2019-07-26 NOTE — DISCHARGE SUMMARY
[facility]    Discharge Summary     Patient ID: Parisa Rossi  :  1965   MRN: [id number]     ACCOUNT:  [de-identified]   Patient's PCP: Katiuska Brandon MD  Admit Date: 2019   Discharge Date: 2019     Length of Stay: 8  Code Status:  Full Code  Admitting Physician: Jacob Escalera DO  Discharge Physician: Guy Momin MD     Active Discharge Diagnoses:     Hospital Problem Lists:  Principal Problem:    Gangrene of right foot [facility])  Active Problems:    Essential hypertension    Neuropathy ([facility]    Uncontrolled type 2 diabetes with cellulitis of foot ([facility]    Diabetic foot left    COLT (acute kidney injury) ([facility]    Impaired mobility    PAD (peripheral artery disease) ([facility]    Cellulitis    Hyponatremia    Azotemia    Hypoalbuminemia due to protein-calorie malnutrition (HCC)    Hypocalcemia    Thrombocytosis (HCC)    Acute blood loss as cause of postoperative anemia    MRSA infection - diabetic foot right side    Below knee amputation status, right (HCC)  Resolved Problems:    * No resolved hospital problems. *      Admission Condition:  poor     Discharged Condition: fair    Hospital Stay:     Hospital Course:  Parisa Rossi is a 52 y.o. male who was admitted for the management of   Gangrene of right foot ([facility] , presented to ER with *Foot Pain (right foot. arch pain where a blister has formed. being seen by podiotry for a toe that is dead on the same foot. )    Per my partner: \"The patient is a 45 y. o. male who is admitted to the hospital for the management of Diabetic foot     Patient was admitted thru ER with:  \"Kin Zhang is a 52 y. o. male 52year-old male history of severe atherosclerotic disease diabetes, above-the-knee amputation left side, history of dry gangrene right lower extremity foot multiple toes, follows with Dr. Mitchell Gandara for vascular and Mir for podietry   Brought in with family for worsening pain and instructions.         CONTINUE taking these medications    amLODIPine 10 MG tablet  Commonly known as:  NORVASC  Take 1 tablet by mouth daily     aspirin 81 MG EC tablet  Take 1 tablet by mouth daily     AVANDIA PO     carvedilol 12.5 MG tablet  Commonly known as:  COREG  Take 1 tablet by mouth 2 times daily (with meals)     clopidogrel 75 MG tablet  Commonly known as:  PLAVIX  Take 1 tablet by mouth daily     cyclobenzaprine 5 MG tablet  Commonly known as:  FLEXERIL     folic acid 1 MG tablet  Commonly known as:  FOLVITE  Take 1 tablet by mouth daily     Gauze Dressing 4\"X4\" Pads  1 each by Does not apply route daily     hydrochlorothiazide 25 MG tablet  Commonly known as:  HYDRODIURIL  Take 1 tablet by mouth daily     insulin glargine 100 UNIT/ML injection vial  Commonly known as:  LANTUS  Inject 20 Units into the skin nightly     latanoprost 0.005 % ophthalmic solution  Commonly known as:  XALATAN     levETIRAcetam 500 MG tablet  Commonly known as:  KEPPRA  take 1 tablet by mouth twice a day     metFORMIN 500 MG tablet  Commonly known as:  GLUCOPHAGE     omeprazole 20 MG delayed release capsule  Commonly known as:  PRILOSEC     pravastatin 10 MG tablet  Commonly known as:  PRAVACHOL  Take 1 tablet by mouth daily     tamsulosin 0.4 MG capsule  Commonly known as:  FLOMAX  Take 1 capsule by mouth daily     timolol 0.25 % ophthalmic gel-forming  Commonly known as:  TIMOPTIC-XE     travoprost (benzalkonium) 0.004 % ophthalmic solution  Commonly known as:  TRAVATAN        STOP taking these medications    ammonium lactate 12 % lotion  Commonly known as:  LAC-HYDRIN     amoxicillin-clavulanate 875-125 MG per tablet  Commonly known as:  AUGMENTIN     LYRICA 50 MG capsule  Generic drug:  pregabalin     metoprolol tartrate 50 MG tablet  Commonly known as:  LOPRESSOR     silver sulfADIAZINE 1 % cream  Commonly known as:  SILVADENE        ASK your doctor about these medications    lisinopril 40 MG tablet  Commonly known as: PRINIVIL;ZESTRIL  Take 1 tablet by mouth daily           Where to Get Your Medications      These medications were sent to [facility], [address] [place] [address]  [address], [address] [address]    Phone:  [phone]   · docusate sodium 100 MG capsule     You can get these medications from any pharmacy    Bring a paper prescription for each of these medications  · gabapentin 100 MG capsule  · HYDROcodone-acetaminophen 5-325 MG per tablet     Information about where to get these medications is not yet available    Ask your nurse or doctor about these medications  · enoxaparin 40 MG/0.4ML injection  · glucagon (rDNA) 1 MG injection  · hydrochlorothiazide 25 MG tablet  · insulin lispro 100 UNIT/ML injection vial  · insulin lispro 100 UNIT/ML injection vial         Time Spent on discharge is  32 mins in patient examination, evaluation, counseling as well as medication reconciliation, prescriptions for required medications, discharge plan and follow up. Electronically signed by   Liset Mccullough MD  7/26/2019  1:45 PM      Thank you Dr. Dana Gill MD for the opportunity to be involved in this patient's care.

## 2019-07-26 NOTE — PROGRESS NOTES
uncontrolled. has a past surgical history that includes Toe amputation (Left, 2014); eye surgery (Bilateral); Toe amputation (Left, 12/09/2016); other surgical history (Left, 01/23/2017); Foot surgery (Left, 02/24/2017); Foot surgery (Left, 04/13/2017); pr deep dissec foot infec,1 bursa (Left, 4/13/2017); other surgical history (Left, 04/27/2017); Toe amputation (Left, 4/27/2017);   picc powerpicc double (4/28/2017); Leg amputation below knee (Left, 9/7/2017); pr incis/drain thigh/knee abscess,deep (Left, 9/12/2017); Leg amputation below knee (Left, 9/14/2017); Toe amputation (04/17/2017); pr lap,cholecystectomy (N/A, 9/25/2018); Leg amputation below knee (Right, 7/19/2019); and Leg amputation below knee (Right, 7/23/2019). Restrictions  Restrictions/Precautions  Restrictions/Precautions: Fall Risk, Contact Precautions  Required Braces or Orthoses?: No  Position Activity Restriction  Other position/activity restrictions: chronic L AKA, prosthetic used baseline. new R BKA 7/19. Subjective   General  Chart Reviewed: Yes  Response To Previous Treatment: Patient with no complaints from previous session. Family / Caregiver Present: Yes  Subjective  Subjective: Pt was awake and alert while laying supine in bed upon writers entry. RN and pt agreed to PT on this date. General Comment  Comments: Pt was left in personal wheelchair with family with sling underneith him for RN to transfer safely. Orientation  Orientation  Overall Orientation Status: Within Functional Limits    Objective   Bed mobility  Rolling to Right: Minimal assistance  Scooting: Minimal assistance  Transfers  Comment: slide board transfer was performed x3 with [place] for safety. Verbal cues were given for safety and tactile cues were given for hand placement.    Ambulation  Ambulation?: No  Stairs/Curb  Stairs?: No  Propulsion 1  Propulsion: Manual  Level: Level Tile  Method: RUE;LUE  Level of Assistance: Minimal assistance(Pt req verbal

## 2019-07-26 NOTE — PROGRESS NOTES
Precert for ARU initiated with [place] ([place] @ [facility] with pending [facility] #[id number]. Benefits confirmed with [facility] ([place]) who states pt has no OOP liability, including no copay, deductible, or coinsurance. Sophia Meyer CM, notified. Rcv'd vm from [facility] with denial for ARU d/t \"pt can receive care at a lower level\". Per Dr Yisel Azul is not approp at this time d/t pt's higher LOF within the last day. PRINCESS CM notified via vm.

## 2019-07-26 NOTE — CARE COORDINATION
Transition planning. Tanika from [facility] called and starting precert    [id number] Tanika from [facility] called and placement denied. Recommended home vs SNF  Spoke with daughter Pretty Aguilar and SNF list given. Will discuss with her mother    [phone] with wife. Does not want SNF and requesting to take patient home. Choices given and wants [address] Faxed referral and spoke to Brain Kingston of [address] . Informed of discharge today.

## 2019-07-29 LAB
P E INTERPRETATION, U: NORMAL
PATHOLOGIST: NORMAL
SPECIMEN TYPE: NORMAL
URINE TOTAL PROTEIN: 15 MG/DL

## 2019-07-30 ENCOUNTER — TELEPHONE (OUTPATIENT)
Dept: VASCULAR SURGERY | Age: 54
End: 2019-07-30

## 2019-08-12 ENCOUNTER — TELEPHONE (OUTPATIENT)
Dept: VASCULAR SURGERY | Age: 54
End: 2019-08-12

## 2019-08-16 ENCOUNTER — OFFICE VISIT (OUTPATIENT)
Dept: VASCULAR SURGERY | Age: 54
End: 2019-08-16

## 2019-08-16 VITALS
SYSTOLIC BLOOD PRESSURE: 143 MMHG | OXYGEN SATURATION: 99 % | BODY MASS INDEX: 26.63 KG/M2 | HEART RATE: 66 BPM | WEIGHT: 170 LBS | DIASTOLIC BLOOD PRESSURE: 73 MMHG | TEMPERATURE: 98.3 F

## 2019-08-16 PROCEDURE — 99024 POSTOP FOLLOW-UP VISIT: CPT | Performed by: SURGERY

## 2019-08-16 RX ORDER — DORZOLAMIDE HCL 20 MG/ML
SOLUTION/ DROPS OPHTHALMIC
Refills: 0 | COMMUNITY
Start: 2019-08-08 | End: 2021-04-21

## 2019-08-16 RX ORDER — GLIMEPIRIDE 2 MG/1
TABLET ORAL
Refills: 0 | COMMUNITY
Start: 2019-07-18 | End: 2021-04-21

## 2019-08-16 RX ORDER — BRIMONIDINE TARTRATE 0.15 %
DROPS OPHTHALMIC (EYE)
Refills: 0 | COMMUNITY
Start: 2019-08-08 | End: 2021-04-21

## 2019-08-16 RX ORDER — HYDROCODONE BITARTRATE AND ACETAMINOPHEN 5; 325 MG/1; MG/1
1.5 TABLET ORAL 2 TIMES DAILY PRN
Refills: 0 | COMMUNITY
Start: 2019-08-06 | End: 2019-08-26 | Stop reason: ALTCHOICE

## 2019-08-26 ENCOUNTER — OFFICE VISIT (OUTPATIENT)
Dept: VASCULAR SURGERY | Age: 54
End: 2019-08-26

## 2019-08-26 VITALS
OXYGEN SATURATION: 99 % | SYSTOLIC BLOOD PRESSURE: 136 MMHG | TEMPERATURE: 97.7 F | HEART RATE: 61 BPM | HEIGHT: 67 IN | DIASTOLIC BLOOD PRESSURE: 71 MMHG | BODY MASS INDEX: 28.25 KG/M2 | WEIGHT: 180 LBS

## 2019-08-26 DIAGNOSIS — G89.18 POST-OPERATIVE PAIN: Primary | ICD-10-CM

## 2019-08-26 PROCEDURE — 99024 POSTOP FOLLOW-UP VISIT: CPT | Performed by: SURGERY

## 2019-08-26 RX ORDER — HYDROCODONE BITARTRATE AND ACETAMINOPHEN 5; 325 MG/1; MG/1
1 TABLET ORAL EVERY 4 HOURS PRN
Qty: 42 TABLET | Refills: 0 | Status: SHIPPED | OUTPATIENT
Start: 2019-08-26 | End: 2019-09-02

## 2019-08-26 NOTE — PROGRESS NOTES
Division of Vascular Surgery        Postoperative Follow Up    Staged right below knee amputation (7/19 and 7/23/19)    History of Present Illness:      Sadia Pearson is a 52 y.o. gentleman presents for postoperative follow up after undergoing staged right BKA. He had a small area of his stump incision with breakdown due to fibrinous exudate. Denies phantom pain, but has some occasional discomfort in his stump requiring pain medication. Otherwise he has been doing well and working with physical therapy. Review of Systems:     Review of Systems   Constitutional: Negative for chills and fever. Respiratory: Negative for chest tightness and shortness of breath. Cardiovascular: Negative for chest pain and leg swelling. Skin: Positive for wound. Negative for color change. Neurological: Negative for weakness and numbness. Physical Exam:     Vitals:  /71 (Site: Left Upper Arm, Position: Sitting, Cuff Size: Medium Adult)   Pulse 61   Temp 97.7 °F (36.5 °C) (Oral)   Ht 5' 7\" (1.702 m)   Wt 180 lb (81.6 kg) Comment: PER PT  SpO2 99%   BMI 28.19 kg/m²     Physical Exam   Constitutional: He is oriented to person, place, and time. He appears well-developed and well-nourished. Cardiovascular: Normal rate and regular rhythm. Pulmonary/Chest: Effort normal. No respiratory distress. Feet:   Right Foot: amputated  Left Foot: amputated  Neurological: He is alert and oriented to person, place, and time. He has normal strength. No sensory deficit. GCS eye subscore is 4. GCS verbal subscore is 5. GCS motor subscore is 6. Skin: Skin is warm. Capillary refill takes less than 2 seconds.    Small area with fibrinous exudate  No erythema, some serous drainage          Imaging/Labs:     None performed    Assessment and Plan:     Ischemic wet gangrene, diabetic foot infection, s/p staged BKA  · Sharp excisional debridement performed over fibrinous exudate  · Good healthy granulation tissue underneath  · Stump washed and cleaned  · Area packed with some gauze followed by bandaid  · Ok to wash stump daily, change packing daily (should only need to do it for a few days)  · Ok for stump  and start prosthesis evaluation  · He will follow up in 1 month for evaluation, sooner if there is further breakdown of wound    Electronically signed by Miranda Yin MD on 8/26/19 at 5:53 PM      [address] [place] [address],[address] [place] [address] North: [phone]  C: [phone]  Email: [email or web address]

## 2019-08-27 ASSESSMENT — ENCOUNTER SYMPTOMS
COLOR CHANGE: 0
SHORTNESS OF BREATH: 0
CHEST TIGHTNESS: 0

## 2019-09-05 ENCOUNTER — TELEPHONE (OUTPATIENT)
Dept: VASCULAR SURGERY | Age: 54
End: 2019-09-05

## 2019-10-07 ENCOUNTER — OFFICE VISIT (OUTPATIENT)
Dept: VASCULAR SURGERY | Age: 54
End: 2019-10-07

## 2019-10-07 VITALS
SYSTOLIC BLOOD PRESSURE: 126 MMHG | OXYGEN SATURATION: 98 % | DIASTOLIC BLOOD PRESSURE: 59 MMHG | HEART RATE: 48 BPM | HEIGHT: 67 IN | WEIGHT: 180 LBS | BODY MASS INDEX: 28.25 KG/M2

## 2019-10-07 DIAGNOSIS — Z89.511 HISTORY OF BELOW KNEE AMPUTATION, RIGHT (HCC): Primary | ICD-10-CM

## 2019-10-07 PROCEDURE — 99024 POSTOP FOLLOW-UP VISIT: CPT | Performed by: SURGERY

## 2019-10-07 RX ORDER — LISINOPRIL 40 MG/1
40 TABLET ORAL DAILY
Refills: 0 | COMMUNITY
Start: 2019-09-08

## 2019-10-07 RX ORDER — CIPROFLOXACIN 500 MG/1
TABLET, FILM COATED ORAL
Refills: 0 | COMMUNITY
Start: 2019-09-20 | End: 2021-04-21

## 2019-10-07 RX ORDER — METOPROLOL TARTRATE 50 MG/1
50 TABLET, FILM COATED ORAL 2 TIMES DAILY
Refills: 0 | COMMUNITY
Start: 2019-09-08

## 2019-10-18 RX ORDER — LEVETIRACETAM 500 MG/1
TABLET ORAL
Qty: 180 TABLET | Refills: 0 | Status: SHIPPED | OUTPATIENT
Start: 2019-10-18 | End: 2020-01-20

## 2020-01-20 RX ORDER — LEVETIRACETAM 500 MG/1
TABLET ORAL
Qty: 180 TABLET | Refills: 0 | Status: SHIPPED | OUTPATIENT
Start: 2020-01-20 | End: 2020-04-07

## 2020-01-20 NOTE — TELEPHONE ENCOUNTER
E-scribe requesting refill for Keppra 500. Please review and e-scribe if applicable. Last Visit Date: 1/22/19    Next Visit Date:  No future appointments.

## 2020-04-06 NOTE — TELEPHONE ENCOUNTER
E-scribe requesting refill for keppra. Please review and e-scribe if applicable. Last Visit Date: 1-22-19    Next Visit Date:  No future appointments.

## 2020-04-07 RX ORDER — LEVETIRACETAM 500 MG/1
500 TABLET ORAL 2 TIMES DAILY
Qty: 180 TABLET | Refills: 1 | Status: SHIPPED | OUTPATIENT
Start: 2020-04-07 | End: 2020-09-30 | Stop reason: SDUPTHER

## 2020-06-02 ENCOUNTER — TELEPHONE (OUTPATIENT)
Dept: VASCULAR SURGERY | Age: 55
End: 2020-06-02

## 2020-06-08 ENCOUNTER — OFFICE VISIT (OUTPATIENT)
Dept: VASCULAR SURGERY | Age: 55
End: 2020-06-08
Payer: COMMERCIAL

## 2020-06-08 VITALS
SYSTOLIC BLOOD PRESSURE: 160 MMHG | DIASTOLIC BLOOD PRESSURE: 63 MMHG | BODY MASS INDEX: 29.35 KG/M2 | HEIGHT: 67 IN | OXYGEN SATURATION: 98 % | HEART RATE: 54 BPM | TEMPERATURE: 97.9 F | WEIGHT: 187 LBS

## 2020-06-08 PROCEDURE — 99214 OFFICE O/P EST MOD 30 MIN: CPT | Performed by: SURGERY

## 2020-06-08 RX ORDER — HYDROCODONE BITARTRATE AND ACETAMINOPHEN 5; 325 MG/1; MG/1
TABLET ORAL
COMMUNITY
Start: 2020-03-06

## 2020-06-08 NOTE — PROGRESS NOTES
LEFT BELOW KNEE GUILLOTINE AMPUTATION performed by Jeana Escalante MD at [address] Left 9/14/2017    REVISION LEFT BELOW KNEE AMPUTATION, CLOSED  performed by Jeana Escalante MD at [address] Right 7/19/2019    RIGHT GUILLOTINE FOOT AMPUTATION performed by Jeff Ribeiro MD at [address] Right 7/23/2019    RIGHT BELOW KNEE AMPUTATION performed by Jeff Ribeiro MD at [address] Left 01/23/2017    I and D bone, bone biopsy with flap closure and pulse lavage    OTHER SURGICAL HISTORY Left 04/27/2017    I & D foot    VA DEEP DISSEC FOOT INFEC,1 BURSA Left 4/13/2017    FOOT DEBRIDEMENT WITH EPIFIX  performed by Reji Siegel DPM at [facility] INCIS/DRAIN THIGH/KNEE ABSCESS,DEEP Left 9/12/2017     LEFT BELOW KNEE AMPUTATION OPEN, WOUND VAC PLACEMENT performed by Jeana Escalante MD at [place] [address] N/A 9/25/2018    CHOLECYSTECTOMY LAPAROSCOPIC performed by Maris Rodney MD at [facility] Left 2014    hallux    TOE AMPUTATION Left 12/09/2016    hallux    TOE AMPUTATION Left 4/27/2017    I AND D FOOT, BONE BIOPSY, POSSIBLE FILET 2ND TOE performed by Reji Siegel DPM at [address] TOE AMPUTATION  04/17/2017    Left second toe        Family History:     Family History   Problem Relation Age of Onset    Diabetes Mother     Cancer Father         colon    Diabetes Father     Diabetes Sister     Diabetes Sister        Allergies:       Adhesive tape    Medications:      Current Outpatient Medications   Medication Sig Dispense Refill    levETIRAcetam (KEPPRA) 500 MG tablet Take 1 tablet by mouth 2 times daily 180 tablet 1    lisinopril (PRINIVIL;ZESTRIL) 40 MG tablet   0    ciprofloxacin (CIPRO) 500 MG tablet take 1 tablet by mouth twice a day for 10 days  0    metoprolol tartrate (LOPRESSOR) 50 MG tablet   0    brimonidine

## 2020-06-10 ASSESSMENT — ENCOUNTER SYMPTOMS
SHORTNESS OF BREATH: 0
ABDOMINAL PAIN: 0
COUGH: 0
COLOR CHANGE: 1
CHEST TIGHTNESS: 0
ALLERGIC/IMMUNOLOGIC NEGATIVE: 1

## 2020-07-10 ENCOUNTER — OFFICE VISIT (OUTPATIENT)
Dept: VASCULAR SURGERY | Age: 55
End: 2020-07-10
Payer: COMMERCIAL

## 2020-07-10 VITALS
HEIGHT: 67 IN | RESPIRATION RATE: 16 BRPM | TEMPERATURE: 98.2 F | SYSTOLIC BLOOD PRESSURE: 105 MMHG | OXYGEN SATURATION: 98 % | HEART RATE: 54 BPM | WEIGHT: 185 LBS | DIASTOLIC BLOOD PRESSURE: 60 MMHG | BODY MASS INDEX: 29.03 KG/M2

## 2020-07-10 PROCEDURE — 99214 OFFICE O/P EST MOD 30 MIN: CPT | Performed by: SURGERY

## 2020-07-10 RX ORDER — AMOXICILLIN AND CLAVULANATE POTASSIUM 875; 125 MG/1; MG/1
1 TABLET, FILM COATED ORAL 2 TIMES DAILY
Qty: 14 TABLET | Refills: 0 | Status: SHIPPED | OUTPATIENT
Start: 2020-07-10 | End: 2020-07-17

## 2020-07-11 ENCOUNTER — HOSPITAL ENCOUNTER (OUTPATIENT)
Dept: PREADMISSION TESTING | Age: 55
Discharge: HOME | DRG: 475 | End: 2020-07-15
Payer: COMMERCIAL

## 2020-07-11 PROCEDURE — U0004 COV-19 TEST NON-CDC HGH THRU: HCPCS

## 2020-07-12 LAB
SARS-COV-2, PCR: NOT DETECTED
SARS-COV-2, RAPID: NORMAL
SARS-COV-2: NORMAL
SOURCE: NORMAL

## 2020-07-12 ASSESSMENT — ENCOUNTER SYMPTOMS
ABDOMINAL PAIN: 0
COLOR CHANGE: 1
CHEST TIGHTNESS: 0
SHORTNESS OF BREATH: 0
ALLERGIC/IMMUNOLOGIC NEGATIVE: 1

## 2020-07-12 NOTE — PROGRESS NOTES
Division of Vascular Surgery        Follow Up    Staged right BKA (July 2019)  Left staged BKA (September 2017)    Chief Complaint:      Ulcer over right below knee amputation site    History of Present Illness:      Rhodia Gitelman is a 62 y.o. gentleman who presents for follow up after he developed a pressure ulcer over his right BKA stump. We have been doing local wound care and offloading (no prosthesis on) but the wound has not healed. He denies any pain, fevers/chills or systemic signs of infection. There is thick drainage noted coming out from the wound.      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    Hyperlipidemia     Hypertension     IDDM (insulin dependent diabetes mellitus) (HCC)     MDRO (multiple drug resistant organisms) resistance     MRSA (methicillin resistant staph aureus) culture positive 09/03/2017    foot    Neuropathy     Osteomyelitis (HCC)     Left Hallux    S/P cataract extraction and insertion of intraocular lens 2015    bilateral     Type II or unspecified type diabetes mellitus without mention of complication, not stated as uncontrolled        Surgical History:     Past Surgical History:   Procedure Laterality Date    EYE SURGERY Bilateral     lazer both eyes    FOOT SURGERY Left 02/24/2017    surgical prep of wound bed    FOOT SURGERY Left 04/13/2017    1) Left foot surgical preparation of wound-bed, 2) Left foot application of graft     [facility]  [facility] DOUBLE  4/28/2017         LEG AMPUTATION BELOW KNEE Left 9/7/2017    LEFT BELOW KNEE GUILLOTINE AMPUTATION performed by Lenny Mcgee MD at [place] [address] (LOPRESSOR) 50 MG tablet   0    brimonidine (ALPHAGAN P) 0.15 % ophthalmic solution   0    dorzolamide (TRUSOPT) 2 % ophthalmic solution   0    timolol (TIMOPTIC) 0.25 % ophthalmic solution   0    hydrochlorothiazide (HYDRODIURIL) 25 MG tablet Take 1 tablet by mouth daily 30 tablet 3    clopidogrel (PLAVIX) 75 MG tablet Take 1 tablet by mouth daily 30 tablet 0    Gauze Pads & Dressings (GAUZE DRESSING) 4\"X4\" PADS 1 each by Does not apply route daily 10 each 1    latanoprost (XALATAN) 0.005 % ophthalmic solution place 1 drop into both eyes at bedtime  0    Rosiglitazone Maleate (AVANDIA PO) tablet      carvedilol (COREG) 12.5 MG tablet Take 1 tablet by mouth 2 times daily (with meals) 60 tablet 3    amLODIPine (NORVASC) 10 MG tablet Take 1 tablet by mouth daily 30 tablet 3    aspirin 81 MG EC tablet Take 1 tablet by mouth daily 30 tablet 3    cyclobenzaprine (FLEXERIL) 5 MG tablet Take 5 mg by mouth 3 times daily as needed for Muscle spasms      metFORMIN (GLUCOPHAGE) 500 MG tablet Take 500 mg by mouth 2 times daily (with meals)      insulin glargine (LANTUS) 100 UNIT/ML injection vial Inject 20 Units into the skin nightly 1 vial 3    pravastatin (PRAVACHOL) 10 MG tablet Take 1 tablet by mouth daily 30 tablet 3    folic acid (FOLVITE) 1 MG tablet Take 1 tablet by mouth daily 30 tablet 3    tamsulosin (FLOMAX) 0.4 MG capsule Take 1 capsule by mouth daily 30 capsule 3    omeprazole (PRILOSEC) 20 MG delayed release capsule Take 20 mg by mouth 2 times daily      timolol (TIMOPTIC-XE) 0.25 % ophthalmic gel-forming 1 drop daily      travoprost, benzalkonium, (TRAVATAN) 0.004 % ophthalmic solution 1 drop nightly      levETIRAcetam (KEPPRA) 500 MG tablet Take 1 tablet by mouth 2 times daily 180 tablet 1    gabapentin (NEURONTIN) 100 MG capsule Take 1 capsule by mouth 3 times daily for 180 days. Intended supply: 90 days 90 capsule 1     No current facility-administered medications for this visit. Social History:     Tobacco:    reports that he quit smoking about 21 months ago. His smoking use included cigarettes. He has a 10.00 pack-year smoking history. He has never used smokeless tobacco.  Alcohol:      reports no history of alcohol use. Drug Use:  reports no history of drug use. Review of Systems:     Review of Systems   Constitutional: Negative for chills and fever. HENT: Negative for congestion. Eyes: Positive for visual disturbance. Respiratory: Negative for chest tightness and shortness of breath. Cardiovascular: Negative for chest pain and leg swelling. Gastrointestinal: Negative for abdominal pain. Endocrine: Negative. Genitourinary: Negative. Musculoskeletal: Positive for gait problem. Skin: Positive for color change and wound. Allergic/Immunologic: Negative. Neurological: Negative for facial asymmetry, speech difficulty and weakness. Hematological: Negative. Psychiatric/Behavioral: Negative. Physical Exam:     Vitals:  /60 (Site: Right Upper Arm, Position: Sitting, Cuff Size: Medium Adult)   Pulse 54   Temp 98.2 °F (36.8 °C) (Temporal)   Resp 16   Ht 5' 7\" (1.702 m)   Wt 185 lb (83.9 kg)   SpO2 98%   BMI 28.98 kg/m²     Physical Exam  Constitutional:       Appearance: He is well-developed and well-groomed. Eyes:      Extraocular Movements: Extraocular movements intact. Conjunctiva/sclera: Conjunctivae normal.   Neck:      Musculoskeletal: Full passive range of motion without pain. Vascular: No carotid bruit. Cardiovascular:      Rate and Rhythm: Normal rate and regular rhythm. Pulmonary:      Effort: Pulmonary effort is normal. No respiratory distress. Abdominal:      Palpations: Abdomen is soft. Tenderness: There is no abdominal tenderness. Feet:      Right foot:      Amputation: Right leg is amputated below knee. Left foot:      Amputation: Left leg is amputated below knee.    Skin:     General: Skin is warm.      Capillary Refill: Capillary refill takes less than 2 seconds. Neurological:      Mental Status: He is alert and oriented to person, place, and time. GCS: GCS eye subscore is 4. GCS verbal subscore is 5. GCS motor subscore is 6. Sensory: Sensation is intact. Motor: Motor function is intact. Psychiatric:         Mood and Affect: Mood normal.         Speech: Speech normal.         Behavior: Behavior normal.         Thought Content: Thought content normal.     Today  Wound probes to bone          Latasha        Imaging/Labs:     none    Assessment and Plan:     Right below knee amputation complication with pressure ulceration with osteomyelitis  · Will need right below knee amputation revision and trimming the bone back  · Started him on oral antibiotics  · Risks and benefits of surgery explained and he consented to proceed  · Will evaluate postop if he needs to be admitted or not based on his pain    Electronically signed by Sejal Philip MD on 7/12/20 at 11:58 AM EDT      [address] [facility] North: [phone]  C: [phone]  Email: [email or web address]

## 2020-07-13 ENCOUNTER — TELEPHONE (OUTPATIENT)
Dept: PRIMARY CARE CLINIC | Age: 55
End: 2020-07-13

## 2020-07-14 ENCOUNTER — ANESTHESIA EVENT (OUTPATIENT)
Dept: OPERATING ROOM | Age: 55
DRG: 475 | End: 2020-07-14
Payer: COMMERCIAL

## 2020-07-14 ENCOUNTER — HOSPITAL ENCOUNTER (INPATIENT)
Age: 55
LOS: 1 days | Discharge: HOME | DRG: 475 | End: 2020-07-14
Attending: SURGERY | Admitting: SURGERY
Payer: COMMERCIAL

## 2020-07-14 ENCOUNTER — ANESTHESIA (OUTPATIENT)
Dept: OPERATING ROOM | Age: 55
DRG: 475 | End: 2020-07-14
Payer: COMMERCIAL

## 2020-07-14 VITALS
TEMPERATURE: 97 F | DIASTOLIC BLOOD PRESSURE: 62 MMHG | RESPIRATION RATE: 10 BRPM | HEART RATE: 58 BPM | OXYGEN SATURATION: 98 % | SYSTOLIC BLOOD PRESSURE: 174 MMHG

## 2020-07-14 VITALS — TEMPERATURE: 96.8 F | SYSTOLIC BLOOD PRESSURE: 152 MMHG | OXYGEN SATURATION: 100 % | DIASTOLIC BLOOD PRESSURE: 121 MMHG

## 2020-07-14 LAB
GFR NON-AFRICAN AMERICAN: >60 ML/MIN
GFR SERPL CREATININE-BSD FRML MDRD: >60 ML/MIN
GFR SERPL CREATININE-BSD FRML MDRD: NORMAL ML/MIN/{1.73_M2}
GLUCOSE BLD-MCNC: 103 MG/DL (ref 74–100)
POC CHLORIDE: 110 MMOL/L (ref 98–107)
POC CREATININE: 0.83 MG/DL (ref 0.51–1.19)
POC HEMATOCRIT: 36 % (ref 41–53)
POC HEMOGLOBIN: 12.1 G/DL (ref 13.5–17.5)
POC POTASSIUM: 4.7 MMOL/L (ref 3.5–4.5)
POC SODIUM: 145 MMOL/L (ref 138–146)

## 2020-07-14 PROCEDURE — 84132 ASSAY OF SERUM POTASSIUM: CPT

## 2020-07-14 PROCEDURE — 3600000014 HC SURGERY LEVEL 4 ADDTL 15MIN: Performed by: SURGERY

## 2020-07-14 PROCEDURE — 84295 ASSAY OF SERUM SODIUM: CPT

## 2020-07-14 PROCEDURE — 82435 ASSAY OF BLOOD CHLORIDE: CPT

## 2020-07-14 PROCEDURE — 6360000002 HC RX W HCPCS: Performed by: NURSE ANESTHETIST, CERTIFIED REGISTERED

## 2020-07-14 PROCEDURE — 82565 ASSAY OF CREATININE: CPT

## 2020-07-14 PROCEDURE — 27886 AMPUTATION FOLLOW-UP SURGERY: CPT | Performed by: SURGERY

## 2020-07-14 PROCEDURE — 3700000000 HC ANESTHESIA ATTENDED CARE: Performed by: SURGERY

## 2020-07-14 PROCEDURE — 85014 HEMATOCRIT: CPT

## 2020-07-14 PROCEDURE — 2709999900 HC NON-CHARGEABLE SUPPLY: Performed by: SURGERY

## 2020-07-14 PROCEDURE — 82947 ASSAY GLUCOSE BLOOD QUANT: CPT

## 2020-07-14 PROCEDURE — 2500000003 HC RX 250 WO HCPCS: Performed by: NURSE ANESTHETIST, CERTIFIED REGISTERED

## 2020-07-14 PROCEDURE — 93005 ELECTROCARDIOGRAM TRACING: CPT | Performed by: SURGERY

## 2020-07-14 PROCEDURE — 3700000001 HC ADD 15 MINUTES (ANESTHESIA): Performed by: SURGERY

## 2020-07-14 PROCEDURE — 0Y6H0Z3 DETACHMENT AT RIGHT LOWER LEG, LOW, OPEN APPROACH: ICD-10-PCS | Performed by: SURGERY

## 2020-07-14 PROCEDURE — 2000000000 HC ICU R&B

## 2020-07-14 PROCEDURE — 2580000003 HC RX 258: Performed by: SURGERY

## 2020-07-14 PROCEDURE — 3600000004 HC SURGERY LEVEL 4 BASE: Performed by: SURGERY

## 2020-07-14 RX ORDER — SODIUM CHLORIDE 0.9 % (FLUSH) 0.9 %
10 SYRINGE (ML) INJECTION EVERY 12 HOURS SCHEDULED
Status: DISCONTINUED | OUTPATIENT
Start: 2020-07-14 | End: 2020-07-14 | Stop reason: HOSPADM

## 2020-07-14 RX ORDER — SODIUM CHLORIDE 9 MG/ML
INJECTION, SOLUTION INTRAVENOUS CONTINUOUS
Status: DISCONTINUED | OUTPATIENT
Start: 2020-07-14 | End: 2020-07-14 | Stop reason: HOSPADM

## 2020-07-14 RX ORDER — ROCURONIUM BROMIDE 10 MG/ML
INJECTION, SOLUTION INTRAVENOUS PRN
Status: DISCONTINUED | OUTPATIENT
Start: 2020-07-14 | End: 2020-07-14 | Stop reason: SDUPTHER

## 2020-07-14 RX ORDER — SODIUM CHLORIDE 0.9 % (FLUSH) 0.9 %
10 SYRINGE (ML) INJECTION PRN
Status: DISCONTINUED | OUTPATIENT
Start: 2020-07-14 | End: 2020-07-14 | Stop reason: HOSPADM

## 2020-07-14 RX ORDER — ONDANSETRON 2 MG/ML
INJECTION INTRAMUSCULAR; INTRAVENOUS PRN
Status: DISCONTINUED | OUTPATIENT
Start: 2020-07-14 | End: 2020-07-14 | Stop reason: SDUPTHER

## 2020-07-14 RX ORDER — VANCOMYCIN HYDROCHLORIDE 1 G/200ML
INJECTION, SOLUTION INTRAVENOUS PRN
Status: DISCONTINUED | OUTPATIENT
Start: 2020-07-14 | End: 2020-07-14 | Stop reason: SDUPTHER

## 2020-07-14 RX ORDER — ONDANSETRON 2 MG/ML
4 INJECTION INTRAMUSCULAR; INTRAVENOUS EVERY 6 HOURS PRN
Status: DISCONTINUED | OUTPATIENT
Start: 2020-07-14 | End: 2020-07-14 | Stop reason: HOSPADM

## 2020-07-14 RX ORDER — FENTANYL CITRATE 50 UG/ML
25 INJECTION, SOLUTION INTRAMUSCULAR; INTRAVENOUS EVERY 5 MIN PRN
Status: DISCONTINUED | OUTPATIENT
Start: 2020-07-14 | End: 2020-07-14 | Stop reason: HOSPADM

## 2020-07-14 RX ORDER — MIDAZOLAM HYDROCHLORIDE 1 MG/ML
INJECTION INTRAMUSCULAR; INTRAVENOUS PRN
Status: DISCONTINUED | OUTPATIENT
Start: 2020-07-14 | End: 2020-07-14 | Stop reason: SDUPTHER

## 2020-07-14 RX ORDER — MAGNESIUM HYDROXIDE 1200 MG/15ML
LIQUID ORAL CONTINUOUS PRN
Status: COMPLETED | OUTPATIENT
Start: 2020-07-14 | End: 2020-07-14

## 2020-07-14 RX ORDER — LIDOCAINE HYDROCHLORIDE 10 MG/ML
INJECTION, SOLUTION EPIDURAL; INFILTRATION; INTRACAUDAL; PERINEURAL PRN
Status: DISCONTINUED | OUTPATIENT
Start: 2020-07-14 | End: 2020-07-14 | Stop reason: SDUPTHER

## 2020-07-14 RX ORDER — EPHEDRINE SULFATE/0.9% NACL/PF 50 MG/5 ML
SYRINGE (ML) INTRAVENOUS PRN
Status: DISCONTINUED | OUTPATIENT
Start: 2020-07-14 | End: 2020-07-14 | Stop reason: SDUPTHER

## 2020-07-14 RX ORDER — ACETAMINOPHEN 500 MG
1000 TABLET ORAL EVERY 8 HOURS SCHEDULED
Status: DISCONTINUED | OUTPATIENT
Start: 2020-07-14 | End: 2020-07-14 | Stop reason: HOSPADM

## 2020-07-14 RX ORDER — ONDANSETRON 2 MG/ML
4 INJECTION INTRAMUSCULAR; INTRAVENOUS
Status: DISCONTINUED | OUTPATIENT
Start: 2020-07-14 | End: 2020-07-14 | Stop reason: HOSPADM

## 2020-07-14 RX ORDER — PROPOFOL 10 MG/ML
INJECTION, EMULSION INTRAVENOUS PRN
Status: DISCONTINUED | OUTPATIENT
Start: 2020-07-14 | End: 2020-07-14 | Stop reason: SDUPTHER

## 2020-07-14 RX ORDER — FENTANYL CITRATE 50 UG/ML
INJECTION, SOLUTION INTRAMUSCULAR; INTRAVENOUS PRN
Status: DISCONTINUED | OUTPATIENT
Start: 2020-07-14 | End: 2020-07-14 | Stop reason: SDUPTHER

## 2020-07-14 RX ORDER — OXYCODONE HYDROCHLORIDE AND ACETAMINOPHEN 5; 325 MG/1; MG/1
1 TABLET ORAL EVERY 6 HOURS PRN
Qty: 12 TABLET | Refills: 0 | Status: SHIPPED | OUTPATIENT
Start: 2020-07-14 | End: 2020-07-17

## 2020-07-14 RX ORDER — FENTANYL CITRATE 50 UG/ML
50 INJECTION, SOLUTION INTRAMUSCULAR; INTRAVENOUS EVERY 5 MIN PRN
Status: DISCONTINUED | OUTPATIENT
Start: 2020-07-14 | End: 2020-07-14 | Stop reason: HOSPADM

## 2020-07-14 RX ORDER — OXYCODONE HYDROCHLORIDE 5 MG/1
5 TABLET ORAL EVERY 4 HOURS PRN
Status: DISCONTINUED | OUTPATIENT
Start: 2020-07-14 | End: 2020-07-14 | Stop reason: HOSPADM

## 2020-07-14 RX ADMIN — PROPOFOL 150 MG: 10 INJECTION, EMULSION INTRAVENOUS at 15:15

## 2020-07-14 RX ADMIN — SUGAMMADEX 200 MG: 100 INJECTION, SOLUTION INTRAVENOUS at 16:11

## 2020-07-14 RX ADMIN — VANCOMYCIN HYDROCHLORIDE 1 G: 1 INJECTION, SOLUTION INTRAVENOUS at 15:25

## 2020-07-14 RX ADMIN — PHENYLEPHRINE HYDROCHLORIDE 50 MCG: 10 INJECTION INTRAVENOUS at 15:45

## 2020-07-14 RX ADMIN — LIDOCAINE HYDROCHLORIDE 50 MG: 10 INJECTION, SOLUTION EPIDURAL; INFILTRATION; INTRACAUDAL; PERINEURAL at 15:15

## 2020-07-14 RX ADMIN — Medication 10 MG: at 15:33

## 2020-07-14 RX ADMIN — Medication 10 MG: at 15:45

## 2020-07-14 RX ADMIN — FENTANYL CITRATE 50 MCG: 50 INJECTION, SOLUTION INTRAMUSCULAR; INTRAVENOUS at 15:15

## 2020-07-14 RX ADMIN — SODIUM CHLORIDE: 9 INJECTION, SOLUTION INTRAVENOUS at 14:06

## 2020-07-14 RX ADMIN — MIDAZOLAM HYDROCHLORIDE 1 MG: 1 INJECTION, SOLUTION INTRAMUSCULAR; INTRAVENOUS at 15:12

## 2020-07-14 RX ADMIN — PHENYLEPHRINE HYDROCHLORIDE 50 MCG: 10 INJECTION INTRAVENOUS at 15:33

## 2020-07-14 RX ADMIN — Medication 10 MG: at 15:28

## 2020-07-14 RX ADMIN — ONDANSETRON 4 MG: 2 INJECTION, SOLUTION INTRAMUSCULAR; INTRAVENOUS at 15:59

## 2020-07-14 RX ADMIN — Medication 10 MG: at 15:24

## 2020-07-14 RX ADMIN — ROCURONIUM BROMIDE 50 MG: 10 INJECTION INTRAVENOUS at 15:15

## 2020-07-14 ASSESSMENT — PULMONARY FUNCTION TESTS
PIF_VALUE: 17
PIF_VALUE: 17
PIF_VALUE: 16
PIF_VALUE: 18
PIF_VALUE: 17
PIF_VALUE: 17
PIF_VALUE: 24
PIF_VALUE: 17
PIF_VALUE: 17
PIF_VALUE: 2
PIF_VALUE: 17
PIF_VALUE: 3
PIF_VALUE: 3
PIF_VALUE: 17
PIF_VALUE: 18
PIF_VALUE: 16
PIF_VALUE: 17
PIF_VALUE: 17
PIF_VALUE: 2
PIF_VALUE: 20
PIF_VALUE: 17
PIF_VALUE: 19
PIF_VALUE: 26
PIF_VALUE: 17
PIF_VALUE: 21
PIF_VALUE: 17
PIF_VALUE: 2
PIF_VALUE: 17
PIF_VALUE: 0
PIF_VALUE: 17
PIF_VALUE: 22
PIF_VALUE: 16
PIF_VALUE: 21
PIF_VALUE: 17
PIF_VALUE: 17
PIF_VALUE: 16
PIF_VALUE: 17
PIF_VALUE: 21
PIF_VALUE: 2
PIF_VALUE: 17
PIF_VALUE: 17
PIF_VALUE: 24
PIF_VALUE: 19
PIF_VALUE: 1
PIF_VALUE: 17
PIF_VALUE: 17
PIF_VALUE: 0
PIF_VALUE: 17
PIF_VALUE: 16
PIF_VALUE: 17
PIF_VALUE: 16

## 2020-07-14 ASSESSMENT — PAIN DESCRIPTION - ORIENTATION: ORIENTATION: RIGHT

## 2020-07-14 ASSESSMENT — PAIN SCALES - GENERAL
PAINLEVEL_OUTOF10: 4
PAINLEVEL_OUTOF10: 6
PAINLEVEL_OUTOF10: 8

## 2020-07-14 ASSESSMENT — PAIN DESCRIPTION - PAIN TYPE: TYPE: ACUTE PAIN

## 2020-07-14 ASSESSMENT — PAIN DESCRIPTION - LOCATION: LOCATION: LEG

## 2020-07-14 NOTE — PROGRESS NOTES
Preprocedure call completed. History and medications were reviewed and pt was instructed on NPO status. Informed which meds to take, which to hold prior to procedure and to bring them to hospital in labeled bottles. Discussed current visitor policy and was given the number to call on arrival to hospital to obtain access. Number of responsible visitor obtained to call for updates on status and for ride home.

## 2020-07-14 NOTE — DISCHARGE INSTR - COC
AMPUTATION BELOW KNEE Right 7/19/2019    RIGHT GUILLOTINE FOOT AMPUTATION performed by Laquita Mcfarland MD at [address] Right 7/23/2019    RIGHT BELOW KNEE [facility]UT[facility]    I and D bone, bone biopsy with flap closure and pulse lavage    OTHER SURGICAL HISTORY Left 04/27/2017    I & D foot    OTHER SURGICAL HISTORY  07/14/2020    RBKA Revision    SC DEEP DISSEC FOOT INFEC,1 BURSA Left 4/13/2017    FOOT DEBRIDEMENT WITH EPIFIX  performed by Edith Ott DPM at [address] INCIS/DRAIN THIGH/KNEE ABSCESS,DEEP Left 9/12/2017     LEFT BELOW KNEE AMPUTATION OPEN, WOUND VAC PLACEMENT performed by Mercy Cardona MD at [place] [address] N/A 9/25/2018    CHOLECYSTECTOMY LAPAROSCOPIC performed by Marquita Post MD at [facility] Left 2014    hallux    TOE AMPUTATION Left 12/09/2016    hallux    TOE AMPUTATION Left 4/27/2017    I AND D FOOT, BONE BIOPSY, POSSIBLE FILET 2ND TOE performed by Edith Ott DPM at [facility]  04/17/2017    Left second toe        Immunization History:   Immunization History   Administered Date(s) Administered    Influenza, Quadv, 6 mo and older, IM, PF (Flulaval, Fluarix) 09/26/2018    Pneumococcal Polysaccharide ([id number]) 05/10/2017       Active Problems:  Patient Active Problem List   Diagnosis Code    Essential hypertension I10    IDDM (insulin dependent diabetes mellitus) ([facility] E11.9, Z79.4    Neuropathy ([facility] G62.9    Uncontrolled type 2 diabetes with cellulitis of foot ([facility] E11.628, L03.119, E11.65    Cerebrovascular disease I67.9    Cataract H26.9    Dysphagia R13.10    Family history of colon cancer Z80.0    Bowel habit changes R19.4    Hyperlipidemia E78.5    Gangrene of right foot ([address] I96    Diabetic foot left E11.8    COLT (acute kidney injury) ([facility] N17.9    Ischemic foot left I99.8    Urinary retention R33.9    Subacute osteomyelitis of left foot ([facility]) M86.272    Hx of BKA ([facility]) Z89.519    Impaired mobility Z74.09    HCAP (healthcare-associated pneumonia) J18.9    Primary open angle glaucoma of both eyes, moderate stage H40.1132    Pseudophakia of both eyes Z96.1    Retinopathy, diabetic, bilateral ([facility]) E11.319    Chest pain, atypical R07.89    Paresthesia of right upper extremity U13.0    Metabolic acidosis S49.5    Right arm pain M79.601    Neck pain M54.2    Intracranial carotid stenosis, left I65.22    Calculus of gallbladder without cholecystitis without obstruction K80.20    Hemispheric carotid artery syndrome G45.1    Abdominal pain R10.9    Acute cholecystitis with chronic cholecystitis K81.2    Severe malnutrition ([facility]) E43    Altered mental status R41.82    PRES (posterior reversible encephalopathy syndrome) I67.83    New onset seizure ([facility]) R56.9    Hypertensive urgency I16.0    Hypertensive emergency I16.1    PAD (peripheral artery disease) ([facility]) I73.9    Cellulitis L03.90    Cellulitis of right foot L03.115    Hyponatremia E87.1    Azotemia R79.89    Hypoalbuminemia due to protein-calorie malnutrition ([facility]) E46    Hypocalcemia E83.51    Thrombocytosis ([facility]) D47.3    Acute blood loss as cause of postoperative anemia D62    MRSA infection - diabetic foot right side A49.02    Below knee amputation status, right [id number]    BKA stump complication ([facility]) K70.5       Isolation/Infection:   Isolation          No Isolation        Unreconciled External Infections     Infection Onset Last Indicated Last Received Source    No mapped external infections found    .     Unmapped Infections (1)      MRSA 10/01/18 10/01/18 07/11/20             Patient Infection Status     Infection Onset Added Last Indicated Last Indicated By Review Planned Expiration Resolved Resolved By    MRSA  04/06/15 07/19/19 Wound Culture        Face 9/2018    Resolved COVID-19 Rule Out 07/11/20 07/11/20 07/11/20 COVID-19 (Ordered)   07/12/20 Rule-Out Test Resulted          Nurse Assessment:  Last Vital Signs: BP (!) 156/56   Pulse 54   Resp 10   SpO2 100%     Last documented pain score (0-10 scale): Pain Level: 6  Last Weight:   Wt Readings from Last 1 Encounters:   07/10/20 185 lb (83.9 kg)     Mental Status:  oriented and alert    IV Access:  - None    Nursing Mobility/ADLs:  Walking   Dependent  Transfer  Dependent  Bathing  Assisted  Dressing  Assisted  Toileting  Assisted  Feeding  Independent  Med Admin  Assisted  Med Delivery   none and whole    Wound Care Documentation and Therapy:  Incision 09/07/17 Leg Left (Active)   Number of days: 2379       Incision 09/12/17 Leg Left (Active)   Number of days: 1036       Incision 09/14/17 Leg Left (Active)   Number of days: 6770       Incision 09/25/18 Abdomen (Active)   Number of days: 658        Elimination:  Continence:   · Bowel: Yes  · Bladder: Yes  Urinary Catheter: None   Colostomy/Ileostomy/Ileal Conduit: No       Date of Last BM: 7/12/2020    Intake/Output Summary (Last 24 hours) at 7/14/2020 1727  Last data filed at 7/14/2020 1622  Gross per 24 hour   Intake 400 ml   Output 50 ml   Net 350 ml     No intake/output data recorded. Safety Concerns: At Risk for Falls    Impairments/Disabilities:      None    Nutrition Therapy:  Current Nutrition Therapy:   - Oral Diet:  Carb Control 3 carbs/meal (1500kcals/day)    Routes of Feeding: Oral  Liquids: No Restrictions  Daily Fluid Restriction: no  Last Modified Barium Swallow with Video (Video Swallowing Test): not done    Treatments at the Time of Hospital Discharge:   Respiratory Treatments: N/A  Oxygen Therapy:  is not on home oxygen therapy.   Ventilator:    - No ventilator support    Rehab Therapies: Physical Therapy and Occupational Therapy  Weight Bearing Status/Restrictions: Bilateral BKA  Other Medical Equipment (for information only, NOT a DME order):  wheelchair and walker  Other Treatments: skilled RN assessment    Patient's personal belongings (please select all that are sent with patient):  None    RN SIGNATURE:  Electronically signed by Beryle Ewings, RN on 7/14/20 at 5:39 PM EDT    CASE MANAGEMENT/SOCIAL WORK SECTION    Inpatient Status Date: 7/14/2020    Readmission Risk Assessment Score:  Readmission Risk              Risk of Unplanned Readmission:        0           Discharging to Facility/ Agency   · Name: [facility]  Address:  [address] [place] [address]         Phone: [phone]       Fax: [phone]        ·   · Phone:  · Fax:    Dialysis Facility (if applicable)   · Name:  · Address:  · Dialysis Schedule:  · Phone:  · Fax:    / signature: Electronically signed by Hamida Kramer RN on 7/14/20 at 5:27 PM EDT    PHYSICIAN SECTION    Prognosis: Good    Condition at Discharge: Stable    Rehab Potential (if transferring to Rehab): Good    Recommended Labs or Other Treatments After Discharge: none    Physician Certification: I certify the above information and transfer of Sheilah Kocher  is necessary for the continuing treatment of the diagnosis listed and that he requires [address] for less 30 days.      Update Admission H&P: No change in H&P    PHYSICIAN SIGNATURE:  Electronically signed by Eli Robertson MD on 7/14/20 at 6:35 PM EDT

## 2020-07-14 NOTE — BRIEF OP NOTE
Brief Postoperative Note      Patient: Eddi Blackman  YOB: 1965  MRN: [id number]    Date of Procedure: 7/14/2020    Pre-Op Diagnosis: Pressure ulcer with osteomyelitis of right Below knee amputation    Post-Op Diagnosis: Same       Procedure(s):  RIGHT BELOW KNEE AMPUTATION REVISION  Myocutaneous flap creation    Surgeon(s): Rasheed Manriquez MD    Assistant: Dr. Samantha Hendricks    Anesthesia: General    Estimated Blood Loss (mL): 23 ml    Complications: None    Specimens: tibial    Implants: none      Drains: none    Findings: Pressure ulcer tracking to tip of tibia. Tibia resected back and wound closed primarily.     Dictation number:  [id number]    Electronically signed by Rasheed Manriquez MD on 7/14/2020 at 4:14 PM

## 2020-07-14 NOTE — PROGRESS NOTES
Pt. Discharged with wife and all belongings. Dr. [place] bedside during pt.'s discharge. All discharge instructions went through with pt. And wife. Pt. Wheeled out by wife in pt.'s wheelchair. No complications and all questions and concerns answered.

## 2020-07-14 NOTE — CARE COORDINATION
Met with patient to discuss transitional plaing, apparently patient being discharged tonight, asking for home care for support with dressing changes. Patient states he's used [facility]  Before and would like referral to use them again, referral faxed.  PS Dr. Isabel Hall with regard to this, vascular service will take care of this, notified Rulon Rubinstein at [facility] that patient will DC today

## 2020-07-14 NOTE — ANESTHESIA PRE PROCEDURE
Department of Anesthesiology  Preprocedure Note       Name:  Kandy Roy   Age:  62 y.o.  :  1965                                          MRN:  [id number]         Date:  2020      Surgeon: Dr. Jill Mclaughlin (right )        Medications prior to admission:   Prior to Admission medications    Medication Sig Start Date End Date Taking? Authorizing Provider   amoxicillin-clavulanate (AUGMENTIN) 875-125 MG per tablet Take 1 tablet by mouth 2 times daily for 7 days 7/10/20 7/17/20  Debra Delgado APRN - DA[place]   HYDROcodone-acetaminophen ([facility]) 5-325 MG per tablet take 1/2 tablet by mouth twice a day if needed 3/6/20   Historical Provider, MD   levETIRAcetam (KEPPRA) 500 MG tablet Take 1 tablet by mouth 2 times daily 20  Jazmyn Mayo MD   lisinopril (PRINIVIL;ZESTRIL) 40 MG tablet  19   Historical Provider, MD   ciprofloxacin (CIPRO) 500 MG tablet take 1 tablet by mouth twice a day for 10 days 19   Historical Provider, MD   metoprolol tartrate (LOPRESSOR) 50 MG tablet  19   Historical Provider, MD   brimonidine (ALPHAGAN P) 0.15 % ophthalmic solution  19   Historical Provider, MD   dorzolamide (TRUSOPT) 2 % ophthalmic solution  19   Historical Provider, MD   timolol (TIMOPTIC) 0.25 % ophthalmic solution  19   Historical Provider, MD   hydrochlorothiazide (HYDRODIURIL) 25 MG tablet Take 1 tablet by mouth daily 19   Edna Cordova MD   gabapentin (NEURONTIN) 100 MG capsule Take 1 capsule by mouth 3 times daily for 180 days.  Intended supply: 90 days 19  Jae Manifold, DO   clopidogrel (PLAVIX) 75 MG tablet Take 1 tablet by mouth daily 19   Jae Manifold, DO   Gauze Pads & Dressings (GAUZE DRESSING) 4\"X4\" PADS 1 each by Does not apply route daily 19   Jae Manifold, DO   latanoprost (XALATAN) 0.005 % ophthalmic solution place 1 drop into both eyes at bedtime 19   Historical Provider, MD   Rosiglitazone Maleate (AVANDIA PO) tablet    Historical Provider, MD   carvedilol (COREG) 12.5 MG tablet Take 1 tablet by mouth 2 times daily (with meals) 11/10/18   Molina Patricia DO   amLODIPine (NORVASC) 10 MG tablet Take 1 tablet by mouth daily 11/11/18   Molina Patricia, DO   aspirin 81 MG EC tablet Take 1 tablet by mouth daily 9/3/18   Cheryl Blas DO   cyclobenzaprine (FLEXERIL) 5 MG tablet Take 5 mg by mouth 3 times daily as needed for Muscle spasms    Historical Provider, MD   metFORMIN (GLUCOPHAGE) 500 MG tablet Take 500 mg by mouth 2 times daily (with meals)    Historical Provider, MD   insulin glargine (LANTUS) 100 UNIT/ML injection vial Inject 20 Units into the skin nightly 9/25/17   Suzy Nageotte, MD   pravastatin (PRAVACHOL) 10 MG tablet Take 1 tablet by mouth daily 9/25/17   Suzy Nageotte, MD   folic acid (FOLVITE) 1 MG tablet Take 1 tablet by mouth daily 9/25/17   Suzy Nageotte, MD   tamsulosin [facility]) 0.4 MG capsule Take 1 capsule by mouth daily 9/25/17   Suzy Nageotte, MD   omeprazole (PRILOSEC) 20 MG delayed release capsule Take 20 mg by mouth 2 times daily    Historical Provider, MD   timolol (TIMOPTIC-XE) 0.25 % ophthalmic gel-forming 1 drop daily    Historical Provider, MD   travoprost, benzalkonium, (TRAVATAN) 0.004 % ophthalmic solution 1 drop nightly    Historical Provider, MD       Current medications:    Current Facility-Administered Medications   Medication Dose Route Frequency Provider Last Rate Last Dose    0.9 % sodium chloride infusion   Intravenous Continuous Char Jaye Sosa MD           Allergies:     Allergies   Allergen Reactions    Adhesive Tape        Problem List:    Patient Active Problem List   Diagnosis Code    Essential hypertension I10    IDDM (insulin dependent diabetes mellitus) ([facility] E11.9, Z79.4    Neuropathy [facility] G62.9    Uncontrolled type 2 diabetes with cellulitis of foot ([facility] E11.628, L03.119, E11.65    Cerebrovascular disease I67.9    Cataract H26.9    Dysphagia R13.10    Family history of colon cancer Z80.0    Bowel habit changes R19.4    Hyperlipidemia E78.5    Gangrene of right foot ([facility] I96    Diabetic foot left E11.8    COLT (acute kidney injury) ([facility] N17.9    Ischemic foot left I99.8    Urinary retention R33.9    Subacute osteomyelitis of left foot ([facility]) M86.272    Hx of BKA ([facility]) Z89.519    Impaired mobility Z74.09    HCAP (healthcare-associated pneumonia) J18.9    Primary open angle glaucoma of both eyes, moderate stage H40.1132    Pseudophakia of both eyes Z96.1    Retinopathy, diabetic, bilateral ([facility]) E11.319    Chest pain, atypical R07.89    Paresthesia of right upper extremity Y40.2    Metabolic acidosis R09.5    Right arm pain M79.601    Neck pain M54.2    Intracranial carotid stenosis, left I65.22    Calculus of gallbladder without cholecystitis without obstruction K80.20    Hemispheric carotid artery syndrome G45.1    Abdominal pain R10.9    Acute cholecystitis with chronic cholecystitis K81.2    Severe malnutrition ([facility]) E43    Altered mental status R41.82    PRES (posterior reversible encephalopathy syndrome) I67.83    New onset seizure ([facility]) R56.9    Hypertensive urgency I16.0    Hypertensive emergency I16.1    PAD (peripheral artery disease) ([facility]) I73.9    Cellulitis L03.90    Cellulitis of right foot L03.115    Hyponatremia E87.1    Azotemia R79.89    Hypoalbuminemia due to protein-calorie malnutrition ([facility]) E46    Hypocalcemia E83.51    Thrombocytosis ([facility]) D47.3    Acute blood loss as cause of postoperative anemia D62    MRSA infection - diabetic foot right side A49.02    Below knee amputation status, right [id number]       Past Medical History:        Diagnosis Date    Acid reflux     Calculus of gallbladder without cholecystitis without obstruction 9/2/2018    Cerebrovascular disease 2006    TIA    Diabetic foot infection ([facility]     Drug (multiple) resistant infection     GERD (gastroesophageal reflux disease)     Glaucoma     Glaucoma     Hemispheric carotid artery syndrome 9/2/2018    Hyperlipidemia     Hypertension     IDDM (insulin dependent diabetes mellitus) (HCC)     MDRO (multiple drug resistant organisms) resistance     MRSA (methicillin resistant staph aureus) culture positive 09/03/2017    foot    Neuropathy     Osteomyelitis (HCC)     Left Hallux    S/P cataract extraction and insertion of intraocular lens 2015    bilateral     Type II or unspecified type diabetes mellitus without mention of complication, not stated as uncontrolled        Past Surgical History:        Procedure Laterality Date    EYE SURGERY Bilateral     lazer both eyes    FOOT SURGERY Left 02/24/2017    surgical prep of wound bed    FOOT SURGERY Left 04/13/2017    1) Left foot surgical preparation of wound-bed, 2) Left foot application of graft     HC  PICC [facility] DOUBLE  4/28/2017         LEG AMPUTATION BELOW KNEE Left 9/7/2017    LEFT BELOW KNEE GUILLOTINE AMPUTATION performed by Dalia Tanner MD at [address] Left 9/14/2017    REVISION LEFT BELOW KNEE AMPUTATION, CLOSED  performed by Dalia Tanner MD at [address] Right 7/19/2019    RIGHT GUILLOTINE FOOT AMPUTATION performed by Amirah Morelos MD at [facility] [address] Right 7/23/2019    RIGHT BELOW KNEE AMPUTATION performed by Amirah Morelos MD at [facility] Left 01/23/2017    I and D bone, bone biopsy with flap closure and pulse lavage    OTHER SURGICAL HISTORY Left 04/27/2017    I & D foot    [place] DEEP DISSEC FOOT INFEC,1 BURSA Left 4/13/2017    FOOT DEBRIDEMENT WITH EPIFIX  performed by Franklin Bergeron DPM at [address] INCIS/DRAIN THIGH/KNEE ABSCESS,DEEP Left 9/12/2017     LEFT BELOW KNEE AMPUTATION OPEN, WOUND VAC PLACEMENT performed by Dalia Tanner MD at [facility] 5 N/A 9/25/2018 CHOLECYSTECTOMY LAPAROSCOPIC performed by Marielos Valdez MD at [facility] Left 2014    hallux    TOE AMPUTATION Left 2016    hallux    TOE AMPUTATION Left 2017    I AND D FOOT, BONE BIOPSY, POSSIBLE FILET 2ND TOE performed by Jarod Alcantar DPM at [address] [place] [address] TOE AMPUTATION  2017    Left second toe        Social History:    Social History     Tobacco Use    Smoking status: Former Smoker     Packs/day: 1.00     Years: 10.00     Pack years: 10.00     Types: Cigarettes     Last attempt to quit: 2018     Years since quittin.8    Smokeless tobacco: Never Used    Tobacco comment: smokes 1 cigarette a day   Substance Use Topics    Alcohol use: No                                Counseling given: Not Answered  Comment: smokes 1 cigarette a day      Vital Signs (Current): There were no vitals filed for this visit.                                            BP Readings from Last 3 Encounters:   07/10/20 105/60   20 (!) 160/63   10/07/19 (!) 126/59       NPO Status:                                                                                 BMI:   Wt Readings from Last 3 Encounters:   07/10/20 185 lb (83.9 kg)   20 187 lb (84.8 kg)   10/07/19 180 lb (81.6 kg)     There is no height or weight on file to calculate BMI.    CBC:   Lab Results   Component Value Date    WBC 12.6 2019    RBC 3.50 2019    HGB 9.4 2019    HCT 32.0 2019    MCV 91.4 2019    RDW 12.8 2019     2019       CMP:   Lab Results   Component Value Date     2019    K 4.8 2019     2019    CO2 21 2019    BUN 13 2019    CREATININE 0.87 2019    GFRAA >60 2019    LABGLOM >60 2019    GLUCOSE 151 2019    PROT 5.7 2019    CALCIUM 8.6 2019    BILITOT <0.10 2019    ALKPHOS 80 2019    AST 19 2019    ALT 23 2019       POC Tests:   No results for input(s): POCGLU, POCNA, POCK, POCCL, POCBUN, POCHEMO, POCHCT in the last 72 hours. Coags:   Lab Results   Component Value Date    PROTIME 10.4 07/07/2019    INR 1.0 07/07/2019    APTT 26.4 07/07/2019       HCG (If Applicable): No results found for: PREGTESTUR, PREGSERUM, HCG, HCGQUANT     ABGs: No results found for: PHART, PO2ART, VAM2XBR, LFL8GCU, BEART, Z2UPSXAG     Type & Screen (If Applicable):  No results found for: [facility]    Anesthesia Evaluation  Patient summary reviewed and Nursing notes reviewed no history of anesthetic complications:   Airway: Mallampati: II  TM distance: >3 FB   Neck ROM: full  Mouth opening: > = 3 FB Dental:          Pulmonary:Negative Pulmonary ROS and normal exam    (+) pneumonia: resolved,                             Cardiovascular:    (+) hypertension: moderate, hyperlipidemia    (-) past MI, CAD, CABG/stent, dysrhythmias and  angina    ECG reviewed  Rhythm: regular  Rate: normal           Beta Blocker:  Not on Beta Blocker         Neuro/Psych:   (+) seizures:, TIA,             GI/Hepatic/Renal:   (+) GERD: well controlled, renal disease:,           Endo/Other:    (+) DiabetesType II DM, using insulin, . Abdominal:           Vascular:   + PVD, aortic or cerebral, . Anesthesia Plan      general     ASA 4       Induction: intravenous. Anesthetic plan and risks discussed with patient.       Plan discussed with CRNA and surgical team.                  Montserrat Herrera MD   7/14/2020          Electronically signed by Montserrat Herrera MD on 7/14/2020 at 1:32 PM

## 2020-07-14 NOTE — H&P
Division of Vascular Surgery        H&P Update    Patient's Office Note from 7/12/2020 was reviewed. There are no changes today. Plan to proceed with procedure       Electronically signed by JANELLE Martinez NP on 7/14/20 at 1:59 PM EDT      [address] [facility]: [phone]  C: [phone]  Email: [email or web address]    Chief Complaint:       Ulcer over right below knee amputation site     History of Present Illness:      Monalisa Gavin is a 62 y.o. gentleman who presents for follow up after he developed a pressure ulcer over his right BKA stump. We have been doing local wound care and offloading (no prosthesis on) but the wound has not healed. He denies any pain, fevers/chills or systemic signs of infection.   There is thick drainage noted coming out from the wound.      Medical History:      Past Medical History        Past Medical History:   Diagnosis Date    Acid reflux      Calculus of gallbladder without cholecystitis without obstruction 9/2/2018    Cerebrovascular disease 2006     TIA    Diabetic foot infection ([address]      Drug (multiple) resistant infection      GERD (gastroesophageal reflux disease)      Glaucoma      Glaucoma      Hemispheric carotid artery syndrome 9/2/2018    Hyperlipidemia      Hypertension      IDDM (insulin dependent diabetes mellitus) ([address]      MDRO (multiple drug resistant organisms) resistance      MRSA (methicillin resistant staph aureus) culture positive 09/03/2017     foot    Neuropathy      Osteomyelitis (HCC)       Left Hallux    S/P cataract extraction and insertion of intraocular lens 2015     bilateral     Type II or unspecified type diabetes mellitus without mention of complication, not stated as uncontrolled             Surgical History:      Past Surgical History         Past Surgical History:   Procedure Laterality Date    EYE SURGERY Bilateral       lazer both eyes    FOOT SURGERY Left 02/24/2017     surgical prep of wound bed    FOOT SURGERY Left 04/13/2017     1) Left foot surgical preparation of wound-bed, 2) Left foot application of graft     HC  PICC POWERPICC DOUBLE   4/28/2017          LEG AMPUTATION BELOW KNEE Left 9/7/2017     LEFT BELOW KNEE GUILLOTINE AMPUTATION performed by Josefina Anne MD at [address] Left 9/14/2017     REVISION LEFT BELOW KNEE AMPUTATION, CLOSED  performed by Josefina Anne MD at [address] Right 7/19/2019     RIGHT GUILLOTINE FOOT AMPUTATION performed by Radha Perez MD at [address] Right 7/23/2019     RIGHT BELOW KNEE AMPUTATION performed by Radha Perez MD at [address] Left 01/23/2017     I and D bone, bone biopsy with flap closure and pulse lavage    OTHER SURGICAL HISTORY Left 04/27/2017     I & D foot    [place] DEEP DISSEC FOOT INFEC,1 BURSA Left 4/13/2017     FOOT DEBRIDEMENT WITH EPIFIX  performed by Lg Zepeda DPM at [address] INCIS/DRAIN THIGH/KNEE ABSCESS,DEEP Left 9/12/2017      LEFT BELOW KNEE AMPUTATION OPEN, WOUND VAC PLACEMENT performed by Josefina Anne MD at [facility] 5 N/A 9/25/2018     CHOLECYSTECTOMY LAPAROSCOPIC performed by Rogelio Herndon MD at [facility] Left 2014     hallux    TOE AMPUTATION Left 12/09/2016     hallux    TOE AMPUTATION Left 4/27/2017     I AND D FOOT, BONE BIOPSY, POSSIBLE FILET 2ND TOE performed by Lg Zepeda DPM at [facility]   04/17/2017     Left second toe            Family History:      Family History         Family History   Problem Relation Age of Onset    Diabetes Mother      Cancer Father           colon    Diabetes Father      Diabetes Sister      Diabetes Sister             Allergies:       Adhesive tape     Medications:      Current Facility-Administered Medications          Current Outpatient Medications   Medication Sig Dispense Refill    amoxicillin-clavulanate (AUGMENTIN) 875-125 MG per tablet Take 1 tablet by mouth 2 times daily for 7 days 14 tablet 0    HYDROcodone-acetaminophen ([facility]) 5-325 MG per tablet take 1/2 tablet by mouth twice a day if needed        lisinopril (PRINIVIL;ZESTRIL) 40 MG tablet     0    ciprofloxacin (CIPRO) 500 MG tablet take 1 tablet by mouth twice a day for 10 days   0    metoprolol tartrate (LOPRESSOR) 50 MG tablet     0    brimonidine (ALPHAGAN P) 0.15 % ophthalmic solution     0    dorzolamide (TRUSOPT) 2 % ophthalmic solution     0    timolol (TIMOPTIC) 0.25 % ophthalmic solution     0    hydrochlorothiazide (HYDRODIURIL) 25 MG tablet Take 1 tablet by mouth daily 30 tablet 3    clopidogrel (PLAVIX) 75 MG tablet Take 1 tablet by mouth daily 30 tablet 0    Gauze Pads & Dressings (GAUZE DRESSING) 4\"X4\" PADS 1 each by Does not apply route daily 10 each 1    latanoprost (XALATAN) 0.005 % ophthalmic solution place 1 drop into both eyes at bedtime   0    Rosiglitazone Maleate (AVANDIA PO) tablet        carvedilol (COREG) 12.5 MG tablet Take 1 tablet by mouth 2 times daily (with meals) 60 tablet 3    amLODIPine (NORVASC) 10 MG tablet Take 1 tablet by mouth daily 30 tablet 3    aspirin 81 MG EC tablet Take 1 tablet by mouth daily 30 tablet 3    cyclobenzaprine (FLEXERIL) 5 MG tablet Take 5 mg by mouth 3 times daily as needed for Muscle spasms        metFORMIN (GLUCOPHAGE) 500 MG tablet Take 500 mg by mouth 2 times daily (with meals)        insulin glargine (LANTUS) 100 UNIT/ML injection vial Inject 20 Units into the skin nightly 1 vial 3    pravastatin (PRAVACHOL) 10 MG tablet Take 1 tablet by mouth daily 30 tablet 3    folic acid (FOLVITE) 1 MG tablet Take 1 tablet by mouth daily 30 tablet 3    tamsulosin (FLOMAX) 0.4 MG capsule Take 1 capsule by mouth daily 30 capsule 3    omeprazole (PRILOSEC) 20 MG delayed release capsule Take 20 mg by mouth 2 times daily        timolol (TIMOPTIC-XE) 0.25 % ophthalmic gel-forming 1 drop daily        travoprost, benzalkonium, (TRAVATAN) 0.004 % ophthalmic solution 1 drop nightly        levETIRAcetam (KEPPRA) 500 MG tablet Take 1 tablet by mouth 2 times daily 180 tablet 1    gabapentin (NEURONTIN) 100 MG capsule Take 1 capsule by mouth 3 times daily for 180 days. Intended supply: 90 days 90 capsule 1      No current facility-administered medications for this visit.            Social History:      Tobacco:    reports that he quit smoking about 21 months ago. His smoking use included cigarettes. He has a 10.00 pack-year smoking history. He has never used smokeless tobacco.  Alcohol:      reports no history of alcohol use. Drug Use:  reports no history of drug use.     Review of Systems:      Review of Systems   Constitutional: Negative for chills and fever. HENT: Negative for congestion. Eyes: Positive for visual disturbance. Respiratory: Negative for chest tightness and shortness of breath. Cardiovascular: Negative for chest pain and leg swelling. Gastrointestinal: Negative for abdominal pain. Endocrine: Negative. Genitourinary: Negative. Musculoskeletal: Positive for gait problem. Skin: Positive for color change and wound. Allergic/Immunologic: Negative. Neurological: Negative for facial asymmetry, speech difficulty and weakness. Hematological: Negative. Psychiatric/Behavioral: Negative.          Physical Exam:      Vitals:  /60 (Site: Right Upper Arm, Position: Sitting, Cuff Size: Medium Adult)   Pulse 54   Temp 98.2 °F (36.8 °C) (Temporal)   Resp 16   Ht 5' 7\" (1.702 m)   Wt 185 lb (83.9 kg)   SpO2 98%   BMI 28.98 kg/m²      Physical Exam  Constitutional:       Appearance: He is well-developed and well-groomed. Eyes:      Extraocular Movements: Extraocular movements intact.       Conjunctiva/sclera: Conjunctivae normal.   Neck:      Musculoskeletal: Full passive range of motion without pain. Vascular: No carotid bruit. Cardiovascular:      Rate and Rhythm: Normal rate and regular rhythm. Pulmonary:      Effort: Pulmonary effort is normal. No respiratory distress. Abdominal:      Palpations: Abdomen is soft. Tenderness: There is no abdominal tenderness. Feet:      Right foot:      Amputation: Right leg is amputated below knee. Left foot:      Amputation: Left leg is amputated below knee. Skin:     General: Skin is warm. Capillary Refill: Capillary refill takes less than 2 seconds. Neurological:      Mental Status: He is alert and oriented to person, place, and time. GCS: GCS eye subscore is 4. GCS verbal subscore is 5. GCS motor subscore is 6. Sensory: Sensation is intact. Motor: Motor function is intact. Psychiatric:         Mood and Affect: Mood normal.         Speech: Speech normal.         Behavior: Behavior normal.         Thought Content: Thought content normal.      Today  Wound probes to bone           Latasha          Imaging/Labs:      none     Assessment and Plan:      Right below knee amputation complication with pressure ulceration with osteomyelitis  ? Will need right below knee amputation revision and trimming the bone back  ? Started him on oral antibiotics  ? Risks and benefits of surgery explained and he consented to proceed  ? Will evaluate postop if he needs to be admitted or not based on his pain     Electronically signed by Mayur Solis MD on 7/12/20 at 11:58 AM EDT  [facility]: [phone]  C: [phone]  Email: [email or web address]

## 2020-07-15 LAB
EKG ATRIAL RATE: 50 BPM
EKG P AXIS: 57 DEGREES
EKG P-R INTERVAL: 168 MS
EKG Q-T INTERVAL: 462 MS
EKG QRS DURATION: 86 MS
EKG QTC CALCULATION (BAZETT): 421 MS
EKG R AXIS: 67 DEGREES
EKG T AXIS: 53 DEGREES
EKG VENTRICULAR RATE: 50 BPM

## 2020-07-15 PROCEDURE — 93010 ELECTROCARDIOGRAM REPORT: CPT | Performed by: INTERNAL MEDICINE

## 2020-07-15 NOTE — OP NOTE
[facility]                                OPERATIVE REPORT    PATIENT NAME: Vita Garcia                      :        1965  MED REC NO:   [id number]                             ROOM:       1004  ACCOUNT NO:   [de-identified]                           ADMIT DATE: 2020  PROVIDER:     Gwendolyn Mack MD    DATE OF PROCEDURE:  2020    PREOPERATIVE DIAGNOSES:  Pressure ulcer of right below-knee amputation  stump with osteomyelitis. POSTOPERATIVE DIAGNOSES:  Pressure ulcer of right below-knee amputation  stump with osteomyelitis. PROCEDURE:  Right below-knee amputation revision and creation of myocutaneous flaps for wound closure. SURGEON:  Gwendolyn Mack MD    ASSISTANT:  Liana Acevedo DO    ANESTHESIA:  General.    ESTIMATED BLOOD LOSS:  23 mL. COMPLICATIONS:  None. SPECIMEN:  Cut end of tibia. DRAIN:  None. FINDINGS:  Pressure ulcer tracking to the tip of the tibia with the cut  edge of the tibia. The tibia was resected back roughly 6-7 cm, and the  wound was primarily closed. INDICATION FOR PROCEDURE:  The patient is a 78-year-old gentleman, well  known to me, had undergone a right below-knee amputation a while back. He developed a new pressure ulcer, which was managed with local wound  care. He came in for followup, and the wound had progressed with  probing of the wound directly down to the tibia, suspecting of  osteomyelitis. He was recommended to undergo revision. Risks and  benefits were explained and he consented to proceed. DESCRIPTION OF PROCEDURE:  The patient was brought to the operating  room. After appropriate general endotracheal anesthesia was delivered,  the right lower extremity was prepped and draped in a standard sterile  fashion. A time-out was performed and agreed upon. Antibiotics were  given during this period.   I began by evaluating the wound.  There was a  circumferential ulcer, which was marked out. An elliptical incision was  then made encompassing the ulcer along with the inflamed soft tissue  around this. Electrocautery was then used to get down through the  subcutaneous tissue and the muscle and the scar, all the way down to the  tibia. The segment of the skin and soft tissue that was cut was roughly   8 x 8 cm. The tip of the tibia was soft with sinus tracts that only spanned to roughly  1 cm into the tibia. In order to get good proximalization and get the  pressure off the stump, decision was made to circumferentially dissect  out the tibia using electrocautery. Once we had good mobility of it,  the tibia was then transected with the power saw, removing roughly 6-7  cm of the distal end. The top edge of the tibia was then also smoothed  over with the power saw and a rasp. The wound was then irrigated  copiously with saline. Hemostasis was achieved with electrocautery and  manual pressure. Myodesis stitch was then performed to reapproximate  the muscle and scar tissue covering up the cut end of the tibia. Fascia  was then reapproximated with interrupted 2-0 Vicryl, and the skin was  reapproximated with interrupted vertical mattress nylon sutures. Please note, I did have to create myocutaneous flaps circumferentially  in order to allow the wound to come together nicely without tension. Gauze and Tegaderm were applied. The patient tolerated the procedure  well and was brought to his recovery room in stable condition.         Tatyana Valle MD    D: 07/14/2020 16:23:52       T: 07/15/2020 0:24:43     [email or web address]  Job#: [id number]     Doc#: [id number]    CC:

## 2020-07-15 NOTE — ANESTHESIA POSTPROCEDURE EVALUATION
Department of Anesthesiology  Postprocedure Note    Patient: Alie Lin  MRN: [id number]  YOB: 1965  Date of evaluation: 7/14/2020  Time:  9:08 PM     Procedure Summary     Date:  07/14/20 Room / Location:  [address] [place] [address]    Anesthesia Start:  1266 Anesthesia Stop:  1626    Procedure:  RIGHT BELOW KNEE AMPUTATION REVISION (Right ) Diagnosis:  (NON HEALING BKA STUMP WOUND)    Surgeon:  Abiola Aaron MD Responsible Provider:  Elen Danielson MD    Anesthesia Type:  general ASA Status:  4          Anesthesia Type: general    Mary Lou Phase I:      Mary Lou Phase II:      Last vitals: Reviewed and per EMR flowsheets.    POST-OP ANESTHESIA NOTE       BP (!) 174/62   Pulse 58   Temp 97 °F (36.1 °C) (Oral)   Resp 10   SpO2 98%    Pain Assessment: 0-10  Pain Level: 4          Anesthesia Post Evaluation    Patient location during evaluation: PACU  Patient participation: complete - patient participated  Level of consciousness: awake  Pain score: 4  Airway patency: patent  Nausea & Vomiting: no nausea and no vomiting  Complications: no  Cardiovascular status: hemodynamically stable  Respiratory status: acceptable  Hydration status: stable

## 2020-07-20 ENCOUNTER — TELEPHONE (OUTPATIENT)
Dept: VASCULAR SURGERY | Age: 55
End: 2020-07-20

## 2020-07-20 NOTE — TELEPHONE ENCOUNTER
----- Message from [facility] sent at 7/15/2020  8:20 AM EDT -----  LVM for patient to call the office to schedule 3-4 week post op s/p BKA revision 7/15  ----- Message -----  From: Pranav Coughlin MD  Sent: 7/14/2020   4:25 PM EDT  To: belinda  Heart/Vascular Clinical Staff    3-4 week postop s/p BKA revision

## 2020-07-24 ENCOUNTER — TELEPHONE (OUTPATIENT)
Dept: VASCULAR SURGERY | Age: 55
End: 2020-07-24

## 2020-07-24 NOTE — TELEPHONE ENCOUNTER
LVM for patient again to schedule a 3-4 week post op s/p BKA revision.  Sending unable to reach letter

## 2020-08-07 NOTE — DISCHARGE SUMMARY
[facility] Vascular Surgery  Discharge Summary    Patient's Name/Date of Birth: Emanuel Springer / 1965 (62 y.o.)    Admission Date: 7/14/2020  1:07 PM  Discharge Date: 7/14/2020  6:30 PM  Discharge Physician: Dr. Scottie Keller  Discharge Unit: [address] [facility]  Discharge condition: fair  Disposition: Home with [employer] [facility]      Reason For Admission: No chief complaint on file. HPI: Emanuel Springer is a 62 y.o.  male who presents to the office for follow up after he developed a pressure ulcer over his right BKA stump.  We have been doing local wound care and offloading (no prosthesis on) but the wound has not healed. Sanchez Diaz is thick drainage noted coming out from the wound that unfortunately  had developed osteomyelitis. We had started him on antibiotic. He needs right below knee amputation revision and trimming the bone back.         Procedures completed in hospital: RIGHT BELOW KNEE AMPUTATION REVISION  Myocutaneous flap creation    Brief Review of Hospital Course:   Procedure completed by Dr. Scottie Keller. Postop recovery went as planned . Patient discharge home with home health in stable condition. Follow up in the office in one month , sooner if any issues. Review of Systems   Constitutional: Negative for chills and fever. HENT: Negative for congestion.    Eyes: Positive for visual disturbance. Respiratory: Negative for chest tightness and shortness of breath.    Cardiovascular: Negative for chest pain and leg swelling. Gastrointestinal: Negative for abdominal pain. Endocrine: Negative.    Genitourinary: Negative.    Musculoskeletal: Positive for gait problem. Skin: Positive for color change and wound. Allergic/Immunologic: Negative.    Neurological: Negative for facial asymmetry, speech difficulty and weakness.    Hematological: Negative.    Psychiatric/Behavioral: Negative.      Physical Exam:  Vitals:    07/14/20 1800   BP: (!) 174/62   Pulse: 58   Resp:    Temp:    SpO2: 98%     Constitutional: daily     aspirin 81 MG EC tablet  Take 1 tablet by mouth daily     AVANDIA PO     brimonidine 0.15 % ophthalmic solution  Commonly known as:  ALPHAGAN P     carvedilol 12.5 MG tablet  Commonly known as:  COREG  Take 1 tablet by mouth 2 times daily (with meals)     ciprofloxacin 500 MG tablet  Commonly known as:  CIPRO     clopidogrel 75 MG tablet  Commonly known as:  Plavix  Take 1 tablet by mouth daily     cyclobenzaprine 5 MG tablet  Commonly known as:  FLEXERIL     dorzolamide 2 % ophthalmic solution  Commonly known as:  TRUSOPT     folic acid 1 MG tablet  Commonly known as:  FOLVITE  Take 1 tablet by mouth daily     gabapentin 100 MG capsule  Commonly known as:  NEURONTIN  Take 1 capsule by mouth 3 times daily for 180 days.  Intended supply: 90 days     Gauze Dressing 4\"X4\" Pads  1 each by Does not apply route daily     hydroCHLOROthiazide 25 MG tablet  Commonly known as:  HYDRODIURIL  Take 1 tablet by mouth daily     HYDROcodone-acetaminophen 5-325 MG per tablet  Commonly known as:  NORCO     insulin glargine 100 UNIT/ML injection vial  Commonly known as:  LANTUS  Inject 20 Units into the skin nightly     latanoprost 0.005 % ophthalmic solution  Commonly known as:  XALATAN     levETIRAcetam 500 MG tablet  Commonly known as:  KEPPRA  Take 1 tablet by mouth 2 times daily     lisinopril 40 MG tablet  Commonly known as:  PRINIVIL;ZESTRIL     metFORMIN 500 MG tablet  Commonly known as:  GLUCOPHAGE     metoprolol tartrate 50 MG tablet  Commonly known as:  LOPRESSOR     omeprazole 20 MG delayed release capsule  Commonly known as:  PRILOSEC     pravastatin 10 MG tablet  Commonly known as:  PRAVACHOL  Take 1 tablet by mouth daily     tamsulosin 0.4 MG capsule  Commonly known as:  FLOMAX  Take 1 capsule by mouth daily     timolol 0.25 % ophthalmic solution  Commonly known as:  TIMOPTIC     travoprost (benzalkonium) 0.004 % ophthalmic solution  Commonly known as:  TRAVATAN        ASK your doctor about these medications 07/25/2019    AMORPHOUS NOT REPORTED 07/25/2019       Problem List Items Addressed This Visit     BKA stump complication ([facility] [address] - Primary          Discharge Plan:  Follow up with vascular Surgery  in one month. Call office at [phone] for any problems. Follow up with PCP in 1-2 weeks.           Nedra Hawk CNP  Phone: [phone]

## 2020-08-10 ENCOUNTER — OFFICE VISIT (OUTPATIENT)
Dept: VASCULAR SURGERY | Age: 55
End: 2020-08-10

## 2020-08-10 VITALS
HEIGHT: 67 IN | SYSTOLIC BLOOD PRESSURE: 151 MMHG | RESPIRATION RATE: 18 BRPM | BODY MASS INDEX: 29.03 KG/M2 | OXYGEN SATURATION: 99 % | WEIGHT: 185 LBS | TEMPERATURE: 97.2 F | HEART RATE: 57 BPM | DIASTOLIC BLOOD PRESSURE: 67 MMHG

## 2020-08-10 PROCEDURE — 99024 POSTOP FOLLOW-UP VISIT: CPT | Performed by: SURGERY

## 2020-08-10 ASSESSMENT — ENCOUNTER SYMPTOMS
CHEST TIGHTNESS: 0
SHORTNESS OF BREATH: 0
COLOR CHANGE: 0

## 2020-08-10 NOTE — PROGRESS NOTES
Division of Vascular Surgery        Postoperative Follow Up    Right BKA revision (7/14/2020)    History of Present Illness:      Xavier Mason is a 62 y.o. gentleman presents for postoperative follow up after undergoing right BKA revision for pressure ulcer that had developed causing osteomyelitis. He has done well and his amputation site has healed very well. Review of Systems:     Review of Systems   Constitutional: Negative for chills and fever. Respiratory: Negative for chest tightness and shortness of breath. Cardiovascular: Negative for chest pain. Skin: Negative for color change and wound. Neurological: Negative for weakness and numbness. Physical Exam:     Vitals:  BP (!) 151/67 (Site: Right Upper Arm, Position: Sitting, Cuff Size: Medium Adult)   Pulse 57   Temp 97.2 °F (36.2 °C) (Temporal)   Resp 18   Ht 5' 7\" (1.702 m)   Wt 185 lb (83.9 kg)   SpO2 99%   BMI 28.98 kg/m²     Physical Exam  Constitutional:       Appearance: He is well-developed and well-groomed. Cardiovascular:      Rate and Rhythm: Normal rate and regular rhythm. Pulmonary:      Effort: Pulmonary effort is normal. No respiratory distress. Abdominal:      Palpations: Abdomen is soft. Tenderness: There is no abdominal tenderness. Feet:      Right foot:      Amputation: Right leg is amputated below knee. Left foot:      Amputation: Left leg is amputated below knee. Skin:     General: Skin is warm. Capillary Refill: Capillary refill takes less than 2 seconds. Neurological:      Mental Status: He is alert. Imaging/Labs:     None performed    Assessment and Plan:     Right BKA revision  · Healed up very well  · Ok to have prosthesis adjusted and start using it  · Follow up as needed    Electronically signed by Rosa Hector MD on 8/10/20 at 1:54 PM EDT      [address] [facility]: [phone]  C: [phone]  Email: [email or web address]

## 2020-09-30 RX ORDER — LEVETIRACETAM 500 MG/1
500 TABLET ORAL 2 TIMES DAILY
Qty: 60 TABLET | Refills: 0 | Status: SHIPPED | OUTPATIENT
Start: 2020-09-30 | End: 2020-10-12 | Stop reason: SDUPTHER

## 2020-10-12 ENCOUNTER — OFFICE VISIT (OUTPATIENT)
Dept: NEUROLOGY | Age: 55
End: 2020-10-12
Payer: COMMERCIAL

## 2020-10-12 VITALS
OXYGEN SATURATION: 100 % | DIASTOLIC BLOOD PRESSURE: 98 MMHG | SYSTOLIC BLOOD PRESSURE: 164 MMHG | HEIGHT: 67 IN | HEART RATE: 47 BPM | WEIGHT: 180 LBS | TEMPERATURE: 96.4 F | BODY MASS INDEX: 28.25 KG/M2

## 2020-10-12 PROCEDURE — 99214 OFFICE O/P EST MOD 30 MIN: CPT | Performed by: STUDENT IN AN ORGANIZED HEALTH CARE EDUCATION/TRAINING PROGRAM

## 2020-10-12 RX ORDER — LEVETIRACETAM 500 MG/1
500 TABLET ORAL 2 TIMES DAILY
Qty: 180 TABLET | Refills: 1 | Status: SHIPPED | OUTPATIENT
Start: 2020-10-12 | End: 2021-04-16

## 2020-10-12 ASSESSMENT — ENCOUNTER SYMPTOMS
NAUSEA: 0
VOMITING: 0
CONSTIPATION: 0
PHOTOPHOBIA: 0
DIARRHEA: 0
EYE PAIN: 0
COUGH: 0
ABDOMINAL PAIN: 0
SHORTNESS OF BREATH: 0

## 2020-10-12 NOTE — PROGRESS NOTES
[address] [place] [address], [facility], [facility], [facility], [address] [place] [address]  P: [phone]  F: [phone]    NEUROLOGY CLINIC NOTE     PATIENT NAME: Robyn Raygoza  PATIENT MRN: [id number]  PRIMARY CARE PHYSICIAN: Oswaldo Hinkle MD      Interval history 10/12/2020  Patient was last seen in the clinic in January 2019. Since last visit patient denies any recurrence of seizures or seizure-like activity or episodes of confusion. He does have some episodes of staring spells, but he responds at that time. Unclear description as patient cannot see bilaterally. He is legally blind. Eyes any shaking episodes or tongue bite or bowel bladder incontinence. Denies any headaches or focal muscle weakness or tingling numbness or speech difficulty. Denies any active neurologic concerns since last visit. Currently taking Keppra 500 mg twice a day, denies any side effects from the medication. He is compliant with his medication. Denies any other concerns at present      Notes from 1/22/2019  HPI:      Robyn Raygoza is a 62 y.o.  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 History obtained from patient and medical chart review. Patient presented on 11/7/2018 with altered mental status and generalized weakness, vomiting and headache. He was initially evaluated in the ER, his systolic blood pressure was elevated into 230s, he was also noted to have seizure-like activity with right gaze deviation and rhythmic jerking of his head and upper extremities, he received Ativan for that. Patient was loaded with Keppra and started on 500 mg twice a day maintenance. CT head was concerning for possible PRES. Patient was also noted to be febrile, had leukocytosis, elevated lactic acid and hence was started on antibiotics vancomycin and Zosyn in the ER.   Patient was evaluated by general neurology and stroke neurology. CTA head and neck showed multifocal intracranial stenosis. MRI brain showed multifocal scattered and confluent areas of increased T2 signal more pronounced in the cerebellar hemisphere and posterior cerebral hemispheres concerning for PRES. Patient was also placed on LTME which did not show any epileptiform discharges. LP was done for CSF analysis which showed mildly elevated WBC to 65, meningitis panel including HSV was negative. Internal medicine team was consulted for the blood pressure management. Patient's blood pressure was treated with hydralazine/labetalol when necessary, amlodipine 5 mg was added. He was also started on Coreg, HCTZ and lisinopril. Patient was then finally discharged on 11/10/2018 once his medical condition was stabilized the plan to repeat the MRI in 6 weeks. Since the discharge from the hospital, patient denies any recurrence of seizure activity or episodes of confusion. Patient continued to have fluctuating blood pressure with sometimes systolic blood pressure increasing to 160s. He is compliant with Keppra 500 mg twice a day. Denies any side effect from the medication. Patient follows up with PCP for blood pressure management. Denies any other new concerns. Currently he is on aspirin 81 mg and pravastatin 10 mg.        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    Hyperlipidemia     Hypertension     IDDM (insulin dependent diabetes mellitus) ([facility])     MDRO (multiple drug resistant organisms) resistance     MRSA (methicillin resistant staph aureus) culture positive 09/03/2017    foot    Neuropathy     Osteomyelitis (HCC)     Left Hallux    S/P cataract extraction and insertion of intraocular lens 2015    bilateral     Type II or unspecified type diabetes mellitus without mention of complication, not stated as uncontrolled         Past Surgical History:   Procedure Laterality Date    EYE SURGERY Bilateral     lazer both eyes    FOOT SURGERY Left 02/24/2017    surgical prep of wound bed    FOOT SURGERY Left 04/13/2017    1) Left foot surgical preparation of wound-bed, 2) Left foot application of graft     [facility]         LEG AMPUTATION BELOW KNEE Left 9/7/2017    LEFT BELOW KNEE GUILLOTINE AMPUTATION performed by Kami Silva MD at [address] Left 9/14/2017    REVISION LEFT BELOW KNEE AMPUTATION, CLOSED  performed by Kami Silva MD at [address] Right 7/19/2019    RIGHT GUILLOTINE FOOT AMPUTATION performed by Sherwin Halsted, MD at [address] Right 7/23/2019    RIGHT BELOW KNEE AMPUTATION performed by Sherwin Halsted, MD at [address] Right 7/14/2020    RIGHT BELOW KNEE AMPUTATION REVISION performed by Sherwin Halsted, MD at [facility] [address] Left 01/23/2017    I and D bone, bone biopsy with flap closure and pulse lavage    OTHER SURGICAL HISTORY Left 04/27/2017    I & D foot    OTHER SURGICAL HISTORY  07/14/2020    RBKA Revision    OR DEEP DISSEC FOOT INFEC,1 BURSA Left 4/13/2017    FOOT DEBRIDEMENT WITH EPIFIX  performed by Jose Enrique Henderson DPM at [facility] INCIS/DRAIN THIGH/KNEE ABSCESS,DEEP Left 9/12/2017     LEFT BELOW KNEE AMPUTATION OPEN, WOUND VAC PLACEMENT performed by Kami Silva MD at [address] 9/25/2018    CHOLECYSTECTOMY LAPAROSCOPIC performed by Carmelo Gomez MD at [place] [address] Left 2014    hallux    TOE ([facility]) 5-325 MG per tablet take 1/2 tablet by mouth twice a day if needed      lisinopril (PRINIVIL;ZESTRIL) 40 MG tablet   0    ciprofloxacin (CIPRO) 500 MG tablet take 1 tablet by mouth twice a day for 10 days  0    metoprolol tartrate (LOPRESSOR) 50 MG tablet   0    brimonidine (ALPHAGAN P) 0.15 % ophthalmic solution   0    dorzolamide (TRUSOPT) 2 % ophthalmic solution   0    timolol (TIMOPTIC) 0.25 % ophthalmic solution   0    hydrochlorothiazide (HYDRODIURIL) 25 MG tablet Take 1 tablet by mouth daily 30 tablet 3    clopidogrel (PLAVIX) 75 MG tablet Take 1 tablet by mouth daily 30 tablet 0    Gauze Pads & Dressings (GAUZE DRESSING) 4\"X4\" PADS 1 each by Does not apply route daily 10 each 1    latanoprost (XALATAN) 0.005 % ophthalmic solution place 1 drop into both eyes at bedtime  0    carvedilol (COREG) 12.5 MG tablet Take 1 tablet by mouth 2 times daily (with meals) 60 tablet 3    amLODIPine (NORVASC) 10 MG tablet Take 1 tablet by mouth daily 30 tablet 3    aspirin 81 MG EC tablet Take 1 tablet by mouth daily 30 tablet 3    cyclobenzaprine (FLEXERIL) 5 MG tablet Take 5 mg by mouth 3 times daily as needed for Muscle spasms      metFORMIN (GLUCOPHAGE) 500 MG tablet Take 500 mg by mouth 2 times daily (with meals)      insulin glargine (LANTUS) 100 UNIT/ML injection vial Inject 20 Units into the skin nightly 1 vial 3    pravastatin (PRAVACHOL) 10 MG tablet Take 1 tablet by mouth daily 30 tablet 3    folic acid (FOLVITE) 1 MG tablet Take 1 tablet by mouth daily 30 tablet 3    tamsulosin (FLOMAX) 0.4 MG capsule Take 1 capsule by mouth daily 30 capsule 3    omeprazole (PRILOSEC) 20 MG delayed release capsule Take 20 mg by mouth 2 times daily      travoprost, benzalkonium, (TRAVATAN) 0.004 % ophthalmic solution 1 drop nightly      gabapentin (NEURONTIN) 100 MG capsule Take 1 capsule by mouth 3 times daily for 180 days.  Intended supply: 90 days 90 capsule 1     No current facility-administered no pupillary defect, bilateral nystagmus  V - normal facial sensation                                                               VII - normal facial symmetry                                                             VIII - intact hearing                                                                             IX, X - symmetrical palate                                                                  XI - symmetrical shoulder shrug                                                       XII - midline tongue without atrophy or fasciculation     Motor function  Normal muscle bulk and tone  Muscle strength: normal power 5/5, bilateral  BKA     Sensory function Intact to touch in bilateral upper and lower extremities. Cerebellar No involuntary movements or tremors     Reflex function Intact 2+ DTR in bilateral upper extremities. Gait                  Bilateral BKA, can ambulate using a wheelchair or a walker. PRIOR TESTS AND IMAGING:     LABS     HbA1c  Lab Results   Component Value Date    LABA1C 8.8 (H) 11/07/2018         LDL  Lab Results   Component Value Date    LDLCHOLESTEROL 72 11/07/2018    LDLCHOLESTEROL 63 09/02/2018    LDLCHOLESTEROL 45 04/27/2017       Lab Results   Component Value Date    HDL 34 (L) 11/07/2018    HDL 23 (L) 09/02/2018    HDL 34 (L) 04/27/2017     Lab Results   Component Value Date    TRIG 113 11/07/2018    TRIG 166 (H) 09/02/2018    TRIG 165 (H) 09/04/2017       IMAGING:     MRI brain With and without contrast: 12/20/2018  Impression   1.  Resolved areas of signal abnormality in both hemispheres as seen on prior.       2.  No evidence of acute intracranial ischemia, mass, or edema on today's   examination.       3.  Mucosal thickening is seen involving right and left nasal cavity   appearing similar compared to prior.       4.  Focus of abnormal signal is seen near right lacrimal gland which may be   related to prior surgery.  Clinical correlation recommended.      MRI brain with and without contrast 11/7/2018  Impression   1. Limited study secondary to motion artifact. 2. No evidence of an acute infarct. 3. Multifocal scattered and confluent areas of increased T2 signal are noted   both supra and infratentorially.  The distribution is slightly more   pronounced in the cerebellar hemispheres and posterior cerebral hemispheres   suspicious for posterior reversible encephalopathy syndrome. CTA head and neck:  Impression   Development of hypoattenuation of the cerebral and cerebellar white matter   concerning for encephalopathy such as hypertensive encephalopathy. Correlation with MRI brain with and without IV contrast recommended.       Left ICA, petrous and caval segment, severe focal stenosis.  Right ICA   cavernous segment moderate stenosis.       Right MCA, M3 segment, severe focal stenosis.  Remainder of intracranial   circulation appears patent.       Left cheek skin thickening.  Clinical correlation recommended.  Reactive left   cervical lymph nodes. 2D Echo:8/31/2018  Summary  Technically difficult study. Left ventricle is normal in size. Global left ventricular systolic function  appears to be normal. Estimated ejection fraction is 55 % . Mild left ventricular hypertrophy. Grade I (mild) left ventricular diastolic dysfunction. No significant valvular regurgitation or stenosis seen. ASSESSMENT / PLAN:     Kristine Tompkins is a 52 y.o.  male  was seen in the clinic for  hospital follow up for PRES. · Posterior reversible encephalopathy syndrome PRES- improving. Follow up MRI reviewed. · New onset focal seizures with impaired awareness secondary to PRES. patient on Keppra 500 mg twice a day. Denies any recurrence of seizures since discharge. · Multifocal intracranial stenosis- patient was started on dual antiplatelet therapy aspirin and Plavix in September 2018 for 90 days, not clear if patient took it for total 90 days on not.   Currently patient is on Aspirin 81 mg.  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 As per patient he does not want to wean off the medication at present as it is helping with his headaches and denies any side effects on the medication. May consider weaning off Keppra during next clinic visit if needed and if EEG unremarkable. - Seizure Precautions:   counseled the patient with regard to seizure precautions for injury prevention and reinforced their rationale. No driving for at least 6 months, no activity at dangerous heights, no operation of dangerous machinery, no baths, no swimming. Caution (preferably accompanied) with cooking or near fires. The patient expressed understanding.     - Education regarding secondary stroke prevention was provided. - Follow up in the clinic in 6 months  - Instructed patient to call the clinic if symptoms worsen or develop any new symptoms. I have spent 25 minutes  with the patient more than 50% of this time was spent reviewing medical records, discussing imaging findings, counseling regarding medications side effects and compliance, healthy habits and coordinating care.       Electronically signed by Sybil De MD on 10/12/2020 at 12:48 PM

## 2020-11-04 ENCOUNTER — HOSPITAL ENCOUNTER (OUTPATIENT)
Dept: NEUROLOGY | Age: 55
Discharge: HOME | End: 2020-11-04
Payer: COMMERCIAL

## 2020-11-04 PROCEDURE — 95816 EEG AWAKE AND DROWSY: CPT | Performed by: PSYCHIATRY & NEUROLOGY

## 2020-11-04 PROCEDURE — 95819 EEG AWAKE AND ASLEEP: CPT

## 2021-01-01 NOTE — PROGRESS NOTES
[address]   Outpatient Physical Therapy  [address] [facility]. [address]  Phone: [phone]  Fax: [phone]    Daily Progress Note    Date: 3/26/18    Patient Name: Debby Ray        MRN: [id number]  Account: [de-identified] : 1965      General Information:  Referring Practitioner: nakia Diaz MD  Referral Date : 17  Diagnosis: L BKA  Other (Comment): Ins Auth to 18  Onset Date: 17  PT Insurance Information: [employer] Wisdom  Total # of Visits to Date: 6  Plan of Care/Certification Expiration Date: 18 (10 visits)  No Show: 3  Canceled Appointment: 4    Subjective: No complaints from last visit      Pain:  Patient Currently in Pain: Denies     Objective:  Exercise 3: bridge with knees bent LE on ball  Exercise 4: gait training exercises with and without AD  Exercise 9: step ups  L LE  Exercise 10: manual hip flexor stretch    Comment: Patient with difficulty maintaining stability today,. Multi VC's for step placement , pelvic advancement, hip control, and wt shifting.  Tactile cues with hip advancement puching into band to encourage pelvic movement     Assessment:  REQUIRES PT FOLLOW UP: Yes  Activity Tolerance: Patient Tolerated treatment well       Plan:  Plan: Continue with current plan       Therapy Time:518-110        Treatment Charges: Minutes Units   []  Ultrasound     []  Electrical-Stim     []  Iontophoresis     []  Traction     []  Massage       []  Eval     []  Gait     [x]  Ther Exercise 45  3    []  Manual Therapy       []  Ther Activities       []  Aquatics     []  Neuro Re-Ed       []  Other       Total Treatment Time: [facility], PT 0

## 2021-02-19 ENCOUNTER — OFFICE VISIT (OUTPATIENT)
Dept: VASCULAR SURGERY | Age: 56
End: 2021-02-19
Payer: COMMERCIAL

## 2021-02-19 VITALS
DIASTOLIC BLOOD PRESSURE: 80 MMHG | RESPIRATION RATE: 18 BRPM | BODY MASS INDEX: 29.03 KG/M2 | HEIGHT: 67 IN | OXYGEN SATURATION: 99 % | WEIGHT: 185 LBS | HEART RATE: 55 BPM | SYSTOLIC BLOOD PRESSURE: 176 MMHG | TEMPERATURE: 97.4 F

## 2021-02-19 DIAGNOSIS — Z89.511 HISTORY OF BELOW KNEE AMPUTATION, RIGHT (HCC): ICD-10-CM

## 2021-02-19 DIAGNOSIS — Z89.512 HISTORY OF BELOW-KNEE AMPUTATION OF LEFT LOWER EXTREMITY (HCC): ICD-10-CM

## 2021-02-19 DIAGNOSIS — I73.9 PAD (PERIPHERAL ARTERY DISEASE) (HCC): Primary | ICD-10-CM

## 2021-02-19 PROCEDURE — 99214 OFFICE O/P EST MOD 30 MIN: CPT | Performed by: SURGERY

## 2021-02-22 ASSESSMENT — ENCOUNTER SYMPTOMS
CHEST TIGHTNESS: 0
SHORTNESS OF BREATH: 0
COLOR CHANGE: 0
ALLERGIC/IMMUNOLOGIC NEGATIVE: 1
GASTROINTESTINAL NEGATIVE: 1

## 2021-02-22 NOTE — PROGRESS NOTES
Division of Vascular Surgery        Follow Up    Bilateral below knee amputations    Chief Complaint:      Bilateral BKA follow up    History of Present Illness:      Chun Lau is a 60 y.o. gentleman who presents for follow up regarding his below knee amputations. He has done well and has been walking using a walker with both of his prosthesis. He has developed a little rash on the inner aspect of his right thigh from sweating and moisture build up. They have been using talcum powder to help alleviate. He is due to get his left prosthesis revised.      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    Hyperlipidemia     Hypertension     IDDM (insulin dependent diabetes mellitus)     MDRO (multiple drug resistant organisms) resistance     MRSA (methicillin resistant staph aureus) culture positive 09/03/2017    foot    Neuropathy     Osteomyelitis (HCC)     Left Hallux    S/P cataract extraction and insertion of intraocular lens 2015    bilateral     Type II or unspecified type diabetes mellitus without mention of complication, not stated as uncontrolled        Surgical History:     Past Surgical History:   Procedure Laterality Date    EYE SURGERY Bilateral     lazer both eyes    FOOT SURGERY Left 02/24/2017    surgical prep of wound bed    FOOT SURGERY Left 04/13/2017    1) Left foot surgical preparation of wound-bed, 2) Left foot application of graft     [facility]  [facility] DOUBLE  4/28/2017         LEG AMPUTATION BELOW KNEE Left 9/7/2017    LEFT BELOW KNEE GUILLOTINE AMPUTATION performed by Cleveland James MD at [address] 40 MG tablet   0    ciprofloxacin (CIPRO) 500 MG tablet take 1 tablet by mouth twice a day for 10 days  0    metoprolol tartrate (LOPRESSOR) 50 MG tablet   0    brimonidine (ALPHAGAN P) 0.15 % ophthalmic solution   0    dorzolamide (TRUSOPT) 2 % ophthalmic solution   0    timolol (TIMOPTIC) 0.25 % ophthalmic solution   0    hydrochlorothiazide (HYDRODIURIL) 25 MG tablet Take 1 tablet by mouth daily 30 tablet 3    clopidogrel (PLAVIX) 75 MG tablet Take 1 tablet by mouth daily 30 tablet 0    Gauze Pads & Dressings (GAUZE DRESSING) 4\"X4\" PADS 1 each by Does not apply route daily 10 each 1    latanoprost (XALATAN) 0.005 % ophthalmic solution place 1 drop into both eyes at bedtime  0    carvedilol (COREG) 12.5 MG tablet Take 1 tablet by mouth 2 times daily (with meals) 60 tablet 3    amLODIPine (NORVASC) 10 MG tablet Take 1 tablet by mouth daily 30 tablet 3    aspirin 81 MG EC tablet Take 1 tablet by mouth daily 30 tablet 3    cyclobenzaprine (FLEXERIL) 5 MG tablet Take 5 mg by mouth 3 times daily as needed for Muscle spasms      metFORMIN (GLUCOPHAGE) 500 MG tablet Take 500 mg by mouth 2 times daily (with meals)      insulin glargine (LANTUS) 100 UNIT/ML injection vial Inject 20 Units into the skin nightly 1 vial 3    pravastatin (PRAVACHOL) 10 MG tablet Take 1 tablet by mouth daily 30 tablet 3    folic acid (FOLVITE) 1 MG tablet Take 1 tablet by mouth daily 30 tablet 3    tamsulosin (FLOMAX) 0.4 MG capsule Take 1 capsule by mouth daily 30 capsule 3    omeprazole (PRILOSEC) 20 MG delayed release capsule Take 20 mg by mouth 2 times daily      travoprost, benzalkonium, (TRAVATAN) 0.004 % ophthalmic solution 1 drop nightly      levETIRAcetam (KEPPRA) 500 MG tablet Take 1 tablet by mouth 2 times daily 180 tablet 1    gabapentin (NEURONTIN) 100 MG capsule Take 1 capsule by mouth 3 times daily for 180 days.  Intended supply: 90 days 90 capsule 1     No current facility-administered medications for this visit. Social History:     Tobacco:    reports that he quit smoking about 2 years ago. His smoking use included cigarettes. He has a 10.00 pack-year smoking history. He has never used smokeless tobacco.  Alcohol:      reports no history of alcohol use. Drug Use:  reports no history of drug use. Review of Systems:     Review of Systems   Constitutional: Negative for chills and fever. HENT: Negative for congestion. Eyes: Positive for visual disturbance (legally blind). Respiratory: Negative for chest tightness and shortness of breath. Cardiovascular: Negative for chest pain and leg swelling. Gastrointestinal: Negative. Endocrine: Negative. Genitourinary: Negative. Musculoskeletal: Positive for gait problem. Skin: Negative for color change and wound. Allergic/Immunologic: Negative. Neurological: Negative for facial asymmetry, speech difficulty, weakness and numbness. Hematological: Negative. Psychiatric/Behavioral: Negative. Physical Exam:     Vitals:  BP (!) 176/80 (Site: Left Upper Arm, Position: Sitting, Cuff Size: Large Adult)   Pulse 55   Temp 97.4 °F (36.3 °C) (Temporal)   Resp 18   Ht 5' 7\" (1.702 m)   Wt 185 lb (83.9 kg)   SpO2 99%   BMI 28.98 kg/m²     Physical Exam  Constitutional:       Appearance: He is well-developed and well-groomed. Eyes:      Extraocular Movements: Extraocular movements intact. Conjunctiva/sclera: Conjunctivae normal.   Neck:      Musculoskeletal: Full passive range of motion without pain. Vascular: No carotid bruit. Cardiovascular:      Rate and Rhythm: Normal rate and regular rhythm. Pulmonary:      Effort: Pulmonary effort is normal. No respiratory distress. Abdominal:      Palpations: Abdomen is soft. Tenderness: There is no abdominal tenderness. Musculoskeletal:      Right lower leg: No edema. Left lower leg: No edema. Feet:      Right foot:      Amputation: Right leg is amputated below knee. Left foot:      Amputation: Left leg is amputated below knee. Skin:     General: Skin is warm. Capillary Refill: Capillary refill takes less than 2 seconds. Neurological:      Mental Status: He is alert and oriented to person, place, and time. GCS: GCS eye subscore is 4. GCS verbal subscore is 5. GCS motor subscore is 6. Sensory: Sensation is intact. Motor: Motor function is intact. Psychiatric:         Mood and Affect: Mood normal.         Speech: Speech normal.         Behavior: Behavior normal.         Thought Content: Thought content normal.           Imaging/Labs:     none    Assessment and Plan:     Bilateral below knee amputations  · Continue to work with prosthetist to adjust as needed  · Doing well, he will follow up as needed    Electronically signed by Scarlet Shanks MD on 2/22/21 at 8:24 AM [address]: [phone]  C: [phone]  Email: [email or web address]

## 2021-04-16 ENCOUNTER — OFFICE VISIT (OUTPATIENT)
Dept: NEUROLOGY | Age: 56
End: 2021-04-16
Payer: COMMERCIAL

## 2021-04-16 VITALS
HEART RATE: 51 BPM | DIASTOLIC BLOOD PRESSURE: 50 MMHG | BODY MASS INDEX: 29.03 KG/M2 | HEIGHT: 67 IN | WEIGHT: 185 LBS | SYSTOLIC BLOOD PRESSURE: 135 MMHG | OXYGEN SATURATION: 98 %

## 2021-04-16 DIAGNOSIS — Z87.898 HISTORY OF SEIZURE: ICD-10-CM

## 2021-04-16 DIAGNOSIS — I73.9 PAD (PERIPHERAL ARTERY DISEASE) (HCC): ICD-10-CM

## 2021-04-16 DIAGNOSIS — I67.9 CEREBROVASCULAR DISEASE: ICD-10-CM

## 2021-04-16 DIAGNOSIS — I67.2 INTRACRANIAL ATHEROSCLEROSIS: ICD-10-CM

## 2021-04-16 DIAGNOSIS — I67.83 PRES (POSTERIOR REVERSIBLE ENCEPHALOPATHY SYNDROME): Primary | ICD-10-CM

## 2021-04-16 PROCEDURE — 99214 OFFICE O/P EST MOD 30 MIN: CPT | Performed by: STUDENT IN AN ORGANIZED HEALTH CARE EDUCATION/TRAINING PROGRAM

## 2021-04-16 RX ORDER — PEN NEEDLE, DIABETIC 31 GX5/16"
NEEDLE, DISPOSABLE MISCELLANEOUS
COMMUNITY
Start: 2021-02-18

## 2021-04-16 RX ORDER — PROMETHAZINE HYDROCHLORIDE 6.25 MG/5ML
SYRUP ORAL
Status: ON HOLD | COMMUNITY
Start: 2021-03-08 | End: 2021-04-30

## 2021-04-16 RX ORDER — LEVETIRACETAM 250 MG/1
TABLET ORAL
Qty: 45 TABLET | Refills: 0 | Status: SHIPPED | OUTPATIENT
Start: 2021-04-16 | End: 2021-09-28

## 2021-04-16 RX ORDER — LEVETIRACETAM 250 MG/1
TABLET ORAL
Qty: 45 TABLET | Refills: 0 | Status: SHIPPED | OUTPATIENT
Start: 2021-04-16 | End: 2021-04-16

## 2021-04-16 ASSESSMENT — ENCOUNTER SYMPTOMS
EYE PAIN: 0
DIARRHEA: 0
NAUSEA: 0
CONSTIPATION: 0
SHORTNESS OF BREATH: 0
ABDOMINAL PAIN: 0
VOMITING: 0
PHOTOPHOBIA: 0
COUGH: 0

## 2021-04-16 NOTE — PROGRESS NOTES
Kenji Olmos is a 60 y.o.  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 History obtained from patient and medical chart review. Patient presented on 11/7/2018 with altered mental status and generalized weakness, vomiting and headache. He was initially evaluated in the ER, his systolic blood pressure was elevated into 230s, he was also noted to have seizure-like activity with right gaze deviation and rhythmic jerking of his head and upper extremities, he received Ativan for that. Patient was loaded with Keppra and started on 500 mg twice a day maintenance. CT head was concerning for possible PRES. Patient was also noted to be febrile, had leukocytosis, elevated lactic acid and hence was started on antibiotics vancomycin and Zosyn in the ER. Patient was evaluated by general neurology and stroke neurology. CTA head and neck showed multifocal intracranial stenosis. MRI brain showed multifocal scattered and confluent areas of increased T2 signal more pronounced in the cerebellar hemisphere and posterior cerebral hemispheres concerning for PRES. Patient was also placed on LTME which did not show any epileptiform discharges. LP was done for CSF analysis which showed mildly elevated WBC to 65, meningitis panel including HSV was negative. Internal medicine team was consulted for the blood pressure management. Patient's blood pressure was treated with hydralazine/labetalol when necessary, amlodipine 5 mg was added. He was also started on Coreg, HCTZ and lisinopril. Patient was then finally discharged on 11/10/2018 once his medical condition was stabilized the plan to repeat the MRI in 6 weeks. Since the discharge from the hospital, patient denies any recurrence of seizure activity or episodes of confusion.   Patient continued to have fluctuating blood pressure with sometimes systolic blood pressure increasing to 160s. He is compliant with Keppra 500 mg twice a day. Denies any side effect from the medication. Patient follows up with PCP for blood pressure management. Denies any other new concerns. Currently he is on aspirin 81 mg and pravastatin 10 mg.        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    Hyperlipidemia     Hypertension     IDDM (insulin dependent diabetes mellitus)     MDRO (multiple drug resistant organisms) resistance     MRSA (methicillin resistant staph aureus) culture positive 09/03/2017    foot    Neuropathy     Osteomyelitis (HCC)     Left Hallux    S/P cataract extraction and insertion of intraocular lens 2015    bilateral     Type II or unspecified type diabetes mellitus without mention of complication, not stated as uncontrolled         Past Surgical History:   Procedure Laterality Date    EYE SURGERY Bilateral     lazer both eyes    FOOT SURGERY Left 02/24/2017    surgical prep of wound bed    FOOT SURGERY Left 04/13/2017    1) Left foot surgical preparation of wound-bed, 2) Left foot application of graft     [facility]  [facility] DOUBLE  4/28/2017         LEG AMPUTATION BELOW KNEE Left 9/7/2017    LEFT BELOW KNEE GUILLOTINE AMPUTATION performed by Candelario Silvestre MD at [address] Left 9/14/2017    REVISION LEFT BELOW KNEE AMPUTATION, CLOSED  performed by Candelario Silvestre MD at [address] Right 7/19/2019    RIGHT GUILLOTINE FOOT AMPUTATION performed by Mike Del Rio MD at [address] Right 7/23/2019 RIGHT BELOW KNEE AMPUTATION performed by Apurva Whitaker MD at [address] [facility] Right 2020    RIGHT BELOW KNEE AMPUTATION REVISION performed by Apurva Whitaker MD at [address] [facility] Left 2017    I and D bone, bone biopsy with flap closure and pulse lavage    OTHER SURGICAL HISTORY Left 2017    I & D foot    OTHER SURGICAL HISTORY  2020    RBKA Revision    NH DEEP DISSEC FOOT INFEC,1 BURSA Left 2017    FOOT DEBRIDEMENT WITH EPIFIX  performed by Kathy Ramírez DPM at [address] INCIS/DRAIN THIGH/KNEE ABSCESS,DEEP Left 2017     LEFT BELOW KNEE AMPUTATION OPEN, WOUND VAC PLACEMENT performed by Anne Cortes MD at [address] N/A 2018    CHOLECYSTECTOMY LAPAROSCOPIC performed by Arya Kim MD at [address] Left 2014    hallux    TOE AMPUTATION Left 2016    hallux    TOE AMPUTATION Left 2017    I AND D FOOT, BONE BIOPSY, POSSIBLE FILET 2ND TOE performed by Kathy Ramírez DPM at [address] TOE AMPUTATION  2017    Left second toe         Social History     Socioeconomic History    Marital status:      Spouse name: Not on file    Number of children: Not on file    Years of education: Not on file    Highest education level: Not on file   Occupational History    Not on file   Social Needs    Financial resource strain: Not on file    Food insecurity     Worry: Not on file     Inability: Not on file    Transportation needs     Medical: Not on file     Non-medical: Not on file   Tobacco Use    Smoking status: Former Smoker     Packs/day: 1.00     Years: 10.00     Pack years: 10.00     Types: Cigarettes     Quit date: 2018     Years since quittin.5    Smokeless tobacco: Never Used    Tobacco comment: smokes 1 cigarette a day   Substance and Sexual Activity    Alcohol use: No    Drug use: No    Sexual activity: Not on file Lifestyle    Physical activity     Days per week: Not on file     Minutes per session: Not on file    Stress: Not on file   Relationships    Social connections     Talks on phone: Not on file     Gets together: Not on file     Attends Restorationism service: Not on file     Active member of club or organization: Not on file     Attends meetings of clubs or organizations: Not on file     Relationship status: Not on file    Intimate partner violence     Fear of current or ex partner: Not on file     Emotionally abused: Not on file     Physically abused: Not on file     Forced sexual activity: Not on file   Other Topics Concern    Not on file   Social History Narrative    Not on file        Current Outpatient Medications   Medication Sig Dispense Refill    levETIRAcetam (KEPPRA) 250 MG tablet For first 2 weeks take to 50 mg twice a day, after that for next 2 weeks take to 50 mg once a day 45 tablet 0    HYDROcodone-acetaminophen ([facility]) 5-325 MG per tablet take 1/2 tablet by mouth twice a day if needed      lisinopril (PRINIVIL;ZESTRIL) 40 MG tablet   0    metoprolol tartrate (LOPRESSOR) 50 MG tablet   0    brimonidine (ALPHAGAN P) 0.15 % ophthalmic solution   0    dorzolamide (TRUSOPT) 2 % ophthalmic solution   0    timolol (TIMOPTIC) 0.25 % ophthalmic solution   0    clopidogrel (PLAVIX) 75 MG tablet Take 1 tablet by mouth daily 30 tablet 0    Gauze Pads & Dressings (GAUZE DRESSING) 4\"X4\" PADS 1 each by Does not apply route daily 10 each 1    latanoprost (XALATAN) 0.005 % ophthalmic solution place 1 drop into both eyes at bedtime  0    carvedilol (COREG) 12.5 MG tablet Take 1 tablet by mouth 2 times daily (with meals) 60 tablet 3    amLODIPine (NORVASC) 10 MG tablet Take 1 tablet by mouth daily 30 tablet 3    aspirin 81 MG EC tablet Take 1 tablet by mouth daily 30 tablet 3    cyclobenzaprine (FLEXERIL) 5 MG tablet Take 5 mg by mouth 3 times daily as needed for Muscle spasms      metFORMIN (GLUCOPHAGE) 500 MG tablet Take 500 mg by mouth 2 times daily (with meals)      insulin glargine (LANTUS) 100 UNIT/ML injection vial Inject 20 Units into the skin nightly 1 vial 3    pravastatin (PRAVACHOL) 10 MG tablet Take 1 tablet by mouth daily 30 tablet 3    folic acid (FOLVITE) 1 MG tablet Take 1 tablet by mouth daily 30 tablet 3    omeprazole (PRILOSEC) 20 MG delayed release capsule Take 20 mg by mouth 2 times daily      travoprost, benzalkonium, (TRAVATAN) 0.004 % ophthalmic solution 1 drop nightly      promethazine (PHENERGAN) 6.25 MG/5ML syrup take 1 teaspoonful by mouth four times a day if needed      B-D UF III MINI PEN NEEDLES 31G X 5 MM MISC USE THREE TIMES A DAY      silver sulfADIAZINE (SSD) 1 % cream apply to affected area once daily (Patient not taking: Reported on 4/16/2021) 50 g 2    ciprofloxacin (CIPRO) 500 MG tablet take 1 tablet by mouth twice a day for 10 days  0    hydrochlorothiazide (HYDRODIURIL) 25 MG tablet Take 1 tablet by mouth daily (Patient not taking: Reported on 4/16/2021) 30 tablet 3    gabapentin (NEURONTIN) 100 MG capsule Take 1 capsule by mouth 3 times daily for 180 days. Intended supply: 90 days 90 capsule 1    tamsulosin (FLOMAX) 0.4 MG capsule Take 1 capsule by mouth daily (Patient not taking: Reported on 4/16/2021) 30 capsule 3     No current facility-administered medications for this visit. Allergies  Allergies   Allergen Reactions    Adhesive Tape         REVIEW OF SYSTEMS:     Review of Systems   Constitutional: Negative for appetite change, chills, fever and unexpected weight change. Eyes: Negative for photophobia, pain and visual disturbance (Legally blind both eyes). Bilateral blindness due to diabetes   Respiratory: Negative for cough and shortness of breath. Cardiovascular: Negative for chest pain and palpitations. Gastrointestinal: Negative for abdominal pain, constipation, diarrhea, nausea and vomiting.    Endocrine: Negative for polydipsia and polyuria. Genitourinary: Negative for difficulty urinating, dysuria and hematuria. Skin: Negative for pallor and rash. Neurological: Positive for seizures. Negative for dizziness, tremors, syncope, facial asymmetry, speech difficulty, weakness, light-headedness, numbness and headaches. Psychiatric/Behavioral: Negative for behavioral problems and hallucinations. VITALS  BP (!) 135/50   Pulse 51   Ht 5' 7\" (1.702 m)   Wt 185 lb (83.9 kg)   SpO2 98%   BMI 28.98 kg/m²      PHYSICAL EXAMINATION:     Constitutional: Well developed, well nourished and in no acute distress. 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                                             III, IV, VI  no pupillary defect, bilateral nystagmus  V - normal facial sensation                                                               VII - normal facial symmetry                                                             VIII - intact hearing                                                                             IX, X - symmetrical palate                                                                  XI - symmetrical shoulder shrug                                                       XII - midline tongue without atrophy or fasciculation     Motor function  Normal muscle bulk and tone  Muscle strength: normal power 5/5, bilateral  BKA     Sensory function Intact to touch in bilateral upper and lower extremities. seizure in November 2018 which was provoked secondary to posterior reversible encephalopathy syndrome, repeat MRI brain showed resolution of the white matter signal changes, repeat EEG did not show increased risk of seizure. He is seizure-free for more than 2 years, we will give a trial of weaning off Keppra. Currently he is taking Keppra 500 mg twice a day. Recommend weaning off to 250 mg twice a day for 2 weeks followed by 250 mg twice a day for 2 weeks and then discontinue the medication. Will wean off gradually to avoid breakthrough seizures. Instructed patient to call the clinic if patient develops any breakthrough seizures. - Seizure Precautions:   counseled the patient with regard to seizure precautions for injury prevention and reinforced their rationale. No driving for at least 6 months, no activity at dangerous heights, no operation of dangerous machinery, no baths, no swimming. Caution (preferably accompanied) with cooking or near fires. The patient expressed understanding.     - Education regarding secondary stroke prevention was provided. - Follow up in the clinic in 4 to 5 months  - Instructed patient to call the clinic if symptoms worsen or develop any new symptoms. I have spent total 31 minutes  with the patient more than 50% of this time was spent reviewing medical records, discussing imaging findings, counseling regarding medications side effects and compliance, healthy habits and coordinating care and documentation.         Electronically signed by Suzie Christianson MD on 4/16/2021 at 10:00 AM

## 2021-04-21 ENCOUNTER — HOSPITAL ENCOUNTER (OUTPATIENT)
Dept: PREADMISSION TESTING | Age: 56
Discharge: HOME | End: 2021-04-25
Payer: COMMERCIAL

## 2021-04-21 VITALS
RESPIRATION RATE: 16 BRPM | OXYGEN SATURATION: 99 % | TEMPERATURE: 97.6 F | DIASTOLIC BLOOD PRESSURE: 72 MMHG | HEART RATE: 60 BPM | BODY MASS INDEX: 29.35 KG/M2 | HEIGHT: 67 IN | WEIGHT: 187 LBS | SYSTOLIC BLOOD PRESSURE: 148 MMHG

## 2021-04-21 LAB
ANION GAP SERPL CALCULATED.3IONS-SCNC: 11 MMOL/L (ref 9–17)
BUN BLDV-MCNC: 24 MG/DL (ref 6–20)
BUN/CREAT BLD: 24 (ref 9–20)
CALCIUM SERPL-MCNC: 9 MG/DL (ref 8.6–10.4)
CHLORIDE BLD-SCNC: 109 MMOL/L (ref 98–107)
CO2: 21 MMOL/L (ref 20–31)
CREAT SERPL-MCNC: 1.01 MG/DL (ref 0.7–1.2)
ESTIMATED AVERAGE GLUCOSE: 157 MG/DL
GFR AFRICAN AMERICAN: >60 ML/MIN
GFR NON-AFRICAN AMERICAN: >60 ML/MIN
GFR SERPL CREATININE-BSD FRML MDRD: ABNORMAL ML/MIN/{1.73_M2}
GFR SERPL CREATININE-BSD FRML MDRD: ABNORMAL ML/MIN/{1.73_M2}
GLUCOSE BLD-MCNC: 169 MG/DL (ref 70–99)
HBA1C MFR BLD: 7.1 % (ref 4–6)
HCT VFR BLD CALC: 40.6 % (ref 40.7–50.3)
HEMOGLOBIN: 12.8 G/DL (ref 13–17)
MCH RBC QN AUTO: 27.2 PG (ref 25.2–33.5)
MCHC RBC AUTO-ENTMCNC: 31.5 G/DL (ref 28.4–34.8)
MCV RBC AUTO: 86.4 FL (ref 82.6–102.9)
NRBC AUTOMATED: 0 PER 100 WBC
PDW BLD-RTO: 13.8 % (ref 11.8–14.4)
PLATELET # BLD: 300 K/UL (ref 138–453)
PMV BLD AUTO: 11.9 FL (ref 8.1–13.5)
POTASSIUM SERPL-SCNC: 4.7 MMOL/L (ref 3.7–5.3)
RBC # BLD: 4.7 M/UL (ref 4.21–5.77)
SODIUM BLD-SCNC: 141 MMOL/L (ref 135–144)
WBC # BLD: 10.8 K/UL (ref 3.5–11.3)

## 2021-04-21 PROCEDURE — 83036 HEMOGLOBIN GLYCOSYLATED A1C: CPT

## 2021-04-21 PROCEDURE — 80048 BASIC METABOLIC PNL TOTAL CA: CPT

## 2021-04-21 PROCEDURE — 36415 COLL VENOUS BLD VENIPUNCTURE: CPT

## 2021-04-21 PROCEDURE — 85027 COMPLETE CBC AUTOMATED: CPT

## 2021-04-21 PROCEDURE — 93005 ELECTROCARDIOGRAM TRACING: CPT | Performed by: ANESTHESIOLOGY

## 2021-04-21 RX ORDER — INSULIN GLARGINE 100 [IU]/ML
INJECTION, SOLUTION SUBCUTANEOUS
COMMUNITY
Start: 2021-04-18

## 2021-04-21 NOTE — PRE-PROCEDURE INSTRUCTIONS
ARRIVE AT [facility] ON Friday, April 30,2021  at 11:00 AM    Once you enter the hospital lobby, take the elevators to the second floor. Check-In is at the surgery registration desk. Continue to take your home medications as you normally do up to and including the night before surgery with the exception of any blood thinning medications. Please stop any blood thinning medications as directed by your surgeon or prescribing physician. Failure to stop certain medications may interfere with your scheduled surgery. These may include:  Aspirin, Warfarin (Coumadin), Clopidogrel (Plavix), Ibuprofen (Motrin, Advil), Naproxen (Aleve), Meloxicam (Mobic), Celecoxib (Celebrex), Eliquis, Pradaxa, Xarelto, Effient, Fish Oil, Herbal supplements. Stop aspirin 7 days before surgery as directed    If you are diabetic, do not take any of your diabetic medications by mouth the morning of surgery. If you are taking insulin contact the doctor that manages your diabetes for instructions about any changes to your insulin dosages the day before surgery. Do not inject insulin or other injectable diabetic medications the morning of surgery unless otherwise instructed by the doctor who manages your diabetes. Please take the following medication(s) the day of surgery with a small sip of water:  Gabapentin,levetiracetam,metoprolol,amlodipine,omeprazole             Patient Instructions:     If you are having any type of anesthesia you are to have nothing to eat or drink after midnight the night before your surgery. This includes gum, hard candy, mints, water or smoking or chewing tobacco.  The only exception to this is a small sip of water to take with any morning dose of heart, blood pressure, or seizure medications. No alcoholic beverages for 24 hours prior to surgery.  Brush your teeth but do not swallow water.  Bring your eyeglasses and case with you. No contacts are to be worn the day of surgery.   You

## 2021-04-21 NOTE — H&P (VIEW-ONLY)
History and Physical Service   [place] [address]    HISTORY AND PHYSICAL EXAMINATION            Date of Evaluation: 4/21/2021  Patient name:  Michelle Alford  MRN:   [id number]  YOB: 1965  PCP:    Krishna Fam MD    History Obtained From:     Patient, medical records    History of Present Illness: This is Michelle Alford a 60 y.o. male who presents today for a PAT appointment for scheduled surgery on 4/30/21 at 1300  for a cystoscopy and dilation for stricture of bulbous. State difficulty with urination, with occasional pain. States symptoms have been ongoing for last two years. Denies any recent illness fever or chills. States take a baby aspirin 81mg daily. Patient has a history of diabetes, blood sugar 179mg/dl prior to arrival. Denies chest pain of shortness of breath. Patient has bilateral below the knee amputations, wear prosthesis. Has appointment with cardiology tomorrow. Past Medical History:     Past Medical History:   Diagnosis Date    Acid reflux     Below-knee amputation (HCC)     bilateral,wears prosthesis. 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 from diabetes & glaucoma    MDRO (multiple drug resistant organisms) resistance     MRSA (methicillin resistant staph aureus) culture positive 09/03/2017    foot    Neuropathy     Osteomyelitis (HCC)     Left Hallux    S/P cataract extraction and insertion of intraocular lens 2015    bilateral     Seizure [facility])     last seizure July 2019    Type II or unspecified type diabetes mellitus without mention of complication, not stated as uncontrolled     Urinary retention         Past Surgical History:     Past Surgical History:   Procedure Laterality Date    EYE SURGERY Bilateral     lazer both eyes    FOOT SURGERY Left 02/24/2017    surgical prep of wound bed    FOOT SURGERY Left 04/13/2017    1) Left foot surgical preparation of wound-bed, 2) Left foot application of graft     HC  PICC [address] DOUBLE  4/28/2017         LEG AMPUTATION BELOW KNEE Left 9/7/2017    LEFT BELOW KNEE GUILLOTINE AMPUTATION performed by Mariaa Frey MD at [address] Left 9/14/2017    REVISION LEFT BELOW KNEE AMPUTATION, CLOSED  performed by Mariaa Frey MD at [address] Right 7/19/2019    RIGHT GUILLOTINE FOOT AMPUTATION performed by Casey Mendez MD at [address] Right 7/23/2019    RIGHT BELOW KNEE AMPUTATION performed by Casey Mendez MD at [address] Right 7/14/2020    RIGHT BELOW KNEE AMPUTATION REVISION performed by Casey Mendez MD at [address] [place] [address] Left 01/23/2017    I and D bone, bone biopsy with flap closure and pulse lavage    OTHER SURGICAL HISTORY Left 04/27/2017    I & D foot    OTHER SURGICAL HISTORY  07/14/2020    RBKA Revision    [place] DEEP DISSEC FOOT INFEC,1 BURSA Left 4/13/2017    FOOT DEBRIDEMENT WITH EPIFIX  performed by Kenyatta Novoa DPM at [facility] INCIS/DRAIN THIGH/KNEE ABSCESS,DEEP Left 9/12/2017     LEFT BELOW KNEE AMPUTATION OPEN, WOUND VAC PLACEMENT performed by Mariaa Frey MD at [address] [place] [address] 9/25/2018    CHOLECYSTECTOMY LAPAROSCOPIC performed by Brady Cunningham MD at [facility] Left 2014    hallux    TOE AMPUTATION Left 12/09/2016    hallux    TOE AMPUTATION Left 4/27/2017    I AND D FOOT, BONE BIOPSY, POSSIBLE FILET 2ND TOE performed by Mayur Beach DPM at [place] [address]  04/17/2017    Left second toe         Medications Prior to Admission:     Prior to Admission medications    Medication Sig Start Date End Date Taking? Authorizing Provider   Mary Ellen Running 100 UNIT/ML injection pen inject 20 units subcutaneously twice a day 4/18/21   Historical Provider, MD   promethazine (PHENERGAN) 6.25 MG/5ML syrup take 1 teaspoonful by mouth four times a day if needed 3/8/21   Historical Provider, MD ESCAMILLA UF III MINI PEN NEEDLES 31G X 5 MM MISC USE THREE TIMES A DAY 2/18/21   Historical Provider, MD   levETIRAcetam (KEPPRA) 250 MG tablet For first 2 weeks take 250 mg twice a day, after that for next 2 weeks take 250 mg once a day 4/16/21   Mario Alexander MD   silver sulfADIAZINE (SSD) 1 % cream apply to affected area once daily  Patient not taking: Reported on 4/16/2021 1/25/21   Mayur Beach DPM   HYDROcodone-acetaminophen [facility]) 5-325 MG per tablet take 1/2 tablet by mouth twice a day if needed 3/6/20   Historical Provider, MD   lisinopril (PRINIVIL;ZESTRIL) 40 MG tablet Take 40 mg by mouth daily  9/8/19   Historical Provider, MD   metoprolol tartrate (LOPRESSOR) 50 MG tablet Take 50 mg by mouth 2 times daily  9/8/19   Historical Provider, MD   gabapentin (NEURONTIN) 100 MG capsule Take 1 capsule by mouth 3 times daily for 180 days.  Intended supply: 90 days 7/25/19 7/14/20  Lenny Carrizales, DO   Gauze Pads & Dressings (GAUZE DRESSING) 4\"X4\" PADS 1 each by Does not apply route daily 7/9/19   Lenny Carrizales,    amLODIPine (NORVASC) 10 MG tablet Take 1 tablet by mouth daily 11/11/18   Molina Patricia, DO   aspirin 81 MG EC tablet Take 1 tablet by mouth daily 9/3/18   Carlos Pack, DO   cyclobenzaprine (FLEXERIL) 5 MG tablet Take 5 mg by mouth 3 times daily as needed for Muscle spasms    Historical Provider, MD   metFORMIN (GLUCOPHAGE) 500 MG tablet Take 500 mg by mouth 2 times daily (with meals)    Historical Provider, MD   pravastatin (PRAVACHOL) 10 MG tablet Take 1 tablet by mouth daily 9/25/17   Mitzy Desouza MD   folic acid (FOLVITE) 1 MG tablet Take 1 tablet by mouth daily 9/25/17   Mitzy Desouza MD   omeprazole (PRILOSEC) 20 MG delayed release capsule Take 20 mg by mouth 2 times daily    Historical Provider, MD        Allergies:     Adhesive tape    Social History:     Tobacco:    reports that he quit smoking about 2 years ago. His smoking use included cigarettes. He has a 10.00 pack-year smoking history. He has never used smokeless tobacco.  Alcohol:      reports no history of alcohol use. Drug Use:  reports no history of drug use. Family History:     Family History   Problem Relation Age of Onset    Diabetes Mother     Cancer Father         colon    Diabetes Father     Diabetes Sister     Diabetes Sister        Review of Systems:     Positive and Negative as described in HPI. CONSTITUTIONAL:  negative for fevers, chills, sweats, fatigue, weight loss  HEENT:  negative for vision, hearing changes, runny nose, throat pain  RESPIRATORY:  negative for shortness of breath, cough, congestion, wheezing. CARDIOVASCULAR:  negative for chest pain, palpitations.   GASTROINTESTINAL:  negative for nausea, vomiting, diarrhea, constipation, change in bowel habits, abdominal pain   GENITOURINARY:  negative for difficulty of urination, burning with urination, frequency   INTEGUMENT:  negative for rash, skin lesions, easy bruising   HEMATOLOGIC/LYMPHATIC:  negative for swelling/edema   ALLERGIC/IMMUNOLOGIC:  negative for urticaria , itching  ENDOCRINE:  negative increase in drinking, increase in urination, hot or cold intolerance  MUSCULOSKELETAL: BKA bilateral legs  NEUROLOGICAL:  negative for headaches, dizziness, lightheadedness, numbness, pain, tingling extremities  BEHAVIOR/PSYCH:  negative for depression, anxiety    Physical Exam:   BP (!) 148/72   Pulse 60   Temp 97.6 °F (36.4 °C) (Oral)   Resp 16   Ht 5' 7\" (1.702 m)   Wt 187 lb (84.8 kg)   SpO2 99%   BMI 29.29 kg/m²   No LMP for male patient. No obstetric history on file. No results for input(s): POCGLU in the last 72 hours. General Appearance:  alert, well appearing, and in no acute distress  Mental status: oriented to person, place, and time with normal affect  Head:  normocephalic, atraumatic. Eye: no icterus, redness, pupils equal and reactive, extraocular eye movements intact, conjunctiva clear  Ear: normal external ear, no discharge, hearing intact  Nose:  no drainage noted  Mouth: mucous membranes moist  Neck: supple, no carotid bruits, thyroid not palpable  Lungs: Bilateral equal air entry, clear to ausculation, no wheezing, rales or rhonchi, normal effort  Cardiovascular: HR  normal rate, regular rhythm, no murmur, gallop, rub. Abdomen: Soft, nontender, nondistended, normal bowel sounds  Neurologic: There are no new focal motor or sensory deficits, normal muscle tone and bulk, no abnormal sensation, normal speech, cranial nerves II through XII grossly intact  Skin: No gross lesions, rashes, bruising or bleeding on exposed skin area  Extremities:  Bilateral below knee amputation   Psych: normal affect     Investigations:      Laboratory Testing:  No results found for this or any previous visit (from the past 24 hour(s)). No results for input(s): HGB, HCT, WBC, MCV, PLATELET, NA, K, CL, CO2, BUN, CREATININE, GLUCOSE, INR, PROTIME, APTT, AST, ALT, LABALBU, HCG in the last 720 hours. No results for input(s): COVID19 in the last 720 hours. Imaging/Diagnostics:    No results found. Diagnosis:      1. Plans:     1.        JANELLE Jacobs CNP  4/21/2021  10:06 AM

## 2021-04-21 NOTE — H&P
unspecified type diabetes mellitus without mention of complication, not stated as uncontrolled     Urinary retention         Past Surgical History:     Past Surgical History:   Procedure Laterality Date    EYE SURGERY Bilateral     lazer both eyes    FOOT SURGERY Left 02/24/2017    surgical prep of wound bed    FOOT SURGERY Left 04/13/2017    1) Left foot surgical preparation of wound-bed, 2) Left foot application of graft     HC  PICC [address] DOUBLE  4/28/2017         LEG AMPUTATION BELOW KNEE Left 9/7/2017    LEFT BELOW KNEE GUILLOTINE AMPUTATION performed by Anne Cortes MD at [address] Left 9/14/2017    REVISION LEFT BELOW KNEE AMPUTATION, CLOSED  performed by Anne Cortes MD at [address] Right 7/19/2019    RIGHT GUILLOTINE FOOT AMPUTATION performed by Apurva Whitaker MD at [address] Right 7/23/2019    RIGHT BELOW KNEE AMPUTATION performed by Apurva Whitaker MD at [address] Right 7/14/2020    RIGHT BELOW KNEE AMPUTATION REVISION performed by Apurva Whitaker MD at [address] Left 01/23/2017    I and D bone, bone biopsy with flap closure and pulse lavage    OTHER SURGICAL HISTORY Left 04/27/2017    I & D foot    OTHER SURGICAL HISTORY  07/14/2020    RBKA Revision    [place] DEEP DISSEC FOOT INFEC,1 BURSA Left 4/13/2017    FOOT DEBRIDEMENT WITH EPIFIX  performed by Kathy Ramírez DPM at [address] INCIS/DRAIN THIGH/KNEE ABSCESS,DEEP Left 9/12/2017     LEFT BELOW KNEE AMPUTATION OPEN, WOUND VAC PLACEMENT performed by Anne Cortes MD at [address] 9/25/2018    CHOLECYSTECTOMY LAPAROSCOPIC performed by Arya Kim MD at [facility] Left 2014    hallux    TOE AMPUTATION Left 12/09/2016    hallux    TOE AMPUTATION Left 4/27/2017    I AND D FOOT, BONE BIOPSY, POSSIBLE FILET 2ND TOE performed by Denise Steven DPM at [place] [address]  04/17/2017    Left second toe         Medications Prior to Admission:     Prior to Admission medications    Medication Sig Start Date End Date Taking? Authorizing Provider   Enma Mis 100 UNIT/ML injection pen inject 20 units subcutaneously twice a day 4/18/21   Historical Provider, MD   promethazine (PHENERGAN) 6.25 MG/5ML syrup take 1 teaspoonful by mouth four times a day if needed 3/8/21   Historical Provider, MD ESCAMILLA UF III MINI PEN NEEDLES 31G X 5 MM MISC USE THREE TIMES A DAY 2/18/21   Historical Provider, MD   levETIRAcetam (KEPPRA) 250 MG tablet For first 2 weeks take 250 mg twice a day, after that for next 2 weeks take 250 mg once a day 4/16/21   Agustin Alanis MD   silver sulfADIAZINE (SSD) 1 % cream apply to affected area once daily  Patient not taking: Reported on 4/16/2021 1/25/21   Denise Steven DPM   HYDROcodone-acetaminophen [facility]) 5-325 MG per tablet take 1/2 tablet by mouth twice a day if needed 3/6/20   Historical Provider, MD   lisinopril (PRINIVIL;ZESTRIL) 40 MG tablet Take 40 mg by mouth daily  9/8/19   Historical Provider, MD   metoprolol tartrate (LOPRESSOR) 50 MG tablet Take 50 mg by mouth 2 times daily  9/8/19   Historical Provider, MD   gabapentin (NEURONTIN) 100 MG capsule Take 1 capsule by mouth 3 times daily for 180 days.  Intended supply: 90 days 7/25/19 7/14/20  Nando Tony, DO   Gauze Pads & Dressings (GAUZE DRESSING) 4\"X4\" PADS 1 each by Does not apply route daily 7/9/19   Nando Tony,    amLODIPine (NORVASC) 10 MG tablet Take 1 tablet by mouth daily 11/11/18   Molina Patricia, DO   aspirin 81 MG EC tablet Take 1 tablet by mouth daily 9/3/18   Tony Tsai,    cyclobenzaprine (FLEXERIL) 5 MG tablet Take 5 mg by mouth 3 times daily as needed for Muscle spasms    Historical Provider, MD   metFORMIN (GLUCOPHAGE) 500 MG tablet Take 500 mg by mouth 2 times daily (with meals)    Historical 7\" (1.702 m)   Wt 187 lb (84.8 kg)   SpO2 99%   BMI 29.29 kg/m²   No LMP for male patient. No obstetric history on file. No results for input(s): POCGLU in the last 72 hours. General Appearance:  alert, well appearing, and in no acute distress  Mental status: oriented to person, place, and time with normal affect  Head:  normocephalic, atraumatic. Eye: no icterus, redness, pupils equal and reactive, extraocular eye movements intact, conjunctiva clear  Ear: normal external ear, no discharge, hearing intact  Nose:  no drainage noted  Mouth: mucous membranes moist  Neck: supple, no carotid bruits, thyroid not palpable  Lungs: Bilateral equal air entry, clear to ausculation, no wheezing, rales or rhonchi, normal effort  Cardiovascular: HR  normal rate, regular rhythm, no murmur, gallop, rub. Abdomen: Soft, nontender, nondistended, normal bowel sounds  Neurologic: There are no new focal motor or sensory deficits, normal muscle tone and bulk, no abnormal sensation, normal speech, cranial nerves II through XII grossly intact  Skin: No gross lesions, rashes, bruising or bleeding on exposed skin area  Extremities:  Bilateral below knee amputation   Psych: normal affect     Investigations:      Laboratory Testing:  No results found for this or any previous visit (from the past 24 hour(s)). No results for input(s): HGB, HCT, WBC, MCV, PLATELET, NA, K, CL, CO2, BUN, CREATININE, GLUCOSE, INR, PROTIME, APTT, AST, ALT, LABALBU, HCG in the last 720 hours. No results for input(s): COVID19 in the last 720 hours. Imaging/Diagnostics:    No results found. Diagnosis:      1. Plans:     1.        JANELLE Arzola CNP  4/21/2021  10:06 AM

## 2021-04-22 LAB
EKG ATRIAL RATE: 56 BPM
EKG P AXIS: 33 DEGREES
EKG P-R INTERVAL: 160 MS
EKG Q-T INTERVAL: 408 MS
EKG QRS DURATION: 86 MS
EKG QTC CALCULATION (BAZETT): 393 MS
EKG R AXIS: 32 DEGREES
EKG T AXIS: 44 DEGREES
EKG VENTRICULAR RATE: 56 BPM

## 2021-04-22 PROCEDURE — 93010 ELECTROCARDIOGRAM REPORT: CPT | Performed by: INTERNAL MEDICINE

## 2021-04-26 ENCOUNTER — HOSPITAL ENCOUNTER (OUTPATIENT)
Dept: LAB | Age: 56
Setting detail: SPECIMEN
Discharge: HOME | End: 2021-04-26
Payer: COMMERCIAL

## 2021-04-26 DIAGNOSIS — Z01.818 PREOP TESTING: Primary | ICD-10-CM

## 2021-04-26 PROCEDURE — U0003 INFECTIOUS AGENT DETECTION BY NUCLEIC ACID (DNA OR RNA); SEVERE ACUTE RESPIRATORY SYNDROME CORONAVIRUS 2 (SARS-COV-2) (CORONAVIRUS DISEASE [COVID-19]), AMPLIFIED PROBE TECHNIQUE, MAKING USE OF HIGH THROUGHPUT TECHNOLOGIES AS DESCRIBED BY CMS-2020-01-R: HCPCS

## 2021-04-26 PROCEDURE — U0005 INFEC AGEN DETEC AMPLI PROBE: HCPCS

## 2021-04-27 LAB
SARS-COV-2: NORMAL
SARS-COV-2: NOT DETECTED
SOURCE: NORMAL

## 2021-04-28 ENCOUNTER — TELEPHONE (OUTPATIENT)
Dept: PRIMARY CARE CLINIC | Age: 56
End: 2021-04-28

## 2021-04-30 ENCOUNTER — ANESTHESIA EVENT (OUTPATIENT)
Dept: OPERATING ROOM | Age: 56
End: 2021-04-30
Payer: COMMERCIAL

## 2021-04-30 ENCOUNTER — ANESTHESIA (OUTPATIENT)
Dept: OPERATING ROOM | Age: 56
End: 2021-04-30
Payer: COMMERCIAL

## 2021-04-30 ENCOUNTER — HOSPITAL ENCOUNTER (OUTPATIENT)
Age: 56
Setting detail: OUTPATIENT SURGERY
Discharge: HOME | End: 2021-04-30
Attending: UROLOGY | Admitting: UROLOGY
Payer: COMMERCIAL

## 2021-04-30 VITALS
HEART RATE: 52 BPM | SYSTOLIC BLOOD PRESSURE: 153 MMHG | RESPIRATION RATE: 17 BRPM | OXYGEN SATURATION: 97 % | BODY MASS INDEX: 29.35 KG/M2 | DIASTOLIC BLOOD PRESSURE: 65 MMHG | TEMPERATURE: 97.3 F | HEIGHT: 67 IN | WEIGHT: 187 LBS

## 2021-04-30 VITALS — DIASTOLIC BLOOD PRESSURE: 62 MMHG | SYSTOLIC BLOOD PRESSURE: 133 MMHG | OXYGEN SATURATION: 100 %

## 2021-04-30 LAB
-: ABNORMAL
AMORPHOUS: ABNORMAL
BACTERIA: ABNORMAL
BILIRUBIN URINE: NEGATIVE
CASTS UA: ABNORMAL /LPF
CASTS UA: ABNORMAL /LPF
COLOR: YELLOW
COMMENT UA: ABNORMAL
CRYSTALS, UA: ABNORMAL /HPF
EPITHELIAL CELLS UA: ABNORMAL /HPF (ref 0–5)
GLUCOSE BLD-MCNC: 119 MG/DL (ref 75–110)
GLUCOSE BLD-MCNC: 87 MG/DL (ref 75–110)
GLUCOSE URINE: ABNORMAL
KETONES, URINE: NEGATIVE
LEUKOCYTE ESTERASE, URINE: NEGATIVE
MUCUS: ABNORMAL
NITRITE, URINE: NEGATIVE
OTHER OBSERVATIONS UA: ABNORMAL
PH UA: 5.5 (ref 5–8)
PROTEIN UA: ABNORMAL
RBC UA: ABNORMAL /HPF (ref 0–2)
RENAL EPITHELIAL, UA: ABNORMAL /HPF
SPECIFIC GRAVITY UA: 1.02 (ref 1–1.03)
TRICHOMONAS: ABNORMAL
TURBIDITY: CLEAR
URINE HGB: ABNORMAL
UROBILINOGEN, URINE: NORMAL
WBC UA: ABNORMAL /HPF (ref 0–5)
YEAST: ABNORMAL

## 2021-04-30 PROCEDURE — 3700000000 HC ANESTHESIA ATTENDED CARE: Performed by: UROLOGY

## 2021-04-30 PROCEDURE — 2580000003 HC RX 258: Performed by: ANESTHESIOLOGY

## 2021-04-30 PROCEDURE — 6360000002 HC RX W HCPCS

## 2021-04-30 PROCEDURE — 7100000010 HC PHASE II RECOVERY - FIRST 15 MIN: Performed by: UROLOGY

## 2021-04-30 PROCEDURE — 87086 URINE CULTURE/COLONY COUNT: CPT

## 2021-04-30 PROCEDURE — 2709999900 HC NON-CHARGEABLE SUPPLY: Performed by: UROLOGY

## 2021-04-30 PROCEDURE — 3700000001 HC ADD 15 MINUTES (ANESTHESIA): Performed by: UROLOGY

## 2021-04-30 PROCEDURE — 2500000003 HC RX 250 WO HCPCS

## 2021-04-30 PROCEDURE — 6360000002 HC RX W HCPCS: Performed by: UROLOGY

## 2021-04-30 PROCEDURE — 81001 URINALYSIS AUTO W/SCOPE: CPT

## 2021-04-30 PROCEDURE — 3600000012 HC SURGERY LEVEL 2 ADDTL 15MIN: Performed by: UROLOGY

## 2021-04-30 PROCEDURE — 7100000011 HC PHASE II RECOVERY - ADDTL 15 MIN: Performed by: UROLOGY

## 2021-04-30 PROCEDURE — 82947 ASSAY GLUCOSE BLOOD QUANT: CPT

## 2021-04-30 PROCEDURE — 3600000002 HC SURGERY LEVEL 2 BASE: Performed by: UROLOGY

## 2021-04-30 RX ORDER — CLOTRIMAZOLE/BETAMETHASONE DIP 1 %-0.05 %
CREAM (GRAM) TOPICAL
Qty: 2 TUBE | Refills: 2 | Status: SHIPPED | OUTPATIENT
Start: 2021-04-30 | End: 2021-05-15

## 2021-04-30 RX ORDER — FENTANYL CITRATE 50 UG/ML
25 INJECTION, SOLUTION INTRAMUSCULAR; INTRAVENOUS EVERY 5 MIN PRN
Status: DISCONTINUED | OUTPATIENT
Start: 2021-04-30 | End: 2021-04-30 | Stop reason: HOSPADM

## 2021-04-30 RX ORDER — HYDROMORPHONE HYDROCHLORIDE 1 MG/ML
0.5 INJECTION, SOLUTION INTRAMUSCULAR; INTRAVENOUS; SUBCUTANEOUS EVERY 5 MIN PRN
Status: DISCONTINUED | OUTPATIENT
Start: 2021-04-30 | End: 2021-04-30 | Stop reason: HOSPADM

## 2021-04-30 RX ORDER — HYDRALAZINE HYDROCHLORIDE 20 MG/ML
5 INJECTION INTRAMUSCULAR; INTRAVENOUS EVERY 10 MIN PRN
Status: DISCONTINUED | OUTPATIENT
Start: 2021-04-30 | End: 2021-04-30 | Stop reason: HOSPADM

## 2021-04-30 RX ORDER — SODIUM CHLORIDE 0.9 % (FLUSH) 0.9 %
10 SYRINGE (ML) INJECTION EVERY 12 HOURS SCHEDULED
Status: DISCONTINUED | OUTPATIENT
Start: 2021-04-30 | End: 2021-04-30 | Stop reason: HOSPADM

## 2021-04-30 RX ORDER — ONDANSETRON 2 MG/ML
4 INJECTION INTRAMUSCULAR; INTRAVENOUS
Status: DISCONTINUED | OUTPATIENT
Start: 2021-04-30 | End: 2021-04-30 | Stop reason: HOSPADM

## 2021-04-30 RX ORDER — SODIUM CHLORIDE, SODIUM LACTATE, POTASSIUM CHLORIDE, CALCIUM CHLORIDE 600; 310; 30; 20 MG/100ML; MG/100ML; MG/100ML; MG/100ML
INJECTION, SOLUTION INTRAVENOUS CONTINUOUS
Status: DISCONTINUED | OUTPATIENT
Start: 2021-04-30 | End: 2021-04-30 | Stop reason: HOSPADM

## 2021-04-30 RX ORDER — OXYCODONE HYDROCHLORIDE AND ACETAMINOPHEN 5; 325 MG/1; MG/1
2 TABLET ORAL PRN
Status: DISCONTINUED | OUTPATIENT
Start: 2021-04-30 | End: 2021-04-30 | Stop reason: HOSPADM

## 2021-04-30 RX ORDER — SODIUM CHLORIDE 0.9 % (FLUSH) 0.9 %
10 SYRINGE (ML) INJECTION PRN
Status: DISCONTINUED | OUTPATIENT
Start: 2021-04-30 | End: 2021-04-30 | Stop reason: HOSPADM

## 2021-04-30 RX ORDER — SODIUM CHLORIDE 9 MG/ML
INJECTION, SOLUTION INTRAVENOUS CONTINUOUS
Status: DISCONTINUED | OUTPATIENT
Start: 2021-04-30 | End: 2021-04-30

## 2021-04-30 RX ORDER — CEPHALEXIN 500 MG/1
500 CAPSULE ORAL 3 TIMES DAILY
Qty: 15 CAPSULE | Refills: 0 | Status: SHIPPED | OUTPATIENT
Start: 2021-04-30 | End: 2021-05-05

## 2021-04-30 RX ORDER — PROPOFOL 10 MG/ML
INJECTION, EMULSION INTRAVENOUS CONTINUOUS PRN
Status: DISCONTINUED | OUTPATIENT
Start: 2021-04-30 | End: 2021-04-30 | Stop reason: SDUPTHER

## 2021-04-30 RX ORDER — LIDOCAINE HYDROCHLORIDE 10 MG/ML
1 INJECTION, SOLUTION EPIDURAL; INFILTRATION; INTRACAUDAL; PERINEURAL
Status: DISCONTINUED | OUTPATIENT
Start: 2021-04-30 | End: 2021-04-30 | Stop reason: HOSPADM

## 2021-04-30 RX ORDER — PROMETHAZINE HYDROCHLORIDE 25 MG/ML
6.25 INJECTION, SOLUTION INTRAMUSCULAR; INTRAVENOUS
Status: DISCONTINUED | OUTPATIENT
Start: 2021-04-30 | End: 2021-04-30 | Stop reason: HOSPADM

## 2021-04-30 RX ORDER — OXYCODONE HYDROCHLORIDE AND ACETAMINOPHEN 5; 325 MG/1; MG/1
1 TABLET ORAL PRN
Status: DISCONTINUED | OUTPATIENT
Start: 2021-04-30 | End: 2021-04-30 | Stop reason: HOSPADM

## 2021-04-30 RX ORDER — LABETALOL HYDROCHLORIDE 5 MG/ML
5 INJECTION, SOLUTION INTRAVENOUS EVERY 10 MIN PRN
Status: DISCONTINUED | OUTPATIENT
Start: 2021-04-30 | End: 2021-04-30 | Stop reason: HOSPADM

## 2021-04-30 RX ORDER — PROPOFOL 10 MG/ML
INJECTION, EMULSION INTRAVENOUS PRN
Status: DISCONTINUED | OUTPATIENT
Start: 2021-04-30 | End: 2021-04-30 | Stop reason: SDUPTHER

## 2021-04-30 RX ORDER — SODIUM CHLORIDE 9 MG/ML
25 INJECTION, SOLUTION INTRAVENOUS PRN
Status: DISCONTINUED | OUTPATIENT
Start: 2021-04-30 | End: 2021-04-30 | Stop reason: HOSPADM

## 2021-04-30 RX ORDER — LIDOCAINE HYDROCHLORIDE 20 MG/ML
INJECTION, SOLUTION EPIDURAL; INFILTRATION; INTRACAUDAL; PERINEURAL PRN
Status: DISCONTINUED | OUTPATIENT
Start: 2021-04-30 | End: 2021-04-30 | Stop reason: SDUPTHER

## 2021-04-30 RX ORDER — MIDAZOLAM HYDROCHLORIDE 1 MG/ML
INJECTION INTRAMUSCULAR; INTRAVENOUS PRN
Status: DISCONTINUED | OUTPATIENT
Start: 2021-04-30 | End: 2021-04-30 | Stop reason: SDUPTHER

## 2021-04-30 RX ADMIN — CEFAZOLIN SODIUM 2000 MG: 10 INJECTION, POWDER, FOR SOLUTION INTRAVENOUS at 14:38

## 2021-04-30 RX ADMIN — MIDAZOLAM 2 MG: 1 INJECTION INTRAMUSCULAR; INTRAVENOUS at 14:30

## 2021-04-30 RX ADMIN — LIDOCAINE HYDROCHLORIDE 100 MG: 20 INJECTION, SOLUTION EPIDURAL; INFILTRATION; INTRACAUDAL; PERINEURAL at 14:34

## 2021-04-30 RX ADMIN — SODIUM CHLORIDE, POTASSIUM CHLORIDE, SODIUM LACTATE AND CALCIUM CHLORIDE: 600; 310; 30; 20 INJECTION, SOLUTION INTRAVENOUS at 14:32

## 2021-04-30 RX ADMIN — PROPOFOL 50 MG: 10 INJECTION, EMULSION INTRAVENOUS at 14:51

## 2021-04-30 RX ADMIN — PROPOFOL 100 MCG/KG/MIN: 10 INJECTION, EMULSION INTRAVENOUS at 14:35

## 2021-04-30 RX ADMIN — PROPOFOL 50 MG: 10 INJECTION, EMULSION INTRAVENOUS at 14:34

## 2021-04-30 ASSESSMENT — PULMONARY FUNCTION TESTS
PIF_VALUE: 1
PIF_VALUE: 0
PIF_VALUE: 1
PIF_VALUE: 0
PIF_VALUE: 1
PIF_VALUE: 1

## 2021-04-30 ASSESSMENT — PAIN SCALES - GENERAL
PAINLEVEL_OUTOF10: 0

## 2021-04-30 NOTE — ANESTHESIA PRE PROCEDURE
Department of Anesthesiology  Preprocedure Note       Name:  Pina Turner   Age:  60 y.o.  :  1965                                          MRN:  [id number]         Date:  2021      Surgeon: Alda Mckeon):  Nabeel Teixeira MD    Procedure: Procedure(s):  CYSTOSCOPY/DILATION    Medications prior to admission:   Prior to Admission medications    Medication Sig Start Date End Date Taking? Authorizing Provider   [facility] 100 UNIT/ML injection pen inject 20 units subcutaneously twice a day 21  Yes Historical Provider, MD   levETIRAcetam (KEPPRA) 250 MG tablet For first 2 weeks take 250 mg twice a day, after that for next 2 weeks take 250 mg once a day 21  Yes Maria L Ferrara MD   HYDROcodone-acetaminophen [facility]) 5-325 MG per tablet take 1/2 tablet by mouth twice a day if needed 3/6/20  Yes Historical Provider, MD   lisinopril (PRINIVIL;ZESTRIL) 40 MG tablet Take 40 mg by mouth daily  19  Yes Historical Provider, MD   metoprolol tartrate (LOPRESSOR) 50 MG tablet Take 50 mg by mouth 2 times daily  19  Yes Historical Provider, MD   gabapentin (NEURONTIN) 100 MG capsule Take 1 capsule by mouth 3 times daily for 180 days.  Intended supply: 90 days 19 Yes Jarocho Ruiz, DO   amLODIPine (NORVASC) 10 MG tablet Take 1 tablet by mouth daily 18  Yes Molina Patricia, DO   aspirin 81 MG EC tablet Take 1 tablet by mouth daily 9/3/18  Yes Yogesh Hays, DO   metFORMIN (GLUCOPHAGE) 500 MG tablet Take 500 mg by mouth 2 times daily (with meals)   Yes Historical Provider, MD   omeprazole (PRILOSEC) 20 MG delayed release capsule Take 20 mg by mouth 2 times daily   Yes Historical Provider, MD ESCAMILLA UF III MINI PEN NEEDLES 31G X 5 MM MISC USE THREE TIMES A DAY 21   Historical Provider, MD   Gauze Pads & Dressings (GAUZE DRESSING) 4\"X4\" PADS 1 each by Does not apply route daily 19   Nhan Lion, DO       Current medications:    Current Facility-Administered Medications Medication Dose Route Frequency Provider Last Rate Last Admin    lactated ringers infusion   Intravenous Continuous Ndal Shu Pop MD        sodium chloride flush 0.9 % injection 10 mL  10 mL Intravenous 2 times per day Sudhakar Whitmore MD        sodium chloride flush 0.9 % injection 10 mL  10 mL Intravenous PRN Sudhakar Whitmore MD        0.9 % sodium chloride infusion  25 mL Intravenous PRN Sudhakar Whitmore MD        lidocaine PF 1 % injection 1 mL  1 mL Intradermal Once PRN Sudhakar Whitmore MD        ceFAZolin (ANCEF) 2000 mg in dextrose 5 % 50 mL IVPB  2,000 mg Intravenous Once Jing Wall MD           Allergies:     Allergies   Allergen Reactions    Latex     Adhesive Tape        Problem List:    Patient Active Problem List   Diagnosis Code    Essential hypertension I10    IDDM (insulin dependent diabetes mellitus) ([facility] [id number]    [facility] G62.9    Uncontrolled type 2 diabetes with cellulitis of foot ([facility] E11.628, L03.119, E11.65    Cerebrovascular disease I67.9    Cataract H26.9    Dysphagia R13.10    Family history of colon cancer Z80.0    Bowel habit changes R19.4    Hyperlipidemia E78.5    Gangrene of right foot ([facility] I96    Diabetic foot left E11.8    COLT (acute kidney injury) ([facility] N17.9    Ischemic foot left I99.8    Urinary retention R33.9    Subacute osteomyelitis of left foot ([facility]) M86.272    Hx of BKA ([facility]) Z89.519    Impaired mobility Z74.09    HCAP (healthcare-associated pneumonia) J18.9    Primary open angle glaucoma of both eyes, moderate stage H40.1132    Pseudophakia of both eyes Z96.1    Retinopathy, diabetic, bilateral ([facility]) E11.319    Chest pain, atypical R07.89    Paresthesia of right upper extremity G86.4    Metabolic acidosis R04.6    Right arm pain M79.601    Neck pain M54.2    Intracranial carotid stenosis, left I65.22    Calculus of gallbladder without cholecystitis without obstruction K80.20    Hemispheric carotid artery syndrome G45.1    Laterality Date    EYE SURGERY Bilateral     lazer both eyes    FOOT SURGERY Left 02/24/2017    surgical prep of wound bed    FOOT SURGERY Left 04/13/2017    1) Left foot surgical preparation of wound-bed, 2) Left foot application of graft     [facility]  P[facility] DOUBLE  4/28/2017         LEG AMPUTATION BELOW KNEE Left 9/7/2017    LEFT BELOW KNEE GUILLOTINE AMPUTATION performed by Omari Light MD at [address] Left 9/14/2017    REVISION LEFT BELOW KNEE AMPUTATION, CLOSED  performed by Omari Light MD at [address] Right 7/19/2019    RIGHT GUILLOTINE FOOT AMPUTATION performed by Phoebe Closs, MD at [address] Right 7/23/2019    RIGHT BELOW KNEE AMPUTATION performed by Phoebe Closs, MD at [address] Right 7/14/2020    RIGHT BELOW KNEE AMPUTATION REVISION performed by Phoebe Closs, MD at [address] [place] [address] Left 01/23/2017    I and D bone, bone biopsy with flap closure and pulse lavage    OTHER SURGICAL HISTORY Left 04/27/2017    I & D foot    OTHER SURGICAL HISTORY  07/14/2020    RBKA Revision    [place] DEEP DISSEC FOOT INFEC,1 BURSA Left 4/13/2017    FOOT DEBRIDEMENT WITH EPIFIX  performed by Meche Mcnair DPM at [address] INCIS/DRAIN THIGH/KNEE ABSCESS,DEEP Left 9/12/2017     LEFT BELOW KNEE AMPUTATION OPEN, WOUND VAC PLACEMENT performed by Omari Light MD at [address] N/A 9/25/2018    CHOLECYSTECTOMY LAPAROSCOPIC performed by Nuria Garsia MD at [address] Left 2014    hallux    TOE AMPUTATION Left 12/09/2016    hallux    TOE AMPUTATION Left 4/27/2017    I AND D FOOT, BONE BIOPSY, POSSIBLE FILET 2ND TOE performed by Meche Mcnair DPM at [address]  04/17/2017    Left second toe        Social History:    Social History     Tobacco Use    Smoking status: Former Smoker     Packs/day: 1.00     Years: 10.00     Pack years: 10.00     Types: Cigarettes     Quit date: 2018     Years since quittin.6    Smokeless tobacco: Never Used    Tobacco comment: smokes 1 cigarette a month   Substance Use Topics    Alcohol use: No                                Counseling given: Not Answered  Comment: smokes 1 cigarette a month      Vital Signs (Current):   Vitals:    21 1053 21 1056   BP:  (!) 170/61   Pulse:  57   Resp:  15   Temp:  98.1 °F (36.7 °C)   SpO2:  97%   Weight: 187 lb (84.8 kg)    Height: 5' 7\" (1.702 m)                                               BP Readings from Last 3 Encounters:   21 (!) 170/61   21 (!) 148/72   21 (!) 135/50       NPO Status: Time of last liquid consumption:                         Time of last solid consumption:                         Date of last liquid consumption: 21                        Date of last solid food consumption: 21    BMI:   Wt Readings from Last 3 Encounters:   21 187 lb (84.8 kg)   21 187 lb (84.8 kg)   21 185 lb (83.9 kg)     Body mass index is 29.29 kg/m².     CBC:   Lab Results   Component Value Date    WBC 10.8 2021    RBC 4.70 2021    HGB 12.8 2021    HCT 40.6 2021    MCV 86.4 2021    RDW 13.8 2021     2021       CMP:   Lab Results   Component Value Date     2021    K 4.7 2021     2021    CO2 21 2021    BUN 24 2021    CREATININE 1.01 2021    GFRAA >60 2021    LABGLOM >60 2021    GLUCOSE 169 2021    PROT 5.7 2019    CALCIUM 9.0 2021    BILITOT <0.10 2019    ALKPHOS 80 2019    AST 19 2019    ALT 23 2019       POC Tests:   Recent Labs     21  1116   POCGLU 119*       Coags:   Lab Results   Component Value Date    PROTIME 10.4 2019    INR 1.0 2019    APTT 26.4 2019       HCG (If Applicable): No results found for: PREGTESTUR, PREGSERUM, HCG, HCGQUANT     ABGs: No results found for: PHART, PO2ART, NSV9TKR, JDK8VXR, BEART, J6JOGKLT     Type & Screen (If Applicable):  No results found for: LABABO, LABRH    Drug/Infectious Status (If Applicable):  Lab Results   Component Value Date    HEPCAB NONREACTIVE 07/24/2019       COVID-19 Screening (If Applicable):   Lab Results   Component Value Date    COVID19 Not Detected 04/26/2021    COVID19 Not Detected 07/11/2020           Anesthesia Evaluation  Patient summary reviewed and Nursing notes reviewed no history of anesthetic complications:   Airway: Mallampati: II  TM distance: >3 FB   Neck ROM: full  Mouth opening: > = 3 FB Dental:          Pulmonary:normal exam                               Cardiovascular:  Exercise tolerance: no interval change,   (+) hypertension:,     (-) past MI and CAD        Rate: normal                    Neuro/Psych:   (+) CVA:, TIA,             GI/Hepatic/Renal:   (+) GERD:,           Endo/Other:    (+) Diabetes, . Abdominal:           Vascular:                                        Anesthesia Plan      MAC and general     ASA 3       Induction: intravenous. Anesthetic plan and risks discussed with patient. Plan discussed with CRNA.     Attending anesthesiologist reviewed and agrees with Pre Eval content              Delia Serra DO   4/30/2021

## 2021-04-30 NOTE — ANESTHESIA POSTPROCEDURE EVALUATION
Department of Anesthesiology  Postprocedure Note    Patient: Mary Estrada  MRN: [id number]  YOB: 1965  Date of evaluation: 4/30/2021  Time:  5:36 PM     Procedure Summary     Date: 04/30/21 Room / Location: [facility] - INPATIENT    Anesthesia Start: 8151 Anesthesia Stop: 1101    Procedure: CYSTOSCOPY/DILATION (N/A ) Diagnosis: (DX STRICTURE OF BULBOUS)    Surgeons: Reza Gomez MD Responsible Provider: Arthur Garcia DO    Anesthesia Type: MAC, general ASA Status: 3          Anesthesia Type: MAC, general    Mary Lou Phase I: Mary Lou Score: 10    Mary Lou Phase II: Mary Lou Score: 6    Last vitals: Reviewed and per EMR flowsheets.        Anesthesia Post Evaluation    [facility]

## 2021-04-30 NOTE — INTERVAL H&P NOTE
History and Physical Update    Pt Name: Guillermo Bell  MRN: [id number]  YOB: 1965  Date of evaluation: 4/30/2021      [x] I have reviewed the H&P by MADHAVI Lisa CNP which meets the criteria for an Interval History and Physical note. [x] I have examined  Guillermo Bell  There are no changes to the patient who is scheduled for a cystoscopy with dilation by Dr Zach Grant for bulbous stricture. The patient denies new health changes, fever, chills, wheezing, cough, increased SOB, chest pain, open sores or wounds. Last ASA 81mg week ago   +DM POC     Vital signs: BP (!) 170/61   Pulse 57   Temp 98.1 °F (36.7 °C)   Resp 15   Ht 5' 7\" (1.702 m)   Wt 187 lb (84.8 kg)   SpO2 97%   BMI 29.29 kg/m²     Allergies:  Latex and Adhesive tape    Medications:    Prior to Admission medications    Medication Sig Start Date End Date Taking? Authorizing Provider   Edi Patino 100 UNIT/ML injection pen inject 20 units subcutaneously twice a day 4/18/21  Yes Historical Provider, MD   levETIRAcetam (KEPPRA) 250 MG tablet For first 2 weeks take 250 mg twice a day, after that for next 2 weeks take 250 mg once a day 4/16/21  Yes Sandra Beck MD   HYDROcodone-acetaminophen [facility]) 5-325 MG per tablet take 1/2 tablet by mouth twice a day if needed 3/6/20  Yes Historical Provider, MD   lisinopril (PRINIVIL;ZESTRIL) 40 MG tablet Take 40 mg by mouth daily  9/8/19  Yes Historical Provider, MD   metoprolol tartrate (LOPRESSOR) 50 MG tablet Take 50 mg by mouth 2 times daily  9/8/19  Yes Historical Provider, MD   gabapentin (NEURONTIN) 100 MG capsule Take 1 capsule by mouth 3 times daily for 180 days.  Intended supply: 90 days 7/25/19 4/30/21 Yes Jarocho Ruiz, DO   amLODIPine (NORVASC) 10 MG tablet Take 1 tablet by mouth daily 11/11/18  Yes Molina Patricia, DO   aspirin 81 MG EC tablet Take 1 tablet by mouth daily 9/3/18  Yes Korina De Jesus, DO   metFORMIN (GLUCOPHAGE) 500 MG tablet Take 500 mg by mouth 2 times daily

## 2021-05-01 LAB
CULTURE: NO GROWTH
Lab: NORMAL
SPECIMEN DESCRIPTION: NORMAL

## 2021-05-03 LAB — GLUCOSE BLD-MCNC: 96 MG/DL (ref 75–110)

## 2021-07-15 NOTE — TELEPHONE ENCOUNTER
Pharmacy requesting refill of KEPPRA.     Date of last fill: 4/7/20  Date of next follow up appointment: NA    Pt notified response time for refills is 24-48 hours Physical Therapy  Patient not seen in therapy.     Per chart review and discussion with , pt is bedbound at baseline and will be transitioning to ECF/detention care.  No acute skilled PT needs.         OBJECTIVE                  Documented in the chart in the following areas: Assessment.      Therapy procedure time and total treatment time can be found documented on the Time Entry flowsheet

## 2021-09-28 ENCOUNTER — OFFICE VISIT (OUTPATIENT)
Dept: NEUROLOGY | Age: 56
End: 2021-09-28
Payer: COMMERCIAL

## 2021-09-28 VITALS
DIASTOLIC BLOOD PRESSURE: 69 MMHG | WEIGHT: 186 LBS | HEIGHT: 67 IN | BODY MASS INDEX: 29.19 KG/M2 | HEART RATE: 67 BPM | SYSTOLIC BLOOD PRESSURE: 169 MMHG

## 2021-09-28 DIAGNOSIS — I67.83 PRES (POSTERIOR REVERSIBLE ENCEPHALOPATHY SYNDROME): Primary | ICD-10-CM

## 2021-09-28 DIAGNOSIS — I73.9 PAD (PERIPHERAL ARTERY DISEASE) (HCC): ICD-10-CM

## 2021-09-28 DIAGNOSIS — Z87.898 HISTORY OF SEIZURE: ICD-10-CM

## 2021-09-28 DIAGNOSIS — I67.2 INTRACRANIAL ATHEROSCLEROSIS: ICD-10-CM

## 2021-09-28 DIAGNOSIS — I67.9 CEREBROVASCULAR DISEASE: ICD-10-CM

## 2021-09-28 PROCEDURE — 99214 OFFICE O/P EST MOD 30 MIN: CPT | Performed by: STUDENT IN AN ORGANIZED HEALTH CARE EDUCATION/TRAINING PROGRAM

## 2021-09-28 RX ORDER — LEVETIRACETAM 500 MG/1
500 TABLET ORAL 2 TIMES DAILY
Qty: 60 TABLET | Refills: 3 | Status: SHIPPED | OUTPATIENT
Start: 2021-09-28 | End: 2021-11-30

## 2021-09-28 ASSESSMENT — ENCOUNTER SYMPTOMS
PHOTOPHOBIA: 0
NAUSEA: 0
VOMITING: 0
CONSTIPATION: 0
COUGH: 0
EYE PAIN: 0
ABDOMINAL PAIN: 0
SHORTNESS OF BREATH: 0
DIARRHEA: 0

## 2021-09-28 NOTE — PROGRESS NOTES
patient denies any recurrence of seizures or seizure-like activity or episodes of confusion. He does have some episodes of staring spells, but he responds at that time. Unclear description as patient cannot see bilaterally. He is legally blind. Denies any shaking episodes or tongue bite or bowel bladder incontinence. Denies any headaches or focal muscle weakness or tingling numbness or speech difficulty. Denies any active neurologic concerns since last visit. Currently taking Keppra 500 mg twice a day, denies any side effects from the medication. He is compliant with his medication. Denies any other concerns at present      Notes from 1/22/2019  HPI:      Dariel Larson is a 60 y.o.  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 History obtained from patient and medical chart review. Patient presented on 11/7/2018 with altered mental status and generalized weakness, vomiting and headache. He was initially evaluated in the ER, his systolic blood pressure was elevated into 230s, he was also noted to have seizure-like activity with right gaze deviation and rhythmic jerking of his head and upper extremities, he received Ativan for that. Patient was loaded with Keppra and started on 500 mg twice a day maintenance. CT head was concerning for possible PRES. Patient was also noted to be febrile, had leukocytosis, elevated lactic acid and hence was started on antibiotics vancomycin and Zosyn in the ER. Patient was evaluated by general neurology and stroke neurology. CTA head and neck showed multifocal intracranial stenosis. MRI brain showed multifocal scattered and confluent areas of increased T2 signal more pronounced in the cerebellar hemisphere and posterior cerebral hemispheres concerning for PRES.      Patient was also placed on LTME which did not show any epileptiform discharges. LP was done for CSF analysis which showed mildly elevated WBC to 65, meningitis panel including HSV was negative. Internal medicine team was consulted for the blood pressure management. Patient's blood pressure was treated with hydralazine/labetalol when necessary, amlodipine 5 mg was added. He was also started on Coreg, HCTZ and lisinopril. Patient was then finally discharged on 11/10/2018 once his medical condition was stabilized the plan to repeat the MRI in 6 weeks. Since the discharge from the hospital, patient denies any recurrence of seizure activity or episodes of confusion. Patient continued to have fluctuating blood pressure with sometimes systolic blood pressure increasing to 160s. He is compliant with Keppra 500 mg twice a day. Denies any side effect from the medication. Patient follows up with PCP for blood pressure management. Denies any other new concerns. Currently he is on aspirin 81 mg and pravastatin 10 mg. PATIENT HISTORY:     Past Medical History:   Diagnosis Date    Acid reflux     Below-knee amputation (HCC)     bilateral,wears prosthesis. 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 from diabetes & glaucoma    MDRO (multiple drug resistant organisms) resistance     MRSA (methicillin resistant staph aureus) culture positive 09/03/2017    foot    Neuropathy     Osteomyelitis (HCC)     Left Hallux    S/P cataract extraction and insertion of intraocular lens 2015    bilateral     Seizure ([address]     last seizure July 2019    Type II or unspecified type diabetes mellitus without mention of complication, not stated as uncontrolled     Urinary retention         Past Surgical History:   Procedure Laterality Date    CYSTOSCOPY N/A 4/30/2021    CYSTOSCOPY/DILATION performed by Flori Ashley MD at [facility] Bilateral     [facility] both eyes    FOOT SURGERY Left 02/24/2017    surgical prep of wound bed    FOOT SURGERY Left 04/13/2017    1) Left foot surgical preparation of wound-bed, 2) Left foot application of graft     [facility].  PICC [facility] DOUBLE  4/28/2017         LEG AMPUTATION BELOW KNEE Left 9/7/2017    LEFT BELOW KNEE GUILLOTINE AMPUTATION performed by Bibi Jerez MD at [address] Left 9/14/2017    REVISION LEFT BELOW KNEE AMPUTATION, CLOSED  performed by Bibi Jerez MD at [address] Right 7/19/2019    RIGHT GUILLOTINE FOOT AMPUTATION performed by Jeffery Walter MD at [address] Right 7/23/2019    RIGHT BELOW KNEE AMPUTATION performed by Jeffery Walter MD at [address] Right 7/14/2020    RIGHT BELOW KNEE AMPUTATION REVISION performed by Jeffery Walter MD at [place] [address] Left 01/23/2017    I and D bone, bone biopsy with flap closure and pulse lavage    OTHER SURGICAL HISTORY Left 04/27/2017    I & D foot    OTHER SURGICAL HISTORY  07/14/2020    RBKA Revision    NM DEEP DISSEC FOOT INFEC,1 BURSA Left 4/13/2017    FOOT DEBRIDEMENT WITH EPIFIX  performed by Nicholas Topete DPM at [address] INCIS/DRAIN THIGH/KNEE ABSCESS,DEEP Left 9/12/2017     LEFT BELOW KNEE AMPUTATION OPEN, WOUND VAC PLACEMENT performed by Bibi Jerez MD at [facility] 5 N/A 9/25/2018    CHOLECYSTECTOMY LAPAROSCOPIC performed by Lilian Collier MD at [facility] Left 2014    hallux    TOE AMPUTATION Left pen inject 20 units subcutaneously twice a day      B-D UF III MINI PEN NEEDLES 31G X 5 MM MISC USE THREE TIMES A DAY      HYDROcodone-acetaminophen (NORCO) 5-325 MG per tablet take 1/2 tablet by mouth twice a day if needed      lisinopril (PRINIVIL;ZESTRIL) 40 MG tablet Take 40 mg by mouth daily   0    metoprolol tartrate (LOPRESSOR) 50 MG tablet Take 50 mg by mouth 2 times daily   0    gabapentin (NEURONTIN) 100 MG capsule Take 1 capsule by mouth 3 times daily for 180 days. Intended supply: 90 days 90 capsule 1    Gauze Pads & Dressings (GAUZE DRESSING) 4\"X4\" PADS 1 each by Does not apply route daily 10 each 1    amLODIPine (NORVASC) 10 MG tablet Take 1 tablet by mouth daily 30 tablet 3    aspirin 81 MG EC tablet Take 1 tablet by mouth daily 30 tablet 3    metFORMIN (GLUCOPHAGE) 500 MG tablet Take 500 mg by mouth 2 times daily (with meals)      omeprazole (PRILOSEC) 20 MG delayed release capsule Take 20 mg by mouth 2 times daily      LOTRISONE 1-0.05 % cream Apply topically 2 times daily. To foreskin and head of the penis 2 Tube 2    levETIRAcetam (KEPPRA) 250 MG tablet For first 2 weeks take 250 mg twice a day, after that for next 2 weeks take 250 mg once a day (Patient not taking: Reported on 9/28/2021) 45 tablet 0     No current facility-administered medications for this visit. Allergies  Allergies   Allergen Reactions    Latex     Adhesive Tape         REVIEW OF SYSTEMS:     Review of Systems   Constitutional: Negative for appetite change, chills, fever and unexpected weight change. Eyes: Negative for photophobia, pain and visual disturbance (Legally blind both eyes). Bilateral blindness due to diabetes   Respiratory: Negative for cough and shortness of breath. Cardiovascular: Negative for chest pain and palpitations. Gastrointestinal: Negative for abdominal pain, constipation, diarrhea, nausea and vomiting. Endocrine: Negative for polydipsia and polyuria.    Genitourinary: Negative for difficulty urinating, dysuria and hematuria. Skin: Negative for pallor and rash. Neurological: Positive for seizures. Negative for dizziness, tremors, syncope, facial asymmetry, speech difficulty, weakness, light-headedness, numbness and headaches. Psychiatric/Behavioral: Negative for behavioral problems and hallucinations. VITALS  BP (!) 176/82 (Site: Left Upper Arm, Position: Sitting, Cuff Size: Medium Adult)   Pulse 60   Ht 5' 7\" (1.702 m)   Wt 186 lb (84.4 kg)   BMI 29.13 kg/m²      PHYSICAL EXAMINATION:     Constitutional: Well developed, well nourished and in no acute distress. 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                                             III, IV, VI - no pupillary defect, bilateral nystagmus  V - normal facial sensation                                                               VII - normal facial symmetry                                                             VIII - intact hearing                                                                             IX, X - symmetrical palate                                                                  XI - symmetrical shoulder shrug                                                       XII - midline tongue without atrophy or fasciculation     Motor function  Normal muscle bulk and tone  Muscle strength: normal power 5/5, bilateral  BKA     Sensory function Intact to touch in bilateral upper and lower extremities. Cerebellar No involuntary movements or tremors     Reflex function Intact 2+ DTR in bilateral upper extremities. Gait                  Bilateral BKA, can ambulate using a wheelchair or a walker. PRIOR TESTS AND IMAGING:     LABS     HbA1c  Lab Results   Component Value Date    LABA1C 7.1 (H) 04/21/2021         LDL  Lab Results   Component Value Date    LDLCHOLESTEROL 72 11/07/2018    LDLCHOLESTEROL 63 09/02/2018    LDLCHOLESTEROL 45 04/27/2017       Lab Results   Component Value Date    HDL 34 (L) 11/07/2018    HDL 23 (L) 09/02/2018    HDL 34 (L) 04/27/2017     Lab Results   Component Value Date    TRIG 113 11/07/2018    TRIG 166 (H) 09/02/2018    TRIG 165 (H) 09/04/2017       IMAGING:     MRI brain With and without contrast: 12/20/2018  Impression   1.  Resolved areas of signal abnormality in both hemispheres as seen on prior.       2.  No evidence of acute intracranial ischemia, mass, or edema on today's   examination.       3.  Mucosal thickening is seen involving right and left nasal cavity   appearing similar compared to prior.       4.  Focus of abnormal signal is seen near right lacrimal gland which may be   related to prior surgery.  Clinical correlation recommended. MRI brain with and without contrast 11/7/2018  Impression   1. Limited study secondary to motion artifact. 2. No evidence of an acute infarct. 3. Multifocal scattered and confluent areas of increased T2 signal are noted   both supra and infratentorially.  The distribution is slightly more   pronounced in the cerebellar hemispheres and posterior cerebral hemispheres   suspicious for posterior reversible encephalopathy syndrome. CTA head and neck:  Impression   Development of hypoattenuation of the cerebral and cerebellar white matter   concerning for encephalopathy such as hypertensive encephalopathy.    Correlation with MRI brain with and without IV contrast recommended.       Left ICA, petrous and caval segment, severe focal stenosis.  Right ICA   cavernous segment moderate stenosis.       Right MCA, M3 segment, severe focal stenosis.  Remainder of intracranial   circulation appears patent.       Left cheek skin thickening.  Clinical correlation recommended.  Reactive left   cervical lymph nodes. 2D Echo:8/31/2018  Summary  Technically difficult study. Left ventricle is normal in size. Global left ventricular systolic function  appears to be normal. Estimated ejection fraction is 55 % . Mild left ventricular hypertrophy. Grade I (mild) left ventricular diastolic dysfunction. No significant valvular regurgitation or stenosis seen. EEG 11/4/2020  CLASSIFICATION:   Abnormal I (Awake, drowsy)   1. Background slow     IMPRESSION:   This was a mildly abnormal routine awake and drowsy EEG, which   showed mild diffuse encephlopathy. There is no epileptiform   discharges or EEG seizures on this recording. ASSESSMENT / PLAN:     Dariel Larson is a 52 y.o.  male  was seen in the clinic for  hospital follow up for PRES. · Posterior reversible encephalopathy syndrome PRES- improving. Follow up MRI reviewed. · New onset focal seizures with impaired awareness secondary to PRES. patient on Keppra 500 mg twice a day. · Multifocal intracranial stenosis- patient was started on dual antiplatelet therapy aspirin and Plavix in September 2018 for 90 days, not clear if patient took it for total 90 days on not. Currently patient is on Aspirin 81 mg.  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 Discussed possible side effects with the patient and his wife/son, they voiced understanding.     Repeat EEG done on 11/4/2020 showed diffuse encephalopathy but no epileptiform discharges or EEG seizures were noted. Repeat MRI of the brain 12/20/2018 showed resolution of signal abnormalities in both hemisphere.      - Seizure Precautions:   counseled the patient with regard to seizure precautions for injury prevention and reinforced their rationale. No driving for at least 6 months, no activity at dangerous heights, no operation of dangerous machinery, no baths, no swimming. Caution (preferably accompanied) with cooking or near fires. The patient expressed understanding.      - Continue aspirin 81 mg and pravastatin 10 mg  - Education regarding secondary stroke prevention was provided. - Follow up in the clinic in 2 to 3 months  - Instructed patient to call the clinic if symptoms worsen or develop any new symptoms.        Electronically signed by Sol Arguello MD on 9/28/2021 at 12:58 PM

## 2021-11-30 ENCOUNTER — OFFICE VISIT (OUTPATIENT)
Dept: NEUROLOGY | Age: 56
End: 2021-11-30
Payer: MEDICAID

## 2021-11-30 VITALS
SYSTOLIC BLOOD PRESSURE: 177 MMHG | OXYGEN SATURATION: 98 % | HEART RATE: 61 BPM | DIASTOLIC BLOOD PRESSURE: 72 MMHG | HEIGHT: 67 IN | TEMPERATURE: 98.1 F | BODY MASS INDEX: 29.19 KG/M2 | WEIGHT: 186 LBS

## 2021-11-30 DIAGNOSIS — I67.83 PRES (POSTERIOR REVERSIBLE ENCEPHALOPATHY SYNDROME): ICD-10-CM

## 2021-11-30 DIAGNOSIS — S88.119A BELOW-KNEE AMPUTATION (HCC): ICD-10-CM

## 2021-11-30 DIAGNOSIS — R56.9 SEIZURES (HCC): Primary | ICD-10-CM

## 2021-11-30 PROCEDURE — 99214 OFFICE O/P EST MOD 30 MIN: CPT | Performed by: STUDENT IN AN ORGANIZED HEALTH CARE EDUCATION/TRAINING PROGRAM

## 2021-11-30 RX ORDER — LEVETIRACETAM 500 MG/1
1000 TABLET ORAL 2 TIMES DAILY
Qty: 60 TABLET | Refills: 3 | Status: SHIPPED | OUTPATIENT
Start: 2021-11-30 | End: 2022-03-25 | Stop reason: SDUPTHER

## 2021-11-30 ASSESSMENT — ENCOUNTER SYMPTOMS
EYE DISCHARGE: 0
CONSTIPATION: 0
ABDOMINAL DISTENTION: 0
COLOR CHANGE: 0
APNEA: 0
COUGH: 0
SHORTNESS OF BREATH: 0
DIARRHEA: 0
WHEEZING: 0
ABDOMINAL PAIN: 0
BACK PAIN: 0

## 2021-11-30 ASSESSMENT — VISUAL ACUITY: VA_NORMAL: 1

## 2021-11-30 NOTE — PROGRESS NOTES
Current                                                [address] [place] [address], [facility] [address], [facility], [address] [facility], [address] [place] [address]  P: [phone]  F: [phone]    NEUROLOGY CLINIC NOTE     PATIENT NAME: Ayaka Ely  PATIENT MRN: [id number]  PRIMARY CARE PHYSICIAN: Stacey An MD    HPI:      Ayaka Ely is a 60 y.o. male who presents to clinic today for a follow-up evaluation. Patient has a PMH of previous CVA, PVD, bilateral BKA and blindness due to diabetes who was seen initially in the clinic for follow-up for PRES. , patient was initially seen in 2018. He presented to the hospital for altered mental status in 2018, SBP in the 230s and found to have a right gaze deviation with rhythmic jerking of his head and upper extremities. Was given Ativan and then loaded with Keppra. CT head showed concern for press, patient was also started on broad-spectrum antibiotics as he was febrile. An MRI showed multiple multifocal scattered increased T2 signals in the posterior cerebral hemispheres. LTM he did not show any epileptiform discharges and an LP was performed which showed an elevated white count but the meningitis panel was negative. He was placed on aspirin 81 mg and pravastatin 10 mg. During his subsequent follow-up, he was found to have some staring spells where he was not responding, again he is legally blind and is on seizure prophylaxis with Keppra. He also had a repeat EEG on 11/4 which did not show any epileptiform discharges. patient was attempted to be weaned off of Keppra but there was concern for seizure-like activity with episodes of confusion. There was no tongue biting or incontinence noted. Dr. Kannan Boles did see the patient on September 28 and the plan was to tentatively decrease the Keppra and the patient was on 250 but due to this increased activity with staring spells and confusion, the patient was increased back to 500.   Currently, the patient is having 2-3 staring spells that are related to arguments occurring in the room, patient is currently having approximately 14-21 spells per week lasting approximately 3-4 minutes. With some postictal confusion, previous EEG did not show any epileptiform discharges. At baseline, he is functional and able to perform his daily activities without any significant impairment. PREVIOUS WORKUP:     Lab Results   Component Value Date    WBC 10.8 04/21/2021    HGB 12.8 (L) 04/21/2021    HCT 40.6 (L) 04/21/2021    MCV 86.4 04/21/2021     04/21/2021       Past Medical History:   Diagnosis Date    Acid reflux     Below-knee amputation (HCC)     bilateral,wears prosthesis. 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 from diabetes & glaucoma    MDRO (multiple drug resistant organisms) resistance     MRSA (methicillin resistant staph aureus) culture positive 09/03/2017    foot    Neuropathy     Osteomyelitis (HCC)     Left Hallux    S/P cataract extraction and insertion of intraocular lens 2015    bilateral     Seizure [facility])     last seizure July 2019    Type II or unspecified type diabetes mellitus without mention of complication, not stated as uncontrolled     Urinary retention         Past Surgical History:   Procedure Laterality Date    CYSTOSCOPY N/A 4/30/2021    CYSTOSCOPY/DILATION performed by Kim Kay MD at [address] Bilateral     lazer both eyes    FOOT SURGERY Left 02/24/2017    surgical prep of wound bed    FOOT SURGERY Left 04/13/2017    1) Left foot surgical preparation of wound-bed, 2) Left foot application of graft     HC  PICC [address] DOUBLE  4/28/2017         LEG AMPUTATION BELOW KNEE Left 9/7/2017    LEFT BELOW KNEE GUILLOTINE AMPUTATION performed by Dana Willingham MD at [address] Left 9/14/2017    REVISION LEFT BELOW KNEE AMPUTATION, CLOSED  performed by Dana Willingham MD at [address] Right 7/19/2019    RIGHT GUILLOTINE FOOT AMPUTATION performed by Alok Lambert MD at [address] Right 7/23/2019    RIGHT BELOW KNEE AMPUTATION performed by Alok Lambert MD at [address] Right 7/14/2020    RIGHT BELOW KNEE AMPUTATION REVISION performed by Alok Lambert MD at [address] Left 01/23/2017    I and D bone, bone biopsy with flap closure and pulse lavage    OTHER SURGICAL HISTORY Left 04/27/2017    I & D foot    OTHER SURGICAL HISTORY  07/14/2020    RBKA Revision    DC DEEP DISSEC FOOT INFEC,1 BURSA Left 4/13/2017    FOOT DEBRIDEMENT WITH EPIFIX  performed by Dee Michaud DPM at [address] INCIS/DRAIN THIGH/KNEE ABSCESS,DEEP Left 9/12/2017     LEFT BELOW KNEE AMPUTATION OPEN, WOUND VAC PLACEMENT performed by Dana Willingham MD at [facility] 5 N/A 9/25/2018    CHOLECYSTECTOMY LAPAROSCOPIC performed by Kelly Stubbs MD at [facility] Left 2014    hallux    TOE AMPUTATION Left 12/09/2016    hallux    TOE AMPUTATION Left 4/27/2017    I AND D FOOT, BONE BIOPSY, POSSIBLE FILET 2ND TOE performed by Dee Michaud DPM at [facility]  04/17/2017    Left second toe         Social History     Socioeconomic History    Marital status:      Spouse name: Not on file    Number of children: Not on file    Years of education: Not on file    Highest education level: Not on file   Occupational History    Not on file   Tobacco Use    Smoking status: Former Smoker     Packs/day: 1.00     Years: 10.00     Pack years: 10.00     Types: Cigarettes     Quit date: 9/18/2018     Years since quitting: 3.2    Smokeless tobacco: Never Used    Tobacco comment: smokes 1 cigarette a month   Vaping Use    Vaping Use: Never used   Substance and Sexual Activity    Alcohol use: No    Drug use: No    Sexual activity: Not on file   Other Topics Concern    Not on file   Social History Narrative    Not on file     Social Determinants of Health     Financial Resource Strain:     Difficulty of Paying Living Expenses: Not on file   Food Insecurity:     Worried About Running Out of Food in the Last Year: Not on file    Melony of Food in the Last Year: Not on file   Transportation Needs:     Lack of Transportation (Medical): Not on file    Lack of Transportation (Non-Medical):  Not on file   Physical Activity:     Days of Exercise per Week: Not on file    Minutes of Exercise per Session: Not on file   Stress:     Feeling of Stress : Not on file   Social Connections:     Frequency of Communication with Friends and Family: Not on file    Frequency of Social Gatherings with Friends and Family: Not on file    Attends Sabianist Services: Not on file    Active Member of [address] [place] [address] [employer] or Organizations: Not on file    Attends Club or Organization Meetings: Not on file    Marital Status: Not on file   Intimate Partner Violence:     Fear of Current or Ex-Partner: Not on file    Emotionally Abused: Not on file    Physically Abused: Not on file    Sexually Abused: Not on file   Housing Stability:     Unable to Pay for Housing in the Last Year: Not on file    Number of [place] in the Last Year: Not on file    Unstable Housing in the Last Year: Not on file        Current Outpatient Medications   Medication Sig Dispense Refill    levETIRAcetam (KEPPRA) 500 MG tablet Take 1 tablet by mouth 2 times daily 60 tablet 3    LOTRISONE 1-0.05 % cream Apply topically 2 times daily. To foreskin and head of the penis 2 Tube 2    BASAGLAR KWIKPEN 100 UNIT/ML injection pen inject 20 units subcutaneously twice a day      B-D UF III MINI PEN NEEDLES 31G X 5 MM MISC USE THREE TIMES A DAY      HYDROcodone-acetaminophen (NORCO) 5-325 MG per tablet take 1/2 tablet by mouth twice a day if needed      lisinopril (PRINIVIL;ZESTRIL) 40 MG tablet Take 40 mg by mouth daily   0    metoprolol tartrate (LOPRESSOR) 50 MG tablet Take 50 mg by mouth 2 times daily   0    gabapentin (NEURONTIN) 100 MG capsule Take 1 capsule by mouth 3 times daily for 180 days. Intended supply: 90 days 90 capsule 1    Gauze Pads & Dressings (GAUZE DRESSING) 4\"X4\" PADS 1 each by Does not apply route daily 10 each 1    amLODIPine (NORVASC) 10 MG tablet Take 1 tablet by mouth daily 30 tablet 3    aspirin 81 MG EC tablet Take 1 tablet by mouth daily 30 tablet 3    metFORMIN (GLUCOPHAGE) 500 MG tablet Take 500 mg by mouth 2 times daily (with meals)      omeprazole (PRILOSEC) 20 MG delayed release capsule Take 20 mg by mouth 2 times daily       No current facility-administered medications for this visit. Allergies   Allergen Reactions    Latex     Adhesive Tape         REVIEW OF SYSTEMS:     Review of Systems   HENT: Negative for congestion, dental problem, drooling, ear discharge and ear pain. Eyes: Negative for discharge. Respiratory: Negative for apnea, cough, shortness of breath and wheezing. Cardiovascular: Negative for chest pain, palpitations and leg swelling. Gastrointestinal: Negative for abdominal distention, abdominal pain, constipation and diarrhea. Endocrine: Negative for cold intolerance. Genitourinary: Negative for difficulty urinating. Musculoskeletal: Negative for arthralgias, back pain and gait problem. Skin: Negative for color change and wound. Neurological: Negative for dizziness, tremors, facial asymmetry, weakness, light-headedness, numbness and headaches. Psychiatric/Behavioral: Negative for agitation, behavioral problems and confusion. VITALS  There were no vitals taken for this visit. PHYSICAL EXAMINATION:     Physical Exam  Vitals and nursing note reviewed. Constitutional:       General: He is awake. Appearance: Normal appearance. He is normal weight. HENT:      Head: Normocephalic and atraumatic. Right Ear: Hearing normal.      Left Ear: Hearing normal.   Eyes:      Extraocular Movements: Extraocular movements intact. Conjunctiva/sclera: Conjunctivae normal.      Pupils: Pupils are equal, round, and reactive to light. Cardiovascular:      Rate and Rhythm: Normal rate and regular rhythm. Pulses: Normal pulses. Pulmonary:      Effort: Pulmonary effort is normal.      Breath sounds: Normal breath sounds. Abdominal:      General: Abdomen is flat. Bowel sounds are normal.      Palpations: Abdomen is soft. Musculoskeletal:         General: Normal range of motion. Cervical back: Normal range of motion and neck supple. Neurological:      Mental Status: He is alert. Coordination: Romberg sign negative. Deep Tendon Reflexes:      Reflex Scores:       Tricep reflexes are 2+ on the left side. Bicep reflexes are 2+ on the right side and 2+ on the left side. Brachioradialis reflexes are 2+ on the right side and 2+ on the left side. Patellar reflexes are 2+ on the right side and 2+ on the left side. Achilles reflexes are 2+ on the right side and 2+ on the left side. Psychiatric:         Mood and Affect: Mood normal.         Speech: Speech normal.         Thought Content: Thought content normal.          NEUROLOGICAL EXAMINATION:     Neurological Exam  Mental Status  Awake and alert. Oriented only to person, time and situation. Recalls 3 of 3 objects immediately. Speech is normal. Language is fluent with no aphasia. Able to perform serial calculations. Able to spell words backwards.  Fund of knowledge is appropriate for level of education. Apraxia absent. Cranial Nerves  CN I: Sense of smell is normal.  CN II: Right visual acuity: normal. Left visual acuity: normal. Right normal visual field. Left normal visual field. CN III, IV, VI: Extraocular movements intact bilaterally. Pupils equal round and reactive to light bilaterally. CN V:  Right: Facial sensation is normal.  Left: Facial sensation is normal on the left. CN VII: Full and symmetric facial movement. CN VIII:  Right: Hearing is normal.  Left: Hearing is normal.  CN IX, X:  Right: Palate is normal.  Left: Palate is normal.  CN XI:  Right: Sternocleidomastoid strength is normal.    Motor  Normal muscle bulk throughout. Normal muscle tone. Strength is 5/5 in all four extremities except as noted. Bilateral BKA. Sensory  Light touch is normal in upper and lower extremities. Pinprick is normal in upper and lower extremities. Temperature is normal in upper and lower extremities. Vibration is normal in upper and lower extremities. Proprioception is normal in upper and lower extremities. Reflexes                                           Right                      Left  Brachioradialis                    2+                         2+  Biceps                                 2+                         2+  Triceps                                                       2+  Finger flex                                                  2+  Hamstring                                                   2+  Patellar                                2+                         2+  Achilles                                2+                         2+  Plantar                           Downgoing                Downgoing    Right pathological reflexes: Azalea's absent. Left pathological reflexes: Azalea's absent.     Coordination  Right: Finger-to-nose normal. Rapid alternating movement normal.  Left: Finger-to-nose normal. Rapid alternating movement normal.    Gait  Casual gait is normal including stance, stride, and arm swing. Normal toe walking. Normal heel walking. Normal tandem gait. Romberg is absent. ASSESSMENT / PLAN:     68-year-old male who was initially evaluated for DE ES secondary to hypertension and multiple hyperintensities that were noted on T2. Patient has been following up in the clinic since that time, has multiple episodes of blank stares, he was initially attempted to be tapered off the Keppra but had increased frequency of spells. He continues to have spells although they have decreased in frequency but are still occurring at 14-21 times per week lasting approximately 3-4 minutes with some postictal confusion. It is unclear whether these are actual focal seizures are not. He denies any tongue biting or gaze deviation. His blood pressure control has improved but is still elevated on current presentation. He is on Keppra 500 and we will be increasing the dose as patient still is having frequent spells. He is also recommended to monitor for any irritability and agitation as that can be one of the side effects of Keppra. He is legally blind and has bilateral BKA's.    neurological-history of PRES/seizure-like activity  -Continue to monitor for any neurological changes   -seizure precautions as directed  -We will increase Keppra to 1 g twice daily  -Avoid heights and operating machinery  -If no improvement in seizures, please call office in 2 weeks, patient might require an LTM E at that time.  -Follow-up in 3 months      Mr. Gigi Lund received counseling on the following healthy behaviors: medical compliance, smoking cessation, blood pressure control, regular follow up with primary doctor.         Electronically signed by Umesh Velasquez MD on 11/30/2021 at 3:29 PM

## 2022-02-01 ENCOUNTER — HOSPITAL ENCOUNTER (OUTPATIENT)
Dept: PREADMISSION TESTING | Age: 57
Discharge: HOME | End: 2022-02-05
Payer: COMMERCIAL

## 2022-02-01 LAB
ANION GAP SERPL CALCULATED.3IONS-SCNC: 8 MMOL/L (ref 9–17)
BUN BLDV-MCNC: 24 MG/DL (ref 6–20)
BUN/CREAT BLD: 21 (ref 9–20)
CALCIUM SERPL-MCNC: 8.7 MG/DL (ref 8.6–10.4)
CHLORIDE BLD-SCNC: 108 MMOL/L (ref 98–107)
CO2: 21 MMOL/L (ref 20–31)
CREAT SERPL-MCNC: 1.16 MG/DL (ref 0.7–1.2)
GFR AFRICAN AMERICAN: >60 ML/MIN
GFR NON-AFRICAN AMERICAN: >60 ML/MIN
GFR SERPL CREATININE-BSD FRML MDRD: ABNORMAL ML/MIN/{1.73_M2}
GFR SERPL CREATININE-BSD FRML MDRD: ABNORMAL ML/MIN/{1.73_M2}
GLUCOSE BLD-MCNC: 146 MG/DL (ref 70–99)
HCT VFR BLD CALC: 41.7 % (ref 40.7–50.3)
HEMOGLOBIN: 13.2 G/DL (ref 13–17)
MCH RBC QN AUTO: 27.4 PG (ref 25.2–33.5)
MCHC RBC AUTO-ENTMCNC: 31.7 G/DL (ref 28.4–34.8)
MCV RBC AUTO: 86.5 FL (ref 82.6–102.9)
NRBC AUTOMATED: 0 PER 100 WBC
PDW BLD-RTO: 14.2 % (ref 11.8–14.4)
PLATELET # BLD: 266 K/UL (ref 138–453)
PMV BLD AUTO: 11.7 FL (ref 8.1–13.5)
POTASSIUM SERPL-SCNC: 4.7 MMOL/L (ref 3.7–5.3)
RBC # BLD: 4.82 M/UL (ref 4.21–5.77)
SODIUM BLD-SCNC: 137 MMOL/L (ref 135–144)
WBC # BLD: 9.1 K/UL (ref 3.5–11.3)

## 2022-02-01 PROCEDURE — 83036 HEMOGLOBIN GLYCOSYLATED A1C: CPT

## 2022-02-01 PROCEDURE — 85027 COMPLETE CBC AUTOMATED: CPT

## 2022-02-01 PROCEDURE — 36415 COLL VENOUS BLD VENIPUNCTURE: CPT

## 2022-02-01 PROCEDURE — 93005 ELECTROCARDIOGRAM TRACING: CPT | Performed by: ANESTHESIOLOGY

## 2022-02-01 PROCEDURE — 80048 BASIC METABOLIC PNL TOTAL CA: CPT

## 2022-02-01 NOTE — PRE-PROCEDURE INSTRUCTIONS
[facility] at 1100 AM    Once you enter the hospital lobby, take the elevators to the second floor. Check-In is at the surgery registration desk. Continue to take your home medications as you normally do up to and including the night before surgery with the exception of any blood thinning medications. Please stop any blood thinning medications as directed by your surgeon or prescribing physician. Failure to stop certain medications may interfere with your scheduled surgery. These may include:  Aspirin, Warfarin (Coumadin), Clopidogrel (Plavix), Ibuprofen (Motrin, Advil), Naproxen (Aleve), Meloxicam (Mobic), Celecoxib (Celebrex), Eliquis, Pradaxa, Xarelto, Effient, Fish Oil, Herbal supplements. If you are diabetic, do not take any of your diabetic medications by mouth the morning of surgery. If you are taking insulin contact the doctor that manages your diabetes for instructions about any changes to your insulin dosages the day before surgery. Do not inject insulin or other injectable diabetic medications the morning of surgery unless otherwise instructed by the doctor who manages your diabetes. Please take the following medication(s) the day of surgery with a small sip of water:  Gabapentin,Keppra, Metoprolol, Amlodipine, Omeprazole    Please use your inhalers at home the day of surgery. PREPARING FOR YOUR SURGERY:     Before surgery, you can play an important role in your own health. Because skin is not sterile, we need to be sure that your skin is as free of germs as possible before surgery by carefully washing before surgery. Preparing or prepping skin before surgery can reduce the risk of a surgical site infection.   Do not shave the area of your body where your surgery will be performed unless you received specific permission from your physician.     You will need to shower at home the night before surgery and the morning of surgery with a special soap called chlorhexidine gluconate (CHG*). *Not to be used by people allergic to Chlorhexidine Gluconate (CHG). Following these instructions will help you be sure that your skin is clean before surgery. Instructions on cleaning your skin before surgery: The night before your surgery:      You will need to shower with warm water (not hot) and the CHG soap.  Use a clean wash cloth and a clean towel. Have clean clothes available to put on after the shower.    First wash your hair with regular shampoo. Rinse your hair and body thoroughly to remove the shampoo.  Wash your face and genital area (private parts) with your regular soap or water only. Thoroughly rinse your body with warm water from the neck down.  Turn water off to prevent rinsing the soap off too soon.  With a clean wet washcloth and half of the CHG soap in the bottle, lather your entire body from the neck down. Do not use CHG soap near your eyes or ears to avoid injury to those areas.  Wash thoroughly, paying special attention to the area where your surgery will be performed.  Wash your body gently for five (5) minutes. Avoid scrubbing your skin too hard.  Turn the water back on and rinse your body thoroughly.  Pat yourself dry with a clean, soft towel. Do not apply lotion, cream or powder.  Dress with clean freshly washed clothes. The morning of surgery:     Repeat shower following steps above - using remaining half of CHG soap in bottle. Patient Instructions:    [place] If you are having any type of anesthesia you are to have nothing to eat or drink after midnight the night before your surgery. This includes gum, hard candy, mints, water or smoking or chewing tobacco.  The only exception to this is a small sip of water to take with any morning dose of heart, blood pressure, or seizure medications. No alcoholic beverages for 24 hours prior to surgery.      Brush your teeth but do not swallow water.  Bring your eyeglasses and case with you. No contacts are to be worn the day of surgery. You also may bring your hearing aids. Most surgical procedures involving anesthesia will require that you remove your dentures prior to surgery.  If you are on C-PAP or Bi-PAP at home and plan on staying in the hospital overnight for your surgery please bring the machine with you. · Do not wear any jewelry or body piercings day of surgery. Also, NO lotion, perfume or deodorant to be used the day of surgery. No nail polish on the operative extremity (arm/leg surgeries)    · Do not bring any valuables such as jewelry, cash, or credit cards. If you are staying overnight with us, please bring a small bag of personal items.  Please wear loose, comfortable clothing. If you are potentially going to have a cast or brace bring clothing that will fit over them.  In case of illness - If you have cold or flu like symptoms (high fever, runny nose, sore throat, cough, etc.) rash, nausea, vomiting, loose stools, and/or recent contact with someone who has a contagious disease (chicken pox, measles, etc.) Please call your doctor before coming to the hospital.     If your child is having surgery please make arrangements for any other children to be cared for at home on the day of surgery. Other children are not permitted in recovery room and we want you to be able to spend time with the patient. If other arrangements are not available then we suggest that you have a second adult to stay in the waiting room. Day of Surgery/Procedure:    As a patient at [facility] - INPATIENT you can expect quality medical and nursing care that is centered on your individual needs. Our goal is to make your surgical experience as comfortable as possible    .   Transportation After Your Surgery/Procedure: You will need a friend or family member to drive you home after your procedure. Your  must be 25years of age or older and able to sign off on your discharge instructions. A taxi cab or any other form of public transportation is not acceptable. Your friend or family member must stay at the hospital throughout your procedure. Someone must remain with you for the first 24 hours after your surgery if you receive anesthesia or medication. If you do not have someone to stay with you, your procedure may be cancelled.       If you have any other questions regarding your procedure or the day of surgery, please call [phone]      _________________________  ____________________________  Signature (Patient)              Signature (Provider) & date

## 2022-02-02 LAB
EKG ATRIAL RATE: 55 BPM
EKG P AXIS: 25 DEGREES
EKG P-R INTERVAL: 176 MS
EKG Q-T INTERVAL: 426 MS
EKG QRS DURATION: 82 MS
EKG QTC CALCULATION (BAZETT): 407 MS
EKG R AXIS: 26 DEGREES
EKG T AXIS: 18 DEGREES
EKG VENTRICULAR RATE: 55 BPM
ESTIMATED AVERAGE GLUCOSE: 186 MG/DL
HBA1C MFR BLD: 8.1 % (ref 4–6)

## 2022-02-02 PROCEDURE — 93010 ELECTROCARDIOGRAM REPORT: CPT | Performed by: INTERNAL MEDICINE

## 2022-02-03 NOTE — PROGRESS NOTES
PAT Progress Note    Pt Name: Izzy Nguyen  MRN: [id number]  YOB: 1965  Date of evaluation: 2/3/2022       [x] Called to PAT. I spoke to the patient, Izzy Nguyen, a 60 y.o. male in attendance with his son. The patient is legally blind with multiple known comorbidities some of which include Diabetes complicated by retinopathy, neuropathy, past nonhealing wounds, MRSA, MDRO, osteomyelitis, PVD, and eventual bilateral BKA's with prothesis. Hx past CVA, HTN, HLD and glaucoma. His ongoing care is managed by a team of specialist.     The patient follows with Urologist, Dr Leilani Bailon for urethral stricture and severe phimosis. S/p cystoscopy with urethral dilation 4/30/21 w/o postoperative complications. Due to his phimosis he continues to have problems with urination. He was seen by Dr Kelsey Santana a week ago and scheduled for an elective circumcision for urethral stricture and phimosis on 2/11/22 @1300. Hx PRES/seizure like activity follows with Neurologist Dr Vicenta Crandall, seen by Resident, Dr Mackenzie Russell 11/30/21. He continues to have \"multiple episodes of blank stares witnessed by family,occurring at 14-21 times per week lasting approximately 3-4 minutes with some postictal confusion\". His Keppra was increased to 1g bid. at that visit. Family was advised to call with new concerns and f/u in 3 months. [x] The Urology Progress Note by Dr. Kelsey Santana will be used for the Interval History and Physical Note the day of surgery. Medications:    Prior to Admission medications    Medication Sig Start Date End Date Taking? Authorizing Provider   levETIRAcetam (KEPPRA) 500 MG tablet Take 2 tablets by mouth 2 times daily 11/30/21   Mackenzie Russell MD   LOTRISONE 1-0.05 % cream Apply topically 2 times daily.   To foreskin and head of the penis 4/30/21 5/15/21  MD Mazin Cassidy 100 UNIT/ML injection pen inject 20 units subcutaneously twice a day 4/18/21   Historical Provider, MD ESCAMILLA UF III MINI PEN NEEDLES 31G X 5 MM MISC USE THREE TIMES A DAY 2/18/21   Historical Provider, MD   HYDROcodone-acetaminophen ([address]) 5-325 MG per tablet take 1/2 tablet by mouth twice a day if needed 3/6/20   Historical Provider, MD   lisinopril (PRINIVIL;ZESTRIL) 40 MG tablet Take 40 mg by mouth daily  9/8/19   Historical Provider, MD   metoprolol tartrate (LOPRESSOR) 50 MG tablet Take 50 mg by mouth 2 times daily  9/8/19   Historical Provider, MD   gabapentin (NEURONTIN) 100 MG capsule Take 1 capsule by mouth 3 times daily for 180 days. Intended supply: 90 days 7/25/19 11/30/21  Delvin Longoria, DO   Gauze Pads & Dressings (GAUZE DRESSING) 4\"X4\" PADS 1 each by Does not apply route daily 7/9/19   Delvin Longoria, DO   amLODIPine (NORVASC) 10 MG tablet Take 1 tablet by mouth daily 11/11/18   Molina Patricia, DO   aspirin 81 MG EC tablet Take 1 tablet by mouth daily 9/3/18   Aniyah Joseph, DO   metFORMIN (GLUCOPHAGE) 500 MG tablet Take 500 mg by mouth 2 times daily (with meals)    Historical Provider, MD   omeprazole (PRILOSEC) 20 MG delayed release capsule Take 20 mg by mouth 2 times daily    Historical Provider, MD         This is a 60 y.o. male who is cooperative, alert and in no acute distress    Brief Exam:  Patient evaluated fully clothed in wheel chair  Lungs: Bilateral equal air entry, unlabored, clear to ausculation, no wheezing, rales or rhonchi, normal effort  Cardiovascular: HR 55 asymptomatic bradycardic rate and regular rhythm, no murmur, gallop, rub. Abdomen: Soft, round, nontender, nondistended, normal bowel sounds. Labs:  Recent Labs     02/01/22  1510   HGB 13.2   HCT 41.7   WBC 9.1   MCV 86.5         K 4.7   *   CO2 21   BUN 24*   CREATININE 1.16   GLUCOSE 146*       No results for input(s): COVID19 in the last 720 hours.     JANELLE Mclean CNP   Electronically signed 2/3/2022 at 4:00 PM

## 2022-02-11 ENCOUNTER — HOSPITAL ENCOUNTER (OUTPATIENT)
Age: 57
Setting detail: OUTPATIENT SURGERY
Discharge: HOME | End: 2022-02-11
Attending: UROLOGY | Admitting: UROLOGY
Payer: COMMERCIAL

## 2022-02-11 ENCOUNTER — ANESTHESIA (OUTPATIENT)
Dept: OPERATING ROOM | Age: 57
End: 2022-02-11
Payer: COMMERCIAL

## 2022-02-11 ENCOUNTER — ANESTHESIA EVENT (OUTPATIENT)
Dept: OPERATING ROOM | Age: 57
End: 2022-02-11
Payer: COMMERCIAL

## 2022-02-11 VITALS — TEMPERATURE: 82.8 F | OXYGEN SATURATION: 99 % | DIASTOLIC BLOOD PRESSURE: 69 MMHG | SYSTOLIC BLOOD PRESSURE: 150 MMHG

## 2022-02-11 VITALS
HEIGHT: 67 IN | TEMPERATURE: 96.9 F | SYSTOLIC BLOOD PRESSURE: 167 MMHG | HEART RATE: 56 BPM | WEIGHT: 186 LBS | RESPIRATION RATE: 14 BRPM | DIASTOLIC BLOOD PRESSURE: 66 MMHG | BODY MASS INDEX: 29.19 KG/M2 | OXYGEN SATURATION: 96 %

## 2022-02-11 LAB
-: NORMAL
AMORPHOUS: NORMAL
BACTERIA: NORMAL
BILIRUBIN URINE: NEGATIVE
CASTS UA: NORMAL /LPF
COLOR: YELLOW
COMMENT UA: ABNORMAL
CRYSTALS, UA: NORMAL /HPF
EPITHELIAL CELLS UA: NORMAL /HPF (ref 0–5)
GLUCOSE BLD-MCNC: 146 MG/DL (ref 75–110)
GLUCOSE BLD-MCNC: 158 MG/DL (ref 75–110)
GLUCOSE URINE: ABNORMAL
KETONES, URINE: NEGATIVE
LEUKOCYTE ESTERASE, URINE: ABNORMAL
MUCUS: NORMAL
NITRITE, URINE: NEGATIVE
OTHER OBSERVATIONS UA: NORMAL
PH UA: 6 (ref 5–8)
PROTEIN UA: ABNORMAL
RBC UA: NORMAL /HPF (ref 0–2)
RENAL EPITHELIAL, UA: NORMAL /HPF
SPECIFIC GRAVITY UA: 1.02 (ref 1–1.03)
TRICHOMONAS: NORMAL
TURBIDITY: ABNORMAL
URINE HGB: ABNORMAL
UROBILINOGEN, URINE: NORMAL
WBC UA: NORMAL /HPF (ref 0–5)
YEAST: NORMAL

## 2022-02-11 PROCEDURE — 2709999900 HC NON-CHARGEABLE SUPPLY: Performed by: UROLOGY

## 2022-02-11 PROCEDURE — 82947 ASSAY GLUCOSE BLOOD QUANT: CPT

## 2022-02-11 PROCEDURE — 3600000012 HC SURGERY LEVEL 2 ADDTL 15MIN: Performed by: UROLOGY

## 2022-02-11 PROCEDURE — 6370000000 HC RX 637 (ALT 250 FOR IP): Performed by: UROLOGY

## 2022-02-11 PROCEDURE — 88305 TISSUE EXAM BY PATHOLOGIST: CPT

## 2022-02-11 PROCEDURE — 7100000011 HC PHASE II RECOVERY - ADDTL 15 MIN: Performed by: UROLOGY

## 2022-02-11 PROCEDURE — 7100000001 HC PACU RECOVERY - ADDTL 15 MIN: Performed by: UROLOGY

## 2022-02-11 PROCEDURE — 2580000003 HC RX 258: Performed by: ANESTHESIOLOGY

## 2022-02-11 PROCEDURE — 6360000002 HC RX W HCPCS: Performed by: UROLOGY

## 2022-02-11 PROCEDURE — 3700000001 HC ADD 15 MINUTES (ANESTHESIA): Performed by: UROLOGY

## 2022-02-11 PROCEDURE — 7100000010 HC PHASE II RECOVERY - FIRST 15 MIN: Performed by: UROLOGY

## 2022-02-11 PROCEDURE — 7100000000 HC PACU RECOVERY - FIRST 15 MIN: Performed by: UROLOGY

## 2022-02-11 PROCEDURE — 2500000003 HC RX 250 WO HCPCS: Performed by: NURSE ANESTHETIST, CERTIFIED REGISTERED

## 2022-02-11 PROCEDURE — 3600000002 HC SURGERY LEVEL 2 BASE: Performed by: UROLOGY

## 2022-02-11 PROCEDURE — 6360000002 HC RX W HCPCS: Performed by: NURSE ANESTHETIST, CERTIFIED REGISTERED

## 2022-02-11 PROCEDURE — C1769 GUIDE WIRE: HCPCS | Performed by: UROLOGY

## 2022-02-11 PROCEDURE — 81001 URINALYSIS AUTO W/SCOPE: CPT

## 2022-02-11 PROCEDURE — 3700000000 HC ANESTHESIA ATTENDED CARE: Performed by: UROLOGY

## 2022-02-11 RX ORDER — SODIUM CHLORIDE, SODIUM LACTATE, POTASSIUM CHLORIDE, CALCIUM CHLORIDE 600; 310; 30; 20 MG/100ML; MG/100ML; MG/100ML; MG/100ML
INJECTION, SOLUTION INTRAVENOUS CONTINUOUS
Status: DISCONTINUED | OUTPATIENT
Start: 2022-02-11 | End: 2022-02-11 | Stop reason: HOSPADM

## 2022-02-11 RX ORDER — LIDOCAINE HYDROCHLORIDE 20 MG/ML
JELLY TOPICAL PRN
Status: DISCONTINUED | OUTPATIENT
Start: 2022-02-11 | End: 2022-02-11 | Stop reason: ALTCHOICE

## 2022-02-11 RX ORDER — ESMOLOL HYDROCHLORIDE 10 MG/ML
INJECTION INTRAVENOUS PRN
Status: DISCONTINUED | OUTPATIENT
Start: 2022-02-11 | End: 2022-02-11 | Stop reason: SDUPTHER

## 2022-02-11 RX ORDER — LIDOCAINE HYDROCHLORIDE 10 MG/ML
1 INJECTION, SOLUTION EPIDURAL; INFILTRATION; INTRACAUDAL; PERINEURAL
Status: DISCONTINUED | OUTPATIENT
Start: 2022-02-11 | End: 2022-02-11 | Stop reason: HOSPADM

## 2022-02-11 RX ORDER — FENTANYL CITRATE 50 UG/ML
INJECTION, SOLUTION INTRAMUSCULAR; INTRAVENOUS PRN
Status: DISCONTINUED | OUTPATIENT
Start: 2022-02-11 | End: 2022-02-11 | Stop reason: SDUPTHER

## 2022-02-11 RX ORDER — ONDANSETRON 2 MG/ML
4 INJECTION INTRAMUSCULAR; INTRAVENOUS
Status: DISCONTINUED | OUTPATIENT
Start: 2022-02-11 | End: 2022-02-11 | Stop reason: HOSPADM

## 2022-02-11 RX ORDER — HYDROCODONE BITARTRATE AND ACETAMINOPHEN 5; 325 MG/1; MG/1
1 TABLET ORAL PRN
Status: DISCONTINUED | OUTPATIENT
Start: 2022-02-11 | End: 2022-02-11 | Stop reason: HOSPADM

## 2022-02-11 RX ORDER — PROMETHAZINE HYDROCHLORIDE 25 MG/ML
6.25 INJECTION, SOLUTION INTRAMUSCULAR; INTRAVENOUS
Status: DISCONTINUED | OUTPATIENT
Start: 2022-02-11 | End: 2022-02-11 | Stop reason: HOSPADM

## 2022-02-11 RX ORDER — CEPHALEXIN 500 MG/1
500 CAPSULE ORAL 3 TIMES DAILY
Qty: 15 CAPSULE | Refills: 0 | Status: SHIPPED | OUTPATIENT
Start: 2022-02-11 | End: 2022-02-16

## 2022-02-11 RX ORDER — FENTANYL CITRATE 50 UG/ML
50 INJECTION, SOLUTION INTRAMUSCULAR; INTRAVENOUS EVERY 5 MIN PRN
Status: DISCONTINUED | OUTPATIENT
Start: 2022-02-11 | End: 2022-02-11 | Stop reason: HOSPADM

## 2022-02-11 RX ORDER — PROPOFOL 10 MG/ML
INJECTION, EMULSION INTRAVENOUS PRN
Status: DISCONTINUED | OUTPATIENT
Start: 2022-02-11 | End: 2022-02-11 | Stop reason: SDUPTHER

## 2022-02-11 RX ORDER — HYDROCODONE BITARTRATE AND ACETAMINOPHEN 5; 325 MG/1; MG/1
2 TABLET ORAL PRN
Status: DISCONTINUED | OUTPATIENT
Start: 2022-02-11 | End: 2022-02-11 | Stop reason: HOSPADM

## 2022-02-11 RX ORDER — EPHEDRINE SULFATE/0.9% NACL/PF 50 MG/5 ML
SYRINGE (ML) INTRAVENOUS PRN
Status: DISCONTINUED | OUTPATIENT
Start: 2022-02-11 | End: 2022-02-11 | Stop reason: SDUPTHER

## 2022-02-11 RX ORDER — FENTANYL CITRATE 50 UG/ML
25 INJECTION, SOLUTION INTRAMUSCULAR; INTRAVENOUS EVERY 5 MIN PRN
Status: DISCONTINUED | OUTPATIENT
Start: 2022-02-11 | End: 2022-02-11 | Stop reason: HOSPADM

## 2022-02-11 RX ORDER — ONDANSETRON 2 MG/ML
INJECTION INTRAMUSCULAR; INTRAVENOUS PRN
Status: DISCONTINUED | OUTPATIENT
Start: 2022-02-11 | End: 2022-02-11 | Stop reason: SDUPTHER

## 2022-02-11 RX ORDER — LIDOCAINE HYDROCHLORIDE 20 MG/ML
INJECTION, SOLUTION EPIDURAL; INFILTRATION; INTRACAUDAL; PERINEURAL PRN
Status: DISCONTINUED | OUTPATIENT
Start: 2022-02-11 | End: 2022-02-11 | Stop reason: SDUPTHER

## 2022-02-11 RX ADMIN — Medication 50 MCG: at 12:04

## 2022-02-11 RX ADMIN — Medication 10 MG: at 12:37

## 2022-02-11 RX ADMIN — ESMOLOL HYDROCHLORIDE 40 MG: 100 INJECTION, SOLUTION INTRAVENOUS at 12:03

## 2022-02-11 RX ADMIN — Medication 10 MG: at 12:26

## 2022-02-11 RX ADMIN — Medication 5 MG: at 12:20

## 2022-02-11 RX ADMIN — PROPOFOL 130 MG: 10 INJECTION, EMULSION INTRAVENOUS at 12:04

## 2022-02-11 RX ADMIN — SODIUM CHLORIDE, POTASSIUM CHLORIDE, SODIUM LACTATE AND CALCIUM CHLORIDE: 600; 310; 30; 20 INJECTION, SOLUTION INTRAVENOUS at 11:25

## 2022-02-11 RX ADMIN — LIDOCAINE HYDROCHLORIDE 80 MG: 20 INJECTION, SOLUTION EPIDURAL; INFILTRATION; INTRACAUDAL; PERINEURAL at 12:04

## 2022-02-11 RX ADMIN — Medication 5 MG: at 12:16

## 2022-02-11 RX ADMIN — CEFAZOLIN 2000 MG: 10 INJECTION, POWDER, FOR SOLUTION INTRAVENOUS at 12:09

## 2022-02-11 RX ADMIN — ONDANSETRON 4 MG: 2 INJECTION INTRAMUSCULAR; INTRAVENOUS at 12:58

## 2022-02-11 ASSESSMENT — PULMONARY FUNCTION TESTS
PIF_VALUE: 0
PIF_VALUE: 16
PIF_VALUE: 1
PIF_VALUE: 17
PIF_VALUE: 18
PIF_VALUE: 3
PIF_VALUE: 1
PIF_VALUE: 1
PIF_VALUE: 18
PIF_VALUE: 0
PIF_VALUE: 16
PIF_VALUE: 0
PIF_VALUE: 17
PIF_VALUE: 18
PIF_VALUE: 17
PIF_VALUE: 18
PIF_VALUE: 3
PIF_VALUE: 17
PIF_VALUE: 18
PIF_VALUE: 18
PIF_VALUE: 4
PIF_VALUE: 17
PIF_VALUE: 17
PIF_VALUE: 0
PIF_VALUE: 18
PIF_VALUE: 18
PIF_VALUE: 17
PIF_VALUE: 17
PIF_VALUE: 18
PIF_VALUE: 18
PIF_VALUE: 17
PIF_VALUE: 19
PIF_VALUE: 18
PIF_VALUE: 4
PIF_VALUE: 18
PIF_VALUE: 19
PIF_VALUE: 0
PIF_VALUE: 18
PIF_VALUE: 0
PIF_VALUE: 18
PIF_VALUE: 0
PIF_VALUE: 18
PIF_VALUE: 18
PIF_VALUE: 19
PIF_VALUE: 18
PIF_VALUE: 17
PIF_VALUE: 17
PIF_VALUE: 3
PIF_VALUE: 17
PIF_VALUE: 0
PIF_VALUE: 18
PIF_VALUE: 1
PIF_VALUE: 18
PIF_VALUE: 17
PIF_VALUE: 0
PIF_VALUE: 1
PIF_VALUE: 13
PIF_VALUE: 3
PIF_VALUE: 4
PIF_VALUE: 19
PIF_VALUE: 18
PIF_VALUE: 26
PIF_VALUE: 3
PIF_VALUE: 18
PIF_VALUE: 2
PIF_VALUE: 18
PIF_VALUE: 18
PIF_VALUE: 17
PIF_VALUE: 3

## 2022-02-11 ASSESSMENT — PAIN SCALES - GENERAL
PAINLEVEL_OUTOF10: 0

## 2022-02-11 ASSESSMENT — PAIN - FUNCTIONAL ASSESSMENT: PAIN_FUNCTIONAL_ASSESSMENT: 0-10

## 2022-02-11 NOTE — H&P
History and Physical Update    Pt Name: Richard South  MRN: [id number]  YOB: 1965  Date of evaluation: 2/11/2022      [x] I have reviewed the Office Note found in [facility] dated 1/26/22  by Roxana Saint which meets the criteria for an Interval History and Physical note and is attached below. Procedure: Circumcision   Diagnosis: bulbous urethral stricture, phimosis  [x] I have examined  Richard South and there are no changes to the patient or plans. Patient is blind states complete vision loss. Last ate and drank yesterday prior to midnight. Denies the use of blood thinners. Blood sugar 158 mg/dl. Vital signs: BP (!) 164/65   Pulse 53   Temp 97.3 °F (36.3 °C) (Temporal)   Resp 18   Ht 5' 7\" (1.702 m)   Wt 186 lb (84.4 kg)   SpO2 97%   BMI 29.13 kg/m²     Allergies:  Latex and Adhesive tape    Medications:   No current facility-administered medications on file prior to encounter. Current Outpatient Medications on File Prior to Encounter   Medication Sig Dispense Refill    levETIRAcetam (KEPPRA) 500 MG tablet Take 2 tablets by mouth 2 times daily 60 tablet 3    BASAGLAR KWIKPEN 100 UNIT/ML injection pen inject 20 units subcutaneously twice a day      HYDROcodone-acetaminophen (NORCO) 5-325 MG per tablet take 1/2 tablet by mouth twice a day if needed      lisinopril (PRINIVIL;ZESTRIL) 40 MG tablet Take 40 mg by mouth daily   0    metoprolol tartrate (LOPRESSOR) 50 MG tablet Take 50 mg by mouth 2 times daily   0    gabapentin (NEURONTIN) 100 MG capsule Take 1 capsule by mouth 3 times daily for 180 days. Intended supply: 90 days 90 capsule 1    amLODIPine (NORVASC) 10 MG tablet Take 1 tablet by mouth daily 30 tablet 3    metFORMIN (GLUCOPHAGE) 500 MG tablet Take 500 mg by mouth 2 times daily (with meals)      omeprazole (PRILOSEC) 20 MG delayed release capsule Take 20 mg by mouth 2 times daily      LOTRISONE 1-0.05 % cream Apply topically 2 times daily.   To foreskin and head of the penis 2 Tube 2    B-D UF III MINI PEN NEEDLES 31G X 5 MM MISC USE THREE TIMES A DAY      Gauze Pads & Dressings (GAUZE DRESSING) 4\"X4\" PADS 1 each by Does not apply route daily 10 each 1    aspirin 81 MG EC tablet Take 1 tablet by mouth daily 30 tablet 3            Prior to Admission medications    Medication Sig Start Date End Date Taking? Authorizing Provider   levETIRAcetam (KEPPRA) 500 MG tablet Take 2 tablets by mouth 2 times daily 11/30/21  Yes MD Domonique Aguilera [facility] [place] 100 UNIT/ML injection pen inject 20 units subcutaneously twice a day 4/18/21  Yes Historical Provider, MD   HYDROcodone-acetaminophen (Laurann Sous) 5-325 MG per tablet take 1/2 tablet by mouth twice a day if needed 3/6/20  Yes Historical Provider, MD   lisinopril (PRINIVIL;ZESTRIL) 40 MG tablet Take 40 mg by mouth daily  9/8/19  Yes Historical Provider, MD   metoprolol tartrate (LOPRESSOR) 50 MG tablet Take 50 mg by mouth 2 times daily  9/8/19  Yes Historical Provider, MD   gabapentin (NEURONTIN) 100 MG capsule Take 1 capsule by mouth 3 times daily for 180 days. Intended supply: 90 days 7/25/19 2/11/22 Yes Jarocho Ruiz, DO   amLODIPine (NORVASC) 10 MG tablet Take 1 tablet by mouth daily 11/11/18  Yes Molina Patricia, DO   metFORMIN (GLUCOPHAGE) 500 MG tablet Take 500 mg by mouth 2 times daily (with meals)   Yes Historical Provider, MD   omeprazole (PRILOSEC) 20 MG delayed release capsule Take 20 mg by mouth 2 times daily   Yes Historical Provider, MD Chaz Tabor 1-0.05 % cream Apply topically 2 times daily.   To foreskin and head of the penis 4/30/21 5/15/21  MD MARY KATE Whitt-JENNIFER UF III MINI PEN NEEDLES 31G X 5 MM MISC USE THREE TIMES A DAY 2/18/21   Historical Provider, MD   Gauze Pads & Dressings (GAUZE DRESSING) 4\"X4\" PADS 1 each by Does not apply route daily 7/9/19   Benjie Arzola, DO   aspirin 81 MG EC tablet Take 1 tablet by mouth daily 9/3/18   Shelley Rivera, DO       This is a 60 y.o. male who is pleasant, cooperative, alert and oriented x3, in no acute distress. Heart: Heart regular rate and rhythm   Lungs:clear to auscultation without wheezes or rales    Abdomen: soft, nontender, nondistended, no masses or organomegaly. No results for input(s): COVID19 in the last 720 hours.     JANELLE Reed CNP  Electronically signed 2/11/2022 at 11:31 AM

## 2022-02-11 NOTE — ANESTHESIA PRE PROCEDURE
Department of Anesthesiology  Preprocedure Note       Name:  Bailey Hagen   Age:  60 y.o.  :  1965                                          MRN:  [id number]         Date:  2022      Surgeon: Kenji Aggarwal):  Rizwana Graves MD    Procedure: Procedure(s):  CIRCUMCISION  CYSTOSCOPY DIL WITH OPTICAL INTERNAL URETHROTOMY    Medications prior to admission:   Prior to Admission medications    Medication Sig Start Date End Date Taking? Authorizing Provider   levETIRAcetam (KEPPRA) 500 MG tablet Take 2 tablets by mouth 2 times daily 21  Yes MD Karol Negron [facility] 100 UNIT/ML injection pen inject 20 units subcutaneously twice a day 21  Yes Historical Provider, MD   HYDROcodone-acetaminophen (Trish Sebastian) 5-325 MG per tablet take 1/2 tablet by mouth twice a day if needed 3/6/20  Yes Historical Provider, MD   lisinopril (PRINIVIL;ZESTRIL) 40 MG tablet Take 40 mg by mouth daily  19  Yes Historical Provider, MD   metoprolol tartrate (LOPRESSOR) 50 MG tablet Take 50 mg by mouth 2 times daily  19  Yes Historical Provider, MD   gabapentin (NEURONTIN) 100 MG capsule Take 1 capsule by mouth 3 times daily for 180 days. Intended supply: 90 days 19 Yes Jarocho Ruiz, DO   amLODIPine (NORVASC) 10 MG tablet Take 1 tablet by mouth daily 18  Yes Molina Patricia, DO   metFORMIN (GLUCOPHAGE) 500 MG tablet Take 500 mg by mouth 2 times daily (with meals)   Yes Historical Provider, MD   omeprazole (PRILOSEC) 20 MG delayed release capsule Take 20 mg by mouth 2 times daily   Yes Historical Provider, MD Rosa Hoffmann 1-0.05 % cream Apply topically 2 times daily.   To foreskin and head of the penis 4/30/21 5/15/21  MD ANDI Goodson UF III MINI PEN NEEDLES 31G X 5 MM MISC USE THREE TIMES A DAY 21   Historical Provider, MD   Gauze Pads & Dressings (GAUZE DRESSING) 4\"X4\" PADS 1 each by Does not apply route daily 19   Iris Greenberg,    aspirin 81 MG EC tablet Take 1 tablet by mouth daily 9/3/18   Shelley Setswana, DO       Current medications:    Current Facility-Administered Medications   Medication Dose Route Frequency Provider Last Rate Last Admin    lactated ringers infusion   IntraVENous Continuous Geraldine Level, MD        lidocaine PF 1 % injection 1 mL  1 mL IntraDERmal Once PRN Geraldine Level, MD           Allergies:     Allergies   Allergen Reactions    Latex     Adhesive Tape        Problem List:    Patient Active Problem List   Diagnosis Code    Essential hypertension I10    IDDM (insulin dependent diabetes mellitus) ([facility] [id number]    Neuropathy ([facility] G62.9    Uncontrolled type 2 diabetes with cellulitis of foot ([facility] E11.628, L03.119, E11.65    Cerebrovascular disease I67.9    Cataract H26.9    Dysphagia R13.10    Family history of colon cancer Z80.0    Bowel habit changes R19.4    Hyperlipidemia E78.5    Gangrene of right foot ([facility] I96    Diabetic foot left E11.8    COLT (acute kidney injury) ([facility] N17.9    Ischemic foot left I99.8    Urinary retention R33.9    Subacute osteomyelitis of left foot ([facility]) M86.272    Hx of BKA ([facility]) Z89.519    Impaired mobility Z74.09    HCAP (healthcare-associated pneumonia) J18.9    Primary open angle glaucoma of both eyes, moderate stage H40.1132    Pseudophakia of both eyes Z96.1    Retinopathy, diabetic, bilateral ([facility]) E11.319    Chest pain, atypical R07.89    Paresthesia of right upper extremity Q28.6    Metabolic acidosis M71.9    Right arm pain M79.601    Neck pain M54.2    Intracranial carotid stenosis, left I65.22    Calculus of gallbladder without cholecystitis without obstruction K80.20    Hemispheric carotid artery syndrome G45.1    Abdominal pain R10.9    Acute cholecystitis with chronic cholecystitis K81.2    Severe malnutrition ([facility]) E43    Altered mental status R41.82    PRES (posterior reversible encephalopathy syndrome) I67.83    New onset seizure ([facility]) R56.9    Hypertensive urgency I16.0    Hypertensive emergency I16.1    PAD (peripheral artery disease) ([facility]) I73.9    Cellulitis L03.90    Cellulitis of right foot L03.115    Hyponatremia E87.1    Azotemia R79.89    Hypoalbuminemia due to protein-calorie malnutrition ([facility]) E88.09, E46    Hypocalcemia E83.51    Thrombocytosis D75.839    Acute blood loss as cause of postoperative anemia D62    MRSA infection - diabetic foot right side A49.02    Below knee amputation status, right [id number]    BKA stump complication ([facility]) H81.3       Past Medical History:        Diagnosis Date    Acid reflux     Below-knee amputation ([facility])     bilateral,wears prosthesis.  Calculus of gallbladder without cholecystitis without obstruction 9/2/2018    Cerebral artery occlusion with cerebral infarction [facility])     Cerebrovascular disease 2006    TIA    COVID-19     Diabetic foot infection ([address]     Drug (multiple) resistant infection     GERD (gastroesophageal reflux disease)     Glaucoma     Glaucoma     Hemispheric carotid artery syndrome 9/2/2018    Hyperlipidemia     Hypertension     IDDM (insulin dependent diabetes mellitus)     Legally blind     bilateral eyes. from diabetes & glaucoma    MDRO (multiple drug resistant organisms) resistance     MRSA (methicillin resistant staph aureus) culture positive 09/03/2017    foot    Neuropathy     Osteomyelitis (HCC)     Left Hallux    S/P cataract extraction and insertion of intraocular lens 2015    bilateral     Seizure [facility])     last seizure July 2019    Type II or unspecified type diabetes mellitus without mention of complication, not stated as uncontrolled     Urinary retention        Past Surgical History:        Procedure Laterality Date    CYSTOSCOPY N/A 4/30/2021    [facility]    FOOT SURGERY Left 02/24/2017    surgical prep of wound bed    FOOT SURGERY Left 04/13/2017    1) Left foot Substance Use Topics    Alcohol use: No                                Counseling given: Not Answered      Vital Signs (Current):   Vitals:    02/11/22 1102 02/11/22 1102   BP:  (!) 164/65   Pulse:  53   Resp:  18   Temp:  97.3 °F (36.3 °C)   TempSrc:  Temporal   SpO2:  97%   Weight: 186 lb (84.4 kg)    Height: 5' 7\" (1.702 m)                                               BP Readings from Last 3 Encounters:   02/11/22 (!) 164/65   11/30/21 (!) 177/72   09/28/21 (!) 169/69       NPO Status:                                                                                 BMI:   Wt Readings from Last 3 Encounters:   02/11/22 186 lb (84.4 kg)   11/30/21 186 lb (84.4 kg)   09/28/21 186 lb (84.4 kg)     Body mass index is 29.13 kg/m². CBC:   Lab Results   Component Value Date    WBC 9.1 02/01/2022    RBC 4.82 02/01/2022    HGB 13.2 02/01/2022    HCT 41.7 02/01/2022    MCV 86.5 02/01/2022    RDW 14.2 02/01/2022     02/01/2022       CMP:   Lab Results   Component Value Date     02/01/2022    K 4.7 02/01/2022     02/01/2022    CO2 21 02/01/2022    BUN 24 02/01/2022    CREATININE 1.16 02/01/2022    GFRAA >60 02/01/2022    LABGLOM >60 02/01/2022    GLUCOSE 146 02/01/2022    PROT 5.7 07/24/2019    CALCIUM 8.7 02/01/2022    BILITOT <0.10 07/20/2019    ALKPHOS 80 07/20/2019    AST 19 07/20/2019    ALT 23 07/20/2019       POC Tests: No results for input(s): POCGLU, POCNA, POCK, POCCL, POCBUN, POCHEMO, POCHCT in the last 72 hours.     Coags:   Lab Results   Component Value Date    PROTIME 10.4 07/07/2019    INR 1.0 07/07/2019    APTT 26.4 07/07/2019       HCG (If Applicable): No results found for: PREGTESTUR, PREGSERUM, HCG, HCGQUANT     ABGs: No results found for: PHART, PO2ART, CJF1KXD, IWL6XKA, BEART, D4ZOUFYJ     Type & Screen (If Applicable):  No results found for: LABABO, LABRH    Drug/Infectious Status (If Applicable):  Lab Results   Component Value Date    HEPCAB NONREACTIVE 07/24/2019       COVID-19 Screening (If Applicable):   Lab Results   Component Value Date    COVID19 Not Detected 04/26/2021    COVID19 Not Detected 07/11/2020           Anesthesia Evaluation    Airway: Mallampati: I  TM distance: >3 FB   Neck ROM: full  Mouth opening: > = 3 FB Dental:          Pulmonary:       (-) pneumonia                           Cardiovascular:    (+) hypertension:,     (-)  angina    ECG reviewed      Echocardiogram reviewed                  Neuro/Psych:   (+) CVA: no interval change,             GI/Hepatic/Renal:             Endo/Other:    (+) Diabetes, . Abdominal:             Vascular:   + PVD, aortic or cerebral, .        Other Findings:             Anesthesia Plan      general     ASA 4                                 Dedrick Martinez MD   2/11/2022

## 2022-02-11 NOTE — OP NOTE
Operative Note      Patient: Lily Jacobson  YOB: 1965  MRN: [id number]    Date of Procedure: 2/11/2022    Pre-Op Diagnosis: DX BULBOUS URETHRAL STRICTURE AND PHIMOSIS, recurrent balanitis    Post-Op Diagnosis: Same       Procedure(s):  CIRCUMCISION  CYSTOSCOPY DIL WITH OPTICAL INTERNAL URETHROTOMY    Surgeon(s):  Claudio Mckeon MD    Assistant:   * No surgical staff found *    Anesthesia: General    Estimated Blood Loss (mL): Minimal    Complications: None    Specimens:   * No specimens in log *    Implants:  * No implants in log *      Drains: * No LDAs found *    Findings: Indications: This patient is a 70-year-old, diabetic, patient was seen in consultation with difficulty upon urination, he has a history of urethral stricture that has required internal urethrotomy previously. He also has severe phimosis and recurrent balanitis. Patient scheduled for circumcision    Also scheduled for internal urethrotomy    Detailed Description of Procedure:   Patient was brought to the operating room positioned in dorsolithotomy, proper patient identification procedure identification. We first addressed the severe phimosis, 2 circumscribing incisions were applied to the foreskin at the level of the coronal sulcus. The foreskin was excised, bleeders were coagulated as encountered. We then approximated the cutaneous and mucosal aspect of the foreskin using 3-0 chromic interrupted and running sutures. There was no residual bleeding. We then entered the bladder with the cystoscope, we identified the posterior urethral stricture previously treated. We used the direct visual urethrotome, and we proceeded with internal urethrotomy this was successfully conducted with incision at the 12 o'clock position.     The cystoscope was then introduced in the bladder, prostate hypertrophy identified, the anterior the bladder was found to be free of tumors ulcers and stones both ureteral orifices identified effluxing clear urine. At the completion of the procedure the bladder was emptied the scope removed patient returned to recovery room in stable condition.     Recommendations follow-up visit at the office in 1 week    Electronically signed by Johny Taylor MD on 2/11/2022 at 11:48 AM

## 2022-02-11 NOTE — ANESTHESIA POSTPROCEDURE EVALUATION
Department of Anesthesiology  Postprocedure Note    Patient: Willie Zaldivar  MRN: [id number]  YOB: 1965  Date of evaluation: 2/11/2022  Time:  3:52 PM     Procedure Summary     Date: 02/11/22 Room / Location: [facility] OR 03 / [facility] - INPATIENT    Anesthesia Start: 9563 Anesthesia Stop: 1143    Procedures:       CIRCUMCISION (N/A Penis)      CYSTOSCOPY DIL WITH OPTICAL INTERNAL URETHROTOMY (N/A Urethra) Diagnosis: (DX STRICTURE AND PHIMOSIS)    Surgeons: Selvin Sharma MD Responsible Provider: Vikas Krause MD    Anesthesia Type: general ASA Status: 4          Anesthesia Type: general    Mary Lou Phase I: Mary Lou Score: 10    Mary Lou Phase II: Mary Lou Score: 10    Last vitals: Reviewed and per EMR flowsheets.        Anesthesia Post Evaluation    Complications: no

## 2022-02-15 LAB — SURGICAL PATHOLOGY REPORT: NORMAL

## 2022-03-01 ENCOUNTER — OFFICE VISIT (OUTPATIENT)
Dept: NEUROLOGY | Age: 57
End: 2022-03-01
Payer: COMMERCIAL

## 2022-03-01 VITALS
BODY MASS INDEX: 29.82 KG/M2 | SYSTOLIC BLOOD PRESSURE: 138 MMHG | DIASTOLIC BLOOD PRESSURE: 63 MMHG | HEIGHT: 67 IN | RESPIRATION RATE: 18 BRPM | WEIGHT: 190 LBS | TEMPERATURE: 97.3 F | OXYGEN SATURATION: 99 % | HEART RATE: 51 BPM

## 2022-03-01 DIAGNOSIS — R56.9 SEIZURES (HCC): Primary | ICD-10-CM

## 2022-03-01 PROCEDURE — 99212 OFFICE O/P EST SF 10 MIN: CPT | Performed by: STUDENT IN AN ORGANIZED HEALTH CARE EDUCATION/TRAINING PROGRAM

## 2022-03-01 ASSESSMENT — ENCOUNTER SYMPTOMS
BACK PAIN: 0
COUGH: 0
CONSTIPATION: 0
WHEEZING: 0
EYE DISCHARGE: 0
APNEA: 0
ABDOMINAL DISTENTION: 0
COLOR CHANGE: 0
ABDOMINAL PAIN: 0
DIARRHEA: 0
SHORTNESS OF BREATH: 0

## 2022-03-01 NOTE — PROGRESS NOTES
Current                                                [address] [place] [address], [facility] [address], [facility], [address] [facility], [address] [place] [address]  P: [phone]  F: [phone]    NEUROLOGY CLINIC NOTE     PATIENT NAME: Eileen Nunes  PATIENT MRN: [id number]  PRIMARY CARE PHYSICIAN: Travis Bolivar MD    INTERVAL HISTORY      Patient was initially being evaluated for DE ES. Patient initial presenting blood pressure was over 230. He is legally blind and has a bilateral BKA. He is currently on Keppra 1 g. Patient's last A1c was 8.1. He continues to have less than 2 episodes per day lasting approximately 15 minutes. His son describes of him scratching his head. He has no recollection of the episode. Last EEG was done in November 2020. He is able to get out of bed and into his wheelchair to get to the bathroom. He is legally blind secondary to diabetic retinopathy. HPI     Eileen Nunes is a 60 y.o. male who presents to clinic today for a follow-up evaluation. Patient has a PMH of previous CVA, PVD, bilateral BKA and blindness due to diabetes who was seen initially in the clinic for follow-up for PRES. , patient was initially seen in 2018. He presented to the hospital for altered mental status in 2018, SBP in the 230s and found to have a right gaze deviation with rhythmic jerking of his head and upper extremities. Was given Ativan and then loaded with Keppra. CT head showed concern for press, patient was also started on broad-spectrum antibiotics as he was febrile. An MRI showed multiple multifocal scattered increased T2 signals in the posterior cerebral hemispheres. LTM he did not show any epileptiform discharges and an LP was performed which showed an elevated white count but the meningitis panel was negative. He was placed on aspirin 81 mg and pravastatin 10 mg.     During his subsequent follow-up, he was found to have some staring spells where he was not responding, again he is legally blind and is on seizure prophylaxis with Keppra. He also had a repeat EEG on 11/4 which did not show any epileptiform discharges. patient was attempted to be weaned off of Keppra but there was concern for seizure-like activity with episodes of confusion. There was no tongue biting or incontinence noted. Dr. Dominik Chakraborty did see the patient on September 28 and the plan was to tentatively decrease the Keppra and the patient was on 250 but due to this increased activity with staring spells and confusion, the patient was increased back to 500. Currently, the patient is having 2-3 staring spells that are related to arguments occurring in the room, patient is currently having approximately 14-21 spells per week lasting approximately 3-4 minutes. With some postictal confusion, previous EEG did not show any epileptiform discharges. At baseline, he is functional and able to perform his daily activities without any significant impairment. Lab Results   Component Value Date    WBC 9.1 02/01/2022    HGB 13.2 02/01/2022    HCT 41.7 02/01/2022    MCV 86.5 02/01/2022     02/01/2022       Past Medical History:   Diagnosis Date    Acid reflux     Below-knee amputation (HCC)     bilateral,wears prosthesis. 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 from diabetes & glaucoma    MDRO (multiple drug resistant organisms) resistance     MRSA (methicillin resistant staph aureus) culture positive 09/03/2017    foot    Neuropathy     Osteomyelitis (HCC)     Left Hallux    S/P cataract extraction and insertion of intraocular lens 2015    bilateral     Seizure [facility])     last seizure July 2019    Type II or unspecified type diabetes mellitus without mention of complication, not stated as uncontrolled     Urinary retention         Past Surgical History:   Procedure Laterality Date    CIRCUMCISION N/A 2/11/2022    CIRCUMCISION performed by Ailyn Randall MD at [address] 4/30/2021    CYSTOSCOPY/DILATION performed by Ailyn Randall MD at [address] 2/11/2022    CYSTOSCOPY DIL WITH OPTICAL INTERNAL URETHROTOMY performed by Ailyn Randall MD at [address] Bilateral     lazer both eyes    FOOT SURGERY Left 02/24/2017    surgical prep of wound bed    FOOT SURGERY Left 04/13/2017    1) Left foot surgical preparation of wound-bed, 2) Left foot application of graft     [facility]  [facility] DOUBLE  4/28/2017         LEG AMPUTATION BELOW KNEE Left 9/7/2017    LEFT BELOW KNEE GUILLOTINE AMPUTATION performed by Bruno Hernandez MD at [address] Left 9/14/2017    REVISION LEFT BELOW KNEE AMPUTATION, CLOSED  performed by Bruno Hernandez MD at [address] Right 7/19/2019    RIGHT GUILLOTINE FOOT AMPUTATION performed by Madalyn Wyman MD at [address] Right 7/23/2019    RIGHT BELOW KNEE AMPUTATION performed by Madalyn Wyman MD at [address] Right 7/14/2020    RIGHT BELOW KNEE AMPUTATION REVISION performed by Madalyn Wyman MD at [facility] Left 01/23/2017    I and D bone, bone biopsy with flap closure and pulse lavage    OTHER SURGICAL HISTORY Left 04/27/2017    I & D foot    OTHER SURGICAL HISTORY  07/14/2020    RBKA Revision    [place] DEEP DISSEC FOOT INFEC,1 BURSA Left 4/13/2017    FOOT DEBRIDEMENT WITH EPIFIX  performed by Marek Bunch DPM at [address] INCIS/DRAIN THIGH/KNEE ABSCESS,DEEP Left 9/12/2017     LEFT BELOW KNEE AMPUTATION OPEN, WOUND VAC PLACEMENT performed by Sloan Blackman MD at [facility] 5 N/A 9/25/2018    CHOLECYSTECTOMY LAPAROSCOPIC performed by Laila Roberts MD at [facility] 26 Left 2014    hallux    TOE AMPUTATION Left 12/09/2016    hallux    TOE AMPUTATION Left 4/27/2017    I AND D FOOT, BONE BIOPSY, POSSIBLE FILET 2ND TOE performed by Klarissa Dye DPM at [address] [place] [address] TOE AMPUTATION  04/17/2017    Left second toe         Social History     Socioeconomic History    Marital status:      Spouse name: Not on file    Number of children: Not on file    Years of education: Not on file    Highest education level: Not on file   Occupational History    Not on file   Tobacco Use    Smoking status: Former Smoker     Packs/day: 1.00     Years: 10.00     Pack years: 10.00     Types: Cigarettes     Quit date: 9/18/2018     Years since quitting: 3.4    Smokeless tobacco: Never Used   Vaping Use    Vaping Use: Never used   Substance and Sexual Activity    Alcohol use: No    Drug use: No    Sexual activity: Not on file   Other Topics Concern    Not on file   Social History Narrative    Not on file     Social Determinants of Health     Financial Resource Strain:     Difficulty of Paying Living Expenses: Not on file   Food Insecurity:     Worried About [address] [employer] in the Last Year: Not on file    Melony of Food in the Last Year: Not on file   Transportation Needs:     Lack of Transportation (Medical): Not on file    Lack of Transportation (Non-Medical):  Not on file   Physical Activity:     Days of Exercise per Week: Not on file    Minutes of Exercise per Session: Not on file   Stress:     Feeling of Stress : Not on file   Social Connections:     Frequency of Communication with Friends and Family: Not on file    Frequency of Social Gatherings with Friends and Family: Not on file    Attends Yarsanism Services: Not on file   [facility] [employer] or Organizations: Not on file    Attends Club or Organization Meetings: Not on file    Marital Status: Not on file   Intimate Partner Violence:     Fear of Current or Ex-Partner: Not on file    Emotionally Abused: Not on file    Physically Abused: Not on file    Sexually Abused: Not on file   Housing Stability:     Unable to Pay for Housing in the Last Year: Not on file    Number of [place] in the Last Year: Not on file    Unstable Housing in the Last Year: Not on file        Current Outpatient Medications   Medication Sig Dispense Refill    levETIRAcetam (KEPPRA) 500 MG tablet Take 2 tablets by mouth 2 times daily 60 tablet 3    LOTRISONE 1-0.05 % cream Apply topically 2 times daily. To foreskin and head of the penis 2 Tube 2    BASAGLAR KWIKPEN 100 UNIT/ML injection pen inject 20 units subcutaneously twice a day      B-D UF III MINI PEN NEEDLES 31G X 5 MM MISC USE THREE TIMES A DAY      HYDROcodone-acetaminophen (NORCO) 5-325 MG per tablet take 1/2 tablet by mouth twice a day if needed      lisinopril (PRINIVIL;ZESTRIL) 40 MG tablet Take 40 mg by mouth daily   0    metoprolol tartrate (LOPRESSOR) 50 MG tablet Take 50 mg by mouth 2 times daily   0    gabapentin (NEURONTIN) 100 MG capsule Take 1 capsule by mouth 3 times daily for 180 days. Intended supply: 90 days 90 capsule 1    Gauze Pads & Dressings (GAUZE DRESSING) 4\"X4\" PADS 1 each by Does not apply route daily 10 each 1    amLODIPine (NORVASC) 10 MG tablet Take 1 tablet by mouth daily 30 tablet 3    aspirin 81 MG EC tablet Take 1 tablet by mouth daily 30 tablet 3    metFORMIN (GLUCOPHAGE) 500 MG tablet Take 500 mg by mouth 2 times daily (with meals)      omeprazole (PRILOSEC) 20 MG delayed release capsule Take 20 mg by mouth 2 times daily       No current facility-administered medications for this visit.         Allergies   Allergen Reactions    Latex     Adhesive Tape         REVIEW OF SYSTEMS: Review of Systems   HENT: Negative for congestion, dental problem, drooling, ear discharge and ear pain. Eyes: Negative for discharge. Respiratory: Negative for apnea, cough, shortness of breath and wheezing. Cardiovascular: Negative for chest pain, palpitations and leg swelling. Gastrointestinal: Negative for abdominal distention, abdominal pain, constipation and diarrhea. Endocrine: Negative for cold intolerance. Genitourinary: Negative for difficulty urinating. Musculoskeletal: Negative for arthralgias, back pain and gait problem. Skin: Negative for color change and wound. Neurological: Negative for dizziness, tremors, facial asymmetry, weakness, light-headedness, numbness and headaches. Psychiatric/Behavioral: Negative for agitation, behavioral problems and confusion. VITALS  There were no vitals taken for this visit. PHYSICAL EXAMINATION:     Physical Exam  Vitals and nursing note reviewed. Constitutional:       General: He is awake. Appearance: Normal appearance. He is normal weight. HENT:      Head: Normocephalic and atraumatic. Right Ear: Hearing normal.      Left Ear: Hearing normal.   Eyes:      Extraocular Movements: EOM normal.      Conjunctiva/sclera: Conjunctivae normal.      Pupils: Pupils are equal, round, and reactive to light. Cardiovascular:      Rate and Rhythm: Normal rate and regular rhythm. Pulses: Normal pulses. Pulmonary:      Effort: Pulmonary effort is normal.      Breath sounds: Normal breath sounds. Abdominal:      General: Abdomen is flat. Bowel sounds are normal.      Palpations: Abdomen is soft. Musculoskeletal:         General: Normal range of motion. Cervical back: Normal range of motion and neck supple. Neurological:      Mental Status: He is alert. Coordination: Romberg sign negative. Deep Tendon Reflexes:      Reflex Scores:       Tricep reflexes are 2+ on the left side.        Bicep reflexes are 2+ on the right side and 2+ on the left side. Brachioradialis reflexes are 2+ on the right side and 2+ on the left side. Patellar reflexes are 2+ on the right side and 2+ on the left side. Achilles reflexes are 2+ on the right side and 2+ on the left side. Psychiatric:         Mood and Affect: Mood normal.         Speech: Speech normal.         Thought Content: Thought content normal.          NEUROLOGICAL EXAMINATION:     Neurological Exam  Mental Status  Awake and alert. Oriented only to person, time and situation. Recalls 3 of 3 objects immediately. Speech is normal. Language is fluent with no aphasia. Able to perform serial calculations. Able to spell words backwards. Fund of knowledge is appropriate for level of education. Apraxia absent. Cranial Nerves  CN I: Sense of smell is normal.  CN II: Right visual acuity: no light perception. Left visual acuity: no light perception. CN III, IV, VI: Extraocular movements intact bilaterally. Pupils equal round and reactive to light bilaterally. CN V:  Right: Facial sensation is normal.  Left: Facial sensation is normal on the left. CN VII: Full and symmetric facial movement. CN VIII:  Right: Hearing is normal.  Left: Hearing is normal.  CN IX, X:  Right: Palate is normal.  Left: Palate is normal.  CN XI:  Right: Sternocleidomastoid strength is normal.    Motor  Decreased muscle bulk throughout. Decreased muscle tone. Bilateral BKA. Sensory  Light touch is normal in upper and lower extremities. Pinprick is normal in upper and lower extremities. Temperature is normal in upper and lower extremities. Vibration is normal in upper and lower extremities. Proprioception is normal in upper and lower extremities.      Reflexes                                           Right                      Left  Brachioradialis                    2+                         2+  Biceps                                 2+                         2+  Triceps in 3 months. neurological-history of PRES/seizure-like activity  -Continue to monitor for any neurological changes   -seizure precautions as directed  -Continue Keppra at current dose of 1 g twice daily  -Avoid heights and operating machinery  -I Will need an EEG, for 60 minutes to assess for seizures. .  -Follow-up in 3 months      Mr. Bc Cummins received counseling on the following healthy behaviors: medical compliance, smoking cessation, blood pressure control, regular follow up with primary doctor.         Electronically signed by Komal Aldana MD on 3/1/2022 at 3:08 PM

## 2022-03-24 DIAGNOSIS — R56.9 SEIZURES (HCC): ICD-10-CM

## 2022-03-24 NOTE — TELEPHONE ENCOUNTER
Patient calling for refill of Keppra.     Date of last fill: 11/30/21  Date of next follow up appointment: 6/7/22    Pt notified response time for refills is 24-48 hours

## 2022-03-25 RX ORDER — LEVETIRACETAM 500 MG/1
1000 TABLET ORAL 2 TIMES DAILY
Qty: 60 TABLET | Refills: 3 | Status: SHIPPED | OUTPATIENT
Start: 2022-03-25 | End: 2022-05-31 | Stop reason: SDUPTHER

## 2022-05-31 DIAGNOSIS — R56.9 SEIZURES (HCC): ICD-10-CM

## 2022-05-31 NOTE — TELEPHONE ENCOUNTER
Patient calling to requesting refill for keppra. Please review and e-scribe if applicable.      Last Visit Date: 3.1.22    Next Visit Date:  Future Appointments   Date Time Provider Nga Sorto   8/9/2022  2:00 PM Coral Li MD Neuro [facility]

## 2022-06-01 RX ORDER — LEVETIRACETAM 500 MG/1
1000 TABLET ORAL 2 TIMES DAILY
Qty: 120 TABLET | Refills: 3 | Status: SHIPPED | OUTPATIENT
Start: 2022-06-01

## 2023-01-14 NOTE — CONSULTS
post amputation 7/22/2019.  3. Type 2 diabetes >15-20 years  4. Hypertension, long-standing  5. Anemia  6. Severe atherosclerotic disease. 7.  History of left-sided AKA. 8.  Difficulty urinating sometimes-rule out obstruction  9. NSAID usage    Plan:   1. Okay to continue IV fluids at 75 cc an hour. 2.  Monitor strict I's and O's and renal function. 3. Will Check Renal Ultrasound to r/o element of obstruction and to assess the kidney size/echotexture. 4. Comprehensive urine testing including Urinalysis, Urine sodium, potassium, chloride, Urine protein and creatinine to quantify the proteinuria if any at all.   5. Will Order serum and urine protein electrophoresis to r/o element to occult paraprotein disease. 6. Will order Hepatitis B and C, MARY, Complement levels. 7.  Agree with stopping NSAIDs, avoid further usage. 8.  Agree with holding ACE inhibitors for now. 9.  Will check Vanco level. 10. BMP in a.m.  11.  Will follow. Nutrition   Please ensure that patient is on a renal diet/TF. Avoid nephrotoxic drugs/contrast exposure. Thank you for the consultation. Please do not hesitate to contact us for any further questions/concerns. We will continue to follow along with you. Nicholas Hicks MD  [facility]     This note is created with the assistance of a speech-recognition program. While intending to generate a document that actually reflects the content of the visit, no guarantees can be provided that every mistake has been identified and corrected by editing. NEGATIVE

## 2023-04-06 ENCOUNTER — INITIAL CONSULT (OUTPATIENT)
Dept: PHYSICAL MEDICINE AND REHAB | Age: 58
End: 2023-04-06

## 2023-04-06 VITALS — HEART RATE: 64 BPM | WEIGHT: 209.2 LBS | TEMPERATURE: 98.1 F | BODY MASS INDEX: 32.77 KG/M2

## 2023-04-06 DIAGNOSIS — Z89.511 S/P BILATERAL BKA (BELOW KNEE AMPUTATION) (HCC): Primary | ICD-10-CM

## 2023-04-06 DIAGNOSIS — Z89.512 S/P BILATERAL BKA (BELOW KNEE AMPUTATION) (HCC): Primary | ICD-10-CM

## 2023-04-06 NOTE — PROGRESS NOTES
[address] [address]  Dept: [phone]  Dept Fax: [phone]      Alina Cisneros is a 59y.o.-year-old male presenting for prosthetic evaluation regarding bilateral below-knee amputations. HPI:     Mr. Debria Kehr has a history of bilateral lower limb below the knee (BK / BK) amputations due to non-healing wounds and infection in the setting of diabetes. The left below-knee amputation took place in September 2017, and the right below-knee amputation took place in July 2019. He does currently have prostheses. Any problems with current prostheses? Yes - Due to limb changes in the bilateral residual limbs, the prostheses are too small for them to fit properly. He uses a 2-wheeled walker at home and in the community for assistance with mobility/ambulation. He uses a manual wheelchair at night when he is not wearing the prostheses. He has participated in physical therapy for training to use prostheses. The patient is able to don and doff the prostheses with no assistance. He does not require assistance at home for ADLs but does require assistance with IADLs. Patient would like to be able to continue to ambulate and complete ADLs independently. Areas of pain or weakness: He denies any pain in the bilateral residual limbs. He also denies any phantom pain and any areas of weakness. He states that he wears bilateral shrinkers when he is not wearing his prostheses. Of note, he also has a history of blindness and reports that he does not see any shapes or light. Past Medical History:   Diagnosis Date    Acid reflux     Below-knee amputation (HCC)     bilateral,wears prosthesis.     Calculus of gallbladder without cholecystitis without obstruction 9/2/2018    Cerebral artery occlusion with cerebral infarction [facility])     Cerebrovascular disease 2006

## 2023-04-09 ASSESSMENT — ENCOUNTER SYMPTOMS: COLOR CHANGE: 0

## 2023-04-24 DIAGNOSIS — Z89.512 S/P BILATERAL BKA (BELOW KNEE AMPUTATION) (HCC): Primary | ICD-10-CM

## 2023-04-24 DIAGNOSIS — Z89.511 S/P BILATERAL BKA (BELOW KNEE AMPUTATION) (HCC): Primary | ICD-10-CM

## 2023-05-23 ENCOUNTER — HOSPITAL ENCOUNTER (OUTPATIENT)
Dept: PHYSICAL THERAPY | Facility: CLINIC | Age: 58
Setting detail: THERAPIES SERIES
Discharge: HOME | End: 2023-05-23
Payer: COMMERCIAL

## 2023-05-23 PROCEDURE — 97161 PT EVAL LOW COMPLEX 20 MIN: CPT

## 2023-05-23 PROCEDURE — 97162 PT EVAL MOD COMPLEX 30 MIN: CPT

## 2023-05-23 NOTE — CONSULTS
[] [address]  [address]  [phone]  F: [phone] [] [address]  [facility]   [address]  P: [phone]  F: [phone] [x] [place]  [address]  P: [phone]  F: [phone] [] [address]  [facility]   [address]   [place]: [phone]  F: [phone]     Physical Therapy Lower Extremity Amputee Evaluation    Date:  2023  Patient: Corey Pal  : 1965  MRN: [id number]  Physician: Donna Santa MD   Insurance: [employer]--Auth after eval  Medical Diagnosis: T83.265, Z89.511 (ICD-10-CM) - S/P bilateral BKA (below knee amputation) ([facility]  Rehab Codes: R26.2 Difficulty walking  Onset date: 19  Next Dr's appt. : tbd    Subjective:   CC:Patient reports difficulty walking with new prosthetics and needs to use a walker. Has had multi falls.  He reports he was able to walk without AD prior to vision loss Dec 2022  Date and cause of amputation: 2019 R BKA   L BKA    Prosthetic company and prosthetist: [employer]  Date received prosthesis: New Bilateral feet and sockets received 23  Type of knee jt.: NA  Current wearing schedule: all day      PMHx: [] Unremarkable [x] Diabetes  IDDM [x] HTN  [] Pacemaker   [] MI/Heart Problems [] Cancer [] Arthritis [x] Other: Blind in bilateral eyes,              [x] Refer to full medical chart  In EPIC     Medications: [x] Refer to full medical record [] None [] Other:  Allergies:      [x] Refer to full medical record [] None [] Other:    General Pain:  [x] Yes  [] No  Location: Back Pain Rating: (0-10 scale) varies  Phantom Pain:[x] Yes  [] No   Phantom Sensation:[x] Yes  [] No     Working:  [] Normal Duty  [] Light Duty  [] Off D/T Condition  [] Retired    [x] Not Employed

## 2023-06-26 ENCOUNTER — HOSPITAL ENCOUNTER (OUTPATIENT)
Dept: PHYSICAL THERAPY | Facility: CLINIC | Age: 58
Setting detail: THERAPIES SERIES
Discharge: HOME | End: 2023-06-26
Payer: COMMERCIAL

## 2023-06-26 PROCEDURE — 97110 THERAPEUTIC EXERCISES: CPT

## 2023-06-29 ENCOUNTER — HOSPITAL ENCOUNTER (OUTPATIENT)
Dept: PHYSICAL THERAPY | Facility: CLINIC | Age: 58
Setting detail: THERAPIES SERIES
Discharge: HOME | End: 2023-06-29
Payer: COMMERCIAL

## 2023-06-29 PROCEDURE — 97110 THERAPEUTIC EXERCISES: CPT

## 2023-07-03 ENCOUNTER — HOSPITAL ENCOUNTER (OUTPATIENT)
Dept: PHYSICAL THERAPY | Facility: CLINIC | Age: 58
Setting detail: THERAPIES SERIES
Discharge: HOME | End: 2023-07-03
Payer: COMMERCIAL

## 2023-07-06 ENCOUNTER — HOSPITAL ENCOUNTER (OUTPATIENT)
Dept: PHYSICAL THERAPY | Facility: CLINIC | Age: 58
Setting detail: THERAPIES SERIES
Discharge: HOME | End: 2023-07-06
Payer: COMMERCIAL

## 2023-07-06 PROCEDURE — 97110 THERAPEUTIC EXERCISES: CPT

## 2023-07-14 ENCOUNTER — HOSPITAL ENCOUNTER (OUTPATIENT)
Dept: PHYSICAL THERAPY | Facility: CLINIC | Age: 58
Setting detail: THERAPIES SERIES
Discharge: HOME | End: 2023-07-14
Payer: COMMERCIAL

## 2023-07-14 PROCEDURE — 97110 THERAPEUTIC EXERCISES: CPT

## 2023-10-16 ENCOUNTER — CLINICAL DOCUMENTATION (OUTPATIENT)
Dept: PHYSICAL THERAPY | Facility: CLINIC | Age: 58
End: 2023-10-16

## 2024-04-19 NOTE — DISCHARGE INSTR - DIET
Continue to follow diabetic diet     Good nutrition is important when healing from an illness, injury, or surgery. Follow any nutrition recommendations given to you during your hospital stay.  If you were given an oral nutrition supplement while in the hospital, continue to take this supplement at home. You can take it with meals, in-between meals, and/or before bedtime. These supplements can be purchased at most local grocery stores, pharmacies, and chain super-stores.  If you have any questions about your diet or nutrition, call the hospital and ask for the dietitian. [employer]

## 2025-05-29 ENCOUNTER — OFFICE VISIT (OUTPATIENT)
Dept: PHYSICAL MEDICINE AND REHAB | Age: 60
End: 2025-05-29

## 2025-05-29 VITALS — DIASTOLIC BLOOD PRESSURE: 62 MMHG | TEMPERATURE: 97.5 F | HEART RATE: 60 BPM | SYSTOLIC BLOOD PRESSURE: 104 MMHG

## 2025-05-29 DIAGNOSIS — Z89.512 S/P BILATERAL BKA (BELOW KNEE AMPUTATION) (HCC): Primary | ICD-10-CM

## 2025-05-29 DIAGNOSIS — Z89.511 S/P BILATERAL BKA (BELOW KNEE AMPUTATION) (HCC): Primary | ICD-10-CM

## 2025-05-29 RX ORDER — LEVOTHYROXINE SODIUM 50 UG/1
50 TABLET ORAL EVERY MORNING
COMMUNITY

## 2025-05-29 RX ORDER — ALENDRONATE SODIUM 70 MG/1
TABLET ORAL
COMMUNITY

## 2025-05-29 RX ORDER — INSULIN ASPART INJECTION 100 [IU]/ML
INJECTION, SOLUTION SUBCUTANEOUS
COMMUNITY

## 2025-05-29 RX ORDER — LOSARTAN POTASSIUM 50 MG/1
50 TABLET ORAL EVERY MORNING
COMMUNITY

## 2025-05-29 RX ORDER — DAPAGLIFLOZIN 10 MG/1
10 TABLET, FILM COATED ORAL EVERY MORNING
COMMUNITY

## 2025-05-29 RX ORDER — BLOOD-GLUCOSE METER
EACH MISCELLANEOUS
COMMUNITY
Start: 2025-04-10

## 2025-05-29 RX ORDER — SODIUM BICARBONATE 650 MG/1
650 TABLET ORAL 2 TIMES DAILY
COMMUNITY

## 2025-05-29 RX ORDER — FLUTICASONE PROPIONATE 50 MCG
SPRAY, SUSPENSION (ML) NASAL
COMMUNITY

## 2025-05-29 RX ORDER — ALBUTEROL SULFATE 90 UG/1
POWDER, METERED RESPIRATORY (INHALATION)
COMMUNITY

## 2025-05-29 RX ORDER — LANCETS 30 GAUGE
EACH MISCELLANEOUS
COMMUNITY
Start: 2025-04-10

## 2025-05-29 RX ORDER — BLOOD SUGAR DIAGNOSTIC
STRIP MISCELLANEOUS
COMMUNITY
Start: 2025-04-10

## 2025-05-29 RX ORDER — FAMOTIDINE 40 MG/1
40 TABLET, FILM COATED ORAL 2 TIMES DAILY
COMMUNITY

## 2025-05-29 RX ORDER — ATORVASTATIN CALCIUM 10 MG/1
10 TABLET, FILM COATED ORAL NIGHTLY
COMMUNITY

## 2025-05-29 RX ORDER — FINERENONE 10 MG/1
1 TABLET, FILM COATED ORAL
COMMUNITY
Start: 2024-10-03

## 2025-05-29 NOTE — PROGRESS NOTES
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    Hyperlipidemia     Hypertension     IDDM (insulin dependent diabetes mellitus)     Legally blind     bilateral eyes.from diabetes & glaucoma    MDRO (multiple drug resistant organisms) resistance     MRSA (methicillin resistant staph aureus) culture positive 09/03/2017    foot    Neuropathy     Osteomyelitis (HCC)     Left Hallux    S/P cataract extraction and insertion of intraocular lens 2015    bilateral     Seizure (HCC)     last seizure July 2019    Type II or unspecified type diabetes mellitus without mention of complication, not stated as uncontrolled     Urinary retention       Past Surgical History:   Procedure Laterality Date    CIRCUMCISION N/A 2/11/2022    CIRCUMCISION performed by Orlando Quiros MD at [facility] OR    CYSTOSCOPY N/A 4/30/2021    CYSTOSCOPY/DILATION performed by Orlando Quiros MD at [facility] OR    CYSTOSCOPY N/A 2/11/2022    CYSTOSCOPY DIL WITH OPTICAL INTERNAL URETHROTOMY performed by Orlando Quiros MD at [facility] OR    EYE SURGERY Bilateral     lazer both eyes    FOOT SURGERY Left 02/24/2017    surgical prep of wound bed    FOOT SURGERY Left 04/13/2017    1) Left foot surgical preparation of wound-bed, 2) Left foot application of graft     HC  PICC POWERPICC DOUBLE  4/28/2017         LEG AMPUTATION BELOW KNEE Left 9/7/2017    LEFT BELOW KNEE GUILLOTINE AMPUTATION performed by Char Dejesus MD at [facility]    LEG AMPUTATION BELOW KNEE Left 9/14/2017    REVISION LEFT BELOW KNEE AMPUTATION, CLOSED  performed by Char Dejesus MD at [facility]    LEG AMPUTATION BELOW KNEE Right 7/19/2019    RIGHT GUILLOTINE FOOT AMPUTATION performed by Char Dejesus MD at [facility]    LEG AMPUTATION BELOW KNEE Right 7/23/2019    RIGHT [facility]    LEG AMPUTATION BELOW KNEE Right

## (undated) DEVICE — SVMMC AMPUTATION PK

## (undated) DEVICE — SUTURE MCRYL SZ 5-0 L18IN ABSRB UD L13MM P-3 3/8 CIR PRIM Y493G

## (undated) DEVICE — GAUZE,SPONGE,FLUFF,6"X6.75",STRL,5/TRAY: Brand: MEDLINE

## (undated) DEVICE — GLOVE SURG SZ 65 THK91MIL LTX FREE SYN POLYISOPRENE

## (undated) DEVICE — STRYKER PERFORMANCE SERIES SAGITTAL BLADE: Brand: STRYKER PERFORMANCE SERIES

## (undated) DEVICE — GLOVE SURG SZ 7 L12IN FNGR THK79MIL GRN LTX FREE

## (undated) DEVICE — ELECTRODE LAPAROSCOPIC LHOOK

## (undated) DEVICE — SUTURE VCRL + SZ 0 L27IN ABSRB UD CT-1 L36MM 1/2 CIR TAPR VCP260H

## (undated) DEVICE — SKIN PREP TRAY W/CHG: Brand: MEDLINE INDUSTRIES, INC.

## (undated) DEVICE — CONVERTED USE 297712 TOWELS OR BL X-RAY ST

## (undated) DEVICE — MINOR BSIN PK

## (undated) DEVICE — GOWN,AURORA,NONREINFORCED,LARGE: Brand: MEDLINE

## (undated) DEVICE — AGENT HEMSTAT W2XL3IN OXIDIZED REGENERATED CELOS ABSRB

## (undated) DEVICE — SUTURE PERMA-HAND SZ 2-0 L30IN NONABSORBABLE BLK L26MM SH K833H

## (undated) DEVICE — STERILE SURGICAL LUBRICANT, METAL TUBE: Brand: SURGILUBE

## (undated) DEVICE — GLOVE SURG SZ 7 CRM LTX FREE POLYISOPRENE POLYMER BEAD ANTI

## (undated) DEVICE — SPLINT KNEE UNIV FOR LESS THAN 36IN L20IN FOAM LAM E CNTCT

## (undated) DEVICE — SPONGE DRN W4XL4IN RAYON/POLYESTER 6 PLY NONWOVEN PRECUT

## (undated) DEVICE — Z DISCONTINUED BY MEDLINE USE 2711682 TRAY SKIN PREP DRY W/ PREM GLV

## (undated) DEVICE — SUTURE PDS II SZ 2-0 L27IN ABSRB VLT SH L26MM 1/2 CIR Z317H

## (undated) DEVICE — SUTURE PERMA HND 2-0 L18IN NONABSORBABLE BLK CT-1 L36MM 1/2 C022D

## (undated) DEVICE — BANDAGE GZ W2XL75IN ST RAYON POLY CNFRM STRTCH LTWT

## (undated) DEVICE — SUTURE VCRL + SZ 2-0 L27IN ABSRB WHT SH 1/2 CIR TAPERCUT VCP417H

## (undated) DEVICE — SUTURE CHROMIC GUT SZ 3-0 L27IN ABSRB BRN L26MM SH 1/2 CIR G122H

## (undated) DEVICE — Device

## (undated) DEVICE — BAG SPEC REM 224ML W4XL6IN DIA10MM 1 HND GYN DISP ENDOPCH

## (undated) DEVICE — SUTURE PERMAHAND SZ 0 L30IN NONABSORBABLE BLK FSL L30MM 3/8 680H

## (undated) DEVICE — TROCARS: Brand: KII® BLUNT TIP ACCESS SYSTEM

## (undated) DEVICE — TROCAR: Brand: KII® SLEEVE

## (undated) DEVICE — SHEET, T, LAPAROTOMY, STERILE: Brand: MEDLINE

## (undated) DEVICE — GLOVE SURG SZ 75 L12IN FNGR THK79MIL GRN LTX FREE

## (undated) DEVICE — NEEDLE HYPO 27GA L1.25IN GRY POLYPR HUB S STL REG BVL STR

## (undated) DEVICE — SUTURE VCRL + SZ 2-0 L27IN ABSRB CLR CT-1 1/2 CIR TAPERCUT VCP259H

## (undated) DEVICE — GLOVE SURG SZ 8 CRM LTX FREE POLYISOPRENE POLYMER BEAD ANTI

## (undated) DEVICE — GLOVE SURG SZ 7 L12IN FNGR THK87MIL WHT LTX FREE

## (undated) DEVICE — SUTURE MCRYL + SZ 4 0 L18IN ABSRB UD PC 3 L16MM 3 8 CIR PRIM MCP845G

## (undated) DEVICE — SHEET TRNSF W355XL435IN TBLR ORNG MAXI MINI

## (undated) DEVICE — RADIFOCUS GLIDEWIRE: Brand: GLIDEWIRE

## (undated) DEVICE — E-Z CLEAN, NON-STICK, PTFE COATED, ELECTROSURGICAL BLADE ELECTRODE, MODIFIED EXTENDED INSULATION, 2.5 INCH (6.35 CM): Brand: MEGADYNE

## (undated) DEVICE — GOWN,AURORA,NONRNF,XL,30/CS: Brand: MEDLINE

## (undated) DEVICE — GLOVE SURG SZ 75 CRM LTX FREE POLYISOPRENE POLYMER BEAD ANTI

## (undated) DEVICE — PAD,ABDOMINAL,5"X9",ST,LF,25/BX: Brand: MEDLINE INDUSTRIES, INC.

## (undated) DEVICE — SOLUTION ANTIFOG VIS SYS CLEARIFY LAPSCP

## (undated) DEVICE — TROCAR: Brand: KII FIOS FIRST ENTRY

## (undated) DEVICE — KIT NEG PRSS M DSG FOAM VAC VERAFLO

## (undated) DEVICE — SOLUTION IV 1000ML 0.9% SOD CHL PH 5 INJ USP VIAFLX PLAS

## (undated) DEVICE — TOWEL,OR,DSP,ST,BLUE,DLX,XR,4/PK,20PK/CS: Brand: MEDLINE

## (undated) DEVICE — Z DISCONTINUED USE 2275676 GLOVE SURG SZ 65 L12IN FNGR THK87MIL DK GRN LTX FREE ISOLEX

## (undated) DEVICE — GLOVE SURG SZ 6 CRM LTX FREE POLYISOPRENE POLYMER BEAD ANTI

## (undated) DEVICE — SUTURE CHROMIC GUT SZ 3-0 L54IN ABSRB BRN LIGAPAK DISPNS L112G

## (undated) DEVICE — AGENT HEMSTAT W2XL14IN OXIDIZED REGENERATED CELOS ABSRB FOR

## (undated) DEVICE — SUTURE PERMAHAND SZ 2-0 L18IN NONABSORBABLE BLK L26MM SH C012D

## (undated) DEVICE — CLEANER ES TIP W2XL2IN ADH BK RADPQ FOR S STL ELECTRD

## (undated) DEVICE — SUTURE ETHLN SZ 3-0 L18IN NONABSORBABLE BLK L24MM PS-1 3/8 1663G

## (undated) DEVICE — GLOVE SURG SZ 75 L12IN FNGR THK87MIL DK GRN LTX FREE ISOLEX

## (undated) DEVICE — SUTURE VCRL + SZ 3-0 L27IN ABSRB WHT CT-1 1/2 CIR VCP258H

## (undated) DEVICE — AGENT HEMSTAT W4XL8IN OXIDIZED REGENERATED CELOS ABSRB

## (undated) DEVICE — GLOVE SURG SZ 8 L12IN FNGR THK79MIL GRN LTX FREE

## (undated) DEVICE — SUTURE NONABSORBABLE MONOFILAMENT 2-0 FS 18 IN ETHILON 664H

## (undated) DEVICE — Z CONVERTED USE 2271043 CONTAINER SPEC COLL 4OZ SCR ON LID PEEL PCH

## (undated) DEVICE — SUTURE PERMAHAND SZ 2-0 L12X18IN NONABSORBABLE BLK SILK A185H

## (undated) DEVICE — 3M™ IOBAN™ 2 ANTIMICROBIAL INCISE DRAPE 6650EZ: Brand: IOBAN™ 2

## (undated) DEVICE — SUTURE VCRL + SZ 2 0 L27IN ABSRB UD CT 1 L36MM 1 2 CIR TAPR VCPP42D

## (undated) DEVICE — GLOVE ORANGE PI 7 1/2   MSG9075

## (undated) DEVICE — APPLIER CLP M/L SHFT DIA5MM 15 LIG LIGAMAX 5

## (undated) DEVICE — SUTURE VCRL + SZ 3-0 L27IN ABSRB UD L26MM SH 1/2 CIR VCP416H

## (undated) DEVICE — ELECTRODE PT RET AD L9FT HI MOIST COND ADH HYDRGEL CORDED

## (undated) DEVICE — SUTURE ABSORBABLE MONOFILAMENT 3-0 SH 27 IN VIO PDS + PDP316H

## (undated) DEVICE — GLOVE ORANGE PI 7   MSG9070

## (undated) DEVICE — DRAPE,REIN 53X77,STERILE: Brand: MEDLINE

## (undated) DEVICE — SOLUTION IV IRRIG POUR BRL 0.9% SODIUM CHL 2F7124

## (undated) DEVICE — BANDAGE COMPR W6INXL12FT SMOOTH FOR LIMB EXSANG ESMARCH

## (undated) DEVICE — 60 ML SYRINGE LUER-LOCK TIP: Brand: MONOJECT

## (undated) DEVICE — BLADE SAW L510MM S STL GIGLI FOR BNE CUT

## (undated) DEVICE — SUTURE PROL 4-0 L36IN NONABSORBABLE BLU V-7 L26MM 1/2 CIR 8975H

## (undated) DEVICE — COVER,LIGHT HANDLE,FLX,2/PK: Brand: MEDLINE INDUSTRIES, INC.

## (undated) DEVICE — YANKAUER,FLEXIBLE HANDLE,REGLR CAPACITY: Brand: MEDLINE INDUSTRIES, INC.

## (undated) DEVICE — GLOVE ORANGE PI 8   MSG9080

## (undated) DEVICE — ZIMMER® STERILE DISPOSABLE TOURNIQUET CUFF WITH PROTECTIVE SLEEVE AND PLC, DUAL PORT, SINGLE BLADDER, 42 IN. (107 CM)

## (undated) DEVICE — SWAB CULT CLR BLU PLAS RAYON LIQ AMIES AERB ANAERB FRIC CAP

## (undated) DEVICE — DISCONTINUED USE 405792 GLOVE SURG SENSICARE ALOE LT LF PF ST GRN SZ 7

## (undated) DEVICE — GLOVE SURG SZ 75 L12IN FNGR THK87MIL WHT LTX FREE

## (undated) DEVICE — Z INACTIVE NO SUPPLIER IDENTIFIED UNAVAILABLE USE 396435 DRAPE X-RAY CASSETTE W/LINER 24X20

## (undated) DEVICE — SUTURE ABSORBABLE MONOFILAMENT 4-0 PS2 18 IN UD PDS + PDP496G

## (undated) DEVICE — ZIMMER® STERILE DISPOSABLE TOURNIQUET CUFF WITH PROTECTIVE SLEEVE AND PLC, DUAL PORT, SINGLE BLADDER, 24 IN. (61 CM)

## (undated) DEVICE — SUTURE PERMAHAND SZ 3-0 L30IN NONABSORBABLE BLK L26MM SH C017D

## (undated) DEVICE — DISSECTOR LAP DIA5MM BLNT TIP ENDOPATH

## (undated) DEVICE — GLOVE SURG SZ 6 L12IN FNGR THK87MIL WHT LTX FREE

## (undated) DEVICE — KIT DRN FLAT W/ 100CC EVAC 7MM FULL PERF

## (undated) DEVICE — BLADE ES ELASTOMERIC COAT INSUL DURABLE BEND UPTO 90DEG

## (undated) DEVICE — GLOVE SURG SZ 65 CRM LTX FREE POLYISOPRENE POLYMER BEAD ANTI

## (undated) DEVICE — SUTURE VCRL + SZ 0 L27IN ABSRB VLT L26MM UR-6 5/8 CIR VCP603H

## (undated) DEVICE — DRESSING,GAUZE,PETROLATUM,CURAD,3"X9",ST: Brand: CURAD

## (undated) DEVICE — GLOVE ORANGE PI 8 1/2   MSG9085

## (undated) DEVICE — CHLORAPREP 26ML ORANGE

## (undated) DEVICE — ST CHARLES GEN LAPAROSCOPY PK: Brand: MEDLINE INDUSTRIES, INC.

## (undated) DEVICE — GLOVE SURG SZ 65 L12IN FNGR THK87MIL WHT LTX FREE

## (undated) DEVICE — BANDAGE,GAUZE,BULKEE II,4.5"X4.1YD,STRL: Brand: MEDLINE

## (undated) DEVICE — TUBE IRRIG HNDPC HI FLO TP INTRPULS W/SUCTION TUBE

## (undated) DEVICE — Z CONVERTED USE 2162213 APPLICATOR PREP 4OZ CHG 4% BACTOSHIELD USE FOR HND WSH AND

## (undated) DEVICE — SURGICAL PROCEDURE KIT BASIN MAJ SET UP

## (undated) DEVICE — DRESSING NEG PRESSURE WND VAC

## (undated) DEVICE — GOWN,SIRUS,POLYRNF,XLN/3XL,18/CS: Brand: MEDLINE

## (undated) DEVICE — Z DUP USE 2522782 SOLUTION IRRIG 1000ML STRL H2O PLAS CONTAINER UROMATIC

## (undated) DEVICE — COAXIAL HIGH FLOW TIP WITH SOFT SHIELD

## (undated) DEVICE — GAUZE,SPONGE,4"X4",16PLY,XRAY,STRL,LF: Brand: MEDLINE

## (undated) DEVICE — SOLUTION IRRIG 3000ML STRL H2O USP UROMATIC PLAS CONT

## (undated) DEVICE — SUTURE SZ 0 27IN 5/8 CIR UR-6  TAPER PT VIOLET ABSRB VICRYL J603H

## (undated) DEVICE — CANISTER NEG PRSS 500ML WND THER W/ TBNG NO PRSS RANG W/

## (undated) DEVICE — 3M™ COBAN™ NL STERILE NON-LATEX SELF-ADHERENT WRAP, 2084S, 4 IN X 5 YD (10 CM X 4,5 M), 18 ROLLS/CASE: Brand: 3M™ COBAN™

## (undated) DEVICE — TROCAR ENDOSCP L100MM DIA5MM BLDELSS STBL SL OBT RADLUC

## (undated) DEVICE — STRIP SKIN CLSR W1XL5IN NYL REINF CURAD

## (undated) DEVICE — SYRINGE, LUER LOCK, 10ML: Brand: MEDLINE

## (undated) DEVICE — THIN OFFSET (9.0 X 0.38 X 25.0MM)

## (undated) DEVICE — SCISSOR SURG CRV ENDOCUT TIP FOR LAP DISP

## (undated) DEVICE — SYRINGE IRRIG 60ML SFT PLIABLE BLB EZ TO GRP 1 HND USE W/